# Patient Record
Sex: FEMALE | Race: WHITE | NOT HISPANIC OR LATINO | Employment: OTHER | URBAN - METROPOLITAN AREA
[De-identification: names, ages, dates, MRNs, and addresses within clinical notes are randomized per-mention and may not be internally consistent; named-entity substitution may affect disease eponyms.]

---

## 2017-01-30 ENCOUNTER — HOSPITAL ENCOUNTER (OUTPATIENT)
Dept: MAMMOGRAPHY | Facility: CLINIC | Age: 77
Discharge: HOME | End: 2017-01-30
Attending: SURGERY | Admitting: SURGERY
Payer: COMMERCIAL

## 2017-01-30 ENCOUNTER — OFFICE VISIT (OUTPATIENT)
Dept: SURGERY | Facility: CLINIC | Age: 77
End: 2017-01-30
Payer: COMMERCIAL

## 2017-01-30 VITALS — DIASTOLIC BLOOD PRESSURE: 88 MMHG | SYSTOLIC BLOOD PRESSURE: 142 MMHG | HEART RATE: 72 BPM

## 2017-01-30 DIAGNOSIS — C50.411 BREAST CANCER OF UPPER-OUTER QUADRANT OF RIGHT FEMALE BREAST (H): Primary | ICD-10-CM

## 2017-01-30 DIAGNOSIS — Z12.31 VISIT FOR SCREENING MAMMOGRAM: ICD-10-CM

## 2017-01-30 PROCEDURE — G0202 SCR MAMMO BI INCL CAD: HCPCS

## 2017-01-30 PROCEDURE — 99213 OFFICE O/P EST LOW 20 MIN: CPT | Performed by: SURGERY

## 2017-01-30 NOTE — PROGRESS NOTES
CHIEF COMPLAINT:  Chief Complaint   Patient presents with     RECHECK     6 month f/u; hx of right breast cancer; lumpectomy 2/2016       HISTORY OF PRESENT ILLNESS:  Yolanda Watson is a 76 year old female who is seen in follow up regarding right breast cancer for which she had a right lumpectomy and SLN biopsy 2/2016.  The tumor was small and low grade and the nodes were negative.  Yolanda had a consultation with radiation, but decided against radiation and is on Anastrazole.  She had mammograms today which are negative by report and my review.  She has noted no breast changes.  Since I last saw her, Yolanda had a right colectomy for cancer.  She says that she is recovering well.    REVIEW OF SYSTEMS:  Constitutional:  Negative for chills, fatigue, fever and weight change.  Eyes:  Negative for blurred vision, eye pain and photophobia.  ENT:  Negative for hearing problems, ENT pain, congestion, rhinorrhea, epistaxis, hoarseness and dental problems.  Cardiovascular:  Negative for chest pain, palpitations, tachycardia, orthopnea and edema.  Respiratory:  Negative for cough, dyspnea and hemoptysis.  Gastrointestinal:  Negative for abdominal pain, heartburn, constipation, diarrhea and stool changes.  Musculoskeletal:  Negative for arthralgias, back pain and myalgias.  Integumentary/Breast:  See HPI.    Past Medical History   Diagnosis Date     Hyperlipidaemia      HTN (hypertension)      Pre-diabetes      Tachycardia      Lung nodule 2013     Lung cancer     Gastro-oesophageal reflux disease      Anxiety      Depression      Hyperlipidemia      Cervical cancer (H)      Breast cancer, right breast (H) 1/2016     Herpes      Acoustic neuroma (H)      H/O: lung cancer 2013     Diabetes mellitus (H)      pre-diabetic no longer taking Metformin     Colon cancer (H)      Herpes zoster      Anemia      iron deficeincy     Arthritis      Dyspnea      Rib lesion        Past Surgical History   Procedure Laterality Date     Cataracts        Tonsillectomy       Cervical cancer screening results documented and reviewed       Hysterectomy  1966     Cervical CA, paps done every other year, done with appendectomy     Breast biopsy, rt/lt       Resect rib  2/15/2013     Procedure: RESECT RIB;  RESECTION PORTION RIGHT TWELVETH RIB;  Surgeon: Lorne Arenas MD;  Location:  OR     Appendectomy  1966     done with [facility]     Eye surgery       fiona cataracts     Thoracotomy  3/12/2013     Procedure: THORACOTOMY;  RIGHT THORACOTOMY, RIGHT MIDDLE AND LOWER LUNG LOBECTOMY, MEDIASTINAL LYMPH NODE DISSECTION ;  Surgeon: Lorne Arenas MD;  Location:  OR     Lobectomy lung  3/12/2013     Procedure: LOBECTOMY LUNG;;  Surgeon: Lorne Arenas MD;  Location:  OR     Excise node mediastinal  3/12/2013     Procedure: EXCISE NODE MEDIASTINAL;;  Surgeon: Lorne Arenas MD;  Location:  OR     Cholecystectomy  1991     Mini arc sling operation for stress incontinence  2009     Cl aff surgical pathology       Lumpectomy breast with seed localization Right 2/3/2016     Procedure: LUMPECTOMY BREAST WITH SEED LOCALIZATION;  Surgeon: Flory Pinto MD;  Location:  [facility]     Biopsy node sentinel Right 2/3/2016     Procedure: BIOPSY NODE SENTINEL;  Surgeon: Flory Pinto MD;  Location:  [facility]     Genitourinary surgery       bladder sling     Arthroscopy knee  2012     right knee scoped     Gyn surgery  1966     hyst      Laparoscopic assisted colectomy Right 10/27/2016     Procedure: LAPAROSCOPIC ASSISTED COLECTOMY;  Surgeon: Umang Arteaga MD;  Location:  OR     Colonoscopy         Family History   Problem Relation Age of Onset     Breast Cancer Paternal Grandmother      Colon Cancer       not specified     Fractures       Hip fracture, Aunt     Hyperlipidemia Mother      Hypertension Mother      HEART DISEASE Mother      OSTEOPOROSIS Mother      Hyperlipidemia Sister      Hyperlipidemia Brother      Prostate Cancer  Brother      Hypertension Sister      HEART DISEASE Maternal Grandfather      Prostate Cancer Father      Colon Cancer Cousin      son of paternal aunt with breast cancer.     Breast Cancer Paternal Aunt      Colon Cancer Paternal Aunt      Other Cancer Paternal Uncle      Esophageal     Other Cancer Maternal Aunt        Social History   Substance Use Topics     Smoking status: Former Smoker     Quit date: 04/24/1998     Smokeless tobacco: Not on file      Comment: quit 1998     Alcohol Use: 0.0 oz/week     0 Standard drinks or equivalent per week      Comment: glass of wine a day       Patient Active Problem List   Diagnosis     Lung cancer (H)     ACP (advance care planning)     OAB (overactive bladder)     Colon cancer (H)     Rectal bleeding     Allergies   Allergen Reactions     Demerol [Meperidine] Nausea and Vomiting     Sulfa Drugs Other (See Comments)     Extreme chills     Penicillins Rash     Swelling In mouth and tongue     Current Outpatient Prescriptions   Medication Sig Dispense Refill     LISINOPRIL PO Take 5 mg by mouth At Bedtime        FERROUS GLUCONATE PO Take 324 mg by mouth daily Takes in the afternoon ~ 4pm       Atorvastatin Calcium (LIPITOR PO) Take 20 mg by mouth At Bedtime       PARoxetine HCl (PAXIL PO) Take 20 mg by mouth At Bedtime       multivitamin, therapeutic (THERA-VIT) TABS Take 1 tablet by mouth At Bedtime No iron       Calcium Carb-Cholecalciferol (CALCIUM 600+D) 600-800 MG-UNIT TABS Take 2 tablets by mouth At Bedtime       ANASTROZOLE PO Take 1 mg by mouth At Bedtime        BIOTIN PO Take 2 tablets by mouth At Bedtime 2 x 5000 mcg       albuterol (PROAIR HFA, PROVENTIL HFA, VENTOLIN HFA) 108 (90 BASE) MCG/ACT inhaler Inhale 2 puffs into the lungs every 6 hours as needed        FENOFIBRATE PO Take 134 mg by mouth At Bedtime        omeprazole (PRILOSEC) 20 MG capsule Take 20 mg by mouth At Bedtime        Vitals: /88 mmHg  Pulse 72  BMI= There is no weight on file to  calculate BMI.    EXAM:  GENERAL: healthy, alert and no distress   BREAST:  The breasts appear symmetric with no overlying skin changes.  The nipples are normal bilaterally.  There is no dimpling or thickening of the skin. A well healed scar is seen on the upper outer right breast.  No mass is appreciated in either breast.  The breast tissue is generally soft with increased lobular density in both upper outer quadrants, more left than right.  There is no axillary or supraclavicular lymphadenopathy.  CARDIOVASCULAR:  No edema or JVD.  RESPIRATORY: negative findings: no chest deformities noted, no chest wall tenderness, breathing is slightly labored (due to previous lung surgery).  NECK: Neck supple. No adenopathy.  SKIN: No suspicious lesions or rashes  LYMPH: Normal cervical lymph nodes    ASSESSMENT:  Yolanda Watson seems to be doing well from the breast cancer standpoint.  There is no evidence of recurrence on exam or mammograms.  She is on anastrazole which she is tolerating well and will continue under Dr. Subramanian's direction.  Yolanda has been referred to genetics due to a mutation found in her colon cancer.    PLAN:  Yolanda will continue with annual breast screening and will follow up with me as needed.    Flory Pinto MD    Please route or send letter to:  Primary Care Provider (PCP)  Dr. Washington Subramanian      [facility]      Physical Exam

## 2017-01-30 NOTE — MR AVS SNAPSHOT
"              After Visit Summary   2017    Yolanda Watson    MRN: [id number]           Patient Information     Date Of Birth          1940        Visit Information        Provider Department      2017 12:00 PM Flory Pinto MD [facility]       Follow-ups after your visit        Who to contact     If you have questions or need follow up information about today's clinic visit or your schedule please contact [facility] directly at [phone].  Normal or non-critical lab and imaging results will be communicated to you by DocOnYouhart, letter or phone within 4 business days after the clinic has received the results. If you do not hear from us within 7 days, please contact the clinic through DocOnYouhart or phone. If you have a critical or abnormal lab result, we will notify you by phone as soon as possible.  Submit refill requests through TakeCharge or call your pharmacy and they will forward the refill request to us. Please allow 3 business days for your refill to be completed.          Additional Information About Your Visit        MyChart Information     TakeCharge lets you send messages to your doctor, view your test results, renew your prescriptions, schedule appointments and more. To sign up, go to [email or web address] . Click on \"Log in\" on the left side of the screen, which will take you to the Welcome page. Then click on \"Sign up Now\" on the right side of the page.     You will be asked to enter the access code listed below, as well as some personal information. Please follow the directions to create your username and password.     Your access code is: [email or web address]  Expires: 2017 12:29 PM     Your access code will  in 90 days. If you need help or a new code, please call your [facility] clinic or [phone].        Care EveryWhere ID     This is your Care EveryWhere ID. This could be " used by other organizations to access your [facility] medical records  [id number]        Your Vitals Were     Pulse                   72            Blood Pressure from Last 3 Encounters:   01/30/17 142/88   11/03/16 163/77   10/30/16 152/78    Weight from Last 3 Encounters:   10/27/16 212 lb (96.163 kg)   07/18/16 224 lb (101.606 kg)   02/15/16 221 lb (100.245 kg)              Today, you had the following     No orders found for display       Primary Care Provider Office Phone # Fax #    Soren Perdomo [phone]       [facility] [address]  [place] [address]        Thank you!     Thank you for choosing [facility]  for your care. Our goal is always to provide you with excellent care. Hearing back from our patients is one way we can continue to improve our services. Please take a few minutes to complete the written survey that you may receive in the mail after your visit with us. Thank you!             Your Updated Medication List - Protect others around you: Learn how to safely use, store and throw away your medicines at www.disposemymeds.org.          This list is accurate as of: 1/30/17 12:29 PM.  Always use your most recent med list.                   Brand Name Dispense Instructions for use    albuterol 108 (90 BASE) MCG/ACT Inhaler    PROAIR HFA/PROVENTIL HFA/VENTOLIN HFA     Inhale 2 puffs into the lungs every 6 hours as needed       ANASTROZOLE PO      Take 1 mg by mouth At Bedtime       BIOTIN PO      Take 2 tablets by mouth At Bedtime 2 x 5000 mcg       CALCIUM 600+D 600-800 MG-UNIT Tabs   Generic drug:  Calcium Carb-Cholecalciferol      Take 2 tablets by mouth At Bedtime       FENOFIBRATE PO      Take 134 mg by mouth At Bedtime       FERROUS GLUCONATE PO      Take 324 mg by mouth daily Takes in the afternoon ~ 4pm       LIPITOR PO      Take 20 mg by mouth At Bedtime       LISINOPRIL PO      Take 5 mg by mouth At Bedtime        multivitamin, therapeutic Tabs tablet      Take 1 tablet by mouth At Bedtime No iron       omeprazole 20 MG CR capsule    priLOSEC     Take 20 mg by mouth At Bedtime       PAXIL PO      Take 20 mg by mouth At Bedtime

## 2017-01-30 NOTE — Clinical Note
2017      RE: Yolanda Watson-:  [birth year]    HISTORY OF PRESENT ILLNESS:  Yolanda Watson is a 76 year old female who is seen in follow up regarding right breast cancer for which she had a right lumpectomy and SLN biopsy 2016.  The tumor was small and low grade and the nodes were negative.  Yolanda had a consultation with radiation, but decided against radiation and is on Anastrazole.  She had mammograms today which are negative by report and my review.  She has noted no breast changes.  Since I last saw her, Yolanda had a right colectomy for cancer.  She says that she is recovering well.    REVIEW OF SYSTEMS:  Constitutional:  Negative for chills, fatigue, fever and weight change.  Eyes:  Negative for blurred vision, eye pain and photophobia.  ENT:  Negative for hearing problems, ENT pain, congestion, rhinorrhea, epistaxis, hoarseness and dental problems.  Cardiovascular:  Negative for chest pain, palpitations, tachycardia, orthopnea and edema.  Respiratory:  Negative for cough, dyspnea and hemoptysis.  Gastrointestinal:  Negative for abdominal pain, heartburn, constipation, diarrhea and stool changes.  Musculoskeletal:  Negative for arthralgias, back pain and myalgias.  Integumentary/Breast:  See HPI.    Vitals: /88 mmHg  Pulse 72  BMI= There is no weight on file to calculate BMI.    EXAM:  GENERAL: healthy, alert and no distress   BREAST:  The breasts appear symmetric with no overlying skin changes.  The nipples are normal bilaterally.  There is no dimpling or thickening of the skin. A well healed scar is seen on the upper outer right breast.  No mass is appreciated in either breast.  The breast tissue is generally soft with increased lobular density in both upper outer quadrants, more left than right.  There is no axillary or supraclavicular lymphadenopathy.  CARDIOVASCULAR:  No edema or JVD.  RESPIRATORY: negative findings: no chest deformities noted, no chest wall tenderness, breathing is  slightly labored (due to previous lung surgery).  NECK: Neck supple. No adenopathy.  SKIN: No suspicious lesions or rashes  LYMPH: Normal cervical lymph nodes    ASSESSMENT:  Yolanda Watson seems to be doing well from the breast cancer standpoint.  There is no evidence of recurrence on exam or mammograms.  She is on anastrazole which she is tolerating well and will continue under Dr. Subramanian's direction.  Yolanda has been referred to genetics due to a mutation found in her colon cancer.    PLAN:  Yolanda will continue with annual breast screening and will follow up with me as needed.    Flory Pinto MD

## 2017-07-22 ENCOUNTER — OFFICE VISIT (OUTPATIENT)
Dept: URGENT CARE | Facility: URGENT CARE | Age: 77
End: 2017-07-22
Payer: COMMERCIAL

## 2017-07-22 VITALS
SYSTOLIC BLOOD PRESSURE: 132 MMHG | OXYGEN SATURATION: 96 % | BODY MASS INDEX: 31.01 KG/M2 | HEART RATE: 106 BPM | DIASTOLIC BLOOD PRESSURE: 80 MMHG | WEIGHT: 210 LBS | TEMPERATURE: 98.8 F

## 2017-07-22 DIAGNOSIS — H60.501 ACUTE OTITIS EXTERNA OF RIGHT EAR, UNSPECIFIED TYPE: Primary | ICD-10-CM

## 2017-07-22 PROCEDURE — 99213 OFFICE O/P EST LOW 20 MIN: CPT | Performed by: PHYSICIAN ASSISTANT

## 2017-07-22 RX ORDER — NEOMYCIN SULFATE, POLYMYXIN B SULFATE AND HYDROCORTISONE 10; 3.5; 1 MG/ML; MG/ML; [USP'U]/ML
4 SUSPENSION/ DROPS AURICULAR (OTIC) 4 TIMES DAILY
Qty: 6 ML | Refills: 0 | Status: SHIPPED | OUTPATIENT
Start: 2017-07-22 | End: 2017-07-29

## 2017-07-22 NOTE — PATIENT INSTRUCTIONS
External Ear Infection (Adult)    External otitis (also called  swimmer s ear ) is an infection in the ear canal. It is often caused by bacteria or fungus. It can occur a few days after water gets trapped in the ear canal (from swimming or bathing). It can also occur after cleaning too deeply in the ear canal with a cotton swab or other object. Sometimes, hair care products get into the ear canal and cause this problem.  Symptoms can include pain, fever, itching, redness, drainage, or swelling of the ear canal. Temporary hearing loss may also occur.  Home care    Do not try to clean the ear canal. This can push pus and bacteria deeper into the canal.    Use prescribed ear drops as directed. These help reduce swelling and fight the infection. If an ear wick was placed in the ear canal, apply drops right onto the end of the wick. The wick will draw the medication into the ear canal even if it is swollen closed.    A cotton ball may be loosely placed in the outer ear to absorb any drainage.    You may use acetaminophen or ibuprofen to control pain, unless another medication was prescribed. Note: If you have chronic liver or kidney disease or ever had a stomach ulcer or GI bleeding, talk to your health care provider before taking any of these medications.    Do not allow water to get into your ear when bathing. Also, avoid swimming until the infection has cleared.  Prevention    Keep your ears dry. This helps lower the risk of infection. Dry your ears with a towel or hair dryer after getting wet. Also, use ear plugs when swimming.    Do not stick any objects in the ear to remove wax.    If you feel water trapped in your ear, use ear drops right away. You can get these drops over the counter at most drugstores. They work by removing water from the ear canal.  Follow-up care  Follow up with your health care provider in one week, or as advised.  When to seek medical advice  Call your health care provider right away if  any of these occur:    Ear pain becomes worse or doesn t improve after 3 days of treatment    Redness or swelling of the outer ear occurs or gets worse    Headache    Painful or stiff neck    Drowsiness or confusion    Fever of 100.4 F (38 C) or higher, or as directed by your health care provider    Seizure  Date Last Reviewed: 3/22/2015    4490-0557 [employer]. [address] [place] [address]. All rights reserved. This information is not intended as a substitute for professional medical care. Always follow your healthcare professional's instructions.

## 2017-07-22 NOTE — PROGRESS NOTES
SUBJECTIVE:    Yolanda Watson is a 76 y.o. Woman who presents to  today for evaluation of right ear pain x 2 days duration. Notes onset of discomfort came on after using Q-Tip in right ear several days ago. No drainage. No fever. No other acute illness sxs.     ROS:     HEENT: Right ear pain.   RESP: No cough, wheezing or SOB  GI: Denies any N/V/D. No abdominal pain. Normal BM's  SKIN: Denies rash          Past Medical History:   Diagnosis Date     Acoustic neuroma (H)      Anemia     iron deficeincy     Anxiety      Arthritis      Breast cancer, right breast (H) 1/2016     Cervical cancer (H)      Colon cancer (H)      Depression      Diabetes mellitus (H)     pre-diabetic no longer taking Metformin     Dyspnea      Gastro-oesophageal reflux disease      H/O: lung cancer 2013     Herpes      Herpes zoster      HTN (hypertension)      Hyperlipidaemia      Hyperlipidemia      Lung nodule 2013    Lung cancer     Pre-diabetes      Rib lesion      Tachycardia        Current Outpatient Prescriptions:      LISINOPRIL PO, Take 5 mg by mouth At Bedtime , Disp: , Rfl:      FERROUS GLUCONATE PO, Take 324 mg by mouth daily Takes in the afternoon ~ 4pm, Disp: , Rfl:      Atorvastatin Calcium (LIPITOR PO), Take 20 mg by mouth At Bedtime, Disp: , Rfl:      PARoxetine HCl (PAXIL PO), Take 20 mg by mouth At Bedtime, Disp: , Rfl:      multivitamin, therapeutic (THERA-VIT) TABS, Take 1 tablet by mouth At Bedtime No iron, Disp: , Rfl:      Calcium Carb-Cholecalciferol (CALCIUM 600+D) 600-800 MG-UNIT TABS, Take 2 tablets by mouth At Bedtime, Disp: , Rfl:      ANASTROZOLE PO, Take 1 mg by mouth At Bedtime , Disp: , Rfl:      BIOTIN PO, Take 2 tablets by mouth At Bedtime 2 x 5000 mcg, Disp: , Rfl:      albuterol (PROAIR HFA, PROVENTIL HFA, VENTOLIN HFA) 108 (90 BASE) MCG/ACT inhaler, Inhale 2 puffs into the lungs every 6 hours as needed , Disp: , Rfl:      FENOFIBRATE PO, Take 134 mg by mouth At Bedtime , Disp: , Rfl:      omeprazole  "(PRILOSEC) 20 MG capsule, Take 20 mg by mouth At Bedtime , Disp: , Rfl:     Allergies   Allergen Reactions     Demerol [Meperidine] Nausea and Vomiting     Sulfa Drugs Other (See Comments)     Extreme chills     Penicillins Rash     Swelling In mouth and tongue           OBJECTIVE:  /80 (BP Location: Right arm, Patient Position: Chair, Cuff Size: Adult Large)  Pulse 106  Temp 98.8  F (37.1  C) (Tympanic)  Wt 210 lb (95.3 kg)  SpO2 96%  BMI 31.01 kg/m2    General appearance: alert and no apparent distress  Skin color is pink and without rash.  HEENT:   Conjunctiva not injected.  Sclera clear.  Left TM is normal: no effusions, no erythema, and normal landmarks.  Right external canal is mildly erythematous and mildly edematous. Small amount of purulent debris noted. I am only able to visualize approximately 50% of TM today. The portion of TM visualized in normal in color and wide light reflex noted. Mildly tender on manipulation of tragus. No adrienne-auricular erythema, tenderness or swelling. No facial or lateral neck swelling.   Nasal mucosa is normal.  Oropharyngeal exam is normal: no lesions, erythema, adenopathy or exudate.  Neck is supple with no adenopathy  CARDIAC:NORMAL - regular rate and rhythm without murmur.  RESP: Normal - CTA without rales, rhonchi, or wheezing.      ASSESSMENT/PLAN:    (H60.501) Acute otitis externa of right ear, unspecified type  (primary encounter diagnosis)  Plan: neomycin-polymyxin-hydrocortisone (CORTISPORIN)        [id number] otic suspension    1. Abx ear gtts as per above   2. Dry ear precautions  3. Follow-up with PCP if sxs change, worsen or fail to fully resolve with above tx.  4.  In addition to the above, OE \"red flag\" signs and sxs are reviewed with pt both verbally and by way of printed educational material for home review.  Pt verbalizes understanding of and agrees to the above plan.            "

## 2017-07-22 NOTE — NURSING NOTE
"Chief Complaint   Patient presents with     Urgent Care     Ear Problem     Pt states has right ear pain x 2 days. States balance has been off as well.        Initial /80 (BP Location: Right arm, Patient Position: Chair, Cuff Size: Adult Large)  Pulse 106  Temp 98.8  F (37.1  C) (Tympanic)  Wt 210 lb (95.3 kg)  SpO2 96%  BMI 31.01 kg/m2 Estimated body mass index is 31.01 kg/(m^2) as calculated from the following:    Height as of 10/27/16: 5' 9\" (1.753 m).    Weight as of this encounter: 210 lb (95.3 kg).  Medication Reconciliation: unable or not appropriate to perform   Daniel Conteh CMA (AAMA) 7/22/2017 1:44 PM    "

## 2017-07-22 NOTE — MR AVS SNAPSHOT
After Visit Summary   7/22/2017    Yolanda Watson    MRN: [id number]           Patient Information     Date Of Birth          1940        Visit Information        Provider Department      7/22/2017 1:25 PM Elmer Del Rio PA-C [facility]        Today's Diagnoses     Acute otitis externa of right ear, unspecified type    -  1      Care Instructions      External Ear Infection (Adult)    External otitis (also called  swimmer s ear ) is an infection in the ear canal. It is often caused by bacteria or fungus. It can occur a few days after water gets trapped in the ear canal (from swimming or bathing). It can also occur after cleaning too deeply in the ear canal with a cotton swab or other object. Sometimes, hair care products get into the ear canal and cause this problem.  Symptoms can include pain, fever, itching, redness, drainage, or swelling of the ear canal. Temporary hearing loss may also occur.  Home care    Do not try to clean the ear canal. This can push pus and bacteria deeper into the canal.    Use prescribed ear drops as directed. These help reduce swelling and fight the infection. If an ear wick was placed in the ear canal, apply drops right onto the end of the wick. The wick will draw the medication into the ear canal even if it is swollen closed.    A cotton ball may be loosely placed in the outer ear to absorb any drainage.    You may use acetaminophen or ibuprofen to control pain, unless another medication was prescribed. Note: If you have chronic liver or kidney disease or ever had a stomach ulcer or GI bleeding, talk to your health care provider before taking any of these medications.    Do not allow water to get into your ear when bathing. Also, avoid swimming until the infection has cleared.  Prevention    Keep your ears dry. This helps lower the risk of infection. Dry your ears with a towel or hair dryer after getting wet. Also, use ear plugs  when swimming.    Do not stick any objects in the ear to remove wax.    If you feel water trapped in your ear, use ear drops right away. You can get these drops over the counter at most drugstores. They work by removing water from the ear canal.  Follow-up care  Follow up with your health care provider in one week, or as advised.  When to seek medical advice  Call your health care provider right away if any of these occur:    Ear pain becomes worse or doesn t improve after 3 days of treatment    Redness or swelling of the outer ear occurs or gets worse    Headache    Painful or stiff neck    Drowsiness or confusion    Fever of 100.4 F (38 C) or higher, or as directed by your health care provider    Seizure  Date Last Reviewed: 3/22/2015    5869-8746 [employer]. [address] [place] [address]. All rights reserved. This information is not intended as a substitute for professional medical care. Always follow your healthcare professional's instructions.                Follow-ups after your visit        Who to contact     If you have questions or need follow up information about today's clinic visit or your schedule please contact [facility] directly at [phone].  Normal or non-critical lab and imaging results will be communicated to you by Areshayhart, letter or phone within 4 business days after the clinic has received the results. If you do not hear from us within 7 days, please contact the clinic through Areshayhart or phone. If you have a critical or abnormal lab result, we will notify you by phone as soon as possible.  Submit refill requests through Mebelrama or call your pharmacy and they will forward the refill request to us. Please allow 3 business days for your refill to be completed.          Additional Information About Your Visit        Areshayhart Information     Mebelrama lets you send messages to your doctor, view your test results, renew your prescriptions, schedule  "appointments and more. To sign up, go to [email or web address] . Click on \"Log in\" on the left side of the screen, which will take you to the Welcome page. Then click on \"Sign up Now\" on the right side of the page.     You will be asked to enter the access code listed below, as well as some personal information. Please follow the directions to create your username and password.     Your access code is: [email or web address]  Expires: 10/20/2017  2:19 PM     Your access code will  in 90 days. If you need help or a new code, please call your [place] clinic or [phone].        Care EveryWhere ID     This is your Care EveryWhere ID. This could be used by other organizations to access your [place] medical records  [id number]        Your Vitals Were     Pulse Temperature Pulse Oximetry BMI (Body Mass Index)          106 98.8  F (37.1  C) (Tympanic) 96% 31.01 kg/m2         Blood Pressure from Last 3 Encounters:   17 132/80   17 142/88   16 163/77    Weight from Last 3 Encounters:   17 210 lb (95.3 kg)   10/27/16 212 lb (96.2 kg)   16 224 lb (101.6 kg)              Today, you had the following     No orders found for display         Today's Medication Changes          These changes are accurate as of: 17  2:19 PM.  If you have any questions, ask your nurse or doctor.               Start taking these medicines.        Dose/Directions    neomycin-polymyxin-hydrocortisone 3.5-62609-4 otic suspension   Commonly known as:  CORTISPORIN   Used for:  Acute otitis externa of right ear, unspecified type   Started by:  Elmer Del Rio PA-C        Dose:  4 drop   Place 4 drops into the right ear 4 times daily for 7 days   Quantity:  6 mL   Refills:  0            Where to get your medicines      These medications were sent to [facility] [address] [place] - [address] [facility] [facility]  AT [place] [address] [facility] [place] [address]     Phone:  " [phone]     neomycin-polymyxin-hydrocortisone 3.5-27017-9 otic suspension                Primary Care Provider Office Phone # Fax #    Soren Perdomo [phone]       [facility] [address]  [place] [address]        Equal Access to Services     [facility] : Hadii aad ku hadasho Soomaali, waaxda luqadaha, qaybta kaalmada adeegyada, waxay idiin hayaan adeeg khkalpeshsh lascottn . So [facility] [phone].    ATENCIÓN: Si habla español, tiene a ferreira disposición servicios gratuitos de asistencia lingüística. Domonique al [phone].    We comply with applicable federal civil rights laws and [place] laws. We do not discriminate on the basis of race, color, national origin, age, disability sex, sexual orientation or gender identity.            Thank you!     Thank you for choosing [facility]  for your care. Our goal is always to provide you with excellent care. Hearing back from our patients is one way we can continue to improve our services. Please take a few minutes to complete the written survey that you may receive in the mail after your visit with us. Thank you!             Your Updated Medication List - Protect others around you: Learn how to safely use, store and throw away your medicines at www.disposemymeds.org.          This list is accurate as of: 7/22/17  2:19 PM.  Always use your most recent med list.                   Brand Name Dispense Instructions for use Diagnosis    albuterol 108 (90 BASE) MCG/ACT Inhaler    PROAIR HFA/PROVENTIL HFA/VENTOLIN HFA     Inhale 2 puffs into the lungs every 6 hours as needed        ANASTROZOLE PO      Take 1 mg by mouth At Bedtime        BIOTIN PO      Take 2 tablets by mouth At Bedtime 2 x 5000 mcg        CALCIUM 600+D 600-800 MG-UNIT Tabs   Generic drug:  Calcium Carb-Cholecalciferol      Take 2 tablets by mouth At Bedtime        FENOFIBRATE PO      Take 134 mg by mouth At Bedtime        FERROUS GLUCONATE PO       Take 324 mg by mouth daily Takes in the afternoon ~ 4pm        LIPITOR PO      Take 20 mg by mouth At Bedtime        LISINOPRIL PO      Take 5 mg by mouth At Bedtime        multivitamin, therapeutic Tabs tablet      Take 1 tablet by mouth At Bedtime No iron        neomycin-polymyxin-hydrocortisone 3.5-83271-9 otic suspension    CORTISPORIN    6 mL    Place 4 drops into the right ear 4 times daily for 7 days    Acute otitis externa of right ear, unspecified type       omeprazole 20 MG CR capsule    priLOSEC     Take 20 mg by mouth At Bedtime        PAXIL PO      Take 20 mg by mouth At Bedtime

## 2018-01-04 ENCOUNTER — TRANSFERRED RECORDS (OUTPATIENT)
Dept: HEALTH INFORMATION MANAGEMENT | Facility: CLINIC | Age: 78
End: 2018-01-04

## 2018-01-30 ENCOUNTER — TRANSFERRED RECORDS (OUTPATIENT)
Dept: HEALTH INFORMATION MANAGEMENT | Facility: CLINIC | Age: 78
End: 2018-01-30

## 2018-01-31 ENCOUNTER — TRANSFERRED RECORDS (OUTPATIENT)
Dept: HEALTH INFORMATION MANAGEMENT | Facility: CLINIC | Age: 78
End: 2018-01-31

## 2018-02-07 ENCOUNTER — TRANSFERRED RECORDS (OUTPATIENT)
Dept: HEALTH INFORMATION MANAGEMENT | Facility: CLINIC | Age: 78
End: 2018-02-07

## 2018-02-20 ENCOUNTER — OFFICE VISIT (OUTPATIENT)
Dept: SURGERY | Facility: CLINIC | Age: 78
End: 2018-02-20
Payer: COMMERCIAL

## 2018-02-20 ENCOUNTER — TELEPHONE (OUTPATIENT)
Dept: MAMMOGRAPHY | Facility: CLINIC | Age: 78
End: 2018-02-20

## 2018-02-20 VITALS
SYSTOLIC BLOOD PRESSURE: 110 MMHG | WEIGHT: 205 LBS | HEART RATE: 83 BPM | HEIGHT: 70 IN | DIASTOLIC BLOOD PRESSURE: 64 MMHG | BODY MASS INDEX: 29.35 KG/M2 | OXYGEN SATURATION: 94 %

## 2018-02-20 DIAGNOSIS — Z17.0 MALIGNANT NEOPLASM OF UPPER-OUTER QUADRANT OF LEFT BREAST IN FEMALE, ESTROGEN RECEPTOR POSITIVE (H): Primary | ICD-10-CM

## 2018-02-20 DIAGNOSIS — C50.412 MALIGNANT NEOPLASM OF UPPER-OUTER QUADRANT OF LEFT BREAST IN FEMALE, ESTROGEN RECEPTOR POSITIVE (H): Primary | ICD-10-CM

## 2018-02-20 PROCEDURE — 99205 OFFICE O/P NEW HI 60 MIN: CPT | Performed by: SURGERY

## 2018-02-20 NOTE — MR AVS SNAPSHOT
"              After Visit Summary   2018    Yolanda Watson    MRN: [id number]           Patient Information     Date Of Birth          1940        Visit Information        Provider Department      2018 10:15 AM Denae Perez MD [facility]       Follow-ups after your visit        Who to contact     If you have questions or need follow up information about today's clinic visit or your schedule please contact [facility] directly at [phone].  Normal or non-critical lab and imaging results will be communicated to you by MyChart, letter or phone within 4 business days after the clinic has received the results. If you do not hear from us within 7 days, please contact the clinic through T[employer]D Therapeutics[employer] or phone. If you have a critical or abnormal lab result, we will notify you by phone as soon as possible.  Submit refill requests through [employer] or call your pharmacy and they will forward the refill request to us. Please allow 3 business days for your refill to be completed.          Additional Information About Your Visit        MyChart Information     In[employer]otion lets you send messages to your doctor, view your test results, renew your prescriptions, schedule appointments and more. To sign up, go to [email or web address] . Click on \"Log in\" on the left side of the screen, which will take you to the Welcome page. Then click on \"Sign up Now\" on the right side of the page.     You will be asked to enter the access code listed below, as well as some personal information. Please follow the directions to create your username and password.     Your access code is: [email or web address]  Expires: 2018 10:53 AM     Your access code will  in 90 days. If you need help or a new code, please call your [place] clinic or [phone].        Care EveryWhere ID     This is your Care EveryWhere ID. This could be used by other organizations to " "access your [facility] medical records  [id number]        Your Vitals Were     Pulse Height Pulse Oximetry BMI (Body Mass Index)          83 5' 9.5\" (1.765 m) 94% 29.84 kg/m2         Blood Pressure from Last 3 Encounters:   02/20/18 110/64   07/22/17 132/80   01/30/17 142/88    Weight from Last 3 Encounters:   02/20/18 205 lb (93 kg)   07/22/17 210 lb (95.3 kg)   10/27/16 212 lb (96.2 kg)              Today, you had the following     No orders found for display       Primary Care Provider Office Phone # Fax #    Soren Perdomo [phone]       [facility] [address]  [place] [address]        Equal Access to Services     [facility] : Hadii celine chrisitan hadasho [place], waaxda luqadaha, qaybta kaalmada adejamesyacherelle, agustina donaldson . So [facility] [phone].    ATENCIÓN: Si habla español, tiene a ferreira disposición servicios gratuitos de asistencia lingüística. Domonique al [phone].    We comply with applicable federal civil rights laws and [place] laws. We do not discriminate on the basis of race, color, national origin, age, disability, sex, sexual orientation, or gender identity.            Thank you!     Thank you for choosing [facility]  for your care. Our goal is always to provide you with excellent care. Hearing back from our patients is one way we can continue to improve our services. Please take a few minutes to complete the written survey that you may receive in the mail after your visit with us. Thank you!             Your Updated Medication List - Protect others around you: Learn how to safely use, store and throw away your medicines at www.disposemymeds.org.          This list is accurate as of 2/20/18 10:53 AM.  Always use your most recent med list.                   Brand Name Dispense Instructions for use Diagnosis    ANASTROZOLE PO      Take 1 mg by mouth At Bedtime        ASPIRIN PO      Take 81 mg by mouth daily        " FENOFIBRATE PO      Take 134 mg by mouth At Bedtime        LIPITOR PO      Take 20 mg by mouth At Bedtime        LISINOPRIL PO      Take 20 mg by mouth daily        multivitamin, therapeutic Tabs tablet      Take 1 tablet by mouth At Bedtime No iron        PAXIL PO      Take 20 mg by mouth At Bedtime

## 2018-02-20 NOTE — NURSING NOTE
Breast Patients    BREAST PATIENTS (ALL)    1-Do you have any of the following symptoms? Lump(s) or Mass(es)  2-In which breast are you having the symptoms? left  3-Do you use hormones?  Yes  Type: Anastrozole  Dosage: 1mg daily   4-Have you had a Mammogram? Yes  Where: [facility]  Date: 1/30/18  5-Have you ever had a breast cyst drained? No  6-Have you ever had a breast biopsy? Yes  Date: Left 2/7/18; Right 2016  7-Have you ever had a Breast Cancer? Yes  Side: Right  Date: 2016   8-Is there a history of Breast Cancer in your family? Yes  Relationship to you: Aunt and cousins  9-Have you ever had Ovarian Cancer? No  10-Is there a history of Ovarian Cancer in your family? No  11-Summarize your caffeine intake (i.e. coffee, tea, chocolate, soda etc.): 1-2 cups of coffee daily     BREAST PATIENTS (FEMALE)    12-What age did your periods begin? 11  13-Date your last menstrual period began? Hysterectomy in 1966  14-Number of full-term pregnancies: 3  15-Your age when your first child was born? 19  16-Did you nurse your children? Yes  17-Are you pregnant now? No  18-Have you begun menopause? Yes  Age Menopause began:  Hysterectomy in 1966  19-Have you had either ovary removed?No  20-Do you have breast implants? No     Eladia Aguilar MA

## 2018-02-20 NOTE — Clinical Note
I saw Yolanda today in clinic and  I discussed with her that with a cancer >2cm and Her2 positivity, chemo is sometimes 1st; however, that if you recommend surgery up front - this is a reasonable option as well. I did have [facility] put her on the list for conference next week. She has a very impressive list of prior cancers... Is she BRCA 2 positive? This was not clear in notes to me.

## 2018-02-20 NOTE — PROGRESS NOTES
[facility] Breast Surgery Consultation    HPI:   Yolanda Watson is a 77 year old female who is seen in consultation at the request of Dr. Subramanian for evaluation of newly diagnosed left breast cancer.  Yolanda has a complicated oncology history with a history of a right lung cancer in 2013 status post surgery and chemotherapy with radiation.  She is diagnosed with a right breast cancer in January 2016 which was stage I at 8 mm, ER IN positive.  She underwent lumpectomy with sentinel lymph node biopsy.  Her lymph nodes were negative.  She did not undergo radiation therapy as the tumor was very small with negative margins.  She is currently on anastrozole.  She was diagnosed with a stage II colon cancer in October 2016 and was treated with a colectomy.  She underwent her screening mammogram in January 2018 which revealed an area of concern in the left upper outer quadrant.  Her imaging was done at [facility] and I have reviewed her imaging as well as the reports.    She reports she does self breast exams.  She had not noted any changes.  She denies any nipple drainage.  Aside from the right breast lumpectomy she has had several breast cyst aspirations and she is unsure of which side they were done on.    She reached menarche at age 12 years old.  SHe had a hysterectomy in 1966.  She has 3 pregnancies.  The first was at age 19.  She breast-fed her children for 6 months and 3 months.  She used hormone replacement medication for a short period after her hysterectomy.  She had no infertility treatment.    Her family history is significant for her father having metastatic prostate cancer.  She has a maternal aunt who had breast cancer, maternal niece with breast cancer and several cousins with breast cancer.  She previously underwent genetic testing and was noted to have a variance of uncertain clinical significance.  From Dr. Subramanian's noted also mentions BRCA2 positivity    Imaging:     Mammogram, diagnostic mammogram and  US from [facility].     2/12/2018: Left unilateral diagnostic mammogram with tomos synthesis.  Left breast ultrasound.  Findings: The breasts are heterogeneously dense.  Spot compression views demonstrate presence of a spiculated mass in the left upper outer breast.  Ultrasound findings: Ultrasound over the left outer breast at the 2 o'clock position, 8 cm from the nipple demonstrate heterogeneous hypoechoic ill-defined mass measuring 3.1 x 2 cm.  BI-RADS 5: Highly suggestive of malignancy.  Biopsy completed with ultrasound guidance.      Percutaneous core needle biopsy, left: Invasive ductal carcinoma with lobular differentiation and signet ring cells.  Grade 2.  Estrogen positive at 91%.  Progesterone receptor negative.  HER-2/rudolph is amplified.      Past Medical History:   has a past medical history of Acoustic neuroma (H); Anemia; Anxiety; Arthritis; Breast cancer, right breast (H) (1/2016); Cervical cancer (H); Colon cancer (H); Depression; Diabetes mellitus (H); Dyspnea; Gastro-oesophageal reflux disease; H/O: lung cancer (2013); Herpes; Herpes zoster; HTN (hypertension); Hyperlipidaemia; Hyperlipidemia; Lung nodule (2013); Pre-diabetes; Rib lesion; and Tachycardia.      Current Outpatient Prescriptions:      ASPIRIN PO, Take 81 mg by mouth daily, Disp: , Rfl:      LISINOPRIL PO, Take 20 mg by mouth daily , Disp: , Rfl:      Atorvastatin Calcium (LIPITOR PO), Take 20 mg by mouth At Bedtime, Disp: , Rfl:      PARoxetine HCl (PAXIL PO), Take 20 mg by mouth At Bedtime, Disp: , Rfl:      multivitamin, therapeutic (THERA-VIT) TABS, Take 1 tablet by mouth At Bedtime No iron, Disp: , Rfl:      ANASTROZOLE PO, Take 1 mg by mouth At Bedtime , Disp: , Rfl:      FENOFIBRATE PO, Take 134 mg by mouth At Bedtime , Disp: , Rfl:     Past Surgical History:  Past Surgical History:   Procedure Laterality Date     APPENDECTOMY  1966    done with [facility]     ARTHROSCOPY KNEE  2012    right knee scoped     BIOPSY NODE SENTINEL Right 2/3/2016     Procedure: BIOPSY NODE SENTINEL;  Surgeon: Flory Pinto MD;  Location:  [facility]     BREAST BIOPSY, RT/LT       cataracts       CERVICAL CANCER SCREENING RESULTS DOCUMENTED AND REVIEWED       CHOLECYSTECTOMY  1991     [facility] SURGICAL PATHOLOGY       COLONOSCOPY       EXCISE NODE MEDIASTINAL  3/12/2013    Procedure: EXCISE NODE MEDIASTINAL;;  Surgeon: Lorne Arenas MD;  Location:  OR     EYE SURGERY      fiona cataracts     GENITOURINARY SURGERY      bladder sling     GYN SURGERY  1966    hyst      HYSTERECTOMY  1966    Cervical CA, paps done every other year, done with appendectomy     LAPAROSCOPIC ASSISTED COLECTOMY Right 10/27/2016    Procedure: LAPAROSCOPIC ASSISTED COLECTOMY;  Surgeon: Umang Arteaga MD;  Location:  OR     LOBECTOMY LUNG  3/12/2013    Procedure: LOBECTOMY LUNG;;  Surgeon: Lorne Arenas MD;  Location:  OR     LUMPECTOMY BREAST WITH SEED LOCALIZATION Right 2/3/2016    Procedure: LUMPECTOMY BREAST WITH SEED LOCALIZATION;  Surgeon: Flory Pinto MD;  Location: [facility]     MINI ARC SLING OPERATION FOR STRESS INCONTINENCE  2009     RESECT RIB  2/15/2013    Procedure: RESECT RIB;  RESECTION PORTION RIGHT TWELVETH RIB;  Surgeon: Lorne Arenas MD;  Location:  OR     THORACOTOMY  3/12/2013    Procedure: THORACOTOMY;  RIGHT THORACOTOMY, RIGHT MIDDLE AND LOWER LUNG LOBECTOMY, MEDIASTINAL LYMPH NODE DISSECTION ;  Surgeon: Lorne Arenas MD;  Location:  OR     TONSILLECTOMY             Allergies   Allergen Reactions     Demerol [Meperidine] Nausea and Vomiting     Sulfa Drugs Other (See Comments)     Extreme chills     Penicillins Rash     Swelling In mouth and tongue        Social History:  Social History     Social History     Marital status:      Spouse name: N/A     Number of children: 3     Years of education: N/A     Occupational History      Retired     Social History Main Topics     Smoking status: Former Smoker     Quit date:  "4/24/1998     Smokeless tobacco: Never Used      Comment: quit 1998     Alcohol use 0.0 oz/week     0 Standard drinks or equivalent per week      Comment: glass of wine a day     Drug use: No     Sexual activity: Yes     Partners: Male     Birth control/ protection: Female Surgical     Other Topics Concern     Not on file     Social History Narrative    12/2015    -    3 kids    Retired        Pap-11/8/2010 WNL    Mammo-12/2/13     Colonoscopy-3/3/2010 WNL, no further recommended per pt     Dexa-1/1/2008            ROS:  The 10 point review of systems is negative other than noted in the HPI and above.    PE:  Vitals: /64  Pulse 83  Ht 1.765 m (5' 9.5\")  Wt 93 kg (205 lb)  SpO2 94%  BMI 29.84 kg/m2  General appearance: well-nourished, sitting comfortably, no apparent distress  Psych: normal affect, pleasant  HEENT:  Head normocephalic and atraumatic, pupils equal and round, conjunctivae clear, mucous membranes moist, external ears and nose normal  Neck: Supple without thyromegaly or masses  Lungs: Respirations unlabored  Lymphatic: No cervical, or supraclavicular lymphadenopathy  Extremities: Without edema  Musculoskeletal:  Normal station and gait  Neurologic: nonfocal, grossly intact times four extremities, alert and oriented times three  Psychiatric: Mood and affect are appropriate  Skin: Without lesions or rashes    Breast:  A bilateral breast exam was performed in the supine position.. Bilateral breasts were palpated in a circumferential clockwise fashion including the supraclavicular and axillary areas.   Right breast: Well-healed lumpectomy scar in the upper outer quadrant of the breast.  Manger breast tissue is soft with no palpable masses.  Nipple and areolar complex is normal.  Left breast: Palpable mass in the upper outer quadrant corresponding to biopsy site with overlying ecchymosis.  Mass feels to be 3-4 cm in size.  Mildly tender to palpation of this.  Nipple areolar complex is " normal      Lymph:       No supraclavicular/infraclavicular adenopathy.   Axillary adenopathy: none    Assessment:  Left breast invasive ductal carcinoma, grade 2, ER +, NC -, [id number] + measuring 3 cm at 2:00 and 8 cm from the nipple.    Plan:  Tarah is a very pleasant 77-year-old female who has newly diagnosed left breast cancer.  I reviewed the imaging and pathology reports with her and her granddaughter and explained the findings.  We talked about the fact that this is invasive ductal carcinoma.  We discussed the receptors with the estrogen positivity and her being on anastrozole.  We also discussed the HER-2/rudolph positivity and that given the size greater than 2 cm occasionally chemotherapy is initiated prior to surgery.  I do think she is a candidate for a lumpectomy with sentinel lymph node biopsy and this is what she would lean towards.  She will meet with Dr. Subramanian on Friday to discuss possible neoadjuvant chemotherapy.    We next discussed the surgical options for treatment in detail.  I described the procedures for lumpectomy and mastectomy including the details of the procedures, the risks, anesthesia and expected recovery.      I reviewed the data regarding lumpectomy and radiation vs mastectomy that shows that the local recurrence risk is slightly higher for lumpectomy and radiation vs mastectomy (5-10% vs. 1-2%), but the survival at 20 years is the same.  I advised  that lymph node biopsy is recommended whenever we are dealing with invasive breast cancer and described the procedure for sentinel lymph node biopsy.  We talked about the risk for lymphedema which is small with removal of only a few nodes, but certainly not zero. For a lumpectomy, I would have radiology place a seed preoperatively to assist in localization.    We talked about post-lumpectomy radiation, the course and usual side effects.  I told her that it may be reasonable to omit radiation if the tumor is very small, removed with clear  margins and the nodes are negative since data shows that it does not improve survival for women over 70.     We also talked about post-mastectomy reconstruction and the stages involved.    We have had a detailed discussion regarding the recommended treatment for axillary lymph node metastases for women undergoing lumpectomy followed by radiation and for women undergoing mastectomy.    We have discussed the indication, alternatives, risks and expected recovery.  Specifically, we have discussed local recurrence of cancer, risk of future second primary breast cancer, incisions, scarring, breast deformity, volume loss, possible need for axillary lymphadenectomy, postoperative infection, anesthesia, bleeding, blood transfusion, DVT, PE, postoperative restrictions and physical limitations.  We have discussed the recommended interventions and treatments for these outcomes.    All questions have been answered to the best of my ability.        Denae Perez MD      Please route or send letter to:  Primary Care Provider (PCP) and Referring Provider

## 2018-02-23 ENCOUNTER — TRANSFERRED RECORDS (OUTPATIENT)
Dept: HEALTH INFORMATION MANAGEMENT | Facility: CLINIC | Age: 78
End: 2018-02-23

## 2018-02-23 DIAGNOSIS — C50.912 MALIGNANT NEOPLASM OF LEFT BREAST (H): Primary | ICD-10-CM

## 2018-02-26 ENCOUNTER — TELEPHONE (OUTPATIENT)
Dept: SURGERY | Facility: CLINIC | Age: 78
End: 2018-02-26

## 2018-02-26 ENCOUNTER — TELEPHONE (OUTPATIENT)
Dept: MAMMOGRAPHY | Facility: CLINIC | Age: 78
End: 2018-02-26

## 2018-02-26 DIAGNOSIS — C50.919 BREAST CANCER (H): Primary | ICD-10-CM

## 2018-02-26 NOTE — TELEPHONE ENCOUNTER
I called patient after her recent surgical consult with Dr. Denae Perez. Reviewed information with patient. Procedure scheduled on 3/5/18 for a Left Breast Seed Localized Lumpectomy with Left [place] Lymph Node Biopsy with port placement which is scheduled with Dr Denae Perez.  I explained the seed localizing procedure.  Answered general questions about the sentinel node procedure, surgery and expected recovery information.  Offer to mail to patient education materials including, After you Lumpectomy and Exercises after Breast Surgery.  Patient accepted offer for information, which I will mail out today.  I encouraged patient to call nurse navigator at [phone] as questions arise.   Patient verbalized understanding and agrees with the plan of care, and follow up.  PUSHPA LeonN, RN

## 2018-02-26 NOTE — TELEPHONE ENCOUNTER
Type of surgery: Seed localized left breast lumpectomy with left sentinel lymph node biopsy and port placement  Location of surgery: [facility]  Date and time of surgery: 3/5/18 at 2:30pm  Anesthesia: General or Mac  Seed & SLN injection ordered with Penny  Surgeon: Dr. Denae Perez  Pre-Op Appt Date:   Post-Op Appt Date:   Packet sent out: Yes  Pre-cert/Authorization completed:  Not Applicable  Date: 2/26/18

## 2018-02-27 ENCOUNTER — HOSPITAL ENCOUNTER (OUTPATIENT)
Dept: CARDIOLOGY | Facility: CLINIC | Age: 78
End: 2018-02-27
Attending: INTERNAL MEDICINE
Payer: COMMERCIAL

## 2018-02-27 DIAGNOSIS — C50.919 BREAST CANCER (H): ICD-10-CM

## 2018-02-27 PROCEDURE — 40000264 ECHO COMPLETE WITH OPTISON: Performed by: INTERNAL MEDICINE

## 2018-02-27 PROCEDURE — 93306 TTE W/DOPPLER COMPLETE: CPT | Performed by: INTERNAL MEDICINE

## 2018-02-27 RX ADMIN — Medication 9 ML: at 11:01

## 2018-03-01 ENCOUNTER — TRANSFERRED RECORDS (OUTPATIENT)
Dept: HEALTH INFORMATION MANAGEMENT | Facility: CLINIC | Age: 78
End: 2018-03-01

## 2018-03-01 ENCOUNTER — DOCUMENTATION ONLY (OUTPATIENT)
Dept: CARDIOLOGY | Facility: CLINIC | Age: 78
End: 2018-03-01

## 2018-03-05 ENCOUNTER — ANESTHESIA EVENT (OUTPATIENT)
Dept: SURGERY | Facility: CLINIC | Age: 78
End: 2018-03-05
Payer: COMMERCIAL

## 2018-03-05 ENCOUNTER — OFFICE VISIT (OUTPATIENT)
Dept: SURGERY | Facility: PHYSICIAN GROUP | Age: 78
End: 2018-03-05
Payer: COMMERCIAL

## 2018-03-05 ENCOUNTER — HOSPITAL ENCOUNTER (OUTPATIENT)
Dept: MAMMOGRAPHY | Facility: CLINIC | Age: 78
End: 2018-03-05
Attending: SURGERY
Payer: COMMERCIAL

## 2018-03-05 ENCOUNTER — HOSPITAL ENCOUNTER (OUTPATIENT)
Facility: CLINIC | Age: 78
Discharge: HOME | End: 2018-03-05
Attending: SURGERY | Admitting: SURGERY
Payer: COMMERCIAL

## 2018-03-05 ENCOUNTER — SURGERY (OUTPATIENT)
Age: 78
End: 2018-03-05

## 2018-03-05 ENCOUNTER — HOSPITAL ENCOUNTER (OUTPATIENT)
Dept: NUCLEAR MEDICINE | Facility: CLINIC | Age: 78
Setting detail: NUCLEAR MEDICINE
End: 2018-03-05
Attending: SURGERY
Payer: COMMERCIAL

## 2018-03-05 ENCOUNTER — ANESTHESIA (OUTPATIENT)
Dept: SURGERY | Facility: CLINIC | Age: 78
End: 2018-03-05
Payer: COMMERCIAL

## 2018-03-05 ENCOUNTER — APPOINTMENT (OUTPATIENT)
Dept: GENERAL RADIOLOGY | Facility: CLINIC | Age: 78
End: 2018-03-05
Attending: SURGERY
Payer: COMMERCIAL

## 2018-03-05 VITALS
HEIGHT: 69 IN | BODY MASS INDEX: 30.51 KG/M2 | TEMPERATURE: 98.8 F | OXYGEN SATURATION: 94 % | RESPIRATION RATE: 16 BRPM | WEIGHT: 206 LBS | DIASTOLIC BLOOD PRESSURE: 90 MMHG | SYSTOLIC BLOOD PRESSURE: 157 MMHG

## 2018-03-05 DIAGNOSIS — C50.912 MALIGNANT NEOPLASM OF LEFT BREAST (H): ICD-10-CM

## 2018-03-05 DIAGNOSIS — G89.18 ACUTE POST-OPERATIVE PAIN: Primary | ICD-10-CM

## 2018-03-05 PROCEDURE — 25000132 ZZH RX MED GY IP 250 OP 250 PS 637: Performed by: PHYSICIAN ASSISTANT

## 2018-03-05 PROCEDURE — 25000128 H RX IP 250 OP 636

## 2018-03-05 PROCEDURE — 34300033 ZZH RX 343: Performed by: SURGERY

## 2018-03-05 PROCEDURE — 25000566 ZZH SEVOFLURANE, EA 15 MIN: Performed by: SURGERY

## 2018-03-05 PROCEDURE — 88341 IMHCHEM/IMCYTCHM EA ADD ANTB: CPT | Mod: 26 | Performed by: SURGERY

## 2018-03-05 PROCEDURE — 19301 PARTIAL MASTECTOMY: CPT | Performed by: SURGERY

## 2018-03-05 PROCEDURE — 71000027 ZZH RECOVERY PHASE 2 EACH 15 MINS: Performed by: SURGERY

## 2018-03-05 PROCEDURE — 88342 IMHCHEM/IMCYTCHM 1ST ANTB: CPT | Performed by: SURGERY

## 2018-03-05 PROCEDURE — 71000012 ZZH RECOVERY PHASE 1 LEVEL 1 FIRST HR: Performed by: SURGERY

## 2018-03-05 PROCEDURE — 19285 PERQ DEV BREAST 1ST US IMAG: CPT | Mod: LT

## 2018-03-05 PROCEDURE — 40000277 XR SURGERY CARM FLUORO LESS THAN 5 MIN W STILLS

## 2018-03-05 PROCEDURE — 37000009 ZZH ANESTHESIA TECHNICAL FEE, EACH ADDTL 15 MIN: Performed by: SURGERY

## 2018-03-05 PROCEDURE — 88307 TISSUE EXAM BY PATHOLOGIST: CPT | Performed by: SURGERY

## 2018-03-05 PROCEDURE — 38900 IO MAP OF SENT LYMPH NODE: CPT | Performed by: SURGERY

## 2018-03-05 PROCEDURE — 40000268 MA BREAST SPECIMEN LEFT OR

## 2018-03-05 PROCEDURE — 25000125 ZZHC RX 250

## 2018-03-05 PROCEDURE — 36000060 ZZH SURGERY LEVEL 3 W FLUORO 1ST 30 MIN: Performed by: SURGERY

## 2018-03-05 PROCEDURE — 25000125 ZZHC RX 250: Performed by: SURGERY

## 2018-03-05 PROCEDURE — 36000058 ZZH SURGERY LEVEL 3 EA 15 ADDTL MIN: Performed by: SURGERY

## 2018-03-05 PROCEDURE — 88307 TISSUE EXAM BY PATHOLOGIST: CPT | Mod: 26 | Performed by: SURGERY

## 2018-03-05 PROCEDURE — 40000170 ZZH STATISTIC PRE-PROCEDURE ASSESSMENT II: Performed by: SURGERY

## 2018-03-05 PROCEDURE — 40000986 MA POST PROCEDURE LEFT

## 2018-03-05 PROCEDURE — 37000008 ZZH ANESTHESIA TECHNICAL FEE, 1ST 30 MIN: Performed by: SURGERY

## 2018-03-05 PROCEDURE — 71000013 ZZH RECOVERY PHASE 1 LEVEL 1 EA ADDTL HR: Performed by: SURGERY

## 2018-03-05 PROCEDURE — 27210794 ZZH OR GENERAL SUPPLY STERILE: Performed by: SURGERY

## 2018-03-05 PROCEDURE — 88342 IMHCHEM/IMCYTCHM 1ST ANTB: CPT | Mod: 26 | Performed by: SURGERY

## 2018-03-05 PROCEDURE — 88341 IMHCHEM/IMCYTCHM EA ADD ANTB: CPT | Performed by: SURGERY

## 2018-03-05 PROCEDURE — 38525 BIOPSY/REMOVAL LYMPH NODES: CPT | Mod: 51 | Performed by: SURGERY

## 2018-03-05 PROCEDURE — 25000128 H RX IP 250 OP 636: Performed by: SURGERY

## 2018-03-05 PROCEDURE — C1788 PORT, INDWELLING, IMP: HCPCS | Performed by: SURGERY

## 2018-03-05 PROCEDURE — A9520 TC99 TILMANOCEPT DIAG 0.5MCI: HCPCS | Performed by: SURGERY

## 2018-03-05 PROCEDURE — 38792 RA TRACER ID OF SENTINL NODE: CPT

## 2018-03-05 DEVICE — CATH PORT POWERPORT CLEARVUE ISP 8FR 5608062
Type: IMPLANTABLE DEVICE | Status: NON-FUNCTIONAL
Removed: 2019-02-13

## 2018-03-05 RX ORDER — SENNOSIDES A AND B 8.6 MG/1
1 TABLET, FILM COATED ORAL 2 TIMES DAILY
Qty: 10 TABLET | Refills: 1 | Status: SHIPPED | OUTPATIENT
Start: 2018-03-05 | End: 2018-03-08

## 2018-03-05 RX ORDER — LIDOCAINE HYDROCHLORIDE 10 MG/ML
INJECTION, SOLUTION INFILTRATION; PERINEURAL
Status: DISCONTINUED
Start: 2018-03-05 | End: 2018-03-05 | Stop reason: HOSPADM

## 2018-03-05 RX ORDER — LIDOCAINE HYDROCHLORIDE 10 MG/ML
INJECTION, SOLUTION EPIDURAL; INFILTRATION; INTRACAUDAL; PERINEURAL
Status: DISCONTINUED
Start: 2018-03-05 | End: 2018-03-05 | Stop reason: HOSPADM

## 2018-03-05 RX ORDER — ONDANSETRON 4 MG/1
4 TABLET, ORALLY DISINTEGRATING ORAL EVERY 30 MIN PRN
Status: DISCONTINUED | OUTPATIENT
Start: 2018-03-05 | End: 2018-03-05 | Stop reason: HOSPADM

## 2018-03-05 RX ORDER — SODIUM CHLORIDE, SODIUM LACTATE, POTASSIUM CHLORIDE, CALCIUM CHLORIDE 600; 310; 30; 20 MG/100ML; MG/100ML; MG/100ML; MG/100ML
INJECTION, SOLUTION INTRAVENOUS CONTINUOUS PRN
Status: DISCONTINUED | OUTPATIENT
Start: 2018-03-05 | End: 2018-03-05

## 2018-03-05 RX ORDER — HEPARIN SODIUM 1000 [USP'U]/ML
INJECTION, SOLUTION INTRAVENOUS; SUBCUTANEOUS
Status: DISCONTINUED
Start: 2018-03-05 | End: 2018-03-05 | Stop reason: HOSPADM

## 2018-03-05 RX ORDER — PROPOFOL 10 MG/ML
INJECTION, EMULSION INTRAVENOUS PRN
Status: DISCONTINUED | OUTPATIENT
Start: 2018-03-05 | End: 2018-03-05

## 2018-03-05 RX ORDER — CLINDAMYCIN PHOSPHATE 900 MG/50ML
900 INJECTION, SOLUTION INTRAVENOUS SEE ADMIN INSTRUCTIONS
Status: DISCONTINUED | OUTPATIENT
Start: 2018-03-05 | End: 2018-03-05 | Stop reason: HOSPADM

## 2018-03-05 RX ORDER — HEPARIN SODIUM (PORCINE) LOCK FLUSH IV SOLN 100 UNIT/ML 100 UNIT/ML
SOLUTION INTRAVENOUS
Status: DISCONTINUED
Start: 2018-03-05 | End: 2018-03-05 | Stop reason: HOSPADM

## 2018-03-05 RX ORDER — LIDOCAINE HYDROCHLORIDE 20 MG/ML
INJECTION, SOLUTION INFILTRATION; PERINEURAL PRN
Status: DISCONTINUED | OUTPATIENT
Start: 2018-03-05 | End: 2018-03-05

## 2018-03-05 RX ORDER — NEOSTIGMINE METHYLSULFATE 1 MG/ML
VIAL (ML) INJECTION PRN
Status: DISCONTINUED | OUTPATIENT
Start: 2018-03-05 | End: 2018-03-05

## 2018-03-05 RX ORDER — FENTANYL CITRATE 50 UG/ML
INJECTION, SOLUTION INTRAMUSCULAR; INTRAVENOUS PRN
Status: DISCONTINUED | OUTPATIENT
Start: 2018-03-05 | End: 2018-03-05

## 2018-03-05 RX ORDER — NALOXONE HYDROCHLORIDE 0.4 MG/ML
.1-.4 INJECTION, SOLUTION INTRAMUSCULAR; INTRAVENOUS; SUBCUTANEOUS
Status: DISCONTINUED | OUTPATIENT
Start: 2018-03-05 | End: 2018-03-05 | Stop reason: HOSPADM

## 2018-03-05 RX ORDER — MEPERIDINE HYDROCHLORIDE 25 MG/ML
12.5 INJECTION INTRAMUSCULAR; INTRAVENOUS; SUBCUTANEOUS
Status: DISCONTINUED | OUTPATIENT
Start: 2018-03-05 | End: 2018-03-05 | Stop reason: HOSPADM

## 2018-03-05 RX ORDER — PROPOFOL 10 MG/ML
INJECTION, EMULSION INTRAVENOUS CONTINUOUS PRN
Status: DISCONTINUED | OUTPATIENT
Start: 2018-03-05 | End: 2018-03-05

## 2018-03-05 RX ORDER — HYDROCODONE BITARTRATE AND ACETAMINOPHEN 5; 325 MG/1; MG/1
1 TABLET ORAL
Status: COMPLETED | OUTPATIENT
Start: 2018-03-05 | End: 2018-03-05

## 2018-03-05 RX ORDER — ONDANSETRON 2 MG/ML
4 INJECTION INTRAMUSCULAR; INTRAVENOUS EVERY 30 MIN PRN
Status: DISCONTINUED | OUTPATIENT
Start: 2018-03-05 | End: 2018-03-05 | Stop reason: HOSPADM

## 2018-03-05 RX ORDER — DEXAMETHASONE SODIUM PHOSPHATE 4 MG/ML
INJECTION, SOLUTION INTRA-ARTICULAR; INTRALESIONAL; INTRAMUSCULAR; INTRAVENOUS; SOFT TISSUE PRN
Status: DISCONTINUED | OUTPATIENT
Start: 2018-03-05 | End: 2018-03-05

## 2018-03-05 RX ORDER — ONDANSETRON 2 MG/ML
INJECTION INTRAMUSCULAR; INTRAVENOUS PRN
Status: DISCONTINUED | OUTPATIENT
Start: 2018-03-05 | End: 2018-03-05

## 2018-03-05 RX ORDER — CLINDAMYCIN PHOSPHATE 900 MG/50ML
900 INJECTION, SOLUTION INTRAVENOUS
Status: COMPLETED | OUTPATIENT
Start: 2018-03-05 | End: 2018-03-05

## 2018-03-05 RX ORDER — GLYCOPYRROLATE 0.2 MG/ML
INJECTION, SOLUTION INTRAMUSCULAR; INTRAVENOUS PRN
Status: DISCONTINUED | OUTPATIENT
Start: 2018-03-05 | End: 2018-03-05

## 2018-03-05 RX ORDER — FENTANYL CITRATE 50 UG/ML
25-50 INJECTION, SOLUTION INTRAMUSCULAR; INTRAVENOUS EVERY 5 MIN PRN
Status: DISCONTINUED | OUTPATIENT
Start: 2018-03-05 | End: 2018-03-05 | Stop reason: HOSPADM

## 2018-03-05 RX ORDER — SODIUM CHLORIDE, SODIUM LACTATE, POTASSIUM CHLORIDE, CALCIUM CHLORIDE 600; 310; 30; 20 MG/100ML; MG/100ML; MG/100ML; MG/100ML
INJECTION, SOLUTION INTRAVENOUS CONTINUOUS
Status: DISCONTINUED | OUTPATIENT
Start: 2018-03-05 | End: 2018-03-05 | Stop reason: HOSPADM

## 2018-03-05 RX ORDER — HYDROCODONE BITARTRATE AND ACETAMINOPHEN 5; 325 MG/1; MG/1
1-2 TABLET ORAL EVERY 4 HOURS PRN
Qty: 20 TABLET | Refills: 0 | Status: SHIPPED | OUTPATIENT
Start: 2018-03-05 | End: 2018-03-08

## 2018-03-05 RX ORDER — HYDROMORPHONE HYDROCHLORIDE 1 MG/ML
.3-.5 INJECTION, SOLUTION INTRAMUSCULAR; INTRAVENOUS; SUBCUTANEOUS EVERY 10 MIN PRN
Status: DISCONTINUED | OUTPATIENT
Start: 2018-03-05 | End: 2018-03-05 | Stop reason: HOSPADM

## 2018-03-05 RX ORDER — BUPIVACAINE HYDROCHLORIDE 2.5 MG/ML
INJECTION, SOLUTION EPIDURAL; INFILTRATION; INTRACAUDAL
Status: DISCONTINUED
Start: 2018-03-05 | End: 2018-03-05 | Stop reason: HOSPADM

## 2018-03-05 RX ORDER — FENTANYL CITRATE 50 UG/ML
25-50 INJECTION, SOLUTION INTRAMUSCULAR; INTRAVENOUS
Status: DISCONTINUED | OUTPATIENT
Start: 2018-03-05 | End: 2018-03-05 | Stop reason: HOSPADM

## 2018-03-05 RX ADMIN — PROPOFOL 40 MG: 10 INJECTION, EMULSION INTRAVENOUS at 13:40

## 2018-03-05 RX ADMIN — HEPARIN SODIUM 20 ML: 1000 INJECTION, SOLUTION INTRAVENOUS; SUBCUTANEOUS at 15:18

## 2018-03-05 RX ADMIN — PROPOFOL 160 MG: 10 INJECTION, EMULSION INTRAVENOUS at 13:35

## 2018-03-05 RX ADMIN — BUPIVACAINE HYDROCHLORIDE 2 ML: 2.5 INJECTION, SOLUTION EPIDURAL; INFILTRATION; INTRACAUDAL; PERINEURAL at 15:21

## 2018-03-05 RX ADMIN — GLYCOPYRROLATE 0.6 MG: 0.2 INJECTION, SOLUTION INTRAMUSCULAR; INTRAVENOUS at 15:20

## 2018-03-05 RX ADMIN — FENTANYL CITRATE 50 MCG: 50 INJECTION, SOLUTION INTRAMUSCULAR; INTRAVENOUS at 13:35

## 2018-03-05 RX ADMIN — PHENYLEPHRINE HYDROCHLORIDE 100 MCG: 10 INJECTION INTRAVENOUS at 15:16

## 2018-03-05 RX ADMIN — LIDOCAINE HYDROCHLORIDE 60 MG: 20 INJECTION, SOLUTION INFILTRATION; PERINEURAL at 13:35

## 2018-03-05 RX ADMIN — SODIUM CHLORIDE, POTASSIUM CHLORIDE, SODIUM LACTATE AND CALCIUM CHLORIDE: 600; 310; 30; 20 INJECTION, SOLUTION INTRAVENOUS at 14:16

## 2018-03-05 RX ADMIN — SODIUM CHLORIDE, POTASSIUM CHLORIDE, SODIUM LACTATE AND CALCIUM CHLORIDE: 600; 310; 30; 20 INJECTION, SOLUTION INTRAVENOUS at 13:32

## 2018-03-05 RX ADMIN — CLINDAMYCIN PHOSPHATE 900 MG: 18 INJECTION, SOLUTION INTRAVENOUS at 13:46

## 2018-03-05 RX ADMIN — ROCURONIUM BROMIDE 30 MG: 10 INJECTION INTRAVENOUS at 13:37

## 2018-03-05 RX ADMIN — NEOSTIGMINE METHYLSULFATE 4 MG: 1 INJECTION INTRAMUSCULAR; INTRAVENOUS; SUBCUTANEOUS at 15:20

## 2018-03-05 RX ADMIN — TILMANOCEPT 0.55 MILLICURIE: KIT at 13:09

## 2018-03-05 RX ADMIN — DEXAMETHASONE SODIUM PHOSPHATE 4 MG: 4 INJECTION, SOLUTION INTRA-ARTICULAR; INTRALESIONAL; INTRAMUSCULAR; INTRAVENOUS; SOFT TISSUE at 14:19

## 2018-03-05 RX ADMIN — HYDROCODONE BITARTRATE AND ACETAMINOPHEN 1 TABLET: 5; 325 TABLET ORAL at 16:38

## 2018-03-05 RX ADMIN — FENTANYL CITRATE 25 MCG: 50 INJECTION, SOLUTION INTRAMUSCULAR; INTRAVENOUS at 13:52

## 2018-03-05 RX ADMIN — SODIUM CHLORIDE, PRESERVATIVE FREE 2 ML: 5 INJECTION INTRAVENOUS at 15:17

## 2018-03-05 RX ADMIN — PROPOFOL 200 MCG/KG/MIN: 10 INJECTION, EMULSION INTRAVENOUS at 13:35

## 2018-03-05 RX ADMIN — PHENYLEPHRINE HYDROCHLORIDE 100 MCG: 10 INJECTION INTRAVENOUS at 14:27

## 2018-03-05 RX ADMIN — ONDANSETRON 4 MG: 2 INJECTION INTRAMUSCULAR; INTRAVENOUS at 15:17

## 2018-03-05 RX ADMIN — METHYLENE BLUE 5 ML: 10 INJECTION INTRAVENOUS at 13:44

## 2018-03-05 RX ADMIN — BUPIVACAINE HYDROCHLORIDE 20 ML: 2.5 INJECTION, SOLUTION EPIDURAL; INFILTRATION; INTRACAUDAL; PERINEURAL at 14:31

## 2018-03-05 RX ADMIN — LIDOCAINE HYDROCHLORIDE 5 ML: 10 INJECTION, SOLUTION INFILTRATION; PERINEURAL at 12:22

## 2018-03-05 ASSESSMENT — LIFESTYLE VARIABLES: TOBACCO_USE: 1

## 2018-03-05 NOTE — ANESTHESIA CARE TRANSFER NOTE
Patient: Yolanda Watson    Procedure(s):  SEED LOCALIZED LEFT BREAST LUMPECTOMY, WITH LEFT SENTINEL NODE BIOPSY, PORT PLACEMENT - Wound Class: I-Clean   - Wound Class: I-Clean   - Wound Class: I-Clean   - Wound Class: I-Clean    Diagnosis: LEFT BREAST CANCER  Diagnosis Additional Information: No value filed.    Anesthesia Type:   General, LMA     Note:  Airway :Face Mask  Patient transferred to:PACU  Comments: Anesthesia Care Note    Patient: Yolanda Watson    Transferred to: PACU    Patient vital signs: Stable    Airway: FM    Monitors placed. VSS. PIV patent. No change in dentition. Report given to MAUDE Palma CRNA   3/5/2018  Handoff Report: Identifed the Patient, Identified the Reponsible Provider, Reviewed the pertinent medical history, Discussed the surgical course, Reviewed Intra-OP anesthesia mangement and issues during anesthesia, Set expectations for post-procedure period and Allowed opportunity for questions and acknowledgement of understanding      Vitals: (Last set prior to Anesthesia Care Transfer)    CRNA VITALS  3/5/2018 1504 - 3/5/2018 1536      3/5/2018             Pulse: 103    SpO2: 100 %    Resp Rate (observed): 13    Resp Rate (set): 10                Electronically Signed By: GEOVANNY Koroma CRNA  March 5, 2018  3:36 PM

## 2018-03-05 NOTE — ANESTHESIA PREPROCEDURE EVALUATION
Anesthesia Evaluation     . Pt has had prior anesthetic. Type: General    No history of anesthetic complications          ROS/MED HX    ENT/Pulmonary:     (+)tobacco use, , . .    Neurologic:       Cardiovascular:     (+) Dyslipidemia, hypertension----. : . . . :. .       METS/Exercise Tolerance:     Hematologic:         Musculoskeletal:         GI/Hepatic:     (+) GERD Asymptomatic on medication,       Renal/Genitourinary:         Endo:      (-) Type I DM   Psychiatric:     (+) psychiatric history anxiety and depression      Infectious Disease:         Malignancy:   (+) Malignancy History of Lung, Breast and GI          Other:                     Physical Exam  Normal systems: pulmonary    Airway   Mallampati: II  TM distance: >3 FB  Neck ROM: full    Dental   (+) lower dentures and upper dentures    Cardiovascular   Rhythm and rate: regular and normal      Pulmonary                     Anesthesia Plan      History & Physical Review  History and physical reviewed and following examination; no interval change.    ASA Status:  3 .    NPO Status:  > 8 hours    Plan for General with Propofol induction. Maintenance will be TIVA.    PONV prophylaxis:  Ondansetron (or other 5HT-3) and Dexamethasone or Solumedrol       Postoperative Care  Postoperative pain management:  IV analgesics and Oral pain medications.      Consents  Anesthetic plan, risks, benefits and alternatives discussed with:  Patient..                          .

## 2018-03-05 NOTE — BRIEF OP NOTE
[facility] Brief Operative Note    Pre-operative diagnosis: LEFT BREAST CANCER   Post-operative diagnosis Breast cancer      Procedure: Procedure(s):  SEED LOCALIZED LEFT BREAST LUMPECTOMY, WITH LEFT SENTINEL NODE BIOPSY, PORT PLACEMENT - Wound Class: I-Clean   - Wound Class: I-Clean   - Wound Class: I-Clean   - Wound Class: I-Clean   Surgeon(s): Surgeon(s) and Role:  Panel 1:     * Denae Perez MD - Primary     * Haydee Monaco PA-C - Assisting    Panel 2:     * Denae Perez MD - Primary     * Haydee Monaco PA-C - Assisting   Estimated blood loss: 10 mL    Specimens:   ID Type Source Tests Collected by Time Destination   A : Seed localized left breast biopsy Tissue Breast, Left SURGICAL PATHOLOGY EXAM Denae Perez MD 3/5/2018  2:12 PM    B : Left axillary sentinel lymph node # 1   No. 167 Tissue Axilla, Left SURGICAL PATHOLOGY EXAM Denae Perez MD 3/5/2018  2:14 PM       Findings: Same as above.       Haydee Monaco PA-C

## 2018-03-05 NOTE — DISCHARGE INSTRUCTIONS
Same Day Surgery Discharge Instructions for  Sedation and General Anesthesia       It's not unusual to feel dizzy, light-headed or faint for up to 24 hours after surgery or while taking pain medication.  If you have these symptoms: sit for a few minutes before standing and have someone assist you when you get up to walk or use the bathroom.      You should rest and relax for the next 24 hours. We recommend you make arrangements to have an adult stay with you for at least 24 hours after your discharge.  Avoid hazardous and strenuous activity.      DO NOT DRIVE any vehicle or operate mechanical equipment for 24 hours following the end of your surgery.  Even though you may feel normal, your reactions may be affected by the medication you have received.      Do not drink alcoholic beverages for 24 hours following surgery.       Slowly progress to your regular diet as you feel able. It's not unusual to feel nauseated and/or vomit after receiving anesthesia.  If you develop these symptoms, drink clear liquids (apple juice, ginger ale, broth, 7-up, etc. ) until you feel better.  If your nausea and vomiting persists for 24 hours, please notify your surgeon.        All narcotic pain medications, along with inactivity and anesthesia, can cause constipation. Drinking plenty of liquids and increasing fiber intake will help.      For any questions of a medical nature, call your surgeon.      Do not make important decisions for 24 hours.      If you had general anesthesia, you may have a sore throat for a couple of days related to the breathing tube used during surgery.  You may use Cepacol lozenges to help with this discomfort.  If it worsens or if you develop a fever, contact your surgeon.       If you feel your pain is not well managed with the pain medications prescribed by your surgeon, please contact your surgeon's office to let them know so they can address your concerns.     HOME CARE FOLLOWING  LUMPECTOMY    DISCOMFORT  You may expect some tenderness and aching at your breast. Use Tylenol or the  prescription your doctor has given you.    DRAINAGE  You may expect a small amount of drainage from your incision.    SKIN CLOSURE    If you have stitches which need to be removed, your doctor will have you make a return appointment to do this. If you have stitches beneath the skin, they will not need to be removed as they will absorb in time. Your incision may be covered by steri-strips, which are thin white tapes. These will peel off by themselves in 5-7 days. If they do not, you may remove them carefully after 7 days.    DRESSING  Keep dressing in place and dry until your doctor tells you to remove it.    SUPPORT  Wear a bra for support for 10 days to 2 weeks, day and night.    ACTIVITY  Avoid strenuous activities such as jogging, tennis, raquetball, etc. for 2 weeks. Also be careful of stretching activities with operative side for 2 weeks.    CONTACT YOUR DOCTOR IF THE FOLLOWING DEVELOPS:  1. A fever that is above 1000 and/or persists more than 1 day.  2. If there is a large amount of drainage or bleeding.  3. Prolonged tenderness or  hot spots  in the breast.  4. Severe pain that is not relieved by your prescription.  5. If there is drainage with an odor.                            Discharge Instructions for Power Port Placement      General Instructions:    You will be given a card with information about your port.  You should carry this with you in your wallet/billfold. It provides information to clinicians about your port.  You should also carry the card in case your port triggers any security devices.     Activity:    Limit your activity for the first few days after your port is placed    Incision Care:     Unless otherwise directed by your surgeon, the dressing may removed tomorrow.      If a topical skin adhesive was used to close incision, you may shower tomorrow. Do not use soaps, lotions, or  ointments on the wound area. Do not scrub the wound. After bathing, pat the wound dry with a soft towel.  Do not scratch, rub, or pick at the strips or film. Do not place tape directly over the strips or film.  Do not apply liquids (such as peroxide), ointments, or creams to the wound while the strips or film are in place.    Call your surgeon if you have:     Redness, swelling or drainage from incision     A fever of 101 F or greater.      Nausea or vomiting.     Pain that is not controlled by medications and/or rest.     Questions or concerns.                      **If you have concerns or questions about your procedure,    please contact Dr Perez at  [phone]**        ................................................................................................................................................................................................

## 2018-03-05 NOTE — ANESTHESIA POSTPROCEDURE EVALUATION
Patient: Yolanda Watson    Procedure(s):  SEED LOCALIZED LEFT BREAST LUMPECTOMY, WITH LEFT SENTINEL NODE BIOPSY, PORT PLACEMENT - Wound Class: I-Clean   - Wound Class: I-Clean   - Wound Class: I-Clean   - Wound Class: I-Clean    Diagnosis:LEFT BREAST CANCER  Diagnosis Additional Information: No value filed.    Anesthesia Type:  General, LMA    Note:  Anesthesia Post Evaluation    Patient location during evaluation: PACU  Patient participation: Able to fully participate in evaluation  Level of consciousness: awake  Pain management: adequate  Airway patency: patent  Cardiovascular status: acceptable  Respiratory status: acceptable  Hydration status: acceptable  PONV: none     Anesthetic complications: None          Last vitals:  Vitals:    03/05/18 1535 03/05/18 1545 03/05/18 1600   BP: 142/74 137/71 140/72   Resp: 14 12 14   Temp: 36.9  C (98.5  F) 37.1  C (98.8  F) 37.1  C (98.8  F)   SpO2: 99% 100% 100%         Electronically Signed By: Richard Jarvis MD  March 5, 2018  4:07 PM

## 2018-03-05 NOTE — OP NOTE
[facility] Operative Note      Pre-operative diagnosis: Left breast invasive ductal carcinoma   Post-operative diagnosis: Left breast invasive ductal carcinoma     Procedure: 1.  LEFT SEED LOCALIZED LUMPECTOMY  2.  LEFT SENTINEL LYMPH NODE BIOPSY  3.  INTRAOPERATIVE INJECTION OF METHYLENE BLUE TRACER  4. LEFT PORTACATH PLACEMENT     Surgeon: Denae Perez MD   Assistant(s):  Haydee Monaco PA-C  The PA s assistance was medically necessary to provide adequate exposure in the operating field, maintain hemostasis, cutting suture, clamping and ligating bleeding vessels, and visualization of anatomic structures throughout the surgical procedure.      Anesthesia: General    Estimated blood loss:   10 cc     Specimens:   ID Type Source Tests Collected by Time Destination   A : Seed localized left breast biopsy Tissue Breast, Left SURGICAL PATHOLOGY EXAM Denae Perez MD 3/5/2018  2:12 PM    B : Left axillary sentinel lymph node # 1   No. 167 Tissue Axilla, Left SURGICAL PATHOLOGY EXAM Denae Perez MD 3/5/2018  2:14 PM         INDICATION:  Ms. Watson is a 77yof with a h/o prior right breast cancer, colon cancer and lung cancer who presents with a newly diagnosed left breast invasive ductal carcinoma which measured 3cm, is grade 2, ER+, [place] - and Her2 +. After a discussion with Dr. Subramanian, her oncologist, she elected to proceed with surgery followed by adjuvant therapy.   DESCRIPTION OF PROCEDURE: The patient was placed on the table in supine position. General anesthetic was induced. Perioperative antibiotics were given. 4 ml of methylene blue dye was injected in subareolar position within the deep dermal lymphatics. The left breast and axilla were prepped and draped in standard sterile fashion.  We used the seed placed in the [facility] as well as the post-seed mammograms to localize the area of interest. We made a transverse incision centered at the 2 o'clock position. We carried the incision  down using electrocautery into the breast tissue and excised the area of interest, including the seed.  The neoprobe was used to guide the dissection. The mass was removed in its entirety with some surrounding benign appearing breast tissue. The posterior margin was including the pectoral fascia. The mass felt to be 3-4 cm in size and was easily palpable. After the specimen was removed it had a high signal with the neoprobe. Once the mass was removed, it was oriented with Morillo dyes. A specimen mammogram was obtained and revealed both the seed and the clip. The specimen was then sent to pathology for review.  The wound was then examined for bleeding and hemostasis was achieved using electrocautery.    Hemostasis was maintained throughout with electrocautery. The field was irrigated with sterile saline.     Clips were placed at the 12, 3, 6, and 9 o'clock positions of the lumpectomy cavity.  The lumpectomy cavity was reapproximated with several interrupted 3-0 vicryl sutures. The skin was closed with a deep dermal 3-0 vicryl and running 4-0 Monocryl subcuticular suture and steri strips.    We than began the sentinel lymph node biopsy. The patient had been injected with a radionuclear pharmaceutical preoperatively.  We used the Neoprobe to localize an area of increased activity in the axilla. We made an incision in the skin overlying that area of activity. The incision was carried down into the subcutaneous tissue and into the axillary space with the Bovie electrocautery. We then localized an area of increased activity, and isolated a lymph node from surrounding tissues. The lymph node had a count of 175 and was blue.  This was passed off the field as left axillary sentinel lymph node #1. The remainder of the axilla had no foci of increased radioactivity. There were no clinically positive nodes upon thorough evaluation by palpation of the axilla.   Hemostasis was assured.  We then closed the incision with  interrupted subcutaneous 3-0 Vicryl sutures, a running 4-0 Monocryl subcuticular suture and Steri strips. The nodes were sent to pathology for routine evaluation.     The drapes were taken down and a roll was placed between her shoulder blades.  The bilateral neck and upper chest were prepped and draped in standard sterile fashion.  We anesthetized the skin of the upper right chest.  The patient was placed in Trendelenburg.  Local anesthetic was injected into the skin in the lower neck, upper chest for the port site and proposed track for the catheter. We made an incision in the skin.  She had had a prior port on the right side but the vessel appeared patent. I was able to cannulate the vessel but was not able to pass a wire. I elected to then look at the left internal jugular. This vessel was much larger and I was able to easily cannulate the vein using ultrasound guidance with a needle.  We then passed a wire through the needle into the internal jugular vein.  Fluoroscopy was used and confirmed the wire was traveling into the SVC. We then used a #15 blade to make a skin incision in the right upper chest wall. The subcutaneous tissue was divided with cautery. The fascia was identified. A pocket was created above the fascia with blunt dissection. The port fit well into the pocket. We then used a tunneling device to place the catheter through the incision in the chest and tunneled to the neck incision. We then passed a dilator over the wire under fluoroscopy.  The catheter was then placed through the catheter introducer and threaded, using fluoroscopy until the tip of the catheter was at the atrial caval junction.  The catheter introducer was removed.  We flushed the port with injectable saline.  We attached the catheter to the port and secured it in place with the catheter hub.   The port was secured to the fascia with two 2-0 prolene sutures on either side.  We then closed the subcutaneous tissue with 3-0  interrupted Vicryl sutures.  The skin was closed with a running 4-0 Vicryl subcuticular suture and Dermabond.  A final flush of full strength heparin (2ml) was injected into the port using a Perez needle.  The patient tolerated the procedure well.  All sponge and instrument counts were correct.    Denae Perez MD

## 2018-03-05 NOTE — PROGRESS NOTES
SBAR Seed Localization    SITUATION:  Patient to breast imaging center for imaging guided seed localizations before breast lumpectomy or excision biopsy with sentinel node injection.    BACKGROUND:  Breast imaging cancer, breast abnormality  Ordered procedure completed: Yes  Special needs identified: No     ASSESSMENT:  SBAR report called to patient care unit because of unexpected event in radiology: Yes  Allergies and medication list reviewed prior to procedure. Yes  Skin cleansed with ChloraPrep One-Step.  Anesthesia: approximately 5ml of 1% Lidocaine injection subcutaneous before seed insertion administered by the radiologist.   Gauze dressing over insertion site(s).  Post procedure mammogram completed: Yes    Patient tolerance:well tolerated    RECOMMENDATIONS:  Patient transferred to Same Day Surgery in stable condition via wheelchair with Breast Imaging Staff.  Copy of note given to patient and instructions to hand this note to surgery staff.    Please call [facility] [phone] if there are any questions.

## 2018-03-05 NOTE — IP AVS SNAPSHOT
[facility] Same Day Surgery    [address]    [place] [address]    Phone:  [phone]    Fax:  [phone]                                       After Visit Summary   3/5/2018    Yolanda Watson    MRN: [id number]           After Visit Summary Signature Page     I have received my discharge instructions, and my questions have been answered. I have discussed any challenges I see with this plan with the nurse or doctor.    ..........................................................................................................................................  Patient/Patient Representative Signature      ..........................................................................................................................................  Patient Representative Print Name and Relationship to Patient    ..................................................               ................................................  Date                                            Time    ..........................................................................................................................................  Reviewed by Signature/Title    ...................................................              ..............................................  Date                                                            Time

## 2018-03-05 NOTE — IP AVS SNAPSHOT
MRN:[id number]                      After Visit Summary   3/5/2018    Yolanda Watson    MRN: [id number]           Thank you!     Thank you for choosing [facility] for your care. Our goal is always to provide you with excellent care. Hearing back from our patients is one way we can continue to improve our services. Please take a few minutes to complete the written survey that you may receive in the mail after you visit with us. Thank you!        Patient Information     Date Of Birth          1940        About your hospital stay     You were admitted on:  March 5, 2018 [facility]    You were discharged on:  March 5, 2018       Who to Call     For medical emergencies, please call 911.  For non-urgent questions about your medical care, please call your primary care provider or clinic, [phone]  For questions related to your surgery, please call your surgery clinic        Attending Provider     Provider Specialty    Denae Perez MD Surgery       Primary Care Provider Office Phone # Fax #    Soren Perdomo [phone]      After Care Instructions     Discharge Instructions       Patient to follow up with Dr Perez in 2 weeks, call office for appointment at [phone]            Dressing       FOR BREAST WOUND:  Keep clean and dry.  May remove dressing after 48 hours, replace as needed.  Steri strips may be removed after 1 week.       FOR PORT:  Dermabond   General Care:  Keep the wound clean and dry. You may shower or bathe tomorrow as usual, but do not use soaps, lotions, or ointments on the wound area. Do not scrub the wound. After bathing, pat the wound dry with a soft towel.  Do not scratch, rub, or pick at the strips or film. Do not place tape directly over the strips or film.  Do not apply liquids (such as peroxide), ointments, or creams to the wound while the strips or film are in place.  Most  wounds heal  without problems. However, an infection sometimes occurs despite proper treatment. Therefore, watch for the signs of infection listed below.  FOLLOW UP as directed by the doctor or our staff. The skin glue strips or film will fall off naturally in 5 to 10 days.            Shower       No shower for 24 hours post procedure. May shower Postoperative Day (POD)  2                  Your next 10 appointments already scheduled     Mar 08, 2018  1:45 PM CST   Cardio-Oncology New with Dinorah Miller DO   [facility] ([facility])    [address] [place] [id number] [phone]              Further instructions from your care team       Same Day Surgery Discharge Instructions for  Sedation and General Anesthesia       It's not unusual to feel dizzy, light-headed or faint for up to 24 hours after surgery or while taking pain medication.  If you have these symptoms: sit for a few minutes before standing and have someone assist you when you get up to walk or use the bathroom.      You should rest and relax for the next 24 hours. We recommend you make arrangements to have an adult stay with you for at least 24 hours after your discharge.  Avoid hazardous and strenuous activity.      DO NOT DRIVE any vehicle or operate mechanical equipment for 24 hours following the end of your surgery.  Even though you may feel normal, your reactions may be affected by the medication you have received.      Do not drink alcoholic beverages for 24 hours following surgery.       Slowly progress to your regular diet as you feel able. It's not unusual to feel nauseated and/or vomit after receiving anesthesia.  If you develop these symptoms, drink clear liquids (apple juice, ginger ale, broth, 7-up, etc. ) until you feel better.  If your nausea and vomiting persists for 24 hours, please notify your surgeon.        All narcotic pain medications, along with inactivity and  anesthesia, can cause constipation. Drinking plenty of liquids and increasing fiber intake will help.      For any questions of a medical nature, call your surgeon.      Do not make important decisions for 24 hours.      If you had general anesthesia, you may have a sore throat for a couple of days related to the breathing tube used during surgery.  You may use Cepacol lozenges to help with this discomfort.  If it worsens or if you develop a fever, contact your surgeon.       If you feel your pain is not well managed with the pain medications prescribed by your surgeon, please contact your surgeon's office to let them know so they can address your concerns.     HOME CARE FOLLOWING LUMPECTOMY    DISCOMFORT  You may expect some tenderness and aching at your breast. Use Tylenol or the  prescription your doctor has given you.    DRAINAGE  You may expect a small amount of drainage from your incision.    SKIN CLOSURE    If you have stitches which need to be removed, your doctor will have you make a return appointment to do this. If you have stitches beneath the skin, they will not need to be removed as they will absorb in time. Your incision may be covered by steri-strips, which are thin white tapes. These will peel off by themselves in 5-7 days. If they do not, you may remove them carefully after 7 days.    DRESSING  Keep dressing in place and dry until your doctor tells you to remove it.    SUPPORT  Wear a bra for support for 10 days to 2 weeks, day and night.    ACTIVITY  Avoid strenuous activities such as jogging, tennis, raquetball, etc. for 2 weeks. Also be careful of stretching activities with operative side for 2 weeks.    CONTACT YOUR DOCTOR IF THE FOLLOWING DEVELOPS:  1. A fever that is above 1000 and/or persists more than 1 day.  2. If there is a large amount of drainage or bleeding.  3. Prolonged tenderness or  hot spots  in the breast.  4. Severe pain that is not relieved by your prescription.  5. If there is  "drainage with an odor.                            Discharge Instructions for Power Port Placement      General Instructions:    You will be given a card with information about your port.  You should carry this with you in your wallet/billfold. It provides information to clinicians about your port.  You should also carry the card in case your port triggers any security devices.     Activity:    Limit your activity for the first few days after your port is placed    Incision Care:     Unless otherwise directed by your surgeon, the dressing may removed tomorrow.      If a topical skin adhesive was used to close incision, you may shower tomorrow. Do not use soaps, lotions, or ointments on the wound area. Do not scrub the wound. After bathing, pat the wound dry with a soft towel.  Do not scratch, rub, or pick at the strips or film. Do not place tape directly over the strips or film.  Do not apply liquids (such as peroxide), ointments, or creams to the wound while the strips or film are in place.    Call your surgeon if you have:     Redness, swelling or drainage from incision     A fever of 101 F or greater.      Nausea or vomiting.     Pain that is not controlled by medications and/or rest.     Questions or concerns.                            Pending Results     Date and Time Order Name Status Description    3/5/2018 1520 XR Surgery MICHAEL Fluoro L/T 5 Min w Stills Preliminary     3/5/2018 1413 Surgical pathology exam In process             Admission Information     Date & Time Provider Department Dept. Phone    3/5/2018 Denae Perez MD [facility] Same Day Surgery [phone]      Your Vitals Were     Blood Pressure Temperature Respirations Height Weight Pulse Oximetry    145/72 98.8  F (37.1  C) 16 1.753 m (5' 9\") 93.4 kg (206 lb) 98%    BMI (Body Mass Index)                   30.42 kg/m2           MyChart Information     [facility] lets you send messages to your doctor, view your test results, renew your " "prescriptions, schedule appointments and more. To sign up, go to [email or web address] . Click on \"Log in\" on the left side of the screen, which will take you to the Welcome page. Then click on \"Sign up Now\" on the right side of the page.     You will be asked to enter the access code listed below, as well as some personal information. Please follow the directions to create your username and password.     Your access code is: [email or web address]  Expires: 2018 10:53 AM     Your access code will  in 90 days. If you need help or a new code, please call your [facility] clinic or [phone].        Care EveryWhere ID     This is your Care EveryWhere ID. This could be used by other organizations to access your [facility] medical records  [id number]        Equal Access to Services     AR LINDQUIST : Chanell Olsen, gaby bonilla, macey walter, agustina donaldson . So [facility] [phone].    ATENCIÓN: Si habla español, tiene a ferreira disposición servicios gratuitos de asistencia lingüística. Domonique al [phone].    We comply with applicable federal civil rights laws and [place] laws. We do not discriminate on the basis of race, color, national origin, age, disability, sex, sexual orientation, or gender identity.               Review of your medicines      START taking        Dose / Directions    HYDROcodone-acetaminophen 5-325 MG per tablet   Commonly known as:  NORCO   Used for:  Acute post-operative pain   Notes to Patient:  1 tablet taken at 4:38 p.m.        Dose:  1-2 tablet   Take 1-2 tablets by mouth every 4 hours as needed for other (Moderate to Severe Pain)   Quantity:  20 tablet   Refills:  0       senna 8.6 MG tablet   Commonly known as:  SENOKOT   Used for:  Acute post-operative pain        Dose:  1 tablet   Take 1 tablet by mouth 2 times daily while on narcotics   Quantity:  10 tablet   Refills:  1         CONTINUE these medicines which have NOT CHANGED  "       Dose / Directions    ANASTROZOLE PO        Dose:  1 mg   Take 1 mg by mouth At Bedtime   Refills:  0       ASPIRIN PO        Dose:  81 mg   Take 81 mg by mouth daily   Refills:  0       FENOFIBRATE PO        Dose:  134 mg   Take 134 mg by mouth At Bedtime   Refills:  0       LIPITOR PO        Dose:  20 mg   Take 20 mg by mouth At Bedtime   Refills:  0       LISINOPRIL PO        Dose:  20 mg   Take 20 mg by mouth daily   Refills:  0       multivitamin, therapeutic Tabs tablet        Dose:  1 tablet   Take 1 tablet by mouth At Bedtime No iron   Refills:  0       PAXIL PO        Dose:  20 mg   Take 20 mg by mouth At Bedtime   Refills:  0            Where to get your medicines      These medications were sent to [facility] - [address]  [address], [place] [address]     Phone:  [phone]     senna 8.6 MG tablet         Some of these will need a paper prescription and others can be bought over the counter. Ask your nurse if you have questions.     Bring a paper prescription for each of these medications     HYDROcodone-acetaminophen 5-325 MG per tablet                Protect others around you: Learn how to safely use, store and throw away your medicines at www.disposemymeds.org.        Information about OPIOIDS     PRESCRIPTION OPIOIDS: WHAT YOU NEED TO KNOW    Prescription opioids can be used to help relieve moderate to severe pain and are often prescribed following a surgery or injury, or for certain health conditions. These medications can be an important part of treatment but also come with serious risks. It is important to work with your health care provider to make sure you are getting the safest, most effective care.    WHAT ARE THE RISKS AND SIDE EFFECTS OF OPIOID USE?  Prescription opioids carry serious risks of addiction and overdose, especially with prolonged use. An opioid overdose, often marked by slowed breathing can cause sudden death. The use of  prescription opioids can have a number of side effects as well, even when taken as directed:      Tolerance - meaning you might need to take more of a medication for the same pain relief    Physical dependence - meaning you have symptoms of withdrawal when a medication is stopped    Increased sensitivity to pain    Constipation    Nausea, vomiting, and dry mouth    Sleepiness and dizziness    Confusion    Depression    Low levels of testosterone that can result in lower sex drive, energy, and strength    Itching and sweating    RISKS ARE GREATER WITH:    History of drug misuse, substance use disorder, or overdose    Mental health conditions (such as depression or anxiety)    Sleep apnea    Older age (65 years or older)    Pregnancy    Avoid alcohol while taking prescription opioids.   Also, unless specifically advised by your health care provider, medications to avoid include:    Benzodiazepines (such as Xanax or Valium)    Muscle relaxants (such as Soma or Flexeril)    Hypnotics (such as Ambien or Lunesta)    Other prescription opioids    KNOW YOUR OPTIONS:  Talk to your health care provider about ways to manage your pain that do not involve prescription opioids. Some of these options may actually work better and have fewer risks and side effects:    Pain relievers such as acetaminophen, ibuprofen, and naproxen    Some medications that are also used for depression or seizures    Physical therapy and exercise    Cognitive behavioral therapy, a psychological, goal-directed approach, in which patients learn how to modify physical, behavioral, and emotional triggers of pain and stress    IF YOU ARE PRESCRIBED OPIOIDS FOR PAIN:    Never take opioids in greater amounts or more often than prescribed    Follow up with your primary health care provider and work together to create a plan on how to manage your pain.    Talk about ways to help manage your pain that do not involve prescription opioids    Talk about all concerns  and side effects    Help prevent misuse and abuse    Never sell or share prescription opioids    Never use another person's prescription opioids    Store prescription opioids in a secure place and out of reach of others (this may include visitors, children, friends, and family)    Visit www.cdc.gov/drugoverdose to learn about risks of opioid abuse and overdose    If you believe you may be struggling with addiction, tell your health care provider and ask for guidance or call [facility]'s National Helpline at 6-675-167-HELP    LEARN MORE / www.cdc.gov/drugoverdose/prescribing/guideline.html    Safely dispose of unused prescription opioids: Find your local drug take-back programs and more information about the importance of safe disposal at www.doseofreality.[place].gov             Medication List: This is a list of all your medications and when to take them. Check marks below indicate your daily home schedule. Keep this list as a reference.      Medications           Morning Afternoon Evening Bedtime As Needed    ANASTROZOLE PO   Take 1 mg by mouth At Bedtime                                ASPIRIN PO   Take 81 mg by mouth daily                                FENOFIBRATE PO   Take 134 mg by mouth At Bedtime                                HYDROcodone-acetaminophen 5-325 MG per tablet   Commonly known as:  NORCO   Take 1-2 tablets by mouth every 4 hours as needed for other (Moderate to Severe Pain)   Last time this was given:  1 tablet on 3/5/2018  4:38 PM   Notes to Patient:  1 tablet taken at 4:38 p.m.                                LIPITOR PO   Take 20 mg by mouth At Bedtime                                LISINOPRIL PO   Take 20 mg by mouth daily                                multivitamin, therapeutic Tabs tablet   Take 1 tablet by mouth At Bedtime No iron                                PAXIL PO   Take 20 mg by mouth At Bedtime                                senna 8.6 MG tablet   Commonly known as:  SENOKOT   Take 1 tablet by  mouth 2 times daily while on narcotics

## 2018-03-08 ENCOUNTER — OFFICE VISIT (OUTPATIENT)
Dept: CARDIOLOGY | Facility: CLINIC | Age: 78
End: 2018-03-08
Payer: COMMERCIAL

## 2018-03-08 VITALS
HEART RATE: 105 BPM | BODY MASS INDEX: 31.1 KG/M2 | WEIGHT: 210 LBS | HEIGHT: 69 IN | SYSTOLIC BLOOD PRESSURE: 138 MMHG | DIASTOLIC BLOOD PRESSURE: 84 MMHG | OXYGEN SATURATION: 96 %

## 2018-03-08 DIAGNOSIS — Z91.89 QUIT USING TOBACCO IN REMOTE PAST: ICD-10-CM

## 2018-03-08 DIAGNOSIS — E78.5 HYPERLIPIDEMIA LDL GOAL <100: ICD-10-CM

## 2018-03-08 DIAGNOSIS — I10 BENIGN ESSENTIAL HYPERTENSION: ICD-10-CM

## 2018-03-08 DIAGNOSIS — C50.011 MALIGNANT NEOPLASM OF NIPPLE OF RIGHT BREAST IN FEMALE, UNSPECIFIED ESTROGEN RECEPTOR STATUS (H): Primary | ICD-10-CM

## 2018-03-08 LAB — COPATH REPORT: NORMAL

## 2018-03-08 PROCEDURE — 99204 OFFICE O/P NEW MOD 45 MIN: CPT | Mod: 24 | Performed by: INTERNAL MEDICINE

## 2018-03-08 RX ORDER — FENOFIBRATE 134 MG/1
134 CAPSULE ORAL AT BEDTIME
Status: ON HOLD | COMMUNITY
End: 2018-06-01

## 2018-03-08 RX ORDER — LISINOPRIL 20 MG/1
40 TABLET ORAL DAILY
COMMUNITY
End: 2019-03-11

## 2018-03-08 RX ORDER — ATORVASTATIN CALCIUM 20 MG/1
20 TABLET, FILM COATED ORAL AT BEDTIME
COMMUNITY
End: 2024-01-01

## 2018-03-08 NOTE — PROGRESS NOTES
Service Date: 2018      HISTORY OF PRESENT ILLNESS:  Ms. Watson is a pleasant 77-year-old female who is referred to our clinic today for management of prevention of cardiotoxicity related to chemotherapy.  She has an unfortunate history of multiple cancers.  He had a lung cancer with right lower lobe resection, a right upper quadrant breast cancer in 2016 with lumpectomy and hormonal modulation therapy, colon cancer in 10/2016 with hemicolectomy, a remote history of cervical cancer and also a recent diagnosis now of left upper quadrant breast cancer.        She has had genetic testing and it looks like she has a BRCA2 gene mutation.  Her left-sided breast cancer is ER positive, HER-2/rudolph amplified.  Dr. Subramanian has recommended lumpectomy followed by chemotherapy with paclitaxel and Herceptin and possibly with Perjeta.  She is scheduled to follow up with him on .  She is uncertain of the timing of her scheduled chemotherapy.        She did have a baseline echocardiogram performed on , which I reviewed with her today.  This showed a normal LV size and systolic function.  Her EF is estimated at 60%-65%, grade 1 diastolic dysfunction is noted.  RV function was normal, and there is no significant valvular heart disease.        Ms. Watson does have cardiac risk factors including treated hypertension, hyperlipidemia, remote tobacco history and a family history of peripheral vascular disease; her mother  of an abdominal aortic aneurysm.  She has not had prior ischemic testing.  She is chronically short of breath with minimal activity ever since her lung surgery.  In reviewing blood work, she has had anemia in the past.  I do not see any recent blood work, but it is noted by her primary care provider that a CBC was checked here just a week ago.  I do not have those results, but she tells me the results came back normal and she is no longer anemic.  I did also review an EKG from the , which shows a  sinus rhythm with left axis deviation, no ST or T-wave abnormalities are seen.      PHYSICAL EXAMINATION:   VITAL SIGNS:  Blood pressure is 138/84, pulse is slightly elevated at 105.  Her weight is 210 with a body mass index of 31.   NECK:  Carotid upstrokes seem brisk without bruit.  She does have some scarring and bruising on her chest from her lumpectomy and a port placement.   CARDIOVASCULAR:  Tones are regular without murmur, gallop or rub.   LUNGS:  Clear with diminished breath sounds in the right base.   EXTREMITIES:  She has no peripheral edema on exam.      In summary, Ms. Watson is a very pleasant 77-year-old female with a history of multiple cancers, most recently diagnosed with left-sided breast cancer that is ER positive and HER-2/rudolph amplified.  She has undergone lumpectomy and port placement is scheduled to see Dr. Subramanian back in Oncology on 03/19 for starting chemotherapy, which is planned to include Herceptin, paclitaxel and possibly Perjeta.  I did explain the reasoning for cardiology followup to her with the possibility of cardiotoxicity, particularly related to Herceptin.  She does not know the dosing yet, but this is likely going to be every 3 weeks and I did talk to her about the monitoring for evidence of cardiotoxicity, which I will recommend every 3 months or 3 to 4 cycles of Herceptin.  This can be done down at the [place] office which is more convenient to her home where we can perform both the limited echo followups as well as clinic visits.      At present, her echocardiogram suggests baseline normal function.  I did talk to her a little bit about the current cardiac testing that has been performed.  She had asked about whether this showed whether she had any coronary disease or blockage and to my knowledge and hers, she has never had any type of ischemic testing.  She does have cardiac risk factors including hypertension and hyperlipidemia, family history of peripheral disease and a  remote tobacco history, but I am not eliciting any new symptoms or abnormalities on EKG or echocardiogram that would suggest any issues with ischemic heart disease at this time.  She is on a good regimen in regards to her blood pressure and cholesterol, although I do not have her cholesterol numbers to see if she is at goal with these.  She also is on aspirin for cerebrovascular and cardiovascular prevention.  I would have a low threshold for her for ischemic testing and I went over potential symptoms that may indicate ischemic heart disease.        We will plan for routine limited echocardiogram to monitor for cardiotoxicity based upon the chemotherapy schedule that is discussed at her Oncology visit.  This schedule has yet determined.  Therefore, I will anticipate either contact from her or [facility] for coordinating this routine followup.        Please feel free to contact me with any questions you have in regards to her care.  Thank you for allowing me to participate in the care of your nice patient.      cc:      Soren Perdomo MD    [facility]   [address]   [place] [address]      [place] Subramanian MD    [facility]   [address]   [place] [address]         TAB BERG DO             D: 2018   T: 2018   MT: LOC      Name:     WARREN METCALF   MRN:      [id number]        Account:      [id number]   :      1940           Service Date: 2018      Document: [id number]

## 2018-03-08 NOTE — LETTER
3/8/2018      [facility] [address]  [place] [address]      RE: Yolanda Georgesergio       Dear Colleague,    I had the pleasure of seeing Yolanda Watson in the [facility].    Service Date: 2018      HISTORY OF PRESENT ILLNESS:  Ms. Watson is a pleasant 77-year-old female who is referred to our clinic today for management of prevention of cardiotoxicity related to chemotherapy.  She has an unfortunate history of multiple cancers.  He had a lung cancer with right lower lobe resection, a right upper quadrant breast cancer in 2016 with lumpectomy and hormonal modulation therapy, colon cancer in 10/2016 with hemicolectomy, a remote history of cervical cancer and also a recent diagnosis now of left upper quadrant breast cancer.        She has had genetic testing and it looks like she has a BRCA2 gene mutation.  Her left-sided breast cancer is ER positive, HER-2/rudolph amplified.  Dr. Subramanian has recommended lumpectomy followed by chemotherapy with paclitaxel and Herceptin and possibly with Perjeta.  She is scheduled to follow up with him on .  She is uncertain of the timing of her scheduled chemotherapy.        She did have a baseline echocardiogram performed on , which I reviewed with her today.  This showed a normal LV size and systolic function.  Her EF is estimated at 60%-65%, grade 1 diastolic dysfunction is noted.  RV function was normal, and there is no significant valvular heart disease.        Ms. Watson does have cardiac risk factors including treated hypertension, hyperlipidemia, remote tobacco history and a family history of peripheral vascular disease; her mother  of an abdominal aortic aneurysm.  She has not had prior ischemic testing.  She is chronically short of breath with minimal activity ever since her lung surgery.  In reviewing blood work, she has had anemia in the past.  I do not see any recent blood work, but  it is noted by her primary care provider that a CBC was checked here just a week ago.  I do not have those results, but she tells me the results came back normal and she is no longer anemic.  I did also review an EKG from the 03/01, which shows a sinus rhythm with left axis deviation, no ST or T-wave abnormalities are seen.      PHYSICAL EXAMINATION:   VITAL SIGNS:  Blood pressure is 138/84, pulse is slightly elevated at 105.  Her weight is 210 with a body mass index of 31.   NECK:  Carotid upstrokes seem brisk without bruit.  She does have some scarring and bruising on her chest from her lumpectomy and a port placement.   CARDIOVASCULAR:  Tones are regular without murmur, gallop or rub.   LUNGS:  Clear with diminished breath sounds in the right base.   EXTREMITIES:  She has no peripheral edema on exam.      In summary, Ms. Watson is a very pleasant 77-year-old female with a history of multiple cancers, most recently diagnosed with left-sided breast cancer that is ER positive and HER-2/rudolph amplified.  She has undergone lumpectomy and port placement is scheduled to see Dr. Subramanian back in Oncology on 03/19 for starting chemotherapy, which is planned to include Herceptin, paclitaxel and possibly Perjeta.  I did explain the reasoning for cardiology followup to her with the possibility of cardiotoxicity, particularly related to Herceptin.  She does not know the dosing yet, but this is likely going to be every 3 weeks and I did talk to her about the monitoring for evidence of cardiotoxicity, which I will recommend every 3 months or 3 to 4 cycles of Herceptin.  This can be done down at the [place] office which is more convenient to her home where we can perform both the limited echo followups as well as clinic visits.      At present, her echocardiogram suggests baseline normal function.  I did talk to her a little bit about the current cardiac testing that has been performed.  She had asked about whether this showed  whether she had any coronary disease or blockage and to my knowledge and hers, she has never had any type of ischemic testing.  She does have cardiac risk factors including hypertension and hyperlipidemia, family history of peripheral disease and a remote tobacco history, but I am not eliciting any new symptoms or abnormalities on EKG or echocardiogram that would suggest any issues with ischemic heart disease at this time.  She is on a good regimen in regards to her blood pressure and cholesterol, although I do not have her cholesterol numbers to see if she is at goal with these.  She also is on aspirin for cerebrovascular and cardiovascular prevention.  I would have a low threshold for her for ischemic testing and I went over potential symptoms that may indicate ischemic heart disease.        We will plan for routine limited echocardiogram to monitor for cardiotoxicity based upon the chemotherapy schedule that is discussed at her Oncology visit.  This schedule has yet determined.  Therefore, I will anticipate either contact from her or [facility] for coordinating this routine followup.        Please feel free to contact me with any questions you have in regards to her care.  Thank you for allowing me to participate in the care of your nice patient.      cc:      Soren Perdomo MD    [facility]   [address]   [place] [address]      [place] Subramanian MD    [facility]   [address]   [place] [address]         TAB BERG DO         D: 2018   T: 2018   MT: LOC      Name:     WARREN METCALF   MRN:      [id number]        Account:      [id number]   :      1940           Service Date: 2018      Document: [id number]      Outpatient Encounter Prescriptions as of 3/8/2018   Medication Sig Dispense Refill     Fenofibrate Micronized 134 MG CAPS Take by mouth daily       lisinopril (PRINIVIL/ZESTRIL) 20 MG tablet Take 20 mg  by mouth daily       atorvastatin (LIPITOR) 20 MG tablet Take 20 mg by mouth daily       ASPIRIN PO Take 81 mg by mouth daily       PARoxetine HCl (PAXIL PO) Take 20 mg by mouth At Bedtime       multivitamin, therapeutic (THERA-VIT) TABS Take 1 tablet by mouth At Bedtime No iron       ANASTROZOLE PO Take 1 mg by mouth At Bedtime        [DISCONTINUED] HYDROcodone-acetaminophen (NORCO) 5-325 MG per tablet Take 1-2 tablets by mouth every 4 hours as needed for other (Moderate to Severe Pain) 20 tablet 0     [DISCONTINUED] senna (SENOKOT) 8.6 MG tablet Take 1 tablet by mouth 2 times daily while on narcotics 10 tablet 1     [DISCONTINUED] LISINOPRIL PO Take 20 mg by mouth daily        [DISCONTINUED] Atorvastatin Calcium (LIPITOR PO) Take 20 mg by mouth At Bedtime       [DISCONTINUED] FENOFIBRATE PO Take 134 mg by mouth At Bedtime        No facility-administered encounter medications on file as of 3/8/2018.      Again, thank you for allowing me to participate in the care of your patient.      Sincerely,    Dinorah Miller,      [facility]

## 2018-03-08 NOTE — LETTER
3/8/2018    [facility] [address]  [place] [address]    RE: Yolanda Watson       Dear Colleague,    I had the pleasure of seeing Yolanda Watson in the [facility].    HPI and Plan:   See dictation    No orders of the defined types were placed in this encounter.      Orders Placed This Encounter   Medications     Fenofibrate Micronized 134 MG CAPS     Sig: Take by mouth daily     lisinopril (PRINIVIL/ZESTRIL) 20 MG tablet     Sig: Take 20 mg by mouth daily     atorvastatin (LIPITOR) 20 MG tablet     Sig: Take 20 mg by mouth daily       Medications Discontinued During This Encounter   Medication Reason     FENOFIBRATE PO Medication Reconciliation Clean Up     senna (SENOKOT) 8.6 MG tablet Stopped by Patient     LISINOPRIL PO Medication Reconciliation Clean Up     Atorvastatin Calcium (LIPITOR PO) Medication Reconciliation Clean Up     HYDROcodone-acetaminophen (NORCO) 5-325 MG per tablet Stopped by Patient         Encounter Diagnoses   Name Primary?     Lung cancer (H) Yes     Colon cancer (H)        CURRENT MEDICATIONS:  Current Outpatient Prescriptions   Medication Sig Dispense Refill     Fenofibrate Micronized 134 MG CAPS Take by mouth daily       lisinopril (PRINIVIL/ZESTRIL) 20 MG tablet Take 20 mg by mouth daily       atorvastatin (LIPITOR) 20 MG tablet Take 20 mg by mouth daily       ASPIRIN PO Take 81 mg by mouth daily       PARoxetine HCl (PAXIL PO) Take 20 mg by mouth At Bedtime       multivitamin, therapeutic (THERA-VIT) TABS Take 1 tablet by mouth At Bedtime No iron       ANASTROZOLE PO Take 1 mg by mouth At Bedtime          ALLERGIES     Allergies   Allergen Reactions     Demerol [Meperidine] Nausea and Vomiting     Sulfa Drugs Other (See Comments)     Extreme chills     Penicillins Rash     Swelling In mouth and tongue       PAST MEDICAL HISTORY:  Past Medical History:   Diagnosis Date     Acoustic neuroma (H)      Anemia      iron deficeincy     Anxiety      Arthritis     osteoarthrosis of knee     Breast cancer, left breast (H) 02/2018     Breast cancer, right breast (H) 1/2016     Cervical cancer (H)      Colon cancer (H)      Depression      Diabetes mellitus (H)     pre-diabetic no longer taking Metformin     Dyspnea      Gastro-oesophageal reflux disease      H/O: lung cancer 2013     Herpes      Herpes zoster      HTN (hypertension)      Hyperlipidaemia      Hyperlipidemia      Lung nodule 2013    Lung cancer     Nausea & vomiting      Pain in female pelvis      Pre-diabetes      Rib lesion      Tachycardia      Torn meniscus        PAST SURGICAL HISTORY:  Past Surgical History:   Procedure Laterality Date     APPENDECTOMY  1966    done with CORNELIO     ARTHROSCOPY KNEE  2012    right knee scoped     BIOPSY NODE SENTINEL Right 2/3/2016    Procedure: BIOPSY NODE SENTINEL;  Surgeon: Flory Pinto MD;  Location: [facility]     BIOPSY NODE SENTINEL Left 3/5/2018    Procedure: BIOPSY NODE SENTINEL;;  Surgeon: Denae Perez MD;  Location: [facility]     BREAST BIOPSY, RT/LT       cataracts       CERVICAL CANCER SCREENING RESULTS DOCUMENTED AND REVIEWED       CHOLECYSTECTOMY  1991     [facility]       COLONOSCOPY       EXCISE NODE MEDIASTINAL  3/12/2013    Procedure: EXCISE NODE MEDIASTINAL;;  Surgeon: Lorne Arenas MD;  Location:  OR     EYE SURGERY      fiona cataracts     GENITOURINARY SURGERY      bladder sling     GYN SURGERY  1966    hyst      HYSTERECTOMY  1966    Cervical CA, paps done every other year, done with appendectomy     INSERT PORT VASCULAR ACCESS Left 3/5/2018    Procedure: INSERT PORT VASCULAR ACCESS;;  Surgeon: Denae Preez MD;  Location: [facility]     INSERT PORT VASCULAR ACCESS N/A 3/5/2018    Procedure: INSERT PORT VASCULAR ACCESS;;  Surgeon: Denae Perez MD;  Location: [facility]     LAPAROSCOPIC ASSISTED COLECTOMY Right 10/27/2016    Procedure: LAPAROSCOPIC ASSISTED COLECTOMY;  Surgeon:  Umang Arteaga MD;  Location: [facility]     LOBECTOMY LUNG  3/12/2013    Procedure: LOBECTOMY LUNG;;  Surgeon: Lorne Arenas MD;  Location: [facility]     LUMPECTOMY BREAST WITH SEED LOCALIZATION Right 2/3/2016    Procedure: LUMPECTOMY BREAST WITH SEED LOCALIZATION;  Surgeon: Flory Pinto MD;  [facility]     LUMPECTOMY BREAST WITH SEED LOCALIZATION Left 3/5/2018    Procedure: LUMPECTOMY BREAST WITH SEED LOCALIZATION;  SEED LOCALIZED LEFT BREAST LUMPECTOMY, WITH LEFT SENTINEL NODE BIOPSY, PORT PLACEMENT;  Surgeon: Denae Perez MD;  [facility]     MINI ARC SLING OPERATION FOR STRESS INCONTINENCE  2009     RESECT RIB  2/15/2013    Procedure: RESECT RIB;  RESECTION PORTION RIGHT TWELVETH RIB;  Surgeon: Lorne Arenas MD;  Location:  OR     THORACOTOMY  3/12/2013    Procedure: THORACOTOMY;  RIGHT THORACOTOMY, RIGHT MIDDLE AND LOWER LUNG LOBECTOMY, MEDIASTINAL LYMPH NODE DISSECTION ;  Surgeon: Lorne Arenas MD;  Location:  OR     TONSILLECTOMY         FAMILY HISTORY:  Family History   Problem Relation Age of Onset     Breast Cancer Paternal Grandmother      Colon Cancer Other      not specified     Fractures Other      Hip fracture, Aunt     Hyperlipidemia Mother      Hypertension Mother      HEART DISEASE Mother      OSTEOPOROSIS Mother      Hyperlipidemia Sister      Hyperlipidemia Brother      Prostate Cancer Brother      Hypertension Sister      HEART DISEASE Maternal Grandfather      Prostate Cancer Father      Colon Cancer Cousin      son of paternal aunt with breast cancer.     Breast Cancer Paternal Aunt      Colon Cancer Paternal Aunt      Other Cancer Paternal Uncle      Esophageal     Other Cancer Maternal Aunt        SOCIAL HISTORY:  Social History     Social History     Marital status:      Spouse name: N/A     Number of children: 3     Years of education: N/A     Occupational History      Retired     Social History Main Topics     Smoking status:  "Former Smoker     Packs/day: 1.00     Years: 45.00     Types: Cigarettes     Quit date: 4/24/1998     Smokeless tobacco: Never Used      Comment: quit 1998     Alcohol use 0.0 oz/week     0 Standard drinks or equivalent per week      Comment: glass of wine a day     Drug use: No     Sexual activity: Yes     Partners: Male     Birth control/ protection: Female Surgical     Other Topics Concern     None     Social History Narrative    12/2015    -    3 kids    Retired        Pap-11/8/2010 WNL    Mammo-12/2/13     Colonoscopy-3/3/2010 WNL, no further recommended per pt     Dexa-1/1/2008           Review of Systems:  Skin:  Positive for bruising     Eyes:  Positive for glasses;cataracts    ENT:  Positive for hearing loss deaf L ear, wears hearing aid R ear  Respiratory:  Positive for dyspnea on exertion     Cardiovascular:  Negative;chest pain;palpitations;syncope or near-syncope;cyanosis;lightheadedness;dizziness;edema exercise intolerance;Positive for;fatigue    Gastroenterology: Positive for   vomiting when tired  Genitourinary:  Positive for urinary frequency;nocturia    Musculoskeletal:  Positive for arthritis    Neurologic:  Negative      Psychiatric:  Positive for sleep disturbances;excessive stress;depression    Heme/Lymph/Imm:  Positive for allergies    Endocrine:  Positive for   diabetes, diet controlled, states last A1C 5.5    Physical Exam:  Vitals: /84 (BP Location: Left arm, Cuff Size: Adult Large)  Pulse 105  Ht 1.753 m (5' 9\")  Wt 95.3 kg (210 lb)  SpO2 96%  BMI 31.01 kg/m2    Constitutional:  cooperative, alert and oriented, well developed, well nourished, in no acute distress        Skin:  warm and dry to the touch          Head:  normocephalic        Eyes:  pupils equal and round        Lymph:      ENT:  no pallor or cyanosis        Neck:  carotid pulses are full and equal bilaterally        Respiratory:  clear to auscultation    diminished in R base    Cardiac: regular rhythm;no " murmurs, gallops or rubs detected                pulses full and equal                                        GI:  abdomen soft;no bruits        Extremities and Muscular Skeletal:  no deformities, clubbing, cyanosis, erythema observed;no edema              Neurological:  no gross motor deficits        Psych:  affect appropriate, oriented to time, person and place          CC  No referring provider defined for this encounter.                    Thank you for allowing me to participate in the care of your patient.      Sincerely,     Dinorah Miller,      [facility]    cc:   No referring provider defined for this encounter.

## 2018-03-08 NOTE — MR AVS SNAPSHOT
"              After Visit Summary   3/8/2018    Yolanda Watson    MRN: [id number]           Patient Information     Date Of Birth          1940        Visit Information        Provider Department      3/8/2018 1:45 PM Dinorah Miller DO [facility]        Today's Diagnoses     Lung cancer (H)    -  1    Colon cancer (H)           Follow-ups after your visit        Who to contact     If you have questions or need follow up information about today's clinic visit or your schedule please contact [facility] directly at [phone].  Normal or non-critical lab and imaging results will be communicated to you by MyChart, letter or phone within 4 business days after the clinic has received the results. If you do not hear from us within 7 days, please contact the clinic through [employer] or phone. If you have a critical or abnormal lab result, we will notify you by phone as soon as possible.  Submit refill requests through [employer] or call your pharmacy and they will forward the refill request to us. Please allow 3 business days for your refill to be completed.          Additional Information About Your Visit        MyChart Information     [employer] lets you send messages to your doctor, view your test results, renew your prescriptions, schedule appointments and more. To sign up, go to [email or web address] . Click on \"Log in\" on the left side of the screen, which will take you to the Welcome page. Then click on \"Sign up Now\" on the right side of the page.     You will be asked to enter the access code listed below, as well as some personal information. Please follow the directions to create your username and password.     Your access code is: [email or web address]  Expires: 2018 10:53 AM     Your access code will  in 90 days. If you need help or a new code, please call your [place] clinic or [phone].        Care EveryWhere ID  " "   This is your Care EveryWhere ID. This could be used by other organizations to access your [place] medical records  [id number]        Your Vitals Were     Pulse Height Pulse Oximetry BMI (Body Mass Index)          105 1.753 m (5' 9\") 96% 31.01 kg/m2         Blood Pressure from Last 3 Encounters:   03/08/18 138/84   03/05/18 157/90   02/20/18 110/64    Weight from Last 3 Encounters:   03/08/18 95.3 kg (210 lb)   03/05/18 93.4 kg (206 lb)   02/20/18 93 kg (205 lb)              Today, you had the following     No orders found for display       Primary Care Provider Office Phone # Fax #    Soren Perdomo [phone]       [facility] [address]  [facility] [address]        Equal Access to Services     [facility]: Hadii aad ku hadasho Soomaali, waaxda luqadaha, qaybta kaalmada adeegyada, waxay malachiin hayapn dorothy donaldson . So [facility] [phone].    ATENCIÓN: Si habla español, tiene a ferreira disposición servicios gratuitos de asistencia lingüística. Llame al [phone].    We comply with applicable federal civil rights laws and [place] laws. We do not discriminate on the basis of race, color, national origin, age, disability, sex, sexual orientation, or gender identity.            Thank you!     Thank you for choosing [facility]  for your care. Our goal is always to provide you with excellent care. Hearing back from our patients is one way we can continue to improve our services. Please take a few minutes to complete the written survey that you may receive in the mail after your visit with us. Thank you!             Your Updated Medication List - Protect others around you: Learn how to safely use, store and throw away your medicines at www.disposemymeds.org.          This list is accurate as of 3/8/18  2:15 PM.  Always use your most recent med list.                   Brand Name Dispense Instructions for use Diagnosis    " ANASTROZOLE PO      Take 1 mg by mouth At Bedtime        ASPIRIN PO      Take 81 mg by mouth daily        atorvastatin 20 MG tablet    LIPITOR     Take 20 mg by mouth daily        Fenofibrate Micronized 134 MG Caps      Take by mouth daily        lisinopril 20 MG tablet    PRINIVIL/ZESTRIL     Take 20 mg by mouth daily        multivitamin, therapeutic Tabs tablet      Take 1 tablet by mouth At Bedtime No iron        PAXIL PO      Take 20 mg by mouth At Bedtime

## 2018-03-08 NOTE — PROGRESS NOTES
HPI and Plan:   See dictation    No orders of the defined types were placed in this encounter.      Orders Placed This Encounter   Medications     Fenofibrate Micronized 134 MG CAPS     Sig: Take by mouth daily     lisinopril (PRINIVIL/ZESTRIL) 20 MG tablet     Sig: Take 20 mg by mouth daily     atorvastatin (LIPITOR) 20 MG tablet     Sig: Take 20 mg by mouth daily       Medications Discontinued During This Encounter   Medication Reason     FENOFIBRATE PO Medication Reconciliation Clean Up     senna (SENOKOT) 8.6 MG tablet Stopped by Patient     LISINOPRIL PO Medication Reconciliation Clean Up     Atorvastatin Calcium (LIPITOR PO) Medication Reconciliation Clean Up     HYDROcodone-acetaminophen (NORCO) 5-325 MG per tablet Stopped by Patient         Encounter Diagnoses   Name Primary?     Lung cancer (H) Yes     Colon cancer (H)        CURRENT MEDICATIONS:  Current Outpatient Prescriptions   Medication Sig Dispense Refill     Fenofibrate Micronized 134 MG CAPS Take by mouth daily       lisinopril (PRINIVIL/ZESTRIL) 20 MG tablet Take 20 mg by mouth daily       atorvastatin (LIPITOR) 20 MG tablet Take 20 mg by mouth daily       ASPIRIN PO Take 81 mg by mouth daily       PARoxetine HCl (PAXIL PO) Take 20 mg by mouth At Bedtime       multivitamin, therapeutic (THERA-VIT) TABS Take 1 tablet by mouth At Bedtime No iron       ANASTROZOLE PO Take 1 mg by mouth At Bedtime          ALLERGIES     Allergies   Allergen Reactions     Demerol [Meperidine] Nausea and Vomiting     Sulfa Drugs Other (See Comments)     Extreme chills     Penicillins Rash     Swelling In mouth and tongue       PAST MEDICAL HISTORY:  Past Medical History:   Diagnosis Date     Acoustic neuroma (H)      Anemia     iron deficeincy     Anxiety      Arthritis     osteoarthrosis of knee     Breast cancer, left breast (H) 02/2018     Breast cancer, right breast (H) 1/2016     Cervical cancer (H)      Colon cancer (H)      Depression      Diabetes mellitus (H)      pre-diabetic no longer taking Metformin     Dyspnea      Gastro-oesophageal reflux disease      H/O: lung cancer 2013     Herpes      Herpes zoster      HTN (hypertension)      Hyperlipidaemia      Hyperlipidemia      Lung nodule 2013    Lung cancer     Nausea & vomiting      Pain in female pelvis      Pre-diabetes      Rib lesion      Tachycardia      Torn meniscus        PAST SURGICAL HISTORY:  Past Surgical History:   Procedure Laterality Date     APPENDECTOMY  1966    done with CORNELIO     ARTHROSCOPY KNEE  2012    right knee scoped     BIOPSY NODE SENTINEL Right 2/3/2016    Procedure: BIOPSY NODE SENTINEL;  Surgeon: Flory Pinto MD;  Location: [facility]     BIOPSY NODE SENTINEL Left 3/5/2018    Procedure: BIOPSY NODE SENTINEL;;  Surgeon: Denae Perez MD;  Location: [facility]     BREAST BIOPSY, RT/LT       cataracts       CERVICAL CANCER SCREENING RESULTS DOCUMENTED AND REVIEWED       CHOLECYSTECTOMY  1991     [facility]       COLONOSCOPY       EXCISE NODE MEDIASTINAL  3/12/2013    Procedure: EXCISE NODE MEDIASTINAL;;  Surgeon: Lorne Arenas MD;  Location:  OR     EYE SURGERY      fiona cataracts     GENITOURINARY SURGERY      bladder sling     GYN SURGERY  1966    hyst      HYSTERECTOMY  1966    Cervical CA, paps done every other year, done with appendectomy     INSERT PORT VASCULAR ACCESS Left 3/5/2018    Procedure: INSERT PORT VASCULAR ACCESS;;  Surgeon: Denae Perez MD;  Location: [facility]     INSERT PORT VASCULAR ACCESS N/A 3/5/2018    Procedure: INSERT PORT VASCULAR ACCESS;;  Surgeon: Denae Perez MD;  Location: [facility]     LAPAROSCOPIC ASSISTED COLECTOMY Right 10/27/2016    Procedure: LAPAROSCOPIC ASSISTED COLECTOMY;  Surgeon: Umang Arteaga MD;  Location:  OR     LOBECTOMY LUNG  3/12/2013    Procedure: LOBECTOMY LUNG;;  Surgeon: Lorne Arenas MD;  Location:  OR     LUMPECTOMY BREAST WITH SEED LOCALIZATION Right 2/3/2016    Procedure: LUMPECTOMY BREAST  WITH SEED LOCALIZATION;  Surgeon: Flory Pinto MD;  [facility]     LUMPECTOMY BREAST WITH SEED LOCALIZATION Left 3/5/2018    Procedure: LUMPECTOMY BREAST WITH SEED LOCALIZATION;  SEED LOCALIZED LEFT BREAST LUMPECTOMY, WITH LEFT SENTINEL NODE BIOPSY, PORT PLACEMENT;  Surgeon: Denae Perez MD;  Location: [facility]     MINI ARC SLING OPERATION FOR STRESS INCONTINENCE  2009     RESECT RIB  2/15/2013    Procedure: RESECT RIB;  RESECTION PORTION RIGHT TWELVETH RIB;  Surgeon: Lorne Arenas MD;  Location:  OR     THORACOTOMY  3/12/2013    Procedure: THORACOTOMY;  RIGHT THORACOTOMY, RIGHT MIDDLE AND LOWER LUNG LOBECTOMY, MEDIASTINAL LYMPH NODE DISSECTION ;  Surgeon: Lorne Arenas MD;  Location:  OR     TONSILLECTOMY         FAMILY HISTORY:  Family History   Problem Relation Age of Onset     Breast Cancer Paternal Grandmother      Colon Cancer Other      not specified     Fractures Other      Hip fracture, Aunt     Hyperlipidemia Mother      Hypertension Mother      HEART DISEASE Mother      OSTEOPOROSIS Mother      Hyperlipidemia Sister      Hyperlipidemia Brother      Prostate Cancer Brother      Hypertension Sister      HEART DISEASE Maternal Grandfather      Prostate Cancer Father      Colon Cancer Cousin      son of paternal aunt with breast cancer.     Breast Cancer Paternal Aunt      Colon Cancer Paternal Aunt      Other Cancer Paternal Uncle      Esophageal     Other Cancer Maternal Aunt        SOCIAL HISTORY:  Social History     Social History     Marital status:      Spouse name: N/A     Number of children: 3     Years of education: N/A     Occupational History      Retired     Social History Main Topics     Smoking status: Former Smoker     Packs/day: 1.00     Years: 45.00     Types: Cigarettes     Quit date: 4/24/1998     Smokeless tobacco: Never Used      Comment: quit 1998     Alcohol use 0.0 oz/week     0 Standard drinks or equivalent per week      Comment: glass  "of wine a day     Drug use: No     Sexual activity: Yes     Partners: Male     Birth control/ protection: Female Surgical     Other Topics Concern     None     Social History Narrative    12/2015    -    3 kids    Retired        Pap-11/8/2010 WNL    Mammo-12/2/13     Colonoscopy-3/3/2010 WNL, no further recommended per pt     Dexa-1/1/2008           Review of Systems:  Skin:  Positive for bruising     Eyes:  Positive for glasses;cataracts    ENT:  Positive for hearing loss deaf L ear, wears hearing aid R ear  Respiratory:  Positive for dyspnea on exertion     Cardiovascular:  Negative;chest pain;palpitations;syncope or near-syncope;cyanosis;lightheadedness;dizziness;edema exercise intolerance;Positive for;fatigue    Gastroenterology: Positive for   vomiting when tired  Genitourinary:  Positive for urinary frequency;nocturia    Musculoskeletal:  Positive for arthritis    Neurologic:  Negative      Psychiatric:  Positive for sleep disturbances;excessive stress;depression    Heme/Lymph/Imm:  Positive for allergies    Endocrine:  Positive for   diabetes, diet controlled, states last A1C 5.5    Physical Exam:  Vitals: /84 (BP Location: Left arm, Cuff Size: Adult Large)  Pulse 105  Ht 1.753 m (5' 9\")  Wt 95.3 kg (210 lb)  SpO2 96%  BMI 31.01 kg/m2    Constitutional:  cooperative, alert and oriented, well developed, well nourished, in no acute distress        Skin:  warm and dry to the touch          Head:  normocephalic        Eyes:  pupils equal and round        Lymph:      ENT:  no pallor or cyanosis        Neck:  carotid pulses are full and equal bilaterally        Respiratory:  clear to auscultation    diminished in R base    Cardiac: regular rhythm;no murmurs, gallops or rubs detected                pulses full and equal                                        GI:  abdomen soft;no bruits        Extremities and Muscular Skeletal:  no deformities, clubbing, cyanosis, erythema observed;no edema          "     Neurological:  no gross motor deficits        Psych:  affect appropriate, oriented to time, person and place          CC  No referring provider defined for this encounter.

## 2018-03-12 ENCOUNTER — TELEPHONE (OUTPATIENT)
Dept: MAMMOGRAPHY | Facility: CLINIC | Age: 78
End: 2018-03-12

## 2018-03-12 NOTE — TELEPHONE ENCOUNTER
I called patient this morning to assess needs and inquire if she needs additional resources, and or support s/p surgery.  Patient informed me that she spoke with Dr. Perez last week and was informed that she had positive margins from her left breast lumpectomy 3/5/18, and is now needing to have a mastectomy.  Patient states she is concerned about this and is feeling a little anxious.      Patient also reports calling over to Dr. Subramanian's office this morning and leaving a message with his nurse to inform of the surgical pathology results and the recommendations by Dr. Perez for her to have a mastectomy.  Patient states nurse Philippe was going to give the information to Dr. Subramanian and then call her back with the plan of care.  Patient states she is wanting to proceed with getting surgery scheduled ASAP.    I informed Yolanda I will call over to Dr. Subramanian's office this morning to confirm he has the path report and to see if there is anything I can do to proceed with getting patient scheduled for surgery.    I called over to [facility] and left a message with the , who will have nurse Philippe call me back when she is able.  Nemo Barroso, PUSHPAN, RN

## 2018-03-12 NOTE — TELEPHONE ENCOUNTER
Nurse Denae from Dr. Subramanian's office called me back this afternoon and left me a voicemail message stating she spoke with Dr. Subramanian today, and he agrees to go ahead and get patient scheduled for the mastectomy as Dr. Perez recommends.  Denae also informed me that she called Yolanda to inform of Dr. Subramanian's recommendations, and they will post pone her post operative appointment with Dr. Subramanian until after her mastectomy.  Denae states she informed Yolanda to notify Dr. Perez's office to go ahead and get surgery scheduled.  I will follow up on this tomorrow to ensure that patient is scheduled for surgery.  Nemo Barroso, PUSHPAN, RN

## 2018-03-13 ENCOUNTER — TELEPHONE (OUTPATIENT)
Dept: SURGERY | Facility: CLINIC | Age: 78
End: 2018-03-13

## 2018-03-13 NOTE — TELEPHONE ENCOUNTER
Type of surgery: left breast mastectomy  Location of surgery: [facility]  Date and time of surgery: 03/28/2018 2:00 pm  Surgeon: Dr Perez  Pre-Op Appt Date: pt to schedule  Post-Op Appt Date: pt to schedule   Packet sent out: Yes  Pre-cert/Authorization completed:  Not Applicable  Date: 03/13/2018    Outpatient in a bed, general anesthesia, pt instructed to stop ASA 5 days prior to surgery per SEW instructions

## 2018-03-22 ENCOUNTER — DOCUMENTATION ONLY (OUTPATIENT)
Dept: MAMMOGRAPHY | Facility: CLINIC | Age: 78
End: 2018-03-22

## 2018-03-22 NOTE — PROGRESS NOTES
Yolanda is now scheduled for a Left Breast Simple Mastectomy with Dr. Denae Perez on 3/28/2018.  I mailed out an educational booklet to patients home address titled:  Mastectomy:  A Patient's guide.  I will also follow up with patient next week to assess her needs and see if she has any questions or concerns regarding her surgery.  PUSHPA LeonN, RN

## 2018-03-28 ENCOUNTER — HOSPITAL ENCOUNTER (OUTPATIENT)
Facility: CLINIC | Age: 78
Discharge: HOME | End: 2018-03-30
Attending: SURGERY | Admitting: SURGERY
Payer: COMMERCIAL

## 2018-03-28 ENCOUNTER — ANESTHESIA (OUTPATIENT)
Dept: SURGERY | Facility: CLINIC | Age: 78
End: 2018-03-28
Payer: COMMERCIAL

## 2018-03-28 ENCOUNTER — OFFICE VISIT (OUTPATIENT)
Dept: SURGERY | Facility: PHYSICIAN GROUP | Age: 78
End: 2018-03-28
Payer: COMMERCIAL

## 2018-03-28 ENCOUNTER — ANESTHESIA EVENT (OUTPATIENT)
Dept: SURGERY | Facility: CLINIC | Age: 78
End: 2018-03-28
Payer: COMMERCIAL

## 2018-03-28 ENCOUNTER — SURGERY (OUTPATIENT)
Age: 78
End: 2018-03-28

## 2018-03-28 DIAGNOSIS — G89.18 ACUTE POST-OPERATIVE PAIN: Primary | ICD-10-CM

## 2018-03-28 PROBLEM — C50.912 MALIGNANT NEOPLASM OF LEFT BREAST (H): Status: ACTIVE | Noted: 2018-03-28

## 2018-03-28 PROCEDURE — 36000058 ZZH SURGERY LEVEL 3 EA 15 ADDTL MIN: Performed by: SURGERY

## 2018-03-28 PROCEDURE — 25000125 ZZHC RX 250: Performed by: SURGERY

## 2018-03-28 PROCEDURE — 19303 MAST SIMPLE COMPLETE: CPT | Mod: AS | Performed by: PHYSICIAN ASSISTANT

## 2018-03-28 PROCEDURE — 40000170 ZZH STATISTIC PRE-PROCEDURE ASSESSMENT II: Performed by: SURGERY

## 2018-03-28 PROCEDURE — 88307 TISSUE EXAM BY PATHOLOGIST: CPT | Performed by: SURGERY

## 2018-03-28 PROCEDURE — 40000936 ZZH STATISTIC OUTPATIENT (NON-OBS) NIGHT

## 2018-03-28 PROCEDURE — 40000935 ZZH STATISTIC OUTPATIENT (NON-OBS) EVE

## 2018-03-28 PROCEDURE — 71000013 ZZH RECOVERY PHASE 1 LEVEL 1 EA ADDTL HR: Performed by: SURGERY

## 2018-03-28 PROCEDURE — 25000125 ZZHC RX 250: Performed by: NURSE ANESTHETIST, CERTIFIED REGISTERED

## 2018-03-28 PROCEDURE — 71000012 ZZH RECOVERY PHASE 1 LEVEL 1 FIRST HR: Performed by: SURGERY

## 2018-03-28 PROCEDURE — 36000056 ZZH SURGERY LEVEL 3 1ST 30 MIN: Performed by: SURGERY

## 2018-03-28 PROCEDURE — 88307 TISSUE EXAM BY PATHOLOGIST: CPT | Mod: 26 | Performed by: SURGERY

## 2018-03-28 PROCEDURE — 27210794 ZZH OR GENERAL SUPPLY STERILE: Performed by: SURGERY

## 2018-03-28 PROCEDURE — 37000009 ZZH ANESTHESIA TECHNICAL FEE, EACH ADDTL 15 MIN: Performed by: SURGERY

## 2018-03-28 PROCEDURE — 25000566 ZZH SEVOFLURANE, EA 15 MIN: Performed by: SURGERY

## 2018-03-28 PROCEDURE — 25000128 H RX IP 250 OP 636: Performed by: NURSE ANESTHETIST, CERTIFIED REGISTERED

## 2018-03-28 PROCEDURE — 25000128 H RX IP 250 OP 636: Performed by: ANESTHESIOLOGY

## 2018-03-28 PROCEDURE — 25000132 ZZH RX MED GY IP 250 OP 250 PS 637: Performed by: ANESTHESIOLOGY

## 2018-03-28 PROCEDURE — 25000132 ZZH RX MED GY IP 250 OP 250 PS 637: Performed by: PHYSICIAN ASSISTANT

## 2018-03-28 PROCEDURE — 25000128 H RX IP 250 OP 636: Performed by: PHYSICIAN ASSISTANT

## 2018-03-28 PROCEDURE — 19303 MAST SIMPLE COMPLETE: CPT | Mod: 58 | Performed by: SURGERY

## 2018-03-28 PROCEDURE — 37000008 ZZH ANESTHESIA TECHNICAL FEE, 1ST 30 MIN: Performed by: SURGERY

## 2018-03-28 RX ORDER — ONDANSETRON 4 MG/1
4 TABLET, ORALLY DISINTEGRATING ORAL EVERY 30 MIN PRN
Status: DISCONTINUED | OUTPATIENT
Start: 2018-03-28 | End: 2018-03-28 | Stop reason: HOSPADM

## 2018-03-28 RX ORDER — BUPIVACAINE HYDROCHLORIDE AND EPINEPHRINE 5; 5 MG/ML; UG/ML
INJECTION, SOLUTION EPIDURAL; INTRACAUDAL; PERINEURAL
Status: DISCONTINUED
Start: 2018-03-28 | End: 2018-03-28 | Stop reason: HOSPADM

## 2018-03-28 RX ORDER — PAROXETINE 20 MG/1
20 TABLET, FILM COATED ORAL AT BEDTIME
Status: DISCONTINUED | OUTPATIENT
Start: 2018-03-28 | End: 2018-03-30 | Stop reason: HOSPADM

## 2018-03-28 RX ORDER — CLINDAMYCIN PHOSPHATE 900 MG/50ML
900 INJECTION, SOLUTION INTRAVENOUS SEE ADMIN INSTRUCTIONS
Status: DISCONTINUED | OUTPATIENT
Start: 2018-03-28 | End: 2018-03-28 | Stop reason: HOSPADM

## 2018-03-28 RX ORDER — ONDANSETRON 4 MG/1
4 TABLET, ORALLY DISINTEGRATING ORAL EVERY 6 HOURS PRN
Status: DISCONTINUED | OUTPATIENT
Start: 2018-03-28 | End: 2018-03-30 | Stop reason: HOSPADM

## 2018-03-28 RX ORDER — ANASTROZOLE 1 MG/1
1 TABLET ORAL AT BEDTIME
Status: DISCONTINUED | OUTPATIENT
Start: 2018-03-28 | End: 2018-03-30 | Stop reason: HOSPADM

## 2018-03-28 RX ORDER — CLINDAMYCIN PHOSPHATE 900 MG/50ML
900 INJECTION, SOLUTION INTRAVENOUS
Status: COMPLETED | OUTPATIENT
Start: 2018-03-28 | End: 2018-03-28

## 2018-03-28 RX ORDER — ONDANSETRON 2 MG/ML
INJECTION INTRAMUSCULAR; INTRAVENOUS PRN
Status: DISCONTINUED | OUTPATIENT
Start: 2018-03-28 | End: 2018-03-28

## 2018-03-28 RX ORDER — NALOXONE HYDROCHLORIDE 0.4 MG/ML
.1-.4 INJECTION, SOLUTION INTRAMUSCULAR; INTRAVENOUS; SUBCUTANEOUS
Status: DISCONTINUED | OUTPATIENT
Start: 2018-03-28 | End: 2018-03-28 | Stop reason: HOSPADM

## 2018-03-28 RX ORDER — LISINOPRIL 20 MG/1
20 TABLET ORAL AT BEDTIME
Status: DISCONTINUED | OUTPATIENT
Start: 2018-03-28 | End: 2018-03-30 | Stop reason: HOSPADM

## 2018-03-28 RX ORDER — LIDOCAINE HYDROCHLORIDE 20 MG/ML
INJECTION, SOLUTION INFILTRATION; PERINEURAL PRN
Status: DISCONTINUED | OUTPATIENT
Start: 2018-03-28 | End: 2018-03-28

## 2018-03-28 RX ORDER — MEPERIDINE HYDROCHLORIDE 25 MG/ML
12.5 INJECTION INTRAMUSCULAR; INTRAVENOUS; SUBCUTANEOUS
Status: DISCONTINUED | OUTPATIENT
Start: 2018-03-28 | End: 2018-03-28 | Stop reason: HOSPADM

## 2018-03-28 RX ORDER — HYDROMORPHONE HYDROCHLORIDE 1 MG/ML
.2-.4 INJECTION, SOLUTION INTRAMUSCULAR; INTRAVENOUS; SUBCUTANEOUS
Status: DISCONTINUED | OUTPATIENT
Start: 2018-03-28 | End: 2018-03-30 | Stop reason: HOSPADM

## 2018-03-28 RX ORDER — SODIUM CHLORIDE, SODIUM LACTATE, POTASSIUM CHLORIDE, CALCIUM CHLORIDE 600; 310; 30; 20 MG/100ML; MG/100ML; MG/100ML; MG/100ML
INJECTION, SOLUTION INTRAVENOUS CONTINUOUS
Status: DISCONTINUED | OUTPATIENT
Start: 2018-03-28 | End: 2018-03-28 | Stop reason: HOSPADM

## 2018-03-28 RX ORDER — HYDROXYZINE HYDROCHLORIDE 25 MG/1
25 TABLET, FILM COATED ORAL ONCE
Status: COMPLETED | OUTPATIENT
Start: 2018-03-28 | End: 2018-03-28

## 2018-03-28 RX ORDER — PROCHLORPERAZINE MALEATE 5 MG
5 TABLET ORAL EVERY 6 HOURS PRN
Status: DISCONTINUED | OUTPATIENT
Start: 2018-03-28 | End: 2018-03-30 | Stop reason: HOSPADM

## 2018-03-28 RX ORDER — ACETAMINOPHEN 500 MG
1000 TABLET ORAL ONCE
Status: COMPLETED | OUTPATIENT
Start: 2018-03-28 | End: 2018-03-28

## 2018-03-28 RX ORDER — HYDROMORPHONE HYDROCHLORIDE 1 MG/ML
.3-.5 INJECTION, SOLUTION INTRAMUSCULAR; INTRAVENOUS; SUBCUTANEOUS EVERY 10 MIN PRN
Status: DISCONTINUED | OUTPATIENT
Start: 2018-03-28 | End: 2018-03-28 | Stop reason: HOSPADM

## 2018-03-28 RX ORDER — ATORVASTATIN CALCIUM 10 MG/1
20 TABLET, FILM COATED ORAL AT BEDTIME
Status: DISCONTINUED | OUTPATIENT
Start: 2018-03-28 | End: 2018-03-30 | Stop reason: HOSPADM

## 2018-03-28 RX ORDER — FENTANYL CITRATE 50 UG/ML
INJECTION, SOLUTION INTRAMUSCULAR; INTRAVENOUS PRN
Status: DISCONTINUED | OUTPATIENT
Start: 2018-03-28 | End: 2018-03-28

## 2018-03-28 RX ORDER — ONDANSETRON 2 MG/ML
4 INJECTION INTRAMUSCULAR; INTRAVENOUS EVERY 30 MIN PRN
Status: DISCONTINUED | OUTPATIENT
Start: 2018-03-28 | End: 2018-03-28 | Stop reason: HOSPADM

## 2018-03-28 RX ORDER — SODIUM CHLORIDE, SODIUM LACTATE, POTASSIUM CHLORIDE, CALCIUM CHLORIDE 600; 310; 30; 20 MG/100ML; MG/100ML; MG/100ML; MG/100ML
INJECTION, SOLUTION INTRAVENOUS CONTINUOUS
Status: DISCONTINUED | OUTPATIENT
Start: 2018-03-28 | End: 2018-03-30 | Stop reason: HOSPADM

## 2018-03-28 RX ORDER — IBUPROFEN 600 MG/1
600 TABLET, FILM COATED ORAL EVERY 6 HOURS PRN
Status: DISCONTINUED | OUTPATIENT
Start: 2018-03-29 | End: 2018-03-29

## 2018-03-28 RX ORDER — NALOXONE HYDROCHLORIDE 0.4 MG/ML
.1-.4 INJECTION, SOLUTION INTRAMUSCULAR; INTRAVENOUS; SUBCUTANEOUS
Status: DISCONTINUED | OUTPATIENT
Start: 2018-03-28 | End: 2018-03-30 | Stop reason: HOSPADM

## 2018-03-28 RX ORDER — FENTANYL CITRATE 50 UG/ML
25-50 INJECTION, SOLUTION INTRAMUSCULAR; INTRAVENOUS EVERY 5 MIN PRN
Status: DISCONTINUED | OUTPATIENT
Start: 2018-03-28 | End: 2018-03-28 | Stop reason: HOSPADM

## 2018-03-28 RX ORDER — DIAZEPAM 2 MG
2-4 TABLET ORAL EVERY 6 HOURS PRN
Status: DISCONTINUED | OUTPATIENT
Start: 2018-03-28 | End: 2018-03-30 | Stop reason: HOSPADM

## 2018-03-28 RX ORDER — PROPOFOL 10 MG/ML
INJECTION, EMULSION INTRAVENOUS PRN
Status: DISCONTINUED | OUTPATIENT
Start: 2018-03-28 | End: 2018-03-28

## 2018-03-28 RX ORDER — SODIUM CHLORIDE, SODIUM LACTATE, POTASSIUM CHLORIDE, CALCIUM CHLORIDE 600; 310; 30; 20 MG/100ML; MG/100ML; MG/100ML; MG/100ML
INJECTION, SOLUTION INTRAVENOUS CONTINUOUS PRN
Status: DISCONTINUED | OUTPATIENT
Start: 2018-03-28 | End: 2018-03-28

## 2018-03-28 RX ORDER — ONDANSETRON 2 MG/ML
4 INJECTION INTRAMUSCULAR; INTRAVENOUS EVERY 6 HOURS PRN
Status: DISCONTINUED | OUTPATIENT
Start: 2018-03-28 | End: 2018-03-30 | Stop reason: HOSPADM

## 2018-03-28 RX ORDER — PROPOFOL 10 MG/ML
INJECTION, EMULSION INTRAVENOUS CONTINUOUS PRN
Status: DISCONTINUED | OUTPATIENT
Start: 2018-03-28 | End: 2018-03-28

## 2018-03-28 RX ORDER — EPHEDRINE SULFATE 50 MG/ML
INJECTION, SOLUTION INTRAMUSCULAR; INTRAVENOUS; SUBCUTANEOUS PRN
Status: DISCONTINUED | OUTPATIENT
Start: 2018-03-28 | End: 2018-03-28

## 2018-03-28 RX ORDER — DEXAMETHASONE SODIUM PHOSPHATE 4 MG/ML
INJECTION, SOLUTION INTRA-ARTICULAR; INTRALESIONAL; INTRAMUSCULAR; INTRAVENOUS; SOFT TISSUE PRN
Status: DISCONTINUED | OUTPATIENT
Start: 2018-03-28 | End: 2018-03-28

## 2018-03-28 RX ORDER — HYDROCODONE BITARTRATE AND ACETAMINOPHEN 5; 325 MG/1; MG/1
1-2 TABLET ORAL EVERY 4 HOURS PRN
Status: DISCONTINUED | OUTPATIENT
Start: 2018-03-28 | End: 2018-03-29

## 2018-03-28 RX ORDER — FENOFIBRATE 160 MG/1
160 TABLET ORAL AT BEDTIME
Status: DISCONTINUED | OUTPATIENT
Start: 2018-03-28 | End: 2018-03-30 | Stop reason: HOSPADM

## 2018-03-28 RX ORDER — BUPIVACAINE HYDROCHLORIDE AND EPINEPHRINE 5; 5 MG/ML; UG/ML
INJECTION, SOLUTION PERINEURAL PRN
Status: DISCONTINUED | OUTPATIENT
Start: 2018-03-28 | End: 2018-03-28 | Stop reason: HOSPADM

## 2018-03-28 RX ORDER — ASPIRIN 81 MG/1
81 TABLET, CHEWABLE ORAL AT BEDTIME
Status: DISCONTINUED | OUTPATIENT
Start: 2018-03-29 | End: 2018-03-30 | Stop reason: HOSPADM

## 2018-03-28 RX ORDER — AMOXICILLIN 250 MG
1 CAPSULE ORAL AT BEDTIME
Status: DISCONTINUED | OUTPATIENT
Start: 2018-03-28 | End: 2018-03-30 | Stop reason: HOSPADM

## 2018-03-28 RX ORDER — LIDOCAINE 40 MG/G
CREAM TOPICAL
Status: DISCONTINUED | OUTPATIENT
Start: 2018-03-28 | End: 2018-03-30 | Stop reason: HOSPADM

## 2018-03-28 RX ORDER — FENTANYL CITRATE 50 UG/ML
25-50 INJECTION, SOLUTION INTRAMUSCULAR; INTRAVENOUS
Status: DISCONTINUED | OUTPATIENT
Start: 2018-03-28 | End: 2018-03-28 | Stop reason: HOSPADM

## 2018-03-28 RX ADMIN — PHENYLEPHRINE HYDROCHLORIDE 100 MCG: 10 INJECTION INTRAVENOUS at 13:54

## 2018-03-28 RX ADMIN — PROPOFOL 200 MG: 10 INJECTION, EMULSION INTRAVENOUS at 13:01

## 2018-03-28 RX ADMIN — FENTANYL CITRATE 50 MCG: 50 INJECTION, SOLUTION INTRAMUSCULAR; INTRAVENOUS at 14:21

## 2018-03-28 RX ADMIN — SENNOSIDES AND DOCUSATE SODIUM 1 TABLET: 8.6; 5 TABLET ORAL at 22:09

## 2018-03-28 RX ADMIN — FENTANYL CITRATE 25 MCG: 50 INJECTION, SOLUTION INTRAMUSCULAR; INTRAVENOUS at 14:31

## 2018-03-28 RX ADMIN — SODIUM CHLORIDE, POTASSIUM CHLORIDE, SODIUM LACTATE AND CALCIUM CHLORIDE: 600; 310; 30; 20 INJECTION, SOLUTION INTRAVENOUS at 20:50

## 2018-03-28 RX ADMIN — LIDOCAINE HYDROCHLORIDE 100 MG: 20 INJECTION, SOLUTION INFILTRATION; PERINEURAL at 13:01

## 2018-03-28 RX ADMIN — DEXMEDETOMIDINE HYDROCHLORIDE 4 MCG: 100 INJECTION, SOLUTION INTRAVENOUS at 13:20

## 2018-03-28 RX ADMIN — Medication 5 MG: at 13:06

## 2018-03-28 RX ADMIN — CLINDAMYCIN PHOSPHATE 900 MG: 18 INJECTION, SOLUTION INTRAVENOUS at 13:02

## 2018-03-28 RX ADMIN — ACETAMINOPHEN 1000 MG: 500 TABLET, FILM COATED ORAL at 12:46

## 2018-03-28 RX ADMIN — SODIUM CHLORIDE, POTASSIUM CHLORIDE, SODIUM LACTATE AND CALCIUM CHLORIDE: 600; 310; 30; 20 INJECTION, SOLUTION INTRAVENOUS at 13:56

## 2018-03-28 RX ADMIN — DEXMEDETOMIDINE HYDROCHLORIDE 4 MCG: 100 INJECTION, SOLUTION INTRAVENOUS at 13:22

## 2018-03-28 RX ADMIN — FENOFIBRATE 160 MG: 160 TABLET ORAL at 22:09

## 2018-03-28 RX ADMIN — ATORVASTATIN CALCIUM 20 MG: 10 TABLET, FILM COATED ORAL at 22:03

## 2018-03-28 RX ADMIN — PHENYLEPHRINE HYDROCHLORIDE 100 MCG: 10 INJECTION INTRAVENOUS at 13:34

## 2018-03-28 RX ADMIN — LISINOPRIL 20 MG: 20 TABLET ORAL at 22:08

## 2018-03-28 RX ADMIN — ONDANSETRON 4 MG: 2 INJECTION INTRAMUSCULAR; INTRAVENOUS at 15:13

## 2018-03-28 RX ADMIN — PAROXETINE HYDROCHLORIDE 20 MG: 20 TABLET, FILM COATED ORAL at 22:09

## 2018-03-28 RX ADMIN — SODIUM CHLORIDE, POTASSIUM CHLORIDE, SODIUM LACTATE AND CALCIUM CHLORIDE: 600; 310; 30; 20 INJECTION, SOLUTION INTRAVENOUS at 12:59

## 2018-03-28 RX ADMIN — DEXAMETHASONE SODIUM PHOSPHATE 4 MG: 4 INJECTION, SOLUTION INTRA-ARTICULAR; INTRALESIONAL; INTRAMUSCULAR; INTRAVENOUS; SOFT TISSUE at 14:17

## 2018-03-28 RX ADMIN — FENTANYL CITRATE 50 MCG: 50 INJECTION, SOLUTION INTRAMUSCULAR; INTRAVENOUS at 13:27

## 2018-03-28 RX ADMIN — PROPOFOL 30 MG: 10 INJECTION, EMULSION INTRAVENOUS at 13:30

## 2018-03-28 RX ADMIN — ANASTROZOLE 1 MG: 1 TABLET, COATED ORAL at 22:09

## 2018-03-28 RX ADMIN — PROPOFOL 120 MCG/KG/MIN: 10 INJECTION, EMULSION INTRAVENOUS at 13:01

## 2018-03-28 RX ADMIN — PHENYLEPHRINE HYDROCHLORIDE 100 MCG: 10 INJECTION INTRAVENOUS at 13:42

## 2018-03-28 RX ADMIN — PHENYLEPHRINE HYDROCHLORIDE 100 MCG: 10 INJECTION INTRAVENOUS at 13:48

## 2018-03-28 RX ADMIN — HYDROXYZINE HYDROCHLORIDE 25 MG: 25 TABLET ORAL at 12:45

## 2018-03-28 RX ADMIN — DEXMEDETOMIDINE HYDROCHLORIDE 4 MCG: 100 INJECTION, SOLUTION INTRAVENOUS at 13:21

## 2018-03-28 RX ADMIN — DEXMEDETOMIDINE HYDROCHLORIDE 4 MCG: 100 INJECTION, SOLUTION INTRAVENOUS at 13:19

## 2018-03-28 RX ADMIN — BUPIVACAINE HYDROCHLORIDE AND EPINEPHRINE BITARTRATE 10 ML: 5; .005 INJECTION, SOLUTION PERINEURAL at 14:26

## 2018-03-28 RX ADMIN — DEXMEDETOMIDINE HYDROCHLORIDE 4 MCG: 100 INJECTION, SOLUTION INTRAVENOUS at 13:18

## 2018-03-28 RX ADMIN — ONDANSETRON 4 MG: 2 INJECTION INTRAMUSCULAR; INTRAVENOUS at 14:24

## 2018-03-28 RX ADMIN — FENTANYL CITRATE 50 MCG: 50 INJECTION, SOLUTION INTRAMUSCULAR; INTRAVENOUS at 13:00

## 2018-03-28 ASSESSMENT — LIFESTYLE VARIABLES: TOBACCO_USE: 1

## 2018-03-28 NOTE — ANESTHESIA PREPROCEDURE EVALUATION
Anesthesia Evaluation     . Pt has had prior anesthetic. Type: General    No history of anesthetic complications          ROS/MED HX    ENT/Pulmonary:     (+)tobacco use, Past use 1ppd x 45 years packs/day  , . .    Neurologic:       Cardiovascular:     (+) Dyslipidemia, hypertension----. : . . . :. .       METS/Exercise Tolerance:     Hematologic:         Musculoskeletal:         GI/Hepatic:     (+) GERD Asymptomatic on medication,       Renal/Genitourinary:         Endo:     (+) Obesity, .   (-) Type I DM and Type II DM   Psychiatric:     (+) psychiatric history anxiety and depression      Infectious Disease:         Malignancy:   (+) Malignancy History of Breast, Lung and GI          Other:                     Physical Exam  Normal systems: cardiovascular and pulmonary    Airway   Mallampati: I    Dental   (+) upper dentures and lower dentures    Cardiovascular   Rhythm and rate: regular and normal      Pulmonary    breath sounds clear to auscultation(+) decreased breath sounds                       Anesthesia Plan      History & Physical Review  History and physical reviewed and following examination; no interval change.    ASA Status:  3 .    NPO Status:  > 8 hours    Plan for General and LMA with Propofol induction. Maintenance will be Inhalation.    PONV prophylaxis:  Ondansetron (or other 5HT-3) and Dexamethasone or Solumedrol       Postoperative Care  Postoperative pain management:  IV analgesics and Oral pain medications.      Consents  Anesthetic plan, risks, benefits and alternatives discussed with:  Patient..                          .

## 2018-03-28 NOTE — IP AVS SNAPSHOT
MRN:[id number]                      After Visit Summary   3/28/2018    Yolanda Watson    MRN: [id number]           Thank you!     Thank you for choosing [facility] for your care. Our goal is always to provide you with excellent care. Hearing back from our patients is one way we can continue to improve our services. Please take a few minutes to complete the written survey that you may receive in the mail after you visit with us. Thank you!        Patient Information     Date Of Birth          1940        Designated Caregiver       Most Recent Value    Caregiver    Will someone help with your care after discharge? yes    Name of designated caregiver ANGELITO BERRY  -  DAUGHTER    Phone number of caregiver [phone]      About your hospital stay     You were admitted on:  March 28, 2018 [facility]    You were discharged on:  March 30, 2018        Reason for your hospital stay       Left breast mastectomy with Dr. Perez                  Who to Call     For medical emergencies, please call 911.  For non-urgent questions about your medical care, please call your primary care provider or clinic, [phone]  For questions related to your surgery, please call your surgery clinic        Attending Provider     Provider Specialty    Denae Perez MD Surgery       Primary Care Provider Office Phone # Fax #    Soren Perdomo [phone]      Future tests that were ordered for you     Mastectomy Camisole       Size per patient                  Further instructions from your care team       [facility]  Discharge Instructions: Post-Operative Mastectomy    ACTIVITY    Take frequent, short walks and increase your activity gradually.      Avoid strenuous physical activity or heavy lifting greater than 15-20 lbs. for 2 weeks on the side of surgery.  You may climb stairs.     Gentle rotation and  stretching of your arms and shoulders will prevent joint stiffness.    You may drive without restrictions when you are not using any prescription pain medication and feel comfortable in a car.    You may return to work/school when you are comfortable without any prescription pain medication.    WOUND CARE    You may remove your outer dressing and shower 48 hours after the surgery.  Pat your incisions dry and leave them open to air.  Re-apply dressing (gauze/tape) as needed for comfort or drainage.    You have steri-strips (looks like white tape) at your incisions.  You may peel off the steri-strips 2 weeks after your surgery if they have not peeled off on their own.  If you have staples, they will be removed at your next office visit.    Do not soak your incisions in a tub or pool for 2 weeks.     Do not apply any lotions, creams, or ointments to your incisions.    A ridge under your incisions is normal and will gradually resolve.    Wear the mastectomy camisole for comfort if you would like.    You have 1 drais at your surgical site, please refer to your drain care instructions.  Record output totals daily.  You will need to schedule an appointment for drain removal when your output is less than 30 mL per day for 1-2 days or 2 weeks, whichever comes first.    DIET    Start with liquids, then gradually resume your regular diet as tolerated.     Drink plenty of fluids to stay hydrated.    PAIN    Expect some tenderness and discomfort at the incision site(s).  Use the prescribed pain medication at your discretion.  Expect gradual resolution of your pain over several days.    You may take ibuprofen with food (unless you have been told not to) instead of or in addition to your prescribed pain medication.  If you are taking Norco or Percocet, do not take any additional acetaminophen/APAP/Tylenol.    Do not drink alcohol or drive while you are taking pain medications.    You may apply ice to your incisions in 20 minute  intervals as needed for the next 48 hours.  After that time, consider switching to heat if you prefer.    EXPECTATIONS    Pain medications can cause constipation.  Limit use when possible.  Take over the counter stool softener/stimulant, such as Colace or Senna, 1-2 times a day with plenty of water.  You may take a mild over the counter laxative, such as Miralax or a suppository, as needed.      You may discontinue these medications once you are having regular bowel movements and/or are no longer taking your narcotic pain medication.    RETURN APPOINTMENT    Follow up with Dr. Perez when your drain is ready to be removed. Please call our office at [phone] to schedule your appointment.    CALL OUR OFFICE -[phone] IF YOU HAVE:     Chills or fever above 101 F.    Increased redness, warmth, or drainage at your incisions.    Significant bleeding or swelling.    Pain not relieved by your pain medication or rest.    Increasing pain after the first 48 hours.    Any other concerns or questions.    Revised January 2018         DRAIN CARE    You have 1 JERRY drain on your left side. In order to empty this, open the tab/vent on the drain and record how much liquid you remove. To properly close the drain, squeeze the bulb (with tab/vent open) then close the tab while still squeezing the bulb. You should notice that liquid moves in the tubing toward the bulb if it is properly closed.   You should also strip the drain tubing once daily to avoid any clogging. You do this by gently pinching the drain, starting near the skin, and stretching the tubing out this way until you reach the bulb. Do not pull hard on the drain tubing to the point of pulling the drain away from the skin.     When each drain is putting out 30cc or less for at least 2 consecutive days, call Dr. Perez's office at [phone] and schedule an appointment with her for drain removal.     Pending Results     No orders found from 3/26/2018 to  "3/29/2018.            Statement of Approval     Ordered          18 0955  I have reviewed and agree with all the recommendations and orders detailed in this document.  EFFECTIVE NOW     Approved and electronically signed by:  Lottie Napier PA-C             Admission Information     Date & Time Provider Department Dept. Phone    3/28/2018 Denae Perez MD [facility] [phone]      Your Vitals Were     Blood Pressure Temperature Respirations Height Weight Pulse Oximetry    153/61 (BP Location: Right arm) 98.8  F (37.1  C) (Oral) 18 1.753 m (5' 9\") 92.5 kg (204 lb) 95%    BMI (Body Mass Index)                   30.13 kg/m2           MyChart Information     "[employer] lets you send messages to your doctor, view your test results, renew your prescriptions, schedule appointments and more. To sign up, go to [email or web address] . Click on \"Log in\" on the left side of the screen, which will take you to the Welcome page. Then click on \"Sign up Now\" on the right side of the page.     You will be asked to enter the access code listed below, as well as some personal information. Please follow the directions to create your username and password.     Your access code is: [email or web address]  Expires: 2018 11:53 AM     Your access code will  in 90 days. If you need help or a new code, please call your [facility] clinic or [phone].        Care EveryWhere ID     This is your Care EveryWhere ID. This could be used by other organizations to access your [facility] medical records  [id number]        Equal Access to Services     [facility] : Hadii celine Olsen, gaby bonilla, qaagustina melchor. So [facility] [phone].    ATENCIÓN: Si habla español, tiene a ferreira disposición servicios gratuitos de asistencia lingüística. Llame al [phone].    We comply with applicable federal civil rights laws and [place] laws. We do not " discriminate on the basis of race, color, national origin, age, disability, sex, sexual orientation, or gender identity.               Review of your medicines      START taking        Dose / Directions    senna-docusate 8.6-50 MG per tablet   Commonly known as:  SENOKOT-S;PERICOLACE   Used for:  Acute post-operative pain        Dose:  1-2 tablet   Take 1-2 tablets by mouth 2 times daily as needed for constipation   Quantity:  30 tablet   Refills:  1       traMADol 50 MG tablet   Commonly known as:  ULTRAM   Used for:  Acute post-operative pain        Dose:  25-50 mg   Take 0.5-1 tablets (25-50 mg) by mouth every 6 hours as needed for moderate pain   Quantity:  20 tablet   Refills:  0         CONTINUE these medicines which have NOT CHANGED        Dose / Directions    ANASTROZOLE PO        Dose:  1 mg   Take 1 mg by mouth At Bedtime   Refills:  0       ASPIRIN PO        Dose:  81 mg   Take 81 mg by mouth At Bedtime   Refills:  0       atorvastatin 20 MG tablet   Commonly known as:  LIPITOR        Dose:  20 mg   Take 20 mg by mouth At Bedtime   Refills:  0       Fenofibrate Micronized 134 MG Caps        Dose:  134 mg   Take 134 mg by mouth At Bedtime   Refills:  0       lisinopril 20 MG tablet   Commonly known as:  PRINIVIL/ZESTRIL        Dose:  20 mg   Take 20 mg by mouth At Bedtime   Refills:  0       multivitamin, therapeutic Tabs tablet        Dose:  1 tablet   Take 1 tablet by mouth At Bedtime No iron   Refills:  0       PAXIL PO        Dose:  20 mg   Take 20 mg by mouth At Bedtime   Refills:  0            Where to get your medicines      These medications were sent to [facility] [place], [place] - [address]  [address] [place] [address] [address]     Phone:  [phone]     senna-docusate 8.6-50 MG per tablet         Some of these will need a paper prescription and others can be bought over the counter. Ask your nurse if you have questions.     Bring a paper prescription for each of  these medications     traMADol 50 MG tablet                Protect others around you: Learn how to safely use, store and throw away your medicines at www.disposemymeds.org.        Information about OPIOIDS     PRESCRIPTION OPIOIDS: WHAT YOU NEED TO KNOW    Prescription opioids can be used to help relieve moderate to severe pain and are often prescribed following a surgery or injury, or for certain health conditions. These medications can be an important part of treatment but also come with serious risks. It is important to work with your health care provider to make sure you are getting the safest, most effective care.    WHAT ARE THE RISKS AND SIDE EFFECTS OF OPIOID USE?  Prescription opioids carry serious risks of addiction and overdose, especially with prolonged use. An opioid overdose, often marked by slowed breathing can cause sudden death. The use of prescription opioids can have a number of side effects as well, even when taken as directed:      Tolerance - meaning you might need to take more of a medication for the same pain relief    Physical dependence - meaning you have symptoms of withdrawal when a medication is stopped    Increased sensitivity to pain    Constipation    Nausea, vomiting, and dry mouth    Sleepiness and dizziness    Confusion    Depression    Low levels of testosterone that can result in lower sex drive, energy, and strength    Itching and sweating    RISKS ARE GREATER WITH:    History of drug misuse, substance use disorder, or overdose    Mental health conditions (such as depression or anxiety)    Sleep apnea    Older age (65 years or older)    Pregnancy    Avoid alcohol while taking prescription opioids.   Also, unless specifically advised by your health care provider, medications to avoid include:    Benzodiazepines (such as Xanax or Valium)    Muscle relaxants (such as Soma or Flexeril)    Hypnotics (such as Ambien or Lunesta)    Other prescription opioids    KNOW YOUR OPTIONS:  Talk  to your health care provider about ways to manage your pain that do not involve prescription opioids. Some of these options may actually work better and have fewer risks and side effects:    Pain relievers such as acetaminophen, ibuprofen, and naproxen    Some medications that are also used for depression or seizures    Physical therapy and exercise    Cognitive behavioral therapy, a psychological, goal-directed approach, in which patients learn how to modify physical, behavioral, and emotional triggers of pain and stress    IF YOU ARE PRESCRIBED OPIOIDS FOR PAIN:    Never take opioids in greater amounts or more often than prescribed    Follow up with your primary health care provider and work together to create a plan on how to manage your pain.    Talk about ways to help manage your pain that do not involve prescription opioids    Talk about all concerns and side effects    Help prevent misuse and abuse    Never sell or share prescription opioids    Never use another person's prescription opioids    Store prescription opioids in a secure place and out of reach of others (this may include visitors, children, friends, and family)    Visit www.cdc.gov/drugoverdose to learn about risks of opioid abuse and overdose    If you believe you may be struggling with addiction, tell your health care provider and ask for guidance or call [facility]'s National Helpline at 9-679-431-HELP    LEARN MORE / www.cdc.gov/drugoverdose/prescribing/guideline.html    Safely dispose of unused prescription opioids: Find your local drug take-back programs and more information about the importance of safe disposal at www.doseofreality.[place].gov             Medication List: This is a list of all your medications and when to take them. Check marks below indicate your daily home schedule. Keep this list as a reference.      Medications           Morning Afternoon Evening Bedtime As Needed    ANASTROZOLE PO   Take 1 mg by mouth At Bedtime   Last time  this was given:  1 mg on 3/29/2018 10:08 PM                                   ASPIRIN PO   Take 81 mg by mouth At Bedtime   Last time this was given:  81 mg on 3/29/2018 10:07 PM                                   atorvastatin 20 MG tablet   Commonly known as:  LIPITOR   Take 20 mg by mouth At Bedtime   Last time this was given:  20 mg on 3/29/2018 10:07 PM                                   Fenofibrate Micronized 134 MG Caps   Take 134 mg by mouth At Bedtime                                   lisinopril 20 MG tablet   Commonly known as:  PRINIVIL/ZESTRIL   Take 20 mg by mouth At Bedtime   Last time this was given:  20 mg on 3/29/2018 10:09 PM                                   multivitamin, therapeutic Tabs tablet   Take 1 tablet by mouth At Bedtime No iron                                   PAXIL PO   Take 20 mg by mouth At Bedtime   Last time this was given:  20 mg on 3/29/2018 10:08 PM                                   senna-docusate 8.6-50 MG per tablet   Commonly known as:  SENOKOT-S;PERICOLACE   Take 1-2 tablets by mouth 2 times daily as needed for constipation   Last time this was given:  1 tablet on 3/28/2018 10:09 PM                                   traMADol 50 MG tablet   Commonly known as:  ULTRAM   Take 0.5-1 tablets (25-50 mg) by mouth every 6 hours as needed for moderate pain   Last time this was given:  50 mg on 3/29/2018 10:16 PM

## 2018-03-28 NOTE — PROGRESS NOTES
Admission medication history interview status for the 3/28/2018  admission is complete. See EPIC admission navigator for prior to admission medications     Medication history source reliability:Good    Medication history interview source(s):Patient    Medication history resources (including written lists, pill bottles, clinic record):None    Primary pharmacy.[facility]    Additional medication history information not noted on PTA med list :None    Time spent in this activity: 40 minutes    Prior to Admission medications    Medication Sig Last Dose Taking? Auth Provider   Fenofibrate Micronized 134 MG CAPS Take 134 mg by mouth At Bedtime  3/27/2018 at HS Yes Reported, Patient   lisinopril (PRINIVIL/ZESTRIL) 20 MG tablet Take 20 mg by mouth At Bedtime  3/27/2018 at HS Yes Reported, Patient   atorvastatin (LIPITOR) 20 MG tablet Take 20 mg by mouth At Bedtime  3/27/2018 at HS Yes Reported, Patient   ASPIRIN PO Take 81 mg by mouth At Bedtime  3/13/2018 at HS Yes Reported, Patient   PARoxetine HCl (PAXIL PO) Take 20 mg by mouth At Bedtime 3/27/2018 at HS Yes Unknown, Entered By History   multivitamin, therapeutic (THERA-VIT) TABS Take 1 tablet by mouth At Bedtime No iron 3/27/2018 at HS Yes Unknown, Entered By History   ANASTROZOLE PO Take 1 mg by mouth At Bedtime  3/27/2018 at HS Yes Reported, Patient

## 2018-03-28 NOTE — OR NURSING
PNDS Criteria met. Dr Jarvis here to assess Mrs. Watson; order received to transfer to Observational Care for continued recovery.  Hand-off report to RN.  To [id number]` per cart with all belongings.  Will transport with Capnography monitoring.

## 2018-03-28 NOTE — OP NOTE
Breast Surgery Operative Note    PREOPERATIVE DIAGNOSIS:  Left breast cancer    POSTOPERATIVE DIAGNOSIS: left breast cancer    PROCEDURE:    1. Left simple mastectomy     SURGEON:  Denae Perez MD    ASSISTANT:  Lottie Napier PA-C  The physician s assistant was medically necessary for their expertise in retraction and suctioning.    ANESTHESIA:  General.    BLOOD LOSS: 25ml    FINDINGS: enlarged left axillary node, likely reactive, excised with mastectomy specimen    INDICATIONS:   Ms. 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 Given the size of the cancer and multiple positive margins she elected to proceed with a left breast mastectomy after a discussion of the risks, benefits, indications and alternatives.     DETAILS OF PROCEDURE:     The patient was taken to the operating room and placed in supine position and general anesthesia by endotracheal tube.  The left breast were prepped with ChloraPrep and draped in the usual sterile fashion.    The timeout procedure was performed.  The patient had pneumo boots on the lower extremities and was given clindamycin for perioperative antibiotics.  The planned incision was measured and marked on the left breast.       Starting on the left side, an elliptical mastectomy incision was made with a #10 scalpel blade and deepened with electrocautery.  The superior flap was raised with electrocautery up to the top edge of the breast tissue just under the clavicle.  The inferior flap was similarly raised using the cautery down to the inframammary fold. Flaps were raised medially to the lateral aspect of the sternum and laterally to the chest wall.  The breast tissue was then elevated off of the pectoralis fascia with cautery.  At the superolateral aspect of the axillary tail of the breast the lymphatic tissue was elevated and there was an  enlarged palpable lymph node. This was excised with the specimen. Once the breast was removed, it was oriented with sutures with a short suture superior and long suture lateral.  The breast was sent to pathology to be placed in formalin and have routine pathology exam.  I then began evaluating the skin flaps for wound closure. There was a significant amount of redundant skin present. Skin on the superior and inferior flap was excised to improve cosmesis. The skin flaps were then approximated using interrupted 3-0 Vicryl sutures.  A 15 Yoruba round drain was placed laterally and oriented across the pectoral muscle. This was secured with a 3-0 nylon suture. The skin was then closed using the Insorb stapler.  Steri strips were placed. The drain was  placed to self suction and the incisions were dressed with island dressings. An ace wrap was then placed around the chest.  The patient tolerated the procedure very well and was extubated in the operating room prior to being transferred to the recovery room in good condition.      Denae Perez MD

## 2018-03-28 NOTE — ANESTHESIA CARE TRANSFER NOTE
Patient: Yolanda Watson    Procedure(s):  LEFT BREAST MASTECTOMY  - Wound Class: I-Clean    Diagnosis: LEFT BREAST CANCER   Diagnosis Additional Information: No value filed.    Anesthesia Type:   General, LMA     Note:  Airway :Face Mask and LMA  Patient transferred to:PACU  Comments: 1440:  to par spontaneous respirations.Handoff Report: Identifed the Patient, Identified the Reponsible Provider, Reviewed the pertinent medical history, Discussed the surgical course, Reviewed Intra-OP anesthesia mangement and issues during anesthesia, Set expectations for post-procedure period and Allowed opportunity for questions and acknowledgement of understanding      Vitals: (Last set prior to Anesthesia Care Transfer)    CRNA VITALS  3/28/2018 1409 - 3/28/2018 1439      3/28/2018             Pulse: 98    SpO2: 99 %    Resp Rate (set): 10                Electronically Signed By: GEOVNANY Prado CRNA  March 28, 2018  2:39 PM

## 2018-03-28 NOTE — BRIEF OP NOTE
[facility] Brief Operative Note    Pre-operative diagnosis: LEFT BREAST CANCER    Post-operative diagnosis Left breast cancer   Procedure: Procedure(s):  LEFT BREAST MASTECTOMY  - Wound Class: I-Clean   Surgeon(s): Surgeon(s) and Role:     * Denae Perez MD - Primary     * Lottie Napier PA-C - Assisting   Estimated blood loss: 25 mL    Specimens:   ID Type Source Tests Collected by Time Destination   A : left breast mastectomy Tissue Breast, Left SURGICAL PATHOLOGY EXAM Denae Perez MD 3/28/2018  1:54 PM       Findings: 15-round JERRY drain placed. No immediate complications. See operative report for full details.      Lottie Napier PA-C  [facility]  [phone]

## 2018-03-28 NOTE — ANESTHESIA POSTPROCEDURE EVALUATION
Patient: Yolanda Watson    Procedure(s):  LEFT BREAST MASTECTOMY  - Wound Class: I-Clean    Diagnosis:LEFT BREAST CANCER   Diagnosis Additional Information: No value filed.    Anesthesia Type:  General, LMA    Note:  Anesthesia Post Evaluation    Patient location during evaluation: PACU  Patient participation: Able to fully participate in evaluation  Level of consciousness: awake  Pain management: adequate  Airway patency: patent  Cardiovascular status: acceptable  Respiratory status: acceptable  Hydration status: acceptable  PONV: none     Anesthetic complications: None          Last vitals:  Vitals:    03/28/18 1515 03/28/18 1530 03/28/18 1545   BP: 127/65 129/69 121/72   Resp: 12 10 12   Temp: 37  C (98.6  F) 36.9  C (98.4  F) 36.8  C (98.2  F)   SpO2: 99% 98% 99%         Electronically Signed By: Richard Jarvis MD  March 28, 2018  3:50 PM

## 2018-03-28 NOTE — IP AVS SNAPSHOT
[facility] Observation Unit    [address] [place] [address]    Phone:  [phone]                                       After Visit Summary   3/28/2018    Yolanda Watson    MRN: [id number]           After Visit Summary Signature Page     I have received my discharge instructions, and my questions have been answered. I have discussed any challenges I see with this plan with the nurse or doctor.    ..........................................................................................................................................  Patient/Patient Representative Signature      ..........................................................................................................................................  Patient Representative Print Name and Relationship to Patient    ..................................................               ................................................  Date                                            Time    ..........................................................................................................................................  Reviewed by Signature/Title    ...................................................              ..............................................  Date                                                            Time

## 2018-03-29 LAB
ANION GAP SERPL CALCULATED.3IONS-SCNC: 6 MMOL/L (ref 3–14)
BUN SERPL-MCNC: 25 MG/DL (ref 7–30)
CALCIUM SERPL-MCNC: 8.5 MG/DL (ref 8.5–10.1)
CHLORIDE SERPL-SCNC: 107 MMOL/L (ref 94–109)
CO2 SERPL-SCNC: 27 MMOL/L (ref 20–32)
COPATH REPORT: NORMAL
CREAT SERPL-MCNC: 1.06 MG/DL (ref 0.52–1.04)
GFR SERPL CREATININE-BSD FRML MDRD: 50 ML/MIN/1.7M2
GLUCOSE BLDC GLUCOMTR-MCNC: 105 MG/DL (ref 70–99)
GLUCOSE SERPL-MCNC: 106 MG/DL (ref 70–99)
HGB BLD-MCNC: 10 G/DL (ref 11.7–15.7)
POTASSIUM SERPL-SCNC: 4 MMOL/L (ref 3.4–5.3)
SODIUM SERPL-SCNC: 140 MMOL/L (ref 133–144)

## 2018-03-29 PROCEDURE — 25000132 ZZH RX MED GY IP 250 OP 250 PS 637: Performed by: SURGERY

## 2018-03-29 PROCEDURE — 36415 COLL VENOUS BLD VENIPUNCTURE: CPT | Performed by: PHYSICIAN ASSISTANT

## 2018-03-29 PROCEDURE — 25000132 ZZH RX MED GY IP 250 OP 250 PS 637: Performed by: PHYSICIAN ASSISTANT

## 2018-03-29 PROCEDURE — 40000934 ZZH STATISTIC OUTPATIENT (NON-OBS) DAY

## 2018-03-29 PROCEDURE — 85018 HEMOGLOBIN: CPT | Performed by: PHYSICIAN ASSISTANT

## 2018-03-29 PROCEDURE — 80048 BASIC METABOLIC PNL TOTAL CA: CPT | Performed by: PHYSICIAN ASSISTANT

## 2018-03-29 PROCEDURE — 40000935 ZZH STATISTIC OUTPATIENT (NON-OBS) EVE

## 2018-03-29 PROCEDURE — 82962 GLUCOSE BLOOD TEST: CPT

## 2018-03-29 PROCEDURE — 25000128 H RX IP 250 OP 636: Performed by: PHYSICIAN ASSISTANT

## 2018-03-29 PROCEDURE — 40000936 ZZH STATISTIC OUTPATIENT (NON-OBS) NIGHT

## 2018-03-29 RX ORDER — ACETAMINOPHEN 325 MG/1
650 TABLET ORAL EVERY 4 HOURS PRN
Status: DISCONTINUED | OUTPATIENT
Start: 2018-03-29 | End: 2018-03-30 | Stop reason: HOSPADM

## 2018-03-29 RX ORDER — AMOXICILLIN 250 MG
1-2 CAPSULE ORAL 2 TIMES DAILY PRN
Qty: 30 TABLET | Refills: 1 | Status: ON HOLD | OUTPATIENT
Start: 2018-03-29 | End: 2018-06-01

## 2018-03-29 RX ORDER — TRAMADOL HYDROCHLORIDE 50 MG/1
25-50 TABLET ORAL EVERY 6 HOURS PRN
Qty: 20 TABLET | Refills: 0 | Status: SHIPPED | OUTPATIENT
Start: 2018-03-29 | End: 2018-05-11

## 2018-03-29 RX ADMIN — TRAMADOL HYDROCHLORIDE 25 MG: 50 TABLET ORAL at 14:16

## 2018-03-29 RX ADMIN — SODIUM CHLORIDE 500 ML: 9 INJECTION, SOLUTION INTRAVENOUS at 11:02

## 2018-03-29 RX ADMIN — PAROXETINE HYDROCHLORIDE 20 MG: 20 TABLET, FILM COATED ORAL at 22:08

## 2018-03-29 RX ADMIN — TRAMADOL HYDROCHLORIDE 50 MG: 50 TABLET ORAL at 22:16

## 2018-03-29 RX ADMIN — TRAMADOL HYDROCHLORIDE 25 MG: 50 TABLET ORAL at 16:12

## 2018-03-29 RX ADMIN — SODIUM CHLORIDE, POTASSIUM CHLORIDE, SODIUM LACTATE AND CALCIUM CHLORIDE: 600; 310; 30; 20 INJECTION, SOLUTION INTRAVENOUS at 04:27

## 2018-03-29 RX ADMIN — HYDROCODONE BITARTRATE AND ACETAMINOPHEN 1 TABLET: 5; 325 TABLET ORAL at 07:22

## 2018-03-29 RX ADMIN — SODIUM CHLORIDE, POTASSIUM CHLORIDE, SODIUM LACTATE AND CALCIUM CHLORIDE: 600; 310; 30; 20 INJECTION, SOLUTION INTRAVENOUS at 12:04

## 2018-03-29 RX ADMIN — ASPIRIN 81 MG 81 MG: 81 TABLET ORAL at 22:07

## 2018-03-29 RX ADMIN — ATORVASTATIN CALCIUM 20 MG: 10 TABLET, FILM COATED ORAL at 22:07

## 2018-03-29 RX ADMIN — FENOFIBRATE 160 MG: 160 TABLET ORAL at 22:08

## 2018-03-29 RX ADMIN — ANASTROZOLE 1 MG: 1 TABLET, COATED ORAL at 22:08

## 2018-03-29 RX ADMIN — Medication 1 LOZENGE: at 11:23

## 2018-03-29 RX ADMIN — LISINOPRIL 20 MG: 20 TABLET ORAL at 22:09

## 2018-03-29 ASSESSMENT — PAIN DESCRIPTION - DESCRIPTORS: DESCRIPTORS: ACHING;SHARP

## 2018-03-29 NOTE — PROGRESS NOTES
"General Surgery Progress Note    Admission Date: 3/28/2018  Today's Date: 3/29/2018         Assessment:      Yolanda Watson is a 77 year old female   S/P left simple mastectomy  POD #1     Diagnosis: left breast cancer         Plan:   - Give 500cc bolus this morning, monitor UOP and recheck SCr tomorrow morning. Continue maintenance fluids at 125ml/hr for now  - Regular diet  - Work on ambulation today, PCDs  - Change Norco to Tramadol and tylenol, hold ibuprofen for now - will monitor drain output  - Senna-docusate daily    Dispo: stable, continue to monitor, likely home tomorrow        Interval History:   Afebrile overnight, slight tachycardia postoperatively, now in the 80s. Normotensive, O2 requirement decreasing now 1L via NC. Patient had some pain so took Norco - controlled her pain well but she feels quite lethargic and tired from the [place]. She would like to try something lighter. 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 She does not feel dizzy or lightheaded today, no chest pain or shortness of breath. Using IS, tolerating diet without nausea. UOP a little sluggish initially after surgery, creatinine 1.06 today. JERRY with 105cc out overnight.          Physical Exam:   /48 (BP Location: Right arm)  Temp 96.6  F (35.9  C) (Axillary)  Resp 16  Ht 1.753 m (5' 9\")  Wt 92.5 kg (204 lb)  SpO2 93%  BMI 30.13 kg/m2  I/O last 3 completed shifts:  In: 2600 [I.V.:2600]  Out: 755 [Urine:625; Drains:105; Blood:25]  General: NAD, pleasant, sleepy but oriented x 3  Cardiovascular: regular rate and rhythm; S1 and S2 distinct without murmur  Respiratory: lungs clear to auscultation bilaterally without wheezes, rales or rhonchi   Chest: left breast incision CDI with only a small amount of ecchymosis laterally, flaps healthy without hematoma, appropriately tender " around incision. ACE wrap and dressing in place. JERRY in place with bloody fluid in bulb  Abdomen: soft, non tender, nondistended  Extremities: no edema, no calf tenderness    LABS:  Recent Labs   Lab Test  03/29/18   0602  11/03/16   0745  11/02/16   0757  11/01/16   0756   10/31/16   0630   WBC   --    --   5.4  7.2   --   7.8   HGB  10.0*  8.6*  7.9*  8.5*   < >  7.9*   MCV   --    --   89  90   --   92   PLT   --    --   323  334   --   367    < > = values in this interval not displayed.      Recent Labs   Lab Test  03/29/18   0602  11/01/16   0756  10/31/16   0630   POTASSIUM  4.0  3.7  3.4   CHLORIDE  107  110*  110*   CO2  27  28  25   BUN  25  11  11   CR  1.06*  0.96  0.97   ANIONGAP  6  7  11                 Lottie Napier PA-C  [facility]: [phone]  Pager: [phone] (7am-4pm)

## 2018-03-29 NOTE — PLAN OF CARE
Problem: Patient Care Overview  Goal: Plan of Care/Patient Progress Review  Outcome: Improving  Pt. A&Ox4. VSS on RA. Up w/ stand by assist. Voiding w/o difficulty. Pain controlled w/ IV tramadol. Left breast dressing CDI. Ace wrap on. Round drain to bulb suction. Tolerating regular diet.

## 2018-03-29 NOTE — DISCHARGE INSTRUCTIONS
[facility]  Discharge Instructions: Post-Operative Mastectomy    ACTIVITY    Take frequent, short walks and increase your activity gradually.      Avoid strenuous physical activity or heavy lifting greater than 15-20 lbs. for 2 weeks on the side of surgery.  You may climb stairs.     Gentle rotation and stretching of your arms and shoulders will prevent joint stiffness.    You may drive without restrictions when you are not using any prescription pain medication and feel comfortable in a car.    You may return to work/school when you are comfortable without any prescription pain medication.    WOUND CARE    You may remove your outer dressing and shower 48 hours after the surgery.  Pat your incisions dry and leave them open to air.  Re-apply dressing (gauze/tape) as needed for comfort or drainage.    You have steri-strips (looks like white tape) at your incisions.  You may peel off the steri-strips 2 weeks after your surgery if they have not peeled off on their own.  If you have staples, they will be removed at your next office visit.    Do not soak your incisions in a tub or pool for 2 weeks.     Do not apply any lotions, creams, or ointments to your incisions.    A ridge under your incisions is normal and will gradually resolve.    Wear the mastectomy camisole for comfort if you would like.    You have 1 drais at your surgical site, please refer to your drain care instructions.  Record output totals daily.  You will need to schedule an appointment for drain removal when your output is less than 30 mL per day for 1-2 days or 2 weeks, whichever comes first.    DIET    Start with liquids, then gradually resume your regular diet as tolerated.     Drink plenty of fluids to stay hydrated.    PAIN    Expect some tenderness and discomfort at the incision site(s).  Use the prescribed pain medication at your discretion.  Expect gradual resolution of your pain over several days.    You  may take ibuprofen with food (unless you have been told not to) instead of or in addition to your prescribed pain medication.  If you are taking Norco or Percocet, do not take any additional acetaminophen/APAP/Tylenol.    Do not drink alcohol or drive while you are taking pain medications.    You may apply ice to your incisions in 20 minute intervals as needed for the next 48 hours.  After that time, consider switching to heat if you prefer.    EXPECTATIONS    Pain medications can cause constipation.  Limit use when possible.  Take over the counter stool softener/stimulant, such as Colace or Senna, 1-2 times a day with plenty of water.  You may take a mild over the counter laxative, such as Miralax or a suppository, as needed.      You may discontinue these medications once you are having regular bowel movements and/or are no longer taking your narcotic pain medication.    RETURN APPOINTMENT    Follow up with Dr. Perez when your drain is ready to be removed. Please call our office at [phone] to schedule your appointment.    CALL OUR OFFICE -[phone] IF YOU HAVE:     Chills or fever above 101 F.    Increased redness, warmth, or drainage at your incisions.    Significant bleeding or swelling.    Pain not relieved by your pain medication or rest.    Increasing pain after the first 48 hours.    Any other concerns or questions.    Revised January 2018         DRAIN CARE    You have 1 JERRY drain on your left side. In order to empty this, open the tab/vent on the drain and record how much liquid you remove. To properly close the drain, squeeze the bulb (with tab/vent open) then close the tab while still squeezing the bulb. You should notice that liquid moves in the tubing toward the bulb if it is properly closed.   You should also strip the drain tubing once daily to avoid any clogging. You do this by gently pinching the drain, starting near the skin, and stretching the tubing out this way until you reach the  bulb. Do not pull hard on the drain tubing to the point of pulling the drain away from the skin.     When each drain is putting out 30cc or less for at least 2 consecutive days, call Dr. Perez's office at [phone] and schedule an appointment with her for drain removal.

## 2018-03-29 NOTE — PLAN OF CARE
Problem: Patient Care Overview  Goal: Plan of Care/Patient Progress Review  Outcome: Improving  Pt POD1 post mastectomy. A/o. VSS. Pain controlled. Tolerating regular diet. Up ambulating in the hallway. Incisional site CDI. JERRY drain patent with bulb suction. Ace wrap on. PCD on. Voiding in bathroom. Up with SBA. Dc home tomorrow.

## 2018-03-29 NOTE — PLAN OF CARE
Problem: Patient Care Overview  Goal: Plan of Care/Patient Progress Review  Outcome: No Change  AVSS; pt denied pain overnight; sleepy, but O2 sats stable at 94-95% on 1L/NC; pt up with 1 assist; voiding in adequate amounts; lap sites c/d/i; JERRY to bulb suction with small amount of serosanguinous drainage; chest dressing c/d/i; with ace wrap in place; IV continued overnight at 125 cc/hr as pt with minimal po intake.

## 2018-03-29 NOTE — PLAN OF CARE
Problem: Patient Care Overview  Goal: Plan of Care/Patient Progress Review  Outcome: Improving  A&Ox4. Tachy otherwise VSS on 1L O2, Capno WDL. IVF infusing. DTV. Denies pain, n/t, nausea. CMS intact. L chest procedure site CDI, ace wrap on, ice applied. JERRY drain to suction, serosanguinous drainage. Regular diet. Regular diet. PCDs on. Voiding. Continue to monitor.

## 2018-03-30 VITALS
OXYGEN SATURATION: 95 % | SYSTOLIC BLOOD PRESSURE: 153 MMHG | RESPIRATION RATE: 18 BRPM | WEIGHT: 204 LBS | HEIGHT: 69 IN | TEMPERATURE: 98.8 F | DIASTOLIC BLOOD PRESSURE: 61 MMHG | BODY MASS INDEX: 30.21 KG/M2

## 2018-03-30 LAB
ANION GAP SERPL CALCULATED.3IONS-SCNC: 5 MMOL/L (ref 3–14)
BUN SERPL-MCNC: 22 MG/DL (ref 7–30)
CALCIUM SERPL-MCNC: 8.3 MG/DL (ref 8.5–10.1)
CHLORIDE SERPL-SCNC: 108 MMOL/L (ref 94–109)
CO2 SERPL-SCNC: 31 MMOL/L (ref 20–32)
CREAT SERPL-MCNC: 0.98 MG/DL (ref 0.52–1.04)
GFR SERPL CREATININE-BSD FRML MDRD: 54 ML/MIN/1.7M2
GLUCOSE SERPL-MCNC: 96 MG/DL (ref 70–99)
HGB BLD-MCNC: 10.1 G/DL (ref 11.7–15.7)
POTASSIUM SERPL-SCNC: 4.1 MMOL/L (ref 3.4–5.3)
SODIUM SERPL-SCNC: 144 MMOL/L (ref 133–144)

## 2018-03-30 PROCEDURE — 40000934 ZZH STATISTIC OUTPATIENT (NON-OBS) DAY

## 2018-03-30 PROCEDURE — 36415 COLL VENOUS BLD VENIPUNCTURE: CPT | Performed by: PHYSICIAN ASSISTANT

## 2018-03-30 PROCEDURE — 85018 HEMOGLOBIN: CPT | Performed by: PHYSICIAN ASSISTANT

## 2018-03-30 PROCEDURE — 80048 BASIC METABOLIC PNL TOTAL CA: CPT | Performed by: PHYSICIAN ASSISTANT

## 2018-03-30 NOTE — PLAN OF CARE
Problem: Patient Care Overview  Goal: Plan of Care/Patient Progress Review  Outcome: No Change  AVSS; pt up with SBA voiding about every 1/2 to 1 hour during the night in 200 cc to 400 cc amounts so IV rated decreased to TKO; pt tolerating diet; + flatus; + BM yesterday; pt denied pain, (has tramadol available); JERRY with minimal serosanguinous drainage; left chest dressing c/d/i with ace wrap around chest; left radial pulse 2+; CMS intact; plan to d/c to home today.

## 2018-03-30 NOTE — DISCHARGE SUMMARY
Discharge Summary    Yolanda Watson MRN# [id number]   YOB: 1940 Age: 77 year old     Date of Admission:  3/28/2018  Date of Discharge:  3/30/2018 11:30 AM  Admitting Physician:  Denae Perez MD  Discharging Service:  [facility] General Surgery   Primary Provider: Soren Perdomo          Discharge Diagnoses:   Principle Diagnosis:  LEFT BREAST CANCER            Brief HPI   Ms. 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 Given the size of the cancer and multiple positive margins she elected to proceed with a left breast mastectomy after a discussion of the risks, benefits, indications and alternatives. On 3/28/18 she underwent left simple mastectomy with Dr. Perez. There were no intraoperative complications. Please see op note for full details.          Hospital Course   Postoperatively Yolanda Watson was transferred to the observation unit for monitoring and recovery. Her creatinine was slightly elevated on POD 1 so she was given a fluid bolus and labs were rechecked. Labs were normal the next morning. Her postoperative course was overall unremarkable and she recovered as anticipated without complication. On POD 2 she was afebrile, had stable vital signs on room air, was tolerating a regular diet, was ambulating without difficulty, was free of nausea, had minimal pain and was therefore appropriate for discharge. Patient agreed with the discharge plan. Discharge instructions were reviewed and questions were answered. Follow-up plan was made.         Consultations:   No consultations were requested during this admission         Procedures / Labs / Imaging:   No other procedures performed during this admission  Please see chart review for complete laboratory data         Discharge Disposition:   Discharged to home          Condition on Discharge:   Discharge  "condition: Stable   Discharge vitals: Blood pressure 153/61, temperature 98.8  F (37.1  C), temperature source Oral, resp. rate 18, height 1.753 m (5' 9\"), weight 92.5 kg (204 lb), SpO2 95 %.          Discharge Medications:     Discharge Medication List as of 3/30/2018 10:45 AM      START taking these medications    Details   senna-docusate (SENOKOT-S;PERICOLACE) 8.6-50 MG per tablet Take 1-2 tablets by mouth 2 times daily as needed for constipation, Disp-30 tablet, R-1, E-Prescribe      traMADol (ULTRAM) 50 MG tablet Take 0.5-1 tablets (25-50 mg) by mouth every 6 hours as needed for moderate pain, Disp-20 tablet, R-0, Local Print         CONTINUE these medications which have NOT CHANGED    Details   Fenofibrate Micronized 134 MG CAPS Take 134 mg by mouth At Bedtime , Historical      lisinopril (PRINIVIL/ZESTRIL) 20 MG tablet Take 20 mg by mouth At Bedtime , Historical      atorvastatin (LIPITOR) 20 MG tablet Take 20 mg by mouth At Bedtime , Historical      ASPIRIN PO Take 81 mg by mouth At Bedtime , Historical      PARoxetine HCl (PAXIL PO) Take 20 mg by mouth At Bedtime, Historical      multivitamin, therapeutic (THERA-VIT) TABS Take 1 tablet by mouth At Bedtime No iron, Historical      ANASTROZOLE PO Take 1 mg by mouth At Bedtime , Historical                Discharge Instructions and Follow-Up:   See AVS for full discharge instructions. Patient will follow-up in clinic when her drain is ready for removal.      -----------------------------------    Lottie Napier PA-C  [facility]  [phone]      "

## 2018-03-30 NOTE — PLAN OF CARE
Problem: Patient Care Overview  Goal: Discharge Needs Assessment  Outcome: Adequate for Discharge Date Met: 03/30/18  A&Ox4. VSS on RA. Regular diet. SBA. Pain managed w/ toradol, denies pain this shift. Denies nausea, n/t, SOB, dizziness. CMS+. Ace wrap in place, CDI, some bruising in L chest area, some swelling, Dr. Perez aware. JERRY bulb draining to suction, serosanguinous drainage, supplies given to pt for d/c, pt friend educated how to drain and put new gauze around JERRY, teach back method used, all questions answered. IV removed before d/c. AVS and d/c meds given and explained to pt at d/c, all questions answered, 5 med rights used. Pt belongings taken w/ pt at d/c. Pt left unit via w/c by NA, friend providing ride home for pt.

## 2018-03-30 NOTE — PLAN OF CARE
Problem: Patient Care Overview  Goal: Plan of Care/Patient Progress Review  Outcome: Improving  A&Ox4, VSS on RA. Pulses +2. JERRY w/ serosanguinous drainage. Dressing and ace CDI. Denies nausea. Pain controlled w/ tramadol.

## 2018-03-30 NOTE — PROGRESS NOTES
"General Surgery  Admission Date: 3/28/2018  Today's Date: 3/30/2018      SUBJECTIVE  Yolanda is feeling much better today, feels comfortable going home. Pain controlled with tramadol, has not taken anything this morning. Mild hypertension at times overnight, otherwise VSS on room air and afebrile. Ambulating better, good UOP. Creatinine normalized today.        OBJECTIVE  /61 (BP Location: Right arm)  Temp 98.8  F (37.1  C) (Oral)  Resp 18  Ht 1.753 m (5' 9\")  Wt 92.5 kg (204 lb)  SpO2 95%  BMI 30.13 kg/m2  I/O last 3 completed shifts:  In: 1746.5 [P.O.:680; I.V.:1066.5]  Out: 3390 [Urine:3200; Drains:190]  General: awake, alert, NAD  Respiratory: non-labored breathing  Chest: deferred, Dr. Perez removed dressings and examined earlier today, dressings replaced    Tubes/Drains past 24 hours  JERRY: 190cc serosanguinous output    Labs  Hgb: 10.1  SCr: 0.98  BUN: 22  Glucose: 96    Surgical Pathology  FINAL DIAGNOSIS:   Breast, left, mastectomy   - Inflammatory changes consistent with previous lumpectomy, no evidence of    residual carcinoma       ASSESSMENT/PLAN  Yolanda Watson is a 77 year old female s/p left simple mastectomy for left breast cancer POD 2  - Discharge to home today  - Discharge instructions reviewed, questions answered. Pathology discussed with patient by Dr. Perez  - Follow-up likely next week when drain ready for removal            Lottie Napier PA-C  Office: [phone]  Pager: [phone]   "

## 2018-04-02 ENCOUNTER — TELEPHONE (OUTPATIENT)
Dept: SURGERY | Facility: CLINIC | Age: 78
End: 2018-04-02

## 2018-04-02 NOTE — TELEPHONE ENCOUNTER
I spoke with Dr. Perez this afternoon who informed me that she will call patient now to discuss symptoms and advise on follow up plan.  PUSHPA LeonN, RN

## 2018-04-02 NOTE — TELEPHONE ENCOUNTER
"I spoke with Yolanda who states she had a left breast mastectomy and excision of a left axillary node by Dr. Perez on 3/28/2018.  Patient states everything was fine until Friday, March 30th when she noticed the left axilla incision had opened up a little and started draining a light bloody color.  Patient states her  went to change the dressing on Saturday, March 31st and the left axilla incision opened up more.  Patient states it's opened up to about 2-3\" in diameter, and is continuing to drain.  Patient states they are having to change the gauze about every other hour because of the drainage.  Patient states the drainage looks like the drainage from her JERRY drain.      Patient denies any fevers, chills, sweats, increased pain, or swelling.  Patient states she has had 15cc out of her JERRY so far today, and had >50cc's out of the JERRY yesterday(4/1/18) and the day before.      I informed patient I would inform Dr. Perez of her symptoms above, and check if she would want to see patient today, or if it's okay for Yolanda to see Dr. Perez 4/3/18 @ [facility] office at 0900.  Appointment has been scheduled for this patient to see Dr. Perez.  I informed patient I would call her back as soon as I hear from Dr. Perez.  Patient verbalized understanding and agrees with the plan of care.    I sent a message to Dr. Perez to call me back in between her surgical cases.  Nemo Barroso, PUSHPAN, RN    "

## 2018-04-02 NOTE — TELEPHONE ENCOUNTER
Patient is scheduled to see Dr. Perez 4/3/18 @ 0900 @ [facility] office.  DAVON Leon, RN

## 2018-04-02 NOTE — TELEPHONE ENCOUNTER
Name of caller: Patient    Reason for Call:  possible infectiom    Surgeon:  Dr. Perez    Recent Surgery:  Yes.    If yes, when & what type:  3/28 left breast mastectomy,      Best phone number to reach pt at is: [phone] (  Ok to leave a message with medical info? Yes.    Pharmacy preferred (if calling for a refill): n/a

## 2018-04-03 ENCOUNTER — OFFICE VISIT (OUTPATIENT)
Dept: SURGERY | Facility: CLINIC | Age: 78
End: 2018-04-03
Payer: COMMERCIAL

## 2018-04-03 VITALS — SYSTOLIC BLOOD PRESSURE: 132 MMHG | DIASTOLIC BLOOD PRESSURE: 89 MMHG | HEART RATE: 94 BPM

## 2018-04-03 DIAGNOSIS — Z90.12 S/P LEFT MASTECTOMY: Primary | ICD-10-CM

## 2018-04-03 PROCEDURE — 99024 POSTOP FOLLOW-UP VISIT: CPT | Performed by: SURGERY

## 2018-04-03 RX ORDER — CEPHALEXIN 500 MG/1
500 CAPSULE ORAL 3 TIMES DAILY
Qty: 30 CAPSULE | Refills: 0 | Status: SHIPPED | OUTPATIENT
Start: 2018-04-03 | End: 2018-04-17

## 2018-04-03 NOTE — PROGRESS NOTES
[facility] Breast Surgery Postoperative Note    S: Yolanda presents today 1 week status post left breast mastectomy.  She called in yesterday as she was doing the dressing change and noticed increased drainage from the axillary aspect of the incision.  The incision appeared to be partially opened and she called in to discuss options.  She preferred to be evaluated today she did not have a ride yesterday.  She has not been having any fevers or chills.  The fluid draining is clear in color.  She has no pain at the open part of the incision.  She reports that she did have pain on Sunday when she stretched with the left arm and felt sharp pain in the axilla and that happened again and that is when she thinks it opened.    Breasts: Left breast is surgically absent and there is a 3 cm section of the incision on the axillary aspect which is open with subcutaneous fat protruding.  The wound is very clean.  There is no surrounding erythema.  Her JERRY drain is in place with serosanguineous output.  The remainder of her incision is intact and healing well.  She does have ecchymosis on her chest wall.    Procedure: The open area on the left breast was prepped.  Local anesthetic was injected.  I then closed the open portion using interrupted 3-0 nylon sutures in a vertical mattress technique.  I placed bacitracin over this aspect of the incision and covered the incision with bandages and replaced her Ace wrap.      A/P  Yolanda Watson is recovering from a left mastectomy for positive margins following a lumpectomy for invasive ductal carcinoma.  We have reviewed her pathology which did not show any residual cancer in the mastectomy specimen.  She unfortunately had the lateral aspect of her wound open and this appeared very clean on my exam today and I elected to close the incision with interrupted sutures.  she understands that she is at much higher risk for infection.  I will have her continue to apply bacitracin daily to that  portion of the incision and will place her on Keflex.  She does have an allergy to penicillin is listed but states she has used Keflex in the past without any symptoms.  If she develops any itching or rash she will stop and call for a different antibiotic.  She has follow-up with Dr Subramanian next week and I would like to see her back in clinic next week as well for likely drain removal and suture removal.    Thank you for the opportunity to help in her care.    Denae Perez  [facility]  [phone]    Please route or send letter to:  Primary Care Provider (PCP) and Referring Provider

## 2018-04-03 NOTE — LETTER
April 3, 2018    Re: Yolanda Watson - 1940    S: Yolanda presents today 1 week status post left breast mastectomy.  She called in yesterday as she was doing the dressing change and noticed increased drainage from the axillary aspect of the incision. The incision appeared to be partially opened and she called in to discuss options.  She preferred to be evaluated today she did not have a ride yesterday.  She has not been having any fevers or chills.  The fluid draining is clear in color.  She has no pain at the open part of the incision.  She reports that she did have pain on  when she stretched with the left arm and felt sharp pain in the axilla and that happened again and that is when she thinks it opened.     Breasts: Left breast is surgically absent and there is a 3 cm section of the incision on the axillary aspect which is open with subcutaneous fat protruding.  The wound is very clean.  There is no surrounding erythema.  Her JERRY drain is in place with serosanguineous output.  The remainder of her incision is intact and healing well.  She does have ecchymosis on her chest wall.     Procedure: The open area on the left breast was prepped.  Local anesthetic was injected.  I then closed the open portion using interrupted 3-0 nylon sutures in a vertical mattress technique.  I placed bacitracin over this aspect of the incision and covered the incision with bandages and replaced her Ace wrap.      A/P  Yolanda Watson is recovering from a left mastectomy for positive margins following a lumpectomy for invasive ductal carcinoma.  We have reviewed her pathology which did not show any residual cancer in the mastectomy specimen.  She unfortunately had the lateral aspect of her wound open and this appeared very clean on my exam today and I elected to close the incision with interrupted sutures. she understands that she is at much higher risk for infection.  I will have her continue to apply bacitracin daily to that  portion of the incision and will place her on Keflex.  She does have an allergy to penicillin is listed but states she has used Keflex in the past without any symptoms.  If she develops any itching or rash she will stop and call for a different antibiotic.  She has follow-up with Dr Subramanian next week and I would like to see her back in clinic next week as well for likely drain removal and suture removal.     Thank you for the opportunity to help in her care.     Denae Perez  [facility]  [phone]

## 2018-04-03 NOTE — MR AVS SNAPSHOT
"              After Visit Summary   4/3/2018    Yolanda Watson    MRN: [id number]           Patient Information     Date Of Birth          1940        Visit Information        Provider Department      4/3/2018 9:15 AM Denae Perez MD [facility]      Today's Diagnoses     S/P left mastectomy    -  1       Follow-ups after your visit        Who to contact     If you have questions or need follow up information about today's clinic visit or your schedule please contact [facility] directly at [phone].  Normal or non-critical lab and imaging results will be communicated to you by [employer], letter or phone within 4 business days after the clinic has received the results. If you do not hear from us within 7 days, please contact the clinic through [employer] or phone. If you have a critical or abnormal lab result, we will notify you by phone as soon as possible.  Submit refill requests through [employer] or call your pharmacy and they will forward the refill request to us. Please allow 3 business days for your refill to be completed.          Additional Information About Your Visit        MyChart Information     [employer] lets you send messages to your doctor, view your test results, renew your prescriptions, schedule appointments and more. To sign up, go to [email or web address] . Click on \"Log in\" on the left side of the screen, which will take you to the Welcome page. Then click on \"Sign up Now\" on the right side of the page.     You will be asked to enter the access code listed below, as well as some personal information. Please follow the directions to create your username and password.     Your access code is: [email or web address]  Expires: 2018 11:53 AM     Your access code will  in 90 days. If you need help or a new code, please call your [place] clinic or [phone].        Care EveryWhere ID     This is your Care " EveryWhere ID. This could be used by other organizations to access your [place] medical records  [id number]        Your Vitals Were     Pulse                   94            Blood Pressure from Last 3 Encounters:   04/03/18 132/89   03/30/18 153/61   03/08/18 138/84    Weight from Last 3 Encounters:   03/28/18 204 lb (92.5 kg)   03/08/18 210 lb (95.3 kg)   03/05/18 206 lb (93.4 kg)              Today, you had the following     No orders found for display         Today's Medication Changes          These changes are accurate as of 4/3/18  9:39 AM.  If you have any questions, ask your nurse or doctor.               Start taking these medicines.        Dose/Directions    cephALEXin 500 MG capsule   Commonly known as:  KEFLEX   Used for:  S/P left mastectomy   Started by:  Denae Perez MD        Dose:  500 mg   Take 1 capsule (500 mg) by mouth 3 times daily   Quantity:  30 capsule   Refills:  0            Where to get your medicines      These medications were sent to [id number]  [place] [address]  AT [facility] [address] [place] [id number] [facility] [place] [address]     Phone:  [phone]     cephALEXin 500 MG capsule                Primary Care Provider Office Phone # Fax #    Soren Perdomo [phone]       [facility] [address]  [place] [address]        Equal Access to Services     [facility] AH: Hadii celine christian hadasho Sorosa, waaxda luqadaha, qaybta kaalmada woodegyada, agustina hughes. So [facility] [phone].    ATENCIÓN: Si habla español, tiene a ferreira disposición servicios gratuitos de asistencia lingüística. Llame al [phone].    We comply with applicable federal civil rights laws and [place] laws. We do not discriminate on the basis of race, color, national origin, age, disability, sex, sexual orientation, or gender identity.            Thank you!     Thank you for choosing [facility]  for your care. Our goal is always to provide you with excellent care. Hearing back from our patients is one way we can continue to improve our services. Please take a few minutes to complete the written survey that you may receive in the mail after your visit with us. Thank you!             Your Updated Medication List - Protect others around you: Learn how to safely use, store and throw away your medicines at www.disposemymeds.org.          This list is accurate as of 4/3/18  9:39 AM.  Always use your most recent med list.                   Brand Name Dispense Instructions for use Diagnosis    ANASTROZOLE PO      Take 1 mg by mouth At Bedtime        ASPIRIN PO      Take 81 mg by mouth At Bedtime        atorvastatin 20 MG tablet    LIPITOR     Take 20 mg by mouth At Bedtime        cephALEXin 500 MG capsule    KEFLEX    30 capsule    Take 1 capsule (500 mg) by mouth 3 times daily    S/P left mastectomy       Fenofibrate Micronized 134 MG Caps      Take 134 mg by mouth At Bedtime        lisinopril 20 MG tablet    PRINIVIL/ZESTRIL     Take 20 mg by mouth At Bedtime        multivitamin, therapeutic Tabs tablet      Take 1 tablet by mouth At Bedtime No iron        PAXIL PO      Take 20 mg by mouth At Bedtime        senna-docusate 8.6-50 MG per tablet    SENOKOT-S;PERICOLACE    30 tablet    Take 1-2 tablets by mouth 2 times daily as needed for constipation    Acute post-operative pain       traMADol 50 MG tablet    ULTRAM    20 tablet    Take 0.5-1 tablets (25-50 mg) by mouth every 6 hours as needed for moderate pain    Acute post-operative pain

## 2018-04-10 ENCOUNTER — OFFICE VISIT (OUTPATIENT)
Dept: SURGERY | Facility: CLINIC | Age: 78
End: 2018-04-10
Payer: COMMERCIAL

## 2018-04-10 DIAGNOSIS — Z09 FOLLOW-UP EXAM: Primary | ICD-10-CM

## 2018-04-10 PROCEDURE — 99024 POSTOP FOLLOW-UP VISIT: CPT | Performed by: SURGERY

## 2018-04-10 NOTE — PROGRESS NOTES
Surgery Postoperative Note    S: pt doing well with dressing changes. ALAYNA output 25ml for the past few days. No fevers. Taking abx without reaction.     Left chest: incision healing well, some blistering out laterally, no drainage from incision. Sutures in place. Mild surrounding erythema at suture line. No induration. ALAYNA drain removed.     A/P  Yolanda Watson is recovering from a left mastectomy. Her incision had opened and it is closed with nylon sutures - we are monitoring closely for infection. No infection at this point. The alayna drain was removed today. She will follow up next week for suture removal.     Thank you for the opportunity to help in her care.    Denae Perez  [facility]  [phone]    Please route or send letter to:  Primary Care Provider (PCP) and Referring Provider

## 2018-04-10 NOTE — MR AVS SNAPSHOT
"              After Visit Summary   4/10/2018    Yolanda Watson    MRN: [id number]           Patient Information     Date Of Birth          1940        Visit Information        Provider Department      4/10/2018 10:00 AM Denae Perez MD [facility] General Surgery      Today's Diagnoses     Follow-up exam    -  1       Follow-ups after your visit        Who to contact     If you have questions or need follow up information about today's clinic visit or your schedule please contact [facility] directly at [phone].  Normal or non-critical lab and imaging results will be communicated to you by [employer], letter or phone within 4 business days after the clinic has received the results. If you do not hear from us within 7 days, please contact the clinic through [employer] or phone. If you have a critical or abnormal lab result, we will notify you by phone as soon as possible.  Submit refill requests through [employer] or call your pharmacy and they will forward the refill request to us. Please allow 3 business days for your refill to be completed.          Additional Information About Your Visit        MyChart Information     [employer] lets you send messages to your doctor, view your test results, renew your prescriptions, schedule appointments and more. To sign up, go to [email or web address] . Click on \"Log in\" on the left side of the screen, which will take you to the Welcome page. Then click on \"Sign up Now\" on the right side of the page.     You will be asked to enter the access code listed below, as well as some personal information. Please follow the directions to create your username and password.     Your access code is: [email or web address]  Expires: 2018 11:53 AM     Your access code will  in 90 days. If you need help or a new code, please call your [place] clinic or [phone].        Care EveryWhere ID     This is your Care EveryWhere " ID. This could be used by other organizations to access your [place] medical records  [id number]         Blood Pressure from Last 3 Encounters:   04/03/18 132/89   03/30/18 153/61   03/08/18 138/84    Weight from Last 3 Encounters:   03/28/18 204 lb (92.5 kg)   03/08/18 210 lb (95.3 kg)   03/05/18 206 lb (93.4 kg)              Today, you had the following     No orders found for display       Primary Care Provider Office Phone # Fax #    Soren Perdomo [phone]       [facility] [address]  [facility] [address]        Equal Access to Services     [facility] : Hadii celine hansono Raúl, waaxda luqadaha, qaybta kaalmada adejamesyada, agustina donaldson . So [facility] [phone].    ATENCIÓN: Si habla español, tiene a ferreira disposición servicios gratuitos de asistencia lingüística. Llame al [phone].    We comply with applicable federal civil rights laws and [place] laws. We do not discriminate on the basis of race, color, national origin, age, disability, sex, sexual orientation, or gender identity.            Thank you!     Thank you for choosing [facility]  for your care. Our goal is always to provide you with excellent care. Hearing back from our patients is one way we can continue to improve our services. Please take a few minutes to complete the written survey that you may receive in the mail after your visit with us. Thank you!             Your Updated Medication List - Protect others around you: Learn how to safely use, store and throw away your medicines at www.disposemymeds.org.          This list is accurate as of 4/10/18 10:35 AM.  Always use your most recent med list.                   Brand Name Dispense Instructions for use Diagnosis    ANASTROZOLE PO      Take 1 mg by mouth At Bedtime        ASPIRIN PO      Take 81 mg by mouth At Bedtime        atorvastatin 20 MG tablet    LIPITOR     Take 20 mg by mouth At Bedtime         cephALEXin 500 MG capsule    KEFLEX    30 capsule    Take 1 capsule (500 mg) by mouth 3 times daily    S/P left mastectomy       Fenofibrate Micronized 134 MG Caps      Take 134 mg by mouth At Bedtime        lisinopril 20 MG tablet    PRINIVIL/ZESTRIL     Take 20 mg by mouth At Bedtime        multivitamin, therapeutic Tabs tablet      Take 1 tablet by mouth At Bedtime No iron        PAXIL PO      Take 20 mg by mouth At Bedtime        senna-docusate 8.6-50 MG per tablet    SENOKOT-S;PERICOLACE    30 tablet    Take 1-2 tablets by mouth 2 times daily as needed for constipation    Acute post-operative pain       traMADol 50 MG tablet    ULTRAM    20 tablet    Take 0.5-1 tablets (25-50 mg) by mouth every 6 hours as needed for moderate pain    Acute post-operative pain

## 2018-04-10 NOTE — LETTER
April 10, 2018    Re: Yolanda Watson - 1940    Pt doing well with dressing changes. ALAYNA output 25ml for the past few days. No fevers. Taking abx without reaction.      Left chest: incision healing well, some blistering out laterally, no drainage from incision. Sutures in place. Mild surrounding erythema at suture line. No induration. ALAYNA drain removed.      A/P  Yolanda Watson is recovering from a left mastectomy. Her incision had opened and it is closed with nylon sutures - we are monitoring closely for infection. No infection at this point. The alayna drain was removed today. She will follow up next week for suture removal.      Thank you for the opportunity to help in her care.     Denae Perez  [facility]  [phone]

## 2018-04-11 ENCOUNTER — TRANSFERRED RECORDS (OUTPATIENT)
Dept: HEALTH INFORMATION MANAGEMENT | Facility: CLINIC | Age: 78
End: 2018-04-11

## 2018-04-17 ENCOUNTER — OFFICE VISIT (OUTPATIENT)
Dept: SURGERY | Facility: CLINIC | Age: 78
End: 2018-04-17
Payer: COMMERCIAL

## 2018-04-17 DIAGNOSIS — Z09 SURGERY FOLLOW-UP EXAMINATION: Primary | ICD-10-CM

## 2018-04-17 PROCEDURE — 99024 POSTOP FOLLOW-UP VISIT: CPT | Performed by: SURGERY

## 2018-04-17 NOTE — MR AVS SNAPSHOT
"              After Visit Summary   2018    Yolanda Watson    MRN: [id number]           Patient Information     Date Of Birth          1940        Visit Information        Provider Department      2018 11:45 AM Denae Perez MD [facility] General Surgery      Today's Diagnoses     Surgery follow-up examination    -  1       Follow-ups after your visit        Who to contact     If you have questions or need follow up information about today's clinic visit or your schedule please contact [facility] directly at [phone].  Normal or non-critical lab and imaging results will be communicated to you by [employer], letter or phone within 4 business days after the clinic has received the results. If you do not hear from us within 7 days, please contact the clinic through [employer] or phone. If you have a critical or abnormal lab result, we will notify you by phone as soon as possible.  Submit refill requests through [employer] or call your pharmacy and they will forward the refill request to us. Please allow 3 business days for your refill to be completed.          Additional Information About Your Visit        MyChart Information     [employer] lets you send messages to your doctor, view your test results, renew your prescriptions, schedule appointments and more. To sign up, go to [email or web address] . Click on \"Log in\" on the left side of the screen, which will take you to the Welcome page. Then click on \"Sign up Now\" on the right side of the page.     You will be asked to enter the access code listed below, as well as some personal information. Please follow the directions to create your username and password.     Your access code is: [email or web address]  Expires: 2018 11:53 AM     Your access code will  in 90 days. If you need help or a new code, please call your [place] clinic or [phone].        Care EveryWhere ID     This is your " Care EveryWhere ID. This could be used by other organizations to access your [place] medical records  [id number]         Blood Pressure from Last 3 Encounters:   04/03/18 132/89   03/30/18 153/61   03/08/18 138/84    Weight from Last 3 Encounters:   03/28/18 204 lb (92.5 kg)   03/08/18 210 lb (95.3 kg)   03/05/18 206 lb (93.4 kg)              Today, you had the following     No orders found for display       Primary Care Provider Office Phone # Fax #    Soren Perdomo [phone]       [facility] [address]  [place] [address]        Equal Access to Services     [facility] : Hadii celine christian hadasho Sorosa, waaxda luqadaha, qaybta kaalmada adeegyada, agustina donaldson . So [facility] [phone].    ATENCIÓN: Si habla español, tiene a ferreira disposición servicios gratuitos de asistencia lingüística. [facility] [phone].    We comply with applicable federal civil rights laws and [place] laws. We do not discriminate on the basis of race, color, national origin, age, disability, sex, sexual orientation, or gender identity.            Thank you!     Thank you for choosing [facility]  for your care. Our goal is always to provide you with excellent care. Hearing back from our patients is one way we can continue to improve our services. Please take a few minutes to complete the written survey that you may receive in the mail after your visit with us. Thank you!             Your Updated Medication List - Protect others around you: Learn how to safely use, store and throw away your medicines at www.disposemymeds.org.          This list is accurate as of 4/17/18 11:59 AM.  Always use your most recent med list.                   Brand Name Dispense Instructions for use Diagnosis    ANASTROZOLE PO      Take 1 mg by mouth At Bedtime        ASPIRIN PO      Take 81 mg by mouth At Bedtime        atorvastatin 20 MG tablet    LIPITOR     Take 20 mg by  mouth At Bedtime        Fenofibrate Micronized 134 MG Caps      Take 134 mg by mouth At Bedtime        lisinopril 20 MG tablet    PRINIVIL/ZESTRIL     Take 20 mg by mouth At Bedtime        multivitamin, therapeutic Tabs tablet      Take 1 tablet by mouth At Bedtime No iron        PAXIL PO      Take 20 mg by mouth At Bedtime        senna-docusate 8.6-50 MG per tablet    SENOKOT-S;PERICOLACE    30 tablet    Take 1-2 tablets by mouth 2 times daily as needed for constipation    Acute post-operative pain       traMADol 50 MG tablet    ULTRAM    20 tablet    Take 0.5-1 tablets (25-50 mg) by mouth every 6 hours as needed for moderate pain    Acute post-operative pain

## 2018-04-17 NOTE — LETTER
2018    Re: Yolanda Watson, : 1940    Surgery Follow Up     Yolanda returns for follow up check of her left breast mastectomy incision. I removed her drain last week. She reports no drainage from the incision or drain site. She is applying bacitracin daily to the small area of denuded skin on the lateral aspect of the incision.      O: There were no vitals taken for this visit.  Left chest: sutures from lateral aspect of incision were removed. Steri strips placed over incision. 2cm area of denuded skin which should heal. Bacitracin applied. No evidence of infection.      A/P: 77yof s/p left breast mastectomy. Wound healing. No infection. She will start chemo tomorrow. She should follow up with me in 2-3 weeks to check the incision.      Denae Perez MD  [facility]  [phone]

## 2018-04-17 NOTE — PROGRESS NOTES
Surgery Follow Up    Yolanda returns for follow up check of her left breast mastectomy incision. I removed her drain last week. She reports no drainage from the incision or drain site. She is applying bacitracin daily to the small area of denuded skin on the lateral aspect of the incision.     O: There were no vitals taken for this visit.  Left chest: sutures from lateral aspect of incision were removed. Steri strips placed over incision. 2cm area of denuded skin which should heal. Bacitracin applied. No evidence of infection.     A/P: 77yof s/p left breast mastectomy. Wound healing. No infection. She will start chemo tomorrow. She should follow up with me in 2-3 weeks to check the incision.     Denae Perez MD  [facility]  [phone]

## 2018-04-18 ENCOUNTER — TRANSFERRED RECORDS (OUTPATIENT)
Dept: HEALTH INFORMATION MANAGEMENT | Facility: CLINIC | Age: 78
End: 2018-04-18

## 2018-05-02 ENCOUNTER — TRANSFERRED RECORDS (OUTPATIENT)
Dept: HEALTH INFORMATION MANAGEMENT | Facility: CLINIC | Age: 78
End: 2018-05-02

## 2018-05-08 LAB
CHOLEST SERPL-MCNC: 104 MG/DL (ref 100–199)
HDLC SERPL-MCNC: 44 MG/DL (ref 39–?)
LDLC SERPL CALC-MCNC: 36 MG/DL (ref 0–99)
TRIGL SERPL-MCNC: 118 MG/DL (ref 0–149)
VLDL CHOLESTEROL: 24 MG/DL (ref 5–40)

## 2018-05-11 ENCOUNTER — OFFICE VISIT (OUTPATIENT)
Dept: SURGERY | Facility: CLINIC | Age: 78
End: 2018-05-11
Payer: COMMERCIAL

## 2018-05-11 DIAGNOSIS — Z17.1 MALIGNANT NEOPLASM OF UPPER-OUTER QUADRANT OF LEFT BREAST IN FEMALE, ESTROGEN RECEPTOR NEGATIVE (H): Primary | ICD-10-CM

## 2018-05-11 DIAGNOSIS — C50.412 MALIGNANT NEOPLASM OF UPPER-OUTER QUADRANT OF LEFT BREAST IN FEMALE, ESTROGEN RECEPTOR NEGATIVE (H): Primary | ICD-10-CM

## 2018-05-11 PROCEDURE — 99024 POSTOP FOLLOW-UP VISIT: CPT | Performed by: SURGERY

## 2018-05-11 NOTE — LETTER
May 11, 2018    Re: Yolanda Watson, : 1940    Surgery Clinic Note     Yolanda returns for recheck of her left mastectomy incision. She reports she is doing well. She has no pain. She has a little tightness in the left axilla. She has had 3 cycles of chemotherapy and is overall tolerating well.      O: Left chest: mastectomy incision healing well, there is one 1cm area which is slightly open. No surrounding erythema or induration. No drainage.      A/P: s/p left mastectomy for breast cancer. I have been following her closely to monitor the incision and it looks great. Bacitracin to wound daily and cover with telfa.   - follow up with me in 6 months     Denae Perez MD  [facility]  [phone]

## 2018-05-11 NOTE — PROGRESS NOTES
Surgery Clinic Note    Yolanda returns for recheck of her left mastectomy incision. She reports she is doing well. She has no pain. She has a little tightness in the left axilla. She has had 3 cycles of chemotherapy and is overall tolerating well.     O: Left chest: mastectomy incision healing well, there is one 1cm area which is slightly open. No surrounding erythema or induration. No drainage.     A/P: s/p left mastectomy for breast cancer. I have been following her closely to monitor the incision and it looks great. Bacitracin to wound daily and cover with telfa.   - follow up with me in 6 months    Denae Perez MD  [facility]  [phone]

## 2018-05-11 NOTE — MR AVS SNAPSHOT
"              After Visit Summary   2018    Yolanda Watson    MRN: [id number]           Patient Information     Date Of Birth          1940        Visit Information        Provider Department      2018 2:15 PM Denae Perez MD [facility] General Surgery       Follow-ups after your visit        Who to contact     If you have questions or need follow up information about today's clinic visit or your schedule please contact [facility] directly at [phone].  Normal or non-critical lab and imaging results will be communicated to you by MyChart, letter or phone within 4 business days after the clinic has received the results. If you do not hear from us within 7 days, please contact the clinic through [facility] or phone. If you have a critical or abnormal lab result, we will notify you by phone as soon as possible.  Submit refill requests through [employer] or call your pharmacy and they will forward the refill request to us. Please allow 3 business days for your refill to be completed.          Additional Information About Your Visit        MyChart Information     [employer] Yet lets you send messages to your doctor, view your test results, renew your prescriptions, schedule appointments and more. To sign up, go to [email or web address] . Click on \"Log in\" on the left side of the screen, which will take you to the Welcome page. Then click on \"Sign up Now\" on the right side of the page.     You will be asked to enter the access code listed below, as well as some personal information. Please follow the directions to create your username and password.     Your access code is: [email or web address]  Expires: 2018 11:53 AM     Your access code will  in 90 days. If you need help or a new code, please call your [place] clinic or [phone].        Care EveryWhere ID     This is your Care EveryWhere ID. This could be used by other organizations to " access your [facility] medical records  [id number]         Blood Pressure from Last 3 Encounters:   04/03/18 132/89   03/30/18 153/61   03/08/18 138/84    Weight from Last 3 Encounters:   03/28/18 204 lb (92.5 kg)   03/08/18 210 lb (95.3 kg)   03/05/18 206 lb (93.4 kg)              Today, you had the following     No orders found for display         Today's Medication Changes          These changes are accurate as of 5/11/18  2:23 PM.  If you have any questions, ask your nurse or doctor.               Stop taking these medicines if you haven't already. Please contact your care team if you have questions.     traMADol 50 MG tablet   Commonly known as:  ULTRAM                    Primary Care Provider Office Phone # Fax #    Soren Perdomo [phone]       [facility] [address]  [facility] [address]        Equal Access to Services     AR LINDQUIST : Hadii celine christian hadasho Sorosa, waaxda luqadaha, qaybta kaalmada aderoxanne, agustina donaldson . So [facility] [phone].    ATENCIÓN: Si habla español, tiene a ferreira disposición servicios gratuitos de asistencia lingüística. Llame al [phone].    We comply with applicable federal civil rights laws and [place] laws. We do not discriminate on the basis of race, color, national origin, age, disability, sex, sexual orientation, or gender identity.            Thank you!     Thank you for choosing [facility]  for your care. Our goal is always to provide you with excellent care. Hearing back from our patients is one way we can continue to improve our services. Please take a few minutes to complete the written survey that you may receive in the mail after your visit with us. Thank you!             Your Updated Medication List - Protect others around you: Learn how to safely use, store and throw away your medicines at www.disposemymeds.org.          This list is accurate as of 5/11/18  2:23 PM.   Always use your most recent med list.                   Brand Name Dispense Instructions for use Diagnosis    ANASTROZOLE PO      Take 1 mg by mouth At Bedtime        ASPIRIN PO      Take 81 mg by mouth At Bedtime        atorvastatin 20 MG tablet    LIPITOR     Take 20 mg by mouth At Bedtime        Fenofibrate Micronized 134 MG Caps      Take 134 mg by mouth At Bedtime        lisinopril 20 MG tablet    PRINIVIL/ZESTRIL     Take 20 mg by mouth At Bedtime        multivitamin, therapeutic Tabs tablet      Take 1 tablet by mouth At Bedtime No iron        PAXIL PO      Take 20 mg by mouth At Bedtime        senna-docusate 8.6-50 MG per tablet    SENOKOT-S;PERICOLACE    30 tablet    Take 1-2 tablets by mouth 2 times daily as needed for constipation    Acute post-operative pain

## 2018-05-23 ENCOUNTER — TRANSFERRED RECORDS (OUTPATIENT)
Dept: HEALTH INFORMATION MANAGEMENT | Facility: CLINIC | Age: 78
End: 2018-05-23

## 2018-06-01 ENCOUNTER — TRANSFERRED RECORDS (OUTPATIENT)
Dept: HEALTH INFORMATION MANAGEMENT | Facility: CLINIC | Age: 78
End: 2018-06-01

## 2018-06-01 ENCOUNTER — APPOINTMENT (OUTPATIENT)
Dept: GENERAL RADIOLOGY | Facility: CLINIC | Age: 78
DRG: 394 | End: 2018-06-01
Attending: NURSE PRACTITIONER
Payer: COMMERCIAL

## 2018-06-01 ENCOUNTER — HOSPITAL ENCOUNTER (INPATIENT)
Facility: CLINIC | Age: 78
LOS: 2 days | Discharge: HOME | DRG: 394 | End: 2018-06-03
Attending: INTERNAL MEDICINE | Admitting: INTERNAL MEDICINE
Payer: COMMERCIAL

## 2018-06-01 DIAGNOSIS — B37.0 CANDIDIASIS OF MOUTH: ICD-10-CM

## 2018-06-01 DIAGNOSIS — R19.7 DIARRHEA, UNSPECIFIED TYPE: Primary | ICD-10-CM

## 2018-06-01 PROBLEM — R53.1 WEAKNESS: Status: ACTIVE | Noted: 2018-06-01

## 2018-06-01 LAB
ALBUMIN SERPL-MCNC: 3.2 G/DL (ref 3.4–5)
ALP SERPL-CCNC: 71 U/L (ref 40–150)
ALT SERPL W P-5'-P-CCNC: 25 U/L (ref 0–50)
ANION GAP SERPL CALCULATED.3IONS-SCNC: 9 MMOL/L (ref 3–14)
AST SERPL W P-5'-P-CCNC: 16 U/L (ref 0–45)
BASOPHILS # BLD AUTO: 0.1 10E9/L (ref 0–0.2)
BASOPHILS NFR BLD AUTO: 2 %
BILIRUB SERPL-MCNC: 0.4 MG/DL (ref 0.2–1.3)
BLASTS # BLD: 0.1 10E9/L
BLASTS BLD QL SMEAR: 1 %
BUN SERPL-MCNC: 11 MG/DL (ref 7–30)
C DIFF TOX B STL QL: NEGATIVE
CALCIUM SERPL-MCNC: 8.5 MG/DL (ref 8.5–10.1)
CHLORIDE SERPL-SCNC: 113 MMOL/L (ref 94–109)
CO2 SERPL-SCNC: 25 MMOL/L (ref 20–32)
CREAT SERPL-MCNC: 0.8 MG/DL (ref 0.52–1.04)
DIFFERENTIAL METHOD BLD: ABNORMAL
EOSINOPHIL # BLD AUTO: 0 10E9/L (ref 0–0.7)
EOSINOPHIL NFR BLD AUTO: 0 %
ERYTHROCYTE [DISTWIDTH] IN BLOOD BY AUTOMATED COUNT: 15.7 % (ref 10–15)
GFR SERPL CREATININE-BSD FRML MDRD: 69 ML/MIN/1.7M2
GLUCOSE SERPL-MCNC: 105 MG/DL (ref 70–99)
HCT VFR BLD AUTO: 32 % (ref 35–47)
HGB BLD-MCNC: 10.3 G/DL (ref 11.7–15.7)
LYMPHOCYTES # BLD AUTO: 1.1 10E9/L (ref 0.8–5.3)
LYMPHOCYTES NFR BLD AUTO: 15 %
MAGNESIUM SERPL-MCNC: 1.4 MG/DL (ref 1.6–2.3)
MCH RBC QN AUTO: 32 PG (ref 26.5–33)
MCHC RBC AUTO-ENTMCNC: 32.2 G/DL (ref 31.5–36.5)
MCV RBC AUTO: 99 FL (ref 78–100)
METAMYELOCYTES # BLD: 0.1 10E9/L
METAMYELOCYTES NFR BLD MANUAL: 2 %
MONOCYTES # BLD AUTO: 0.5 10E9/L (ref 0–1.3)
MONOCYTES NFR BLD AUTO: 7 %
MYELOCYTES # BLD: 0.4 10E9/L
MYELOCYTES NFR BLD MANUAL: 5 %
NEUTROPHILS # BLD AUTO: 5 10E9/L (ref 1.6–8.3)
NEUTROPHILS NFR BLD AUTO: 68 %
NRBC # BLD AUTO: 0.1 10*3/UL
NRBC BLD AUTO-RTO: 1 /100
PLATELET # BLD AUTO: 505 10E9/L (ref 150–450)
PLATELET # BLD EST: ABNORMAL 10*3/UL
POLYCHROMASIA BLD QL SMEAR: SLIGHT
POTASSIUM SERPL-SCNC: 3.2 MMOL/L (ref 3.4–5.3)
PROT SERPL-MCNC: 6.3 G/DL (ref 6.8–8.8)
RBC # BLD AUTO: 3.22 10E12/L (ref 3.8–5.2)
SODIUM SERPL-SCNC: 147 MMOL/L (ref 133–144)
SPECIMEN SOURCE: NORMAL
WBC # BLD AUTO: 7.4 10E9/L (ref 4–11)

## 2018-06-01 PROCEDURE — 40000847 ZZHCL STATISTIC MORPHOLOGY W/INTERP HISTOLOGY TC 85060: Performed by: INTERNAL MEDICINE

## 2018-06-01 PROCEDURE — 25000128 H RX IP 250 OP 636

## 2018-06-01 PROCEDURE — 85025 COMPLETE CBC W/AUTO DIFF WBC: CPT | Performed by: INTERNAL MEDICINE

## 2018-06-01 PROCEDURE — 85060 BLOOD SMEAR INTERPRETATION: CPT | Performed by: INTERNAL MEDICINE

## 2018-06-01 PROCEDURE — 25000132 ZZH RX MED GY IP 250 OP 250 PS 637

## 2018-06-01 PROCEDURE — 25000132 ZZH RX MED GY IP 250 OP 250 PS 637: Performed by: NURSE PRACTITIONER

## 2018-06-01 PROCEDURE — 12000000 ZZH R&B MED SURG/OB

## 2018-06-01 PROCEDURE — 25000128 H RX IP 250 OP 636: Performed by: NURSE PRACTITIONER

## 2018-06-01 PROCEDURE — 87506 IADNA-DNA/RNA PROBE TQ 6-11: CPT | Performed by: NURSE PRACTITIONER

## 2018-06-01 PROCEDURE — 83735 ASSAY OF MAGNESIUM: CPT | Performed by: INTERNAL MEDICINE

## 2018-06-01 PROCEDURE — 74019 RADEX ABDOMEN 2 VIEWS: CPT

## 2018-06-01 PROCEDURE — 87493 C DIFF AMPLIFIED PROBE: CPT | Performed by: NURSE PRACTITIONER

## 2018-06-01 PROCEDURE — 80053 COMPREHEN METABOLIC PANEL: CPT | Performed by: INTERNAL MEDICINE

## 2018-06-01 RX ORDER — LOPERAMIDE HCL 2 MG
2 CAPSULE ORAL 4 TIMES DAILY PRN
Status: DISCONTINUED | OUTPATIENT
Start: 2018-06-01 | End: 2018-06-03 | Stop reason: HOSPADM

## 2018-06-01 RX ORDER — POTASSIUM CHLORIDE 7.45 MG/ML
10 INJECTION INTRAVENOUS
Status: DISCONTINUED | OUTPATIENT
Start: 2018-06-01 | End: 2018-06-03 | Stop reason: HOSPADM

## 2018-06-01 RX ORDER — ONDANSETRON 2 MG/ML
4 INJECTION INTRAMUSCULAR; INTRAVENOUS EVERY 6 HOURS PRN
Status: DISCONTINUED | OUTPATIENT
Start: 2018-06-01 | End: 2018-06-03 | Stop reason: HOSPADM

## 2018-06-01 RX ORDER — POTASSIUM CHLORIDE 29.8 MG/ML
20 INJECTION INTRAVENOUS
Status: DISCONTINUED | OUTPATIENT
Start: 2018-06-01 | End: 2018-06-03 | Stop reason: HOSPADM

## 2018-06-01 RX ORDER — CIPROFLOXACIN 250 MG/1
250 TABLET, FILM COATED ORAL EVERY 12 HOURS SCHEDULED
Status: DISCONTINUED | OUTPATIENT
Start: 2018-06-01 | End: 2018-06-03 | Stop reason: HOSPADM

## 2018-06-01 RX ORDER — ANASTROZOLE 1 MG/1
1 TABLET ORAL AT BEDTIME
Status: DISCONTINUED | OUTPATIENT
Start: 2018-06-02 | End: 2018-06-03 | Stop reason: HOSPADM

## 2018-06-01 RX ORDER — POTASSIUM CHLORIDE 1500 MG/1
20-40 TABLET, EXTENDED RELEASE ORAL
Status: DISCONTINUED | OUTPATIENT
Start: 2018-06-01 | End: 2018-06-03 | Stop reason: HOSPADM

## 2018-06-01 RX ORDER — LISINOPRIL 20 MG/1
20 TABLET ORAL AT BEDTIME
Status: DISCONTINUED | OUTPATIENT
Start: 2018-06-02 | End: 2018-06-03 | Stop reason: HOSPADM

## 2018-06-01 RX ORDER — ASPIRIN 81 MG/1
81 TABLET, CHEWABLE ORAL AT BEDTIME
Status: DISCONTINUED | OUTPATIENT
Start: 2018-06-02 | End: 2018-06-03 | Stop reason: HOSPADM

## 2018-06-01 RX ORDER — POTASSIUM CHLORIDE 1.5 G/1.58G
20-40 POWDER, FOR SOLUTION ORAL
Status: DISCONTINUED | OUTPATIENT
Start: 2018-06-01 | End: 2018-06-03 | Stop reason: HOSPADM

## 2018-06-01 RX ORDER — NYSTATIN 100000/ML
500000 SUSPENSION, ORAL (FINAL DOSE FORM) ORAL 4 TIMES DAILY
Status: DISCONTINUED | OUTPATIENT
Start: 2018-06-01 | End: 2018-06-03 | Stop reason: HOSPADM

## 2018-06-01 RX ORDER — ATORVASTATIN CALCIUM 20 MG/1
20 TABLET, FILM COATED ORAL AT BEDTIME
Status: DISCONTINUED | OUTPATIENT
Start: 2018-06-01 | End: 2018-06-03 | Stop reason: HOSPADM

## 2018-06-01 RX ORDER — PAROXETINE 20 MG/1
20 TABLET, FILM COATED ORAL AT BEDTIME
Status: DISCONTINUED | OUTPATIENT
Start: 2018-06-01 | End: 2018-06-03 | Stop reason: HOSPADM

## 2018-06-01 RX ORDER — SODIUM CHLORIDE 9 MG/ML
INJECTION, SOLUTION INTRAVENOUS CONTINUOUS
Status: DISCONTINUED | OUTPATIENT
Start: 2018-06-01 | End: 2018-06-03 | Stop reason: HOSPADM

## 2018-06-01 RX ORDER — PROCHLORPERAZINE MALEATE 5 MG
5 TABLET ORAL EVERY 6 HOURS PRN
Status: DISCONTINUED | OUTPATIENT
Start: 2018-06-01 | End: 2018-06-03 | Stop reason: HOSPADM

## 2018-06-01 RX ORDER — POTASSIUM CL/LIDO/0.9 % NACL 10MEQ/0.1L
10 INTRAVENOUS SOLUTION, PIGGYBACK (ML) INTRAVENOUS
Status: DISCONTINUED | OUTPATIENT
Start: 2018-06-01 | End: 2018-06-03 | Stop reason: HOSPADM

## 2018-06-01 RX ORDER — DIPHENOXYLATE HCL/ATROPINE 2.5-.025MG
2 TABLET ORAL 4 TIMES DAILY
COMMUNITY
End: 2018-12-18

## 2018-06-01 RX ORDER — MAGNESIUM SULFATE HEPTAHYDRATE 40 MG/ML
4 INJECTION, SOLUTION INTRAVENOUS EVERY 4 HOURS PRN
Status: DISCONTINUED | OUTPATIENT
Start: 2018-06-01 | End: 2018-06-03 | Stop reason: HOSPADM

## 2018-06-01 RX ORDER — MULTIVITAMIN,THERAPEUTIC
1 TABLET ORAL AT BEDTIME
Status: DISCONTINUED | OUTPATIENT
Start: 2018-06-02 | End: 2018-06-03 | Stop reason: HOSPADM

## 2018-06-01 RX ORDER — DIPHENOXYLATE HCL/ATROPINE 2.5-.025MG
1 TABLET ORAL 4 TIMES DAILY PRN
Status: DISCONTINUED | OUTPATIENT
Start: 2018-06-01 | End: 2018-06-03 | Stop reason: HOSPADM

## 2018-06-01 RX ADMIN — ATORVASTATIN CALCIUM 20 MG: 20 TABLET, FILM COATED ORAL at 21:20

## 2018-06-01 RX ADMIN — PAROXETINE HYDROCHLORIDE 20 MG: 20 TABLET, FILM COATED ORAL at 21:20

## 2018-06-01 RX ADMIN — POTASSIUM CHLORIDE 40 MEQ: 1500 TABLET, EXTENDED RELEASE ORAL at 21:20

## 2018-06-01 RX ADMIN — SODIUM CHLORIDE: 9 INJECTION, SOLUTION INTRAVENOUS at 18:18

## 2018-06-01 RX ADMIN — NYSTATIN 500000 UNITS: 100000 SUSPENSION ORAL at 21:20

## 2018-06-01 RX ADMIN — ENOXAPARIN SODIUM 40 MG: 40 INJECTION SUBCUTANEOUS at 18:18

## 2018-06-01 RX ADMIN — NYSTATIN 500000 UNITS: 100000 SUSPENSION ORAL at 18:13

## 2018-06-01 RX ADMIN — MAGNESIUM SULFATE IN WATER 4 G: 40 INJECTION, SOLUTION INTRAVENOUS at 21:24

## 2018-06-01 RX ADMIN — CIPROFLOXACIN HYDROCHLORIDE 250 MG: 250 TABLET, FILM COATED ORAL at 20:32

## 2018-06-01 NOTE — IP AVS SNAPSHOT
[address]    [facility] [address]    Phone:  [phone]                                       After Visit Summary   6/1/2018    Yolanda Watson    MRN: [id number]           After Visit Summary Signature Page     I have received my discharge instructions, and my questions have been answered. I have discussed any challenges I see with this plan with the nurse or doctor.    ..........................................................................................................................................  Patient/Patient Representative Signature      ..........................................................................................................................................  Patient Representative Print Name and Relationship to Patient    ..................................................               ................................................  Date                                            Time    ..........................................................................................................................................  Reviewed by Signature/Title    ...................................................              ..............................................  Date                                                            Time

## 2018-06-01 NOTE — PHARMACY-ADMISSION MEDICATION HISTORY
Admission medication history interview status for the 6/1/2018  admission is complete. See EPIC admission navigator for prior to admission medications     Medication history source reliability:Good - patient, personal medication list, called [facility] to verify cipro, ondansetron & prochlorperazine strength    Actions taken by pharmacist (provider contacted, etc): called [facility]     Additional medication history information not noted on [facility] med list :  -She takes ondansetron each evening before bed, prochlorperazine as needed & has been taking the Lomotil scheduled since Wed 5/30.    Medication reconciliation/reorder completed by provider prior to medication history? Yes    Time spent in this activity: 20 min    Prior to Admission medications    Medication Sig Last Dose Taking? Auth Provider   ANASTROZOLE PO Take 1 mg by mouth At Bedtime  5/31/2018 at pm Yes Reported, Patient   ASPIRIN PO Take 81 mg by mouth At Bedtime  5/31/2018 at pm Yes Reported, Patient   atorvastatin (LIPITOR) 20 MG tablet Take 20 mg by mouth At Bedtime  5/31/2018 at pm Yes Reported, Patient   CIPROFLOXACIN PO Take 500 mg by mouth 2 times daily 5/31/2018 at pm Yes Unknown, Entered By History   diphenoxylate-atropine (LOMOTIL) 2.5-0.025 MG per tablet Take 2 tablets by mouth 4 times daily 5/31/2018 at Unknown time Yes Unknown, Entered By History   lisinopril (PRINIVIL/ZESTRIL) 20 MG tablet Take 20 mg by mouth At Bedtime  5/31/2018 at pm Yes Reported, Patient   multivitamin, therapeutic (THERA-VIT) TABS Take 1 tablet by mouth At Bedtime No iron 5/31/2018 at pm Yes Unknown, Entered By History   ONDANSETRON PO Take 8 mg by mouth every 6 hours as needed for nausea 5/31/2018 at pm Yes Unknown, Entered By History   PARoxetine HCl (PAXIL PO) Take 20 mg by mouth At Bedtime 5/31/2018 at pm Yes Unknown, Entered By History   PROCHLORPERAZINE MALEATE PO Take 10 mg by mouth every 4 hours as needed for nausea or vomiting prn Yes Unknown, Entered By History

## 2018-06-01 NOTE — H&P
IDENTIFYING DATA:  Breast cancer  Lung cancer  Intractable diarrhea  HISTORY OF PRESENT ILLNESS:   Yolanda presents to clinic today for routine follow up. She was last seen on 5/30/2018.   On 5/30/2018, Yolanda's treatment was held given ongoing issues with diarrhea and weakness. The week prior she was given a prescription for tincture of opium which caused significant confusion. The alteration in mental status is better off the tincture of opium.  Unfortunately, she has reported persistent diarrhea. This is been present since she began Taxol and Herceptin approximately 5 weeks ago. She has tried Imodium and Lomotil. Neither of these agents seem helpful. The diarrhea can be 4-5 times daily. She is having spells of incontinence since the diarrhea comes without warning. She is not noticing abdominal pain, cramping, or bloating. No fever. No blood in the stool. C diff testing was recently negative. She is on antibiotics for UTI.  No fever or chills. Stable chronic shortness of breath. No chest pain. No bony pain. She does feel very weak, but no falls. She is using a walker for stability. She is the caregiver for her .  Remainder of review of systems are negative.  ONCOLOGY HISTORY:  In 2013 she was found to have an incidental right lower lobe pulmonary mass. A PET/CT scan showed no evidence of distant disease and, therefore, the patient was taken to the operating room, and on March 12, 2013, underwent thoracotomy, right middle and lower lobe lobectomy with mediastinal lymph node dissection.  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  She opted for adjuvant navelbine and cisplatin chemotherapy, and required dose reductions and delays due to neutropenia. She eventually required discontinuation of cisplatin due to renal insufficiency, but completed all adjuvant chemotherapy in August 2013.  In January 2016 routine mammography revealed right upper outer  quadrant abnormality. After further evaluation, she then underwent a breast lumpectomy and sentinel node biopsy. The final pathology revealed a 8 mm [id number] stage 1A invasive ductal carcinoma that is strongly estrogen and progesterone receptor positive and her 2 negative by FISH.  She is continuing adjuvant hormonal therapy with anastrazole, without breast radiation. She did hold the anastrazole for a couple months due to loose stools and arthralgias, but her symptoms did not change off the anastrazole and she has since resumed it.  In October 2016 she developed iron deficiency anemia and a colonoscopy revealed a transverse colon adenocarcinoma. The tumor is microsatellite unstable, and additional testing confirmed a BRAF mutation, which is acquired and sporadic, but not related to Verduzco syndrome.  She underwent a colectomy in October 2016 with the final pathology confirming a T3N0 stage IIA colon cancer, without other high risk features.  Genetic testing has revealed a BRCA2 and TSC1 variant both of uncertain significance.  A CT scan of the chest, abdomen, and pelvis in September 2017 was unchanged without malignancy.  Mammography in February 2017 revealed a new 3 by 2 cm left upper outer quadrant breast mass. Biopsy has revealed a grade 2 invasive ductal carcinoma that is estrogen positive, progesterone negative, and her 2 positive at 3+ by IHC.  On 3/5/18 she underwent a lumpectomy with pathology revealing a multifocal tumor, the largest 2.7 cm in size, with negative sentinel node. Due to positive margins 3/28/18 she completed a mastectomy with no residual disease and now negative margins. Final stage is [id number] or stage 1B.  On 4/18/18 she began adjuvant chemotherapy with weekly paclitaxel and trastuzumab.  PAST MEDICAL HISTORY:  Significant for lung cancer, breast cancer, colon cancer, cervical cancer, treated by hysterectomy in 1966; hypertension; diabetes mellitus; chronic renal insufficiency; acoustic  neuroma resected at [place]; hiatal hernia; arthritis; depression; cholecystectomy; and a history of shingles.  FAMILY HISTORY:  Significant for her father who developed prostate cancer, and her paternal grandmother who developed breast cancer. There is no other history of malignancy in the immediate family.  SOCIAL HISTORY:  The patient is , but the primary caregiver for her . She is a former cigarette smoker of two to three packs per day for 45 years, but quit in 1999. She does drink wine, roughly one to two glasses per day on average.  ALLERGIES:  Sulfa and penicillin   MEDICATIONS:  1. Aspirin 81 mg daily  2. Atorvastatin 10 mg daily  3. Ciprofloxacin 500 mg twice daily x 7 more days  4. Lisinopril 20 mg daily  5. Multiple vitamin daily  6. Paroxetine 20 mg daily  7. Anastrozole 1 mg daily  8. Ondansetron 8 mg every 6 hours as needed for nausea  9. Prochlorperazine 10 mg every 4-6 hours as needed for nausea  10. Lomotil 1 tablet 4 times daily as needed   PHYSICAL EXAM:  VITALS: Please see chart. These were reviewed.   GENERAL: Pleasant. She is weak and frail.  HEENT: Normocephalic, atraumatic. PERRL. Sclera are white. Conjunctivae are clear. Oropharynx is clear without stomatitis or sores. Mouth is dry with white patches.  NECK: Supple. No adenopathy. Trachea is midline.  CARDIAC: Tachycardia. Regular rate and rhythm, S1, S2. No murmurs.   RESPIRATORY: Mildly labored at rest. Lungs are absent in the right lower portion from previous pneumonectomy. Otherwise breath sounds are diminished.  ABDOMEN: Soft and nontender. Bowel sounds are present. No hepatosplenomegaly or appreciable masses.  EXTREMITIES: No edema. She is seated, but is ambulating with a walker.  DERM: No petechiae, rashes, or bruising.  NEURO: The patient is alert and oriented.  LABS:  Labs on 5/30/2018 show white count 6700 with an ANC of 3600, hemoglobin 10.3 and platelet count 526,000. Glucose 104, BUN 12, creatinine 0.99 with a GFR of  54, sodium 143, potassium 3.9, chloride 108, CO2 22. Albumin 3.7, total bilirubin 0.4, AST 17 and ALT 20.  IMPRESSION/PLAN:  1. Diarrhea with resultant weakness  - Likely impacted by previous colon surgery  - Diarrhea persistent for the past 5 weeks since starting chemotherapy  - Imodium and Lomotil not helpful and she had negative effects from tincture of opium  - The issue is complicated with recent antibiotics  - She has now started to lose weight.  - Reduced dose of chemotherapy on 5/23 did not help, nor did holding her treatment on 5/30  - Get repeat stool studies, labs and plain films of the abdomen  - Consider CT imaging  - We reviewed the options and have elected to admit her to the hospital for evaluation with imaging and Gastroenterology  - ? Role of fiber given colectomy or bile acid sequstrant  2. Mild forgetfulness/confusion  - Improved off of tincture of opium  3. UTI  - She remains on antibiotics for another week   4. Breast cancers  - On AI after first breast cancer, up to 10 years if tolerated  - Getting weekly paclitaxel x 12 and trastuzumab for a year after the second breast cancer  - Echocardiogram every 3 months on trastuzumab (due mid July)  - Dose of paclitaxel cut for weight loss and diarrhea. No improvement in symptoms. Last given 5/23/2018.  - Re-evaluate treatment once diarrhea improved  5. T3N1 or stage IIIA squamous cell carcinoma of the lung.  - S/P resection and adjuvant chemotherapy   - Annual CTs, next due 9/2018  6. Colon cancer  - Treated with partial colectomy  - No adjuvant chemotherapy  - Given other comorbidities, repeat colonoscopy has been deferred but might be reconsidered  7. Prophylaxis  - LMWH given immobility  8. Code status  - DNR/DNI  Mina Barrientos APRN, CNP

## 2018-06-01 NOTE — IP AVS SNAPSHOT
MRN:[id number]                      After Visit Summary   6/1/2018    Yolanda Watson    MRN: [id number]           Thank you!     Thank you for choosing [facility] for your care. Our goal is always to provide you with excellent care. Hearing back from our patients is one way we can continue to improve our services. Please take a few minutes to complete the written survey that you may receive in the mail after you visit with us. Thank you!        Patient Information     Date Of Birth          1940        Designated Caregiver       Most Recent Value    Caregiver    Will someone help with your care after discharge? yes    Name of designated caregiver Naomi [light housework]    Phone number of caregiver [phone]    Caregiver address cannot remember at this time      About your hospital stay     You were admitted on:  June 1, 2018 You last received care in the:  [place] [address] [facility]    You were discharged on:  Jazmine 3, 2018        Reason for your hospital stay       Diarrhea dyhydration                  Who to Call     For medical emergencies, please call 911.  For non-urgent questions about your medical care, please call your primary care provider or clinic, [phone]  For questions related to your surgery, please call your surgery clinic        Attending Provider     Provider Specialty    Washington Subramanian MD Oncology       Primary Care Provider Office Phone # Fax #    Soren Perdomo [phone]      After Care Instructions     Activity       Your activity upon discharge: activity as tolerated            Diet       Follow this diet upon discharge: Regular                  Follow-up Appointments     Follow-up and recommended labs and tests        This week with Dr Subramanian (scheduled)                  Pending Results     Date and Time Order Name Status Description    6/2/2018 0909 Bld morphology pathology review In process             Statement of Approval      "Ordered          18 1034  I have reviewed and agree with all the recommendations and orders detailed in this document.  EFFECTIVE NOW     Approved and electronically signed by:  Jessica Leigh MD             Admission Information     Date & Time Provider Department Dept. Phone    2018 Washington Subramanian MD [place] [address] [facility] [phone]      Your Vitals Were     Blood Pressure Pulse Temperature Respirations Height Weight    173/76 (BP Location: Right arm) 92 97.9  F (36.6  C) (Oral) 16 1.765 m (5' 9.5\") 90.7 kg (200 lb)    Pulse Oximetry BMI (Body Mass Index)                96% 29.11 kg/m2          MyChart Information     [facility] lets you send messages to your doctor, view your test results, renew your prescriptions, schedule appointments and more. To sign up, go to [email or web address] . Click on \"Log in\" on the left side of the screen, which will take you to the Welcome page. Then click on \"Sign up Now\" on the right side of the page.     You will be asked to enter the access code listed below, as well as some personal information. Please follow the directions to create your username and password.     Your access code is: [email or web address]  Expires: 2018 11:10 AM     Your access code will  in 90 days. If you need help or a new code, please call your [facility] clinic or [phone].        Care EveryWhere ID     This is your Care EveryWhere ID. This could be used by other organizations to access your [facility] medical records  [id number]        Equal Access to Services     [facility]: Hadii celine warren Sorosa, waaxda luqadaha, qaybta kaalagustina mcdowell. So [facility] [phone].    ATENCIÓN: Si habla español, tiene a ferreira disposición servicios gratuitos de asistencia lingüística. Llame al [phone].    We comply with applicable federal civil rights laws and [place] laws. We do not discriminate on the basis of race, color, national " origin, age, disability, sex, sexual orientation, or gender identity.               Review of your medicines      START taking        Dose / Directions    cholestyramine 4 g Packet   Commonly known as:  QUESTRAN   Used for:  Diarrhea, unspecified type        Dose:  1 packet   Take 1 packet (4 g) by mouth 2 times daily   Quantity:  90 each   Refills:  0       nystatin 323419 UNIT/ML suspension   Commonly known as:  MYCOSTATIN   Indication:  Candidiasis Fungal Infection of the Oropharynx   Used for:  Candidiasis of mouth        Dose:  224219 Units   Take 5 mLs (500,000 Units) by mouth 4 times daily   Quantity:  120 mL   Refills:  0         CONTINUE these medicines which have NOT CHANGED        Dose / Directions    ANASTROZOLE PO        Dose:  1 mg   Take 1 mg by mouth At Bedtime   Refills:  0       ASPIRIN PO        Dose:  81 mg   Take 81 mg by mouth At Bedtime   Refills:  0       atorvastatin 20 MG tablet   Commonly known as:  LIPITOR        Dose:  20 mg   Take 20 mg by mouth At Bedtime   Refills:  0       CIPROFLOXACIN PO        Dose:  500 mg   Take 500 mg by mouth 2 times daily   Refills:  0       diphenoxylate-atropine 2.5-0.025 MG per tablet   Commonly known as:  LOMOTIL        Dose:  2 tablet   Take 2 tablets by mouth 4 times daily   Refills:  0       lisinopril 20 MG tablet   Commonly known as:  PRINIVIL/ZESTRIL        Dose:  20 mg   Take 20 mg by mouth At Bedtime   Refills:  0       multivitamin, therapeutic Tabs tablet        Dose:  1 tablet   Take 1 tablet by mouth At Bedtime No iron   Refills:  0       ONDANSETRON PO        Dose:  8 mg   Take 8 mg by mouth every 6 hours as needed for nausea   Refills:  0       PAXIL PO        Dose:  20 mg   Take 20 mg by mouth At Bedtime   Refills:  0       PROCHLORPERAZINE MALEATE PO        Dose:  10 mg   Take 10 mg by mouth every 4 hours as needed for nausea or vomiting   Refills:  0            Where to get your medicines      Some of these will need a paper prescription  and others can be bought over the counter. Ask your nurse if you have questions.     Bring a paper prescription for each of these medications     cholestyramine 4 g Packet    nystatin 758335 UNIT/ML suspension                Protect others around you: Learn how to safely use, store and throw away your medicines at www.disposemymeds.org.        ANTIBIOTIC INSTRUCTION     You've Been Prescribed an Antibiotic - Now What?  Your healthcare team thinks that you or your loved one might have an infection. Some infections can be treated with antibiotics, which are powerful, life-saving drugs. Like all medications, antibiotics have side effects and should only be used when necessary. There are some important things you should know about your antibiotic treatment.      Your healthcare team may run tests before you start taking an antibiotic.    Your team may take samples (e.g., from your blood, urine or other areas) to run tests to look for bacteria. These test can be important to determine if you need an antibiotic at all and, if you do, which antibiotic will work best.      Within a few days, your healthcare team might change or even stop your antibiotic.    Your team may start you on an antibiotic while they are working to find out what is making you sick.    Your team might change your antibiotic because test results show that a different antibiotic would be better to treat your infection.    In some cases, once your team has more information, they learn that you do not need an antibiotic at all. They may find out that you don't have an infection, or that the antibiotic you're taking won't work against your infection. For example, an infection caused by a virus can't be treated with antibiotics. Staying on an antibiotic when you don't need it is more likely to be harmful than helpful.      You may experience side effects from your antibiotic.    Like all medications, antibiotics have side effects. Some of these can be  serious.    Let you healthcare team know if you have any known allergies when you are admitted to the hospital.    One significant side effect of nearly all antibiotics is the risk of severe and sometimes deadly diarrhea caused by Clostridium difficile (C. Difficile). This occurs when a person takes antibiotics because some good germs are destroyed. Antibiotic use allows C. diificile to take over, putting patients at high risk for this serious infection.    As a patient or caregiver, it is important to understand your or your loved one's antibiotic treatment. It is especially important for caregivers to speak up when patients can't speak for themselves. Here are some important questions to ask your healthcare team.    What infection is this antibiotic treating and how do you know I have that infection?    What side effects might occur from this antibiotic?    How long will I need to take this antibiotic?    Is it safe to take this antibiotic with other medications or supplements (e.g., vitamins) that I am taking?     Are there any special directions I need to know about taking this antibiotic? For example, should I take it with food?    How will I be monitored to know whether my infection is responding to the antibiotic?    What tests may help to make sure the right antibiotic is prescribed for me?      Information provided by:  www.cdc.gov/getsmart  U.S. Department of Health and Human Services  Centers for disease Control and Prevention  National Center for Emerging and Zoonotic Infectious Diseases  Division of Healthcare Quality Promotion             Medication List: This is a list of all your medications and when to take them. Check marks below indicate your daily home schedule. Keep this list as a reference.      Medications           Morning Afternoon Evening Bedtime As Needed    ANASTROZOLE PO   Take 1 mg by mouth At Bedtime   Last time this was given:  1 mg on 6/2/2018 10:24 PM                                 ASPIRIN PO   Take 81 mg by mouth At Bedtime   Last time this was given:  81 mg on 6/2/2018 10:23 PM                                atorvastatin 20 MG tablet   Commonly known as:  LIPITOR   Take 20 mg by mouth At Bedtime   Last time this was given:  20 mg on 6/2/2018 10:24 PM                                cholestyramine 4 g Packet   Commonly known as:  QUESTRAN   Take 1 packet (4 g) by mouth 2 times daily   Last time this was given:  4 g on 6/3/2018 10:19 AM                                CIPROFLOXACIN PO   Take 500 mg by mouth 2 times daily                                diphenoxylate-atropine 2.5-0.025 MG per tablet   Commonly known as:  LOMOTIL   Take 2 tablets by mouth 4 times daily                                lisinopril 20 MG tablet   Commonly known as:  PRINIVIL/ZESTRIL   Take 20 mg by mouth At Bedtime   Last time this was given:  20 mg on 6/2/2018 10:23 PM                                multivitamin, therapeutic Tabs tablet   Take 1 tablet by mouth At Bedtime No iron   Last time this was given:  1 tablet on 6/2/2018 10:24 PM                                nystatin [id number] UNIT/ML suspension   Commonly known as:  MYCOSTATIN   Take 5 mLs (500,000 Units) by mouth 4 times daily   Last time this was given:  500,000 Units on 6/3/2018  9:08 AM                                ONDANSETRON PO   Take 8 mg by mouth every 6 hours as needed for nausea                                PAXIL PO   Take 20 mg by mouth At Bedtime   Last time this was given:  20 mg on 6/2/2018 10:24 PM                                PROCHLORPERAZINE MALEATE PO   Take 10 mg by mouth every 4 hours as needed for nausea or vomiting

## 2018-06-02 LAB
ANION GAP SERPL CALCULATED.3IONS-SCNC: 8 MMOL/L (ref 3–14)
BASOPHILS # BLD AUTO: 0.2 10E9/L (ref 0–0.2)
BASOPHILS NFR BLD AUTO: 2 %
BLASTS # BLD: 0.1 10E9/L
BLASTS BLD QL SMEAR: 1 %
BUN SERPL-MCNC: 9 MG/DL (ref 7–30)
C COLI+JEJUNI+LARI FUSA STL QL NAA+PROBE: NOT DETECTED
CALCIUM SERPL-MCNC: 8.2 MG/DL (ref 8.5–10.1)
CHLORIDE SERPL-SCNC: 114 MMOL/L (ref 94–109)
CO2 SERPL-SCNC: 24 MMOL/L (ref 20–32)
CREAT SERPL-MCNC: 0.79 MG/DL (ref 0.52–1.04)
DIFFERENTIAL METHOD BLD: ABNORMAL
EC STX1 GENE STL QL NAA+PROBE: NOT DETECTED
EC STX2 GENE STL QL NAA+PROBE: NOT DETECTED
ENTERIC PATHOGEN COMMENT: NORMAL
EOSINOPHIL # BLD AUTO: 0 10E9/L (ref 0–0.7)
EOSINOPHIL NFR BLD AUTO: 0 %
ERYTHROCYTE [DISTWIDTH] IN BLOOD BY AUTOMATED COUNT: 15.8 % (ref 10–15)
FLEXIBLE SIGMOIDOSCOPY: NORMAL
GFR SERPL CREATININE-BSD FRML MDRD: 70 ML/MIN/1.7M2
GLUCOSE SERPL-MCNC: 105 MG/DL (ref 70–99)
HCT VFR BLD AUTO: 30.7 % (ref 35–47)
HGB BLD-MCNC: 9.9 G/DL (ref 11.7–15.7)
LYMPHOCYTES # BLD AUTO: 1.3 10E9/L (ref 0.8–5.3)
LYMPHOCYTES NFR BLD AUTO: 17 %
MAGNESIUM SERPL-MCNC: 2.1 MG/DL (ref 1.6–2.3)
MAGNESIUM SERPL-MCNC: 2.4 MG/DL (ref 1.6–2.3)
MCH RBC QN AUTO: 32 PG (ref 26.5–33)
MCHC RBC AUTO-ENTMCNC: 32.2 G/DL (ref 31.5–36.5)
MCV RBC AUTO: 99 FL (ref 78–100)
METAMYELOCYTES # BLD: 0.3 10E9/L
METAMYELOCYTES NFR BLD MANUAL: 4 %
MONOCYTES # BLD AUTO: 0.6 10E9/L (ref 0–1.3)
MONOCYTES NFR BLD AUTO: 8 %
MYELOCYTES # BLD: 0.4 10E9/L
MYELOCYTES NFR BLD MANUAL: 5 %
NEUTROPHILS # BLD AUTO: 4.9 10E9/L (ref 1.6–8.3)
NEUTROPHILS NFR BLD AUTO: 63 %
NOROV GI+II ORF1-ORF2 JNC STL QL NAA+PR: NOT DETECTED
PLATELET # BLD AUTO: 542 10E9/L (ref 150–450)
PLATELET # BLD EST: ABNORMAL 10*3/UL
POLYCHROMASIA BLD QL SMEAR: SLIGHT
POTASSIUM SERPL-SCNC: 3.3 MMOL/L (ref 3.4–5.3)
POTASSIUM SERPL-SCNC: 3.7 MMOL/L (ref 3.4–5.3)
POTASSIUM SERPL-SCNC: 3.8 MMOL/L (ref 3.4–5.3)
RBC # BLD AUTO: 3.09 10E12/L (ref 3.8–5.2)
RVA NSP5 STL QL NAA+PROBE: NOT DETECTED
SALMONELLA SP RPOD STL QL NAA+PROBE: NOT DETECTED
SHIGELLA SP+EIEC IPAH STL QL NAA+PROBE: NOT DETECTED
SODIUM SERPL-SCNC: 146 MMOL/L (ref 133–144)
V CHOL+PARA RFBL+TRKH+TNAA STL QL NAA+PR: NOT DETECTED
WBC # BLD AUTO: 7.7 10E9/L (ref 4–11)
Y ENTERO RECN STL QL NAA+PROBE: NOT DETECTED

## 2018-06-02 PROCEDURE — 80048 BASIC METABOLIC PNL TOTAL CA: CPT | Performed by: INTERNAL MEDICINE

## 2018-06-02 PROCEDURE — 85025 COMPLETE CBC W/AUTO DIFF WBC: CPT | Performed by: INTERNAL MEDICINE

## 2018-06-02 PROCEDURE — 84132 ASSAY OF SERUM POTASSIUM: CPT | Performed by: INTERNAL MEDICINE

## 2018-06-02 PROCEDURE — 25000132 ZZH RX MED GY IP 250 OP 250 PS 637

## 2018-06-02 PROCEDURE — 12000000 ZZH R&B MED SURG/OB

## 2018-06-02 PROCEDURE — 25000132 ZZH RX MED GY IP 250 OP 250 PS 637: Performed by: NURSE PRACTITIONER

## 2018-06-02 PROCEDURE — 0DBN8ZX EXCISION OF SIGMOID COLON, VIA NATURAL OR ARTIFICIAL OPENING ENDOSCOPIC, DIAGNOSTIC: ICD-10-PCS | Performed by: INTERNAL MEDICINE

## 2018-06-02 PROCEDURE — 83735 ASSAY OF MAGNESIUM: CPT | Performed by: INTERNAL MEDICINE

## 2018-06-02 PROCEDURE — 25000128 H RX IP 250 OP 636: Performed by: NURSE PRACTITIONER

## 2018-06-02 PROCEDURE — 45331 SIGMOIDOSCOPY AND BIOPSY: CPT | Performed by: INTERNAL MEDICINE

## 2018-06-02 PROCEDURE — 88305 TISSUE EXAM BY PATHOLOGIST: CPT | Performed by: INTERNAL MEDICINE

## 2018-06-02 PROCEDURE — 25000132 ZZH RX MED GY IP 250 OP 250 PS 637: Performed by: INTERNAL MEDICINE

## 2018-06-02 PROCEDURE — 88305 TISSUE EXAM BY PATHOLOGIST: CPT | Mod: 26 | Performed by: INTERNAL MEDICINE

## 2018-06-02 RX ORDER — LIDOCAINE 40 MG/G
CREAM TOPICAL
Status: DISCONTINUED | OUTPATIENT
Start: 2018-06-02 | End: 2018-06-03 | Stop reason: HOSPADM

## 2018-06-02 RX ORDER — FLUMAZENIL 0.1 MG/ML
0.2 INJECTION, SOLUTION INTRAVENOUS
Status: ACTIVE | OUTPATIENT
Start: 2018-06-02 | End: 2018-06-02

## 2018-06-02 RX ORDER — CHOLESTYRAMINE 4 G/9G
1 POWDER, FOR SUSPENSION ORAL 2 TIMES DAILY
Status: DISCONTINUED | OUTPATIENT
Start: 2018-06-02 | End: 2018-06-03

## 2018-06-02 RX ORDER — NALOXONE HYDROCHLORIDE 0.4 MG/ML
.1-.4 INJECTION, SOLUTION INTRAMUSCULAR; INTRAVENOUS; SUBCUTANEOUS
Status: ACTIVE | OUTPATIENT
Start: 2018-06-02 | End: 2018-06-03

## 2018-06-02 RX ADMIN — ASPIRIN 81 MG 81 MG: 81 TABLET ORAL at 22:23

## 2018-06-02 RX ADMIN — NYSTATIN 500000 UNITS: 100000 SUSPENSION ORAL at 12:40

## 2018-06-02 RX ADMIN — CHOLESTYRAMINE 4 G: 4 POWDER, FOR SUSPENSION ORAL at 21:00

## 2018-06-02 RX ADMIN — NYSTATIN 500000 UNITS: 100000 SUSPENSION ORAL at 22:22

## 2018-06-02 RX ADMIN — POTASSIUM CHLORIDE 20 MEQ: 1500 TABLET, EXTENDED RELEASE ORAL at 07:06

## 2018-06-02 RX ADMIN — POTASSIUM CHLORIDE 40 MEQ: 1500 TABLET, EXTENDED RELEASE ORAL at 04:58

## 2018-06-02 RX ADMIN — NYSTATIN 500000 UNITS: 100000 SUSPENSION ORAL at 17:25

## 2018-06-02 RX ADMIN — NYSTATIN 500000 UNITS: 100000 SUSPENSION ORAL at 08:49

## 2018-06-02 RX ADMIN — SODIUM CHLORIDE: 9 INJECTION, SOLUTION INTRAVENOUS at 22:54

## 2018-06-02 RX ADMIN — LISINOPRIL 20 MG: 20 TABLET ORAL at 22:23

## 2018-06-02 RX ADMIN — PAROXETINE HYDROCHLORIDE 20 MG: 20 TABLET, FILM COATED ORAL at 22:24

## 2018-06-02 RX ADMIN — PROCHLORPERAZINE EDISYLATE 5 MG: 5 INJECTION INTRAMUSCULAR; INTRAVENOUS at 07:53

## 2018-06-02 RX ADMIN — SODIUM CHLORIDE: 9 INJECTION, SOLUTION INTRAVENOUS at 10:17

## 2018-06-02 RX ADMIN — CIPROFLOXACIN HYDROCHLORIDE 250 MG: 250 TABLET, FILM COATED ORAL at 08:26

## 2018-06-02 RX ADMIN — ENOXAPARIN SODIUM 40 MG: 40 INJECTION SUBCUTANEOUS at 17:25

## 2018-06-02 RX ADMIN — LOPERAMIDE HYDROCHLORIDE 2 MG: 2 CAPSULE ORAL at 08:25

## 2018-06-02 RX ADMIN — POTASSIUM CHLORIDE 20 MEQ: 1500 TABLET, EXTENDED RELEASE ORAL at 00:01

## 2018-06-02 RX ADMIN — SODIUM PHOSPHATE, DIBASIC AND SODIUM PHOSPHATE, MONOBASIC 2 ENEMA: 7; 19 ENEMA RECTAL at 09:30

## 2018-06-02 RX ADMIN — LOPERAMIDE HYDROCHLORIDE 2 MG: 2 CAPSULE ORAL at 22:23

## 2018-06-02 RX ADMIN — ATORVASTATIN CALCIUM 20 MG: 20 TABLET, FILM COATED ORAL at 22:24

## 2018-06-02 RX ADMIN — ANASTROZOLE 1 MG: 1 TABLET, COATED ORAL at 22:24

## 2018-06-02 RX ADMIN — CHOLESTYRAMINE 4 G: 4 POWDER, FOR SUSPENSION ORAL at 12:39

## 2018-06-02 RX ADMIN — CIPROFLOXACIN HYDROCHLORIDE 250 MG: 250 TABLET, FILM COATED ORAL at 22:23

## 2018-06-02 RX ADMIN — LOPERAMIDE HYDROCHLORIDE 2 MG: 2 CAPSULE ORAL at 04:22

## 2018-06-02 RX ADMIN — THERA TABS 1 TABLET: TAB at 22:24

## 2018-06-02 NOTE — CONSULTS
Gastroenterology Consultation      Yolanda Watson MRN# [id number]   YOB: 1940 Age: 77 year old   Date of Admission: 6/1/2018     Reason for consult: I was asked by Dr Barrientos to evaluate this patient for diarrhea.               History of Present Illness:   This patient is a 77 year old female who presents with 5 weeks of diarrhea.  She has not had issues with diarrhea in the past, and the current problems started after receiving Taxol and Herceptin.  The diarrhea is unchanged over this time.  She has 5-6 urgent stools daily.  She is incontinent most of the time as there is no warning.  The stools are yellow and mostly liquid.  There is no pain associated with this.  Fatigue is strongly associated with the diarrhea.   She occasionally vomits, but this is not new and not associated with the diarrhea.  Lomotil has not helped and tincture of opium caused mental status changes.  Imodium was started yesterday and does not seem to be working.  All stool cultures are negative.  There is no family history of diarrhea, Crohn's or celiac.              Past Medical History:     Past Medical History:   Diagnosis Date     Acoustic neuroma (H)      Acoustic neuroma (H)      Acute arthropathy     lower leg     Acute depression      Anemia     iron deficeincy     Anxiety      Arthritis     osteoarthrosis of knee     Breast cancer (H)     left breast     Breast cancer, left breast (H) 02/2018     Breast cancer, right breast (H) 1/2016     Cervical cancer (H)      Colon cancer (H)      Colon cancer (H)      Depression      Diabetes mellitus (H)     pre-diabetic no longer taking Metformin     Dyspnea      Dyspnea      Gastro-oesophageal reflux disease      H/O: lung cancer 2013     Herpes      Herpes zoster      Herpes zoster      HTN (hypertension)      Hyperlipidaemia      Hyperlipidemia      Hyperlipidemia      Iron deficiency anemia      Lung cancer (H)      Lung nodule 2013    Lung cancer     Lung nodule       Nausea & vomiting      Osteoarthritis of knee      Pain in female pelvis      Pain in female pelvis      Pre-diabetes      Preop general physical exam      Rib lesion      Rib lesion      Shoulder pain, acute     right     Tachycardia      Torn meniscus      Torn meniscus              Past Surgical History:     Past Surgical History:   Procedure Laterality Date     APPENDECTOMY  1966    done with CORNELIO     ARTHROSCOPY KNEE  2012    right knee scoped     BIOPSY NODE SENTINEL Right 2/3/2016    Procedure: BIOPSY NODE SENTINEL;  Surgeon: Flory Pinto MD;  Location: [facility]     BIOPSY NODE SENTINEL Left 3/5/2018    Procedure: BIOPSY NODE SENTINEL;;  Surgeon: Denae Perez MD;  Location: [facility]     BREAST BIOPSY, RT/LT       cataracts       cataracts       CERVICAL CANCER SCREENING RESULTS DOCUMENTED AND REVIEWED       CHOLECYSTECTOMY  1991     CL AFF SURGICAL PATHOLOGY       CL AFF SURGICAL PATHOLOGY      acoustic neuroma left side excised [facility] [place] 6/17/14     COLONOSCOPY       EXCISE NODE MEDIASTINAL  3/12/2013    Procedure: EXCISE NODE MEDIASTINAL;;  Surgeon: Lorne Arenas MD;  Location:  OR     EYE SURGERY      fiona cataracts     GENITOURINARY SURGERY      bladder sling     GYN SURGERY  1966    hysterectomy for cervical cancer *ovaries remain     HYSTERECTOMY  1966    Cervical CA, paps done every other year, done with appendectomy     INSERT PORT VASCULAR ACCESS Left 3/5/2018    Procedure: INSERT PORT VASCULAR ACCESS;;  Surgeon: Denae Perez MD;  Location: [facility]     INSERT PORT VASCULAR ACCESS N/A 3/5/2018    Procedure: INSERT PORT VASCULAR ACCESS;;  Surgeon: Denae Perez MD;  Location: [facility]     LAPAROSCOPIC ASSISTED COLECTOMY Right 10/27/2016    Procedure: LAPAROSCOPIC ASSISTED COLECTOMY;  Surgeon: Umang Arteaga MD;  Location:  OR     LOBECTOMY LUNG  3/12/2013    Procedure: LOBECTOMY LUNG;;  Surgeon: Lorne Arenas MD;  Location:  OR     LUMPECTOMY BREAST  WITH SEED LOCALIZATION Right 2/3/2016    Procedure: LUMPECTOMY BREAST WITH SEED LOCALIZATION;  Surgeon: Flory Pinto MD;  Location: [facility]     LUMPECTOMY BREAST WITH SEED LOCALIZATION Left 3/5/2018    Procedure: LUMPECTOMY BREAST WITH SEED LOCALIZATION;  SEED LOCALIZED LEFT BREAST LUMPECTOMY, WITH LEFT SENTINEL NODE BIOPSY, PORT PLACEMENT;  Surgeon: Denae Perez MD;  Location: [facility]     MASTECTOMY SIMPLE Left 3/28/2018    Procedure: MASTECTOMY SIMPLE;  LEFT BREAST MASTECTOMY ;  Surgeon: Denae Perez MD;  Location: [facility]     MINI ARC SLING OPERATION FOR STRESS INCONTINENCE  2009     RESECT RIB  2/15/2013    Procedure: RESECT RIB;  RESECTION PORTION RIGHT TWELVETH RIB;  Surgeon: Lorne Arenas MD;  Location:  OR     THORACOTOMY  3/12/2013    Procedure: THORACOTOMY;  RIGHT THORACOTOMY, RIGHT MIDDLE AND LOWER LUNG LOBECTOMY, MEDIASTINAL LYMPH NODE DISSECTION ;  Surgeon: Lorne Arenas MD;  Location:  OR     TONSILLECTOMY                 Social History:     Social History     Social History     Marital status:      Spouse name: N/A     Number of children: 3     Years of education: N/A     Occupational History      Retired     Social History Main Topics     Smoking status: Former Smoker     Packs/day: 1.00     Years: 45.00     Types: Cigarettes     Quit date: 4/24/1998     Smokeless tobacco: Never Used      Comment: quit 1998     Alcohol use 0.0 oz/week     0 Standard drinks or equivalent per week      Comment: glass of wine a day     Drug use: No     Sexual activity: Yes     Partners: Male     Birth control/ protection: Female Surgical     Other Topics Concern     Not on file     Social History Narrative    12/2015    -    3 kids    Retired        Pap-11/8/2010 WNL    Mammo-12/2/13     Colonoscopy-3/3/2010 WNL, no further recommended per pt     Dexa-1/1/2008                 Family History:     Family History   Problem Relation Age of Onset     Breast Cancer Paternal  Grandmother      Colon Cancer Other      not specified     Fractures Other      Hip fracture, Aunt     Hyperlipidemia Mother      Hypertension Mother      HEART DISEASE Mother      OSTEOPOROSIS Mother      Hyperlipidemia Sister      Hyperlipidemia Brother      Prostate Cancer Brother      Hypertension Sister      HEART DISEASE Maternal Grandfather      Prostate Cancer Father      Colon Cancer Cousin      son of paternal aunt with breast cancer.     Breast Cancer Paternal Aunt      Colon Cancer Paternal Aunt      Other Cancer Paternal Uncle      Esophageal     Other Cancer Maternal Aunt              Allergies:      Allergies   Allergen Reactions     Demerol [Meperidine] Nausea and Vomiting     Sulfa Drugs Other (See Comments)     Extreme chills     Penicillins Rash     Swelling In mouth and tongue             Medications:     No current outpatient prescriptions on file.             Review of Systems:   10-point review of systems negative except as noted in HPI.            Physical Exam:   Vitals were reviewed  /75 (BP Location: Right arm)  Pulse 104  Temp 97.9  F (36.6  C) (Oral)  Resp 18  SpO2 95%  Constitutional:   No distress     Heent:   NC/AT, sclera anicteric     Neck:   No adenopathy, thyroid not palpable   Respiratory:   CTA anteriorly     Cardiovascular:   RRR, no murmur     Gastrointestinal:   Active BS, soft, NT/ND     Extremities:   No clubbing, cyanosis or edema   Neuro:   Grossly nonfocal     Skin:   No rashes or ecchymoses   Psychiatry:        No evidence of anxiety or depression         Data:     ROUTINE IP LABS  BMP  Last Basic Metabolic Panel:  Lab Results   Component Value Date     06/02/2018      Lab Results   Component Value Date    POTASSIUM 3.8 06/02/2018     Lab Results   Component Value Date    CHLORIDE 114 06/02/2018     Lab Results   Component Value Date    PRIYA 8.2 06/02/2018     Lab Results   Component Value Date    CO2 24 06/02/2018     Lab Results   Component Value Date     BUN 9 06/02/2018     Lab Results   Component Value Date    CR 0.79 06/02/2018     Lab Results   Component Value Date     06/02/2018       CBC  Lab Results   Component Value Date    WBC 7.7 06/02/2018     Lab Results   Component Value Date    RBC 3.09 06/02/2018     Lab Results   Component Value Date    HGB 9.9 06/02/2018     Lab Results   Component Value Date    HCT 30.7 06/02/2018     No components found for: MCT  Lab Results   Component Value Date    MCV 99 06/02/2018     Lab Results   Component Value Date    MCH 32.0 06/02/2018     Lab Results   Component Value Date    MCHC 32.2 06/02/2018     Lab Results   Component Value Date    RDW 15.8 06/02/2018     Lab Results   Component Value Date     06/02/2018       INR  Lab Results   Component Value Date    INR 1.07 11/01/2016       PANCREASNo lab results found in last 7 days.  LFT  Recent Labs  Lab 06/01/18  1850   PROTTOTAL 6.3*   ALBUMIN 3.2*   BILITOTAL 0.4   ALKPHOS 71   AST 16   ALT 25                Assessment and Plan:   I suspect the diarrhea is related to chemo, and is not helped by ongoing antibiotic therapy.  I would like to do a flex sig with biopsies to look for treatable causes of the diarrhea, then start cholestyramine.     Stephen Boston MD  [facility]  Office:  [phone]

## 2018-06-02 NOTE — PROGRESS NOTES
MD Notification    Notified Person: MD    Notified Person Name: Dr. Munguia (on-call, [facility])    Notification Date/Time: 7:55 PM 6/1/2018     Notification Interaction: phone    Purpose of Notification: low magnesium (1.4) and potassium (3.2), Na high (127); need replacement protocol    Orders Received: start replacement protocol and add BMP and mag labs for AM draw. Okay to continue with NS at 75/hr    Comments:

## 2018-06-02 NOTE — PROCEDURES
Pre-Endoscopy History and Physical     Yolanda Watson MRN# [id number]   YOB: 1940 Age: 77 year old     Date of Procedure: 6/1/2018  Primary care provider: Soren Perdomo  Type of Endoscopy: flexible sigmoidoscopy  Reason for Procedure: diarrhea  Type of Anesthesia Anticipated: Moderate (conscious) sedation    HPI:    Yolanda is a 77 year old female who will be undergoing the above procedure.      A history and physical has been performed. The patient's medications and allergies have been reviewed. The risks and benefits of the procedure and the sedation options and risks were discussed with the patient.  All questions were answered and informed consent was obtained.      Allergies   Allergen Reactions     Demerol [Meperidine] Nausea and Vomiting     Sulfa Drugs Other (See Comments)     Extreme chills     Penicillins Rash     Swelling In mouth and tongue        Current Facility-Administered Medications   Medication     anastrozole (ARIMIDEX) tablet 1 mg     aspirin chewable tablet 81 mg     atorvastatin (LIPITOR) tablet 20 mg     ciprofloxacin (CIPRO) tablet 250 mg     diphenoxylate-atropine (LOMOTIL) 2.5-0.025 MG per tablet 1 tablet     enoxaparin (LOVENOX) injection 40 mg     lisinopril (PRINIVIL/ZESTRIL) tablet 20 mg     loperamide (IMODIUM) capsule 2 mg     magnesium sulfate 4 g in 100 mL sterile water (premade)     Medication Instruction     multivitamin, therapeutic (THERA-VIT) tablet 1 tablet     nystatin (MYCOSTATIN) suspension 500,000 Units     ondansetron (ZOFRAN) injection 4 mg     PARoxetine (PAXIL) tablet 20 mg     potassium chloride (KLOR-CON) Packet 20-40 mEq     potassium chloride 10 mEq in 100 mL intermittent infusion with 10 mg lidocaine     potassium chloride 10 mEq in 100 mL sterile water intermittent infusion (premix)     potassium chloride 20 mEq in 50 mL intermittent infusion     potassium chloride SA (K-DUR/KLOR-CON M) CR tablet 20-40 mEq     prochlorperazine (COMPAZINE) tablet 5  mg    Or     prochlorperazine (COMPAZINE) injection 5 mg     sodium chloride 0.9% infusion     sodium phosphate (FLEET ENEMA) 2 enema    Followed by     sodium phosphate (FLEET ENEMA) 1 enema       Patient Active Problem List   Diagnosis     Lung cancer (H)     ACP (advance care planning)     OAB (overactive bladder)     Colon cancer (H)     Rectal bleeding     Malignant neoplasm of left breast (H)     Weakness        Past Medical History:   Diagnosis Date     Acoustic neuroma (H)      Acoustic neuroma (H)      Acute arthropathy     lower leg     Acute depression      Anemia     iron deficeincy     Anxiety      Arthritis     osteoarthrosis of knee     Breast cancer (H)     left breast     Breast cancer, left breast (H) 02/2018     Breast cancer, right breast (H) 1/2016     Cervical cancer (H)      Colon cancer (H)      Colon cancer (H)      Depression      Diabetes mellitus (H)     pre-diabetic no longer taking Metformin     Dyspnea      Dyspnea      Gastro-oesophageal reflux disease      H/O: lung cancer 2013     Herpes      Herpes zoster      Herpes zoster      HTN (hypertension)      Hyperlipidaemia      Hyperlipidemia      Hyperlipidemia      Iron deficiency anemia      Lung cancer (H)      Lung nodule 2013    Lung cancer     Lung nodule      Nausea & vomiting      Osteoarthritis of knee      Pain in female pelvis      Pain in female pelvis      Pre-diabetes      Preop general physical exam      Rib lesion      Rib lesion      Shoulder pain, acute     right     Tachycardia      Torn meniscus      Torn meniscus         Past Surgical History:   Procedure Laterality Date     APPENDECTOMY  1966    done with [facility]     ARTHROSCOPY KNEE  2012    right knee scoped     BIOPSY NODE SENTINEL Right 2/3/2016    Procedure: BIOPSY NODE SENTINEL;  Surgeon: Flory Pinto MD;  Location: [facility]     BIOPSY NODE SENTINEL Left 3/5/2018    Procedure: BIOPSY NODE SENTINEL;;  Surgeon: Denae Perez MD;  Location: [facility]      BREAST BIOPSY, RT/LT       cataracts       cataracts       CERVICAL CANCER SCREENING RESULTS DOCUMENTED AND REVIEWED       CHOLECYSTECTOMY  1991     CL AFF SURGICAL PATHOLOGY       CL AFF SURGICAL PATHOLOGY      acoustic neuroma left side excised [facility] 6/17/14     COLONOSCOPY       EXCISE NODE MEDIASTINAL  3/12/2013    Procedure: EXCISE NODE MEDIASTINAL;;  Surgeon: Lorne Arenas MD;  Location:  OR     EYE SURGERY      fiona cataracts     GENITOURINARY SURGERY      bladder sling     GYN SURGERY  1966    hysterectomy for cervical cancer *ovaries remain     HYSTERECTOMY  1966    Cervical CA, paps done every other year, done with appendectomy     INSERT PORT VASCULAR ACCESS Left 3/5/2018    Procedure: INSERT PORT VASCULAR ACCESS;;  Surgeon: Denae Perez MD;  Location: [facility]     INSERT PORT VASCULAR ACCESS N/A 3/5/2018    Procedure: INSERT PORT VASCULAR ACCESS;;  Surgeon: Denae Perez MD;  Location: [facility]     LAPAROSCOPIC ASSISTED COLECTOMY Right 10/27/2016    Procedure: LAPAROSCOPIC ASSISTED COLECTOMY;  Surgeon: Umang Arteaga MD;  Location:  OR     LOBECTOMY LUNG  3/12/2013    Procedure: LOBECTOMY LUNG;;  Surgeon: Lorne Arenas MD;  Location:  OR     LUMPECTOMY BREAST WITH SEED LOCALIZATION Right 2/3/2016    Procedure: LUMPECTOMY BREAST WITH SEED LOCALIZATION;  Surgeon: Flory Pinto MD;  Location: [facility]     LUMPECTOMY BREAST WITH SEED LOCALIZATION Left 3/5/2018    Procedure: LUMPECTOMY BREAST WITH SEED LOCALIZATION;  SEED LOCALIZED LEFT BREAST LUMPECTOMY, WITH LEFT SENTINEL NODE BIOPSY, PORT PLACEMENT;  Surgeon: Denae Perez MD;  Location: [facility]     MASTECTOMY SIMPLE Left 3/28/2018    Procedure: MASTECTOMY SIMPLE;  LEFT BREAST MASTECTOMY ;  Surgeon: Denae Perez MD;  Location: [facility]     MINI ARC SLING OPERATION FOR STRESS INCONTINENCE  2009     RESECT RIB  2/15/2013    Procedure: RESECT RIB;  RESECTION PORTION RIGHT TWELVETH RIB;  Surgeon: Dagoberto  Lorne Oliva MD;  Location: [facility] OR     THORACOTOMY  3/12/2013    Procedure: THORACOTOMY;  RIGHT THORACOTOMY, RIGHT MIDDLE AND LOWER LUNG LOBECTOMY, MEDIASTINAL LYMPH NODE DISSECTION ;  Surgeon: Lorne Arenas MD;  Location: [facility] OR     TONSILLECTOMY         Social History   Substance Use Topics     Smoking status: Former Smoker     Packs/day: 1.00     Years: 45.00     Types: Cigarettes     Quit date: 4/24/1998     Smokeless tobacco: Never Used      Comment: quit 1998     Alcohol use 0.0 oz/week     0 Standard drinks or equivalent per week      Comment: glass of wine a day       Family History   Problem Relation Age of Onset     Breast Cancer Paternal Grandmother      Colon Cancer Other      not specified     Fractures Other      Hip fracture, Aunt     Hyperlipidemia Mother      Hypertension Mother      HEART DISEASE Mother      OSTEOPOROSIS Mother      Hyperlipidemia Sister      Hyperlipidemia Brother      Prostate Cancer Brother      Hypertension Sister      HEART DISEASE Maternal Grandfather      Prostate Cancer Father      Colon Cancer Cousin      son of paternal aunt with breast cancer.     Breast Cancer Paternal Aunt      Colon Cancer Paternal Aunt      Other Cancer Paternal Uncle      Esophageal     Other Cancer Maternal Aunt             Medications:     Prescriptions Prior to Admission   Medication Sig Dispense Refill Last Dose     ANASTROZOLE PO Take 1 mg by mouth At Bedtime    5/31/2018 at pm     ASPIRIN PO Take 81 mg by mouth At Bedtime    5/31/2018 at pm     atorvastatin (LIPITOR) 20 MG tablet Take 20 mg by mouth At Bedtime    5/31/2018 at pm     CIPROFLOXACIN PO Take 500 mg by mouth 2 times daily   5/31/2018 at pm     diphenoxylate-atropine (LOMOTIL) 2.5-0.025 MG per tablet Take 2 tablets by mouth 4 times daily   5/31/2018 at Unknown time     lisinopril (PRINIVIL/ZESTRIL) 20 MG tablet Take 20 mg by mouth At Bedtime    5/31/2018 at pm     multivitamin, therapeutic (THERA-VIT) TABS Take 1 tablet  "by mouth At Bedtime No iron   5/31/2018 at pm     ONDANSETRON PO Take 8 mg by mouth every 6 hours as needed for nausea   5/31/2018 at pm     PARoxetine HCl (PAXIL PO) Take 20 mg by mouth At Bedtime   5/31/2018 at pm     PROCHLORPERAZINE MALEATE PO Take 10 mg by mouth every 4 hours as needed for nausea or vomiting   prn       Scheduled Medications:    anastrozole (ARIMIDEX) tablet 1 mg  1 mg Oral At Bedtime     aspirin chewable tablet 81 mg  81 mg Oral At Bedtime     atorvastatin  20 mg Oral At Bedtime     ciprofloxacin  250 mg Oral Q12H GARRICK     enoxaparin  40 mg Subcutaneous Q24H     lisinopril  20 mg Oral At Bedtime     multivitamin, therapeutic  1 tablet Oral At Bedtime     nystatin  500,000 Units Oral 4x Daily     PARoxetine (PAXIL) tablet 20 mg  20 mg Oral At Bedtime     sodium phosphate  2 enema Rectal Once       PRN:  diphenoxylate-atropine, loperamide, magnesium sulfate, - MEDICATION INSTRUCTIONS -, ondansetron, potassium chloride, potassium chloride with lidocaine, potassium chloride, potassium chloride, potassium chloride, prochlorperazine **OR** prochlorperazine, sodium phosphate **FOLLOWED BY** sodium phosphate    PHYSICAL EXAM:   /75 (BP Location: Right arm)  Pulse 104  Temp 97.9  F (36.6  C) (Oral)  Resp 18  SpO2 95% Estimated body mass index is 30.13 kg/(m^2) as calculated from the following:    Height as of 3/28/18: 1.753 m (5' 9\").    Weight as of 3/28/18: 92.5 kg (204 lb).   RESP: lungs clear to auscultation - no rales, rhonchi or wheezes  CV: regular rates and rhythm    IMPRESSION   ASA Class 3 - Severe systemic disease, but not incapacitating      Signed Electronically by: Ignacio Boston MD  June 2, 2018    .            "

## 2018-06-02 NOTE — PLAN OF CARE
Problem: Patient Care Overview  Goal: Plan of Care/Patient Progress Review  Direct admit from oncology clinic this evening. A&Ox4. HR slightly tachy, other VSS on RA. LS clear/diminished. Low fiber diet, tolerating well, denies n/v. 3-4 loose incontinent stools this evening, cdiff negative. Up SBA/A1. L chest port infusing NS at 75/hr, also replacing K+ and mag per protocol. Abdominal x-ray tonight, awaiting results. GI consulted. Will continue to monitor.

## 2018-06-02 NOTE — PROGRESS NOTES
Seen pt, had low mag and given replacement, flex sig today, inflammation, to start on questran by GI  Had 12 BMs in 24 hours  I discussed resuscitation , she desires full code, with no active cancer I agreed.  Continue IVF and monitor chem.

## 2018-06-02 NOTE — PLAN OF CARE
Problem: Patient Care Overview  Goal: Plan of Care/Patient Progress Review  Outcome: No Change  Patient A&Ox4, pleasant. No c/o pain on shift. VSS. LS clear/dim. Absent in RLL and RML d/t previous lobectomy. Left mastectomy. Blanchable pink/purple discoloration to coccyx, DAVIDSON. Having loose stools on shift x6. PRN Imodium given x1. L chest port infusing NS at 75 mL/hr. MG= 2.4 @ 0130 when rechecked after replacement on 6/1. K= 3.3 @ 0400 after replacement on 6/1; PO replacement started again. Emesis x1 on shift, 100 mL of undigested food, yellow/tan in color. PRN IV Compazine given. Denies numbness or tingling. SBA with walker; is impulsive when having diarrhea so sets off bed alarm. Discharge pending. Continue to monitor.

## 2018-06-02 NOTE — PLAN OF CARE
Problem: Patient Care Overview  Goal: Plan of Care/Patient Progress Review  Outcome: No Change    A&ox4, VSS on RA. Denies pain. LS clear but diminished, RLL lobectomy absent in that area. L mastectomy. +1-2 BLE and LUE. Low fiber diet-tolerating well. 4 Loose stools prn imodium given x1. NS at 75 ml/hr. SBA with walker.

## 2018-06-03 VITALS
HEART RATE: 92 BPM | TEMPERATURE: 97.9 F | BODY MASS INDEX: 28.63 KG/M2 | OXYGEN SATURATION: 96 % | DIASTOLIC BLOOD PRESSURE: 76 MMHG | WEIGHT: 200 LBS | HEIGHT: 70 IN | RESPIRATION RATE: 16 BRPM | SYSTOLIC BLOOD PRESSURE: 173 MMHG

## 2018-06-03 LAB
ANION GAP SERPL CALCULATED.3IONS-SCNC: 7 MMOL/L (ref 3–14)
BUN SERPL-MCNC: 5 MG/DL (ref 7–30)
CALCIUM SERPL-MCNC: 7.9 MG/DL (ref 8.5–10.1)
CHLORIDE SERPL-SCNC: 116 MMOL/L (ref 94–109)
CO2 SERPL-SCNC: 23 MMOL/L (ref 20–32)
CREAT SERPL-MCNC: 0.67 MG/DL (ref 0.52–1.04)
ERYTHROCYTE [DISTWIDTH] IN BLOOD BY AUTOMATED COUNT: 15.9 % (ref 10–15)
GFR SERPL CREATININE-BSD FRML MDRD: 84 ML/MIN/1.7M2
GLUCOSE SERPL-MCNC: 98 MG/DL (ref 70–99)
HCT VFR BLD AUTO: 29.6 % (ref 35–47)
HGB BLD-MCNC: 9.5 G/DL (ref 11.7–15.7)
MCH RBC QN AUTO: 32.1 PG (ref 26.5–33)
MCHC RBC AUTO-ENTMCNC: 32.1 G/DL (ref 31.5–36.5)
MCV RBC AUTO: 100 FL (ref 78–100)
PLATELET # BLD AUTO: 460 10E9/L (ref 150–450)
POTASSIUM SERPL-SCNC: 3.7 MMOL/L (ref 3.4–5.3)
RBC # BLD AUTO: 2.96 10E12/L (ref 3.8–5.2)
SODIUM SERPL-SCNC: 146 MMOL/L (ref 133–144)
WBC # BLD AUTO: 7.5 10E9/L (ref 4–11)

## 2018-06-03 PROCEDURE — 25000132 ZZH RX MED GY IP 250 OP 250 PS 637: Performed by: INTERNAL MEDICINE

## 2018-06-03 PROCEDURE — 25000128 H RX IP 250 OP 636: Performed by: INTERNAL MEDICINE

## 2018-06-03 PROCEDURE — 80048 BASIC METABOLIC PNL TOTAL CA: CPT | Performed by: INTERNAL MEDICINE

## 2018-06-03 PROCEDURE — 85027 COMPLETE CBC AUTOMATED: CPT | Performed by: INTERNAL MEDICINE

## 2018-06-03 PROCEDURE — 25000132 ZZH RX MED GY IP 250 OP 250 PS 637: Performed by: NURSE PRACTITIONER

## 2018-06-03 RX ORDER — HEPARIN SODIUM,PORCINE 10 UNIT/ML
5-10 VIAL (ML) INTRAVENOUS EVERY 24 HOURS
Status: DISCONTINUED | OUTPATIENT
Start: 2018-06-03 | End: 2018-06-03 | Stop reason: HOSPADM

## 2018-06-03 RX ORDER — NYSTATIN 100000/ML
500000 SUSPENSION, ORAL (FINAL DOSE FORM) ORAL 4 TIMES DAILY
Qty: 120 ML | Refills: 0 | Status: ON HOLD | OUTPATIENT
Start: 2018-06-03 | End: 2019-07-26

## 2018-06-03 RX ORDER — HEPARIN SODIUM (PORCINE) LOCK FLUSH IV SOLN 100 UNIT/ML 100 UNIT/ML
5 SOLUTION INTRAVENOUS
Status: DISCONTINUED | OUTPATIENT
Start: 2018-06-03 | End: 2018-06-03 | Stop reason: HOSPADM

## 2018-06-03 RX ORDER — CHOLESTYRAMINE 4 G/9G
1 POWDER, FOR SUSPENSION ORAL 2 TIMES DAILY
Qty: 90 EACH | Refills: 0 | Status: ON HOLD | OUTPATIENT
Start: 2018-06-03 | End: 2019-02-13

## 2018-06-03 RX ORDER — HEPARIN SODIUM,PORCINE 10 UNIT/ML
5-10 VIAL (ML) INTRAVENOUS
Status: DISCONTINUED | OUTPATIENT
Start: 2018-06-03 | End: 2018-06-03 | Stop reason: HOSPADM

## 2018-06-03 RX ORDER — CHOLESTYRAMINE 4 G/9G
1 POWDER, FOR SUSPENSION ORAL 2 TIMES DAILY
Status: DISCONTINUED | OUTPATIENT
Start: 2018-06-03 | End: 2018-06-03 | Stop reason: HOSPADM

## 2018-06-03 RX ADMIN — NYSTATIN 500000 UNITS: 100000 SUSPENSION ORAL at 12:28

## 2018-06-03 RX ADMIN — HEPARIN 5 ML: 100 SYRINGE at 12:39

## 2018-06-03 RX ADMIN — CHOLESTYRAMINE 4 G: 4 POWDER, FOR SUSPENSION ORAL at 10:19

## 2018-06-03 RX ADMIN — NYSTATIN 500000 UNITS: 100000 SUSPENSION ORAL at 09:08

## 2018-06-03 RX ADMIN — CIPROFLOXACIN HYDROCHLORIDE 250 MG: 250 TABLET, FILM COATED ORAL at 08:57

## 2018-06-03 NOTE — PROGRESS NOTES
Boy deaccessed. Discharge paperwork and meds gone over. Belonging, paperwork and medication sent with patient.

## 2018-06-03 NOTE — PLAN OF CARE
Problem: Patient Care Overview  Goal: Plan of Care/Patient Progress Review  A&Ox4. VSS on RA, HR tachy at times. 2 loose incontinent BMs on brandt shift, started on scheduled questran today, PRN imodium x1. Low fiber diet, denies nausea. L port infusing NS at 75/hr. Up SBA. Will continue to monitor.

## 2018-06-03 NOTE — DISCHARGE SUMMARY
Admit Date:     2018   Discharge Date:           FINAL DIAGNOSES:   1.  Dehydration.   2.  Diarrhea due to systemic therapy.   3.  Hypokalemia and hypomagnesemia.   4.  History of breast cancer, lung cancer.   5.  Weakness.   6.  Urinary tract infection.   7.  Oral candidiasis.        PROCEDURE DONE DURING THIS ADMISSION:  Sigmoidoscopy, which was unremarkable except for diverticulosis and some erythema.      Followup appointment with Dr. Subramanian later this week.      DISPOSITION:  To home.        HISTORY:  Warren is a 77-year-old woman with history of breast and lung cancer, has been on therapy with Herceptin and Taxol.  She had significant diarrhea 12 times per day, and she became weaker and was hospitalized on .  She was given IV fluids, potassium and magnesium replacement, and GI was consulted.  Flexible sigmoidoscopy as detailed and she was started yesterday on Questran.  Her diarrhea significantly improved since she started on Questran, and she is eager to go home today.  She has an appointment with Dr. Subramanian later this week and she is advised to keep that appointment.  The order for Questran was 1 pack 4 times a day, but she is advised to decrease that by half if she notes significant decrease in stool or constipation.  She had oral candidiasis and started on Nystatin to continue 6 more days.  She had diagnosis of UTI prior to admission and is kept on the same course of Cipro for a total of 1 week.  She expressed understanding of the discussion before discharge today.         MARIO ANDRADE MD             D: 2018   T: 2018   MT: NALINI      Name:     WARREN METCALF   MRN:      [id number]        Account:        [id number]   :      1940           Admit Date:     2018                                  Discharge Date:       Document: [id number]

## 2018-06-03 NOTE — PLAN OF CARE
Problem: Patient Care Overview  Goal: Plan of Care/Patient Progress Review  Outcome: No Change  Pt is A&Ox4. VSS on RA. HR tachy at times, WNL this shift. Up SBA with walker to BR. Small loose BM's x2 this shift, has PRN imodium and limotal available. Low fiber diet. No c/o nausea. L port infusing NS @75mL/hr. Pt started on questran yesterday. Will continue to monitor.

## 2018-06-03 NOTE — PROGRESS NOTES
"GASTROENTEROLOGY PROGRESS NOTE       SUBJECTIVE:  Feels much better.  Only a small stool since the flex.  Able to sleep.     OBJECTIVE:  /70 (BP Location: Right arm)  Pulse 92  Temp 98.5  F (36.9  C) (Oral)  Resp 18  Ht 1.765 m (5' 9.5\")  Wt 90.7 kg (200 lb)  SpO2 94%  BMI 29.11 kg/m2  Temp (24hrs), Av.5  F (36.9  C), Min:97.8  F (36.6  C), Max:99  F (37.2  C)    Patient Vitals for the past 72 hrs:   Weight   18 0933 90.7 kg (200 lb)       Intake/Output Summary (Last 24 hours) at 18 0815  Last data filed at 18 0617   Gross per 24 hour   Intake             1813 ml   Output             2380 ml   Net             -567 ml        PHYSICAL EXAM     Gastrointestinal: Active BS, soft, NT/ND        Recent Labs   Lab Test  18   0618   0702  18   1850   16   0756   10/31/16   0614   0810   WBC  7.5  7.7  7.4   < >  7.2   --   7.8   < >  5.7   HGB  9.5*  9.9*  10.3*   < >  8.5*   < >  7.9*   < >  10.6*   MCV  100  99  99   < >  90   --   92   < >  90   PLT  460*  542*  505*   < >  334   --   367   < >  255   INR   --    --    --    --   1.07   --   0.97   --   0.93    < > = values in this interval not displayed.     Recent Labs   Lab Test  18   0618   1045  18   0718   1850   POTASSIUM  3.7  3.7  3.8   < >  3.2*   CHLORIDE  116*   --   114*   --   113*   CO2  23   --   24   --   25   BUN  5*   --   9   --   11   ANIONGAP  7   --   8   --   9    < > = values in this interval not displayed.     Recent Labs   Lab Test  18   1850  10/31/16   0630  10/28/16   0800  13   1200  13   1515   ALBUMIN  3.2*  2.8*  3.0*   --    --    BILITOTAL  0.4  0.2  0.5   --    --    ALT  25  14  22   --    --    AST  16  9  15   --    --    PROTEIN   --    --    --   Negative  10*           Active Problems:    Diarrhea    Assessment: The cholestyramine is working well to control the diarrhea.  The dose may need to be scaled back " some if it causes constipation.  Awaiting colon biopsies.  She does not have to stay in the hospital to wait for the biopsy results.          Ignacio Boston MD  [facility]  Office:  [phone]

## 2018-06-04 LAB — COPATH REPORT: NORMAL

## 2018-06-05 LAB — COPATH REPORT: NORMAL

## 2018-06-13 ENCOUNTER — TRANSFERRED RECORDS (OUTPATIENT)
Dept: HEALTH INFORMATION MANAGEMENT | Facility: CLINIC | Age: 78
End: 2018-06-13

## 2018-07-11 ENCOUNTER — TRANSFERRED RECORDS (OUTPATIENT)
Dept: HEALTH INFORMATION MANAGEMENT | Facility: CLINIC | Age: 78
End: 2018-07-11

## 2018-08-15 ENCOUNTER — TRANSFERRED RECORDS (OUTPATIENT)
Dept: HEALTH INFORMATION MANAGEMENT | Facility: CLINIC | Age: 78
End: 2018-08-15

## 2018-08-21 LAB
ALBUMIN SERPL-MCNC: 4.2 G/DL (ref 3.5–4.8)
ALP SERPL-CCNC: 81 U/L (ref 39–117)
ALT SERPL-CCNC: 23 U/L (ref 0–32)
AST SERPL-CCNC: 14 U/L (ref 0–40)
BILIRUB SERPL-MCNC: 0.5 MG/DL (ref 0–1.2)
BUN SERPL-MCNC: 14 MG/DL (ref 8–27)
CALCIUM SERPL-MCNC: 9.7 MG/DL (ref 8.7–10.3)
CHLORIDE SERPLBLD-SCNC: 103 MMOL/L (ref 96–106)
CHOLEST SERPL-MCNC: 98 MG/DL (ref 100–199)
CO2 SERPL-SCNC: 27 MMOL/L (ref 20–29)
CREAT SERPL-MCNC: 0.83 MG/DL (ref 0.57–1)
GFR SERPL CREATININE-BSD FRML MDRD: 67 ML/MIN/1.73M2 (ref 60–?)
GLUCOSE SERPL-MCNC: 107 MG/DL (ref 65–99)
HBA1C MFR BLD: 5.2 % (ref 4.8–5.6)
HDLC SERPL-MCNC: 47 MG/DL (ref 39–?)
LDLC SERPL CALC-MCNC: 19 MG/DL (ref 0–99)
POTASSIUM SERPL-SCNC: 4 MMOL/L (ref 3.5–5.2)
PROT SERPL-MCNC: 6.5 G/DL (ref 6–8.5)
SODIUM SERPL-SCNC: 143 MMOL/L (ref 134–144)
TRIGL SERPL-MCNC: 159 MG/DL (ref 0–149)
VLDL CHOLESTEROL: 32 MG/DL (ref 5–40)

## 2018-08-30 ENCOUNTER — OFFICE VISIT (OUTPATIENT)
Dept: PEDIATRICS | Facility: CLINIC | Age: 78
End: 2018-08-30
Payer: COMMERCIAL

## 2018-08-30 VITALS
TEMPERATURE: 98.6 F | BODY MASS INDEX: 26.87 KG/M2 | WEIGHT: 187.7 LBS | OXYGEN SATURATION: 95 % | HEIGHT: 70 IN | HEART RATE: 105 BPM | DIASTOLIC BLOOD PRESSURE: 70 MMHG | SYSTOLIC BLOOD PRESSURE: 142 MMHG

## 2018-08-30 DIAGNOSIS — G89.29 CHRONIC RIGHT SHOULDER PAIN: Primary | ICD-10-CM

## 2018-08-30 DIAGNOSIS — M25.511 CHRONIC RIGHT SHOULDER PAIN: Primary | ICD-10-CM

## 2018-08-30 PROCEDURE — 99213 OFFICE O/P EST LOW 20 MIN: CPT | Performed by: PHYSICIAN ASSISTANT

## 2018-08-30 ASSESSMENT — PAIN SCALES - GENERAL: PAINLEVEL: EXTREME PAIN (8)

## 2018-08-30 NOTE — MR AVS SNAPSHOT
"              After Visit Summary   2018    Yolanda Watson    MRN: [id number]           Patient Information     Date Of Birth          1940        Visit Information        Provider Department      2018 8:40 AM Mahendra Bacon PA-C [facility]        Today's Diagnoses     Chronic right shoulder pain    -  1       Follow-ups after your visit        Who to contact     If you have questions or need follow up information about today's clinic visit or your schedule please contact [facility] directly at [phone].  Normal or non-critical lab and imaging results will be communicated to you by Exoprisehart, letter or phone within 4 business days after the clinic has received the results. If you do not hear from us within 7 days, please contact the clinic through Exoprisehart or phone. If you have a critical or abnormal lab result, we will notify you by phone as soon as possible.  Submit refill requests through Save22 or call your pharmacy and they will forward the refill request to us. Please allow 3 business days for your refill to be completed.          Additional Information About Your Visit        MyChart Information     Save22 lets you send messages to your doctor, view your test results, renew your prescriptions, schedule appointments and more. To sign up, go to [email or web address] . Click on \"Log in\" on the left side of the screen, which will take you to the Welcome page. Then click on \"Sign up Now\" on the right side of the page.     You will be asked to enter the access code listed below, as well as some personal information. Please follow the directions to create your username and password.     Your access code is: [email or web address]  Expires: 2018 11:10 AM     Your access code will  in 90 days. If you need help or a new code, please call your [facility] or [phone].        Care EveryWhere ID     This is your Care EveryWhere ID. This could be used by other " "organizations to access your [facility] medical records  [id number]        Your Vitals Were     Pulse Temperature Height Pulse Oximetry BMI (Body Mass Index)       105 98.6  F (37  C) (Oral) 5' 9.5\" (1.765 m) 95% 27.32 kg/m2        Blood Pressure from Last 3 Encounters:   08/30/18 142/70   06/03/18 173/76   04/03/18 132/89    Weight from Last 3 Encounters:   08/30/18 187 lb 11.2 oz (85.1 kg)   06/02/18 200 lb (90.7 kg)   03/28/18 204 lb (92.5 kg)              Today, you had the following     No orders found for display       Primary Care Provider Office Phone # Fax #    Soren Perdomo [phone]       [facility] [address]  [place] [address]        Equal Access to Services     [facility] : Hadii aad ku hadasho Sojaniceali, waaxda luqadaha, qaybta kaalmada adeegyada, waxay malachiin hayapn dorothy donaldson . So [facility] [phone].    ATENCIÓN: Si habla español, tiene a ferreira disposición servicios gratuitos de asistencia lingüística. Domonique al [phone].    We comply with applicable federal civil rights laws and [place] laws. We do not discriminate on the basis of race, color, national origin, age, disability, sex, sexual orientation, or gender identity.            Thank you!     Thank you for choosing [facility]  for your care. Our goal is always to provide you with excellent care. Hearing back from our patients is one way we can continue to improve our services. Please take a few minutes to complete the written survey that you may receive in the mail after your visit with us. Thank you!             Your Updated Medication List - Protect others around you: Learn how to safely use, store and throw away your medicines at www.disposemymeds.org.          This list is accurate as of 8/30/18 10:07 AM.  Always use your most recent med list.                   Brand Name Dispense Instructions for use Diagnosis    ANASTROZOLE PO      Take 1 mg by mouth At Bedtime     "    ASPIRIN PO      Take 81 mg by mouth At Bedtime        atorvastatin 20 MG tablet    LIPITOR     Take 20 mg by mouth At Bedtime        cholestyramine 4 g Packet    QUESTRAN    90 each    Take 1 packet (4 g) by mouth 2 times daily    Diarrhea, unspecified type       CIPROFLOXACIN PO      Take 500 mg by mouth 2 times daily        diphenoxylate-atropine 2.5-0.025 MG per tablet    LOMOTIL     Take 2 tablets by mouth 4 times daily        lisinopril 20 MG tablet    PRINIVIL/ZESTRIL     Take 40 mg by mouth At Bedtime        multivitamin, therapeutic Tabs tablet      Take 1 tablet by mouth At Bedtime No iron        nystatin 838136 UNIT/ML suspension    MYCOSTATIN    120 mL    Take 5 mLs (500,000 Units) by mouth 4 times daily    Candidiasis of mouth       ONDANSETRON PO      Take 8 mg by mouth every 6 hours as needed for nausea        PAXIL PO      Take 20 mg by mouth At Bedtime        PROCHLORPERAZINE MALEATE PO      Take 10 mg by mouth every 4 hours as needed for nausea or vomiting

## 2018-08-30 NOTE — PROGRESS NOTES
SUBJECTIVE:  Chief Complaint   Patient presents with     Musculoskeletal Problem     start 3 days sx right shoulder pain, no trauma, redness, swelling or discoloration, 8/10-pain hx of shoulder problems, hypertension, being treated for cancer  tx ice, heat, Ibuprofen and  2 pain medicatiions names unknown      Yolanda Watson is a 77 year old female who presents with a chief complaint of right shoulder pain acute on chronic with unknown preciptitator.  Symptoms began 3 day(s) ago, are moderate and severe and constant  Context:  Injury:No.  Pain exacerbated by movement Relieved by rest.  She treated it initially with ice, heat, Tylenol and unknown Rx pain killer. This is not the first time this type of injury has occurred to this patient.     Patient was scheduled for surgery 2 years ago, but due to other circumstances was forced to cancel surgery.  Today would like MRI and pain medications.  Primary care through [facility].    Denies chest pain, shortness of breath, numbness, weakness, tingling      Past Medical History:   Diagnosis Date     Acoustic neuroma (H)      Acoustic neuroma (H)      Acute arthropathy     lower leg     Acute depression      Anemia     iron deficeincy     Anxiety      Arthritis     osteoarthrosis of knee     Breast cancer (H)     left breast     Breast cancer, left breast (H) 02/2018     Breast cancer, right breast (H) 1/2016     Cervical cancer (H)      Colon cancer (H)      Colon cancer (H)      Depression      Diabetes mellitus (H)     pre-diabetic no longer taking Metformin     Dyspnea      Dyspnea      Gastro-oesophageal reflux disease      H/O: lung cancer 2013     Herpes      Herpes zoster      Herpes zoster      HTN (hypertension)      Hyperlipidaemia      Hyperlipidemia      Hyperlipidemia      Iron deficiency anemia      Lung cancer (H)      Lung nodule 2013    Lung cancer     Lung nodule      Nausea & vomiting      Osteoarthritis of knee      Pain in female pelvis       "Pain in female pelvis      Pre-diabetes      Preop general physical exam      Rib lesion      Rib lesion      Shoulder pain, acute     right     Tachycardia      Torn meniscus      Torn meniscus      Current Outpatient Prescriptions   Medication Sig Dispense Refill     ANASTROZOLE PO Take 1 mg by mouth At Bedtime        ASPIRIN PO Take 81 mg by mouth At Bedtime        atorvastatin (LIPITOR) 20 MG tablet Take 20 mg by mouth At Bedtime        cholestyramine (QUESTRAN) 4 g Packet Take 1 packet (4 g) by mouth 2 times daily 90 each 0     CIPROFLOXACIN PO Take 500 mg by mouth 2 times daily       lisinopril (PRINIVIL/ZESTRIL) 20 MG tablet Take 40 mg by mouth At Bedtime        multivitamin, therapeutic (THERA-VIT) TABS Take 1 tablet by mouth At Bedtime No iron       ONDANSETRON PO Take 8 mg by mouth every 6 hours as needed for nausea       PARoxetine HCl (PAXIL PO) Take 20 mg by mouth At Bedtime       PROCHLORPERAZINE MALEATE PO Take 10 mg by mouth every 4 hours as needed for nausea or vomiting       diphenoxylate-atropine (LOMOTIL) 2.5-0.025 MG per tablet Take 2 tablets by mouth 4 times daily       nystatin (MYCOSTATIN) 386602 UNIT/ML suspension Take 5 mLs (500,000 Units) by mouth 4 times daily (Patient not taking: Reported on 8/30/2018) 120 mL 0     Social History   Substance Use Topics     Smoking status: Former Smoker     Packs/day: 1.00     Years: 45.00     Types: Cigarettes     Quit date: 4/24/1998     Smokeless tobacco: Never Used      Comment: quit 1998     Alcohol use 0.0 oz/week     0 Standard drinks or equivalent per week      Comment: glass of wine a day       ROS:  Review of systems negative except as stated below    EXAM:   /70 (BP Location: Left arm, Patient Position: Chair, Cuff Size: Adult Large)  Pulse 105  Temp 98.6  F (37  C) (Oral)  Ht 5' 9.5\" (1.765 m)  Wt 187 lb 11.2 oz (85.1 kg)  SpO2 95%  BMI 27.32 kg/m2  M/S Exam:shoulder nontender to palpation and pain with all muscular activity of " shoulder  GENERAL APPEARANCE: healthy, alert and no distress  CHEST: clear to auscultation  CV: regular rate and rhythm  EXTREMITIES: peripheral pulses normal  MS: no gross deformities noted, no evidence of inflammation in joints, FROM in elbow, wrist, digits, spine.  No tenderness of spine.   SKIN: no suspicious lesions or rashes  NEURO: Normal strength and tone, sensory exam grossly normal, mentation intact and speech normal    X-RAY was not done.    ASSESSMENT:  (M25.511,  G89.29) Chronic right shoulder pain  (primary encounter diagnosis)  Comment: Acute on chronic shoulder pain.  Discussed approach to care (xray, conservative measures, follow up with primary care, advanced imagine if needed) with patient. She requested advanced imaging today and Pain Medication. Declines xray and sling.  Plan: Pt to follow up with primary care provider.  If pain is intolerable, ED or Orthopedic Urgent care options discussed and understood by patient.

## 2018-09-10 ENCOUNTER — TRANSFERRED RECORDS (OUTPATIENT)
Dept: HEALTH INFORMATION MANAGEMENT | Facility: CLINIC | Age: 78
End: 2018-09-10

## 2018-09-16 ENCOUNTER — TRANSFERRED RECORDS (OUTPATIENT)
Dept: HEALTH INFORMATION MANAGEMENT | Facility: CLINIC | Age: 78
End: 2018-09-16

## 2018-09-21 LAB
BUN SERPL-MCNC: 22 MG/DL (ref 8–27)
CHLORIDE SERPLBLD-SCNC: 104 MMOL/L (ref 96–106)
CO2 SERPL-SCNC: 22 MMOL/L (ref 20–29)
CREAT SERPL-MCNC: 0.69 MG/DL (ref 0.57–1)
EGFR IF AFRICN AM: 96 ML/MIN/1.73 (ref 60–?)
EGFR IF NONAFRICN AM: 83 ML/MIN/1.73 (ref 60–?)
POTASSIUM SERPL-SCNC: 4.1 MMOL/L (ref 3.5–5.2)
SODIUM SERPL-SCNC: 144 MMOL/L (ref 134–144)

## 2018-10-01 ENCOUNTER — OFFICE VISIT (OUTPATIENT)
Dept: CARDIOLOGY | Facility: CLINIC | Age: 78
End: 2018-10-01
Payer: COMMERCIAL

## 2018-10-01 VITALS
DIASTOLIC BLOOD PRESSURE: 88 MMHG | OXYGEN SATURATION: 95 % | WEIGHT: 180 LBS | HEART RATE: 104 BPM | BODY MASS INDEX: 25.77 KG/M2 | HEIGHT: 70 IN | SYSTOLIC BLOOD PRESSURE: 142 MMHG

## 2018-10-01 DIAGNOSIS — T45.1X5A CARDIOMYOPATHY DUE TO CHEMOTHERAPY (H): Primary | ICD-10-CM

## 2018-10-01 DIAGNOSIS — I10 BENIGN ESSENTIAL HYPERTENSION: ICD-10-CM

## 2018-10-01 DIAGNOSIS — E78.5 HYPERLIPIDEMIA LDL GOAL <100: ICD-10-CM

## 2018-10-01 DIAGNOSIS — I42.7 CARDIOMYOPATHY DUE TO CHEMOTHERAPY (H): Primary | ICD-10-CM

## 2018-10-01 PROCEDURE — 99214 OFFICE O/P EST MOD 30 MIN: CPT | Performed by: INTERNAL MEDICINE

## 2018-10-01 RX ORDER — CARVEDILOL 12.5 MG/1
12.5 TABLET ORAL 2 TIMES DAILY WITH MEALS
Qty: 90 TABLET | Refills: 3 | Status: SHIPPED | OUTPATIENT
Start: 2018-10-01 | End: 2018-12-18

## 2018-10-01 NOTE — PROGRESS NOTES
Service Date: 10/01/2018      REASON FOR CONSULTATION:  Chemotherapy-induced cardiomyopathy.      REFERRING PHYSICIAN:  Dr. Washington Subramanian from [facility]       HISTORY OF PRESENT ILLNESS:  I had the pleasure of seeing Yolanda Watson at the [facility] in [place] this morning.  As you know, she is a very pleasant 77-year-old female with history of multiple prior cancers but more recently with a diagnosis of left upper quadrant breast cancer.  She was seen earlier this year by my colleague, Dr. Latricia Miller, for initial consultation.  An echocardiogram obtained in 02/2018 showed preserved LV function with an EF of 60%-65%.  Since that echocardiogram, the patient has been commenced on Herceptin.      Recent echocardiogram obtained at an outside facility demonstrated reduced LV function with an EF of 30%-35%.  Compared to her previous echocardiograms, the ejection fraction drop is new since the initiation of the Herceptin.      Ms. Watson currently denies any significant cardiopulmonary symptoms.  Specifically, she denies heart failure symptoms including exertional dyspnea, orthopnea, PND or lower extremity edema.  On clinical examination, she is euvolemic.      IMPRESSION, REPORT AND PLAN:   1.  Cardiomyopathy due to chemotherapy.   2.  Breast cancer.   3.  Hypertension.   4.  Hyperlipidemia.      The patient's new LV systolic dysfunction is likely related to toxicity from the Herceptin.  She currently does not endorse any heart failure symptoms.  Therefore, she is asymptomatic from a cardiomyopathy point of view.  She is currently on lisinopril but is not any other cardiac medications.  I would recommend adding carvedilol to her regimen, given the LV systolic dysfunction.       It is my understanding that Herceptin-related cardiotoxicity is largely reversible in majority of the cases.  I would defer to you with regards to treatment continuation versus discontinuation and  resumption once her LV function recovers.      For now, I recommend repeat echocardiogram in the next 4-8 weeks to reassess her LV systolic function.  We will see her in approximately 3 months for followup but sooner if she develops heart failure symptoms.       I appreciate the opportunity to be part of her care.      cc:   Washington Subramanian MD    [facility]   [address], [address]   [place] [address]         KARUNA WOODARD MD             D: 10/01/2018   T: 10/01/2018   MT: ANISA      Name:     WARREN METCALF   MRN:      [id number]        Account:      [id number]   :      1940           Service Date: 10/01/2018      Document: [id number]

## 2018-10-01 NOTE — LETTER
10/1/2018    [facility] [address]  [place] [address]    RE: Yolanda Watson       Dear Colleague,    I had the pleasure of seeing Yolanda Watson in the [facility].    HPI and Plan:   See dictation    Orders Placed This Encounter   Procedures     Follow-Up with Cardiologist     Echocardiogram       Orders Placed This Encounter   Medications     carvedilol (COREG) 12.5 MG tablet     Sig: Take 1 tablet (12.5 mg) by mouth 2 times daily (with meals)     Dispense:  90 tablet     Refill:  3       There are no discontinued medications.      Encounter Diagnoses   Name Primary?     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     Benign essential hypertension      Hyperlipidemia LDL goal <100        CURRENT MEDICATIONS:  Current Outpatient Prescriptions   Medication Sig Dispense Refill     ANASTROZOLE PO Take 1 mg by mouth At Bedtime        ASPIRIN PO Take 81 mg by mouth At Bedtime        atorvastatin (LIPITOR) 20 MG tablet Take 20 mg by mouth At Bedtime        carvedilol (COREG) 12.5 MG tablet Take 1 tablet (12.5 mg) by mouth 2 times daily (with meals) 90 tablet 3     cholestyramine (QUESTRAN) 4 g Packet Take 1 packet (4 g) by mouth 2 times daily 90 each 0     lisinopril (PRINIVIL/ZESTRIL) 20 MG tablet Take 40 mg by mouth At Bedtime        multivitamin, therapeutic (THERA-VIT) TABS Take 1 tablet by mouth At Bedtime No iron       ONDANSETRON PO Take 8 mg by mouth every 6 hours as needed for nausea       PARoxetine HCl (PAXIL PO) Take 20 mg by mouth At Bedtime       PROCHLORPERAZINE MALEATE PO Take 10 mg by mouth every 4 hours as needed for nausea or vomiting       CIPROFLOXACIN PO Take 500 mg by mouth 2 times daily       diphenoxylate-atropine (LOMOTIL) 2.5-0.025 MG per tablet Take 2 tablets by mouth 4 times daily       nystatin (MYCOSTATIN) 532329 UNIT/ML suspension Take 5 mLs (500,000 Units) by mouth 4 times daily (Patient not taking: Reported on  8/30/2018) 120 mL 0       ALLERGIES     Allergies   Allergen Reactions     Demerol [Meperidine] Nausea and Vomiting     Sulfa Drugs Other (See Comments)     Extreme chills     Penicillins Rash     Swelling In mouth and tongue       PAST MEDICAL HISTORY:  Past Medical History:   Diagnosis Date     Acoustic neuroma (H)      Acoustic neuroma (H)      Acute arthropathy     lower leg     Acute depression      Anemia     iron deficeincy     Anxiety      Arthritis     osteoarthrosis of knee     Breast cancer (H)     left breast     Breast cancer, left breast (H) 02/2018     Breast cancer, right breast (H) 1/2016     Cervical cancer (H)      Colon cancer (H)      Colon cancer (H)      Depression      Diabetes mellitus (H)     pre-diabetic no longer taking Metformin     Dyspnea      Dyspnea      Gastro-oesophageal reflux disease      H/O: lung cancer 2013     Herpes      Herpes zoster      Herpes zoster      HTN (hypertension)      Hyperlipidaemia      Hyperlipidemia      Hyperlipidemia      Iron deficiency anemia      Lung cancer (H)      Lung nodule 2013    Lung cancer     Lung nodule      Nausea & vomiting      Osteoarthritis of knee      Pain in female pelvis      Pain in female pelvis      Pre-diabetes      Preop general physical exam      Rib lesion      Rib lesion      Shoulder pain, acute     right     Tachycardia      Torn meniscus      Torn meniscus        PAST SURGICAL HISTORY:  Past Surgical History:   Procedure Laterality Date     APPENDECTOMY  1966    done with CORNELIO     ARTHROSCOPY KNEE  2012    right knee scoped     BIOPSY NODE SENTINEL Right 2/3/2016    Procedure: BIOPSY NODE SENTINEL;  Surgeon: Flory Pinto MD;  Location: [facility]     BIOPSY NODE SENTINEL Left 3/5/2018    Procedure: BIOPSY NODE SENTINEL;;  Surgeon: Denae Perez MD;  Location: [facility]     BREAST BIOPSY, RT/LT       cataracts       cataracts       CERVICAL CANCER SCREENING RESULTS DOCUMENTED AND REVIEWED       CHOLECYSTECTOMY  1991      CL AFF SURGICAL PATHOLOGY       CL AFF SURGICAL PATHOLOGY      acoustic neuroma left side excised [facility] Dr. [place] 6/17/14     COLONOSCOPY       EXCISE NODE MEDIASTINAL  3/12/2013    Procedure: EXCISE NODE MEDIASTINAL;;  Surgeon: Lorne Arenas MD;  Location:  OR     EYE SURGERY      fiona cataracts     GENITOURINARY SURGERY      bladder sling     GYN SURGERY  1966    hysterectomy for cervical cancer *ovaries remain     HYSTERECTOMY  1966    Cervical CA, paps done every other year, done with appendectomy     INSERT PORT VASCULAR ACCESS Left 3/5/2018    Procedure: INSERT PORT VASCULAR ACCESS;;  Surgeon: Denae Perez MD;  Location: [facility]     INSERT PORT VASCULAR ACCESS N/A 3/5/2018    Procedure: INSERT PORT VASCULAR ACCESS;;  Surgeon: Denae Perez MD;  Location: [facility]     LAPAROSCOPIC ASSISTED COLECTOMY Right 10/27/2016    Procedure: LAPAROSCOPIC ASSISTED COLECTOMY;  Surgeon: Umang Arteaga MD;  Location:  OR     LOBECTOMY LUNG  3/12/2013    Procedure: LOBECTOMY LUNG;;  Surgeon: Lorne Arenas MD;  Location:  OR     LUMPECTOMY BREAST WITH SEED LOCALIZATION Right 2/3/2016    Procedure: LUMPECTOMY BREAST WITH SEED LOCALIZATION;  Surgeon: Flory Pinto MD;  Location: [facility]     LUMPECTOMY BREAST WITH SEED LOCALIZATION Left 3/5/2018    Procedure: LUMPECTOMY BREAST WITH SEED LOCALIZATION;  SEED LOCALIZED LEFT BREAST LUMPECTOMY, WITH LEFT SENTINEL NODE BIOPSY, PORT PLACEMENT;  Surgeon: Denae Perez MD;  Location: [facility]     MASTECTOMY SIMPLE Left 3/28/2018    Procedure: MASTECTOMY SIMPLE;  LEFT BREAST MASTECTOMY ;  Surgeon: Denae Perez MD;  Location: [facility]     MINI ARC SLING OPERATION FOR STRESS INCONTINENCE  2009     RESECT RIB  2/15/2013    Procedure: RESECT RIB;  RESECTION PORTION RIGHT TWELVETH RIB;  Surgeon: Lorne Arenas MD;  Location:  OR     THORACOTOMY  3/12/2013    Procedure: THORACOTOMY;  RIGHT THORACOTOMY, RIGHT MIDDLE AND LOWER LUNG LOBECTOMY,  MEDIASTINAL LYMPH NODE DISSECTION ;  Surgeon: Lorne Arenas MD;  Location: [facility]     TONSILLECTOMY         FAMILY HISTORY:  Family History   Problem Relation Age of Onset     Breast Cancer Paternal Grandmother      Colon Cancer Other      not specified     Fractures Other      Hip fracture, Aunt     Hyperlipidemia Mother      Hypertension Mother      HEART DISEASE Mother      Osteoporosis Mother      Hyperlipidemia Sister      Hyperlipidemia Brother      Prostate Cancer Brother      Hypertension Sister      HEART DISEASE Maternal Grandfather      Prostate Cancer Father      Colon Cancer Cousin      son of paternal aunt with breast cancer.     Breast Cancer Paternal Aunt      Colon Cancer Paternal Aunt      Other Cancer Paternal Uncle      Esophageal     Other Cancer Maternal Aunt        SOCIAL HISTORY:  Social History     Social History     Marital status:      Spouse name: N/A     Number of children: 3     Years of education: N/A     Occupational History      Retired     Social History Main Topics     Smoking status: Former Smoker     Packs/day: 1.00     Years: 45.00     Types: Cigarettes     Quit date: 4/24/1998     Smokeless tobacco: Never Used      Comment: quit 1998     Alcohol use 0.0 oz/week     0 Standard drinks or equivalent per week      Comment: glass of wine a day     Drug use: No     Sexual activity: Yes     Partners: Male     Birth control/ protection: Female Surgical     Other Topics Concern     None     Social History Narrative    12/2015    -    3 kids    Retired        Pap-11/8/2010 WNL    Mammo-12/2/13     Colonoscopy-3/3/2010 WNL, no further recommended per pt     Dexa-1/1/2008           Review of Systems:  Skin:  Negative       Eyes:    glasses;cataracts right eye cataract growing back,   ENT:  Positive for deafness;hearing loss deaf L ear, wears hearing aid R ear  Respiratory:  Positive for dyspnea on exertion;shortness of breath 2/3 of right lung removed   "  Cardiovascular:    Positive for;chest pain chest pressure with deep breaths occasionally   Gastroenterology: Negative      Genitourinary:  Positive for urinary frequency;incontinence    Musculoskeletal:  Positive for arthritis;joint pain both knees and right shoulder   Neurologic:  Negative tremors    Psychiatric:  Positive for excessive stress 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  Heme/Lymph/Imm:  Negative      Endocrine:  Negative        Physical Exam:  Vitals: /88 (BP Location: Right arm, Patient Position: Sitting, Cuff Size: Adult Regular)  Pulse 104  Ht 1.765 m (5' 9.5\")  Wt 81.6 kg (180 lb)  SpO2 95%  BMI 26.2 kg/m2    Constitutional:  cooperative;in no acute distress        Skin:  warm and dry to the touch          Head:  normocephalic, no masses or lesions        Eyes:  conjunctivae and lids unremarkable        Lymph:No Cervical lymphadenopathy present     ENT:  no pallor or cyanosis, dentition good        Neck:  carotid pulses are full and equal bilaterally;JVP normal        Respiratory:  clear to auscultation         Cardiac: regular rhythm;normal S1 and S2;no murmurs, gallops or rubs detected                pulses full and equal, no bruits auscultated                                        GI:  abdomen soft;no masses;non-tender        Extremities and Muscular Skeletal:  no deformities, clubbing, cyanosis, erythema observed;no edema              Neurological:  no gross motor deficits        Psych:  Alert and Oriented x 3        CC  No referring provider defined for this encounter.                Thank you for allowing me to participate in the care of your patient.      Sincerely,     Bartolome Caly MD, MD     [facility] [place] [facility]    cc:   No referring provider defined for this encounter.        "

## 2018-10-01 NOTE — PROGRESS NOTES
HPI and Plan:   See dictation    Orders Placed This Encounter   Procedures     Follow-Up with Cardiologist     Echocardiogram       Orders Placed This Encounter   Medications     carvedilol (COREG) 12.5 MG tablet     Sig: Take 1 tablet (12.5 mg) by mouth 2 times daily (with meals)     Dispense:  90 tablet     Refill:  3       There are no discontinued medications.      Encounter Diagnoses   Name Primary?     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     Benign essential hypertension      Hyperlipidemia LDL goal <100        CURRENT MEDICATIONS:  Current Outpatient Prescriptions   Medication Sig Dispense Refill     ANASTROZOLE PO Take 1 mg by mouth At Bedtime        ASPIRIN PO Take 81 mg by mouth At Bedtime        atorvastatin (LIPITOR) 20 MG tablet Take 20 mg by mouth At Bedtime        carvedilol (COREG) 12.5 MG tablet Take 1 tablet (12.5 mg) by mouth 2 times daily (with meals) 90 tablet 3     cholestyramine (QUESTRAN) 4 g Packet Take 1 packet (4 g) by mouth 2 times daily 90 each 0     lisinopril (PRINIVIL/ZESTRIL) 20 MG tablet Take 40 mg by mouth At Bedtime        multivitamin, therapeutic (THERA-VIT) TABS Take 1 tablet by mouth At Bedtime No iron       ONDANSETRON PO Take 8 mg by mouth every 6 hours as needed for nausea       PARoxetine HCl (PAXIL PO) Take 20 mg by mouth At Bedtime       PROCHLORPERAZINE MALEATE PO Take 10 mg by mouth every 4 hours as needed for nausea or vomiting       CIPROFLOXACIN PO Take 500 mg by mouth 2 times daily       diphenoxylate-atropine (LOMOTIL) 2.5-0.025 MG per tablet Take 2 tablets by mouth 4 times daily       nystatin (MYCOSTATIN) 528225 UNIT/ML suspension Take 5 mLs (500,000 Units) by mouth 4 times daily (Patient not taking: Reported on 8/30/2018) 120 mL 0       ALLERGIES     Allergies   Allergen Reactions     Demerol [Meperidine] Nausea and Vomiting     Sulfa Drugs Other (See Comments)     Extreme chills     Penicillins Rash     Swelling In mouth and tongue       PAST MEDICAL  HISTORY:  Past Medical History:   Diagnosis Date     Acoustic neuroma (H)      Acoustic neuroma (H)      Acute arthropathy     lower leg     Acute depression      Anemia     iron deficeincy     Anxiety      Arthritis     osteoarthrosis of knee     Breast cancer (H)     left breast     Breast cancer, left breast (H) 02/2018     Breast cancer, right breast (H) 1/2016     Cervical cancer (H)      Colon cancer (H)      Colon cancer (H)      Depression      Diabetes mellitus (H)     pre-diabetic no longer taking Metformin     Dyspnea      Dyspnea      Gastro-oesophageal reflux disease      H/O: lung cancer 2013     Herpes      Herpes zoster      Herpes zoster      HTN (hypertension)      Hyperlipidaemia      Hyperlipidemia      Hyperlipidemia      Iron deficiency anemia      Lung cancer (H)      Lung nodule 2013    Lung cancer     Lung nodule      Nausea & vomiting      Osteoarthritis of knee      Pain in female pelvis      Pain in female pelvis      Pre-diabetes      Preop general physical exam      Rib lesion      Rib lesion      Shoulder pain, acute     right     Tachycardia      Torn meniscus      Torn meniscus        PAST SURGICAL HISTORY:  Past Surgical History:   Procedure Laterality Date     APPENDECTOMY  1966    done with CORNELIO     ARTHROSCOPY KNEE  2012    right knee scoped     BIOPSY NODE SENTINEL Right 2/3/2016    Procedure: BIOPSY NODE SENTINEL;  Surgeon: Flory Pinto MD;  Location: [facility]     BIOPSY NODE SENTINEL Left 3/5/2018    Procedure: BIOPSY NODE SENTINEL;;  Surgeon: Denae Perez MD;  Location: [facility]     BREAST BIOPSY, RT/LT       cataracts       cataracts       CERVICAL CANCER SCREENING RESULTS DOCUMENTED AND REVIEWED       CHOLECYSTECTOMY  1991     CL AFF SURGICAL PATHOLOGY       CL AFF SURGICAL PATHOLOGY      acoustic neuroma left side excised [facility] Dr. Longoria 6/17/14     COLONOSCOPY       EXCISE NODE MEDIASTINAL  3/12/2013    Procedure: EXCISE NODE MEDIASTINAL;;  Surgeon: Dagoberto  Lorne Oliva MD;  Location:  OR     EYE SURGERY      fiona cataracts     GENITOURINARY SURGERY      bladder sling     GYN SURGERY  1966    hysterectomy for cervical cancer *ovaries remain     HYSTERECTOMY  1966    Cervical CA, paps done every other year, done with appendectomy     INSERT PORT VASCULAR ACCESS Left 3/5/2018    Procedure: INSERT PORT VASCULAR ACCESS;;  Surgeon: Denae Perez MD;  Location: [facility]     INSERT PORT VASCULAR ACCESS N/A 3/5/2018    Procedure: INSERT PORT VASCULAR ACCESS;;  Surgeon: Denae Perez MD;  Location: [facility]     LAPAROSCOPIC ASSISTED COLECTOMY Right 10/27/2016    Procedure: LAPAROSCOPIC ASSISTED COLECTOMY;  Surgeon: Umang Arteaga MD;  Location:  OR     LOBECTOMY LUNG  3/12/2013    Procedure: LOBECTOMY LUNG;;  Surgeon: Lorne Arenas MD;  Location:  OR     LUMPECTOMY BREAST WITH SEED LOCALIZATION Right 2/3/2016    Procedure: LUMPECTOMY BREAST WITH SEED LOCALIZATION;  Surgeon: Flory Pinto MD;  Location: [facility]     LUMPECTOMY BREAST WITH SEED LOCALIZATION Left 3/5/2018    Procedure: LUMPECTOMY BREAST WITH SEED LOCALIZATION;  SEED LOCALIZED LEFT BREAST LUMPECTOMY, WITH LEFT SENTINEL NODE BIOPSY, PORT PLACEMENT;  Surgeon: Denae Perez MD;  Location: [facility]     MASTECTOMY SIMPLE Left 3/28/2018    Procedure: MASTECTOMY SIMPLE;  LEFT BREAST MASTECTOMY ;  Surgeon: Denae Perez MD;  Location: [facility]     MINI ARC SLING OPERATION FOR STRESS INCONTINENCE  2009     RESECT RIB  2/15/2013    Procedure: RESECT RIB;  RESECTION PORTION RIGHT TWELVETH RIB;  Surgeon: Lorne Arenas MD;  Location:  OR     THORACOTOMY  3/12/2013    Procedure: THORACOTOMY;  RIGHT THORACOTOMY, RIGHT MIDDLE AND LOWER LUNG LOBECTOMY, MEDIASTINAL LYMPH NODE DISSECTION ;  Surgeon: Lorne Arenas MD;  Location:  OR     TONSILLECTOMY         FAMILY HISTORY:  Family History   Problem Relation Age of Onset     Breast Cancer Paternal Grandmother      Colon Cancer  Other      not specified     Fractures Other      Hip fracture, Aunt     Hyperlipidemia Mother      Hypertension Mother      HEART DISEASE Mother      Osteoporosis Mother      Hyperlipidemia Sister      Hyperlipidemia Brother      Prostate Cancer Brother      Hypertension Sister      HEART DISEASE Maternal Grandfather      Prostate Cancer Father      Colon Cancer Cousin      son of paternal aunt with breast cancer.     Breast Cancer Paternal Aunt      Colon Cancer Paternal Aunt      Other Cancer Paternal Uncle      Esophageal     Other Cancer Maternal Aunt        SOCIAL HISTORY:  Social History     Social History     Marital status:      Spouse name: N/A     Number of children: 3     Years of education: N/A     Occupational History      Retired     Social History Main Topics     Smoking status: Former Smoker     Packs/day: 1.00     Years: 45.00     Types: Cigarettes     Quit date: 4/24/1998     Smokeless tobacco: Never Used      Comment: quit 1998     Alcohol use 0.0 oz/week     0 Standard drinks or equivalent per week      Comment: glass of wine a day     Drug use: No     Sexual activity: Yes     Partners: Male     Birth control/ protection: Female Surgical     Other Topics Concern     None     Social History Narrative    12/2015    -    3 kids    Retired        Pap-11/8/2010 WNL    Mammo-12/2/13     Colonoscopy-3/3/2010 WNL, no further recommended per pt     Dexa-1/1/2008           Review of Systems:  Skin:  Negative       Eyes:    glasses;cataracts right eye cataract growing back,   ENT:  Positive for deafness;hearing loss deaf L ear, wears hearing aid R ear  Respiratory:  Positive for dyspnea on exertion;shortness of breath 2/3 of right lung removed    Cardiovascular:    Positive for;chest pain chest pressure with deep breaths occasionally   Gastroenterology: Negative      Genitourinary:  Positive for urinary frequency;incontinence    Musculoskeletal:  Positive for arthritis;joint pain both knees  "and right shoulder   Neurologic:  Negative tremors    Psychiatric:  Positive for excessive stress 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  Heme/Lymph/Imm:  Negative      Endocrine:  Negative        Physical Exam:  Vitals: /88 (BP Location: Right arm, Patient Position: Sitting, Cuff Size: Adult Regular)  Pulse 104  Ht 1.765 m (5' 9.5\")  Wt 81.6 kg (180 lb)  SpO2 95%  BMI 26.2 kg/m2    Constitutional:  cooperative;in no acute distress        Skin:  warm and dry to the touch          Head:  normocephalic, no masses or lesions        Eyes:  conjunctivae and lids unremarkable        Lymph:No Cervical lymphadenopathy present     ENT:  no pallor or cyanosis, dentition good        Neck:  carotid pulses are full and equal bilaterally;JVP normal        Respiratory:  clear to auscultation         Cardiac: regular rhythm;normal S1 and S2;no murmurs, gallops or rubs detected                pulses full and equal, no bruits auscultated                                        GI:  abdomen soft;no masses;non-tender        Extremities and Muscular Skeletal:  no deformities, clubbing, cyanosis, erythema observed;no edema              Neurological:  no gross motor deficits        Psych:  Alert and Oriented x 3        CC  No referring provider defined for this encounter.              "

## 2018-10-01 NOTE — LETTER
10/1/2018      [facility] [address]  [place] [address]      RE: Yolanda Watson       Dear Colleague,    I had the pleasure of seeing Yolanda Watson in the [facility].    Service Date: 10/01/2018      REASON FOR CONSULTATION:  Chemotherapy-induced cardiomyopathy.      REFERRING PHYSICIAN:  Dr. Washington Subramanian from [facility]       HISTORY OF PRESENT ILLNESS:  I had the pleasure of seeing Yolanda Watson at the [facility] in [place] this morning.  As you know, she is a very pleasant 77-year-old female with history of multiple prior cancers but more recently with a diagnosis of left upper quadrant breast cancer.  She was seen earlier this year by my colleague, Dr. Latricia Miller, for initial consultation.  An echocardiogram obtained in 02/2018 showed preserved LV function with an EF of 60%-65%.  Since that echocardiogram, the patient has been commenced on Herceptin.      Recent echocardiogram obtained at an outside facility demonstrated reduced LV function with an EF of 30%-35%.  Compared to her previous echocardiograms, the ejection fraction drop is new since the initiation of the Herceptin.      Ms. Watson currently denies any significant cardiopulmonary symptoms.  Specifically, she denies heart failure symptoms including exertional dyspnea, orthopnea, PND or lower extremity edema.  On clinical examination, she is euvolemic.      IMPRESSION, REPORT AND PLAN:   1.  Cardiomyopathy due to chemotherapy.   2.  Breast cancer.   3.  Hypertension.   4.  Hyperlipidemia.      The patient's new LV systolic dysfunction is likely related to toxicity from the Herceptin.  She currently does not endorse any heart failure symptoms.  Therefore, she is asymptomatic from a cardiomyopathy point of view.  She is currently on lisinopril but is not any other cardiac medications.  I would recommend adding carvedilol to her  regimen, given the LV systolic dysfunction.       It is my understanding that Herceptin-related cardiotoxicity is largely reversible in majority of the cases.  I would defer to you with regards to treatment continuation versus discontinuation and resumption once her LV function recovers.      For now, I recommend repeat echocardiogram in the next 4-8 weeks to reassess her LV systolic function.  We will see her in approximately 3 months for followup but sooner if she develops heart failure symptoms.       I appreciate the opportunity to be part of her care.      cc:   Washington Subramanian MD    [facility]   [address]   [place] [address]         KARUNA WOODARD MD             D: 10/01/2018   T: 10/01/2018   MT: ANISA      Name:     WARREN METCALF   MRN:      [id number]        Account:      [id number]   :      1940           Service Date: 10/01/2018      Document: [id number]         Outpatient Encounter Prescriptions as of 10/1/2018   Medication Sig Dispense Refill     ANASTROZOLE PO Take 1 mg by mouth At Bedtime        ASPIRIN PO Take 81 mg by mouth At Bedtime        atorvastatin (LIPITOR) 20 MG tablet Take 20 mg by mouth At Bedtime        carvedilol (COREG) 12.5 MG tablet Take 1 tablet (12.5 mg) by mouth 2 times daily (with meals) 90 tablet 3     cholestyramine (QUESTRAN) 4 g Packet Take 1 packet (4 g) by mouth 2 times daily 90 each 0     lisinopril (PRINIVIL/ZESTRIL) 20 MG tablet Take 40 mg by mouth At Bedtime        multivitamin, therapeutic (THERA-VIT) TABS Take 1 tablet by mouth At Bedtime No iron       ONDANSETRON PO Take 8 mg by mouth every 6 hours as needed for nausea       PARoxetine HCl (PAXIL PO) Take 20 mg by mouth At Bedtime       PROCHLORPERAZINE MALEATE PO Take 10 mg by mouth every 4 hours as needed for nausea or vomiting       CIPROFLOXACIN PO Take 500 mg by mouth 2 times daily       diphenoxylate-atropine (LOMOTIL) 2.5-0.025 MG per tablet Take 2 tablets by mouth 4  times daily       nystatin (MYCOSTATIN) 680431 UNIT/ML suspension Take 5 mLs (500,000 Units) by mouth 4 times daily (Patient not taking: Reported on 8/30/2018) 120 mL 0     No facility-administered encounter medications on file as of 10/1/2018.        Again, thank you for allowing me to participate in the care of your patient.      Sincerely,    Bartolome [place] MD, MD     [facility]

## 2018-10-01 NOTE — MR AVS SNAPSHOT
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  Please bring or wear a comfortable two-piece outfit. 2.  You may eat, drink and take your normal medicines. 3.  For any questions that cannot be answered, please contact the ordering physician 4.  Please do not wear perfumes or scented lotions on the day of your exam. ***Please check-in at the [facility] Office located in [address] in the [facility] building. When you are finished registering, please go to [address] and have a seat. The technician will call your name for the test.            Dec 18, 2018  1:45 PM CST   Return Visit with Dinorah Miller DO   [facility] ([facility])    [address]  [facility] [id number] [phone]              Future tests that were ordered for you today     Open Future Orders        Priority Expected Expires Ordered    Follow-Up with Cardiologist Routine 12/30/2018 10/1/2019 10/1/2018    Echocardiogram Routine 11/30/2018 10/1/2019 10/1/2018            Who to contact     If you have questions or need follow up information about today's clinic visit or your schedule please contact [facility]  "directly at [phone].  Normal or non-critical lab and imaging results will be communicated to you by THEVAhart, letter or phone within 4 business days after the clinic has received the results. If you do not hear from us within 7 days, please contact the clinic through THEVAhart or phone. If you have a critical or abnormal lab result, we will notify you by phone as soon as possible.  Submit refill requests through Makoondi or call your pharmacy and they will forward the refill request to us. Please allow 3 business days for your refill to be completed.          Additional Information About Your Visit        THEVAharHeatmaps Information     Makoondi lets you send messages to your doctor, view your test results, renew your prescriptions, schedule appointments and more. To sign up, go to [email or web address] . Click on \"Log in\" on the left side of the screen, which will take you to the Welcome page. Then click on \"Sign up Now\" on the right side of the page.     You will be asked to enter the access code listed below, as well as some personal information. Please follow the directions to create your username and password.     Your access code is: [email or web address]  Expires: 12/10/2018  3:32 PM     Your access code will  in 90 days. If you need help or a new code, please call your [place] [facility] clinic or [phone].        Care EveryWhere ID     This is your Care EveryWhere ID. This could be used by other organizations to access your [place] [facility] medical records  [id number]        Your Vitals Were     Pulse Height Pulse Oximetry BMI (Body Mass Index)          104 1.765 m (5' 9.5\") 95% 26.2 kg/m2         Blood Pressure from Last 3 Encounters:   10/01/18 142/88   18 142/70   18 173/76    Weight from Last 3 Encounters:   10/01/18 81.6 kg (180 lb)   18 85.1 kg (187 lb 11.2 oz)   18 90.7 kg (200 lb)                 Today's Medication Changes          These changes are accurate as of 10/1/18 11:04 AM.  If you have " any questions, ask your nurse or doctor.               Start taking these medicines.        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)   Started by:  Bartolome Clay MD        Dose:  12.5 mg   Take 1 tablet (12.5 mg) by mouth 2 times daily (with meals)   Quantity:  90 tablet   Refills:  3            Where to get your medicines      These medications were sent to [id number] - [place], [place] - [address]  AT [address] [facility] [address]  [address] [place] [address]     Phone:  [phone]     carvedilol 12.5 MG tablet                Primary Care Provider Office Phone # Fax #    Soren Perdomo [phone]       [facility] [address]  [place] [address]        Equal Access to Services     [facility]: Hadii celine christian hadasho Sorosa, waaxda luqadaha, qaybta kaalmada adeegyada, waxay malachiin hayvirgil donaldson . So [facility] [phone].    ATENCIÓN: Si habla español, tiene a ferreira disposición servicios gratuitos de asistencia lingüística. Domonique al [phone].    We comply with applicable federal civil rights laws and [place] laws. We do not discriminate on the basis of race, color, national origin, age, disability, sex, sexual orientation, or gender identity.            Thank you!     Thank you for choosing [facility]  for your care. Our goal is always to provide you with excellent care. Hearing back from our patients is one way we can continue to improve our services. Please take a few minutes to complete the written survey that you may receive in the mail after your visit with us. Thank you!             Your Updated Medication List - Protect others around you: Learn how to safely use, store and throw away your medicines at www.disposemymeds.org.          This list is accurate as of 10/1/18 11:04 AM.  Always  use your most recent med list.                   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)       cholestyramine 4 g Packet    QUESTRAN    90 each    Take 1 packet (4 g) by mouth 2 times daily    Diarrhea, unspecified type       CIPROFLOXACIN PO      Take 500 mg by mouth 2 times daily        diphenoxylate-atropine 2.5-0.025 MG per tablet    LOMOTIL     Take 2 tablets by mouth 4 times daily        lisinopril 20 MG tablet    PRINIVIL/ZESTRIL     Take 40 mg by mouth At Bedtime        multivitamin, therapeutic Tabs tablet      Take 1 tablet by mouth At Bedtime No iron        nystatin 988240 UNIT/ML suspension    MYCOSTATIN    120 mL    Take 5 mLs (500,000 Units) by mouth 4 times daily    Candidiasis of mouth       ONDANSETRON PO      Take 8 mg by mouth every 6 hours as needed for nausea        PAXIL PO      Take 20 mg by mouth At Bedtime        PROCHLORPERAZINE MALEATE PO      Take 10 mg by mouth every 4 hours as needed for nausea or vomiting

## 2018-10-03 ENCOUNTER — TRANSFERRED RECORDS (OUTPATIENT)
Dept: HEALTH INFORMATION MANAGEMENT | Facility: CLINIC | Age: 78
End: 2018-10-03

## 2018-11-09 ENCOUNTER — OFFICE VISIT (OUTPATIENT)
Dept: SURGERY | Facility: CLINIC | Age: 78
End: 2018-11-09
Payer: COMMERCIAL

## 2018-11-09 VITALS
WEIGHT: 185 LBS | BODY MASS INDEX: 26.93 KG/M2 | SYSTOLIC BLOOD PRESSURE: 126 MMHG | DIASTOLIC BLOOD PRESSURE: 70 MMHG | HEART RATE: 91 BPM

## 2018-11-09 DIAGNOSIS — Z85.3 HISTORY OF LEFT BREAST CANCER: Primary | ICD-10-CM

## 2018-11-09 PROCEDURE — 99213 OFFICE O/P EST LOW 20 MIN: CPT | Performed by: SURGERY

## 2018-11-09 NOTE — PROGRESS NOTES
[facility] Follow Up Note    CHIEF COMPLAINT:  Chief Complaint   Patient presents with     RECHECK     s/p Left simple mastectomy on 3/28/18; 6 month follow-up        HISTORY OF PRESENT ILLNESS:  Yolanda Watson is a 78 year old female who is seen in follow up for left breast cancer. She is now 8 months s/p left mastectomy for T2N1 left breast cancer. She is doing very well. She completed adjuvant chemotherapy and 6 months of herceptin. She is taking anastrozole. She follows with Dr. Subramanian with oncology. She has no concerns today.     REVIEW OF SYSTEMS:  Constitutional:  Negative for chills, fatigue, fever and weight change.  Eyes:  Negative for blurred vision, eye pain and photophobia.  ENT:  Negative for hearing problems, ENT pain, congestion, rhinorrhea, epistaxis, hoarseness and dental problems.  Cardiovascular:  Negative for chest pain, palpitations, tachycardia, orthopnea and edema.  Respiratory:  Negative for cough, dyspnea and hemoptysis.  Gastrointestinal:  Negative for abdominal pain, heartburn, constipation, diarrhea and stool changes.  Musculoskeletal:  Negative for arthralgias, back pain and myalgias.  Integumentary/Breast:  See HPI.    Past Medical History:   Diagnosis Date     Acoustic neuroma (H)      Acoustic neuroma (H)      Acute arthropathy     lower leg     Acute depression      Anemia     iron deficeincy     Anxiety      Arthritis     osteoarthrosis of knee     Breast cancer (H)     left breast     Breast cancer, left breast (H) 02/2018     Breast cancer, right breast (H) 1/2016     Cervical cancer (H)      Colon cancer (H)      Colon cancer (H)      Depression      Diabetes mellitus (H)     pre-diabetic no longer taking Metformin     Dyspnea      Dyspnea      Gastro-oesophageal reflux disease      H/O: lung cancer 2013     Herpes      Herpes zoster      Herpes zoster      HTN (hypertension)      Hyperlipidaemia      Hyperlipidemia      Hyperlipidemia      Iron deficiency anemia       Lung cancer (H)      Lung nodule 2013    Lung cancer     Lung nodule      Nausea & vomiting      Osteoarthritis of knee      Pain in female pelvis      Pain in female pelvis      Pre-diabetes      Preop general physical exam      Rib lesion      Rib lesion      Shoulder pain, acute     right     Tachycardia      Torn meniscus      Torn meniscus        Past Surgical History:   Procedure Laterality Date     APPENDECTOMY  1966    done with CORNELIO     ARTHROSCOPY KNEE  2012    right knee scoped     BIOPSY NODE SENTINEL Right 2/3/2016    Procedure: BIOPSY NODE SENTINEL;  Surgeon: Flory Pinto MD;  Location: [facility]     BIOPSY NODE SENTINEL Left 3/5/2018    Procedure: BIOPSY NODE SENTINEL;;  Surgeon: Denae Perez MD;  Location: [facility]     BREAST BIOPSY, RT/LT       cataracts       cataracts       CERVICAL CANCER SCREENING RESULTS DOCUMENTED AND REVIEWED       CHOLECYSTECTOMY  1991     CL AFF SURGICAL PATHOLOGY       CL AFF SURGICAL PATHOLOGY      acoustic neuroma left side excised [facility] Dr. [place] 6/17/14     COLONOSCOPY       EXCISE NODE MEDIASTINAL  3/12/2013    Procedure: EXCISE NODE MEDIASTINAL;;  Surgeon: Lorne Areans MD;  Location:  OR     EYE SURGERY      fiona cataracts     GENITOURINARY SURGERY      bladder sling     GYN SURGERY  1966    hysterectomy for cervical cancer *ovaries remain     HYSTERECTOMY  1966    Cervical CA, paps done every other year, done with appendectomy     INSERT PORT VASCULAR ACCESS Left 3/5/2018    Procedure: INSERT PORT VASCULAR ACCESS;;  Surgeon: Denae Perez MD;  Location: [facility]     INSERT PORT VASCULAR ACCESS N/A 3/5/2018    Procedure: INSERT PORT VASCULAR ACCESS;;  Surgeon: Denae Perez MD;  Location: [facility]     LAPAROSCOPIC ASSISTED COLECTOMY Right 10/27/2016    Procedure: LAPAROSCOPIC ASSISTED COLECTOMY;  Surgeon: Umang Arteaga MD;  Location:  OR     LOBECTOMY LUNG  3/12/2013    Procedure: LOBECTOMY LUNG;;  Surgeon: Lorne Arenas  MD;  Location: [facility]     LUMPECTOMY BREAST WITH SEED LOCALIZATION Right 2/3/2016    Procedure: LUMPECTOMY BREAST WITH SEED LOCALIZATION;  Surgeon: Flory Pinto MD;  [facility]     LUMPECTOMY BREAST WITH SEED LOCALIZATION Left 3/5/2018    Procedure: LUMPECTOMY BREAST WITH SEED LOCALIZATION;  SEED LOCALIZED LEFT BREAST LUMPECTOMY, WITH LEFT SENTINEL NODE BIOPSY, PORT PLACEMENT;  Surgeon: Denae Perez MD;  Location:  [facility]     MASTECTOMY SIMPLE Left 3/28/2018    Procedure: MASTECTOMY SIMPLE;  LEFT BREAST MASTECTOMY ;  Surgeon: Denae Perez MD;  Location:  [facility]     MINI ARC SLING OPERATION FOR STRESS INCONTINENCE  2009     RESECT RIB  2/15/2013    Procedure: RESECT RIB;  RESECTION PORTION RIGHT TWELVETH RIB;  Surgeon: Lorne Arenas MD;  Location:  OR     THORACOTOMY  3/12/2013    Procedure: THORACOTOMY;  RIGHT THORACOTOMY, RIGHT MIDDLE AND LOWER LUNG LOBECTOMY, MEDIASTINAL LYMPH NODE DISSECTION ;  Surgeon: Lorne Arenas MD;  Location:  OR     TONSILLECTOMY         Family History   Problem Relation Age of Onset     Breast Cancer Paternal Grandmother      Colon Cancer Other      not specified     Fractures Other      Hip fracture, Aunt     Hyperlipidemia Mother      Hypertension Mother      HEART DISEASE Mother      Osteoporosis Mother      Hyperlipidemia Sister      Hyperlipidemia Brother      Prostate Cancer Brother      Hypertension Sister      HEART DISEASE Maternal Grandfather      Prostate Cancer Father      Colon Cancer Cousin      son of paternal aunt with breast cancer.     Breast Cancer Paternal Aunt      Colon Cancer Paternal Aunt      Other Cancer Paternal Uncle      Esophageal     Other Cancer Maternal Aunt        Social History   Substance Use Topics     Smoking status: Former Smoker     Packs/day: 1.00     Years: 45.00     Types: Cigarettes     Quit date: 4/24/1998     Smokeless tobacco: Never Used      Comment: quit 1998     Alcohol use 0.0 oz/week     0  Standard drinks or equivalent per week      Comment: glass of wine a day       Patient Active Problem List   Diagnosis     Lung cancer (H)     ACP (advance care planning)     OAB (overactive bladder)     Colon cancer (H)     Rectal bleeding     Malignant neoplasm of left breast (H)     Weakness     Pre-diabetes     Hyperlipidemia     HTN (hypertension)     Esophageal reflux     Dyspnea     Breast cancer (H)     Allergies   Allergen Reactions     Demerol [Meperidine] Nausea and Vomiting     Sulfa Drugs Other (See Comments)     Extreme chills     Penicillins Rash     Swelling In mouth and tongue     Current Outpatient Prescriptions   Medication Sig Dispense Refill     ANASTROZOLE PO Take 1 mg by mouth At Bedtime        ASPIRIN PO Take 81 mg by mouth At Bedtime        atorvastatin (LIPITOR) 20 MG tablet Take 20 mg by mouth At Bedtime        carvedilol (COREG) 12.5 MG tablet Take 1 tablet (12.5 mg) by mouth 2 times daily (with meals) 90 tablet 3     cholestyramine (QUESTRAN) 4 g Packet Take 1 packet (4 g) by mouth 2 times daily 90 each 0     CIPROFLOXACIN PO Take 500 mg by mouth 2 times daily       diphenoxylate-atropine (LOMOTIL) 2.5-0.025 MG per tablet Take 2 tablets by mouth 4 times daily       lisinopril (PRINIVIL/ZESTRIL) 20 MG tablet Take 40 mg by mouth At Bedtime        multivitamin, therapeutic (THERA-VIT) TABS Take 1 tablet by mouth At Bedtime No iron       nystatin (MYCOSTATIN) 043242 UNIT/ML suspension Take 5 mLs (500,000 Units) by mouth 4 times daily 120 mL 0     ONDANSETRON PO Take 8 mg by mouth every 6 hours as needed for nausea       PARoxetine HCl (PAXIL PO) Take 20 mg by mouth At Bedtime       PROCHLORPERAZINE MALEATE PO Take 10 mg by mouth every 4 hours as needed for nausea or vomiting       Vitals: /70  Pulse 91  Wt 185 lb (83.9 kg)  BMI 26.93 kg/m2  BMI= Body mass index is 26.93 kg/(m^2).    EXAM:  GENERAL: healthy, alert and no distress   BREAST:  Left breast is surgically absent.  Mastectomy incision is well healed. There are no areas of concern. Right breast is normal.   There is no axillary or supraclavicular lymphadenopathy.  CARDIOVASCULAR:  RRR  RESPIRATORY: nonlabored breathing  NECK: Neck supple. No adenopathy. Thyroid symmetric, normal size,, Carotids without bruits.  SKIN: No suspicious lesions or rashes  LYMPH: Normal cervical lymph nodes      ASSESSMENT:  Yolanda Watson is 6 months s/p left mastectomy. No evidence of recurrence on exam. Doing well.     PLAN:  Yolanda with follow up with Dr. Subramanian going forward, she will call me if she has any questions or concerns.     Denae Perez MD  [facility]  [phone]        Please route or send letter to:  Primary Care Provider (PCP) and Referring Provider

## 2018-11-09 NOTE — LETTER
2018    Re: Yolanda Watson, : 1940    HISTORY OF PRESENT ILLNESS:  Yolanda Watson is a 78 year old female who is seen in follow up for left breast cancer. She is now 8 months s/p left mastectomy for T2N1 left breast cancer. She is doing very well. She completed adjuvant chemotherapy and 6 months of herceptin. She is taking anastrozole. She follows with Dr. Subramanian with oncology. She has no concerns today.      REVIEW OF SYSTEMS:  Constitutional:  Negative for chills, fatigue, fever and weight change.  Eyes:  Negative for blurred vision, eye pain and photophobia.  ENT:  Negative for hearing problems, ENT pain, congestion, rhinorrhea, epistaxis, hoarseness and dental problems.  Cardiovascular:  Negative for chest pain, palpitations, tachycardia, orthopnea and edema.  Respiratory:  Negative for cough, dyspnea and hemoptysis.  Gastrointestinal:  Negative for abdominal pain, heartburn, constipation, diarrhea and stool changes.  Musculoskeletal:  Negative for arthralgias, back pain and myalgias.  Integumentary/Breast:  See HPI.     EXAM:  GENERAL: healthy, alert and no distress   BREAST:  Left breast is surgically absent. Mastectomy incision is well healed. There are no areas of concern. Right breast is normal.   There is no axillary or supraclavicular lymphadenopathy.  CARDIOVASCULAR:  RRR  RESPIRATORY: nonlabored breathing  NECK: Neck supple. No adenopathy. Thyroid symmetric, normal size,, Carotids without bruits.  SKIN: No suspicious lesions or rashes  LYMPH: Normal cervical lymph nodes     ASSESSMENT:  Yolanda Watson is 6 months s/p left mastectomy. No evidence of recurrence on exam. Doing well.      PLAN:  Yolanda with follow up with Dr. Subramanian going forward, she will call me if she has any questions or concerns.      Denae Perez MD  [facility]  [phone]

## 2018-11-09 NOTE — MR AVS SNAPSHOT
After Visit Summary   11/9/2018    Yolanda Watson    MRN: [id number]           Patient Information     Date Of Birth          1940        Visit Information        Provider Department      11/9/2018 3:30 PM Denae Perez MD [facility]      Today's Diagnoses     History of left breast cancer    -  1       Follow-ups after your visit        Your next 10 appointments already scheduled     Nov 19, 2018 12:30 PM CST   Ech Complete with [id number] ([facility])    [address]  [facility] [place] [address] [phone]           1.  Please bring or wear a comfortable two-piece outfit. 2.  You may eat, drink and take your normal medicines. 3.  For any questions that cannot be answered, please contact the ordering physician 4.  Please do not wear perfumes or scented lotions on the day of your exam. ***Please check-in at the [facility] located in [address] in the [facility] building. When you are finished registering, please go to [address] and have a seat. The technician will call your name for the test.            Dec 18, 2018  1:45 PM CST   Return Visit with Dinorah Miller DO   [facility] ([facility])    [address]  [place] [address] [phone]              Who to contact     If you have questions or need follow up information about today's clinic visit or your schedule please contact [facility] directly at [phone].  Normal or non-critical lab and imaging results will be communicated to you by MyChart, letter or phone within 4 business days after the clinic has received the results. If you do not hear from us within 7 days, please contact the clinic through MyChart or phone. If you have a critical or abnormal lab result, we  "will notify you by phone as soon as possible.  Submit refill requests through [employer] or call your pharmacy and they will forward the refill request to us. Please allow 3 business days for your refill to be completed.          Additional Information About Your Visit        [facility] [employer] [facility] Information     [facility] lets you send messages to your doctor, view your test results, renew your prescriptions, schedule appointments and more. To sign up, go to [email or web address] . Click on \"Log in\" on the left side of the screen, which will take you to the Welcome page. Then click on \"Sign up Now\" on the right side of the page.     You will be asked to enter the access code listed below, as well as some personal information. Please follow the directions to create your username and password.     Your access code is: [email or web address]  Expires: 12/10/2018  2:32 PM     Your access code will  in 90 days. If you need help or a new code, please call your [place] clinic or [phone].        Care EveryWhere ID     This is your Care EveryWhere ID. This could be used by other organizations to access your [place] medical records  [id number]        Your Vitals Were     Pulse BMI (Body Mass Index)                91 26.93 kg/m2           Blood Pressure from Last 3 Encounters:   18 126/70   10/01/18 142/88   18 142/70    Weight from Last 3 Encounters:   18 185 lb (83.9 kg)   10/01/18 180 lb (81.6 kg)   18 187 lb 11.2 oz (85.1 kg)              Today, you had the following     No orders found for display       Primary Care Provider Office Phone # Fax #    Soren Perdomo [phone]       [facility] [address]  [facility] [address]        Equal Access to Services     AR LINDQUIST : Chanell Olsen, gaby bonilla, qaybta kaalagustina mcdowell. [facility] [phone].    ATENCIÓN: Si melvi harvey, tiene a ferreira disposición " servicios gratuitos de asistencia lingüística. Domonique bolton [phone].    We comply with applicable federal civil rights laws and [place] laws. We do not discriminate on the basis of race, color, national origin, age, disability, sex, sexual orientation, or gender identity.            Thank you!     Thank you for choosing [facility]  for your care. Our goal is always to provide you with excellent care. Hearing back from our patients is one way we can continue to improve our services. Please take a few minutes to complete the written survey that you may receive in the mail after your visit with us. Thank you!             Your Updated Medication List - Protect others around you: Learn how to safely use, store and throw away your medicines at www.disposemymeds.org.          This list is accurate as of 11/9/18  4:20 PM.  Always use your most recent med list.                   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)       cholestyramine 4 g Packet    QUESTRAN    90 each    Take 1 packet (4 g) by mouth 2 times daily    Diarrhea, unspecified type       CIPROFLOXACIN PO      Take 500 mg by mouth 2 times daily        diphenoxylate-atropine 2.5-0.025 MG per tablet    LOMOTIL     Take 2 tablets by mouth 4 times daily        lisinopril 20 MG tablet    PRINIVIL/ZESTRIL     Take 40 mg by mouth At Bedtime        multivitamin, therapeutic Tabs tablet      Take 1 tablet by mouth At Bedtime No iron        nystatin [id number] UNIT/ML suspension    MYCOSTATIN    120 mL    Take 5 mLs (500,000 Units) by mouth 4 times daily    Candidiasis of mouth       ONDANSETRON PO       Take 8 mg by mouth every 6 hours as needed for nausea        PAXIL PO      Take 20 mg by mouth At Bedtime        PROCHLORPERAZINE MALEATE PO      Take 10 mg by mouth every 4 hours as needed for nausea or vomiting

## 2018-12-03 ENCOUNTER — HOSPITAL ENCOUNTER (OUTPATIENT)
Dept: CARDIOLOGY | Facility: CLINIC | Age: 78
Discharge: HOME | End: 2018-12-03
Attending: INTERNAL MEDICINE | Admitting: INTERNAL MEDICINE
Payer: COMMERCIAL

## 2018-12-03 DIAGNOSIS — T45.1X5A CARDIOMYOPATHY DUE TO CHEMOTHERAPY (H): ICD-10-CM

## 2018-12-03 DIAGNOSIS — I42.7 CARDIOMYOPATHY DUE TO CHEMOTHERAPY (H): ICD-10-CM

## 2018-12-03 PROCEDURE — 93306 TTE W/DOPPLER COMPLETE: CPT | Mod: 26 | Performed by: INTERNAL MEDICINE

## 2018-12-03 PROCEDURE — 25500064 ZZH RX 255 OP 636: Performed by: INTERNAL MEDICINE

## 2018-12-03 PROCEDURE — 93306 TTE W/DOPPLER COMPLETE: CPT

## 2018-12-03 RX ADMIN — HUMAN ALBUMIN MICROSPHERES AND PERFLUTREN 3 ML: 10; .22 INJECTION, SOLUTION INTRAVENOUS at 13:15

## 2018-12-13 ENCOUNTER — TELEPHONE (OUTPATIENT)
Dept: CARDIOLOGY | Facility: CLINIC | Age: 78
End: 2018-12-13

## 2018-12-13 NOTE — TELEPHONE ENCOUNTER
Echocardiogram results noted from 12/3/18:    The visual ejection fraction is estimated at 30-35%.  There is moderate global hypokinesia of the left ventricle.  Grade I or early diastolic dysfunction.  There is mild (1+) tricuspid regurgitation.  Right ventricular systolic pressure is elevated, consistent with moderate  pulmonary hypertension.  LVEF appears signficantly diminished as compared with most recent study.  Contrast was used without apparent complications.    Last OV note from Dr. Clay on 10/1/18:    HISTORY OF PRESENT ILLNESS:  I had the pleasure of seeing Yolanda Watson at the [facility] in [place] this morning.  As you know, she is a very pleasant 77-year-old female with history of multiple prior cancers but more recently with a diagnosis of left upper quadrant breast cancer.  She was seen earlier this year by my colleague, Dr. Latricia Miller, for initial consultation.  An echocardiogram obtained in 02/2018 showed preserved LV function with an EF of 60%-65%.  Since that echocardiogram, the patient has been commenced on Herceptin.      Recent echocardiogram obtained at an outside facility demonstrated reduced LV function with an EF of 30%-35%.  Compared to her previous echocardiograms, the ejection fraction drop is new since the initiation of the Herceptin.      Ms. Watson currently denies any significant cardiopulmonary symptoms.  Specifically, she denies heart failure symptoms including exertional dyspnea, orthopnea, PND or lower extremity edema.  On clinical examination, she is euvolemic.      IMPRESSION, REPORT AND PLAN:   1.  Cardiomyopathy due to chemotherapy.   2.  Breast cancer.   3.  Hypertension.   4.  Hyperlipidemia.      The patient's new LV systolic dysfunction is likely related to toxicity from the Herceptin.  She currently does not endorse any heart failure symptoms.  Therefore, she is asymptomatic from a cardiomyopathy point of view.  She is currently on  lisinopril but is not any other cardiac medications.  I would recommend adding carvedilol to her regimen, given the LV systolic dysfunction.       It is my understanding that Herceptin-related cardiotoxicity is largely reversible in majority of the cases.  I would defer to you with regards to treatment continuation versus discontinuation and resumption once her LV function recovers.      For now, I recommend repeat echocardiogram in the next 4-8 weeks to reassess her LV systolic function.  We will see her in approximately 3 months for followup but sooner if she develops heart failure symptoms.       I appreciate the opportunity to be part of her care.     Will route to Dr. Clay for review.

## 2018-12-17 ENCOUNTER — DOCUMENTATION ONLY (OUTPATIENT)
Dept: CARDIOLOGY | Facility: CLINIC | Age: 78
End: 2018-12-17

## 2018-12-17 NOTE — PROGRESS NOTES
Records received from [facility]. Records sent to scan. Labs entered as external results.

## 2018-12-17 NOTE — TELEPHONE ENCOUNTER
Echo reviewed. EF is still depressed. Please contact her oncologist to see if the Herceptin can be discontinued

## 2018-12-18 ENCOUNTER — OFFICE VISIT (OUTPATIENT)
Dept: CARDIOLOGY | Facility: CLINIC | Age: 78
End: 2018-12-18
Attending: INTERNAL MEDICINE
Payer: COMMERCIAL

## 2018-12-18 VITALS
HEART RATE: 84 BPM | BODY MASS INDEX: 27.86 KG/M2 | HEIGHT: 69 IN | WEIGHT: 188.1 LBS | DIASTOLIC BLOOD PRESSURE: 74 MMHG | SYSTOLIC BLOOD PRESSURE: 138 MMHG

## 2018-12-18 DIAGNOSIS — I42.7 CARDIOMYOPATHY DUE TO CHEMOTHERAPY (H): ICD-10-CM

## 2018-12-18 DIAGNOSIS — I10 BENIGN ESSENTIAL HYPERTENSION: ICD-10-CM

## 2018-12-18 DIAGNOSIS — C50.011 MALIGNANT NEOPLASM OF NIPPLE OF RIGHT BREAST IN FEMALE, UNSPECIFIED ESTROGEN RECEPTOR STATUS (H): Primary | ICD-10-CM

## 2018-12-18 DIAGNOSIS — T45.1X5A CARDIOMYOPATHY DUE TO CHEMOTHERAPY (H): ICD-10-CM

## 2018-12-18 PROCEDURE — 99214 OFFICE O/P EST MOD 30 MIN: CPT | Performed by: INTERNAL MEDICINE

## 2018-12-18 RX ORDER — CARVEDILOL 25 MG/1
12.5 TABLET ORAL 2 TIMES DAILY WITH MEALS
Qty: 60 TABLET | Refills: 11 | Status: SHIPPED | OUTPATIENT
Start: 2018-12-18 | End: 2018-12-20

## 2018-12-18 RX ORDER — SPIRONOLACTONE 25 MG/1
25 TABLET ORAL DAILY
Qty: 30 TABLET | Refills: 11 | Status: SHIPPED | OUTPATIENT
Start: 2018-12-18 | End: 2019-11-18

## 2018-12-18 ASSESSMENT — MIFFLIN-ST. JEOR: SCORE: 1387.6

## 2018-12-18 NOTE — PROGRESS NOTES
Service Date: 12/18/2018      HISTORY OF PRESENT ILLNESS:  Ms. Watson is a very pleasant 78-year-old female with a history of left-sided breast cancer, ER positive, HER-2/rudolph amplified.  She has undergone lumpectomy followed by paclitaxel and Herceptin therapy for about 6 months.  She did have a decline in her LV function and was seen by my partner, Dr. Bartolome Clay in early October.  It looks like he placed her on carvedilol in addition to maximizing her lisinopril which she has been tolerating.  She did complete 6 months of Herceptin therapy.  According to her, there is no other proposed treatment in the near future.  She is scheduled to see Dr. Subramanian here in January and have repeat screening for progression of disease.        She did have a repeat echocardiogram to assess if her LV function improved.  Once again, she started with a baseline EF 60%-65%.  In October, she had a decline in her overall LV function.  The echocardiogram is not in our system as it was done at an outside facility.  It suggested she had moderate to severe LV dysfunction with an ejection fraction estimated at 30%-35%.        The echocardiogram repeated on 12/03 at our facility, unfortunately, shows persistent moderate to severe LV dysfunction with an EF estimated 30%-35%.  Ms. Watson complains of being tired, but she denies any difficulty breathing, palpitations or chest discomfort.  She is tolerating the addition of carvedilol without any difficulty.      PHYSICAL EXAMINATION:  Today her blood pressure is 138/74, pulse of 84, weight 188 and a body mass index of 27.  Physical exam findings are otherwise unchanged.      IMPRESSION:   In summary, Ms. Watson is a very pleasant 78-year-old female with a cardiomyopathy likely secondary to cardiotoxicity related to chemotherapy.  She has subsequently stopped chemotherapy, which was Herceptin and Perjeta and paclitaxel.  However, she did complete a 6-month course and she has persistent moderate to  severe LV dysfunction.        At this time, I would like to optimize her medical management by increasing her carvedilol to 25 mg twice daily and adding low dose spironolactone.  I explained the reasoning for the new medication in optimizing her medical therapy in the hopes that we can improve upon her LV function and avoid symptoms of congestive heart failure.        She will need a blood test within 7-10 days after starting spironolactone to look for any electrolyte or kidney dysfunction.  She will return to clinic in about 7-10 days with a blood test and followup to ensure that she is tolerating the increase in carvedilol and addition of spironolactone and also check her electrolytes.        Please feel free to contact me with any questions you have in regards to her care.      cc:      Soren Perdomo MD    [facility]   [address]   [place] [address]      [place] Subramanian MD    [facility]   [address]   [place] [address]         TAB BERG DO             D: 2018   T: 2018   MT: LOC      Name:     WARREN METCALF   MRN:      [id number]        Account:      [id number]   :      1940           Service Date: 2018      Document: [id number]

## 2018-12-18 NOTE — PROGRESS NOTES
HPI and Plan:   See dictation    Orders Placed This Encounter   Procedures     Basic metabolic panel     Follow-Up with Cardiac Advanced Practice Provider       Orders Placed This Encounter   Medications     carvedilol (COREG) 25 MG tablet     Sig: Take 0.5 tablets (12.5 mg) by mouth 2 times daily (with meals)     Dispense:  60 tablet     Refill:  11     spironolactone (ALDACTONE) 25 MG tablet     Sig: Take 1 tablet (25 mg) by mouth daily     Dispense:  30 tablet     Refill:  11       Medications Discontinued During This Encounter   Medication Reason     CIPROFLOXACIN PO Therapy completed     ONDANSETRON PO Not filled/taken by Patient     diphenoxylate-atropine (LOMOTIL) 2.5-0.025 MG per tablet Therapy completed     carvedilol (COREG) 12.5 MG tablet Reorder         Encounter Diagnoses   Name Primary?     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)      Malignant neoplasm of nipple of right breast in female, unspecified estrogen receptor status (H) Yes     Benign essential hypertension        CURRENT MEDICATIONS:  Current Outpatient Medications   Medication Sig Dispense Refill     ANASTROZOLE PO Take 1 mg by mouth At Bedtime        ASPIRIN PO Take 81 mg by mouth At Bedtime        carvedilol (COREG) 25 MG tablet Take 0.5 tablets (12.5 mg) by mouth 2 times daily (with meals) 60 tablet 11     lisinopril (PRINIVIL/ZESTRIL) 20 MG tablet Take 40 mg by mouth At Bedtime        multivitamin, therapeutic (THERA-VIT) TABS Take 1 tablet by mouth At Bedtime No iron       PARoxetine HCl (PAXIL PO) Take 20 mg by mouth At Bedtime       spironolactone (ALDACTONE) 25 MG tablet Take 1 tablet (25 mg) by mouth daily 30 tablet 11     atorvastatin (LIPITOR) 20 MG tablet Take 20 mg by mouth At Bedtime        cholestyramine (QUESTRAN) 4 g Packet Take 1 packet (4 g) by mouth 2 times daily (Patient not taking: Reported on 12/18/2018) 90 each 0     nystatin (MYCOSTATIN) 811069 UNIT/ML suspension Take 5 mLs (500,000 Units) by mouth 4 times daily  (Patient not taking: Reported on 12/18/2018) 120 mL 0     PROCHLORPERAZINE MALEATE PO Take 10 mg by mouth every 4 hours as needed for nausea or vomiting         ALLERGIES     Allergies   Allergen Reactions     Demerol [Meperidine] Nausea and Vomiting     Sulfa Drugs Other (See Comments)     Extreme chills     Penicillins Rash     Swelling In mouth and tongue       PAST MEDICAL HISTORY:  Past Medical History:   Diagnosis Date     Acoustic neuroma (H)      Acoustic neuroma (H)      Acute arthropathy     lower leg     Acute depression      Anemia     iron deficeincy     Anxiety      Arthritis     osteoarthrosis of knee     Breast cancer (H)     left breast     Breast cancer, left breast (H) 02/2018     Breast cancer, right breast (H) 1/2016     Cervical cancer (H)      Colon cancer (H)      Colon cancer (H)      Depression      Diabetes mellitus (H)     pre-diabetic no longer taking Metformin     Dyspnea      Dyspnea      Gastro-oesophageal reflux disease      H/O: lung cancer 2013     Herpes      Herpes zoster      Herpes zoster      HTN (hypertension)      Hyperlipidaemia      Hyperlipidemia      Hyperlipidemia      Iron deficiency anemia      Lung cancer (H)      Lung nodule 2013    Lung cancer     Lung nodule      Nausea & vomiting      Osteoarthritis of knee      Pain in female pelvis      Pain in female pelvis      Pre-diabetes      Preop general physical exam      Rib lesion      Rib lesion      Shoulder pain, acute     right     Tachycardia      Torn meniscus      Torn meniscus        PAST SURGICAL HISTORY:  Past Surgical History:   Procedure Laterality Date     APPENDECTOMY  1966    done with [facility]     ARTHROSCOPY KNEE  2012    right knee scoped     BIOPSY NODE SENTINEL Right 2/3/2016    Procedure: BIOPSY NODE SENTINEL;  Surgeon: Flory Pinto MD;  Location: [facility]     BIOPSY NODE SENTINEL Left 3/5/2018    Procedure: BIOPSY NODE SENTINEL;;  Surgeon: Denae Perez MD;  Location: [facility]     BREAST BIOPSY,  RT/LT       cataracts       cataracts       CERVICAL CANCER SCREENING RESULTS DOCUMENTED AND REVIEWED       CHOLECYSTECTOMY  1991     CL AFF SURGICAL PATHOLOGY       CL AFF SURGICAL PATHOLOGY      acoustic neuroma left side excised [facility] Dr. [place] 6/17/14     COLONOSCOPY       EXCISE NODE MEDIASTINAL  3/12/2013    Procedure: EXCISE NODE MEDIASTINAL;;  Surgeon: Lorne Arenas MD;  Location:  OR     EYE SURGERY      fiona cataracts     GENITOURINARY SURGERY      bladder sling     GYN SURGERY  1966    hysterectomy for cervical cancer *ovaries remain     HYSTERECTOMY  1966    Cervical CA, paps done every other year, done with appendectomy     INSERT PORT VASCULAR ACCESS Left 3/5/2018    Procedure: INSERT PORT VASCULAR ACCESS;;  Surgeon: Denae Perez MD;  Location: [facility]     INSERT PORT VASCULAR ACCESS N/A 3/5/2018    Procedure: INSERT PORT VASCULAR ACCESS;;  Surgeon: Denae Perez MD;  Location: [facility]     LAPAROSCOPIC ASSISTED COLECTOMY Right 10/27/2016    Procedure: LAPAROSCOPIC ASSISTED COLECTOMY;  Surgeon: Umang Arteaga MD;  Location:  OR     LOBECTOMY LUNG  3/12/2013    Procedure: LOBECTOMY LUNG;;  Surgeon: Lorne Arenas MD;  Location:  OR     LUMPECTOMY BREAST WITH SEED LOCALIZATION Right 2/3/2016    Procedure: LUMPECTOMY BREAST WITH SEED LOCALIZATION;  Surgeon: Flory Pinto MD;  Location: [facility]     LUMPECTOMY BREAST WITH SEED LOCALIZATION Left 3/5/2018    Procedure: LUMPECTOMY BREAST WITH SEED LOCALIZATION;  SEED LOCALIZED LEFT BREAST LUMPECTOMY, WITH LEFT SENTINEL NODE BIOPSY, PORT PLACEMENT;  Surgeon: Denae Perez MD;  Location: [facility]     MASTECTOMY SIMPLE Left 3/28/2018    Procedure: MASTECTOMY SIMPLE;  LEFT BREAST MASTECTOMY ;  Surgeon: Denae Perez MD;  Location: [facility]     MINI ARC SLING OPERATION FOR STRESS INCONTINENCE  2009     RESECT RIB  2/15/2013    Procedure: RESECT RIB;  RESECTION PORTION RIGHT TWELVETH RIB;  Surgeon: Lorne Arenas  MD;  Location: [facility] OR     THORACOTOMY  3/12/2013    Procedure: THORACOTOMY;  RIGHT THORACOTOMY, RIGHT MIDDLE AND LOWER LUNG LOBECTOMY, MEDIASTINAL LYMPH NODE DISSECTION ;  Surgeon: Lorne Arenas MD;  Location: [facility] OR     TONSILLECTOMY         FAMILY HISTORY:  Family History   Problem Relation Age of Onset     Breast Cancer Paternal Grandmother      Colon Cancer Other         not specified     Fractures Other         Hip fracture, Aunt     Hyperlipidemia Mother      Hypertension Mother      Heart Disease Mother      Osteoporosis Mother      Hyperlipidemia Sister      Hyperlipidemia Brother      Prostate Cancer Brother      Hypertension Sister      Heart Disease Maternal Grandfather      Prostate Cancer Father      Colon Cancer Cousin         son of paternal aunt with breast cancer.     Breast Cancer Paternal Aunt      Colon Cancer Paternal Aunt      Other Cancer Paternal Uncle         Esophageal     Other Cancer Maternal Aunt        SOCIAL HISTORY:  Social History     Socioeconomic History     Marital status:      Spouse name: None     Number of children: 3     Years of education: None     Highest education level: None   Social Needs     Financial resource strain: None     Food insecurity - worry: None     Food insecurity - inability: None     Transportation needs - medical: None     Transportation needs - non-medical: None   Occupational History     Employer: RETIRED   Tobacco Use     Smoking status: Former Smoker     Packs/day: 1.00     Years: 45.00     Pack years: 45.00     Types: Cigarettes     Last attempt to quit: 1998     Years since quittin.6     Smokeless tobacco: Never Used     Tobacco comment: quit    Substance and Sexual Activity     Alcohol use: Yes     Alcohol/week: 0.0 oz     Comment: glass of wine a day     Drug use: No     Sexual activity: Yes     Partners: Male     Birth control/protection: Female Surgical   Other Topics Concern     Parent/sibling w/ CABG, MI or  "angioplasty before 65F 55M? Not Asked   Social History Narrative    12/2015    -    3 kids    Retired        Pap-11/8/2010 WNL    Mammo-12/2/13     Colonoscopy-3/3/2010 WNL, no further recommended per pt     Dexa-1/1/2008       Review of Systems:  Skin:  Negative       Eyes:  Positive for glasses;cataracts    ENT:  Positive for deafness;hearing loss    Respiratory:  Positive for dyspnea on exertion;shortness of breath     Cardiovascular:  Negative      Gastroenterology: Negative      Genitourinary:         Musculoskeletal:  Positive for joint pain both knees and right shoulder   Neurologic:  Positive for headaches rare headache  Psychiatric:  Positive for sleep disturbances    Heme/Lymph/Imm:  Negative      Endocrine:  Negative        Physical Exam:  Vitals: /74 (BP Location: Right arm, Patient Position: Sitting, Cuff Size: Adult Regular)   Pulse 84   Ht 1.753 m (5' 9\")   Wt 85.3 kg (188 lb 1.6 oz)   Breastfeeding? No   BMI 27.78 kg/m      Constitutional:  cooperative;in no acute distress        Skin:  warm and dry to the touch          Head:  normocephalic, no masses or lesions        Eyes:  conjunctivae and lids unremarkable        Lymph:No Cervical lymphadenopathy present     ENT:  no pallor or cyanosis, dentition good        Neck:  carotid pulses are full and equal bilaterally;JVP normal        Respiratory:  clear to auscultation    diminished in R base    Cardiac: regular rhythm;normal S1 and S2;no murmurs, gallops or rubs detected                pulses full and equal, no bruits auscultated                                        GI:  abdomen soft;no masses;non-tender        Extremities and Muscular Skeletal:  no deformities, clubbing, cyanosis, erythema observed;no edema              Neurological:  no gross motor deficits        Psych:  Alert and Oriented x 3          CC  Bartolome Clay MD  [address]  [place] [id number]                  "

## 2018-12-18 NOTE — LETTER
12/18/2018      [facility]   [address]  [place] [address]      RE: Yolanda Watson       Dear Colleague,    I had the pleasure of seeing Yolanda Watson in the [facility].    Service Date: 12/18/2018      HISTORY OF PRESENT ILLNESS:  Ms. Watson is a very pleasant 78-year-old female with a history of left-sided breast cancer, ER positive, HER-2/rudolph amplified.  She has undergone lumpectomy followed by paclitaxel and Herceptin therapy for about 6 months.  She did have a decline in her LV function and was seen by my partner, Dr. Bartolome Clay in early October.  It looks like he placed her on carvedilol in addition to maximizing her lisinopril which she has been tolerating.  She did complete 6 months of Herceptin therapy.  According to her, there is no other proposed treatment in the near future.  She is scheduled to see Dr. Subramanian here in January and have repeat screening for progression of disease.        She did have a repeat echocardiogram to assess if her LV function improved.  Once again, she started with a baseline EF 60%-65%.  In October, she had a decline in her overall LV function.  The echocardiogram is not in our system as it was done at an outside facility.  It suggested she had moderate to severe LV dysfunction with an ejection fraction estimated at 30%-35%.        The echocardiogram repeated on 12/03 at our facility, unfortunately, shows persistent moderate to severe LV dysfunction with an EF estimated 30%-35%.  Ms. Watson complains of being tired, but she denies any difficulty breathing, palpitations or chest discomfort.  She is tolerating the addition of carvedilol without any difficulty.      PHYSICAL EXAMINATION:  Today her blood pressure is 138/74, pulse of 84, weight 188 and a body mass index of 27.  Physical exam findings are otherwise unchanged.      IMPRESSION:   In summary, Ms. 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  She has subsequently stopped chemotherapy, which was Herceptin and Perjeta and paclitaxel.  However, she did complete a 6-month course and she has persistent moderate to severe LV dysfunction.        At this time, I would like to optimize her medical management by increasing her carvedilol to 25 mg twice daily and adding low dose spironolactone.  I explained the reasoning for the new medication in optimizing her medical therapy in the hopes that we can improve upon her LV function and avoid symptoms of congestive heart failure.        She will need a blood test within 7-10 days after starting spironolactone to look for any electrolyte or kidney dysfunction.  She will return to clinic in about 7-10 days with a blood test and followup to ensure that she is tolerating the increase in carvedilol and addition of spironolactone and also check her electrolytes.        Please feel free to contact me with any questions you have in regards to her care.      cc:      Soren Perdomo MD    [facility]   [address]   [place] [address]      [place] Subramanian MD    [facility]   [address]   [place] [address]         TAB BERG DO             D: 2018   T: 2018   MT: LOC      Name:     WARREN METCALF   MRN:      [id number]        Account:      [id number]   :      1940           Service Date: 2018      Document: [id number]         Outpatient Encounter Medications as of 2018   Medication Sig Dispense Refill     ANASTROZOLE PO Take 1 mg by mouth At Bedtime        ASPIRIN PO Take 81 mg by mouth At Bedtime        lisinopril (PRINIVIL/ZESTRIL) 20 MG tablet Take 40 mg by mouth At Bedtime        multivitamin, therapeutic (THERA-VIT) TABS Take 1 tablet by mouth At Bedtime No iron       PARoxetine HCl (PAXIL PO) Take 20 mg by mouth At Bedtime        spironolactone (ALDACTONE) 25 MG tablet Take 1 tablet (25 mg) by mouth daily 30 tablet 11     [DISCONTINUED] carvedilol (COREG) 25 MG tablet Take 0.5 tablets (12.5 mg) by mouth 2 times daily (with meals) 60 tablet 11     atorvastatin (LIPITOR) 20 MG tablet Take 20 mg by mouth At Bedtime        cholestyramine (QUESTRAN) 4 g Packet Take 1 packet (4 g) by mouth 2 times daily (Patient not taking: Reported on 12/18/2018) 90 each 0     nystatin (MYCOSTATIN) 583316 UNIT/ML suspension Take 5 mLs (500,000 Units) by mouth 4 times daily (Patient not taking: Reported on 12/18/2018) 120 mL 0     PROCHLORPERAZINE MALEATE PO Take 10 mg by mouth every 4 hours as needed for nausea or vomiting       [DISCONTINUED] carvedilol (COREG) 12.5 MG tablet Take 1 tablet (12.5 mg) by mouth 2 times daily (with meals) 90 tablet 3     [DISCONTINUED] CIPROFLOXACIN PO Take 500 mg by mouth 2 times daily       [DISCONTINUED] diphenoxylate-atropine (LOMOTIL) 2.5-0.025 MG per tablet Take 2 tablets by mouth 4 times daily       [DISCONTINUED] ONDANSETRON PO Take 8 mg by mouth every 6 hours as needed for nausea       No facility-administered encounter medications on file as of 12/18/2018.        Again, thank you for allowing me to participate in the care of your patient.      Sincerely,    Dinorah Miller,      [facility]

## 2018-12-20 ENCOUNTER — TELEPHONE (OUTPATIENT)
Dept: CARDIOLOGY | Facility: CLINIC | Age: 78
End: 2018-12-20

## 2018-12-20 DIAGNOSIS — I42.7 CARDIOMYOPATHY DUE TO CHEMOTHERAPY (H): ICD-10-CM

## 2018-12-20 DIAGNOSIS — T45.1X5A CARDIOMYOPATHY DUE TO CHEMOTHERAPY (H): ICD-10-CM

## 2018-12-20 RX ORDER — CARVEDILOL 25 MG/1
TABLET ORAL
Qty: 180 TABLET | Refills: 3 | Status: SHIPPED | OUTPATIENT
Start: 2018-12-20 | End: 2019-03-11

## 2018-12-20 RX ORDER — CARVEDILOL 25 MG/1
TABLET ORAL
Qty: 180 TABLET | Refills: 3 | Status: SHIPPED | OUTPATIENT
Start: 2018-12-20 | End: 2018-12-20

## 2018-12-20 NOTE — TELEPHONE ENCOUNTER
"At last OV with Dr. Miller, Dr. Miller told patient \"At this time, I would like to optimize her medical management by increasing her carvedilol to 25 mg twice daily and adding low dose spironolactone\" and put it in her letter.  She was going to order 25 mg tablets.  However, the dosage was left at 12.5 mg two times daily with meals.  Pt called to be sure that she was taking the correct dosage.  Told her that her 25 mg tablets should say, \"Take one tablet (25 mg) twice daily (with meals).  Dr. Miller also put her on spironolactone with directions to Take one tablet (25 mg) by mouth daily .  Will change carvedilol so that dosage is 25 mg twice daily.    SEEMA Adan 12/20/2018    "

## 2018-12-20 NOTE — TELEPHONE ENCOUNTER
"Directions that were sent in on 12/18/18 were in error.  Patient was told by Dr. Miller to \"Take one tablet (25 mg) by mouth two times daily, with meals\", so the correct dosage is being sent to the pharmacy.    "

## 2018-12-26 ENCOUNTER — CARE COORDINATION (OUTPATIENT)
Dept: CARDIOLOGY | Facility: CLINIC | Age: 78
End: 2018-12-26

## 2018-12-26 DIAGNOSIS — T45.1X5A CARDIOMYOPATHY DUE TO CHEMOTHERAPY (H): ICD-10-CM

## 2018-12-26 DIAGNOSIS — C50.011 MALIGNANT NEOPLASM OF NIPPLE OF RIGHT BREAST IN FEMALE, UNSPECIFIED ESTROGEN RECEPTOR STATUS (H): ICD-10-CM

## 2018-12-26 DIAGNOSIS — I42.7 CARDIOMYOPATHY DUE TO CHEMOTHERAPY (H): ICD-10-CM

## 2018-12-26 DIAGNOSIS — I10 BENIGN ESSENTIAL HYPERTENSION: ICD-10-CM

## 2018-12-26 LAB
ANION GAP SERPL CALCULATED.3IONS-SCNC: 5 MMOL/L (ref 3–14)
BUN SERPL-MCNC: 26 MG/DL (ref 7–30)
CALCIUM SERPL-MCNC: 8.6 MG/DL (ref 8.5–10.1)
CHLORIDE SERPL-SCNC: 108 MMOL/L (ref 94–109)
CO2 SERPL-SCNC: 29 MMOL/L (ref 20–32)
CREAT SERPL-MCNC: 0.78 MG/DL (ref 0.52–1.04)
GFR SERPL CREATININE-BSD FRML MDRD: 72 ML/MIN/{1.73_M2}
GLUCOSE SERPL-MCNC: 120 MG/DL (ref 70–99)
POTASSIUM SERPL-SCNC: 4.2 MMOL/L (ref 3.4–5.3)
SODIUM SERPL-SCNC: 142 MMOL/L (ref 133–144)

## 2018-12-26 PROCEDURE — 80048 BASIC METABOLIC PNL TOTAL CA: CPT | Performed by: INTERNAL MEDICINE

## 2018-12-26 PROCEDURE — 36415 COLL VENOUS BLD VENIPUNCTURE: CPT | Performed by: INTERNAL MEDICINE

## 2018-12-31 ENCOUNTER — TELEPHONE (OUTPATIENT)
Dept: CARDIOLOGY | Facility: CLINIC | Age: 78
End: 2018-12-31

## 2018-12-31 NOTE — TELEPHONE ENCOUNTER
Spoke with pt about lab results. Informed pt that the results were within normal range and indicated normal findings. Explained to pt that this will be addressed by Dr. Miller next week to see if she would like any further follow up in the future. Pt gave verbal understanding.

## 2019-01-09 ENCOUNTER — TRANSFERRED RECORDS (OUTPATIENT)
Dept: HEALTH INFORMATION MANAGEMENT | Facility: CLINIC | Age: 79
End: 2019-01-09

## 2019-01-11 ENCOUNTER — TELEPHONE (OUTPATIENT)
Dept: SURGERY | Facility: CLINIC | Age: 79
End: 2019-01-11

## 2019-01-11 NOTE — TELEPHONE ENCOUNTER
Type of surgery: port removal  Location of surgery: [facility]  Date and time of surgery: 02/13/19 10:15am  Surgeon: Dr Perez  Pre-Op Appt Date: pt to schedule  Post-Op Appt Date: pt to schedule   Packet sent out: Yes  Pre-cert/Authorization completed:  Not Applicable  Date: 01/11/19    MAC anesthesia

## 2019-01-24 ENCOUNTER — TELEPHONE (OUTPATIENT)
Dept: CARDIOLOGY | Facility: CLINIC | Age: 79
End: 2019-01-24

## 2019-01-24 DIAGNOSIS — T45.1X5A CARDIOMYOPATHY DUE TO CHEMOTHERAPY (H): ICD-10-CM

## 2019-01-24 DIAGNOSIS — I10 HTN (HYPERTENSION): Primary | ICD-10-CM

## 2019-01-24 DIAGNOSIS — I42.7 CARDIOMYOPATHY DUE TO CHEMOTHERAPY (H): ICD-10-CM

## 2019-01-24 NOTE — TELEPHONE ENCOUNTER
Received call from pt wanting to see what Dr. Miller thinks about her carvedilol medication. When pt saw Dr. Miller on 12/18/18, she increased her carvedilol from 12.5mg to 25mg BID. Pt said that she has noticed more and more that when she take her morning dose, she starts to feel dizzy and lightheaded. Pt says it is fine when she goes to bed. Pt took her blood pressure at 6:45pm last night and it was 124/65 HR 73. Pt reports that she does not take it regularly and she has not taken it when she feels dizzy. explained to pt that this RN will review this with Dr. Miller and see what her recommendations are. Pt gave verbal understanding.

## 2019-01-24 NOTE — TELEPHONE ENCOUNTER
Spoke with Dr. Miller about pt's carvedilol medication and that she has been feeling lightheaded and dizzy after taking her morning dose of carvedilol. Dr. Miller then recommended that pt cut her morning dose in half to 12.5mg and still take the full 25mg before going to bed. Informed pt that she should still try to take her BP daily and if she does have dizziness to also try to take it then. Pt gave verbal understanding and will call us if she has any further issues.

## 2019-02-05 DIAGNOSIS — I42.7 CARDIOMYOPATHY DUE TO CHEMOTHERAPY (H): ICD-10-CM

## 2019-02-05 DIAGNOSIS — T45.1X5A CARDIOMYOPATHY DUE TO CHEMOTHERAPY (H): ICD-10-CM

## 2019-02-05 DIAGNOSIS — I10 BENIGN ESSENTIAL HYPERTENSION: ICD-10-CM

## 2019-02-05 DIAGNOSIS — I42.9 SECONDARY CARDIOMYOPATHY (H): Primary | ICD-10-CM

## 2019-02-05 LAB
ANION GAP SERPL CALCULATED.3IONS-SCNC: NORMAL MMOL/L
BUN SERPL-MCNC: 24 MG/DL
CALCIUM SERPL-MCNC: 9.3 MG/DL
CHLORIDE SERPLBLD-SCNC: 107 MMOL/L
CO2 SERPL-SCNC: 26 MMOL/L
CREAT SERPL-MCNC: 0.88 MG/DL
GFR SERPL CREATININE-BSD FRML MDRD: 62 ML/MIN/1.73M2
GLUCOSE SERPL-MCNC: 95 MG/DL (ref 65–99)
HBA1C MFR BLD: 5.2 % (ref 4.8–5.6)
HEMOGLOBIN: 10.9 G/DL (ref 11.7–15.7)
POTASSIUM SERPL-SCNC: 5.1 MMOL/L
SODIUM SERPL-SCNC: 147 MMOL/L

## 2019-02-12 ENCOUNTER — ANESTHESIA EVENT (OUTPATIENT)
Dept: SURGERY | Facility: CLINIC | Age: 79
End: 2019-02-12
Payer: COMMERCIAL

## 2019-02-12 ASSESSMENT — LIFESTYLE VARIABLES: TOBACCO_USE: 1

## 2019-02-12 NOTE — ANESTHESIA PREPROCEDURE EVALUATION
Anesthesia Pre-Procedure Evaluation    Patient: Yolanda Watson   MRN: [id number] : 1940          Preoperative Diagnosis: LEFT BREAST CANCER    Procedure(s):  REMOVE PORT VASCULAR ACCESS    Past Medical History:   Diagnosis Date     Acoustic neuroma (H)      Acoustic neuroma (H)      Acute arthropathy     lower leg     Acute depression      Anemia     iron deficeincy     Anxiety      Arthritis     osteoarthrosis of knee     Breast cancer (H)     left breast     Breast cancer, left breast (H) 2018     Breast cancer, right breast (H) 2016     Cervical cancer (H)      Colon cancer (H)      Colon cancer (H)      Depression      Diabetes mellitus (H)     pre-diabetic no longer taking Metformin     Dyspnea      Dyspnea      Gastro-oesophageal reflux disease      H/O: lung cancer      Herpes      Herpes zoster      Herpes zoster      HTN (hypertension)      Hyperlipidaemia      Hyperlipidemia      Hyperlipidemia      Iron deficiency anemia      Lung cancer (H)      Lung nodule 2013    Lung cancer     Lung nodule      Nausea & vomiting      Osteoarthritis of knee      Pain in female pelvis      Pain in female pelvis      Pre-diabetes      Preop general physical exam      Rib lesion      Rib lesion      Shoulder pain, acute     right     Tachycardia      Torn meniscus      Torn meniscus      Past Surgical History:   Procedure Laterality Date     acoustic neuroma excised       APPENDECTOMY  1966    done with CORNELIO     ARTHROSCOPY KNEE      right knee scoped     BIOPSY NODE SENTINEL Right 2/3/2016    Procedure: BIOPSY NODE SENTINEL;  Surgeon: Flory Pinto MD;  Location: [facility]     BIOPSY NODE SENTINEL Left 3/5/2018    Procedure: BIOPSY NODE SENTINEL;;  Surgeon: Denae Perez MD;  Location: [facility]     BREAST BIOPSY, RT/LT       cataracts       cataracts       CERVICAL CANCER SCREENING RESULTS DOCUMENTED AND REVIEWED       CHOLECYSTECTOMY       CL AFF SURGICAL PATHOLOGY       CL AFF SURGICAL  PATHOLOGY      acoustic neuroma left side excised [facility] [place] 6/17/14     COLONOSCOPY       EXCISE NODE MEDIASTINAL  3/12/2013    Procedure: EXCISE NODE MEDIASTINAL;;  Surgeon: Lorne Arenas MD;  Location:  OR     EYE SURGERY      fiona cataracts     GENITOURINARY SURGERY      bladder sling     GYN SURGERY  1966    hysterectomy for cervical cancer *ovaries remain     HYSTERECTOMY  1966    Cervical CA, paps done every other year, done with appendectomy     INSERT PORT VASCULAR ACCESS Left 3/5/2018    Procedure: INSERT PORT VASCULAR ACCESS;;  Surgeon: Denae Perez MD;  Location: [facility]     INSERT PORT VASCULAR ACCESS N/A 3/5/2018    Procedure: INSERT PORT VASCULAR ACCESS;;  Surgeon: Denae Perez MD;  Location: [facility]     LAPAROSCOPIC ASSISTED COLECTOMY Right 10/27/2016    Procedure: LAPAROSCOPIC ASSISTED COLECTOMY;  Surgeon: Umang Arteaga MD;  Location:  OR     LOBECTOMY LUNG  3/12/2013    Procedure: LOBECTOMY LUNG;;  Surgeon: Lorne Arenas MD;  Location:  OR     LUMPECTOMY BREAST WITH SEED LOCALIZATION Right 2/3/2016    Procedure: LUMPECTOMY BREAST WITH SEED LOCALIZATION;  Surgeon: Flory Pinto MD;  Location: [facility]     LUMPECTOMY BREAST WITH SEED LOCALIZATION Left 3/5/2018    Procedure: LUMPECTOMY BREAST WITH SEED LOCALIZATION;  SEED LOCALIZED LEFT BREAST LUMPECTOMY, WITH LEFT SENTINEL NODE BIOPSY, PORT PLACEMENT;  Surgeon: Denae Perez MD;  Location: [facility]     MASTECTOMY SIMPLE Left 3/28/2018    Procedure: MASTECTOMY SIMPLE;  LEFT BREAST MASTECTOMY ;  Surgeon: Denae Perez MD;  Location: [facility]     MINI ARC SLING OPERATION FOR STRESS INCONTINENCE  2009     RESECT RIB  2/15/2013    Procedure: RESECT RIB;  RESECTION PORTION RIGHT TWELVETH RIB;  Surgeon: Lorne Arenas MD;  Location:  OR     THORACOTOMY  3/12/2013    Procedure: THORACOTOMY;  RIGHT THORACOTOMY, RIGHT MIDDLE AND LOWER LUNG LOBECTOMY, MEDIASTINAL LYMPH NODE DISSECTION ;  Surgeon:  Lorne Arenas MD;  Location:  OR     TONSILLECTOMY         Anesthesia Evaluation     . Pt has had prior anesthetic.     No history of anesthetic complications          ROS/MED HX    ENT/Pulmonary:     (+)tobacco use, , . .   (-) sleep apnea   Neurologic:       Cardiovascular:     (+) Dyslipidemia, hypertension-range: well controlled typically, ---. : . CHF Last EF: 30-35 date: 2018 . . :. . pulmonary hypertension (moderate), Previous cardiac testing Echodate:12.2018results:The visual ejection fraction is estimated at 30-35%. There is moderate global hypokinesia of the left ventricle. Grade I or early diastolic dysfunction. There is mild (1+) tricuspid regurgitation. Right ventricular systolic pressure is elevated, consistent with moderate pulmonary hypertension. LVEF appears signficantly diminished as compared with most recent study. Contrast was used without apparent complications.date: results: date: results: date: results:          METS/Exercise Tolerance:     Hematologic:         Musculoskeletal:         GI/Hepatic:        (-) GERD   Renal/Genitourinary:         Endo:         Psychiatric:         Infectious Disease:         Malignancy:   (+) Malignancy (Lung CA 2013, colon CA 2016, breast CA (left) 2018)           Other:                          Physical Exam  Normal systems: cardiovascular and pulmonary    Airway   Mallampati: II  TM distance: >3 FB  Neck ROM: full    Dental   (+) upper dentures and lower dentures    Cardiovascular       Pulmonary             Lab Results   Component Value Date    WBC 7.5 06/03/2018    HGB 9.5 (L) 06/03/2018    HCT 29.6 (L) 06/03/2018     (H) 06/03/2018     12/26/2018    POTASSIUM 4.2 12/26/2018    CHLORIDE 108 12/26/2018    CO2 29 12/26/2018    BUN 26 12/26/2018    CR 0.78 12/26/2018     (H) 12/26/2018    PRIYA 8.6 12/26/2018    PHOS 2.9 10/28/2016    MAG 2.1 06/02/2018    ALBUMIN 4.2 08/21/2018    PROTTOTAL 6.5 08/21/2018    ALT 23 08/21/2018     "AST 14 08/21/2018    ALKPHOS 81 08/21/2018    BILITOTAL 0.5 08/21/2018    PTT 29 11/01/2016    INR 1.07 11/01/2016       Preop Vitals  BP Readings from Last 3 Encounters:   12/18/18 138/74   11/09/18 126/70   10/01/18 142/88    Pulse Readings from Last 3 Encounters:   12/18/18 84   11/09/18 91   10/01/18 104      Resp Readings from Last 3 Encounters:   06/03/18 16   03/30/18 18   03/05/18 16    SpO2 Readings from Last 3 Encounters:   10/01/18 95%   08/30/18 95%   06/03/18 96%      Temp Readings from Last 1 Encounters:   08/30/18 37  C (98.6  F) (Oral)    Ht Readings from Last 1 Encounters:   12/18/18 1.753 m (5' 9\")      Wt Readings from Last 1 Encounters:   12/18/18 85.3 kg (188 lb 1.6 oz)    Estimated body mass index is 27.78 kg/m  as calculated from the following:    Height as of 12/18/18: 1.753 m (5' 9\").    Weight as of 12/18/18: 85.3 kg (188 lb 1.6 oz).       Anesthesia Plan      History & Physical Review  History and physical reviewed and following examination; no interval change.    ASA Status:  3 .    NPO Status:  > 8 hours    Plan for MAC Reason for MAC:  Procedure to face, neck, head or breast  PONV prophylaxis:  Ondansetron (or other 5HT-3)  Pt wanted to leave her dentures in.  Given it is a sedation procedure without deep sedation, I told her that was allowable.  She says they are easily removable if we need to.        Postoperative Care  Postoperative pain management:  IV analgesics.      Consents  Anesthetic plan, risks, benefits and alternatives discussed with:  Patient..                 Chase Patton MD  "

## 2019-02-13 ENCOUNTER — HOSPITAL ENCOUNTER (OUTPATIENT)
Facility: CLINIC | Age: 79
Discharge: HOME | End: 2019-02-13
Attending: SURGERY | Admitting: SURGERY
Payer: COMMERCIAL

## 2019-02-13 ENCOUNTER — OFFICE VISIT (OUTPATIENT)
Dept: SURGERY | Facility: PHYSICIAN GROUP | Age: 79
End: 2019-02-13
Payer: COMMERCIAL

## 2019-02-13 ENCOUNTER — ANESTHESIA (OUTPATIENT)
Dept: SURGERY | Facility: CLINIC | Age: 79
End: 2019-02-13
Payer: COMMERCIAL

## 2019-02-13 VITALS
HEIGHT: 69 IN | WEIGHT: 191.3 LBS | DIASTOLIC BLOOD PRESSURE: 52 MMHG | HEART RATE: 66 BPM | TEMPERATURE: 97.9 F | SYSTOLIC BLOOD PRESSURE: 113 MMHG | OXYGEN SATURATION: 96 % | RESPIRATION RATE: 16 BRPM | BODY MASS INDEX: 28.33 KG/M2

## 2019-02-13 PROCEDURE — 27210794 ZZH OR GENERAL SUPPLY STERILE: Performed by: SURGERY

## 2019-02-13 PROCEDURE — 36000052 ZZH SURGERY LEVEL 2 EA 15 ADDTL MIN: Performed by: SURGERY

## 2019-02-13 PROCEDURE — 25000125 ZZHC RX 250: Performed by: NURSE ANESTHETIST, CERTIFIED REGISTERED

## 2019-02-13 PROCEDURE — 40000170 ZZH STATISTIC PRE-PROCEDURE ASSESSMENT II: Performed by: SURGERY

## 2019-02-13 PROCEDURE — 37000009 ZZH ANESTHESIA TECHNICAL FEE, EACH ADDTL 15 MIN: Performed by: SURGERY

## 2019-02-13 PROCEDURE — 25800030 ZZH RX IP 258 OP 636: Performed by: NURSE ANESTHETIST, CERTIFIED REGISTERED

## 2019-02-13 PROCEDURE — 71000027 ZZH RECOVERY PHASE 2 EACH 15 MINS: Performed by: SURGERY

## 2019-02-13 PROCEDURE — 71000012 ZZH RECOVERY PHASE 1 LEVEL 1 FIRST HR: Performed by: SURGERY

## 2019-02-13 PROCEDURE — 37000008 ZZH ANESTHESIA TECHNICAL FEE, 1ST 30 MIN: Performed by: SURGERY

## 2019-02-13 PROCEDURE — 36590 REMOVAL TUNNELED CV CATH: CPT | Performed by: SURGERY

## 2019-02-13 PROCEDURE — 36000050 ZZH SURGERY LEVEL 2 1ST 30 MIN: Performed by: SURGERY

## 2019-02-13 PROCEDURE — 25000128 H RX IP 250 OP 636: Performed by: SURGERY

## 2019-02-13 PROCEDURE — 25000128 H RX IP 250 OP 636: Performed by: NURSE ANESTHETIST, CERTIFIED REGISTERED

## 2019-02-13 PROCEDURE — 25000125 ZZHC RX 250: Performed by: SURGERY

## 2019-02-13 RX ORDER — CLINDAMYCIN PHOSPHATE 900 MG/50ML
900 INJECTION, SOLUTION INTRAVENOUS SEE ADMIN INSTRUCTIONS
Status: DISCONTINUED | OUTPATIENT
Start: 2019-02-13 | End: 2019-02-13 | Stop reason: HOSPADM

## 2019-02-13 RX ORDER — ONDANSETRON 2 MG/ML
4 INJECTION INTRAMUSCULAR; INTRAVENOUS EVERY 30 MIN PRN
Status: DISCONTINUED | OUTPATIENT
Start: 2019-02-13 | End: 2019-02-13 | Stop reason: HOSPADM

## 2019-02-13 RX ORDER — ONDANSETRON 2 MG/ML
INJECTION INTRAMUSCULAR; INTRAVENOUS PRN
Status: DISCONTINUED | OUTPATIENT
Start: 2019-02-13 | End: 2019-02-13

## 2019-02-13 RX ORDER — MEPERIDINE HYDROCHLORIDE 25 MG/ML
12.5 INJECTION INTRAMUSCULAR; INTRAVENOUS; SUBCUTANEOUS
Status: DISCONTINUED | OUTPATIENT
Start: 2019-02-13 | End: 2019-02-13 | Stop reason: HOSPADM

## 2019-02-13 RX ORDER — HYDROMORPHONE HYDROCHLORIDE 1 MG/ML
.3-.5 INJECTION, SOLUTION INTRAMUSCULAR; INTRAVENOUS; SUBCUTANEOUS EVERY 10 MIN PRN
Status: DISCONTINUED | OUTPATIENT
Start: 2019-02-13 | End: 2019-02-13 | Stop reason: HOSPADM

## 2019-02-13 RX ORDER — LIDOCAINE HYDROCHLORIDE 20 MG/ML
INJECTION, SOLUTION INFILTRATION; PERINEURAL PRN
Status: DISCONTINUED | OUTPATIENT
Start: 2019-02-13 | End: 2019-02-13

## 2019-02-13 RX ORDER — NALOXONE HYDROCHLORIDE 0.4 MG/ML
.1-.4 INJECTION, SOLUTION INTRAMUSCULAR; INTRAVENOUS; SUBCUTANEOUS
Status: DISCONTINUED | OUTPATIENT
Start: 2019-02-13 | End: 2019-02-13 | Stop reason: HOSPADM

## 2019-02-13 RX ORDER — PROPOFOL 10 MG/ML
INJECTION, EMULSION INTRAVENOUS CONTINUOUS PRN
Status: DISCONTINUED | OUTPATIENT
Start: 2019-02-13 | End: 2019-02-13

## 2019-02-13 RX ORDER — DIAZEPAM 10 MG/2ML
2.5 INJECTION, SOLUTION INTRAMUSCULAR; INTRAVENOUS
Status: DISCONTINUED | OUTPATIENT
Start: 2019-02-13 | End: 2019-02-13

## 2019-02-13 RX ORDER — CLINDAMYCIN PHOSPHATE 900 MG/50ML
900 INJECTION, SOLUTION INTRAVENOUS
Status: COMPLETED | OUTPATIENT
Start: 2019-02-13 | End: 2019-02-13

## 2019-02-13 RX ORDER — MAGNESIUM HYDROXIDE 1200 MG/15ML
LIQUID ORAL PRN
Status: DISCONTINUED | OUTPATIENT
Start: 2019-02-13 | End: 2019-02-13 | Stop reason: HOSPADM

## 2019-02-13 RX ORDER — ONDANSETRON 4 MG/1
4 TABLET, ORALLY DISINTEGRATING ORAL EVERY 30 MIN PRN
Status: DISCONTINUED | OUTPATIENT
Start: 2019-02-13 | End: 2019-02-13 | Stop reason: HOSPADM

## 2019-02-13 RX ORDER — LOPERAMIDE HCL 2 MG
2 CAPSULE ORAL 4 TIMES DAILY PRN
COMMUNITY

## 2019-02-13 RX ORDER — ACETAMINOPHEN 325 MG/1
650 TABLET ORAL
Status: DISCONTINUED | OUTPATIENT
Start: 2019-02-13 | End: 2019-02-13 | Stop reason: HOSPADM

## 2019-02-13 RX ORDER — FENTANYL CITRATE 50 UG/ML
25-50 INJECTION, SOLUTION INTRAMUSCULAR; INTRAVENOUS
Status: DISCONTINUED | OUTPATIENT
Start: 2019-02-13 | End: 2019-02-13 | Stop reason: HOSPADM

## 2019-02-13 RX ORDER — BUPIVACAINE HYDROCHLORIDE 5 MG/ML
INJECTION, SOLUTION EPIDURAL; INTRACAUDAL
Status: DISCONTINUED
Start: 2019-02-13 | End: 2019-02-13 | Stop reason: HOSPADM

## 2019-02-13 RX ORDER — FENTANYL CITRATE 50 UG/ML
25-100 INJECTION, SOLUTION INTRAMUSCULAR; INTRAVENOUS
Status: DISCONTINUED | OUTPATIENT
Start: 2019-02-13 | End: 2019-02-13 | Stop reason: HOSPADM

## 2019-02-13 RX ORDER — HYDROMORPHONE HYDROCHLORIDE 1 MG/ML
0.3 INJECTION, SOLUTION INTRAMUSCULAR; INTRAVENOUS; SUBCUTANEOUS EVERY 10 MIN PRN
Status: DISCONTINUED | OUTPATIENT
Start: 2019-02-13 | End: 2019-02-13 | Stop reason: HOSPADM

## 2019-02-13 RX ORDER — SODIUM CHLORIDE, SODIUM LACTATE, POTASSIUM CHLORIDE, CALCIUM CHLORIDE 600; 310; 30; 20 MG/100ML; MG/100ML; MG/100ML; MG/100ML
INJECTION, SOLUTION INTRAVENOUS CONTINUOUS
Status: DISCONTINUED | OUTPATIENT
Start: 2019-02-13 | End: 2019-02-13 | Stop reason: HOSPADM

## 2019-02-13 RX ORDER — LIDOCAINE HYDROCHLORIDE 10 MG/ML
INJECTION, SOLUTION INFILTRATION; PERINEURAL
Status: DISCONTINUED
Start: 2019-02-13 | End: 2019-02-13 | Stop reason: HOSPADM

## 2019-02-13 RX ORDER — ACETAMINOPHEN 650 MG/1
650 SUPPOSITORY RECTAL EVERY 4 HOURS PRN
Status: DISCONTINUED | OUTPATIENT
Start: 2019-02-13 | End: 2019-02-13 | Stop reason: HOSPADM

## 2019-02-13 RX ORDER — DIAZEPAM 10 MG/2ML
2.5 INJECTION, SOLUTION INTRAMUSCULAR; INTRAVENOUS
Status: DISCONTINUED | OUTPATIENT
Start: 2019-02-13 | End: 2019-02-13 | Stop reason: HOSPADM

## 2019-02-13 RX ORDER — SODIUM CHLORIDE, SODIUM LACTATE, POTASSIUM CHLORIDE, CALCIUM CHLORIDE 600; 310; 30; 20 MG/100ML; MG/100ML; MG/100ML; MG/100ML
INJECTION, SOLUTION INTRAVENOUS CONTINUOUS PRN
Status: DISCONTINUED | OUTPATIENT
Start: 2019-02-13 | End: 2019-02-13

## 2019-02-13 RX ADMIN — SODIUM CHLORIDE, POTASSIUM CHLORIDE, SODIUM LACTATE AND CALCIUM CHLORIDE: 600; 310; 30; 20 INJECTION, SOLUTION INTRAVENOUS at 08:56

## 2019-02-13 RX ADMIN — DEXMEDETOMIDINE HYDROCHLORIDE 8 MCG: 100 INJECTION, SOLUTION INTRAVENOUS at 08:55

## 2019-02-13 RX ADMIN — PHENYLEPHRINE HYDROCHLORIDE 100 MCG: 10 INJECTION, SOLUTION INTRAMUSCULAR; INTRAVENOUS; SUBCUTANEOUS at 09:09

## 2019-02-13 RX ADMIN — PHENYLEPHRINE HYDROCHLORIDE 50 MCG: 10 INJECTION, SOLUTION INTRAMUSCULAR; INTRAVENOUS; SUBCUTANEOUS at 09:06

## 2019-02-13 RX ADMIN — PROPOFOL 150 MCG/KG/MIN: 10 INJECTION, EMULSION INTRAVENOUS at 08:56

## 2019-02-13 RX ADMIN — PHENYLEPHRINE HYDROCHLORIDE 100 MCG: 10 INJECTION, SOLUTION INTRAMUSCULAR; INTRAVENOUS; SUBCUTANEOUS at 09:17

## 2019-02-13 RX ADMIN — ONDANSETRON 4 MG: 2 INJECTION INTRAMUSCULAR; INTRAVENOUS at 09:01

## 2019-02-13 RX ADMIN — CLINDAMYCIN PHOSPHATE 900 MG: 18 INJECTION, SOLUTION INTRAVENOUS at 08:58

## 2019-02-13 RX ADMIN — LIDOCAINE HYDROCHLORIDE 60 MG: 20 INJECTION, SOLUTION INFILTRATION; PERINEURAL at 08:56

## 2019-02-13 ASSESSMENT — MIFFLIN-ST. JEOR: SCORE: 1402.11

## 2019-02-13 NOTE — BRIEF OP NOTE
[facility]    Brief Operative Note    Pre-operative diagnosis: LEFT BREAST CANCER  Post-operative diagnosis Left breast cancer  Procedure: Procedure(s):  REMOVE PORT VASCULAR ACCESS  Surgeon: Surgeon(s) and Role:     * Denae Perez MD - Primary     * Haydee Monaco PA-C - Assisting  Anesthesia: Monitor Anesthesia Care   Estimated blood loss: 2 mL  Drains: None  Specimens: * No specimens in log *  Findings:   as expected.  Complications: None.  Implants: None.

## 2019-02-13 NOTE — ANESTHESIA CARE TRANSFER NOTE
Patient: Yolanda Watson    Procedure(s):  REMOVE PORT VASCULAR ACCESS    Diagnosis: LEFT BREAST CANCER  Diagnosis Additional Information: No value filed.    Anesthesia Type:   MAC     Note:  Airway :Room Air  Patient transferred to:PACU  Comments: At end of procedure, spontaneous respirations, patient alert to voice, able to follow commands. Patient breathing room air to PACU. SpO2, NiBP, and EKG monitors and alarms on and functioning, Thais Hugger warmer connected to patient gown, report on patient's clinical status given to PACU RN, RN questions answered.Handoff Report: Identifed the Patient, Identified the Reponsible Provider, Reviewed the pertinent medical history, Discussed the surgical course, Reviewed Intra-OP anesthesia mangement and issues during anesthesia, Set expectations for post-procedure period and Allowed opportunity for questions and acknowledgement of understanding      Vitals: (Last set prior to Anesthesia Care Transfer)    CRNA VITALS  2/13/2019 0855 - 2/13/2019 0930      2/13/2019             Pulse:  65    SpO2:  99 %    Resp Rate (set):  10                Electronically Signed By: GEOVANNY Brasher CRNA  February 13, 2019  9:30 AM

## 2019-02-13 NOTE — ANESTHESIA POSTPROCEDURE EVALUATION
Patient: Yolanda Watson    Procedure(s):  REMOVE PORT VASCULAR ACCESS    Diagnosis:LEFT BREAST CANCER  Diagnosis Additional Information: No value filed.    Anesthesia Type:  MAC    Note:  Anesthesia Post Evaluation    Patient location during evaluation: PACU  Patient participation: Able to fully participate in evaluation  Level of consciousness: awake  Pain management: adequate  Airway patency: patent  Cardiovascular status: acceptable  Respiratory status: acceptable  Hydration status: acceptable  PONV: controlled     Anesthetic complications: None          Last vitals:  Vitals:    02/13/19 0932 02/13/19 1000 02/13/19 1115   BP: (!) 86/42 103/44 113/52   Pulse: 72 66    Resp: 12 12 16   Temp:      SpO2: 94% 96% 96%         Electronically Signed By: Chase Patton MD  February 13, 2019  1:25 PM

## 2019-02-13 NOTE — DISCHARGE INSTRUCTIONS
Same Day Surgery Discharge Instructions for  Sedation and General Anesthesia       It's not unusual to feel dizzy, light-headed or faint for up to 24 hours after surgery or while taking pain medication.  If you have these symptoms: sit for a few minutes before standing and have someone assist you when you get up to walk or use the bathroom.      You should rest and relax for the next 24 hours. We recommend you make arrangements to have an adult stay with you for at least 24 hours after your discharge.  Avoid hazardous and strenuous activity.      DO NOT DRIVE any vehicle or operate mechanical equipment for 24 hours following the end of your surgery.  Even though you may feel normal, your reactions may be affected by the medication you have received.      Do not drink alcoholic beverages for 24 hours following surgery.       Slowly progress to your regular diet as you feel able. It's not unusual to feel nauseated and/or vomit after receiving anesthesia.  If you develop these symptoms, drink clear liquids (apple juice, ginger ale, broth, 7-up, etc. ) until you feel better.  If your nausea and vomiting persists for 24 hours, please notify your surgeon.        All narcotic pain medications, along with inactivity and anesthesia, can cause constipation. Drinking plenty of liquids and increasing fiber intake will help.      For any questions of a medical nature, call your surgeon.      Do not make important decisions for 24 hours.      If you had general anesthesia, you may have a sore throat for a couple of days related to the breathing tube used during surgery.  You may use Cepacol lozenges to help with this discomfort.  If it worsens or if you develop a fever, contact your surgeon.       If you feel your pain is not well managed with the pain medications prescribed by your surgeon, please contact your surgeon's office to let them know so they can address your concerns.           [facility]  DISCHARGE INSTRUCTIONS  WOUND CARE  You may remove your dressing the morning after surgery or if drainage seeps through it. You should shower without the dressing on and replace in after bathing if the wound is still draining or if it is more comfortable to have the wound dressed.  You may use guaze and tape or bandaids depending on the size of the wound and your preference.  Dermabond:  Keep the wound clean and dry. You may shower or bathe tomorrow as usual, but do not use soaps, lotions, or ointments on the wound area. Do not scrub the wound. After bathing, pat the wound dry with a soft towel.  Do not scratch, rub, or pick at the strips or film. Do not place tape directly over the strips or film.  Do not apply liquids (such as peroxide), ointments, or creams to the wound while the strips or film are in place.  Most  wounds heal without problems. However, an infection sometimes occurs despite proper treatment. Therefore, watch for the signs of infection listed below.      ACTIVITY  You may climb stairs.  You may exercise if you are comfortable.  You may drive without restrictions when you are not using prescription pain medication and are comfortable in the car.  You may return to work / school when you are comfortable without prescription pain medication.  BATHING  You may shower.  Do not soak the wound or swim until the incision has been dry for 2-3 days.  .  DIET  Return to the diet you were on before surgery.  FOLLOW UP  Our office will contact you approximately 2 weeks to check on your progress and answer any questions you may have.  If you are doing well, you will not need to return for a follow up appointment.  If any concerns are identified over the phone, we will help you make an appointment to see a provider.     If you have not received a phone call, have any questions or concerns, or would like to be seen, please call us at [phone] and ask to speak with our nurse.  We are located at  [address] [place] [address].    HELPFUL HINTS  Prescription pain medications make most people constipated.  It is never a bad idea to take a mild laxative (Miralax / Milk of magnesia) while you are using your prescription medication.  You may take ibuprofen instead of or in addition to your prescription.  If you are taking the maximum amount of your prescription medication, you should not take any additional Tylenol.  CALL YOUR PHYSICIAN IF YOU HAVE  Chills or fever above 101  F.  Significant or malodorous drainage from the incision(s)   Significant bleeding  Pain not relieved by your pain medication or rest.   Increasing pain after the first 48 hours

## 2019-02-13 NOTE — OP NOTE
General Surgery Operative Note      Pre-operative diagnosis: LEFT BREAST CANCER   Post-operative diagnosis: Same   Procedure: Portacath removal   Surgeon: Denae Perez MD   Assistant(s): Haydee Monaco PA-C  The PA s assistance was medically necessary to provide adequate exposure in the operating field, maintain hemostasis, cutting suture, clamping and ligating bleeding vessels, and visualization of anatomic structures throughout the surgical procedure.    Anesthesia                                   Local with MAC    Estimated blood loss:  Findings:                                        2 cc  Port removed in its entirety         DESCRIPTION OF PROCEDURE:  The patient was taken to the operating room after obtaining informed consent.  The patient was placed on the table in supine position.    IV anesthetic was administered.  The left neck and upper chest were prepped and draped in standard sterile fashion.  We anesthetized the skin of the upper left chest at the prior incision for port placement.  A #15 blade was used to make a skin incision. The subcutaneous tissue was divided with cautery. The port was encountered. The prolene sutures were cut and removed. The port was removed from the pocket. A figure of eight 3-0 vicryl suture was placed around the catheter track and the catheter was then removed and the suture tied down. Pressure was held at the neck for 5 minutes. Hemostasis was excellent. We then closed the subcutaneous tissue with 3-0 interrupted Vicryl sutures.  The skin was closed with a running 4-0 monocryl subcuticular suture and steri strips were placed.  The patient tolerated the procedure well.  All sponge and instrument counts were correct    Denae Perez MD

## 2019-02-14 ENCOUNTER — TRANSFERRED RECORDS (OUTPATIENT)
Dept: HEALTH INFORMATION MANAGEMENT | Facility: CLINIC | Age: 79
End: 2019-02-14

## 2019-03-11 RX ORDER — LISINOPRIL 20 MG/1
20 TABLET ORAL DAILY
COMMUNITY
Start: 2019-03-11 | End: 2019-10-17

## 2019-03-11 RX ORDER — CARVEDILOL 25 MG/1
TABLET ORAL
Qty: 180 TABLET | Refills: 3 | COMMUNITY
Start: 2019-03-11 | End: 2019-04-15

## 2019-03-11 NOTE — TELEPHONE ENCOUNTER
Spoke with pt about continued dizziness she has been having. Pt reports that after she started decreasing her carvedilol to 12.5mg in the AM, her dizziness did improve but she has still been struggling with dizziness. Pt reported that her BP yesterday at two different times was 90/60s. Last night her BP was 119/70s and she did not take her 25mg carvedilol dose last night. Pt reports that she is still feeling dizzy this morning. Informed pt that this will be reviewed and this RN will call back with recommendations.     Discussed this pt with Dr. Penny ([facility]) and he recommended that the pt decrease her lisinopril from 40mg to 20mg daily, and that she started taking the carvedilol at 12.5mg BID.     Called pt back and explained the recommendations. Informed pt that she needs to continue to monitor her BP and inform us if it continues to be low and she still has dizziness. Pt gave verbal understanding and will update us as needed.

## 2019-03-20 ENCOUNTER — TRANSFERRED RECORDS (OUTPATIENT)
Dept: HEALTH INFORMATION MANAGEMENT | Facility: CLINIC | Age: 79
End: 2019-03-20

## 2019-04-10 ENCOUNTER — TRANSFERRED RECORDS (OUTPATIENT)
Dept: HEALTH INFORMATION MANAGEMENT | Facility: CLINIC | Age: 79
End: 2019-04-10

## 2019-04-10 ENCOUNTER — DOCUMENTATION ONLY (OUTPATIENT)
Dept: CARDIOLOGY | Facility: CLINIC | Age: 79
End: 2019-04-10

## 2019-04-10 ENCOUNTER — MEDICAL CORRESPONDENCE (OUTPATIENT)
Dept: HEALTH INFORMATION MANAGEMENT | Facility: CLINIC | Age: 79
End: 2019-04-10

## 2019-04-10 NOTE — PROGRESS NOTES
Records received from patient's PCP, including OV notes and labs. Labs entered into snapshot, copy sent to scan.

## 2019-04-15 ENCOUNTER — OFFICE VISIT (OUTPATIENT)
Dept: CARDIOLOGY | Facility: CLINIC | Age: 79
End: 2019-04-15
Payer: COMMERCIAL

## 2019-04-15 VITALS
BODY MASS INDEX: 30.01 KG/M2 | OXYGEN SATURATION: 95 % | DIASTOLIC BLOOD PRESSURE: 72 MMHG | WEIGHT: 198 LBS | HEART RATE: 86 BPM | SYSTOLIC BLOOD PRESSURE: 110 MMHG | HEIGHT: 68 IN

## 2019-04-15 DIAGNOSIS — C50.011 MALIGNANT NEOPLASM OF NIPPLE OF RIGHT BREAST IN FEMALE, UNSPECIFIED ESTROGEN RECEPTOR STATUS (H): ICD-10-CM

## 2019-04-15 DIAGNOSIS — R42 DIZZINESS: ICD-10-CM

## 2019-04-15 DIAGNOSIS — I42.9 SECONDARY CARDIOMYOPATHY (H): Primary | ICD-10-CM

## 2019-04-15 PROCEDURE — 99214 OFFICE O/P EST MOD 30 MIN: CPT | Performed by: INTERNAL MEDICINE

## 2019-04-15 RX ORDER — METOPROLOL SUCCINATE 25 MG/1
25 TABLET, EXTENDED RELEASE ORAL DAILY
Qty: 30 TABLET | Refills: 3 | Status: SHIPPED | OUTPATIENT
Start: 2019-04-15 | End: 2019-04-29

## 2019-04-15 ASSESSMENT — MIFFLIN-ST. JEOR: SCORE: 1416.62

## 2019-04-15 NOTE — PATIENT INSTRUCTIONS
STOP CARVEDILOL  START METOPROLOL 25MG ONCE DAILY  CONTINUE TO MONITOR BLOOD PRESSURE DAILY AND WHEN YOU DON'T FEEL WELL.

## 2019-04-15 NOTE — PROGRESS NOTES
HPI and Plan:   See dictation    Orders Placed This Encounter   Procedures     Follow-Up with Cardiac Advanced Practice Provider       Orders Placed This Encounter   Medications     metoprolol succinate ER (TOPROL-XL) 25 MG 24 hr tablet     Sig: Take 1 tablet (25 mg) by mouth daily     Dispense:  30 tablet     Refill:  3       Medications Discontinued During This Encounter   Medication Reason     carvedilol (COREG) 25 MG tablet          Encounter Diagnoses   Name Primary?     Secondary cardiomyopathy (H) Yes     Malignant neoplasm of nipple of right breast in female, unspecified estrogen receptor status (H)      Dizziness        CURRENT MEDICATIONS:  Current Outpatient Medications   Medication Sig Dispense Refill     ANASTROZOLE PO Take 1 mg by mouth At Bedtime        ASPIRIN PO Take 81 mg by mouth At Bedtime        atorvastatin (LIPITOR) 20 MG tablet Take 20 mg by mouth At Bedtime        lisinopril (PRINIVIL/ZESTRIL) 40 MG tablet Take 0.5 tablets (20 mg) by mouth daily       loperamide (IMODIUM A-D) 2 MG capsule Take 2 mg by mouth 4 times daily as needed for diarrhea       metoprolol succinate ER (TOPROL-XL) 25 MG 24 hr tablet Take 1 tablet (25 mg) by mouth daily 30 tablet 3     multivitamin, therapeutic (THERA-VIT) TABS Take 1 tablet by mouth At Bedtime No iron       PARoxetine HCl (PAXIL PO) Take 20 mg by mouth At Bedtime       spironolactone (ALDACTONE) 25 MG tablet Take 1 tablet (25 mg) by mouth daily 30 tablet 11     nystatin (MYCOSTATIN) 536708 UNIT/ML suspension Take 5 mLs (500,000 Units) by mouth 4 times daily (Patient not taking: Reported on 12/18/2018) 120 mL 0     PROCHLORPERAZINE MALEATE PO Take 10 mg by mouth every 4 hours as needed for nausea or vomiting         ALLERGIES     Allergies   Allergen Reactions     Demerol [Meperidine] Nausea and Vomiting     Sulfa Drugs Other (See Comments)     Extreme chills     Penicillins Rash     Swelling In mouth and tongue       PAST MEDICAL HISTORY:  Past Medical  History:   Diagnosis Date     Acoustic neuroma (H)      Acoustic neuroma (H)      Acute arthropathy     lower leg     Acute depression      Anemia     iron deficeincy     Anxiety      Arthritis     osteoarthrosis of knee     Breast cancer (H)     left breast     Breast cancer, left breast (H) 02/2018     Breast cancer, right breast (H) 1/2016     Cervical cancer (H)      Colon cancer (H)      Colon cancer (H)      Decreased cardiac ejection fraction      Depression      Diabetes mellitus (H)     pre-diabetic no longer taking Metformin     Dyspnea      Dyspnea      Gastro-oesophageal reflux disease      H/O: lung cancer 2013     Herpes      Herpes zoster      Herpes zoster      HTN (hypertension)      Hyperlipidaemia      Hyperlipidemia      Hyperlipidemia      Iron deficiency anemia      Lung cancer (H)      Lung nodule 2013    Lung cancer     Lung nodule      Nausea & vomiting      OAB (overactive bladder)      Osteoarthritis of knee      Pain in female pelvis      Pain in female pelvis      Pre-diabetes      Preop general physical exam      Rib lesion      Rib lesion      Shoulder pain, acute     right     Tachycardia      Torn meniscus      Torn meniscus        PAST SURGICAL HISTORY:  Past Surgical History:   Procedure Laterality Date     acoustic neuroma excised       APPENDECTOMY  1966    done with [facility]     ARTHROSCOPY KNEE  2012    right knee scoped     BIOPSY NODE SENTINEL Right 2/3/2016    Procedure: BIOPSY NODE SENTINEL;  Surgeon: Flory Pinto MD;  Location: [facility]     BIOPSY NODE SENTINEL Left 3/5/2018    Procedure: BIOPSY NODE SENTINEL;;  Surgeon: Denae Perez MD;  Location: [facility]     BREAST BIOPSY, RT/LT       cataracts       cataracts       CERVICAL CANCER SCREENING RESULTS DOCUMENTED AND REVIEWED       CHOLECYSTECTOMY  1991     CL AFF SURGICAL PATHOLOGY       CL AFF SURGICAL PATHOLOGY      acoustic neuroma left side excised [facility] [address] [place] 6/17/14     COLONOSCOPY       EXCISE NODE  MEDIASTINAL  3/12/2013    Procedure: EXCISE NODE MEDIASTINAL;;  Surgeon: Lorne Arenas MD;  Location:  OR     EYE SURGERY      fiona cataracts     GENITOURINARY SURGERY      bladder sling     GYN SURGERY  1966    hysterectomy for cervical cancer *ovaries remain     HYSTERECTOMY  1966    Cervical CA, paps done every other year, done with appendectomy     INSERT PORT VASCULAR ACCESS Left 3/5/2018    Procedure: INSERT PORT VASCULAR ACCESS;;  Surgeon: Denae Perez MD;  Location: [facility]     INSERT PORT VASCULAR ACCESS N/A 3/5/2018    Procedure: INSERT PORT VASCULAR ACCESS;;  Surgeon: Denae Perez MD;  Location: [facility]     LAPAROSCOPIC ASSISTED COLECTOMY Right 10/27/2016    Procedure: LAPAROSCOPIC ASSISTED COLECTOMY;  Surgeon: Umang Arteaga MD;  Location:  OR     LOBECTOMY LUNG  3/12/2013    Procedure: LOBECTOMY LUNG;;  Surgeon: Lorne Arenas MD;  Location:  OR     LUMPECTOMY BREAST WITH SEED LOCALIZATION Right 2/3/2016    Procedure: LUMPECTOMY BREAST WITH SEED LOCALIZATION;  Surgeon: Flory Pinto MD;  Location: [facility]     LUMPECTOMY BREAST WITH SEED LOCALIZATION Left 3/5/2018    Procedure: LUMPECTOMY BREAST WITH SEED LOCALIZATION;  SEED LOCALIZED LEFT BREAST LUMPECTOMY, WITH LEFT SENTINEL NODE BIOPSY, PORT PLACEMENT;  Surgeon: Denae Perez MD;  Location: [facility]     MASTECTOMY SIMPLE Left 3/28/2018    Procedure: MASTECTOMY SIMPLE;  LEFT BREAST MASTECTOMY ;  Surgeon: Denae Perez MD;  Location: [facility]     MINI ARC SLING OPERATION FOR STRESS INCONTINENCE  2009     REMOVE PORT VASCULAR ACCESS N/A 2/13/2019    Procedure: REMOVE PORT VASCULAR ACCESS;  Surgeon: Denae Perez MD;  Location:  OR     RESECT RIB  2/15/2013    Procedure: RESECT RIB;  RESECTION PORTION RIGHT TWELVETH RIB;  Surgeon: Lorne Arenas MD;  Location:  OR     THORACOTOMY  3/12/2013    Procedure: THORACOTOMY;  RIGHT THORACOTOMY, RIGHT MIDDLE AND LOWER LUNG LOBECTOMY, MEDIASTINAL LYMPH NODE  DISSECTION ;  Surgeon: Lorne Arenas MD;  [facility]     TONSILLECTOMY         FAMILY HISTORY:  Family History   Problem Relation Age of Onset     Breast Cancer Paternal Grandmother      Colon Cancer Other         not specified     Fractures Other         Hip fracture, Aunt     Hyperlipidemia Mother      Hypertension Mother      Heart Disease Mother      Osteoporosis Mother      Hyperlipidemia Sister      Hyperlipidemia Brother      Prostate Cancer Brother      Hypertension Sister      Heart Disease Maternal Grandfather      Prostate Cancer Father      Colon Cancer Cousin         son of paternal aunt with breast cancer.     Breast Cancer Paternal Aunt      Colon Cancer Paternal Aunt      Other Cancer Paternal Uncle         Esophageal     Other Cancer Maternal Aunt        SOCIAL HISTORY:  Social History     Socioeconomic History     Marital status:      Spouse name: None     Number of children: 3     Years of education: None     Highest education level: None   Occupational History     Employer: RETIRED   Social Needs     Financial resource strain: None     Food insecurity:     Worry: None     Inability: None     Transportation needs:     Medical: None     Non-medical: None   Tobacco Use     Smoking status: Former Smoker     Packs/day: 1.00     Years: 45.00     Pack years: 45.00     Types: Cigarettes     Start date:      Last attempt to quit: 1998     Years since quittin.9     Smokeless tobacco: Never Used     Tobacco comment: quit    Substance and Sexual Activity     Alcohol use: Yes     Alcohol/week: 0.0 oz     Comment: glass of wine a day     Drug use: No     Sexual activity: Yes     Partners: Male     Birth control/protection: Female Surgical   Lifestyle     Physical activity:     Days per week: None     Minutes per session: None     Stress: None   Relationships     Social connections:     Talks on phone: None     Gets together: None     Attends Taoist service: None      "Active member of club or organization: None     Attends meetings of clubs or organizations: None     Relationship status: None     Intimate partner violence:     Fear of current or ex partner: None     Emotionally abused: None     Physically abused: None     Forced sexual activity: None   Other Topics Concern     Parent/sibling w/ CABG, MI or angioplasty before 65F 55M? Not Asked   Social History Narrative    12/2015    -    3 kids    Retired        Pap-11/8/2010 WNL    Mammo-12/2/13     Colonoscopy-3/3/2010 WNL, no further recommended per pt     Dexa-1/1/2008       Review of Systems:  Skin:  Negative       Eyes:  Positive for glasses    ENT:  Positive for hearing loss    Respiratory:  Positive for   CABRERA, minimal activity   Cardiovascular:  Negative;palpitations;chest pain;cyanosis;syncope or near-syncope Positive for;lightheadedness;dizziness;lower extremity symptoms;exercise intolerance;fatigue fell 2X last month after getting up to walk, legs feel weak  Gastroenterology: Positive for reflux;nausea;vomiting;diarrhea    Genitourinary:  Positive for urinary frequency;incontinence    Musculoskeletal:  Positive for muscular weakness;joint pain    Neurologic:  Positive for headaches    Psychiatric:  Positive for sleep disturbances    Heme/Lymph/Imm:  Negative      Endocrine:  Negative        Physical Exam:  Vitals: /72 (BP Location: Left arm, Patient Position: Sitting, Cuff Size: Adult Large)   Pulse 86   Ht 1.727 m (5' 8\")   Wt 89.8 kg (198 lb)   SpO2 95%   BMI 30.11 kg/m      Constitutional:  cooperative;in no acute distress        Skin:  warm and dry to the touch          Head:  normocephalic, no masses or lesions        Eyes:  conjunctivae and lids unremarkable        Lymph:No Cervical lymphadenopathy present     ENT:  no pallor or cyanosis, dentition good        Neck:  carotid pulses are full and equal bilaterally;JVP normal        Respiratory:  clear to auscultation    diminished in R " base    Cardiac: regular rhythm;normal S1 and S2;no murmurs, gallops or rubs detected                pulses full and equal, no bruits auscultated                                        GI:  abdomen soft;no masses;non-tender        Extremities and Muscular Skeletal:  no deformities, clubbing, cyanosis, erythema observed;no edema              Neurological:  no gross motor deficits        Psych:  Alert and Oriented x 3          CC  [facility]  [address]  [place] [address]

## 2019-04-15 NOTE — LETTER
4/15/2019    [facility] [address]  [place] [address]    RE: Yolanda Watson       Dear Colleague,    I had the pleasure of seeing Yolanda Watson in the [facility].    HPI and Plan:   See dictation    Orders Placed This Encounter   Procedures     Follow-Up with Cardiac Advanced Practice Provider       Orders Placed This Encounter   Medications     metoprolol succinate ER (TOPROL-XL) 25 MG 24 hr tablet     Sig: Take 1 tablet (25 mg) by mouth daily     Dispense:  30 tablet     Refill:  3       Medications Discontinued During This Encounter   Medication Reason     carvedilol (COREG) 25 MG tablet          Encounter Diagnoses   Name Primary?     Secondary cardiomyopathy (H) Yes     Malignant neoplasm of nipple of right breast in female, unspecified estrogen receptor status (H)      Dizziness        CURRENT MEDICATIONS:  Current Outpatient Medications   Medication Sig Dispense Refill     ANASTROZOLE PO Take 1 mg by mouth At Bedtime        ASPIRIN PO Take 81 mg by mouth At Bedtime        atorvastatin (LIPITOR) 20 MG tablet Take 20 mg by mouth At Bedtime        lisinopril (PRINIVIL/ZESTRIL) 40 MG tablet Take 0.5 tablets (20 mg) by mouth daily       loperamide (IMODIUM A-D) 2 MG capsule Take 2 mg by mouth 4 times daily as needed for diarrhea       metoprolol succinate ER (TOPROL-XL) 25 MG 24 hr tablet Take 1 tablet (25 mg) by mouth daily 30 tablet 3     multivitamin, therapeutic (THERA-VIT) TABS Take 1 tablet by mouth At Bedtime No iron       PARoxetine HCl (PAXIL PO) Take 20 mg by mouth At Bedtime       spironolactone (ALDACTONE) 25 MG tablet Take 1 tablet (25 mg) by mouth daily 30 tablet 11     nystatin (MYCOSTATIN) 843463 UNIT/ML suspension Take 5 mLs (500,000 Units) by mouth 4 times daily (Patient not taking: Reported on 12/18/2018) 120 mL 0     PROCHLORPERAZINE MALEATE PO Take 10 mg by mouth every 4 hours as needed for nausea or vomiting          ALLERGIES     Allergies   Allergen Reactions     Demerol [Meperidine] Nausea and Vomiting     Sulfa Drugs Other (See Comments)     Extreme chills     Penicillins Rash     Swelling In mouth and tongue       PAST MEDICAL HISTORY:  Past Medical History:   Diagnosis Date     Acoustic neuroma (H)      Acoustic neuroma (H)      Acute arthropathy     lower leg     Acute depression      Anemia     iron deficeincy     Anxiety      Arthritis     osteoarthrosis of knee     Breast cancer (H)     left breast     Breast cancer, left breast (H) 02/2018     Breast cancer, right breast (H) 1/2016     Cervical cancer (H)      Colon cancer (H)      Colon cancer (H)      Decreased cardiac ejection fraction      Depression      Diabetes mellitus (H)     pre-diabetic no longer taking Metformin     Dyspnea      Dyspnea      Gastro-oesophageal reflux disease      H/O: lung cancer 2013     Herpes      Herpes zoster      Herpes zoster      HTN (hypertension)      Hyperlipidaemia      Hyperlipidemia      Hyperlipidemia      Iron deficiency anemia      Lung cancer (H)      Lung nodule 2013    Lung cancer     Lung nodule      Nausea & vomiting      OAB (overactive bladder)      Osteoarthritis of knee      Pain in female pelvis      Pain in female pelvis      Pre-diabetes      Preop general physical exam      Rib lesion      Rib lesion      Shoulder pain, acute     right     Tachycardia      Torn meniscus      Torn meniscus        PAST SURGICAL HISTORY:  Past Surgical History:   Procedure Laterality Date     acoustic neuroma excised       APPENDECTOMY  1966    done with [facility]     ARTHROSCOPY KNEE  2012    right knee scoped     BIOPSY NODE SENTINEL Right 2/3/2016    Procedure: BIOPSY NODE SENTINEL;  Surgeon: Flory Pinto MD;  Location: [facility]     BIOPSY NODE SENTINEL Left 3/5/2018    Procedure: BIOPSY NODE SENTINEL;;  Surgeon: Denae Perez MD;  Location: [facility]     BREAST BIOPSY, RT/LT       cataracts       cataracts       CERVICAL  CANCER SCREENING RESULTS DOCUMENTED AND REVIEWED       CHOLECYSTECTOMY  1991     CL AFF SURGICAL PATHOLOGY       CL AFF SURGICAL PATHOLOGY      acoustic neuroma left side excised [facility] [address] 6/17/14     COLONOSCOPY       EXCISE NODE MEDIASTINAL  3/12/2013    Procedure: EXCISE NODE MEDIASTINAL;;  Surgeon: Lorne Arenas MD;  Location:  OR     EYE SURGERY      fiona cataracts     GENITOURINARY SURGERY      bladder sling     GYN SURGERY  1966    hysterectomy for cervical cancer *ovaries remain     HYSTERECTOMY  1966    Cervical CA, paps done every other year, done with appendectomy     INSERT PORT VASCULAR ACCESS Left 3/5/2018    Procedure: INSERT PORT VASCULAR ACCESS;;  Surgeon: Denae Perez MD;  Location: [facility]     INSERT PORT VASCULAR ACCESS N/A 3/5/2018    Procedure: INSERT PORT VASCULAR ACCESS;;  Surgeon: Denae Perez MD;  Location: [facility]     LAPAROSCOPIC ASSISTED COLECTOMY Right 10/27/2016    Procedure: LAPAROSCOPIC ASSISTED COLECTOMY;  Surgeon: Umang Arteaga MD;  Location:  OR     LOBECTOMY LUNG  3/12/2013    Procedure: LOBECTOMY LUNG;;  Surgeon: Lorne Arenas MD;  Location:  OR     LUMPECTOMY BREAST WITH SEED LOCALIZATION Right 2/3/2016    Procedure: LUMPECTOMY BREAST WITH SEED LOCALIZATION;  Surgeon: Flory Pinto MD;  Location: [facility]     LUMPECTOMY BREAST WITH SEED LOCALIZATION Left 3/5/2018    Procedure: LUMPECTOMY BREAST WITH SEED LOCALIZATION;  SEED LOCALIZED LEFT BREAST LUMPECTOMY, WITH LEFT SENTINEL NODE BIOPSY, PORT PLACEMENT;  Surgeon: Denae Perez MD;  Location: [facility]     MASTECTOMY SIMPLE Left 3/28/2018    Procedure: MASTECTOMY SIMPLE;  LEFT BREAST MASTECTOMY ;  Surgeon: Denae Perez MD;  Location: [facility]     MINI ARC SLING OPERATION FOR STRESS INCONTINENCE  2009     REMOVE PORT VASCULAR ACCESS N/A 2/13/2019    Procedure: REMOVE PORT VASCULAR ACCESS;  Surgeon: Denae Perez MD;  Location:  OR     RESECT RIB  2/15/2013    Procedure:  RESECT RIB;  RESECTION PORTION RIGHT TWELVETH RIB;  Surgeon: Lorne Arenas MD;  [facility]     THORACOTOMY  3/12/2013    Procedure: THORACOTOMY;  RIGHT THORACOTOMY, RIGHT MIDDLE AND LOWER LUNG LOBECTOMY, MEDIASTINAL LYMPH NODE DISSECTION ;  Surgeon: Lorne Arenas MD;  [facility]     TONSILLECTOMY         FAMILY HISTORY:  Family History   Problem Relation Age of Onset     Breast Cancer Paternal Grandmother      Colon Cancer Other         not specified     Fractures Other         Hip fracture, Aunt     Hyperlipidemia Mother      Hypertension Mother      Heart Disease Mother      Osteoporosis Mother      Hyperlipidemia Sister      Hyperlipidemia Brother      Prostate Cancer Brother      Hypertension Sister      Heart Disease Maternal Grandfather      Prostate Cancer Father      Colon Cancer Cousin         son of paternal aunt with breast cancer.     Breast Cancer Paternal Aunt      Colon Cancer Paternal Aunt      Other Cancer Paternal Uncle         Esophageal     Other Cancer Maternal Aunt        SOCIAL HISTORY:  Social History     Socioeconomic History     Marital status:      Spouse name: None     Number of children: 3     Years of education: None     Highest education level: None   Occupational History     Employer: RETIRED   Social Needs     Financial resource strain: None     Food insecurity:     Worry: None     Inability: None     Transportation needs:     Medical: None     Non-medical: None   Tobacco Use     Smoking status: Former Smoker     Packs/day: 1.00     Years: 45.00     Pack years: 45.00     Types: Cigarettes     Start date:      Last attempt to quit: 1998     Years since quittin.9     Smokeless tobacco: Never Used     Tobacco comment: quit    Substance and Sexual Activity     Alcohol use: Yes     Alcohol/week: 0.0 oz     Comment: glass of wine a day     Drug use: No     Sexual activity: Yes     Partners: Male     Birth control/protection: Female  "Surgical   Lifestyle     Physical activity:     Days per week: None     Minutes per session: None     Stress: None   Relationships     Social connections:     Talks on phone: None     Gets together: None     Attends Taoism service: None     Active member of club or organization: None     Attends meetings of clubs or organizations: None     Relationship status: None     Intimate partner violence:     Fear of current or ex partner: None     Emotionally abused: None     Physically abused: None     Forced sexual activity: None   Other Topics Concern     Parent/sibling w/ CABG, MI or angioplasty before 65F 55M? Not Asked   Social History Narrative    12/2015    -    3 kids    Retired        Pap-11/8/2010 WNL    Mammo-12/2/13     Colonoscopy-3/3/2010 WNL, no further recommended per pt     Dexa-1/1/2008       Review of Systems:  Skin:  Negative       Eyes:  Positive for glasses    ENT:  Positive for hearing loss    Respiratory:  Positive for   CABRERA, minimal activity   Cardiovascular:  Negative;palpitations;chest pain;cyanosis;syncope or near-syncope Positive for;lightheadedness;dizziness;lower extremity symptoms;exercise intolerance;fatigue fell 2X last month after getting up to walk, legs feel weak  Gastroenterology: Positive for reflux;nausea;vomiting;diarrhea    Genitourinary:  Positive for urinary frequency;incontinence    Musculoskeletal:  Positive for muscular weakness;joint pain    Neurologic:  Positive for headaches    Psychiatric:  Positive for sleep disturbances    Heme/Lymph/Imm:  Negative      Endocrine:  Negative        Physical Exam:  Vitals: /72 (BP Location: Left arm, Patient Position: Sitting, Cuff Size: Adult Large)   Pulse 86   Ht 1.727 m (5' 8\")   Wt 89.8 kg (198 lb)   SpO2 95%   BMI 30.11 kg/m       Constitutional:  cooperative;in no acute distress        Skin:  warm and dry to the touch          Head:  normocephalic, no masses or lesions        Eyes:  conjunctivae and lids " unremarkable        Lymph:No Cervical lymphadenopathy present     ENT:  no pallor or cyanosis, dentition good        Neck:  carotid pulses are full and equal bilaterally;JVP normal        Respiratory:  clear to auscultation    diminished in R base    Cardiac: regular rhythm;normal S1 and S2;no murmurs, gallops or rubs detected                pulses full and equal, no bruits auscultated                                        GI:  abdomen soft;no masses;non-tender        Extremities and Muscular Skeletal:  no deformities, clubbing, cyanosis, erythema observed;no edema              Neurological:  no gross motor deficits        Psych:  Alert and Oriented x 3          CC  [facility]  [address]  [place] [address]                    Thank you for allowing me to participate in the care of your patient.      Sincerely,     Dinorah Miller, [facility]    cc:   [facility]  [address]  [place] [address]

## 2019-04-15 NOTE — LETTER
4/15/2019      [facility] [address]  [place] [address]      RE: Yolanda Watson       Dear Colleague,    I had the pleasure of seeing Yolanda Watson in the [facility].    Service Date: 04/15/2019      HISTORY OF PRESENT ILLNESS:  Ms. 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  She has had several cancers including a left-sided breast cancer for which she took paclitaxel and Herceptin.  She had baseline normal LV function following chemotherapy; however, her EF dropped to 30%-35%.  We did place her on cardioprotective medications.  She is returning to clinic today stating she is having difficulty with dizziness, feeling heaviness and shortness of breath.  Her blood pressure has been on the low side and one of my partners recommended to decrease the dosages of both lisinopril and carvedilol.  This was done in mid March.        She is feeling a little bit better with these changes, but has persistent lightheadedness and feeling heaviness.  She does feel short of breath and when I talk to her initially, it sounded like she might have orthopneic symptoms, but she states she can lay comfortably flat without any difficulty breathing.  She does prefer to sit in her reclining chair; however.  She has not note any peripheral edema.        She saw Dr. Subramanian about 5 days ago and had some blood work with the repeat hemoglobin which looks like it is increasing from her previous of 10.9, up to 11.8.      PHYSICAL EXAMINATION:     VITAL SIGNS:  She was borderline orthostatic with a blood pressure 110/74 which remains unchanged with standing.  Her pulse was 86, weight 198 and a body mass index of 30.   CARDIOVASCULAR:  Tones are regular.  I did not appreciate a murmur, gallop or rub.   LUNGS:  Clear posteriorly.   EXTREMITIES:  She has no peripheral edema on exam today.      In  summary, Ms. Watson is a pleasant 78-year-old female with multiple cancers, most recently left-sided breast cancer and underwent Herceptin and paclitaxel therapy.  She has a secondary cardiomyopathy related which is persistent and her LV function has dropped to 30%-35%.        It sounds like she is not tolerating the cardiac medications due to positional lightheadedness, feeling heaviness as well as shortness of breath.  Her clinical exam is not consistent with decompensation and it seems her symptoms are more related to feeling really positionally lightheaded.        She did receive some benefit from reduction in the dose of carvedilol and lisinopril.  I talked a little bit about her symptoms.  It seems she has had symptoms of lightheadedness ever since starting carvedilol.  Therefore, I would like to actually change her carvedilol to metoprolol and lower the dose further.  I am going to start her on 25 mg of metoprolol.      For now, we will keep her lisinopril at the reduced dose of 20 mg, but we may need to decrease this further if she has persistent symptoms, I will have her return to clinic in a couple weeks and determine if this change is helpful in reducing her symptoms.  Please feel free to contact me with any questions you have in regards to her care.      cc:      Soren Perdomo MD    [facility]   [address]   [place] [address]      Washington Subramanian MD    [facility]   [address]   [place] [address]         TAB BERG DO             D: 04/15/2019   T: 04/15/2019   MT: LOC      Name:     WARREN WATSON   MRN:      [id number]        Account:      [id number]   :      1940           Service Date: 04/15/2019      Document: [id number]         Outpatient Encounter Medications as of 4/15/2019   Medication Sig Dispense Refill     ANASTROZOLE PO Take 1 mg by mouth At Bedtime        ASPIRIN PO Take 81 mg by mouth At Bedtime         atorvastatin (LIPITOR) 20 MG tablet Take 20 mg by mouth At Bedtime        lisinopril (PRINIVIL/ZESTRIL) 40 MG tablet Take 0.5 tablets (20 mg) by mouth daily       loperamide (IMODIUM A-D) 2 MG capsule Take 2 mg by mouth 4 times daily as needed for diarrhea       metoprolol succinate ER (TOPROL-XL) 25 MG 24 hr tablet Take 1 tablet (25 mg) by mouth daily 30 tablet 3     multivitamin, therapeutic (THERA-VIT) TABS Take 1 tablet by mouth At Bedtime No iron       PARoxetine HCl (PAXIL PO) Take 20 mg by mouth At Bedtime       spironolactone (ALDACTONE) 25 MG tablet Take 1 tablet (25 mg) by mouth daily 30 tablet 11     nystatin (MYCOSTATIN) 775036 UNIT/ML suspension Take 5 mLs (500,000 Units) by mouth 4 times daily (Patient not taking: Reported on 12/18/2018) 120 mL 0     PROCHLORPERAZINE MALEATE PO Take 10 mg by mouth every 4 hours as needed for nausea or vomiting       [DISCONTINUED] carvedilol (COREG) 25 MG tablet Take 1/2 tablet (12.5 mg) by mouth twice daily (with meals). 180 tablet 3     No facility-administered encounter medications on file as of 4/15/2019.        Again, thank you for allowing me to participate in the care of your patient.      Sincerely,    Dinorah Miller,      [facility]

## 2019-04-15 NOTE — PROGRESS NOTES
Service Date: 04/15/2019      HISTORY OF PRESENT ILLNESS:  Ms. 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  She has had several cancers including a left-sided breast cancer for which she took paclitaxel and Herceptin.  She had baseline normal LV function following chemotherapy; however, her EF dropped to 30%-35%.  We did place her on cardioprotective medications.  She is returning to clinic today stating she is having difficulty with dizziness, feeling heaviness and shortness of breath.  Her blood pressure has been on the low side and one of my partners recommended to decrease the dosages of both lisinopril and carvedilol.  This was done in mid March.        She is feeling a little bit better with these changes, but has persistent lightheadedness and feeling heaviness.  She does feel short of breath and when I talk to her initially, it sounded like she might have orthopneic symptoms, but she states she can lay comfortably flat without any difficulty breathing.  She does prefer to sit in her reclining chair; however.  She has not note any peripheral edema.        She saw Dr. Subramanian about 5 days ago and had some blood work with the repeat hemoglobin which looks like it is increasing from her previous of 10.9, up to 11.8.      PHYSICAL EXAMINATION:     VITAL SIGNS:  She was borderline orthostatic with a blood pressure 110/74 which remains unchanged with standing.  Her pulse was 86, weight 198 and a body mass index of 30.   CARDIOVASCULAR:  Tones are regular.  I did not appreciate a murmur, gallop or rub.   LUNGS:  Clear posteriorly.   EXTREMITIES:  She has no peripheral edema on exam today.      In summary, Ms. Watson is a pleasant 78-year-old female with multiple cancers, most recently left-sided breast cancer and underwent Herceptin and paclitaxel therapy.  She has a secondary cardiomyopathy related which is persistent and her LV function has  dropped to 30%-35%.        It sounds like she is not tolerating the cardiac medications due to positional lightheadedness, feeling heaviness as well as shortness of breath.  Her clinical exam is not consistent with decompensation and it seems her symptoms are more related to feeling really positionally lightheaded.        She did receive some benefit from reduction in the dose of carvedilol and lisinopril.  I talked a little bit about her symptoms.  It seems she has had symptoms of lightheadedness ever since starting carvedilol.  Therefore, I would like to actually change her carvedilol to metoprolol and lower the dose further.  I am going to start her on 25 mg of metoprolol.      For now, we will keep her lisinopril at the reduced dose of 20 mg, but we may need to decrease this further if she has persistent symptoms, I will have her return to clinic in a couple weeks and determine if this change is helpful in reducing her symptoms.  Please feel free to contact me with any questions you have in regards to her care.      cc:      Soren Perdomo MD    [facility]   [address]   [place] [address]      Washington Subramanian MD    [facility]   [address]   [place] [address]         TAB BERG DO             D: 04/15/2019   T: 04/15/2019   MT: LOC      Name:     WARREN METCALF   MRN:      [id number]        Account:      [id number]   :      1940           Service Date: 04/15/2019      Document: [id number]

## 2019-04-29 ENCOUNTER — OFFICE VISIT (OUTPATIENT)
Dept: CARDIOLOGY | Facility: CLINIC | Age: 79
End: 2019-04-29
Attending: INTERNAL MEDICINE
Payer: COMMERCIAL

## 2019-04-29 VITALS
BODY MASS INDEX: 28.63 KG/M2 | WEIGHT: 200 LBS | HEIGHT: 70 IN | SYSTOLIC BLOOD PRESSURE: 121 MMHG | HEART RATE: 90 BPM | DIASTOLIC BLOOD PRESSURE: 79 MMHG

## 2019-04-29 DIAGNOSIS — R42 DIZZINESS: Primary | ICD-10-CM

## 2019-04-29 DIAGNOSIS — I42.9 SECONDARY CARDIOMYOPATHY (H): ICD-10-CM

## 2019-04-29 PROCEDURE — 99214 OFFICE O/P EST MOD 30 MIN: CPT | Performed by: PHYSICIAN ASSISTANT

## 2019-04-29 RX ORDER — METOPROLOL SUCCINATE 25 MG/1
25 TABLET, EXTENDED RELEASE ORAL DAILY
Qty: 90 TABLET | Refills: 3 | Status: SHIPPED | OUTPATIENT
Start: 2019-04-29 | End: 2019-12-16

## 2019-04-29 ASSESSMENT — MIFFLIN-ST. JEOR: SCORE: 1449.5

## 2019-04-29 NOTE — PROGRESS NOTES
Please see separate dictation for HPI, PHYSICAL EXAM AND IMPRESSION/PLAN.    CURRENT MEDICATIONS:  Current Outpatient Medications   Medication Sig Dispense Refill     ANASTROZOLE PO Take 1 mg by mouth At Bedtime        ASPIRIN PO Take 81 mg by mouth At Bedtime        atorvastatin (LIPITOR) 20 MG tablet Take 20 mg by mouth At Bedtime        lisinopril (PRINIVIL/ZESTRIL) 40 MG tablet Take 0.5 tablets (20 mg) by mouth daily       loperamide (IMODIUM A-D) 2 MG capsule Take 2 mg by mouth 4 times daily as needed for diarrhea       metoprolol succinate ER (TOPROL-XL) 25 MG 24 hr tablet Take 1 tablet (25 mg) by mouth daily 30 tablet 3     multivitamin, therapeutic (THERA-VIT) TABS Take 1 tablet by mouth At Bedtime No iron       PARoxetine HCl (PAXIL PO) Take 20 mg by mouth At Bedtime       spironolactone (ALDACTONE) 25 MG tablet Take 1 tablet (25 mg) by mouth daily 30 tablet 11     nystatin (MYCOSTATIN) 760115 UNIT/ML suspension Take 5 mLs (500,000 Units) by mouth 4 times daily (Patient not taking: Reported on 12/18/2018) 120 mL 0     PROCHLORPERAZINE MALEATE PO Take 10 mg by mouth every 4 hours as needed for nausea or vomiting         ALLERGIES:     Allergies   Allergen Reactions     Demerol [Meperidine] Nausea and Vomiting     Sulfa Drugs Other (See Comments)     Extreme chills     Penicillins Rash     Swelling In mouth and tongue       PAST MEDICAL HISTORY:  Past Medical History:   Diagnosis Date     Acoustic neuroma (H)      Acoustic neuroma (H)      Acute arthropathy     lower leg     Acute depression      Anemia     iron deficeincy     Anxiety      Arthritis     osteoarthrosis of knee     Breast cancer (H)     left breast     Breast cancer, left breast (H) 02/2018     Breast cancer, right breast (H) 1/2016     Cervical cancer (H)      Colon cancer (H)      Colon cancer (H)      Decreased cardiac ejection fraction      Depression      Diabetes mellitus (H)     pre-diabetic no longer taking Metformin     Dyspnea       Dyspnea      Gastro-oesophageal reflux disease      H/O: lung cancer 2013     Herpes      Herpes zoster      Herpes zoster      HTN (hypertension)      Hyperlipidaemia      Hyperlipidemia      Hyperlipidemia      Iron deficiency anemia      Lung cancer (H)      Lung nodule 2013    Lung cancer     Lung nodule      Nausea & vomiting      OAB (overactive bladder)      Osteoarthritis of knee      Pain in female pelvis      Pain in female pelvis      Pre-diabetes      Preop general physical exam      Rib lesion      Rib lesion      Shoulder pain, acute     right     Tachycardia      Torn meniscus      Torn meniscus        PAST SURGICAL HISTORY:  Past Surgical History:   Procedure Laterality Date     acoustic neuroma excised       APPENDECTOMY  1966    done with CORNELIO     ARTHROSCOPY KNEE  2012    right knee scoped     BIOPSY NODE SENTINEL Right 2/3/2016    Procedure: BIOPSY NODE SENTINEL;  Surgeon: Flory Pinto MD;  [facility]     BIOPSY NODE SENTINEL Left 3/5/2018    Procedure: BIOPSY NODE SENTINEL;;  Surgeon: Denae Perez MD;  Location: [facility]     BREAST BIOPSY, RT/LT       cataracts       cataracts       CERVICAL CANCER SCREENING RESULTS DOCUMENTED AND REVIEWED       CHOLECYSTECTOMY  1991     CL AFF SURGICAL PATHOLOGY       CL AFF SURGICAL PATHOLOGY      acoustic neuroma left side excised [facility] Dr. Longoria 6/17/14     COLONOSCOPY       EXCISE NODE MEDIASTINAL  3/12/2013    Procedure: EXCISE NODE MEDIASTINAL;;  Surgeon: Lorne Arenas MD;  Location:  OR     EYE SURGERY      fiona cataracts     GENITOURINARY SURGERY      bladder sling     GYN SURGERY  1966    hysterectomy for cervical cancer *ovaries remain     HYSTERECTOMY  1966    Cervical CA, paps done every other year, done with appendectomy     INSERT PORT VASCULAR ACCESS Left 3/5/2018    Procedure: INSERT PORT VASCULAR ACCESS;;  Surgeon: Denae Perez MD;  Location: [facility]     INSERT PORT VASCULAR ACCESS N/A 3/5/2018    Procedure:  INSERT PORT VASCULAR ACCESS;;  Surgeon: Denae Perez MD;  [facility]:  [facility]     LAPAROSCOPIC ASSISTED COLECTOMY Right 10/27/2016    Procedure: LAPAROSCOPIC ASSISTED COLECTOMY;  Surgeon: Umang Arteaga MD;  Location:  OR     LOBECTOMY LUNG  3/12/2013    Procedure: LOBECTOMY LUNG;;  Surgeon: Lorne Arenas MD;  Location:  OR     LUMPECTOMY BREAST WITH SEED LOCALIZATION Right 2/3/2016    Procedure: LUMPECTOMY BREAST WITH SEED LOCALIZATION;  Surgeon: Flory Pinto MD;  Location:  [facility]     LUMPECTOMY BREAST WITH SEED LOCALIZATION Left 3/5/2018    Procedure: LUMPECTOMY BREAST WITH SEED LOCALIZATION;  SEED LOCALIZED LEFT BREAST LUMPECTOMY, WITH LEFT SENTINEL NODE BIOPSY, PORT PLACEMENT;  Surgeon: Denae Perez MD;  Location: [facility]     MASTECTOMY SIMPLE Left 3/28/2018    Procedure: MASTECTOMY SIMPLE;  LEFT BREAST MASTECTOMY ;  Surgeon: Denae Perez MD;  Location: [facility]     MINI ARC SLING OPERATION FOR STRESS INCONTINENCE  2009     REMOVE PORT VASCULAR ACCESS N/A 2/13/2019    Procedure: REMOVE PORT VASCULAR ACCESS;  Surgeon: Denae Perez MD;  Location:  OR     RESECT RIB  2/15/2013    Procedure: RESECT RIB;  RESECTION PORTION RIGHT TWELVETH RIB;  Surgeon: Lorne Arenas MD;  Location:  OR     THORACOTOMY  3/12/2013    Procedure: THORACOTOMY;  RIGHT THORACOTOMY, RIGHT MIDDLE AND LOWER LUNG LOBECTOMY, MEDIASTINAL LYMPH NODE DISSECTION ;  Surgeon: Lorne Aernas MD;  Location:  OR     TONSILLECTOMY         SOCIAL HISTORY:  Social History     Socioeconomic History     Marital status:      Spouse name: None     Number of children: 3     Years of education: None     Highest education level: None   Occupational History     Employer: RETIRED   Social Needs     Financial resource strain: None     Food insecurity:     Worry: None     Inability: None     Transportation needs:     Medical: None     Non-medical: None   Tobacco Use     Smoking status: Former Smoker      Packs/day: 1.00     Years: 45.00     Pack years: 45.00     Types: Cigarettes     Start date:      Last attempt to quit: 1998     Years since quittin.0     Smokeless tobacco: Never Used     Tobacco comment: quit    Substance and Sexual Activity     Alcohol use: Yes     Alcohol/week: 0.0 oz     Comment: glass of wine a day     Drug use: No     Sexual activity: Yes     Partners: Male     Birth control/protection: Female Surgical   Lifestyle     Physical activity:     Days per week: None     Minutes per session: None     Stress: None   Relationships     Social connections:     Talks on phone: None     Gets together: None     Attends Orthodoxy service: None     Active member of club or organization: None     Attends meetings of clubs or organizations: None     Relationship status: None     Intimate partner violence:     Fear of current or ex partner: None     Emotionally abused: None     Physically abused: None     Forced sexual activity: None   Other Topics Concern     Parent/sibling w/ CABG, MI or angioplasty before 65F 55M? Not Asked   Social History Narrative    2015    -    3 kids    Retired        Pap-2010 WNL    Mammo-11     Colonoscopy-3/3/2010 WNL, no further recommended per pt     Dexa-2008       FAMILY HISTORY:  Family History   Problem Relation Age of Onset     Breast Cancer Paternal Grandmother      Colon Cancer Other         not specified     Fractures Other         Hip fracture, Aunt     Hyperlipidemia Mother      Hypertension Mother      Heart Disease Mother      Osteoporosis Mother      Hyperlipidemia Sister      Hyperlipidemia Brother      Prostate Cancer Brother      Hypertension Sister      Heart Disease Maternal Grandfather      Prostate Cancer Father      Colon Cancer Cousin         son of paternal aunt with breast cancer.     Breast Cancer Paternal Aunt      Colon Cancer Paternal Aunt      Other Cancer Paternal Uncle         Esophageal     Other Cancer  Maternal Aunt        Review of Systems:  Skin:          Eyes:  Positive for      ENT:  Positive for hearing loss    Respiratory:  Positive for dyspnea on exertion;cough;wheezing     Cardiovascular:    fatigue;Positive for    Gastroenterology: Negative      Genitourinary:  not assessed      Musculoskeletal:  Negative      Neurologic:  Positive for headaches    Psychiatric:  Positive for sleep disturbances    Heme/Lymph/Imm:  Negative      Endocrine:  Negative for         Reviewed. Remainder of the note dictated.    Sulema Coleman PA-C

## 2019-04-29 NOTE — LETTER
4/29/2019      [facility] [address]  [place] [address]      RE: Yolanda Watson       Dear Colleague,    I had the pleasure of seeing Yolanda Watson in the [facility].    Service Date: 04/29/2019      HISTORY OF PRESENT ILLNESS:  Ms. Watson is a very pleasant 78-year-old female who presents to the office today to follow up after a recent medication change secondary to lightheadedness.  Her carvedilol was discontinued and she was started on a low dose of Toprol-XL.      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  She has a history of several cancers in the past including most recently a left-sided breast cancer for which she took paclitaxel and Herceptin.  She had a baseline normal echocardiogram with normal LV size and function in early 2018, however, on a repeat echocardiography, I believe in 09/2018, her EF dropped to 30%-35%.  She was placed on cardioprotective medications.  She did have a repeat echocardiogram in December which showed her EF remained at 30%-35%.  She was in to see Dr. Miller for routine followup about 2 weeks ago.  At that time she complained of significant dizziness along with a feeling of heaviness and shortness of breath.  She actually had taken 2 falls secondary to the dizziness.  Again, at that time Dr. Miller discontinued her carvedilol 12.5 mg b.i.d. and started her on Toprol-XL 25 mg daily.      Today, Yolanda states she feels great.  Her prior dizziness has completely resolved.  She has some mild dyspnea on exertion, but states that this has been fairly stable.  She denies any orthopnea, PND or lower extremity edema.  She has not noted any significant weight gain.      CURRENT CARDIAC MEDICATIONS:   1.  Aspirin 81 mg daily.   2.  Atorvastatin 20 mg daily.   3.  Lisinopril 40 mg half tablet daily.   4.  Toprol-XL 25 mg daily.   5.  Spironolactone 25 mg daily.      The  remainder of her medications, allergies and review of systems are reviewed and as are documented separately.      PHYSICAL EXAMINATION:   GENERAL:  The patient is a pleasant 78-year-old female who appears her stated age.  She is in no apparent distress.   VITAL SIGNS:  Her blood pressure is 121/79, pulse is 90, weight is 200 pounds.     PULMONARY:  Breathing is nonlabored and lungs are clear to auscultation.   CARDIAC:  Reveals a regular rate and rhythm.  I do not appreciate any significant murmur.   EXTREMITIES:  Lower extremities show no evidence of edema.   NEUROLOGIC:  Alert and oriented.      ASSESSMENT AND PLAN:  Ms. Watson is a very pleasant 78-year-old female with a history of multiple cancers, most recently a left-sided breast cancer which was treated with Herceptin and paclitaxel therapy.  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  Again, recently she had been experiencing significant lightheadedness/dizziness which fortunately has pretty much completely resolved with the discontinuation of carvedilol and change over to Toprol-XL.      I discussed with her for the time being, I am going to continue her current medication regimen unchanged.  We will see how she does on the metoprolol for the next 8 weeks or so and then plan to repeat an echocardiogram.  If her EF remains low at that time, we may want to try to up-titrate the Toprol-XL.  She was initially scheduled to see Dr. Miller in early June, but since she is doing so well, we will have her reschedule this visit for followup after the echocardiogram.  Of course, if at any time in the interim she develops any heart failure symptoms or has any other questions or concerns, we would be happy to see her back sooner.      Thank you for allowing us to participate in the care of this pleasant patient.      cc:   Soren Perdomo MD   [facility]    [address] [place] [address]    [place]  [address]      MD [facility]    [address]    [address]   [place]  [address]         SULEMA STARK PA-C             D: 2019   T: 2019   MT: ELOY      Name:     WARREN METCALF   MRN:      [id number]        Account:      [id number]   :      1940           Service Date: 2019      Document: [id number]         Outpatient Encounter Medications as of 2019   Medication Sig Dispense Refill     ANASTROZOLE PO Take 1 mg by mouth At Bedtime        ASPIRIN PO Take 81 mg by mouth At Bedtime        atorvastatin (LIPITOR) 20 MG tablet Take 20 mg by mouth At Bedtime        lisinopril (PRINIVIL/ZESTRIL) 40 MG tablet Take 0.5 tablets (20 mg) by mouth daily       loperamide (IMODIUM A-D) 2 MG capsule Take 2 mg by mouth 4 times daily as needed for diarrhea       metoprolol succinate ER (TOPROL-XL) 25 MG 24 hr tablet Take 1 tablet (25 mg) by mouth daily 90 tablet 3     multivitamin, therapeutic (THERA-VIT) TABS Take 1 tablet by mouth At Bedtime No iron       PARoxetine HCl (PAXIL PO) Take 20 mg by mouth At Bedtime       spironolactone (ALDACTONE) 25 MG tablet Take 1 tablet (25 mg) by mouth daily 30 tablet 11     nystatin (MYCOSTATIN) 133554 UNIT/ML suspension Take 5 mLs (500,000 Units) by mouth 4 times daily (Patient not taking: Reported on 2018) 120 mL 0     PROCHLORPERAZINE MALEATE PO Take 10 mg by mouth every 4 hours as needed for nausea or vomiting       [DISCONTINUED] metoprolol succinate ER (TOPROL-XL) 25 MG 24 hr tablet Take 1 tablet (25 mg) by mouth daily 30 tablet 3     No facility-administered encounter medications on file as of 2019.        Again, thank you for allowing me to participate in the care of your patient.      Sincerely,    Sulema Stark PA-C     [facility]

## 2019-04-29 NOTE — PROGRESS NOTES
Service Date: 04/29/2019      HISTORY OF PRESENT ILLNESS:  Ms. Watson is a very pleasant 78-year-old female who presents to the office today to follow up after a recent medication change secondary to lightheadedness.  Her carvedilol was discontinued and she was started on a low dose of Toprol-XL.      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  She has a history of several cancers in the past including most recently a left-sided breast cancer for which she took paclitaxel and Herceptin.  She had a baseline normal echocardiogram with normal LV size and function in early 2018, however, on a repeat echocardiography, I believe in 09/2018, her EF dropped to 30%-35%.  She was placed on cardioprotective medications.  She did have a repeat echocardiogram in December which showed her EF remained at 30%-35%.  She was in to see Dr. Miller for routine followup about 2 weeks ago.  At that time she complained of significant dizziness along with a feeling of heaviness and shortness of breath.  She actually had taken 2 falls secondary to the dizziness.  Again, at that time Dr. Miller discontinued her carvedilol 12.5 mg b.i.d. and started her on Toprol-XL 25 mg daily.      Today, Yolanda states she feels great.  Her prior dizziness has completely resolved.  She has some mild dyspnea on exertion, but states that this has been fairly stable.  She denies any orthopnea, PND or lower extremity edema.  She has not noted any significant weight gain.      CURRENT CARDIAC MEDICATIONS:   1.  Aspirin 81 mg daily.   2.  Atorvastatin 20 mg daily.   3.  Lisinopril 40 mg half tablet daily.   4.  Toprol-XL 25 mg daily.   5.  Spironolactone 25 mg daily.      The remainder of her medications, allergies and review of systems are reviewed and as are documented separately.      PHYSICAL EXAMINATION:   GENERAL:  The patient is a pleasant 78-year-old female who appears her stated age.  She is in no apparent  distress.   VITAL SIGNS:  Her blood pressure is 121/79, pulse is 90, weight is 200 pounds.     PULMONARY:  Breathing is nonlabored and lungs are clear to auscultation.   CARDIAC:  Reveals a regular rate and rhythm.  I do not appreciate any significant murmur.   EXTREMITIES:  Lower extremities show no evidence of edema.   NEUROLOGIC:  Alert and oriented.      ASSESSMENT AND PLAN:  Ms. Watson is a very pleasant 78-year-old female with a history of multiple cancers, most recently a left-sided breast cancer which was treated with Herceptin and paclitaxel therapy.  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  Again, recently she had been experiencing significant lightheadedness/dizziness which fortunately has pretty much completely resolved with the discontinuation of carvedilol and change over to Toprol-XL.      I discussed with her for the time being, I am going to continue her current medication regimen unchanged.  We will see how she does on the metoprolol for the next 8 weeks or so and then plan to repeat an echocardiogram.  If her EF remains low at that time, we may want to try to up-titrate the Toprol-XL.  She was initially scheduled to see Dr. Miller in early June, but since she is doing so well, we will have her reschedule this visit for followup after the echocardiogram.  Of course, if at any time in the interim she develops any heart failure symptoms or has any other questions or concerns, we would be happy to see her back sooner.      Thank you for allowing us to participate in the care of this pleasant patient.      cc:   Soren Perdomo MD   [facility]    [address]   [place]  [address]      Washington Subramanian MD   [facility]    [address]    [address]   [place]  [address]         BLUE STARK PA-C             D: 04/29/2019   T: 04/29/2019   MT: ELOY      Name:      DIXON AWRREN   MRN:      [id number]        Account:      [id number]   :      1940           Service Date: 2019      Document: [id number]

## 2019-04-29 NOTE — PATIENT INSTRUCTIONS
Thank you for your [facility] visit today. Your provider has recommended the following:  Medication Changes:  No changes at this time  Recommendations:  Echo in about 3 months  Follow-up:  Move your appt with Dr Miller to the end of July after the echocardiogram    Reminder:  Please bring in your current medication list or your medication, over the counter supplements and vitamin bottles as we will review these at each office visit.

## 2019-04-29 NOTE — LETTER
4/29/2019    [facility] [address]  [place] [address]    RE: Yolanda Watson       Dear Colleague,    I had the pleasure of seeing Yolanda Watson in the [facility].    Please see separate dictation for HPI, PHYSICAL EXAM AND IMPRESSION/PLAN.    CURRENT MEDICATIONS:  Current Outpatient Medications   Medication Sig Dispense Refill     ANASTROZOLE PO Take 1 mg by mouth At Bedtime        ASPIRIN PO Take 81 mg by mouth At Bedtime        atorvastatin (LIPITOR) 20 MG tablet Take 20 mg by mouth At Bedtime        lisinopril (PRINIVIL/ZESTRIL) 40 MG tablet Take 0.5 tablets (20 mg) by mouth daily       loperamide (IMODIUM A-D) 2 MG capsule Take 2 mg by mouth 4 times daily as needed for diarrhea       metoprolol succinate ER (TOPROL-XL) 25 MG 24 hr tablet Take 1 tablet (25 mg) by mouth daily 30 tablet 3     multivitamin, therapeutic (THERA-VIT) TABS Take 1 tablet by mouth At Bedtime No iron       PARoxetine HCl (PAXIL PO) Take 20 mg by mouth At Bedtime       spironolactone (ALDACTONE) 25 MG tablet Take 1 tablet (25 mg) by mouth daily 30 tablet 11     nystatin (MYCOSTATIN) 264889 UNIT/ML suspension Take 5 mLs (500,000 Units) by mouth 4 times daily (Patient not taking: Reported on 12/18/2018) 120 mL 0     PROCHLORPERAZINE MALEATE PO Take 10 mg by mouth every 4 hours as needed for nausea or vomiting         ALLERGIES:     Allergies   Allergen Reactions     Demerol [Meperidine] Nausea and Vomiting     Sulfa Drugs Other (See Comments)     Extreme chills     Penicillins Rash     Swelling In mouth and tongue       PAST MEDICAL HISTORY:  Past Medical History:   Diagnosis Date     Acoustic neuroma (H)      Acoustic neuroma (H)      Acute arthropathy     lower leg     Acute depression      Anemia     iron deficeincy     Anxiety      Arthritis     osteoarthrosis of knee     Breast cancer (H)     left breast     Breast cancer, left breast (H) 02/2018     Breast  cancer, right breast (H) 1/2016     Cervical cancer (H)      Colon cancer (H)      Colon cancer (H)      Decreased cardiac ejection fraction      Depression      Diabetes mellitus (H)     pre-diabetic no longer taking Metformin     Dyspnea      Dyspnea      Gastro-oesophageal reflux disease      H/O: lung cancer 2013     Herpes      Herpes zoster      Herpes zoster      HTN (hypertension)      Hyperlipidaemia      Hyperlipidemia      Hyperlipidemia      Iron deficiency anemia      Lung cancer (H)      Lung nodule 2013    Lung cancer     Lung nodule      Nausea & vomiting      OAB (overactive bladder)      Osteoarthritis of knee      Pain in female pelvis      Pain in female pelvis      Pre-diabetes      Preop general physical exam      Rib lesion      Rib lesion      Shoulder pain, acute     right     Tachycardia      Torn meniscus      Torn meniscus        PAST SURGICAL HISTORY:  Past Surgical History:   Procedure Laterality Date     acoustic neuroma excised       APPENDECTOMY  1966    done with CORNELIO     ARTHROSCOPY KNEE  2012    right knee scoped     BIOPSY NODE SENTINEL Right 2/3/2016    Procedure: BIOPSY NODE SENTINEL;  Surgeon: Flory Pinto MD;  Location: [facility]     BIOPSY NODE SENTINEL Left 3/5/2018    Procedure: BIOPSY NODE SENTINEL;;  Surgeon: Denae Perez MD;  [facility]     BREAST BIOPSY, RT/LT       cataracts       cataracts       CERVICAL CANCER SCREENING RESULTS DOCUMENTED AND REVIEWED       CHOLECYSTECTOMY  1991     CL AFF SURGICAL PATHOLOGY       CL AFF SURGICAL PATHOLOGY      acoustic neuroma left side excised [facility] Dr[facility] [place] 6/17/14     COLONOSCOPY       EXCISE NODE MEDIASTINAL  3/12/2013    Procedure: EXCISE NODE MEDIASTINAL;;  Surgeon: Lorne Arenas MD;  Location:  [facility]     EYE SURGERY      fiona cataracts     GENITOURINARY SURGERY      bladder sling     GYN SURGERY  1966    hysterectomy for cervical cancer *ovaries remain     HYSTERECTOMY  1966    Cervical CA, paps  done every other year, done with appendectomy     INSERT PORT VASCULAR ACCESS Left 3/5/2018    Procedure: INSERT PORT VASCULAR ACCESS;;  Surgeon: Denae Perez MD;  Location: [facility]     INSERT PORT VASCULAR ACCESS N/A 3/5/2018    Procedure: INSERT PORT VASCULAR ACCESS;;  Surgeon: Denae Perez MD;  Location: [facility]     LAPAROSCOPIC ASSISTED COLECTOMY Right 10/27/2016    Procedure: LAPAROSCOPIC ASSISTED COLECTOMY;  Surgeon: Umang Arteaga MD;  Location:  OR     LOBECTOMY LUNG  3/12/2013    Procedure: LOBECTOMY LUNG;;  Surgeon: Lorne Arenas MD;  Location:  OR     LUMPECTOMY BREAST WITH SEED LOCALIZATION Right 2/3/2016    Procedure: LUMPECTOMY BREAST WITH SEED LOCALIZATION;  Surgeon: Flory Pinto MD;  [facility]     LUMPECTOMY BREAST WITH SEED LOCALIZATION Left 3/5/2018    Procedure: LUMPECTOMY BREAST WITH SEED LOCALIZATION;  SEED LOCALIZED LEFT BREAST LUMPECTOMY, WITH LEFT SENTINEL NODE BIOPSY, PORT PLACEMENT;  Surgeon: Denae Perez MD;  Location: [facility]     MASTECTOMY SIMPLE Left 3/28/2018    Procedure: MASTECTOMY SIMPLE;  LEFT BREAST MASTECTOMY ;  Surgeon: Denae Perez MD;  Location: [facility]     MINI ARC SLING OPERATION FOR STRESS INCONTINENCE  2009     REMOVE PORT VASCULAR ACCESS N/A 2/13/2019    Procedure: REMOVE PORT VASCULAR ACCESS;  Surgeon: Denae Perez MD;  Location:  OR     RESECT RIB  2/15/2013    Procedure: RESECT RIB;  RESECTION PORTION RIGHT TWELVETH RIB;  Surgeon: Lorne Arenas MD;  Location:  OR     THORACOTOMY  3/12/2013    Procedure: THORACOTOMY;  RIGHT THORACOTOMY, RIGHT MIDDLE AND LOWER LUNG LOBECTOMY, MEDIASTINAL LYMPH NODE DISSECTION ;  Surgeon: Lorne Arenas MD;  Location:  OR     TONSILLECTOMY         SOCIAL HISTORY:  Social History     Socioeconomic History     Marital status:      Spouse name: None     Number of children: 3     Years of education: None     Highest education level: None   Occupational History      Employer: RETIRED   Social Needs     Financial resource strain: None     Food insecurity:     Worry: None     Inability: None     Transportation needs:     Medical: None     Non-medical: None   Tobacco Use     Smoking status: Former Smoker     Packs/day: 1.00     Years: 45.00     Pack years: 45.00     Types: Cigarettes     Start date:      Last attempt to quit: 1998     Years since quittin.0     Smokeless tobacco: Never Used     Tobacco comment: quit    Substance and Sexual Activity     Alcohol use: Yes     Alcohol/week: 0.0 oz     Comment: glass of wine a day     Drug use: No     Sexual activity: Yes     Partners: Male     Birth control/protection: Female Surgical   Lifestyle     Physical activity:     Days per week: None     Minutes per session: None     Stress: None   Relationships     Social connections:     Talks on phone: None     Gets together: None     Attends [employer] service: None     Active member of club or organization: None     Attends meetings of clubs or organizations: None     Relationship status: None     Intimate partner violence:     Fear of current or ex partner: None     Emotionally abused: None     Physically abused: None     Forced sexual activity: None   Other Topics Concern     Parent/sibling w/ CABG, MI or angioplasty before 65F 55M? Not Asked   Social History Narrative    2015    -    3 kids    Retired        Pap-2010 WNL    Mammo-11     Colonoscopy-3/3/2010 WNL, no further recommended per pt     Dexa-2008       FAMILY HISTORY:  Family History   Problem Relation Age of Onset     Breast Cancer Paternal Grandmother      Colon Cancer Other         not specified     Fractures Other         Hip fracture, Aunt     Hyperlipidemia Mother      Hypertension Mother      Heart Disease Mother      Osteoporosis Mother      Hyperlipidemia Sister      Hyperlipidemia Brother      Prostate Cancer Brother      Hypertension Sister      Heart Disease Maternal  Grandfather      Prostate Cancer Father      Colon Cancer Cousin         son of paternal aunt with breast cancer.     Breast Cancer Paternal Aunt      Colon Cancer Paternal Aunt      Other Cancer Paternal Uncle         Esophageal     Other Cancer Maternal Aunt        Review of Systems:  Skin:          Eyes:  Positive for      ENT:  Positive for hearing loss    Respiratory:  Positive for dyspnea on exertion;cough;wheezing     Cardiovascular:    fatigue;Positive for    Gastroenterology: Negative      Genitourinary:  not assessed      Musculoskeletal:  Negative      Neurologic:  Positive for headaches    Psychiatric:  Positive for sleep disturbances    Heme/Lymph/Imm:  Negative      Endocrine:  Negative for         Reviewed. Remainder of the note dictated.    Sulema Coleman PA-C    Service Date: 04/29/2019      HISTORY OF PRESENT ILLNESS:  Ms. Watson is a very pleasant 78-year-old female who presents to the office today to follow up after a recent medication change secondary to lightheadedness.  Her carvedilol was discontinued and she was started on a low dose of Toprol-XL.      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  She has a history of several cancers in the past including most recently a left-sided breast cancer for which she took paclitaxel and Herceptin.  She had a baseline normal echocardiogram with normal LV size and function in early 2018, however, on a repeat echocardiography, I believe in 09/2018, her EF dropped to 30%-35%.  She was placed on cardioprotective medications.  She did have a repeat echocardiogram in December which showed her EF remained at 30%-35%.  She was in to see Dr. Miller for routine followup about 2 weeks ago.  At that time she complained of significant dizziness along with a feeling of heaviness and shortness of breath.  She actually had taken 2 falls secondary to the dizziness.  Again, at that time Dr. Miller discontinued her  carvedilol 12.5 mg b.i.d. and started her on Toprol-XL 25 mg daily.      Today, Yolanda states she feels great.  Her prior dizziness has completely resolved.  She has some mild dyspnea on exertion, but states that this has been fairly stable.  She denies any orthopnea, PND or lower extremity edema.  She has not noted any significant weight gain.      CURRENT CARDIAC MEDICATIONS:   1.  Aspirin 81 mg daily.   2.  Atorvastatin 20 mg daily.   3.  Lisinopril 40 mg half tablet daily.   4.  Toprol-XL 25 mg daily.   5.  Spironolactone 25 mg daily.      The remainder of her medications, allergies and review of systems are reviewed and as are documented separately.      PHYSICAL EXAMINATION:   GENERAL:  The patient is a pleasant 78-year-old female who appears her stated age.  She is in no apparent distress.   VITAL SIGNS:  Her blood pressure is 121/79, pulse is 90, weight is 200 pounds.     PULMONARY:  Breathing is nonlabored and lungs are clear to auscultation.   CARDIAC:  Reveals a regular rate and rhythm.  I do not appreciate any significant murmur.   EXTREMITIES:  Lower extremities show no evidence of edema.   NEUROLOGIC:  Alert and oriented.      ASSESSMENT AND PLAN:  Ms. Watson is a very pleasant 78-year-old female with a history of multiple cancers, most recently a left-sided breast cancer which was treated with Herceptin and paclitaxel therapy.  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  Again, recently she had been experiencing significant lightheadedness/dizziness which fortunately has pretty much completely resolved with the discontinuation of carvedilol and change over to Toprol-XL.      I discussed with her for the time being, I am going to continue her current medication regimen unchanged.  We will see how she does on the metoprolol for the next 8 weeks or so and then plan to repeat an echocardiogram.  If her EF remains low at that time, we may want to try to  up-titrate the Toprol-XL.  She was initially scheduled to see Dr. Miller in early , but since she is doing so well, we will have her reschedule this visit for followup after the echocardiogram.  Of course, if at any time in the interim she develops any heart failure symptoms or has any other questions or concerns, we would be happy to see her back sooner.      Thank you for allowing us to participate in the care of this pleasant patient.      cc:   Soren Perdomo MD   [facility]    [address]   [place]  [address]      [place] Subramanian MD   [facility]    [address]    [address]   [place]  [address]         SULEMA STARK PA-C             D: 2019   T: 2019   MT: ELOY      Name:     WARREN METCALF   MRN:      [id number]        Account:      [id number]   :      1940           Service Date: 2019      Document: [id number]       Thank you for allowing me to participate in the care of your patient.      Sincerely,     Sulema Stark PA-C     [facility]    cc:   Dinorah Miller, DO  [address]  [place] [address]

## 2019-06-03 ENCOUNTER — TELEPHONE (OUTPATIENT)
Dept: CARDIOLOGY | Facility: CLINIC | Age: 79
End: 2019-06-03

## 2019-06-03 DIAGNOSIS — I42.9 SECONDARY CARDIOMYOPATHY (H): ICD-10-CM

## 2019-06-03 DIAGNOSIS — R06.00 DYSPNEA: ICD-10-CM

## 2019-06-03 DIAGNOSIS — R42 DIZZINESS: ICD-10-CM

## 2019-06-03 NOTE — TELEPHONE ENCOUNTER
Spoke with pt about symptoms that she has been having the past week. Pt reports that she is still experiencing dizziness and is extremely tried and out of breath. Pt wondering if it has to do with her medication change. When pt last saw Dr. Miller on 4/15/19, her carvedilol was discontinued and she was started on Toprol XL due to dizziness and SOB. When pt saw Sulema on 4/29/19 per the note:    We will see how she does on the metoprolol for the next 8 weeks or so and then plan to repeat an echocardiogram.  If her EF remains low at that time, we may want to try to up-titrate the Toprol-XL.  She was initially scheduled to see Dr. Miller in early June, but since she is doing so well, we will have her reschedule this visit for followup after the echocardiogram.  Of course, if at any time in the interim she develops any heart failure symptoms or has any other questions or concerns, we would be happy to see her back sooner.     Pt reports that her BPs have been good and range 120s/60-70s and she checks it at home and has checked it when she has been dizzy. Pt said that she gets extremely tired that she throws up. Now that pt is having symptoms again, informed pt that she may need to have her echo sooner and then follow up with Sulema. Pt gave verbal understanding. Will route update to Sulema to see what her recommendations are. Dr. Miller does not have any openings in the next two weeks.    ADDENDUM: Let's have her get a 7 day Zio Patch and repeat the echo. I am happy to see her back after that or she can see Dr Miller if she prefers. Sulema Coleman PA-C

## 2019-06-04 NOTE — TELEPHONE ENCOUNTER
Spoke with pt about Sulema's recommendations. Pt agreed to having the echo done and then getting the zio patch placed. Placed order for 7 day zio patch. Transferred pt to scheduling to have echo, zio patch, and follow up appt scheduled.

## 2019-06-14 ENCOUNTER — HOSPITAL ENCOUNTER (OUTPATIENT)
Dept: CARDIOLOGY | Facility: CLINIC | Age: 79
Discharge: HOME | End: 2019-06-14
Attending: PHYSICIAN ASSISTANT | Admitting: PHYSICIAN ASSISTANT
Payer: COMMERCIAL

## 2019-06-14 DIAGNOSIS — I42.9 SECONDARY CARDIOMYOPATHY (H): ICD-10-CM

## 2019-06-14 DIAGNOSIS — R06.00 DYSPNEA: ICD-10-CM

## 2019-06-14 DIAGNOSIS — R42 DIZZINESS: ICD-10-CM

## 2019-06-14 PROCEDURE — 0298T ZIO PATCH HOLTER ADULT PEDIATRIC GREATER THAN 48 HRS: CPT | Performed by: INTERNAL MEDICINE

## 2019-06-14 PROCEDURE — 93306 TTE W/DOPPLER COMPLETE: CPT | Mod: 26 | Performed by: INTERNAL MEDICINE

## 2019-06-14 PROCEDURE — 25500064 ZZH RX 255 OP 636: Performed by: PHYSICIAN ASSISTANT

## 2019-06-14 PROCEDURE — 0296T ZIO PATCH HOLTER ADULT PEDIATRIC GREATER THAN 48 HRS: CPT

## 2019-06-14 PROCEDURE — 40000264 ECHOCARDIOGRAM COMPLETE

## 2019-06-14 RX ADMIN — HUMAN ALBUMIN MICROSPHERES AND PERFLUTREN 9 ML: 10; .22 INJECTION, SOLUTION INTRAVENOUS at 14:30

## 2019-07-02 ENCOUNTER — DOCUMENTATION ONLY (OUTPATIENT)
Dept: CARDIOLOGY | Facility: CLINIC | Age: 79
End: 2019-07-02

## 2019-07-02 NOTE — PROGRESS NOTES
"Called patient regarding Zio patch results and recommendations per Sulema RONDON. Pt to follow up w/  as planned.     \"Notes recorded by Sulema Coleman PA-C on 6/29/2019 at 1:53 PM CDT  1 short run of NSVT (5 beats) and a few short runs of PSVT lasting up to 11 beats. Again recent echo shows EF is normal now. Follow up as planned to discuss concerns regarding her BB. Sulema Coleman PA-C\"    SEEMA Bhatt    "

## 2019-07-17 ENCOUNTER — TRANSFERRED RECORDS (OUTPATIENT)
Dept: HEALTH INFORMATION MANAGEMENT | Facility: CLINIC | Age: 79
End: 2019-07-17

## 2019-07-24 ENCOUNTER — TRANSFERRED RECORDS (OUTPATIENT)
Dept: HEALTH INFORMATION MANAGEMENT | Facility: CLINIC | Age: 79
End: 2019-07-24

## 2019-07-26 ENCOUNTER — APPOINTMENT (OUTPATIENT)
Dept: GENERAL RADIOLOGY | Facility: CLINIC | Age: 79
DRG: 168 | End: 2019-07-26
Attending: THORACIC SURGERY (CARDIOTHORACIC VASCULAR SURGERY)
Payer: COMMERCIAL

## 2019-07-26 ENCOUNTER — ANESTHESIA (OUTPATIENT)
Dept: SURGERY | Facility: CLINIC | Age: 79
DRG: 168 | End: 2019-07-26
Payer: COMMERCIAL

## 2019-07-26 ENCOUNTER — HOSPITAL ENCOUNTER (INPATIENT)
Facility: CLINIC | Age: 79
LOS: 1 days | Discharge: HOME | DRG: 168 | End: 2019-07-27
Attending: THORACIC SURGERY (CARDIOTHORACIC VASCULAR SURGERY) | Admitting: THORACIC SURGERY (CARDIOTHORACIC VASCULAR SURGERY)
Payer: COMMERCIAL

## 2019-07-26 ENCOUNTER — ANESTHESIA EVENT (OUTPATIENT)
Dept: SURGERY | Facility: CLINIC | Age: 79
DRG: 168 | End: 2019-07-26
Payer: COMMERCIAL

## 2019-07-26 PROBLEM — R91.1 NODULE OF LOWER LOBE OF LEFT LUNG: Status: ACTIVE | Noted: 2019-07-26

## 2019-07-26 LAB
ABO + RH BLD: NORMAL
ABO + RH BLD: NORMAL
ANION GAP SERPL CALCULATED.3IONS-SCNC: 6 MMOL/L (ref 3–14)
BLD GP AB SCN SERPL QL: NORMAL
BLOOD BANK CMNT PATIENT-IMP: NORMAL
BUN SERPL-MCNC: 27 MG/DL (ref 7–30)
CALCIUM SERPL-MCNC: 9 MG/DL (ref 8.5–10.1)
CHLORIDE SERPL-SCNC: 106 MMOL/L (ref 94–109)
CO2 SERPL-SCNC: 30 MMOL/L (ref 20–32)
CREAT SERPL-MCNC: 0.95 MG/DL (ref 0.52–1.04)
ERYTHROCYTE [DISTWIDTH] IN BLOOD BY AUTOMATED COUNT: 13.4 % (ref 10–15)
GFR SERPL CREATININE-BSD FRML MDRD: 56 ML/MIN/{1.73_M2}
GLUCOSE SERPL-MCNC: 122 MG/DL (ref 70–99)
HCT VFR BLD AUTO: 36 % (ref 35–47)
HGB BLD-MCNC: 11.8 G/DL (ref 11.7–15.7)
INR PPP: 0.95 (ref 0.86–1.14)
MCH RBC QN AUTO: 33.2 PG (ref 26.5–33)
MCHC RBC AUTO-ENTMCNC: 32.8 G/DL (ref 31.5–36.5)
MCV RBC AUTO: 101 FL (ref 78–100)
PLATELET # BLD AUTO: 448 10E9/L (ref 150–450)
POTASSIUM SERPL-SCNC: 3.9 MMOL/L (ref 3.4–5.3)
RBC # BLD AUTO: 3.55 10E12/L (ref 3.8–5.2)
SODIUM SERPL-SCNC: 142 MMOL/L (ref 133–144)
SPECIMEN EXP DATE BLD: NORMAL
WBC # BLD AUTO: 10.3 10E9/L (ref 4–11)

## 2019-07-26 PROCEDURE — 88341 IMHCHEM/IMCYTCHM EA ADD ANTB: CPT | Mod: 26 | Performed by: THORACIC SURGERY (CARDIOTHORACIC VASCULAR SURGERY)

## 2019-07-26 PROCEDURE — 85610 PROTHROMBIN TIME: CPT | Performed by: THORACIC SURGERY (CARDIOTHORACIC VASCULAR SURGERY)

## 2019-07-26 PROCEDURE — 12000000 ZZH R&B MED SURG/OB

## 2019-07-26 PROCEDURE — 25000125 ZZHC RX 250: Performed by: NURSE ANESTHETIST, CERTIFIED REGISTERED

## 2019-07-26 PROCEDURE — 86900 BLOOD TYPING SEROLOGIC ABO: CPT | Performed by: THORACIC SURGERY (CARDIOTHORACIC VASCULAR SURGERY)

## 2019-07-26 PROCEDURE — 86850 RBC ANTIBODY SCREEN: CPT | Performed by: THORACIC SURGERY (CARDIOTHORACIC VASCULAR SURGERY)

## 2019-07-26 PROCEDURE — 25800030 ZZH RX IP 258 OP 636: Performed by: PHYSICIAN ASSISTANT

## 2019-07-26 PROCEDURE — 0BBJ4ZX EXCISION OF LEFT LOWER LUNG LOBE, PERCUTANEOUS ENDOSCOPIC APPROACH, DIAGNOSTIC: ICD-10-PCS | Performed by: THORACIC SURGERY (CARDIOTHORACIC VASCULAR SURGERY)

## 2019-07-26 PROCEDURE — 36415 COLL VENOUS BLD VENIPUNCTURE: CPT | Performed by: THORACIC SURGERY (CARDIOTHORACIC VASCULAR SURGERY)

## 2019-07-26 PROCEDURE — 86901 BLOOD TYPING SEROLOGIC RH(D): CPT | Performed by: THORACIC SURGERY (CARDIOTHORACIC VASCULAR SURGERY)

## 2019-07-26 PROCEDURE — 36000063 ZZH SURGERY LEVEL 4 EA 15 ADDTL MIN: Performed by: THORACIC SURGERY (CARDIOTHORACIC VASCULAR SURGERY)

## 2019-07-26 PROCEDURE — 25000125 ZZHC RX 250: Performed by: PHYSICIAN ASSISTANT

## 2019-07-26 PROCEDURE — 27210794 ZZH OR GENERAL SUPPLY STERILE: Performed by: THORACIC SURGERY (CARDIOTHORACIC VASCULAR SURGERY)

## 2019-07-26 PROCEDURE — 36000093 ZZH SURGERY LEVEL 4 1ST 30 MIN: Performed by: THORACIC SURGERY (CARDIOTHORACIC VASCULAR SURGERY)

## 2019-07-26 PROCEDURE — 25000566 ZZH SEVOFLURANE, EA 15 MIN: Performed by: THORACIC SURGERY (CARDIOTHORACIC VASCULAR SURGERY)

## 2019-07-26 PROCEDURE — 88341 IMHCHEM/IMCYTCHM EA ADD ANTB: CPT | Performed by: THORACIC SURGERY (CARDIOTHORACIC VASCULAR SURGERY)

## 2019-07-26 PROCEDURE — 25000128 H RX IP 250 OP 636: Performed by: NURSE ANESTHETIST, CERTIFIED REGISTERED

## 2019-07-26 PROCEDURE — 88307 TISSUE EXAM BY PATHOLOGIST: CPT | Mod: 26 | Performed by: THORACIC SURGERY (CARDIOTHORACIC VASCULAR SURGERY)

## 2019-07-26 PROCEDURE — 37000009 ZZH ANESTHESIA TECHNICAL FEE, EACH ADDTL 15 MIN: Performed by: THORACIC SURGERY (CARDIOTHORACIC VASCULAR SURGERY)

## 2019-07-26 PROCEDURE — 40000985 XR CHEST PORT 1 VW

## 2019-07-26 PROCEDURE — 37000008 ZZH ANESTHESIA TECHNICAL FEE, 1ST 30 MIN: Performed by: THORACIC SURGERY (CARDIOTHORACIC VASCULAR SURGERY)

## 2019-07-26 PROCEDURE — 88342 IMHCHEM/IMCYTCHM 1ST ANTB: CPT | Performed by: THORACIC SURGERY (CARDIOTHORACIC VASCULAR SURGERY)

## 2019-07-26 PROCEDURE — 25800030 ZZH RX IP 258 OP 636: Performed by: SURGERY

## 2019-07-26 PROCEDURE — 88307 TISSUE EXAM BY PATHOLOGIST: CPT | Performed by: THORACIC SURGERY (CARDIOTHORACIC VASCULAR SURGERY)

## 2019-07-26 PROCEDURE — 25000125 ZZHC RX 250: Performed by: THORACIC SURGERY (CARDIOTHORACIC VASCULAR SURGERY)

## 2019-07-26 PROCEDURE — 80048 BASIC METABOLIC PNL TOTAL CA: CPT | Performed by: THORACIC SURGERY (CARDIOTHORACIC VASCULAR SURGERY)

## 2019-07-26 PROCEDURE — 85027 COMPLETE CBC AUTOMATED: CPT | Performed by: THORACIC SURGERY (CARDIOTHORACIC VASCULAR SURGERY)

## 2019-07-26 PROCEDURE — 25000132 ZZH RX MED GY IP 250 OP 250 PS 637: Performed by: PHYSICIAN ASSISTANT

## 2019-07-26 PROCEDURE — 40000170 ZZH STATISTIC PRE-PROCEDURE ASSESSMENT II: Performed by: THORACIC SURGERY (CARDIOTHORACIC VASCULAR SURGERY)

## 2019-07-26 PROCEDURE — 71000012 ZZH RECOVERY PHASE 1 LEVEL 1 FIRST HR: Performed by: THORACIC SURGERY (CARDIOTHORACIC VASCULAR SURGERY)

## 2019-07-26 PROCEDURE — 88342 IMHCHEM/IMCYTCHM 1ST ANTB: CPT | Mod: 26 | Performed by: THORACIC SURGERY (CARDIOTHORACIC VASCULAR SURGERY)

## 2019-07-26 PROCEDURE — 25000128 H RX IP 250 OP 636: Performed by: ANESTHESIOLOGY

## 2019-07-26 RX ORDER — METOPROLOL SUCCINATE 25 MG/1
25 TABLET, EXTENDED RELEASE ORAL DAILY
Status: DISCONTINUED | OUTPATIENT
Start: 2019-07-27 | End: 2019-07-27 | Stop reason: HOSPADM

## 2019-07-26 RX ORDER — AMOXICILLIN 250 MG
2 CAPSULE ORAL 2 TIMES DAILY
Status: DISCONTINUED | OUTPATIENT
Start: 2019-07-26 | End: 2019-07-27 | Stop reason: HOSPADM

## 2019-07-26 RX ORDER — SPIRONOLACTONE 25 MG
25 TABLET ORAL AT BEDTIME
Status: DISCONTINUED | OUTPATIENT
Start: 2019-07-26 | End: 2019-07-27 | Stop reason: HOSPADM

## 2019-07-26 RX ORDER — AMOXICILLIN 250 MG
1 CAPSULE ORAL 2 TIMES DAILY
Status: DISCONTINUED | OUTPATIENT
Start: 2019-07-26 | End: 2019-07-27 | Stop reason: HOSPADM

## 2019-07-26 RX ORDER — LIDOCAINE 40 MG/G
CREAM TOPICAL
Status: DISCONTINUED | OUTPATIENT
Start: 2019-07-26 | End: 2019-07-27 | Stop reason: HOSPADM

## 2019-07-26 RX ORDER — DIPHENHYDRAMINE HYDROCHLORIDE 50 MG/ML
12.5 INJECTION INTRAMUSCULAR; INTRAVENOUS EVERY 6 HOURS PRN
Status: DISCONTINUED | OUTPATIENT
Start: 2019-07-26 | End: 2019-07-27 | Stop reason: HOSPADM

## 2019-07-26 RX ORDER — ONDANSETRON 4 MG/1
4 TABLET, ORALLY DISINTEGRATING ORAL EVERY 30 MIN PRN
Status: DISCONTINUED | OUTPATIENT
Start: 2019-07-26 | End: 2019-07-26 | Stop reason: HOSPADM

## 2019-07-26 RX ORDER — LOPERAMIDE HCL 2 MG
2 CAPSULE ORAL 4 TIMES DAILY PRN
Status: DISCONTINUED | OUTPATIENT
Start: 2019-07-26 | End: 2019-07-27 | Stop reason: HOSPADM

## 2019-07-26 RX ORDER — CALCIUM CARBONATE 500 MG/1
1000 TABLET, CHEWABLE ORAL 4 TIMES DAILY PRN
Status: DISCONTINUED | OUTPATIENT
Start: 2019-07-26 | End: 2019-07-27 | Stop reason: HOSPADM

## 2019-07-26 RX ORDER — ATORVASTATIN CALCIUM 10 MG/1
20 TABLET, FILM COATED ORAL AT BEDTIME
Status: DISCONTINUED | OUTPATIENT
Start: 2019-07-26 | End: 2019-07-27 | Stop reason: HOSPADM

## 2019-07-26 RX ORDER — SODIUM CHLORIDE, SODIUM LACTATE, POTASSIUM CHLORIDE, CALCIUM CHLORIDE 600; 310; 30; 20 MG/100ML; MG/100ML; MG/100ML; MG/100ML
INJECTION, SOLUTION INTRAVENOUS CONTINUOUS
Status: DISCONTINUED | OUTPATIENT
Start: 2019-07-26 | End: 2019-07-26 | Stop reason: HOSPADM

## 2019-07-26 RX ORDER — FENTANYL CITRATE 50 UG/ML
INJECTION, SOLUTION INTRAMUSCULAR; INTRAVENOUS PRN
Status: DISCONTINUED | OUTPATIENT
Start: 2019-07-26 | End: 2019-07-26

## 2019-07-26 RX ORDER — ONDANSETRON 4 MG/1
4 TABLET, ORALLY DISINTEGRATING ORAL EVERY 6 HOURS PRN
Status: DISCONTINUED | OUTPATIENT
Start: 2019-07-26 | End: 2019-07-27 | Stop reason: HOSPADM

## 2019-07-26 RX ORDER — ONDANSETRON 2 MG/ML
4 INJECTION INTRAMUSCULAR; INTRAVENOUS EVERY 6 HOURS PRN
Status: DISCONTINUED | OUTPATIENT
Start: 2019-07-26 | End: 2019-07-27 | Stop reason: HOSPADM

## 2019-07-26 RX ORDER — PROCHLORPERAZINE MALEATE 5 MG
5 TABLET ORAL EVERY 6 HOURS PRN
Status: DISCONTINUED | OUTPATIENT
Start: 2019-07-26 | End: 2019-07-27 | Stop reason: HOSPADM

## 2019-07-26 RX ORDER — PROPOFOL 10 MG/ML
INJECTION, EMULSION INTRAVENOUS PRN
Status: DISCONTINUED | OUTPATIENT
Start: 2019-07-26 | End: 2019-07-26

## 2019-07-26 RX ORDER — GINSENG 100 MG
CAPSULE ORAL 3 TIMES DAILY
Status: DISCONTINUED | OUTPATIENT
Start: 2019-07-26 | End: 2019-07-27 | Stop reason: HOSPADM

## 2019-07-26 RX ORDER — ONDANSETRON 4 MG/1
4 TABLET, ORALLY DISINTEGRATING ORAL 2 TIMES DAILY PRN
COMMUNITY
End: 2021-07-19

## 2019-07-26 RX ORDER — FENTANYL CITRATE 50 UG/ML
25-50 INJECTION, SOLUTION INTRAMUSCULAR; INTRAVENOUS
Status: DISCONTINUED | OUTPATIENT
Start: 2019-07-26 | End: 2019-07-26 | Stop reason: HOSPADM

## 2019-07-26 RX ORDER — POLYETHYLENE GLYCOL 3350 17 G/17G
17 POWDER, FOR SOLUTION ORAL DAILY PRN
Status: DISCONTINUED | OUTPATIENT
Start: 2019-07-26 | End: 2019-07-27 | Stop reason: HOSPADM

## 2019-07-26 RX ORDER — HYDROMORPHONE HYDROCHLORIDE 1 MG/ML
.2-.3 INJECTION, SOLUTION INTRAMUSCULAR; INTRAVENOUS; SUBCUTANEOUS
Status: DISCONTINUED | OUTPATIENT
Start: 2019-07-26 | End: 2019-07-27 | Stop reason: HOSPADM

## 2019-07-26 RX ORDER — ANASTROZOLE 1 MG/1
1 TABLET ORAL AT BEDTIME
Status: DISCONTINUED | OUTPATIENT
Start: 2019-07-26 | End: 2019-07-27 | Stop reason: HOSPADM

## 2019-07-26 RX ORDER — LISINOPRIL 20 MG/1
20 TABLET ORAL AT BEDTIME
Status: DISCONTINUED | OUTPATIENT
Start: 2019-07-26 | End: 2019-07-27 | Stop reason: HOSPADM

## 2019-07-26 RX ORDER — ACETAMINOPHEN 325 MG/1
650 TABLET ORAL EVERY 4 HOURS PRN
Status: DISCONTINUED | OUTPATIENT
Start: 2019-07-29 | End: 2019-07-27 | Stop reason: HOSPADM

## 2019-07-26 RX ORDER — AMOXICILLIN 250 MG
1 CAPSULE ORAL 2 TIMES DAILY PRN
Status: DISCONTINUED | OUTPATIENT
Start: 2019-07-26 | End: 2019-07-27 | Stop reason: HOSPADM

## 2019-07-26 RX ORDER — ASPIRIN 81 MG/1
81 TABLET ORAL EVERY EVENING
COMMUNITY
End: 2021-07-19

## 2019-07-26 RX ORDER — DIPHENHYDRAMINE HCL 12.5MG/5ML
12.5 LIQUID (ML) ORAL EVERY 6 HOURS PRN
Status: DISCONTINUED | OUTPATIENT
Start: 2019-07-26 | End: 2019-07-27 | Stop reason: HOSPADM

## 2019-07-26 RX ORDER — CLINDAMYCIN PHOSPHATE 900 MG/50ML
900 INJECTION, SOLUTION INTRAVENOUS SEE ADMIN INSTRUCTIONS
Status: DISCONTINUED | OUTPATIENT
Start: 2019-07-26 | End: 2019-07-26 | Stop reason: HOSPADM

## 2019-07-26 RX ORDER — CHOLECALCIFEROL (VITAMIN D3) 50 MCG
1 TABLET ORAL DAILY
Status: ON HOLD | COMMUNITY
End: 2023-01-01

## 2019-07-26 RX ORDER — HYDROMORPHONE HYDROCHLORIDE 1 MG/ML
.3-.5 INJECTION, SOLUTION INTRAMUSCULAR; INTRAVENOUS; SUBCUTANEOUS EVERY 5 MIN PRN
Status: DISCONTINUED | OUTPATIENT
Start: 2019-07-26 | End: 2019-07-26 | Stop reason: HOSPADM

## 2019-07-26 RX ORDER — FENTANYL CITRATE 50 UG/ML
25-100 INJECTION, SOLUTION INTRAMUSCULAR; INTRAVENOUS
Status: DISCONTINUED | OUTPATIENT
Start: 2019-07-26 | End: 2019-07-26 | Stop reason: HOSPADM

## 2019-07-26 RX ORDER — CLINDAMYCIN PHOSPHATE 900 MG/50ML
900 INJECTION, SOLUTION INTRAVENOUS
Status: COMPLETED | OUTPATIENT
Start: 2019-07-26 | End: 2019-07-26

## 2019-07-26 RX ORDER — AMOXICILLIN 250 MG
2 CAPSULE ORAL 2 TIMES DAILY PRN
Status: DISCONTINUED | OUTPATIENT
Start: 2019-07-26 | End: 2019-07-27 | Stop reason: HOSPADM

## 2019-07-26 RX ORDER — ONDANSETRON 2 MG/ML
4 INJECTION INTRAMUSCULAR; INTRAVENOUS EVERY 30 MIN PRN
Status: DISCONTINUED | OUTPATIENT
Start: 2019-07-26 | End: 2019-07-26 | Stop reason: HOSPADM

## 2019-07-26 RX ORDER — PAROXETINE 20 MG/1
20 TABLET, FILM COATED ORAL AT BEDTIME
Status: DISCONTINUED | OUTPATIENT
Start: 2019-07-26 | End: 2019-07-27 | Stop reason: HOSPADM

## 2019-07-26 RX ORDER — BISACODYL 10 MG
10 SUPPOSITORY, RECTAL RECTAL DAILY PRN
Status: DISCONTINUED | OUTPATIENT
Start: 2019-07-26 | End: 2019-07-27 | Stop reason: HOSPADM

## 2019-07-26 RX ORDER — SODIUM CHLORIDE 9 MG/ML
INJECTION, SOLUTION INTRAVENOUS CONTINUOUS
Status: DISCONTINUED | OUTPATIENT
Start: 2019-07-26 | End: 2019-07-27 | Stop reason: HOSPADM

## 2019-07-26 RX ORDER — ONDANSETRON 2 MG/ML
INJECTION INTRAMUSCULAR; INTRAVENOUS PRN
Status: DISCONTINUED | OUTPATIENT
Start: 2019-07-26 | End: 2019-07-26

## 2019-07-26 RX ORDER — ACETAMINOPHEN 325 MG/1
975 TABLET ORAL EVERY 8 HOURS
Status: DISCONTINUED | OUTPATIENT
Start: 2019-07-26 | End: 2019-07-27 | Stop reason: HOSPADM

## 2019-07-26 RX ORDER — NALOXONE HYDROCHLORIDE 0.4 MG/ML
.1-.4 INJECTION, SOLUTION INTRAMUSCULAR; INTRAVENOUS; SUBCUTANEOUS
Status: DISCONTINUED | OUTPATIENT
Start: 2019-07-26 | End: 2019-07-26

## 2019-07-26 RX ORDER — IBUPROFEN 200 MG
200-400 TABLET ORAL DAILY PRN
COMMUNITY
End: 2020-08-11

## 2019-07-26 RX ORDER — IBUPROFEN 600 MG/1
600 TABLET, FILM COATED ORAL EVERY 6 HOURS PRN
Status: DISCONTINUED | OUTPATIENT
Start: 2019-07-26 | End: 2019-07-27 | Stop reason: HOSPADM

## 2019-07-26 RX ORDER — LIDOCAINE HYDROCHLORIDE 20 MG/ML
INJECTION, SOLUTION INFILTRATION; PERINEURAL PRN
Status: DISCONTINUED | OUTPATIENT
Start: 2019-07-26 | End: 2019-07-26

## 2019-07-26 RX ORDER — NALOXONE HYDROCHLORIDE 0.4 MG/ML
.1-.4 INJECTION, SOLUTION INTRAMUSCULAR; INTRAVENOUS; SUBCUTANEOUS
Status: DISCONTINUED | OUTPATIENT
Start: 2019-07-26 | End: 2019-07-27 | Stop reason: HOSPADM

## 2019-07-26 RX ADMIN — LIDOCAINE HYDROCHLORIDE 80 MG: 20 INJECTION, SOLUTION INFILTRATION; PERINEURAL at 15:01

## 2019-07-26 RX ADMIN — HYDROMORPHONE HYDROCHLORIDE 1 MG: 2 TABLET ORAL at 18:30

## 2019-07-26 RX ADMIN — ACETAMINOPHEN 975 MG: 325 TABLET, FILM COATED ORAL at 21:18

## 2019-07-26 RX ADMIN — ANASTROZOLE 1 MG: 1 TABLET ORAL at 21:17

## 2019-07-26 RX ADMIN — FENTANYL CITRATE 50 MCG: 50 INJECTION, SOLUTION INTRAMUSCULAR; INTRAVENOUS at 15:01

## 2019-07-26 RX ADMIN — ONDANSETRON 4 MG: 2 INJECTION INTRAMUSCULAR; INTRAVENOUS at 15:44

## 2019-07-26 RX ADMIN — HYDROMORPHONE HYDROCHLORIDE 0.5 MG: 1 INJECTION, SOLUTION INTRAMUSCULAR; INTRAVENOUS; SUBCUTANEOUS at 16:24

## 2019-07-26 RX ADMIN — SENNOSIDES AND DOCUSATE SODIUM 2 TABLET: 8.6; 5 TABLET ORAL at 21:19

## 2019-07-26 RX ADMIN — LISINOPRIL 20 MG: 20 TABLET ORAL at 21:20

## 2019-07-26 RX ADMIN — PAROXETINE HYDROCHLORIDE 20 MG: 20 TABLET, FILM COATED ORAL at 21:20

## 2019-07-26 RX ADMIN — PROPOFOL 180 MG: 10 INJECTION, EMULSION INTRAVENOUS at 15:01

## 2019-07-26 RX ADMIN — SUGAMMADEX 200 MG: 100 INJECTION, SOLUTION INTRAVENOUS at 15:49

## 2019-07-26 RX ADMIN — FAMOTIDINE 20 MG: 10 INJECTION, SOLUTION INTRAVENOUS at 21:22

## 2019-07-26 RX ADMIN — SODIUM CHLORIDE: 9 INJECTION, SOLUTION INTRAVENOUS at 18:30

## 2019-07-26 RX ADMIN — ROCURONIUM BROMIDE 40 MG: 10 INJECTION INTRAVENOUS at 15:01

## 2019-07-26 RX ADMIN — FENTANYL CITRATE 50 MCG: 50 INJECTION, SOLUTION INTRAMUSCULAR; INTRAVENOUS at 16:08

## 2019-07-26 RX ADMIN — SPIRONOLACTONE 25 MG: 25 TABLET ORAL at 21:17

## 2019-07-26 RX ADMIN — SODIUM CHLORIDE, POTASSIUM CHLORIDE, SODIUM LACTATE AND CALCIUM CHLORIDE: 600; 310; 30; 20 INJECTION, SOLUTION INTRAVENOUS at 14:16

## 2019-07-26 RX ADMIN — ATORVASTATIN CALCIUM 20 MG: 10 TABLET, FILM COATED ORAL at 21:18

## 2019-07-26 RX ADMIN — CLINDAMYCIN PHOSPHATE 900 MG: 18 INJECTION, SOLUTION INTRAVENOUS at 15:09

## 2019-07-26 ASSESSMENT — ACTIVITIES OF DAILY LIVING (ADL): ADLS_ACUITY_SCORE: 15

## 2019-07-26 ASSESSMENT — MIFFLIN-ST. JEOR: SCORE: 1448.37

## 2019-07-26 ASSESSMENT — LIFESTYLE VARIABLES: TOBACCO_USE: 1

## 2019-07-26 NOTE — PROGRESS NOTES
Admission medication history interview status for the 7/26/2019  admission is complete. See EPIC admission navigator for prior to admission medications     Medication history source reliability:Good    Medication history interview source(s):Patient    Medication history resources (including written lists, pill bottles, clinic record):None    Primary pharmacy.[facility]    Additional medication history information not noted on PTA med list :None    Time spent in this activity: 45 minutes    Prior to Admission medications    Medication Sig Last Dose Taking? Auth Provider   anastrozole (ARIMIDEX) 1 MG tablet Take 1 mg by mouth At Bedtime  7/25/2019 at 2230 Yes Reported, Patient   aspirin 81 MG EC tablet Take 81 mg by mouth every evening 7/25/2019 at 2230 Yes Reported, Patient   atorvastatin (LIPITOR) 20 MG tablet Take 20 mg by mouth At Bedtime  7/25/2019 at 2230 Yes Reported, Patient   ibuprofen (ADVIL/MOTRIN) 200 MG tablet Take 200-400 mg by mouth daily as needed for mild pain 7/24/2019 at prn Yes Reported, Patient   lisinopril (PRINIVIL/ZESTRIL) 20 MG tablet Take 20 mg by mouth daily  7/25/2019 at 2230 Yes Dinorah Miller DO   loperamide (IMODIUM A-D) 2 MG capsule Take 2 mg by mouth 4 times daily as needed for diarrhea 7/26/2019 at 0830 Yes Reported, Patient   metoprolol succinate ER (TOPROL-XL) 25 MG 24 hr tablet Take 1 tablet (25 mg) by mouth daily 7/26/2019 at 0830 Yes Sulema Coleman PA-C   multivitamin, therapeutic (THERA-VIT) TABS Take 1 tablet by mouth At Bedtime No iron 7/25/2019 at 2230 Yes Unknown, Entered By History   ondansetron (ZOFRAN-ODT) 4 MG ODT tab Take 4 mg by mouth 2 times daily as needed for nausea 7/25/2019 at prn Yes Reported, Patient   PARoxetine (PAXIL) 20 MG tablet Take 20 mg by mouth At Bedtime  7/25/2019 at 2230 Yes Unknown, Entered By History   spironolactone (ALDACTONE) 25 MG tablet Take 1 tablet (25 mg) by mouth daily 7/25/2019 at 2230 Yes Dinorah Miller  DO   vitamin D3 (CHOLECALCIFEROL) 2000 units (50 mcg) tablet Take 1 tablet by mouth daily 7/26/2019 at 0830 Yes Reported, Patient

## 2019-07-26 NOTE — ANESTHESIA PREPROCEDURE EVALUATION
Anesthesia Pre-Procedure Evaluation    Patient: Yolanda Watson   MRN: [id number] : 1940          Preoperative Diagnosis: LEFT LOWER LOBE LUNG NODULE    Procedure(s):  LEFT VIDEO ASSISTED THORACIC SURGERY; POSSIBLE LEFT THORACOTOMY; WEDGE RESECTION OF LEFT LUNG NODULE    Past Medical History:   Diagnosis Date     Acoustic neuroma (H)      Acoustic neuroma (H)      Acute arthropathy     lower leg     Acute depression      Anemia     iron deficeincy     Anxiety      Arthritis     osteoarthrosis of knee     Breast cancer (H)     left breast     Breast cancer, left breast (H) 2018     Breast cancer, right breast (H) 2016     Cervical cancer (H)      Colon cancer (H)      Colon cancer (H)      Decreased cardiac ejection fraction      Depression      Depressive disorder      Diabetes mellitus (H)     pre-diabetic no longer taking Metformin     Dyspnea      Dyspnea      Gastro-oesophageal reflux disease      H/O: lung cancer      Herpes      Herpes zoster      Herpes zoster      History of blood transfusion      HTN (hypertension)      Hyperlipidaemia      Hyperlipidemia      Hyperlipidemia      Iron deficiency anemia      Lung cancer (H)      Lung nodule     Lung cancer     Lung nodule      Nausea & vomiting      OAB (overactive bladder)      Obese      Osteoarthritis of knee      Pain in female pelvis      Pain in female pelvis      Pre-diabetes      Preop general physical exam      Rib lesion      Rib lesion      Shoulder pain, acute     right     Tachycardia      Torn meniscus      Torn meniscus      Past Surgical History:   Procedure Laterality Date     acoustic neuroma excised       APPENDECTOMY  1966    done with CORNELIO     ARTHROSCOPY KNEE      right knee scoped     BIOPSY NODE SENTINEL Right 2/3/2016    Procedure: BIOPSY NODE SENTINEL;  Surgeon: Flory Pinto MD;  Location: [facility]     BIOPSY NODE SENTINEL Left 3/5/2018    Procedure: BIOPSY NODE SENTINEL;;  Surgeon: Denae Perez MD;   [facility]     BREAST BIOPSY, RT/LT       cataracts       cataracts       CERVICAL CANCER SCREENING RESULTS DOCUMENTED AND REVIEWED       CHOLECYSTECTOMY  1991     CL AFF SURGICAL PATHOLOGY       CL AFF SURGICAL PATHOLOGY      acoustic neuroma left side excised [facility] [place] 6/17/14     COLONOSCOPY       EXCISE NODE MEDIASTINAL  3/12/2013    Procedure: EXCISE NODE MEDIASTINAL;;  Surgeon: Lorne Arenas MD;  Location:  OR     EYE SURGERY      fiona cataracts     GENITOURINARY SURGERY      bladder sling     GYN SURGERY  1966    hysterectomy for cervical cancer *ovaries remain     HYSTERECTOMY  1966    Cervical CA, paps done every other year, done with appendectomy     INSERT PORT VASCULAR ACCESS Left 3/5/2018    Procedure: INSERT PORT VASCULAR ACCESS;;  Surgeon: Denae Perez MD;  Location: [facility]     INSERT PORT VASCULAR ACCESS N/A 3/5/2018    Procedure: INSERT PORT VASCULAR ACCESS;;  Surgeon: Denae Perez MD;  Location: [facility]     LAPAROSCOPIC ASSISTED COLECTOMY Right 10/27/2016    Procedure: LAPAROSCOPIC ASSISTED COLECTOMY;  Surgeon: Umang Arteaga MD;  Location:  OR     LOBECTOMY LUNG  3/12/2013    Procedure: LOBECTOMY LUNG;;  Surgeon: Lorne Arenas MD;  Location:  OR     LUMPECTOMY BREAST WITH SEED LOCALIZATION Right 2/3/2016    Procedure: LUMPECTOMY BREAST WITH SEED LOCALIZATION;  Surgeon: Flory Pinto MD;  Location: [facility]     LUMPECTOMY BREAST WITH SEED LOCALIZATION Left 3/5/2018    Procedure: LUMPECTOMY BREAST WITH SEED LOCALIZATION;  SEED LOCALIZED LEFT BREAST LUMPECTOMY, WITH LEFT SENTINEL NODE BIOPSY, PORT PLACEMENT;  Surgeon: Denae Perez MD;  Location: [facility]     MASTECTOMY SIMPLE Left 3/28/2018    Procedure: MASTECTOMY SIMPLE;  LEFT BREAST MASTECTOMY ;  Surgeon: Denae Perez MD;  Location: [facility]     MINI ARC SLING OPERATION FOR STRESS INCONTINENCE  2009     REMOVE PORT VASCULAR ACCESS N/A 2/13/2019    Procedure: REMOVE PORT VASCULAR ACCESS;   Surgeon: Denae Perez MD;  Location:  OR     RESECT RIB  2/15/2013    Procedure: RESECT RIB;  RESECTION PORTION RIGHT TWELVETH RIB;  Surgeon: Lorne Arenas MD;  Location:  OR     THORACOTOMY  3/12/2013    Procedure: THORACOTOMY;  RIGHT THORACOTOMY, RIGHT MIDDLE AND LOWER LUNG LOBECTOMY, MEDIASTINAL LYMPH NODE DISSECTION ;  Surgeon: Lorne Arenas MD;  Location:  OR     TONSILLECTOMY         Anesthesia Evaluation     . Pt has had prior anesthetic.     No history of anesthetic complications          ROS/MED HX    ENT/Pulmonary:     (+)tobacco use (90-130pk yr history), Past use , . .   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 : . . CABRERA (Per patient she say pulmonology and no COPD diagnosis and cleared for surgery.  SHe is unsure her exercise capacity but no home O2 and able to walk around), . :. .       METS/Exercise Tolerance:     Hematologic:         Musculoskeletal:   (+) arthritis,  -       GI/Hepatic:     (+) GERD Asymptomatic on medication, hiatal hernia,       Renal/Genitourinary:         Endo:     (+) Obesity (BMI 31), .   (-) Type II DM   Psychiatric:     (+) psychiatric history depression      Infectious Disease:         Malignancy:   (+) Malignancy   Cervical CA sp resection  Accoustic neuroma sp resection  Breast CA 2016 and 2018  Colon CA 2013 sp colectomy  Lung CA currently and sp 2013 middle and lower lobectomy        Other:                          Physical Exam  Normal systems: cardiovascular and pulmonary    Airway   Mallampati: II  TM distance: >3 FB  Neck ROM: full    Dental   (+) upper dentures and lower dentures    Cardiovascular   Rhythm and rate: regular and normal      Pulmonary   PE comment: Mild crackles            Lab Results   Component Value Date    WBC 10.3 07/26/2019    HGB 11.8 07/26/2019    HCT 36.0 07/26/2019      "07/26/2019     02/05/2019    POTASSIUM 5.1 02/05/2019    CHLORIDE 107 02/05/2019    CO2 26 02/05/2019    BUN 24 02/05/2019    CR 0.88 02/05/2019    GLC 95 02/05/2019    PRIYA 9.3 02/05/2019    PHOS 2.9 10/28/2016    MAG 2.1 06/02/2018    ALBUMIN 4.2 08/21/2018    PROTTOTAL 6.5 08/21/2018    ALT 23 08/21/2018    AST 14 08/21/2018    ALKPHOS 81 08/21/2018    BILITOTAL 0.5 08/21/2018    PTT 29 11/01/2016    INR 1.07 11/01/2016       Preop Vitals  BP Readings from Last 3 Encounters:   07/26/19 112/67   04/29/19 121/79   04/15/19 110/72    Pulse Readings from Last 3 Encounters:   07/26/19 71   04/29/19 90   04/15/19 86      Resp Readings from Last 3 Encounters:   07/26/19 14   02/13/19 16   06/03/18 16    SpO2 Readings from Last 3 Encounters:   07/26/19 94%   04/15/19 95%   02/13/19 96%      Temp Readings from Last 1 Encounters:   07/26/19 36.9  C (98.4  F) (Temporal)    Ht Readings from Last 1 Encounters:   07/26/19 1.727 m (5' 8\")      Wt Readings from Last 1 Encounters:   07/26/19 93 kg (205 lb)    Estimated body mass index is 31.17 kg/m  as calculated from the following:    Height as of this encounter: 1.727 m (5' 8\").    Weight as of this encounter: 93 kg (205 lb).       Anesthesia Plan      History & Physical Review  History and physical reviewed and following examination; no interval change.    ASA Status:  4 .    NPO Status:  > 8 hours    Plan for General and ETT with Intravenous induction. Maintenance will be Balanced.    PONV prophylaxis:  Ondansetron (or other 5HT-3)  37F DL ETT      Postoperative Care  Postoperative pain management:  IV analgesics.      Consents  Anesthetic plan, risks, benefits and alternatives discussed with:  Patient..                 Chase Patton MD  "

## 2019-07-26 NOTE — ANESTHESIA CARE TRANSFER NOTE
Patient: Yolanda Watson    Procedure(s):  LEFT VIDEO ASSISTED THORACIC SURGERY; POSSIBLE LEFT THORACOTOMY; TWO WEDGE RESECTIONS OF LEFT LOWER LUNG NODULE    Diagnosis: LEFT LOWER LOBE LUNG NODULE  Diagnosis Additional Information: No value filed.    Anesthesia Type:   General, ETT     Note:  Airway :Face Mask  Patient transferred to:PACU  Comments: Neuromuscular blockade reversed after TOF 4/4, spontaneous respirations, adequate tidal volumes, followed commands to voice, oropharynx suctioned with soft flexible catheter, extubated atraumatically, extubated with suction, airway patent after extubation.  Oxygen via facemask at 10 liters per minute to PACU. Oxygen tubing connected to wall O2 in PACU, SpO2, NiBP, and EKG monitors and alarms on and functioning, report on patient's clinical status given to PACU RN, RN questions answered. Handoff Report: Identifed the Patient, Identified the Reponsible Provider, Reviewed the pertinent medical history, Discussed the surgical course, Reviewed Intra-OP anesthesia mangement and issues during anesthesia, Set expectations for post-procedure period and Allowed opportunity for questions and acknowledgement of understanding      Vitals: (Last set prior to Anesthesia Care Transfer)    CRNA VITALS  7/26/2019 1529 - 7/26/2019 1610      7/26/2019             Pulse:  79    SpO2:  98 %    Resp Rate (observed):  1  (Abnormal)     Resp Rate (set):  10                Electronically Signed By: GEOVANNY Serna CRNA  July 26, 2019  4:10 PM

## 2019-07-26 NOTE — ANESTHESIA POSTPROCEDURE EVALUATION
Patient: Yolanda Watson    Procedure(s):  LEFT VIDEO ASSISTED THORACIC SURGERY; POSSIBLE LEFT THORACOTOMY; TWO WEDGE RESECTIONS OF LEFT LOWER LUNG NODULE    Diagnosis:LEFT LOWER LOBE LUNG NODULE  Diagnosis Additional Information: No value filed.    Anesthesia Type:  General, ETT    Note:  Anesthesia Post Evaluation    Patient location during evaluation: PACU  Patient participation: Able to fully participate in evaluation  Level of consciousness: awake and alert  Pain management: adequate  Airway patency: patent  Cardiovascular status: acceptable  Respiratory status: acceptable and nasal cannula  Hydration status: acceptable  PONV: none     Anesthetic complications: None          Last vitals:  Vitals:    07/26/19 1620 07/26/19 1630 07/26/19 1640   BP: 131/66 116/67 125/63   Pulse: 76 76 78   Resp: 17 12 8   Temp:  36.3  C (97.4  F)    SpO2: 100% 98% 97%         Electronically Signed By: Reed Graf MD  July 26, 2019  4:52 PM

## 2019-07-27 ENCOUNTER — APPOINTMENT (OUTPATIENT)
Dept: GENERAL RADIOLOGY | Facility: CLINIC | Age: 79
DRG: 168 | End: 2019-07-27
Attending: PHYSICIAN ASSISTANT
Payer: COMMERCIAL

## 2019-07-27 VITALS
TEMPERATURE: 98.5 F | HEIGHT: 68 IN | OXYGEN SATURATION: 90 % | RESPIRATION RATE: 16 BRPM | BODY MASS INDEX: 31.07 KG/M2 | HEART RATE: 62 BPM | DIASTOLIC BLOOD PRESSURE: 47 MMHG | WEIGHT: 205 LBS | SYSTOLIC BLOOD PRESSURE: 96 MMHG

## 2019-07-27 LAB — GLUCOSE BLDC GLUCOMTR-MCNC: 104 MG/DL (ref 70–99)

## 2019-07-27 PROCEDURE — 00000146 ZZHCL STATISTIC GLUCOSE BY METER IP

## 2019-07-27 PROCEDURE — 25000132 ZZH RX MED GY IP 250 OP 250 PS 637: Performed by: PHYSICIAN ASSISTANT

## 2019-07-27 PROCEDURE — 71045 X-RAY EXAM CHEST 1 VIEW: CPT

## 2019-07-27 RX ADMIN — IBUPROFEN 600 MG: 600 TABLET ORAL at 03:38

## 2019-07-27 RX ADMIN — ACETAMINOPHEN 975 MG: 325 TABLET, FILM COATED ORAL at 06:00

## 2019-07-27 RX ADMIN — HYDROMORPHONE HYDROCHLORIDE 2 MG: 2 TABLET ORAL at 03:38

## 2019-07-27 ASSESSMENT — ACTIVITIES OF DAILY LIVING (ADL)
ADLS_ACUITY_SCORE: 15

## 2019-07-27 NOTE — OP NOTE
Procedure Date: 07/26/2019      SURGEON:  Lorne Arenas MD      ASSISTANT:  Dianna Swift PA-C      PREOPERATIVE DIAGNOSIS:  Left lower lobe lung nodules.      POSTOPERATIVE DIAGNOSIS:  Left lower lobe lung nodules.      PROCEDURE:  Left video-assisted thoracoscopy and two wedge resections of left lower lobe lung nodules.      ANESTHESIA:  General with double endotracheal tube.      INDICATIONS:  An 78-year-old woman with significant history of cancer.  She had cervical cancer in the remote past.  She has a history of right lower lobe lung cancer, 2 separate breast cancers, colon cancer and acoustic neuroma.  She has been followed on a regular basis.  His most recent CT scan showed an enlarging subpleural nodule in the left lower lobe lung.  At the base there are 2 tiny nodules that may have increased in size.  Also,  a PET scan did not show evidence of nicole distant disease.  Pulmonary function tests are borderline, but adequate to tolerate a wedge resection.  Based on the findings, a left video-assisted thoracoscopy and resection is indicated for diagnosis and treatment.      DESCRIPTION OF PROCEDURE:  The patient was brought in and placed in a supine position.  Under general anesthesia with double endotracheal tube, the patient was placed in a right lateral decubitus position.  The left chest was prepared and draped in the usual fashion using ChloraPrep.  Ventilation of left lung was discontinued.  Three thoracoscopic incisions were made in the usual fashion.  The lung was examined.  There were 3 separate nodules that were identified.  Using multiple applications of [place] 60 mm stapling device, wedge resection of the suspicious nodule close to the superior segment of the left lower lobe lung was performed with excellent margin.  The specimen was placed in an Endocatch bag and retrieved through the anterior thoracoscopic incision.  At the base,  there were 2 small nodules corresponded to the abnormality  seen on the CT scan.  One of the nodules appears to be subpleural lymph node.  Using multiple applications of [place] 60 gold stapling device, a wedge resection of these 3 areas were performed.  Staple lines were hemostatic.  Through a separate stab wound, a 24 Martiniquais chest tube was placed and sutured to skin with #2 silk suture.  Thirty mL of Marcaine 0.5% without epinephrine was injected as costal blocks, and the 3 were closed in the usual fashion.  Estimated blood loss minimal.  Sponge and needle counts correct.        Dianna Swift PA-C, was the first assistant during the procedure.  Her role as first assistant was essential and necessary in accomplishing the steps of the procedure as described above, providing exposure, retraction and handling of the scope.         REA LEONARD MD             D: 2019   T: 2019   MT: REECE      Name:     WARREN METCALF   MRN:      [id number]        Account:        [id number]   :      1940           Procedure Date: 2019      Document: [id number]

## 2019-07-27 NOTE — PLAN OF CARE
Patient alert and orientated X 4, up with assistance of one, vital signs stable, pain managed with PO Dilaudid and Ibuprofen. Chest tube to suction, no air leak or crepitus. CT clamp at midnight.adhesive strips Dressing to incision CDI.

## 2019-07-27 NOTE — DISCHARGE INSTRUCTIONS
"[facility]  Discharge Orders & Follow-up Care  Video-Assisted Thoracoscopy or Thoracotomy    You already have your follow-up appointments scheduled for you:  Please go to [facility] for a chest x-ray at _____________________________. [facility] is located in the same building ([facility], [address] [place] [address]) as Dr. Arenas' office. They are in [address].  Please then go for your post-operative follow-up appointment in Dr. Arenas' office, on the second floor of the [facility], [address] at ____________. You will see either Sofía Swift PA-C or Dr. Arenas during your appointment.      Call Magda at Dr. Arenas  office at [phone] to make a return appointment with a chest x-ray on august 5, 2019.  Your appointment will be with either Sofía Swift PA-C or Dr. Arenas.        A. Patient Care:  Call Dr Arenas  office @ [phone] if you experience:  *Severe chills or a fever or 101 F or higher on two occasions  *Increased incisional pain that cannot be relieved with rest or pain medications  *Presence of unusual incisional or chest tube site drainage that is odorous, green or yellow in color, or if your incision is warm, red or swollen  *Coughing up bright red blood or greenish-yellow secretions  *Chest pain that gets worse with deep breathing or a significant increase in shortness of breath  *Inability to urinate or have a bowel movement  *New pain or swelling in your legs    In an emergency, call [phone] or have someone drive you to the nearest Emergency Department    Pain Relief:  You may have been given a prescription for narcotic pain medicine.  You may also take ibuprofen and acetaminophen either as a new prescription  or over the counter.  Recommended dosages are:  600 mg Ibuprofen every 6 hours as needed and 650 mg Acetaminophen every 4 hours as needed.  Many patients get good pain relief by \"staggering\" these medications. "     Constipation:  Narcotic pain medication, general anesthesia, and time in the hospital with less activity than normal can all cause constipation. Please take a stool softener (what you have at home or one that was prescribed during hospital discharge, such as Senokot-S, docusate sodium, Miralax, Milk of Magnesia) while you are taking narcotics to prevent constipation. Stop taking the stool softener once you are done taking narcotics or if you begin having loose stools/diarrhea. Please call our clinic nurse, Lottie, at [phone] if you are not having success (not having BMs) with your current stool softener.     No driving while on narcotics.     Activity:  XXX No activity restrictions for a scope procedure/thoracoscopy  ___ No heavy lifting greater than 8-10 pounds on the operative side for 4-6 weeks for a thoracotomy    Wound Care:  *You should look at your incision each day and keep it clean while it heals  *Do not apply any creams, salves such as Bacitracin, or ointments on the incision while it is healing  * Steri strips (thin white paper strips) will be present on the incision(s) and they will peel off as your incision heals-- otherwise, they will be removed at your post-op appointment.    *Remove the dressings covering your chest tube site 48 hours after your discharge from the hospital. You may then shower.  Wash the incision and chest tube site(s) daily with soap and water. No bathing or immersing incision underwater for approximately 2 weeks or until the chest tube sites are completely healed.     Place a dry gauze dressing (and tape) over the chest tube site because it is normal to have some drainage for a few days after the chest tube is removed.  Do not be alarmed if a large amount of fluid drains (should be pink or yellow) either spontaneously or with coughing or exertion. If this happens, just place a larger dry gauze dressing over the chest tube site-- it will stop and scab over in about a week  or so. Once the drainage stops, you can stop covering the chest tube site with a dressing. Call our office if the drainage is milky or green in color or foul-smelling.    B. Respiratory:  ZXXX Utilize Incentive spirometer and flutter valve/acapella (if you received one) 10 times in a row every few hours while awake for a few weeks after discharging home from the hospital    C. Activity:  It may take a few months to regain your normal energy level/stamina. It is important during your recovery to get regular physical activity:  *walk each day at a comfortable pace  *climb stairs as tolerated  *take some rest periods each day but try not to take too many long naps, as this can affect your sleep at night    D. Returning to Work:  Time away from work will depend on your situation. In general, you will need between 1-6 weeks to recover from surgery. Specific dates for returning to work can be discussed at your post-op appointments.

## 2019-07-27 NOTE — PROGRESS NOTES
THORACIC SURGERY POD #1    Discussed findings and procedure  AVSS on RA  No air leak, minimal CT output  CXR no ptx with CT clamped    CT out    D/C today    REA LEONARD MD [facility] ONCOLOGY THORACIC SURGERY  CELL:  [phone]  OFFICE: [phone]

## 2019-07-27 NOTE — PLAN OF CARE
A&O, AVSS. CT removed. Dressing CDI. Pt and daughter verbalize understanding of discharge instructions and follow up appts. Sent home with all belongings with daughter.

## 2019-07-27 NOTE — PLAN OF CARE
Care of pt transferred to writer for remainder of shift. AVSS. Sats 92% on RA. CT to suction, 65 mLs serosanguinous output since PACU. No AL, no crep. Dressing CDI, incision at scapula adhesive strips and BA, CDI. Up to chair for dinner, Assist 1, tolerated PO, mild episode of nausea with increased activity-resolved with rest and cool cloths. Pain well controlled 1mg PO dilaudid. DTV. Proper tech IS given, pt now resting comfortably, will reassess.

## 2019-07-30 ENCOUNTER — DOCUMENTATION ONLY (OUTPATIENT)
Dept: OTHER | Facility: CLINIC | Age: 79
End: 2019-07-30

## 2019-07-30 LAB — COPATH REPORT: NORMAL

## 2019-08-02 NOTE — ADDENDUM NOTE
Addendum  created 08/02/19 0602 by Chase Patton MD    Attestation recorded in Intraprocedure, Intraprocedure Attestations filed

## 2019-08-05 ENCOUNTER — TRANSFERRED RECORDS (OUTPATIENT)
Dept: HEALTH INFORMATION MANAGEMENT | Facility: CLINIC | Age: 79
End: 2019-08-05

## 2019-08-08 ENCOUNTER — TRANSFERRED RECORDS (OUTPATIENT)
Dept: HEALTH INFORMATION MANAGEMENT | Facility: CLINIC | Age: 79
End: 2019-08-08

## 2019-08-08 NOTE — DISCHARGE SUMMARY
THORACIC SURGERY HOSPITAL DISCHARGE SUMMARY  [facility] ONCOLOGY - THORACIC SURGERY  [address], [address]  [place] [address]  Phone [phone]  [email or web address]    8/8/2019     [facility] [address] / [place]  Phone: [phone]   Fax: [phone]        Re: Yolanda Watson             1940             [id number]              Dates of Hospitalization: 7/26/2019 - 7/27/2019   Date of Service (when I saw the patient): 7/27/19    Dear Dr. Perdomo:     As you are aware, we had the pleasure of caring for your patient,  Yolanda Watson here at [facility].  She is an 78-year-old woman with significant history of cancer.  She had cervical cancer in the remote past.  She has a history of right lower lobe lung cancer, 2 separate breast cancers, colon cancer and acoustic neuroma.  She has been followed on a regular basis.  His most recent CT scan showed an enlarging subpleural nodule in the left lower lobe lung.  At the base there are 2 tiny nodules that may have increased in size.  Also, a PET scan did not show evidence of nicole nor distant disease.  Pulmonary function tests are borderline, but adequate to tolerate a wedge resection.  Based on the findings, a left video-assisted thoracoscopy and resection is indicated for diagnosis and treatment.     On 7/26/2019, Dr. Lorne Arenas performed the following:    Procedure/Surgery Information   Procedure:  Left video-assisted thoracoscopy and two wedge resections of left lower lobe lung nodules.    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 Final pathologic stage [id number], Final Stage IA2.  2) Left lower lobe lung benign intraparenchymal lymph node with fibrosis with terminal airway dilation and bronchiolar metaplasia    Her post-operative course was unremarkable.      Consultations This Hospital Stay   None    Yolanda Watson has otherwise recovered sufficiently to be discharged to home today, 7/27/2019, on post-operative day number one for further convalescence.  Her incisions are healing well with no signs or symptoms of infection.  Her bowels have moved sufficiently and she is tolerating diet and activity, ambulating and transferring independently.  She is currently afebrile with stable vital signs.     Below, you will find a full discharge medication list and instructions.  We have arranged for Yolanda Watson to follow-up with us in our [facility] in 7-10 days with a Chest X-ray prior to that appointment.  We thank you for allowing us to participate in the care of Yolanda Watson here at [facility].  Please feel free to contact our office at [phone] with any questions or concerns or if we can be of any further assistance in the care of this patient.    Sincerely,    Dr. Lorne Arenas MD    D/C Summary Prepared by: Sofía Swift PA-C    Discharge Medications:  Discharge Medication List as of 7/27/2019 11:13 AM      CONTINUE these medications which have NOT CHANGED    Details   anastrozole (ARIMIDEX) 1 MG tablet Take 1 mg by mouth At Bedtime , Historical      aspirin 81 MG EC tablet Take 81 mg by mouth every evening, Historical      atorvastatin (LIPITOR) 20 MG tablet Take 20 mg by mouth At Bedtime , Historical      ibuprofen (ADVIL/MOTRIN) 200 MG tablet Take 200-400 mg by mouth daily as  needed for mild pain, Historical      lisinopril (PRINIVIL/ZESTRIL) 20 MG tablet Take 20 mg by mouth daily , Historical      loperamide (IMODIUM A-D) 2 MG capsule Take 2 mg by mouth 4 times daily as needed for diarrhea, Historical      metoprolol succinate ER (TOPROL-XL) 25 MG 24 hr tablet Take 1 tablet (25 mg) by mouth daily, Disp-90 tablet, R-3, E-PrescribeHold on file. Increase in disp #.      multivitamin, therapeutic (THERA-VIT) TABS Take 1 tablet by mouth At Bedtime No iron, Historical      ondansetron (ZOFRAN-ODT) 4 MG ODT tab Take 4 mg by mouth 2 times daily as needed for nausea, Historical      PARoxetine (PAXIL) 20 MG tablet Take 20 mg by mouth At Bedtime , Historical      spironolactone (ALDACTONE) 25 MG tablet Take 1 tablet (25 mg) by mouth daily, Disp-30 tablet, R-11, E-Prescribe      vitamin D3 (CHOLECALCIFEROL) 2000 units (50 mcg) tablet Take 1 tablet by mouth daily, Historical               Discharge Instructions:  1) Remove chest tube dressing on 7/29/19 and then it is Ok to shower.  Please wash both incision and chest tube site daily with soap and water.  You may cover the chest tube site daily with a clean band-aid or dry gauze if it continues to drain.  Once it stops draining, leave the site open to air and it will form a scab.  2) Steri-strips can be removed in 1 week or they will fall off when they are ready.  3) Continue daily use of your Incentive Spirometer, set of 10x in a row, every 1-2 hours while you are awake during the day. Also use your flutter valve if you received one during your stay.     Follow-Up Care:  1) Follow up with Sofía Swift PA-C/Dr. Arenas at the [facility] in [place] ([address] [place] [address], [address], [place] [address]).  Call Magda at [phone] to schedule the appointment.  2) Follow up with Primary Care Provider, Soren Perdomo within 1 month of discharge for routine post-surgical care, wound check and follow up.  Please call [phone] to arrange  this appointment.       CC  Patient Care Team:  Soren Perdomo MD as PCP - General (Family Practice)

## 2019-10-02 ENCOUNTER — TRANSFERRED RECORDS (OUTPATIENT)
Dept: HEALTH INFORMATION MANAGEMENT | Facility: CLINIC | Age: 79
End: 2019-10-02

## 2019-10-17 ENCOUNTER — OFFICE VISIT (OUTPATIENT)
Dept: CARDIOLOGY | Facility: CLINIC | Age: 79
End: 2019-10-17
Payer: COMMERCIAL

## 2019-10-17 ENCOUNTER — APPOINTMENT (OUTPATIENT)
Dept: GENERAL RADIOLOGY | Facility: CLINIC | Age: 79
End: 2019-10-17
Attending: EMERGENCY MEDICINE
Payer: COMMERCIAL

## 2019-10-17 ENCOUNTER — HOSPITAL ENCOUNTER (EMERGENCY)
Facility: CLINIC | Age: 79
Discharge: HOME | End: 2019-10-17
Attending: EMERGENCY MEDICINE | Admitting: EMERGENCY MEDICINE
Payer: COMMERCIAL

## 2019-10-17 VITALS
OXYGEN SATURATION: 100 % | WEIGHT: 203 LBS | RESPIRATION RATE: 22 BRPM | DIASTOLIC BLOOD PRESSURE: 63 MMHG | BODY MASS INDEX: 29.55 KG/M2 | TEMPERATURE: 98 F | SYSTOLIC BLOOD PRESSURE: 117 MMHG | HEART RATE: 73 BPM

## 2019-10-17 VITALS
BODY MASS INDEX: 29.15 KG/M2 | WEIGHT: 203.6 LBS | DIASTOLIC BLOOD PRESSURE: 60 MMHG | HEART RATE: 106 BPM | HEIGHT: 70 IN | SYSTOLIC BLOOD PRESSURE: 88 MMHG

## 2019-10-17 DIAGNOSIS — I42.9 SECONDARY CARDIOMYOPATHY (H): ICD-10-CM

## 2019-10-17 DIAGNOSIS — I42.7 CARDIOMYOPATHY DUE TO CHEMOTHERAPY (H): Primary | ICD-10-CM

## 2019-10-17 DIAGNOSIS — T45.1X5A CARDIOMYOPATHY DUE TO CHEMOTHERAPY (H): Primary | ICD-10-CM

## 2019-10-17 DIAGNOSIS — R06.00 DYSPNEA, UNSPECIFIED TYPE: ICD-10-CM

## 2019-10-17 DIAGNOSIS — C50.011 MALIGNANT NEOPLASM OF NIPPLE OF RIGHT BREAST IN FEMALE, UNSPECIFIED ESTROGEN RECEPTOR STATUS (H): ICD-10-CM

## 2019-10-17 DIAGNOSIS — I95.1 ORTHOSTATIC HYPOTENSION: ICD-10-CM

## 2019-10-17 DIAGNOSIS — R42 DIZZINESS: ICD-10-CM

## 2019-10-17 LAB
ANION GAP SERPL CALCULATED.3IONS-SCNC: 5 MMOL/L (ref 3–14)
BASOPHILS # BLD AUTO: 0.1 10E9/L (ref 0–0.2)
BASOPHILS NFR BLD AUTO: 1.1 %
BUN SERPL-MCNC: 31 MG/DL (ref 7–30)
CALCIUM SERPL-MCNC: 9.2 MG/DL (ref 8.5–10.1)
CHLORIDE SERPL-SCNC: 107 MMOL/L (ref 94–109)
CO2 SERPL-SCNC: 26 MMOL/L (ref 20–32)
CREAT SERPL-MCNC: 1.31 MG/DL (ref 0.52–1.04)
DIFFERENTIAL METHOD BLD: ABNORMAL
EOSINOPHIL # BLD AUTO: 0.1 10E9/L (ref 0–0.7)
EOSINOPHIL NFR BLD AUTO: 0.8 %
ERYTHROCYTE [DISTWIDTH] IN BLOOD BY AUTOMATED COUNT: 13.7 % (ref 10–15)
GFR SERPL CREATININE-BSD FRML MDRD: 38 ML/MIN/{1.73_M2}
GLUCOSE SERPL-MCNC: 111 MG/DL (ref 70–99)
HCT VFR BLD AUTO: 38.1 % (ref 35–47)
HGB BLD-MCNC: 12 G/DL (ref 11.7–15.7)
IMM GRANULOCYTES # BLD: 0.1 10E9/L (ref 0–0.4)
IMM GRANULOCYTES NFR BLD: 1.1 %
LYMPHOCYTES # BLD AUTO: 2.7 10E9/L (ref 0.8–5.3)
LYMPHOCYTES NFR BLD AUTO: 20.4 %
MCH RBC QN AUTO: 33 PG (ref 26.5–33)
MCHC RBC AUTO-ENTMCNC: 31.5 G/DL (ref 31.5–36.5)
MCV RBC AUTO: 105 FL (ref 78–100)
MONOCYTES # BLD AUTO: 1.3 10E9/L (ref 0–1.3)
MONOCYTES NFR BLD AUTO: 9.7 %
NEUTROPHILS # BLD AUTO: 8.8 10E9/L (ref 1.6–8.3)
NEUTROPHILS NFR BLD AUTO: 66.9 %
NRBC # BLD AUTO: 0 10*3/UL
NRBC BLD AUTO-RTO: 0 /100
PLATELET # BLD AUTO: 399 10E9/L (ref 150–450)
POTASSIUM SERPL-SCNC: 4.9 MMOL/L (ref 3.4–5.3)
RBC # BLD AUTO: 3.64 10E12/L (ref 3.8–5.2)
SODIUM SERPL-SCNC: 138 MMOL/L (ref 133–144)
TROPONIN I SERPL-MCNC: <0.015 UG/L (ref 0–0.04)
WBC # BLD AUTO: 13.1 10E9/L (ref 4–11)

## 2019-10-17 PROCEDURE — 84484 ASSAY OF TROPONIN QUANT: CPT | Performed by: EMERGENCY MEDICINE

## 2019-10-17 PROCEDURE — 99215 OFFICE O/P EST HI 40 MIN: CPT | Performed by: INTERNAL MEDICINE

## 2019-10-17 PROCEDURE — 99285 EMERGENCY DEPT VISIT HI MDM: CPT | Mod: 25

## 2019-10-17 PROCEDURE — 96360 HYDRATION IV INFUSION INIT: CPT

## 2019-10-17 PROCEDURE — 85025 COMPLETE CBC W/AUTO DIFF WBC: CPT | Performed by: EMERGENCY MEDICINE

## 2019-10-17 PROCEDURE — 25000128 H RX IP 250 OP 636: Performed by: EMERGENCY MEDICINE

## 2019-10-17 PROCEDURE — 93000 ELECTROCARDIOGRAM COMPLETE: CPT | Performed by: INTERNAL MEDICINE

## 2019-10-17 PROCEDURE — 93005 ELECTROCARDIOGRAM TRACING: CPT

## 2019-10-17 PROCEDURE — 80048 BASIC METABOLIC PNL TOTAL CA: CPT | Performed by: EMERGENCY MEDICINE

## 2019-10-17 PROCEDURE — 71046 X-RAY EXAM CHEST 2 VIEWS: CPT

## 2019-10-17 PROCEDURE — 96361 HYDRATE IV INFUSION ADD-ON: CPT

## 2019-10-17 RX ORDER — LISINOPRIL 5 MG/1
5 TABLET ORAL DAILY
Qty: 30 TABLET | Refills: 0 | Status: SHIPPED | OUTPATIENT
Start: 2019-10-17 | End: 2019-11-22

## 2019-10-17 RX ADMIN — SODIUM CHLORIDE 1000 ML: 9 INJECTION, SOLUTION INTRAVENOUS at 17:47

## 2019-10-17 ASSESSMENT — MIFFLIN-ST. JEOR: SCORE: 1465.83

## 2019-10-17 NOTE — ED AVS SNAPSHOT
[facility] Emergency Department  [address]  [facility] [address]  Phone:  [phone]  Fax:  [phone]                                    Yolanda Watson   MRN: [id number]    Department:  [facility] Emergency Department   Date of Visit:  10/17/2019           After Visit Summary Signature Page    I have received my discharge instructions, and my questions have been answered. I have discussed any challenges I see with this plan with the nurse or doctor.    ..........................................................................................................................................  Patient/Patient Representative Signature      ..........................................................................................................................................  Patient Representative Print Name and Relationship to Patient    ..................................................               ................................................  Date                                   Time    ..........................................................................................................................................  Reviewed by Signature/Title    ...................................................              ..............................................  Date                                               Time          22EPIC Rev 08/18

## 2019-10-17 NOTE — LETTER
10/17/2019      [facility] [address]  [place] [address]      RE: Yolanda Watson       Dear Colleague,    I had the pleasure of seeing Yolanda Watson in the [facility].    Service Date: 10/17/2019      CLINIC FOLLOWUP VISIT      REFERRING PHYSICIAN:  Soren Perdomo MD       HISTORY OF PRESENT ILLNESS:  Ms. Watson is a very pleasant 78-year-old female with a history of multiple cancers.  She has had colon cancer with resection, cervical cancer with resection, 2 different lung cancers with two-thirds of a right lung lobectomy and a wedge resection of her left lower lung.  She has left breast cancer and has undergone lumpectomy and chemotherapy for this as well.  I had seen her after she had evidence of Herceptin-induced cardiotoxicity.  We put her on cardiac medications and interrupted her Herceptin.  Her LV function got down to about 30%-35%; however, repeat testing in 06/2019 showed resolution of her LV function.  EF was then 55%-60%.  She has had some issues with arrhythmias, SVTs and some PVCs noted on Zio Patch monitor but she does not have any significant palpitations with this.  She came in for a routine clinic visit today but she has been having trouble with nausea, vomiting and loose stools.  She says ever since she received chemo, she has had periodic episodes of loose stools for which she takes Imodium.  In the last couple days, she has had several loose stools, at least 7 stools in the last 2 days and they are loose and watery.  Every time she stands for any length of time or tries to shower, she feels like she could vomit and the last time she did actually vomit was on Friday.  I tried to check orthostatic blood pressures in her today and her initial sitting blood pressure was 88/60.  I had her stand for me but she could not stand for 1 minute.  She started to hyperventilate, became very pale.  I tried to measure her blood  pressure and after the sphygmomanometer reached 60, I still did not even hear the pulse and she looked as though she may faint, so I had her sit back down.  She became very nauseated at that point and I did get a bag for her.  Sitting did help to relieve her symptoms.  She felt better after about 1-2 minutes and no longer felt nauseated.  She still remains mildly tachypneic and pale with this.        On exam, cardiovascular tones were regular.  We did manage to get an EKG on her before I went in and this showed just a sinus tachycardia.  She does have a left axis deviation and evidence of left ventricular hypertrophy but no apparent changes from her previous EKG other than the fast heart rhythm.  Her lungs demonstrated coarse crackles and expiratory wheezes throughout.  She has no peripheral edema on exam.  Again, she is very pale today.  Her last hemoglobin measurement that I can see is from July, was 11.8.      In summary, Ms. Watson is a very pleasant 78-year-old female with multiple cancers as described above.  Most recently, was treated for left breast cancer with Herceptin therapy which caused some cardiotoxicity.  Interruption of Herceptin and treatment of her cardiomyopathy with ACE inhibitor and beta blocker as well as spironolactone did help to improve her overall LV function and her last echocardiogram suggested normal LV function in June.  She was coming in for a routine visit today with recent frequent loose stools, nausea, vomiting and orthostatic symptoms.  In fact, she was very orthostatic in our office and therefore, I have sent her to the emergency room and contacted the emergency room physician.  She will go by wheelchair, nurse guided, straight to the emergency room for IV fluids and for further evaluation of her symptomatic hypotension which is likely orthostatic and related to dehydration given her history of underlying diarrhea along with overmedication.  I have instructed her to cut back her  lisinopril dose to 5 mg from 20 mg given the above.  I would like her to continue with her current dose of metoprolol if she can tolerate those 2 medications and I have discussed this with the ER physician.  I would like to see her back in a few weeks in our office to ensure that she is able to tolerate the lower doses of medication and decide from here if she needs any further adjustment of her heart medications.        Please feel free to contact me with any questions you have in regards to her care.      cc:   [facility]    [address]   [place] [address]      Washington Subramanian MD    [facility]   [address]   [place] [address]         TAB BERG DO             D: 10/17/2019   T: 10/17/2019   MT: DW      Name:     WARREN METCALF   MRN:      [id number]        Account:      [id number]   :      1940           Service Date: 10/17/2019      Document: [id number]         Outpatient Encounter Medications as of 10/17/2019   Medication Sig Dispense Refill     anastrozole (ARIMIDEX) 1 MG tablet Take 1 mg by mouth At Bedtime        aspirin 81 MG EC tablet Take 81 mg by mouth every evening       atorvastatin (LIPITOR) 20 MG tablet Take 20 mg by mouth At Bedtime        ibuprofen (ADVIL/MOTRIN) 200 MG tablet Take 200-400 mg by mouth daily as needed for mild pain       loperamide (IMODIUM A-D) 2 MG capsule Take 2 mg by mouth 4 times daily as needed for diarrhea       metoprolol succinate ER (TOPROL-XL) 25 MG 24 hr tablet Take 1 tablet (25 mg) by mouth daily 90 tablet 3     multivitamin, therapeutic (THERA-VIT) TABS Take 1 tablet by mouth At Bedtime No iron       ondansetron (ZOFRAN-ODT) 4 MG ODT tab Take 4 mg by mouth 2 times daily as needed for nausea       PARoxetine (PAXIL) 20 MG tablet Take 20 mg by mouth At Bedtime        spironolactone (ALDACTONE) 25 MG tablet Take 1 tablet (25 mg) by mouth daily 30 tablet 11     vitamin D3  (CHOLECALCIFEROL) 2000 units (50 mcg) tablet Take 1 tablet by mouth daily       [DISCONTINUED] lisinopril (PRINIVIL/ZESTRIL) 20 MG tablet Take 20 mg by mouth daily        No facility-administered encounter medications on file as of 10/17/2019.        Again, thank you for allowing me to participate in the care of your patient.      Sincerely,    Dinorah Miller,      [facility]

## 2019-10-17 NOTE — ED PROVIDER NOTES
I received a call from Dr. 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  She presented today with a blood pressure of 88/60 and heart rate of 106.  Patient was markedly orthostatic likely related to recent diarrhea.  Dr. Miller is concerned for subsequent dehydration.  If patient is safe for discharge after ED evaluation, Dr. Miller of cardiology would recommend that the patient continue her metoprolol dose if able but decrease lisinopril from 20 to 5 mg daily.     Genaro Mark MD  10/17/19 [id number]     Genaro Mark MD  10/17/19 [id number]

## 2019-10-17 NOTE — PROGRESS NOTES
HPI and Plan:   See dictation    Orders Placed This Encounter   Procedures     EKG 12-lead complete w/read - Clinics (performed today)       No orders of the defined types were placed in this encounter.      There are no discontinued medications.      Encounter Diagnoses   Name Primary?     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     Secondary cardiomyopathy (H)      Malignant neoplasm of nipple of right breast in female, unspecified estrogen receptor status (H)      Dizziness      Dyspnea, unspecified type        CURRENT MEDICATIONS:  Current Outpatient Medications   Medication Sig Dispense Refill     anastrozole (ARIMIDEX) 1 MG tablet Take 1 mg by mouth At Bedtime        aspirin 81 MG EC tablet Take 81 mg by mouth every evening       atorvastatin (LIPITOR) 20 MG tablet Take 20 mg by mouth At Bedtime        ibuprofen (ADVIL/MOTRIN) 200 MG tablet Take 200-400 mg by mouth daily as needed for mild pain       lisinopril (PRINIVIL/ZESTRIL) 20 MG tablet Take 20 mg by mouth daily        loperamide (IMODIUM A-D) 2 MG capsule Take 2 mg by mouth 4 times daily as needed for diarrhea       metoprolol succinate ER (TOPROL-XL) 25 MG 24 hr tablet Take 1 tablet (25 mg) by mouth daily 90 tablet 3     multivitamin, therapeutic (THERA-VIT) TABS Take 1 tablet by mouth At Bedtime No iron       ondansetron (ZOFRAN-ODT) 4 MG ODT tab Take 4 mg by mouth 2 times daily as needed for nausea       PARoxetine (PAXIL) 20 MG tablet Take 20 mg by mouth At Bedtime        spironolactone (ALDACTONE) 25 MG tablet Take 1 tablet (25 mg) by mouth daily 30 tablet 11     vitamin D3 (CHOLECALCIFEROL) 2000 units (50 mcg) tablet Take 1 tablet by mouth daily         ALLERGIES     Allergies   Allergen Reactions     Demerol [Meperidine] Nausea and Vomiting     Sulfa Drugs Other (See Comments)     Extreme chills     Penicillins Rash     Swelling In mouth and tongue       PAST MEDICAL HISTORY:  Past Medical History:   Diagnosis Date     Acoustic neuroma (H)       Acoustic neuroma (H)      Acute arthropathy     lower leg     Acute depression      Anemia     iron deficeincy     Anxiety      Arthritis     osteoarthrosis of knee     Breast cancer (H)     left breast     Breast cancer, left breast (H) 02/2018     Breast cancer, right breast (H) 1/2016     Cervical cancer (H)      Colon cancer (H)      Colon cancer (H)      Decreased cardiac ejection fraction      Depression      Depressive disorder      Diabetes mellitus (H)     pre-diabetic no longer taking Metformin     Dyspnea      Dyspnea      Gastro-oesophageal reflux disease      H/O: lung cancer 2013     Herpes      Herpes zoster      Herpes zoster      History of blood transfusion      HTN (hypertension)      Hyperlipidaemia      Hyperlipidemia      Hyperlipidemia      Iron deficiency anemia      Lung cancer (H)      Lung nodule 2013    Lung cancer     Lung nodule      Nausea & vomiting      OAB (overactive bladder)      Obese      Osteoarthritis of knee      Pain in female pelvis      Pain in female pelvis      Pre-diabetes      Preop general physical exam      Rib lesion      Rib lesion      Shoulder pain, acute     right     Tachycardia      Torn meniscus      Torn meniscus        PAST SURGICAL HISTORY:  Past Surgical History:   Procedure Laterality Date     acoustic neuroma excised       APPENDECTOMY  1966    done with CORNELIO     ARTHROSCOPY KNEE  2012    right knee scoped     BIOPSY NODE SENTINEL Right 2/3/2016    Procedure: BIOPSY NODE SENTINEL;  Surgeon: Flory Pinto MD;  Location: [facility]     BIOPSY NODE SENTINEL Left 3/5/2018    Procedure: BIOPSY NODE SENTINEL;;  Surgeon: Denae Perez MD;  Location: [facility]     BREAST BIOPSY, RT/LT       cataracts       cataracts       CERVICAL CANCER SCREENING RESULTS DOCUMENTED AND REVIEWED       CHOLECYSTECTOMY  1991     CL AFF SURGICAL PATHOLOGY       CL AFF SURGICAL PATHOLOGY      acoustic neuroma left side excised [facility] [place] 6/17/14     COLONOSCOPY        EXCISE NODE MEDIASTINAL  3/12/2013    Procedure: EXCISE NODE MEDIASTINAL;;  Surgeon: Lorne Arenas MD;  Location:  OR     EYE SURGERY      fiona cataracts     GENITOURINARY SURGERY      bladder sling     GYN SURGERY  1966    hysterectomy for cervical cancer *ovaries remain     HYSTERECTOMY  1966    Cervical CA, paps done every other year, done with appendectomy     INSERT PORT VASCULAR ACCESS Left 3/5/2018    Procedure: INSERT PORT VASCULAR ACCESS;;  Surgeon: Denae Perez MD;  Location: [facility]     INSERT PORT VASCULAR ACCESS N/A 3/5/2018    Procedure: INSERT PORT VASCULAR ACCESS;;  Surgeon: Denea Perez MD;  Location: [facility]     LAPAROSCOPIC ASSISTED COLECTOMY Right 10/27/2016    Procedure: LAPAROSCOPIC ASSISTED COLECTOMY;  Surgeon: Umang Arteaga MD;  Location:  OR     LOBECTOMY LUNG  3/12/2013    Procedure: LOBECTOMY LUNG;;  Surgeon: Lorne Arenas MD;  Location:  OR     LUMPECTOMY BREAST WITH SEED LOCALIZATION Right 2/3/2016    Procedure: LUMPECTOMY BREAST WITH SEED LOCALIZATION;  Surgeon: Flory Pinto MD;  Location: [facility]     LUMPECTOMY BREAST WITH SEED LOCALIZATION Left 3/5/2018    Procedure: LUMPECTOMY BREAST WITH SEED LOCALIZATION;  SEED LOCALIZED LEFT BREAST LUMPECTOMY, WITH LEFT SENTINEL NODE BIOPSY, PORT PLACEMENT;  Surgeon: Denae Perez MD;  Location: [facility]     MASTECTOMY SIMPLE Left 3/28/2018    Procedure: MASTECTOMY SIMPLE;  LEFT BREAST MASTECTOMY ;  Surgeon: Denae Perez MD;  Location: [facility]     MINI ARC SLING OPERATION FOR STRESS INCONTINENCE  2009     REMOVE PORT VASCULAR ACCESS N/A 2/13/2019    Procedure: REMOVE PORT VASCULAR ACCESS;  Surgeon: Denae Perez MD;  Location:  OR     RESECT RIB  2/15/2013    Procedure: RESECT RIB;  RESECTION PORTION RIGHT TWELVETH RIB;  Surgeon: Lorne Arenas MD;  Location:  OR     THORACOSCOPIC WEDGE RESECTION LUNG Left 7/26/2019    Procedure: LEFT VIDEO ASSISTED THORACIC SURGERY; POSSIBLE LEFT  THORACOTOMY; TWO WEDGE RESECTIONS OF LEFT LOWER LUNG NODULE;  Surgeon: Lorne Arenas MD;  Location: [facility] OR     THORACOTOMY  3/12/2013    Procedure: THORACOTOMY;  RIGHT THORACOTOMY, RIGHT MIDDLE AND LOWER LUNG LOBECTOMY, MEDIASTINAL LYMPH NODE DISSECTION ;  Surgeon: Lorne Arenas MD;  Location: [facility] OR     TONSILLECTOMY         FAMILY HISTORY:  Family History   Problem Relation Age of Onset     Breast Cancer Paternal Grandmother      Colon Cancer Other         not specified     Fractures Other         Hip fracture, Aunt     Hyperlipidemia Mother      Hypertension Mother      Heart Disease Mother      Osteoporosis Mother      Hyperlipidemia Sister      Hyperlipidemia Brother      Prostate Cancer Brother      Hypertension Sister      Heart Disease Maternal Grandfather      Prostate Cancer Father      Colon Cancer Cousin         son of paternal aunt with breast cancer.     Breast Cancer Paternal Aunt      Colon Cancer Paternal Aunt      Other Cancer Paternal Uncle         Esophageal     Other Cancer Maternal Aunt        SOCIAL HISTORY:  Social History     Socioeconomic History     Marital status:      Spouse name: None     Number of children: 3     Years of education: None     Highest education level: None   Occupational History     Employer: RETIRED   Social Needs     Financial resource strain: None     Food insecurity:     Worry: None     Inability: None     Transportation needs:     Medical: None     Non-medical: None   Tobacco Use     Smoking status: Former Smoker     Packs/day: 1.00     Years: 45.00     Pack years: 45.00     Types: Cigarettes     Start date:      Last attempt to quit: 1998     Years since quittin.4     Smokeless tobacco: Never Used     Tobacco comment: quit    Substance and Sexual Activity     Alcohol use: Yes     Alcohol/week: 0.0 standard drinks     Comment: glass of wine a day     Drug use: No     Sexual activity: Yes     Partners: Male     Birth  "control/protection: Female Surgical   Lifestyle     Physical activity:     Days per week: None     Minutes per session: None     Stress: None   Relationships     Social connections:     Talks on phone: None     Gets together: None     Attends Restorationist service: None     Active member of club or organization: None     Attends meetings of clubs or organizations: None     Relationship status: None     Intimate partner violence:     Fear of current or ex partner: None     Emotionally abused: None     Physically abused: None     Forced sexual activity: None   Other Topics Concern     Parent/sibling w/ CABG, MI or angioplasty before 65F 55M? Not Asked   Social History Narrative    12/2015    -    3 kids    Retired        Pap-11/8/2010 WNL    Mammo-12/2/13     Colonoscopy-3/3/2010 WNL, no further recommended per pt     Dexa-1/1/2008       Review of Systems:  Skin:  Negative       Eyes:  Positive for glasses;cataracts cataract extraction of both eyes  ENT:  Positive for hearing loss deaf L ear, wears hearing aid R ear  Respiratory:  Positive for dyspnea on exertion CABRERA, minimal activity   Cardiovascular:    Positive for;palpitations;dizziness;fatigue palpitations with dyspnea  Gastroenterology: Positive for heartburn;reflux;nausea;diarrhea waking up every morning feeling sick to her stomach; occ diarrhea  Genitourinary:  Positive for urinary frequency;incontinence;retention    Musculoskeletal:  Positive for joint pain;nocturnal cramping right shoulder pain  Neurologic:  Negative      Psychiatric:  Positive for sleep disturbances;depression trouble falling asleep; depression under control  Heme/Lymph/Imm:  Positive for allergies RX  Endocrine:  Negative        Physical Exam:  Vitals: BP (!) 88/60 (BP Location: Right arm, Patient Position: Chair, Cuff Size: Adult Regular)   Pulse 106   Ht 1.765 m (5' 9.5\")   Wt 92.4 kg (203 lb 9.6 oz)   BMI 29.64 kg/m      Constitutional:    frail;chronically ill;in " distress;appears anxious      Skin:    cyanotic;cool and clammy        Head:  normocephalic        Eyes:  conjunctivae and lids unremarkable        Lymph:      ENT:    pallor      Neck:  JVP normal        Respiratory:       crackles, exp wheezing diminished BS    Cardiac: no murmurs, gallops or rubs detected tachycardic              pulses full and equal                                        GI:  abdomen soft        Extremities and Muscular Skeletal:  no deformities, clubbing, cyanosis, erythema observed;no edema              Neurological:  no gross motor deficits        Psych:  Alert and Oriented x 3          CC  No referring provider defined for this encounter.

## 2019-10-17 NOTE — ED TRIAGE NOTES
"For a year, patient states she has been exhausted, can't stand up for more than a minute, has dizziness, near syncope.  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  Chemo finished a year ago, symptoms started while getting chemo.  Got new cardiology meds early this year.  Was at cardiology clinic today, Dr. Miller told her that \"my blood pressure drops from 88 to nothing when I'm standing and that I have to come to the ED.\"  ABCDs intact  "

## 2019-10-17 NOTE — PROGRESS NOTES
Service Date: 10/17/2019      CLINIC FOLLOWUP VISIT      REFERRING PHYSICIAN:  Soren Perdomo MD       HISTORY OF PRESENT ILLNESS:  Ms. Watson is a very pleasant 78-year-old female with a history of multiple cancers.  She has had colon cancer with resection, cervical cancer with resection, 2 different lung cancers with two-thirds of a right lung lobectomy and a wedge resection of her left lower lung.  She has left breast cancer and has undergone lumpectomy and chemotherapy for this as well.  I had seen her after she had evidence of Herceptin-induced cardiotoxicity.  We put her on cardiac medications and interrupted her Herceptin.  Her LV function got down to about 30%-35%; however, repeat testing in 06/2019 showed resolution of her LV function.  EF was then 55%-60%.  She has had some issues with arrhythmias, SVTs and some PVCs noted on Zio Patch monitor but she does not have any significant palpitations with this.  She came in for a routine clinic visit today but she has been having trouble with nausea, vomiting and loose stools.  She says ever since she received chemo, she has had periodic episodes of loose stools for which she takes Imodium.  In the last couple days, she has had several loose stools, at least 7 stools in the last 2 days and they are loose and watery.  Every time she stands for any length of time or tries to shower, she feels like she could vomit and the last time she did actually vomit was on Friday.  I tried to check orthostatic blood pressures in her today and her initial sitting blood pressure was 88/60.  I had her stand for me but she could not stand for 1 minute.  She started to hyperventilate, became very pale.  I tried to measure her blood pressure and after the sphygmomanometer reached 60, I still did not even hear the pulse and she looked as though she may faint, so I had her sit back down.  She became very nauseated at that point and I did get a bag for her.  Sitting did help to relieve  her symptoms.  She felt better after about 1-2 minutes and no longer felt nauseated.  She still remains mildly tachypneic and pale with this.        On exam, cardiovascular tones were regular.  We did manage to get an EKG on her before I went in and this showed just a sinus tachycardia.  She does have a left axis deviation and evidence of left ventricular hypertrophy but no apparent changes from her previous EKG other than the fast heart rhythm.  Her lungs demonstrated coarse crackles and expiratory wheezes throughout.  She has no peripheral edema on exam.  Again, she is very pale today.  Her last hemoglobin measurement that I can see is from July, was 11.8.      In summary, Ms. Watson is a very pleasant 78-year-old female with multiple cancers as described above.  Most recently, was treated for left breast cancer with Herceptin therapy which caused some cardiotoxicity.  Interruption of Herceptin and treatment of her cardiomyopathy with ACE inhibitor and beta blocker as well as spironolactone did help to improve her overall LV function and her last echocardiogram suggested normal LV function in June.  She was coming in for a routine visit today with recent frequent loose stools, nausea, vomiting and orthostatic symptoms.  In fact, she was very orthostatic in our office and therefore, I have sent her to the emergency room and contacted the emergency room physician.  She will go by wheelchair, nurse guided, straight to the emergency room for IV fluids and for further evaluation of her symptomatic hypotension which is likely orthostatic and related to dehydration given her history of underlying diarrhea along with overmedication.  I have instructed her to cut back her lisinopril dose to 5 mg from 20 mg given the above.  I would like her to continue with her current dose of metoprolol if she can tolerate those 2 medications and I have discussed this with the ER physician.  I would like to see her back in a few weeks in  our office to ensure that she is able to tolerate the lower doses of medication and decide from here if she needs any further adjustment of her heart medications.        Please feel free to contact me with any questions you have in regards to her care.      cc:   [facility]    [address], [address]   [place] [address]      Washington Subramanian MD    [facility]   [address], [address]   [place] [address]         TAB BERG DO             D: 10/17/2019   T: 10/17/2019   MT: ANISA      Name:     WARREN METCALF   MRN:      [id number]        Account:      [id number]   :      1940           Service Date: 10/17/2019      Document: [id number]

## 2019-10-18 LAB — INTERPRETATION ECG - MUSE: NORMAL

## 2019-10-18 ASSESSMENT — ENCOUNTER SYMPTOMS
DIZZINESS: 1
FEVER: 0
ABDOMINAL PAIN: 0
FATIGUE: 1
SHORTNESS OF BREATH: 1
COUGH: 0
CHILLS: 0

## 2019-10-18 NOTE — ED PROVIDER NOTES
History     Chief Complaint:  exhausted, unable to stand more than a minute, dizzy       HPI   Yolanda Watson is a 78 year old female who presents with generalized weakness, dizziness, and need for IV fluids.  Patient was seen in Dr. Miller's cardiology clinic this afternoon for evaluation of patient's long-term dizziness.  Patient reports a several month history of dyspnea on exertion, dizziness, worse when she gets up and moves around.  She states she was evaluated by her hematologist as well as neurology with out because found.  She was seeing cardiology today for input when she was found to be significantly orthostatic and it was recommended that she present to the emergency department.  Notably, patient reports that she gets diarrhea about once a month, and about 2 to 3 days ago she had her monthly episode where she had at least 7 bowel movements inside of a couple days that were very watery.  Dr. Miller's plan was to reduce her lisinopril dose and get her hydrated and reassess her symptoms.  She presents with her  and daughter today.    Allergies:  Demerol [Meperidine]  Sulfa Drugs  Penicillins     Medications:    lisinopril (PRINIVIL/ZESTRIL) 5 MG tablet  anastrozole (ARIMIDEX) 1 MG tablet  aspirin 81 MG EC tablet  atorvastatin (LIPITOR) 20 MG tablet  ibuprofen (ADVIL/MOTRIN) 200 MG tablet  loperamide (IMODIUM A-D) 2 MG capsule  metoprolol succinate ER (TOPROL-XL) 25 MG 24 hr tablet  multivitamin, therapeutic (THERA-VIT) TABS  ondansetron (ZOFRAN-ODT) 4 MG ODT tab  PARoxetine (PAXIL) 20 MG tablet  spironolactone (ALDACTONE) 25 MG tablet  vitamin D3 (CHOLECALCIFEROL) 2000 units (50 mcg) tablet        Past Medical History:    Past Medical History:   Diagnosis Date     Acoustic neuroma (H)      Acoustic neuroma (H)      Acute arthropathy      Acute depression      Anemia      Anxiety      Arthritis      Breast cancer (H)      Breast cancer, left breast (H) 02/2018     Breast cancer, right breast (H)  1/2016     Cervical cancer (H)      Colon cancer (H)      Colon cancer (H)      Decreased cardiac ejection fraction      Depression      Depressive disorder      Diabetes mellitus (H)      Dyspnea      Dyspnea      Gastro-oesophageal reflux disease      H/O: lung cancer 2013     Herpes      Herpes zoster      Herpes zoster      History of blood transfusion      HTN (hypertension)      Hyperlipidaemia      Hyperlipidemia      Hyperlipidemia      Iron deficiency anemia      Lung cancer (H)      Lung nodule 2013     Lung nodule      Nausea & vomiting      OAB (overactive bladder)      Obese      Osteoarthritis of knee      Pain in female pelvis      Pain in female pelvis      Pre-diabetes      Preop general physical exam      Rib lesion      Rib lesion      Shoulder pain, acute      Tachycardia      Torn meniscus      Torn meniscus        Patient Active Problem List    Diagnosis Date Noted     Nodule of lower lobe of left lung 07/26/2019     Priority: Medium     Pre-diabetes      Priority: Medium     Hyperlipidemia      Priority: Medium     HTN (hypertension)      Priority: Medium     Esophageal reflux      Priority: Medium     Dyspnea      Priority: Medium     Breast cancer (H)      Priority: Medium     left breast       Weakness 06/01/2018     Priority: Medium     Malignant neoplasm of left breast (H) 03/28/2018     Priority: Medium     Rectal bleeding 10/31/2016     Priority: Medium     Colon cancer (H) 10/27/2016     Priority: Medium     OAB (overactive bladder) 12/09/2015     Priority: Medium     Patient voids 3-4 times at night, wears pads constantly, often has urge and starts leaking when trying to get to bathroom  Tried a medication for it from urology in past, didn't help, can't remember name of drug  Already had mini arc sling place for JESSICA in 2009,, no mesh exposure  Drinks one cup coffee and freq tea use during day       Lung cancer (H) 03/12/2013     Priority: Medium        Past Surgical History:    Past  Surgical History:   Procedure Laterality Date     acoustic neuroma excised       APPENDECTOMY  1966    done with CORNELIO     ARTHROSCOPY KNEE  2012    right knee scoped     BIOPSY NODE SENTINEL Right 2/3/2016    Procedure: BIOPSY NODE SENTINEL;  Surgeon: Flory Pinto MD;  Location: [facility]     BIOPSY NODE SENTINEL Left 3/5/2018    Procedure: BIOPSY NODE SENTINEL;;  Surgeon: Denae Perez MD;  Location: [facility]     BREAST BIOPSY, RT/LT       cataracts       cataracts       CERVICAL CANCER SCREENING RESULTS DOCUMENTED AND REVIEWED       CHOLECYSTECTOMY  1991     CL AFF SURGICAL PATHOLOGY       CL AFF SURGICAL PATHOLOGY      acoustic neuroma left side excised [facility] [place] 6/17/14     COLONOSCOPY       EXCISE NODE MEDIASTINAL  3/12/2013    Procedure: EXCISE NODE MEDIASTINAL;;  Surgeon: Lorne Arenas MD;  Location:  OR     EYE SURGERY      fiona cataracts     GENITOURINARY SURGERY      bladder sling     GYN SURGERY  1966    hysterectomy for cervical cancer *ovaries remain     HYSTERECTOMY  1966    Cervical CA, paps done every other year, done with appendectomy     INSERT PORT VASCULAR ACCESS Left 3/5/2018    Procedure: INSERT PORT VASCULAR ACCESS;;  Surgeon: Denae Perez MD;  Location: [facility]     INSERT PORT VASCULAR ACCESS N/A 3/5/2018    Procedure: INSERT PORT VASCULAR ACCESS;;  Surgeon: Denae Perez MD;  Location: [facility]     LAPAROSCOPIC ASSISTED COLECTOMY Right 10/27/2016    Procedure: LAPAROSCOPIC ASSISTED COLECTOMY;  Surgeon: Umang Arteaga MD;  Location:  OR     LOBECTOMY LUNG  3/12/2013    Procedure: LOBECTOMY LUNG;;  Surgeon: Lorne Arenas MD;  Location:  OR     LUMPECTOMY BREAST WITH SEED LOCALIZATION Right 2/3/2016    Procedure: LUMPECTOMY BREAST WITH SEED LOCALIZATION;  Surgeon: Flory Pinto MD;  Location: [facility]     LUMPECTOMY BREAST WITH SEED LOCALIZATION Left 3/5/2018    Procedure: LUMPECTOMY BREAST WITH SEED LOCALIZATION;  SEED LOCALIZED LEFT BREAST  LUMPECTOMY, WITH LEFT SENTINEL NODE BIOPSY, PORT PLACEMENT;  Surgeon: Denae Perez MD;  Location: [facility]     MASTECTOMY SIMPLE Left 3/28/2018    Procedure: MASTECTOMY SIMPLE;  LEFT BREAST MASTECTOMY ;  Surgeon: Denae Perez MD;  Location: [facility]     MINI ARC SLING OPERATION FOR STRESS INCONTINENCE  2009     REMOVE PORT VASCULAR ACCESS N/A 2/13/2019    Procedure: REMOVE PORT VASCULAR ACCESS;  Surgeon: Denae Perez MD;  Location:  OR     RESECT RIB  2/15/2013    Procedure: RESECT RIB;  RESECTION PORTION RIGHT TWELVETH RIB;  Surgeon: Lorne Arenas MD;  Location:  OR     THORACOSCOPIC WEDGE RESECTION LUNG Left 7/26/2019    Procedure: LEFT VIDEO ASSISTED THORACIC SURGERY; POSSIBLE LEFT THORACOTOMY; TWO WEDGE RESECTIONS OF LEFT LOWER LUNG NODULE;  Surgeon: Lorne Arenas MD;  Location:  OR     THORACOTOMY  3/12/2013    Procedure: THORACOTOMY;  RIGHT THORACOTOMY, RIGHT MIDDLE AND LOWER LUNG LOBECTOMY, MEDIASTINAL LYMPH NODE DISSECTION ;  Surgeon: Lorne Arenas MD;  Location:  OR     TONSILLECTOMY          Family History:    family history includes Breast Cancer in her paternal aunt and paternal grandmother; Colon Cancer in her cousin, paternal aunt, and another family member; Fractures in an other family member; Heart Disease in her maternal grandfather and mother; Hyperlipidemia in her brother, mother, and sister; Hypertension in her mother and sister; Osteoporosis in her mother; Other Cancer in her maternal aunt and paternal uncle; Prostate Cancer in her brother and father.    Social History:   reports that she quit smoking about 21 years ago. Her smoking use included cigarettes. She started smoking about 65 years ago. She has a 45.00 pack-year smoking history. She has never used smokeless tobacco. She reports current alcohol use. She reports that she does not use drugs.    PCP: Soren Perdomo     Review of Systems   Constitutional: Positive for fatigue. Negative for  chills and fever.   Respiratory: Positive for shortness of breath. Negative for cough.    Cardiovascular: Negative for chest pain.   Gastrointestinal: Negative for abdominal pain.   Neurological: Positive for dizziness. Negative for syncope.   All other systems reviewed and are negative.        Physical Exam     Patient Vitals for the past 24 hrs:   BP Temp Temp src Pulse Heart Rate Resp SpO2 Weight   10/17/19 1900 117/63 -- -- 73 72 -- 100 % --   10/17/19 1800 127/60 -- -- 75 74 -- 97 % --   10/17/19 1745 128/80 -- -- -- 76 -- 96 % --   10/17/19 1700 110/61 -- -- 79 81 -- 95 % --   10/17/19 1652 -- -- -- -- -- -- -- 92.1 kg (203 lb)   10/17/19 1432 121/67 98  F (36.7  C) Temporal 104 -- 22 97 % --        Physical Exam  Nursing note and vitals reviewed.  Constitutional: Cooperative.   HENT:   Mouth/Throat: Moist mucous membranes.   Eyes: EOMI, nonicteric sclera  Cardiovascular: mild tachycardia, regular rhythm, no murmurs, rubs, or gallops  Pulmonary/Chest: Effort normal and breath sounds normal. No respiratory distress. No wheezes. No rales.   Abdominal: Soft. Nontender, nondistended, no guarding or rigidity. BS present.   Musculoskeletal: Normal range of motion.   Neurological: Alert. Moves all extremities spontaneously.   Skin: Skin is warm and dry. No rash noted.   Psychiatric: Normal mood and affect.       Emergency Department Course     Imaging:  Chest XR,  PA & LAT   Final Result   IMPRESSION: Left-sided chest tube removed. No pneumothorax. Emphysema as on prior. Resolution of congestion on prior. No focal infiltrate. Remainder stable.           Laboratory:  Labs Ordered and Resulted from Time of ED Arrival Up to the Time of Departure from the ED   CBC WITH PLATELETS DIFFERENTIAL - Abnormal; Notable for the following components:       Result Value    WBC 13.1 (*)     RBC Count 3.64 (*)      (*)     Absolute Neutrophil 8.8 (*)     All other components within normal limits   BASIC METABOLIC PANEL -  Abnormal; Notable for the following components:    Glucose 111 (*)     Urea Nitrogen 31 (*)     Creatinine 1.31 (*)     GFR Estimate 38 (*)     GFR Estimate If Black 44 (*)     All other components within normal limits   TROPONIN I      Interventions:  Medications   0.9% sodium chloride BOLUS (0 mLs Intravenous Stopped 10/17/19 1944)      Impression & Plan      Medical Decision Making:  Patient presents to the emergency department from cardiology clinic with concern for dyspnea on exertion and dizziness.  Patient was found to be significantly orthostatic in cardiology clinic with SBP in the 60s when she stands.  Here, this is confirmed as her blood pressure dropped into the 80s with merely sitting with tachycardia.  Patient received 1 L of IV fluids for suspected dehydration given recent diarrhea.  I personally rechecked her orthostatics and they are significantly improved.  However, patient is still symptomatic when she stands up and moves around.  Per Dr. Miller, patient may be on too much blood pressure medication and she recommended dropping the lisinopril from 20 mg daily to 5 mg daily.  I did write for this new prescription.  Admission offered to the patient and her family, but they declined stating that this problem has been ongoing for some time, and there is no acute change, and knowing that they have a plan, they feel comfortable with discharge home.  Patient was therefore discharged in stable condition.  All questions answered.  Reasons to return discussed.    Diagnosis:    ICD-10-CM    1. Orthostatic hypotension I95.1                William Pardo MD  10/18/19 0559

## 2019-10-18 NOTE — ED NOTES
Pt provided with discharge paperwork and educated on recommended follow-up with PCP. Pt educated on how to manage symptoms at home and when to seek medical attention. Pt voiced understanding and denied any questions at discharge. Brought to lobby in wheelchair.

## 2019-11-18 ENCOUNTER — OFFICE VISIT (OUTPATIENT)
Dept: CARDIOLOGY | Facility: CLINIC | Age: 79
End: 2019-11-18
Payer: COMMERCIAL

## 2019-11-18 VITALS
BODY MASS INDEX: 29.78 KG/M2 | SYSTOLIC BLOOD PRESSURE: 108 MMHG | HEIGHT: 70 IN | HEART RATE: 88 BPM | DIASTOLIC BLOOD PRESSURE: 76 MMHG | WEIGHT: 208 LBS

## 2019-11-18 DIAGNOSIS — I42.7 CARDIOMYOPATHY DUE TO CHEMOTHERAPY (H): ICD-10-CM

## 2019-11-18 DIAGNOSIS — T45.1X5A CARDIOMYOPATHY DUE TO CHEMOTHERAPY (H): ICD-10-CM

## 2019-11-18 DIAGNOSIS — R06.09 DOE (DYSPNEA ON EXERTION): Primary | ICD-10-CM

## 2019-11-18 PROCEDURE — 99214 OFFICE O/P EST MOD 30 MIN: CPT | Performed by: PHYSICIAN ASSISTANT

## 2019-11-18 ASSESSMENT — MIFFLIN-ST. JEOR: SCORE: 1480.79

## 2019-11-18 NOTE — PATIENT INSTRUCTIONS
Thank you for your [facility] visit today. Your provider has recommended the following:  Medication Changes:  STOP the spironolactone  Recommendations:  Nuclear stress test in the next couple weeks  Follow-up:  See Sulema FERMIN for cardiology follow up after the stress test.    Reminder:  Please bring in your current medication list or your medication, over the counter supplements and vitamin bottles as we will review these at each office visit.

## 2019-11-18 NOTE — LETTER
11/18/2019      [facility] [address]  [place] [address]      RE: Yolanda Watson       Dear Colleague,    I had the pleasure of seeing Yolanda Watson in the [facility].    Service Date: 11/18/2019      HISTORY OF PRESENT ILLNESS:  Yolanda is a pleasant 79-year-old female who presents to the office today for followup on lightheadedness after a recent reduction in her lisinopril.      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  She has a history of multiple cancers.  According to her last Oncology note from Dr. Subramanian, she was initially diagnosed with a lung cancer in 2013.  In 01/2016, she was found to have a right-sided breast cancer.  In 10/2016, she was found to have a colon cancer.  In 02/2018, she developed a second breast cancer on the left side.  Then, in 07/2019 she was found to have a second lung cancer which was resected.  Her Cardiology care started while she was being treated for her left-sided breast cancer in 2018.  She is going to be placed on paclitaxel and Herceptin.  She had a normal baseline echocardiogram; however, her repeat echocardiogram in 09/2018 showed her EF reduced to 30%-35%.  It was at that time she was placed on cardioprotective medications including a beta blocker, ACE inhibitor and spironolactone.  She had a repeat echocardiogram in 12/2018 which showed her EF remained at 30%-35%.  She was seen by Dr. Miller for routine followup in 04/2019 and at that time complained of significant dizziness along with weakness/heaviness and shortness of breath.  Her carvedilol was changed to Toprol-XL.  I saw her for followup in late April and at that time she stated that she was feeling quite well aside from some mild dyspnea on exertion.  I did not make any medication changes.      In 6/2019 she called into the office stating that she was still experiencing  "dizziness, fatigue and dyspnea on exertion. She did have a followup echocardiogram which showed resolution of her prior cardiomyopathy with an LVEF of 55%-60%.  She was noted to have some PVCs at the time of her echocardiogram.  I also recommended that she have a 7-day ZIO Patch monitor.  This showed sinus rhythm with one 5-beat run of ventricular tachycardia and a few short runs of paroxysmal SVT lasting up to 11 beats per minute, nothing that would explain her symptomatology.  I encouraged her to keep her followup with Dr. Miller as planned in July.  Unfortunately, something came up and she canceled that appointment, she was not seen in followup until October.      At that office visit, she was found to be significantly weak and hypotensive.  It was noted that she had had several recent episodes of vomiting and diarrhea.  Due to her significant hypotension, she was brought from the Cardiology Clinic to the Emergency Department.  She did get IV fluid.  Apparently admission was offered, but she refused.  Dr. Miller did decrease her lisinopril from 20 mg daily to 5 mg daily at that time and today's office visit was scheduled.      Today, Yolanda tells me that she continues to feel rather dyspneic with exertion as well as lightheaded and weak.  She tells me that these symptoms have been ongoing for more than a year.  She also states that she continues to have some intermittent diarrhea.  She denies any chest discomfort.  She denies any orthopnea, PND or significant lower extremity edema.  She does tell me \"sometimes I get so tired that I vomit.\"  She tells me that she was in to see Dr. Lopez at [facility] within the last couple of weeks and apparently was not felt to have a pulmonary etiology to her symptomatology.  She tells me that Dr. Lopez told her to follow up in Cardiology.  According to Dr. Subramanian's note, the patient has previously come off of the anastrozole due to loose stools and arthralgias, but her " symptoms continue despite this.  The patient does state that she is seeing a gastroenterologist in the next couple of weeks.      CURRENT CARDIAC MEDICATIONS:   1.  Aspirin 81 mg daily.   2.  Atorvastatin 20 mg daily.   3.  Lisinopril 5 mg daily.   4.  Toprol-XL 25 mg daily.   5.  Spironolactone 25 mg daily.      The remainder of her medications, allergies and review of systems were reviewed and as are documented separately.      PHYSICAL EXAMINATION:   GENERAL:  The patient is an 79-year-old female who appears her stated age.  She is in no apparent distress.   VITAL SIGNS:  Her blood pressure is 108/76, pulse is 88, weight is 208 pounds which is actually up 5 pounds on our office scale.   PULMONARY:  Breathing is nonlabored.  Lungs are clear to auscultation.   CARDIAC:  Reveals a regular rate and rhythm.  I do not appreciate any significant murmur.      ASSESSMENT AND PLAN:  Ms. 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  She continues to have significant lightheadedness, dizziness and dyspnea on exertion.  Previously, her carvedilol was changed to Toprol-XL at a much lower dose and her symptoms did not improve.  Her lisinopril was recently reduced from 20 mg to 5 mg daily.  Her symptoms still have not improved.  At this point, I am going to discontinue her spironolactone and see if her symptoms improve at all.  I am also going to send her for a Lexiscan nuclear stress test to look for any ischemic etiology to her ongoing dyspnea on exertion.  We will try to get this in the next 1-2 weeks and I will see her back after the stress test to review the results as well as see how she is doing off the spironolactone.          Thank you for allowing us to care for this  pleasant patient      cc:     Soren Perdomo MD   [facility]    [address]   [place]  [address]      Washington Subramanian MD   [facility]    [address]   [place]  [address]         SULEMA STARK PA-C             D: 2019   T: 2019   MT: ELOY      Name:     WARREN METCALF   MRN:      [id number]        Account:      [id number]   :      1940           Service Date: 2019      Document: [id number]           Outpatient Encounter Medications as of 2019   Medication Sig Dispense Refill     anastrozole (ARIMIDEX) 1 MG tablet Take 1 mg by mouth At Bedtime        aspirin 81 MG EC tablet Take 81 mg by mouth every evening       atorvastatin (LIPITOR) 20 MG tablet Take 20 mg by mouth At Bedtime        ibuprofen (ADVIL/MOTRIN) 200 MG tablet Take 200-400 mg by mouth daily as needed for mild pain       lisinopril (PRINIVIL/ZESTRIL) 5 MG tablet Take 1 tablet (5 mg) by mouth daily 30 tablet 0     loperamide (IMODIUM A-D) 2 MG capsule Take 2 mg by mouth 4 times daily as needed for diarrhea       metoprolol succinate ER (TOPROL-XL) 25 MG 24 hr tablet Take 1 tablet (25 mg) by mouth daily 90 tablet 3     multivitamin, therapeutic (THERA-VIT) TABS Take 1 tablet by mouth At Bedtime No iron       ondansetron (ZOFRAN-ODT) 4 MG ODT tab Take 4 mg by mouth 2 times daily as needed for nausea       PARoxetine (PAXIL) 20 MG tablet Take 20 mg by mouth At Bedtime        vitamin D3 (CHOLECALCIFEROL) 2000 units (50 mcg) tablet Take 1 tablet by mouth daily       [DISCONTINUED] spironolactone (ALDACTONE) 25 MG tablet Take 1 tablet (25 mg) by mouth daily 30 tablet 11     No facility-administered encounter medications on file as of 2019.        Again, thank you for allowing me to participate in the care of your patient.      Sincerely,    Sulema Stark PA-C     [facility]

## 2019-11-18 NOTE — PROGRESS NOTES
Service Date: 11/18/2019      HISTORY OF PRESENT ILLNESS:  Yolanda is a pleasant 79-year-old female who presents to the office today for followup on lightheadedness after a recent reduction in her lisinopril.      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  She has a history of multiple cancers.  According to her last Oncology note from Dr. Subramanian, she was initially diagnosed with a lung cancer in 2013.  In 01/2016, she was found to have a right-sided breast cancer.  In 10/2016, she was found to have a colon cancer.  In 02/2018, she developed a second breast cancer on the left side.  Then, in 07/2019 she was found to have a second lung cancer which was resected.  Her Cardiology care started while she was being treated for her left-sided breast cancer in 2018.  She is going to be placed on paclitaxel and Herceptin.  She had a normal baseline echocardiogram; however, her repeat echocardiogram in 09/2018 showed her EF reduced to 30%-35%.  It was at that time she was placed on cardioprotective medications including a beta blocker, ACE inhibitor and spironolactone.  She had a repeat echocardiogram in 12/2018 which showed her EF remained at 30%-35%.  She was seen by Dr. Miller for routine followup in 04/2019 and at that time complained of significant dizziness along with weakness/heaviness and shortness of breath.  Her carvedilol was changed to Toprol-XL.  I saw her for followup in late April and at that time she stated that she was feeling quite well aside from some mild dyspnea on exertion.  I did not make any medication changes.      In 6/2019 she called into the office stating that she was still experiencing dizziness, fatigue and dyspnea on exertion. She did have a followup echocardiogram which showed resolution of her prior cardiomyopathy with an LVEF of 55%-60%.  She was noted to have some PVCs at the time of her echocardiogram.  I also recommended  "that she have a 7-day ZIO Patch monitor.  This showed sinus rhythm with one 5-beat run of ventricular tachycardia and a few short runs of paroxysmal SVT lasting up to 11 beats per minute, nothing that would explain her symptomatology.  I encouraged her to keep her followup with Dr. Miller as planned in July.  Unfortunately, something came up and she canceled that appointment, she was not seen in followup until October.      At that office visit, she was found to be significantly weak and hypotensive.  It was noted that she had had several recent episodes of vomiting and diarrhea.  Due to her significant hypotension, she was brought from the Cardiology Clinic to the Emergency Department.  She did get IV fluid.  Apparently admission was offered, but she refused.  Dr. Miller did decrease her lisinopril from 20 mg daily to 5 mg daily at that time and today's office visit was scheduled.      Today, Yolanda tells me that she continues to feel rather dyspneic with exertion as well as lightheaded and weak.  She tells me that these symptoms have been ongoing for more than a year.  She also states that she continues to have some intermittent diarrhea.  She denies any chest discomfort.  She denies any orthopnea, PND or significant lower extremity edema.  She does tell me \"sometimes I get so tired that I vomit.\"  She tells me that she was in to see Dr. Lopez at [facility] within the last couple of weeks and apparently was not felt to have a pulmonary etiology to her symptomatology.  She tells me that Dr. Lopez told her to follow up in Cardiology.  According to Dr. Subramanian's note, the patient has previously come off of the anastrozole due to loose stools and arthralgias, but her symptoms continue despite this.  The patient does state that she is seeing a gastroenterologist in the next couple of weeks.      CURRENT CARDIAC MEDICATIONS:   1.  Aspirin 81 mg daily.   2.  Atorvastatin 20 mg daily.   3.  Lisinopril 5 mg daily. "   4.  Toprol-XL 25 mg daily.   5.  Spironolactone 25 mg daily.      The remainder of her medications, allergies and review of systems were reviewed and as are documented separately.      PHYSICAL EXAMINATION:   GENERAL:  The patient is an 79-year-old female who appears her stated age.  She is in no apparent distress.   VITAL SIGNS:  Her blood pressure is 108/76, pulse is 88, weight is 208 pounds which is actually up 5 pounds on our office scale.   PULMONARY:  Breathing is nonlabored.  Lungs are clear to auscultation.   CARDIAC:  Reveals a regular rate and rhythm.  I do not appreciate any significant murmur.      ASSESSMENT AND PLAN:  Ms. 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  She continues to have significant lightheadedness, dizziness and dyspnea on exertion.  Previously, her carvedilol was changed to Toprol-XL at a much lower dose and her symptoms did not improve.  Her lisinopril was recently reduced from 20 mg to 5 mg daily.  Her symptoms still have not improved.  At this point, I am going to discontinue her spironolactone and see if her symptoms improve at all.  I am also going to send her for a Lexiscan nuclear stress test to look for any ischemic etiology to her ongoing dyspnea on exertion.  We will try to get this in the next 1-2 weeks and I will see her back after the stress test to review the results as well as see how she is doing off the spironolactone.      Thank you for allowing us to care for this pleasant patient      cc:     Soren Perdomo MD   [facility]    [address]   [place]  [address]      [place] Subramanian MD   [facility]    [address]   [facility] [address] [facility]  [place]  [address]         BLUE STARK PA-C             D: 2019   T: 2019   MT: ELOY      Name:     WARREN METCALF   MRN:      [id number]        Account:      [id number]   :      1940           Service Date: 2019      Document: [id number]

## 2019-11-18 NOTE — PROGRESS NOTES
Please see separate dictation for HPI, PHYSICAL EXAM AND IMPRESSION/PLAN.    CURRENT MEDICATIONS:  Current Outpatient Medications   Medication Sig Dispense Refill     anastrozole (ARIMIDEX) 1 MG tablet Take 1 mg by mouth At Bedtime        aspirin 81 MG EC tablet Take 81 mg by mouth every evening       atorvastatin (LIPITOR) 20 MG tablet Take 20 mg by mouth At Bedtime        ibuprofen (ADVIL/MOTRIN) 200 MG tablet Take 200-400 mg by mouth daily as needed for mild pain       lisinopril (PRINIVIL/ZESTRIL) 5 MG tablet Take 1 tablet (5 mg) by mouth daily 30 tablet 0     loperamide (IMODIUM A-D) 2 MG capsule Take 2 mg by mouth 4 times daily as needed for diarrhea       metoprolol succinate ER (TOPROL-XL) 25 MG 24 hr tablet Take 1 tablet (25 mg) by mouth daily 90 tablet 3     multivitamin, therapeutic (THERA-VIT) TABS Take 1 tablet by mouth At Bedtime No iron       ondansetron (ZOFRAN-ODT) 4 MG ODT tab Take 4 mg by mouth 2 times daily as needed for nausea       PARoxetine (PAXIL) 20 MG tablet Take 20 mg by mouth At Bedtime        spironolactone (ALDACTONE) 25 MG tablet Take 1 tablet (25 mg) by mouth daily 30 tablet 11     vitamin D3 (CHOLECALCIFEROL) 2000 units (50 mcg) tablet Take 1 tablet by mouth daily         ALLERGIES:     Allergies   Allergen Reactions     Demerol [Meperidine] Nausea and Vomiting     Sulfa Drugs Other (See Comments)     Extreme chills     Penicillins Rash     Swelling In mouth and tongue       PAST MEDICAL HISTORY:  Past Medical History:   Diagnosis Date     Acoustic neuroma (H)      Acoustic neuroma (H)      Acute arthropathy     lower leg     Acute depression      Anemia     iron deficeincy     Anxiety      Arthritis     osteoarthrosis of knee     Breast cancer (H)     left breast     Breast cancer, left breast (H) 02/2018     Breast cancer, right breast (H) 1/2016     Cervical cancer (H)      Colon cancer (H)      Colon cancer (H)      Decreased cardiac ejection fraction      Depression       Depressive disorder      Diabetes mellitus (H)     pre-diabetic no longer taking Metformin     Dyspnea      Dyspnea      Gastro-oesophageal reflux disease      H/O: lung cancer 2013     Herpes      Herpes zoster      Herpes zoster      History of blood transfusion      HTN (hypertension)      Hyperlipidaemia      Hyperlipidemia      Hyperlipidemia      Iron deficiency anemia      Lung cancer (H)      Lung nodule 2013    Lung cancer     Lung nodule      Nausea & vomiting      OAB (overactive bladder)      Obese      Osteoarthritis of knee      Pain in female pelvis      Pain in female pelvis      Pre-diabetes      Preop general physical exam      Rib lesion      Rib lesion      Shoulder pain, acute     right     Tachycardia      Torn meniscus      Torn meniscus        PAST SURGICAL HISTORY:  Past Surgical History:   Procedure Laterality Date     acoustic neuroma excised       APPENDECTOMY  1966    done with CORNELIO     ARTHROSCOPY KNEE  2012    right knee scoped     BIOPSY NODE SENTINEL Right 2/3/2016    Procedure: BIOPSY NODE SENTINEL;  Surgeon: Flory Pinto MD;  Location: [facility]     BIOPSY NODE SENTINEL Left 3/5/2018    Procedure: BIOPSY NODE SENTINEL;;  Surgeon: Denae Perez MD;  Location: [facility]     BREAST BIOPSY, RT/LT       cataracts       cataracts       CERVICAL CANCER SCREENING RESULTS DOCUMENTED AND REVIEWED       CHOLECYSTECTOMY  1991     CL AFF SURGICAL PATHOLOGY       CL AFF SURGICAL PATHOLOGY      acoustic neuroma left side excised [facility] Dr. Longoria 6/17/14     COLONOSCOPY       EXCISE NODE MEDIASTINAL  3/12/2013    Procedure: EXCISE NODE MEDIASTINAL;;  Surgeon: Lorne Arenas MD;  Location:  OR     EYE SURGERY      fiona cataracts     GENITOURINARY SURGERY      bladder sling     GYN SURGERY  1966    hysterectomy for cervical cancer *ovaries remain     HYSTERECTOMY  1966    Cervical CA, paps done every other year, done with appendectomy     INSERT PORT VASCULAR ACCESS Left 3/5/2018     Procedure: INSERT PORT VASCULAR ACCESS;;  Surgeon: Denae Perez MD;  Location: [facility]     INSERT PORT VASCULAR ACCESS N/A 3/5/2018    Procedure: INSERT PORT VASCULAR ACCESS;;  Surgeon: Denae Perez MD;  Location: [facility]     LAPAROSCOPIC ASSISTED COLECTOMY Right 10/27/2016    Procedure: LAPAROSCOPIC ASSISTED COLECTOMY;  Surgeon: Umang Arteaga MD;  Location:  OR     LOBECTOMY LUNG  3/12/2013    Procedure: LOBECTOMY LUNG;;  Surgeon: Lorne Arenas MD;  Location:  OR     LUMPECTOMY BREAST WITH SEED LOCALIZATION Right 2/3/2016    Procedure: LUMPECTOMY BREAST WITH SEED LOCALIZATION;  Surgeon: Flory Pinto MD;  Location: [facility]     LUMPECTOMY BREAST WITH SEED LOCALIZATION Left 3/5/2018    Procedure: LUMPECTOMY BREAST WITH SEED LOCALIZATION;  SEED LOCALIZED LEFT BREAST LUMPECTOMY, WITH LEFT SENTINEL NODE BIOPSY, PORT PLACEMENT;  Surgeon: Denae Perez MD;  Location: [facility]     MASTECTOMY SIMPLE Left 3/28/2018    Procedure: MASTECTOMY SIMPLE;  LEFT BREAST MASTECTOMY ;  Surgeon: Denae Perez MD;  Location: [facility]     MINI ARC SLING OPERATION FOR STRESS INCONTINENCE  2009     REMOVE PORT VASCULAR ACCESS N/A 2/13/2019    Procedure: REMOVE PORT VASCULAR ACCESS;  Surgeon: Denae Perez MD;  Location:  OR     RESECT RIB  2/15/2013    Procedure: RESECT RIB;  RESECTION PORTION RIGHT TWELVETH RIB;  Surgeon: Lorne Arenas MD;  Location:  OR     THORACOSCOPIC WEDGE RESECTION LUNG Left 7/26/2019    Procedure: LEFT VIDEO ASSISTED THORACIC SURGERY; POSSIBLE LEFT THORACOTOMY; TWO WEDGE RESECTIONS OF LEFT LOWER LUNG NODULE;  Surgeon: Lorne Arenas MD;  Location:  OR     THORACOTOMY  3/12/2013    Procedure: THORACOTOMY;  RIGHT THORACOTOMY, RIGHT MIDDLE AND LOWER LUNG LOBECTOMY, MEDIASTINAL LYMPH NODE DISSECTION ;  Surgeon: Lorne Arenas MD;  Location:  OR     TONSILLECTOMY         SOCIAL HISTORY:  Social History     Socioeconomic History     Marital  status:      Spouse name: None     Number of children: 3     Years of education: None     Highest education level: None   Occupational History     Employer: RETIRED   Social Needs     Financial resource strain: None     Food insecurity:     Worry: None     Inability: None     Transportation needs:     Medical: None     Non-medical: None   Tobacco Use     Smoking status: Former Smoker     Packs/day: 1.00     Years: 45.00     Pack years: 45.00     Types: Cigarettes     Start date:      Last attempt to quit: 1998     Years since quittin.5     Smokeless tobacco: Never Used     Tobacco comment: quit    Substance and Sexual Activity     Alcohol use: Yes     Alcohol/week: 0.0 standard drinks     Comment: glass of wine a day     Drug use: No     Sexual activity: Yes     Partners: Male     Birth control/protection: Female Surgical   Lifestyle     Physical activity:     Days per week: None     Minutes per session: None     Stress: None   Relationships     Social connections:     Talks on phone: None     Gets together: None     Attends [employer] service: None     Active member of club or organization: None     Attends meetings of clubs or organizations: None     Relationship status: None     Intimate partner violence:     Fear of current or ex partner: None     Emotionally abused: None     Physically abused: None     Forced sexual activity: None   Other Topics Concern     Parent/sibling w/ CABG, MI or angioplasty before 65F 55M? Not Asked   Social History Narrative    2015    -    3 kids    Retired        Pap-2010 WNL    Mammo-11     Colonoscopy-3/3/2010 WNL, no further recommended per pt     Dexa-2008       FAMILY HISTORY:  Family History   Problem Relation Age of Onset     Breast Cancer Paternal Grandmother      Colon Cancer Other         not specified     Fractures Other         Hip fracture, Aunt     Hyperlipidemia Mother      Hypertension Mother      Heart Disease Mother       Osteoporosis Mother      Hyperlipidemia Sister      Hyperlipidemia Brother      Prostate Cancer Brother      Hypertension Sister      Heart Disease Maternal Grandfather      Prostate Cancer Father      Colon Cancer Cousin         son of paternal aunt with breast cancer.     Breast Cancer Paternal Aunt      Colon Cancer Paternal Aunt      Other Cancer Paternal Uncle         Esophageal     Other Cancer Maternal Aunt        Review of Systems:  Skin:  Negative       Eyes:  Positive for glasses cataract extraction of both eyes  ENT:  Positive for hearing loss deaf L ear, wears hearing aid R ear  Respiratory:  Positive for dyspnea on exertion CABRERA, minimal activity   Cardiovascular:    Positive for;palpitations;dizziness;fatigue palpitations with dyspnea  Gastroenterology: Positive for heartburn;reflux;nausea;diarrhea;vomiting waking up every morning feeling sick to her stomach - vomiting when she showers; occ diarrhea  Genitourinary:  Positive for urinary frequency;incontinence    Musculoskeletal:  Positive for joint pain;nocturnal cramping right shoulder pain  Neurologic:  Negative      Psychiatric:  Positive for sleep disturbances;depression trouble falling asleep; depression under control  Heme/Lymph/Imm:  Positive for allergies RX  Endocrine:  Negative         Reviewed. Remainder of the note dictated.    Sulema Coleman PA-C

## 2019-11-18 NOTE — LETTER
11/18/2019    [facility] [address]  [place] [address]    RE: Yolanda Watson       Dear Colleague,    I had the pleasure of seeing Yolanda Watson in the [facility].    Please see separate dictation for HPI, PHYSICAL EXAM AND IMPRESSION/PLAN.    CURRENT MEDICATIONS:  Current Outpatient Medications   Medication Sig Dispense Refill     anastrozole (ARIMIDEX) 1 MG tablet Take 1 mg by mouth At Bedtime        aspirin 81 MG EC tablet Take 81 mg by mouth every evening       atorvastatin (LIPITOR) 20 MG tablet Take 20 mg by mouth At Bedtime        ibuprofen (ADVIL/MOTRIN) 200 MG tablet Take 200-400 mg by mouth daily as needed for mild pain       lisinopril (PRINIVIL/ZESTRIL) 5 MG tablet Take 1 tablet (5 mg) by mouth daily 30 tablet 0     loperamide (IMODIUM A-D) 2 MG capsule Take 2 mg by mouth 4 times daily as needed for diarrhea       metoprolol succinate ER (TOPROL-XL) 25 MG 24 hr tablet Take 1 tablet (25 mg) by mouth daily 90 tablet 3     multivitamin, therapeutic (THERA-VIT) TABS Take 1 tablet by mouth At Bedtime No iron       ondansetron (ZOFRAN-ODT) 4 MG ODT tab Take 4 mg by mouth 2 times daily as needed for nausea       PARoxetine (PAXIL) 20 MG tablet Take 20 mg by mouth At Bedtime        spironolactone (ALDACTONE) 25 MG tablet Take 1 tablet (25 mg) by mouth daily 30 tablet 11     vitamin D3 (CHOLECALCIFEROL) 2000 units (50 mcg) tablet Take 1 tablet by mouth daily         ALLERGIES:     Allergies   Allergen Reactions     Demerol [Meperidine] Nausea and Vomiting     Sulfa Drugs Other (See Comments)     Extreme chills     Penicillins Rash     Swelling In mouth and tongue       PAST MEDICAL HISTORY:  Past Medical History:   Diagnosis Date     Acoustic neuroma (H)      Acoustic neuroma (H)      Acute arthropathy     lower leg     Acute depression      Anemia     iron deficeincy     Anxiety      Arthritis     osteoarthrosis of knee     Breast  cancer (H)     left breast     Breast cancer, left breast (H) 02/2018     Breast cancer, right breast (H) 1/2016     Cervical cancer (H)      Colon cancer (H)      Colon cancer (H)      Decreased cardiac ejection fraction      Depression      Depressive disorder      Diabetes mellitus (H)     pre-diabetic no longer taking Metformin     Dyspnea      Dyspnea      Gastro-oesophageal reflux disease      H/O: lung cancer 2013     Herpes      Herpes zoster      Herpes zoster      History of blood transfusion      HTN (hypertension)      Hyperlipidaemia      Hyperlipidemia      Hyperlipidemia      Iron deficiency anemia      Lung cancer (H)      Lung nodule 2013    Lung cancer     Lung nodule      Nausea & vomiting      OAB (overactive bladder)      Obese      Osteoarthritis of knee      Pain in female pelvis      Pain in female pelvis      Pre-diabetes      Preop general physical exam      Rib lesion      Rib lesion      Shoulder pain, acute     right     Tachycardia      Torn meniscus      Torn meniscus        PAST SURGICAL HISTORY:  Past Surgical History:   Procedure Laterality Date     acoustic neuroma excised       APPENDECTOMY  1966    done with CORNELIO     ARTHROSCOPY KNEE  2012    right knee scoped     BIOPSY NODE SENTINEL Right 2/3/2016    Procedure: BIOPSY NODE SENTINEL;  Surgeon: Flory Pinto MD;  [facility]     BIOPSY NODE SENTINEL Left 3/5/2018    Procedure: BIOPSY NODE SENTINEL;;  Surgeon: Denae Perez MD;  [facility]     BREAST BIOPSY, RT/LT       cataracts       cataracts       CERVICAL CANCER SCREENING RESULTS DOCUMENTED AND REVIEWED       CHOLECYSTECTOMY  1991     CL AFF SURGICAL PATHOLOGY       CL AFF SURGICAL PATHOLOGY      acoustic neuroma left side excised [facility] [place] 6/17/14     COLONOSCOPY       EXCISE NODE MEDIASTINAL  3/12/2013    Procedure: EXCISE NODE MEDIASTINAL;;  Surgeon: Lorne Arenas MD;  Location:  OR     EYE SURGERY      fiona cataracts      GENITOURINARY SURGERY      bladder sling     GYN SURGERY  1966    hysterectomy for cervical cancer *ovaries remain     HYSTERECTOMY  1966    Cervical CA, paps done every other year, done with appendectomy     INSERT PORT VASCULAR ACCESS Left 3/5/2018    Procedure: INSERT PORT VASCULAR ACCESS;;  Surgeon: Denae Perez MD;  Location: [facility]     INSERT PORT VASCULAR ACCESS N/A 3/5/2018    Procedure: INSERT PORT VASCULAR ACCESS;;  Surgeon: Denae Perez MD;  Location: [facility]     LAPAROSCOPIC ASSISTED COLECTOMY Right 10/27/2016    Procedure: LAPAROSCOPIC ASSISTED COLECTOMY;  Surgeon: Umang Arteaga MD;  Location:  OR     LOBECTOMY LUNG  3/12/2013    Procedure: LOBECTOMY LUNG;;  Surgeon: Lorne Arenas MD;  Location:  OR     LUMPECTOMY BREAST WITH SEED LOCALIZATION Right 2/3/2016    Procedure: LUMPECTOMY BREAST WITH SEED LOCALIZATION;  Surgeon: Flory Pinto MD;  Location: [facility]     LUMPECTOMY BREAST WITH SEED LOCALIZATION Left 3/5/2018    Procedure: LUMPECTOMY BREAST WITH SEED LOCALIZATION;  SEED LOCALIZED LEFT BREAST LUMPECTOMY, WITH LEFT SENTINEL NODE BIOPSY, PORT PLACEMENT;  Surgeon: Denae Perez MD;  Location: [facility]     MASTECTOMY SIMPLE Left 3/28/2018    Procedure: MASTECTOMY SIMPLE;  LEFT BREAST MASTECTOMY ;  Surgeon: Denae Perez MD;  Location: [facility]     MINI ARC SLING OPERATION FOR STRESS INCONTINENCE  2009     REMOVE PORT VASCULAR ACCESS N/A 2/13/2019    Procedure: REMOVE PORT VASCULAR ACCESS;  Surgeon: Denae Perez MD;  Location:  OR     RESECT RIB  2/15/2013    Procedure: RESECT RIB;  RESECTION PORTION RIGHT TWELVETH RIB;  Surgeon: Lorne Arenas MD;  Location:  OR     THORACOSCOPIC WEDGE RESECTION LUNG Left 7/26/2019    Procedure: LEFT VIDEO ASSISTED THORACIC SURGERY; POSSIBLE LEFT THORACOTOMY; TWO WEDGE RESECTIONS OF LEFT LOWER LUNG NODULE;  Surgeon: Lorne Arenas MD;  Location:  OR     THORACOTOMY  3/12/2013    Procedure: THORACOTOMY;   RIGHT THORACOTOMY, RIGHT MIDDLE AND LOWER LUNG LOBECTOMY, MEDIASTINAL LYMPH NODE DISSECTION ;  Surgeon: oLrne Arenas MD;  Location: [facility]     TONSILLECTOMY         SOCIAL HISTORY:  Social History     Socioeconomic History     Marital status:      Spouse name: None     Number of children: 3     Years of education: None     Highest education level: None   Occupational History     Employer: RETIRED   Social Needs     Financial resource strain: None     Food insecurity:     Worry: None     Inability: None     Transportation needs:     Medical: None     Non-medical: None   Tobacco Use     Smoking status: Former Smoker     Packs/day: 1.00     Years: 45.00     Pack years: 45.00     Types: Cigarettes     Start date:      Last attempt to quit: 1998     Years since quittin.5     Smokeless tobacco: Never Used     Tobacco comment: quit    Substance and Sexual Activity     Alcohol use: Yes     Alcohol/week: 0.0 standard drinks     Comment: glass of wine a day     Drug use: No     Sexual activity: Yes     Partners: Male     Birth control/protection: Female Surgical   Lifestyle     Physical activity:     Days per week: None     Minutes per session: None     Stress: None   Relationships     Social connections:     Talks on phone: None     Gets together: None     Attends Jain service: None     Active member of club or organization: None     Attends meetings of clubs or organizations: None     Relationship status: None     Intimate partner violence:     Fear of current or ex partner: None     Emotionally abused: None     Physically abused: None     Forced sexual activity: None   Other Topics Concern     Parent/sibling w/ CABG, MI or angioplasty before 65F 55M? Not Asked   Social History Narrative    2015    -    3 kids    Retired        Pap-2010 WNL    Mammo-11     Colonoscopy-3/3/2010 WNL, no further recommended per pt     Dexa-2008       FAMILY HISTORY:  Family  History   Problem Relation Age of Onset     Breast Cancer Paternal Grandmother      Colon Cancer Other         not specified     Fractures Other         Hip fracture, Aunt     Hyperlipidemia Mother      Hypertension Mother      Heart Disease Mother      Osteoporosis Mother      Hyperlipidemia Sister      Hyperlipidemia Brother      Prostate Cancer Brother      Hypertension Sister      Heart Disease Maternal Grandfather      Prostate Cancer Father      Colon Cancer Cousin         son of paternal aunt with breast cancer.     Breast Cancer Paternal Aunt      Colon Cancer Paternal Aunt      Other Cancer Paternal Uncle         Esophageal     Other Cancer Maternal Aunt        Review of Systems:  Skin:  Negative       Eyes:  Positive for glasses cataract extraction of both eyes  ENT:  Positive for hearing loss deaf L ear, wears hearing aid R ear  Respiratory:  Positive for dyspnea on exertion CABRERA, minimal activity   Cardiovascular:    Positive for;palpitations;dizziness;fatigue palpitations with dyspnea  Gastroenterology: Positive for heartburn;reflux;nausea;diarrhea;vomiting waking up every morning feeling sick to her stomach - vomiting when she showers; occ diarrhea  Genitourinary:  Positive for urinary frequency;incontinence    Musculoskeletal:  Positive for joint pain;nocturnal cramping right shoulder pain  Neurologic:  Negative      Psychiatric:  Positive for sleep disturbances;depression trouble falling asleep; depression under control  Heme/Lymph/Imm:  Positive for allergies RX  Endocrine:  Negative         Reviewed. Remainder of the note dictated.    Sulema Coleman PA-C        Service Date: 11/18/2019      HISTORY OF PRESENT ILLNESS:  Yolanda is a pleasant 79-year-old female who presents to the office today for followup on lightheadedness after a recent reduction in her lisinopril.      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  She has a  history of multiple cancers.  According to her last Oncology note from Dr. Subramanian, she was initially diagnosed with a lung cancer in 2013.  In 01/2016, she was found to have a right-sided breast cancer.  In 10/2016, she was found to have a colon cancer.  In 02/2018, she developed a second breast cancer on the left side.  Then, in 07/2019 she was found to have a second lung cancer which was resected.  Her Cardiology care started while she was being treated for her left-sided breast cancer in 2018.  She is going to be placed on paclitaxel and Herceptin.  She had a normal baseline echocardiogram; however, her repeat echocardiogram in 09/2018 showed her EF reduced to 30%-35%.  It was at that time she was placed on cardioprotective medications including a beta blocker, ACE inhibitor and spironolactone.  She had a repeat echocardiogram in 12/2018 which showed her EF remained at 30%-35%.  She was seen by Dr. Miller for routine followup in 04/2019 and at that time complained of significant dizziness along with weakness/heaviness and shortness of breath.  Her carvedilol was changed to Toprol-XL.  I saw her for followup in late April and at that time she stated that she was feeling quite well aside from some mild dyspnea on exertion.  I did not make any medication changes.      In 6/2019 she called into the office stating that she was still experiencing dizziness, fatigue and dyspnea on exertion. She did have a followup echocardiogram which showed resolution of her prior cardiomyopathy with an LVEF of 55%-60%.  She was noted to have some PVCs at the time of her echocardiogram.  I also recommended that she have a 7-day ZIO Patch monitor.  This showed sinus rhythm with one 5-beat run of ventricular tachycardia and a few short runs of paroxysmal SVT lasting up to 11 beats per minute, nothing that would explain her symptomatology.  I encouraged her to keep her followup with Dr. Miller as planned in July.  Unfortunately,  "something came up and she canceled that appointment, she was not seen in followup until October.      At that office visit, she was found to be significantly weak and hypotensive.  It was noted that she had had several recent episodes of vomiting and diarrhea.  Due to her significant hypotension, she was brought from the Cardiology Clinic to the Emergency Department.  She did get IV fluid.  Apparently admission was offered, but she refused.  Dr. Miller did decrease her lisinopril from 20 mg daily to 5 mg daily at that time and today's office visit was scheduled.      Today, Yolanda tells me that she continues to feel rather dyspneic with exertion as well as lightheaded and weak.  She tells me that these symptoms have been ongoing for more than a year.  She also states that she continues to have some intermittent diarrhea.  She denies any chest discomfort.  She denies any orthopnea, PND or significant lower extremity edema.  She does tell me \"sometimes I get so tired that I vomit.\"  She tells me that she was in to see Dr. Lopez at [facility] within the last couple of weeks and apparently was not felt to have a pulmonary etiology to her symptomatology.  She tells me that Dr. Lopez told her to follow up in Cardiology.  According to Dr. Subramanian's note, the patient has previously come off of the anastrozole due to loose stools and arthralgias, but her symptoms continue despite this.  The patient does state that she is seeing a gastroenterologist in the next couple of weeks.      CURRENT CARDIAC MEDICATIONS:   1.  Aspirin 81 mg daily.   2.  Atorvastatin 20 mg daily.   3.  Lisinopril 5 mg daily.   4.  Toprol-XL 25 mg daily.   5.  Spironolactone 25 mg daily.      The remainder of her medications, allergies and review of systems were reviewed and as are documented separately.      PHYSICAL EXAMINATION:   GENERAL:  The patient is an 79-year-old female who appears her stated age.  She is in no apparent distress.   VITAL " SIGNS:  Her blood pressure is 108/76, pulse is 88, weight is 208 pounds which is actually up 5 pounds on our office scale.   PULMONARY:  Breathing is nonlabored.  Lungs are clear to auscultation.   CARDIAC:  Reveals a regular rate and rhythm.  I do not appreciate any significant murmur.      ASSESSMENT AND PLAN:  Ms. 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  She continues to have significant lightheadedness, dizziness and dyspnea on exertion.  Previously, her carvedilol was changed to Toprol-XL at a much lower dose and her symptoms did not improve.  Her lisinopril was recently reduced from 20 mg to 5 mg daily.  Her symptoms still have not improved.  At this point, I am going to discontinue her spironolactone and see if her symptoms improve at all.  I am also going to send her for a Lexiscan nuclear stress test to look for any ischemic etiology to her ongoing dyspnea on exertion.  We will try to get this in the next 1-2 weeks and I will see her back after the stress test to review the results as well as see how she is doing off the spironolactone.      Thank you for allowing us to care for this pleasant patient      cc:     Soren Perdomo MD   [facility]    [address]   [place]  [address]      Washington Subramanian MD   [facility]    [address]   [place]  [address]         BLUE STARK PA-C             D: 2019   T: 2019   MT: ELOY      Name:     WARREN WATSON   MRN:      [id number]        Account:      [id number]   :      1940           Service Date: 2019      Document: [id number]        Thank you for allowing me to participate in the  care of your patient.      Sincerely,     MADELIN MosesC     [facility] [place]    cc:   [facility]  [address]  [place] [address]

## 2019-11-20 ENCOUNTER — HOSPITAL ENCOUNTER (OUTPATIENT)
Dept: CARDIOLOGY | Facility: CLINIC | Age: 79
Discharge: HOME | End: 2019-11-20
Attending: PHYSICIAN ASSISTANT | Admitting: PHYSICIAN ASSISTANT
Payer: COMMERCIAL

## 2019-11-20 VITALS
WEIGHT: 206 LBS | OXYGEN SATURATION: 97 % | HEART RATE: 90 BPM | SYSTOLIC BLOOD PRESSURE: 138 MMHG | HEIGHT: 70 IN | DIASTOLIC BLOOD PRESSURE: 78 MMHG | BODY MASS INDEX: 29.49 KG/M2

## 2019-11-20 DIAGNOSIS — R06.09 DOE (DYSPNEA ON EXERTION): ICD-10-CM

## 2019-11-20 LAB
CV BLOOD PRESSURE: 58 %
CV STRESS MAX HR HE: 100
NUC STRESS EJECTION FRACTION: 61 %
RATE PRESSURE PRODUCT: NORMAL
STRESS ECHO BASELINE DIASTOLIC HE: 78
STRESS ECHO BASELINE HR: 77
STRESS ECHO BASELINE SYSTOLIC BP: 138
STRESS ECHO CALCULATED PERCENT HR: 72 %
STRESS ECHO LAST STRESS DIASTOLIC BP: 78
STRESS ECHO LAST STRESS SYSTOLIC BP: 158
STRESS ECHO TARGET HR: 139
STRESS/REST PERFUSION RATIO: 1.06

## 2019-11-20 PROCEDURE — 78452 HT MUSCLE IMAGE SPECT MULT: CPT | Mod: 26 | Performed by: INTERNAL MEDICINE

## 2019-11-20 PROCEDURE — 93016 CV STRESS TEST SUPVJ ONLY: CPT | Performed by: INTERNAL MEDICINE

## 2019-11-20 PROCEDURE — 34300033 ZZH RX 343: Performed by: PHYSICIAN ASSISTANT

## 2019-11-20 PROCEDURE — 25000128 H RX IP 250 OP 636: Performed by: INTERNAL MEDICINE

## 2019-11-20 PROCEDURE — 78452 HT MUSCLE IMAGE SPECT MULT: CPT

## 2019-11-20 PROCEDURE — 93018 CV STRESS TEST I&R ONLY: CPT | Performed by: INTERNAL MEDICINE

## 2019-11-20 PROCEDURE — 25000132 ZZH RX MED GY IP 250 OP 250 PS 637: Performed by: INTERNAL MEDICINE

## 2019-11-20 PROCEDURE — A9502 TC99M TETROFOSMIN: HCPCS | Performed by: PHYSICIAN ASSISTANT

## 2019-11-20 RX ORDER — CAFFEINE 200 MG
200 TABLET ORAL
Status: DISCONTINUED | OUTPATIENT
Start: 2019-11-20 | End: 2019-11-21 | Stop reason: HOSPADM

## 2019-11-20 RX ORDER — AMINOPHYLLINE 25 MG/ML
50-100 INJECTION, SOLUTION INTRAVENOUS
Status: DISCONTINUED | OUTPATIENT
Start: 2019-11-20 | End: 2019-11-21 | Stop reason: HOSPADM

## 2019-11-20 RX ORDER — ALBUTEROL SULFATE 90 UG/1
2 AEROSOL, METERED RESPIRATORY (INHALATION) EVERY 5 MIN PRN
Status: DISCONTINUED | OUTPATIENT
Start: 2019-11-20 | End: 2019-11-21 | Stop reason: HOSPADM

## 2019-11-20 RX ORDER — ACYCLOVIR 200 MG/1
0-1 CAPSULE ORAL
Status: DISCONTINUED | OUTPATIENT
Start: 2019-11-20 | End: 2019-11-21 | Stop reason: HOSPADM

## 2019-11-20 RX ORDER — CAFFEINE CITRATE 20 MG/ML
60 SOLUTION INTRAVENOUS
Status: DISCONTINUED | OUTPATIENT
Start: 2019-11-20 | End: 2019-11-21 | Stop reason: HOSPADM

## 2019-11-20 RX ORDER — REGADENOSON 0.08 MG/ML
0.4 INJECTION, SOLUTION INTRAVENOUS ONCE
Status: COMPLETED | OUTPATIENT
Start: 2019-11-20 | End: 2019-11-20

## 2019-11-20 RX ADMIN — ALBUTEROL SULFATE 2 PUFF: 90 AEROSOL, METERED RESPIRATORY (INHALATION) at 13:20

## 2019-11-20 RX ADMIN — REGADENOSON 0.4 MG: 0.08 INJECTION, SOLUTION INTRAVENOUS at 13:20

## 2019-11-20 RX ADMIN — TETROFOSMIN 11.59 MCI.: 1.38 INJECTION, POWDER, LYOPHILIZED, FOR SOLUTION INTRAVENOUS at 13:23

## 2019-11-20 RX ADMIN — TETROFOSMIN 3.63 MCI.: 1.38 INJECTION, POWDER, LYOPHILIZED, FOR SOLUTION INTRAVENOUS at 11:28

## 2019-11-20 ASSESSMENT — MIFFLIN-ST. JEOR: SCORE: 1471.72

## 2019-11-22 DIAGNOSIS — I42.9 SECONDARY CARDIOMYOPATHY (H): Primary | ICD-10-CM

## 2019-11-22 RX ORDER — LISINOPRIL 5 MG/1
5 TABLET ORAL DAILY
Qty: 90 TABLET | Refills: 0 | Status: SHIPPED | OUTPATIENT
Start: 2019-11-22 | End: 2020-02-19

## 2019-11-22 NOTE — TELEPHONE ENCOUNTER
Received refill request for:  Lisinopril  Last OV was: 11/18/2019 with NELA Cuadra  Labs/EKG: last BMP 10/17/2019  F/U scheduled: 12/9/2019 with NELA Cuadra  New script sent to: [facility]    **reviewed with NELA Cuadra.  Order placed for BMP to be drawn prior to OV on 12/9/19.  KMortimer RN

## 2019-12-16 ENCOUNTER — OFFICE VISIT (OUTPATIENT)
Dept: CARDIOLOGY | Facility: CLINIC | Age: 79
End: 2019-12-16
Attending: PHYSICIAN ASSISTANT
Payer: COMMERCIAL

## 2019-12-16 VITALS
HEART RATE: 142 BPM | SYSTOLIC BLOOD PRESSURE: 126 MMHG | WEIGHT: 209.5 LBS | BODY MASS INDEX: 29.99 KG/M2 | DIASTOLIC BLOOD PRESSURE: 76 MMHG | HEIGHT: 70 IN

## 2019-12-16 DIAGNOSIS — R00.0 TACHYCARDIA: ICD-10-CM

## 2019-12-16 DIAGNOSIS — I42.9 SECONDARY CARDIOMYOPATHY (H): Primary | ICD-10-CM

## 2019-12-16 DIAGNOSIS — R06.09 DOE (DYSPNEA ON EXERTION): ICD-10-CM

## 2019-12-16 DIAGNOSIS — I42.9 SECONDARY CARDIOMYOPATHY (H): ICD-10-CM

## 2019-12-16 LAB
ANION GAP SERPL CALCULATED.3IONS-SCNC: 7 MMOL/L (ref 3–14)
BUN SERPL-MCNC: 22 MG/DL (ref 7–30)
CALCIUM SERPL-MCNC: 9.3 MG/DL (ref 8.5–10.1)
CHLORIDE SERPL-SCNC: 112 MMOL/L (ref 94–109)
CO2 SERPL-SCNC: 25 MMOL/L (ref 20–32)
CREAT SERPL-MCNC: 0.81 MG/DL (ref 0.52–1.04)
GFR SERPL CREATININE-BSD FRML MDRD: 68 ML/MIN/{1.73_M2}
GLUCOSE SERPL-MCNC: 138 MG/DL (ref 70–99)
POTASSIUM SERPL-SCNC: 3.8 MMOL/L (ref 3.4–5.3)
SODIUM SERPL-SCNC: 144 MMOL/L (ref 133–144)

## 2019-12-16 PROCEDURE — 80048 BASIC METABOLIC PNL TOTAL CA: CPT | Performed by: INTERNAL MEDICINE

## 2019-12-16 PROCEDURE — 93000 ELECTROCARDIOGRAM COMPLETE: CPT | Performed by: PHYSICIAN ASSISTANT

## 2019-12-16 PROCEDURE — 36415 COLL VENOUS BLD VENIPUNCTURE: CPT | Performed by: INTERNAL MEDICINE

## 2019-12-16 PROCEDURE — 99214 OFFICE O/P EST MOD 30 MIN: CPT | Performed by: PHYSICIAN ASSISTANT

## 2019-12-16 RX ORDER — METOPROLOL SUCCINATE 50 MG/1
50 TABLET, EXTENDED RELEASE ORAL DAILY
Qty: 90 TABLET | Refills: 3 | Status: ON HOLD | OUTPATIENT
Start: 2019-12-16 | End: 2024-01-01

## 2019-12-16 RX ORDER — CHOLESTYRAMINE 4 G/9G
4 POWDER, FOR SUSPENSION ORAL DAILY
COMMUNITY
End: 2021-07-19

## 2019-12-16 ASSESSMENT — MIFFLIN-ST. JEOR: SCORE: 1487.6

## 2019-12-16 NOTE — LETTER
12/16/2019    Soren Perdomo MD  [facility] [address]  [place] [address]    RE: Yolanda Watson       Dear Colleague,    I had the pleasure of seeing Yolanda Watson in the [facility].    Please see separate dictation for HPI, PHYSICAL EXAM AND IMPRESSION/PLAN.    CURRENT MEDICATIONS:Increase   Current Outpatient Medications   Medication Sig Dispense Refill     anastrozole (ARIMIDEX) 1 MG tablet Take 1 mg by mouth At Bedtime        aspirin 81 MG EC tablet Take 81 mg by mouth every evening       atorvastatin (LIPITOR) 20 MG tablet Take 20 mg by mouth At Bedtime        cholestyramine (QUESTRAN) 4 GM/DOSE powder Take by mouth 2 times daily (with meals)       ibuprofen (ADVIL/MOTRIN) 200 MG tablet Take 200-400 mg by mouth daily as needed for mild pain       lisinopril (PRINIVIL/ZESTRIL) 5 MG tablet Take 1 tablet (5 mg) by mouth daily 90 tablet 0     melatonin 5 MG tablet Take 5-10 mg by mouth nightly as needed for sleep       metoprolol succinate ER (TOPROL-XL) 25 MG 24 hr tablet Take 1 tablet (25 mg) by mouth daily 90 tablet 3     multivitamin, therapeutic (THERA-VIT) TABS Take 1 tablet by mouth At Bedtime No iron       ondansetron (ZOFRAN-ODT) 4 MG ODT tab Take 4 mg by mouth 2 times daily as needed for nausea       PARoxetine (PAXIL) 20 MG tablet Take 20 mg by mouth At Bedtime        vitamin D3 (CHOLECALCIFEROL) 2000 units (50 mcg) tablet Take 1 tablet by mouth daily       loperamide (IMODIUM A-D) 2 MG capsule Take 2 mg by mouth 4 times daily as needed for diarrhea         ALLERGIES:     Allergies   Allergen Reactions     Demerol [Meperidine] Nausea and Vomiting     Sulfa Drugs Other (See Comments)     Extreme chills     Penicillins Rash     Swelling In mouth and tongue       PAST MEDICAL HISTORY:  Past Medical History:   Diagnosis Date     Acoustic neuroma (H)      Acoustic neuroma (H)      Acute arthropathy     lower leg     Acute depression      Anemia      iron deficeincy     Anxiety      Arthritis     osteoarthrosis of knee     Breast cancer (H)     left breast     Breast cancer, left breast (H) 02/2018     Breast cancer, right breast (H) 1/2016     Cervical cancer (H)      Colon cancer (H)      Colon cancer (H)      Decreased cardiac ejection fraction      Depression      Depressive disorder      Diabetes mellitus (H)     pre-diabetic no longer taking Metformin     Dyspnea      Dyspnea      Gastro-oesophageal reflux disease      H/O: lung cancer 2013     Herpes      Herpes zoster      Herpes zoster      History of blood transfusion      HTN (hypertension)      Hyperlipidaemia      Hyperlipidemia      Hyperlipidemia      Iron deficiency anemia      Lung cancer (H)      Lung nodule 2013    Lung cancer     Lung nodule      Nausea & vomiting      OAB (overactive bladder)      Obese      Osteoarthritis of knee      Pain in female pelvis      Pain in female pelvis      Pre-diabetes      Preop general physical exam      Rib lesion      Rib lesion      Shoulder pain, acute     right     Tachycardia      Torn meniscus      Torn meniscus        PAST SURGICAL HISTORY:  Past Surgical History:   Procedure Laterality Date     acoustic neuroma excised       APPENDECTOMY  1966    done with CORNELIO     ARTHROSCOPY KNEE  2012    right knee scoped     BIOPSY NODE SENTINEL Right 2/3/2016    Procedure: BIOPSY NODE SENTINEL;  Surgeon: Flory Pinto MD;  Location: [facility]     BIOPSY NODE SENTINEL Left 3/5/2018    Procedure: BIOPSY NODE SENTINEL;;  Surgeon: Denae Perez MD;  Location: [facility]     BREAST BIOPSY, RT/LT       cataracts       cataracts       CERVICAL CANCER SCREENING RESULTS DOCUMENTED AND REVIEWED       CHOLECYSTECTOMY  1991     CL AFF SURGICAL PATHOLOGY       CL AFF SURGICAL PATHOLOGY      acoustic neuroma left side excised [facility] Dr. [place] 6/17/14     COLONOSCOPY       EXCISE NODE MEDIASTINAL  3/12/2013    Procedure: EXCISE NODE MEDIASTINAL;;  Surgeon: Dagoberto  Lorne Oliva MD;  Location:  OR     EYE SURGERY      fiona cataracts     GENITOURINARY SURGERY      bladder sling     GYN SURGERY  1966    hysterectomy for cervical cancer *ovaries remain     HYSTERECTOMY  1966    Cervical CA, paps done every other year, done with appendectomy     INSERT PORT VASCULAR ACCESS Left 3/5/2018    Procedure: INSERT PORT VASCULAR ACCESS;;  Surgeon: Denae Perez MD;  Location: [facility]     INSERT PORT VASCULAR ACCESS N/A 3/5/2018    Procedure: INSERT PORT VASCULAR ACCESS;;  Surgeon: Denae Perez MD;  Location: [facility]     LAPAROSCOPIC ASSISTED COLECTOMY Right 10/27/2016    Procedure: LAPAROSCOPIC ASSISTED COLECTOMY;  Surgeon: Umang Arteaga MD;  Location:  OR     LOBECTOMY LUNG  3/12/2013    Procedure: LOBECTOMY LUNG;;  Surgeon: Lorne Arenas MD;  Location:  OR     LUMPECTOMY BREAST WITH SEED LOCALIZATION Right 2/3/2016    Procedure: LUMPECTOMY BREAST WITH SEED LOCALIZATION;  Surgeon: Flory Pinto MD;  Location: [facility]     LUMPECTOMY BREAST WITH SEED LOCALIZATION Left 3/5/2018    Procedure: LUMPECTOMY BREAST WITH SEED LOCALIZATION;  SEED LOCALIZED LEFT BREAST LUMPECTOMY, WITH LEFT SENTINEL NODE BIOPSY, PORT PLACEMENT;  Surgeon: Deane Perez MD;  Location: [facility]     MASTECTOMY SIMPLE Left 3/28/2018    Procedure: MASTECTOMY SIMPLE;  LEFT BREAST MASTECTOMY ;  Surgeon: Denae Perez MD;  Location: [facility]     MINI ARC SLING OPERATION FOR STRESS INCONTINENCE  2009     REMOVE PORT VASCULAR ACCESS N/A 2/13/2019    Procedure: REMOVE PORT VASCULAR ACCESS;  Surgeon: Denae Perez MD;  Location:  OR     RESECT RIB  2/15/2013    Procedure: RESECT RIB;  RESECTION PORTION RIGHT TWELVETH RIB;  Surgeon: Lorne Arenas MD;  Location:  OR     THORACOSCOPIC WEDGE RESECTION LUNG Left 7/26/2019    Procedure: LEFT VIDEO ASSISTED THORACIC SURGERY; POSSIBLE LEFT THORACOTOMY; TWO WEDGE RESECTIONS OF LEFT LOWER LUNG NODULE;  Surgeon: Lorne Arenas  MD;  Location: [facility] OR     THORACOTOMY  3/12/2013    Procedure: THORACOTOMY;  RIGHT THORACOTOMY, RIGHT MIDDLE AND LOWER LUNG LOBECTOMY, MEDIASTINAL LYMPH NODE DISSECTION ;  Surgeon: Lorne Arenas MD;  Location: [facility] OR     TONSILLECTOMY         SOCIAL HISTORY:  Social History     Socioeconomic History     Marital status:      Spouse name: None     Number of children: 3     Years of education: None     Highest education level: None   Occupational History     Employer: RETIRED   Social Needs     Financial resource strain: None     Food insecurity:     Worry: None     Inability: None     Transportation needs:     Medical: None     Non-medical: None   Tobacco Use     Smoking status: Former Smoker     Packs/day: 1.00     Years: 45.00     Pack years: 45.00     Types: Cigarettes     Start date:      Last attempt to quit: 1998     Years since quittin.6     Smokeless tobacco: Never Used     Tobacco comment: quit    Substance and Sexual Activity     Alcohol use: Yes     Alcohol/week: 0.0 standard drinks     Comment: glass of wine a day     Drug use: No     Sexual activity: Yes     Partners: Male     Birth control/protection: Female Surgical   Lifestyle     Physical activity:     Days per week: None     Minutes per session: None     Stress: None   Relationships     Social connections:     Talks on phone: None     Gets together: None     Attends Zoroastrianism service: None     Active member of club or organization: None     Attends meetings of clubs or organizations: None     Relationship status: None     Intimate partner violence:     Fear of current or ex partner: None     Emotionally abused: None     Physically abused: None     Forced sexual activity: None   Other Topics Concern     Parent/sibling w/ CABG, MI or angioplasty before 65F 55M? Not Asked   Social History Narrative    2015    -    3 kids    Retired        Pap-2010 WNL    Mammo-11     Colonoscopy-3/3/2010 WNL, no  further recommended per pt     Dexa-1/1/2008       FAMILY HISTORY:  Family History   Problem Relation Age of Onset     Breast Cancer Paternal Grandmother      Colon Cancer Other         not specified     Fractures Other         Hip fracture, Aunt     Hyperlipidemia Mother      Hypertension Mother      Heart Disease Mother      Osteoporosis Mother      Hyperlipidemia Sister      Hyperlipidemia Brother      Prostate Cancer Brother      Hypertension Sister      Heart Disease Maternal Grandfather      Prostate Cancer Father      Colon Cancer Cousin         son of paternal aunt with breast cancer.     Breast Cancer Paternal Aunt      Colon Cancer Paternal Aunt      Other Cancer Paternal Uncle         Esophageal     Other Cancer Maternal Aunt        Review of Systems:  Skin:  Negative       Eyes:  Positive for glasses cataract extraction of both eyes  ENT:  Positive for hearing loss deaf L ear, wears hearing aid R ear  Respiratory:  Positive for dyspnea on exertion CABRERA, minimal activity; one episode a week ago of waking up in the middle of the night and was not able to breathe   Cardiovascular:    Positive for;palpitations;fatigue palpitations with dyspnea; poor balance  Gastroenterology: Positive for nausea;vomiting waking up every morning feeling sick to her stomach - vomiting when she showers  Genitourinary:  Positive for urinary frequency;incontinence    Musculoskeletal:  Positive for joint pain;back pain right shoulder pain; arthritis in knees and hands  Neurologic:  Positive for headaches    Psychiatric:  Positive for sleep disturbances;depression trouble falling asleep; depression under control  Heme/Lymph/Imm:  Positive for allergies RX  Endocrine:  Negative         Reviewed. Remainder of the note dictated.    Sulema Coleman PA-C    Service Date: 12/16/2019      HISTORY OF PRESENT ILLNESS:  Yolanda is an 79-year-old female who presents to the office today for followup after recent discontinuation of  spironolactone.      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  She has a history of multiple cancers.  According to her last Oncology note from Dr. Subramanian, she was initially diagnosed with lung cancer in 2013.  I believe she underwent resection at that time.  In 01/2016, she was found to have a right-sided breast cancer.  In 10/2016, she was found to have colon cancer.  In 02/2018, she was diagnosed with a second breast cancer on the left side, then in 07/2019 she was found to have a second lung cancer which was resected.  Her cardiology care started while she was being treated for her left-sided breast cancer in 2018.  She was placed on paclitaxel and Herceptin.  Her baseline echocardiogram was normal; however, a repeat echocardiogram showed her EF had dropped to 30%-35%.  She was started on a beta blocker, ACE inhibitor and spironolactone at that time.  She had a repeat echocardiogram in 12/2018 which showed her EF remained low.  Continued medical therapy was recommended.  She was seen in 04/2019 for routine followup and at that time complained of significant dizziness along with weakness, heaviness and shortness of breath, especially with activity.  Her carvedilol was changed to Toprol-XL.  I initially met her in late April of this year, and at that time she was feeling well aside from some mild dyspnea on exertion.  I did not make any medication changes.      In 06/2019, she called in to the office stating that she was still experiencing significant dizziness, fatigue and dyspnea on exertion.  We ordered a followup echocardiogram which showed resolution of her prior cardiomyopathy with an EF of 55%-60%.  She was noted to have some PVCs at the time of her echocardiogram, so a 7-day Zio Patch monitor was recommended.  This showed sinus rhythm with one 5-beat run of ventricular tachycardia and a few short runs of paroxysmal SVT lasting up to  11 beats per minute.  Nothing that would explain her symptomatology.  She was not seen in Cardiology followup until October, and when she presented, she was significantly weak and hypotensive.  She had recently had several episodes of vomiting and diarrhea.  She was brought from the Cardiology Clinic to the Emergency Department and received IV fluids.  Admission was offered, but the patient refused.  Dr. Miller decreased her lisinopril from 20 mg daily to 5 mg daily at that time, and an office visit was scheduled with me for mid November.      When I saw her in mid November, she continued to feel rather dyspneic with exertion as well as lightheaded and weak.  Again, she told me that the symptoms had been ongoing for more than a year.  She complains that if she does too much activity, she vomits.  She had recently been in to see Dr. Lopez at [facility] and apparently was not felt to have a pulmonary etiology to her symptomatology (I do not have any records regarding this).  When I saw her, her blood pressure was on the low side and recent lab work had shown that she might be a bit dehydrated.  I asked her to stop spironolactone, have a Lexiscan stress test and follow up in the office today.      At this point, the patient states that her lightheadedness is a bit better after stopping the spironolactone.  She continues to feel quite dyspneic with exertion, weak and still states that she vomits if she does too much activity.  She recently was in to see a gastroenterologist and tells me that they discussed her diarrhea.  She was started on cholestyramine, which has helped.  She states that she did not mention the vomiting to him.      We reviewed the results of her recent Lexiscan stress test, no ischemia or infarction was noted.  Her EF was felt to be normal at 58%.  No ischemic EKG changes.  She had a basic metabolic panel earlier today.  Her sodium is 144, potassium is 3.8, BUN is 22 and creatinine is 0.81.   These findings are back to her baseline.  Her hemoglobin was last checked in October, it was normal.      CURRENT CARDIAC MEDICATIONS:   1.  Aspirin 81 mg daily.   2.  Atorvastatin 20 mg daily.   3.  Lisinopril 5 mg daily.   4.  Toprol-XL 25 mg daily.      The remainder of her medications, allergies and review of systems were reviewed and as are documented separately.      PHYSICAL EXAMINATION:   GENERAL:  The patient is an 79-year-old female who appears her stated age.  She is in no apparent distress.   VITAL SIGNS:  Blood pressure was 126/76, pulse after moving from her wheelchair across the exam room to a seated position was 142.  Weight is 209 pounds, which is stable.   PULMONARY:  Breathing is nonlabored.  Lungs are clear to auscultation.   CARDIAC:  Examination reveals a regular rate and rhythm.  She is mildly tachycardic.  No significant murmur was noted.   EXTREMITIES:  Lower extremities show 1+ pitting edema bilaterally.   NEUROLOGIC:  Alert and oriented.      I did get an EKG during today's office visit that shows sinus tachycardia with a left anterior fascicular block.  She has a nonspecific T-wave abnormality.  Overall, this is unchanged from her prior EKGs other than she is more tachycardic.      ASSESSMENT AND PLAN:  Ms. 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  For at least the past year, she has complained of lightheadedness, dizziness, weakness and dyspnea on exertion.  Despite multiple changes to her cardiovascular medication regimen, her symptoms have overall remained the same, although she does feel that her lightheadedness has improved slightly with the recent discontinuation of spironolactone.  I did have her do a nuclear stress test, which did not  show any evidence of ischemia or infarction and again shows preserved LV systolic function.  At this point, I do not see a clear cardiac etiology to her symptomatology and actually would have thought as her EF improved that if her symptoms were cardiovascular, they would have also improved.  What I did note today was that with minimal activity she does get fairly tachycardic.  I suggested that we could try increasing her Toprol-XL a bit to see if her symptoms improved, and the patient was agreeable to this.  I do question a component of deconditioning to her symptomatology as well and encouraged her to try to get some routine activity in each day.      Again, she overall seems stable from a cardiac standpoint at this time.  I recommended that she follow up in the Cardiology office with Dr. Miller in approximately 6-9 months.  Of course, I encouraged her to contact us sooner with questions or concerns.         SULEMA STARK PA-C             D: 2019   T: 2019   MT: HERNÁN      Name:     WARREN METCAFL   MRN:      [id number]        Account:      [id number]   :      1940           Service Date: 2019      Document: [id number]        Thank you for allowing me to participate in the care of your patient.      Sincerely,     Sulema Stark PA-C     [facility]    cc:   [facility]  [address]  [place] [address]

## 2019-12-16 NOTE — PROGRESS NOTES
Please see separate dictation for HPI, PHYSICAL EXAM AND IMPRESSION/PLAN.    CURRENT MEDICATIONS:Increase   Current Outpatient Medications   Medication Sig Dispense Refill     anastrozole (ARIMIDEX) 1 MG tablet Take 1 mg by mouth At Bedtime        aspirin 81 MG EC tablet Take 81 mg by mouth every evening       atorvastatin (LIPITOR) 20 MG tablet Take 20 mg by mouth At Bedtime        cholestyramine (QUESTRAN) 4 GM/DOSE powder Take by mouth 2 times daily (with meals)       ibuprofen (ADVIL/MOTRIN) 200 MG tablet Take 200-400 mg by mouth daily as needed for mild pain       lisinopril (PRINIVIL/ZESTRIL) 5 MG tablet Take 1 tablet (5 mg) by mouth daily 90 tablet 0     melatonin 5 MG tablet Take 5-10 mg by mouth nightly as needed for sleep       metoprolol succinate ER (TOPROL-XL) 25 MG 24 hr tablet Take 1 tablet (25 mg) by mouth daily 90 tablet 3     multivitamin, therapeutic (THERA-VIT) TABS Take 1 tablet by mouth At Bedtime No iron       ondansetron (ZOFRAN-ODT) 4 MG ODT tab Take 4 mg by mouth 2 times daily as needed for nausea       PARoxetine (PAXIL) 20 MG tablet Take 20 mg by mouth At Bedtime        vitamin D3 (CHOLECALCIFEROL) 2000 units (50 mcg) tablet Take 1 tablet by mouth daily       loperamide (IMODIUM A-D) 2 MG capsule Take 2 mg by mouth 4 times daily as needed for diarrhea         ALLERGIES:     Allergies   Allergen Reactions     Demerol [Meperidine] Nausea and Vomiting     Sulfa Drugs Other (See Comments)     Extreme chills     Penicillins Rash     Swelling In mouth and tongue       PAST MEDICAL HISTORY:  Past Medical History:   Diagnosis Date     Acoustic neuroma (H)      Acoustic neuroma (H)      Acute arthropathy     lower leg     Acute depression      Anemia     iron deficeincy     Anxiety      Arthritis     osteoarthrosis of knee     Breast cancer (H)     left breast     Breast cancer, left breast (H) 02/2018     Breast cancer, right breast (H) 1/2016     Cervical cancer (H)      Colon cancer (H)       Colon cancer (H)      Decreased cardiac ejection fraction      Depression      Depressive disorder      Diabetes mellitus (H)     pre-diabetic no longer taking Metformin     Dyspnea      Dyspnea      Gastro-oesophageal reflux disease      H/O: lung cancer 2013     Herpes      Herpes zoster      Herpes zoster      History of blood transfusion      HTN (hypertension)      Hyperlipidaemia      Hyperlipidemia      Hyperlipidemia      Iron deficiency anemia      Lung cancer (H)      Lung nodule 2013    Lung cancer     Lung nodule      Nausea & vomiting      OAB (overactive bladder)      Obese      Osteoarthritis of knee      Pain in female pelvis      Pain in female pelvis      Pre-diabetes      Preop general physical exam      Rib lesion      Rib lesion      Shoulder pain, acute     right     Tachycardia      Torn meniscus      Torn meniscus        PAST SURGICAL HISTORY:  Past Surgical History:   Procedure Laterality Date     acoustic neuroma excised       APPENDECTOMY  1966    done with CORNELIO     ARTHROSCOPY KNEE  2012    right knee scoped     BIOPSY NODE SENTINEL Right 2/3/2016    Procedure: BIOPSY NODE SENTINEL;  Surgeon: Flory Pinto MD;  Location: [facility]     BIOPSY NODE SENTINEL Left 3/5/2018    Procedure: BIOPSY NODE SENTINEL;;  Surgeon: Denae Perez MD;  Location: [facility]     BREAST BIOPSY, RT/LT       cataracts       cataracts       CERVICAL CANCER SCREENING RESULTS DOCUMENTED AND REVIEWED       CHOLECYSTECTOMY  1991     CL AFF SURGICAL PATHOLOGY       CL AFF SURGICAL PATHOLOGY      acoustic neuroma left side excised [facility] [address] [place] 6/17/14     COLONOSCOPY       EXCISE NODE MEDIASTINAL  3/12/2013    Procedure: EXCISE NODE MEDIASTINAL;;  Surgeon: Lorne Arenas MD;  Location:  OR     EYE SURGERY      fiona cataracts     GENITOURINARY SURGERY      bladder sling     GYN SURGERY  1966    hysterectomy for cervical cancer *ovaries remain     HYSTERECTOMY  1966    Cervical CA, paps done every  other year, done with appendectomy     INSERT PORT VASCULAR ACCESS Left 3/5/2018    Procedure: INSERT PORT VASCULAR ACCESS;;  Surgeon: Denae Perez MD;  Location: [facility]     INSERT PORT VASCULAR ACCESS N/A 3/5/2018    Procedure: INSERT PORT VASCULAR ACCESS;;  Surgeon: Denae Perez MD;  Location: [facility]     LAPAROSCOPIC ASSISTED COLECTOMY Right 10/27/2016    Procedure: LAPAROSCOPIC ASSISTED COLECTOMY;  Surgeon: Umang Arteaga MD;  Location:  OR     LOBECTOMY LUNG  3/12/2013    Procedure: LOBECTOMY LUNG;;  Surgeon: Lorne Arenas MD;  Location:  OR     LUMPECTOMY BREAST WITH SEED LOCALIZATION Right 2/3/2016    Procedure: LUMPECTOMY BREAST WITH SEED LOCALIZATION;  Surgeon: Flory Pinto MD;  Location: [facility]     LUMPECTOMY BREAST WITH SEED LOCALIZATION Left 3/5/2018    Procedure: LUMPECTOMY BREAST WITH SEED LOCALIZATION;  SEED LOCALIZED LEFT BREAST LUMPECTOMY, WITH LEFT SENTINEL NODE BIOPSY, PORT PLACEMENT;  Surgeon: Denae Perez MD;  Location: [facility]     MASTECTOMY SIMPLE Left 3/28/2018    Procedure: MASTECTOMY SIMPLE;  LEFT BREAST MASTECTOMY ;  Surgeon: Denae Perez MD;  Location: [facility]     MINI ARC SLING OPERATION FOR STRESS INCONTINENCE  2009     REMOVE PORT VASCULAR ACCESS N/A 2/13/2019    Procedure: REMOVE PORT VASCULAR ACCESS;  Surgeon: Denae Perez MD;  Location:  OR     RESECT RIB  2/15/2013    Procedure: RESECT RIB;  RESECTION PORTION RIGHT TWELVETH RIB;  Surgeon: Lorne Arenas MD;  Location:  OR     THORACOSCOPIC WEDGE RESECTION LUNG Left 7/26/2019    Procedure: LEFT VIDEO ASSISTED THORACIC SURGERY; POSSIBLE LEFT THORACOTOMY; TWO WEDGE RESECTIONS OF LEFT LOWER LUNG NODULE;  Surgeon: Lorne Arenas MD;  Location:  OR     THORACOTOMY  3/12/2013    Procedure: THORACOTOMY;  RIGHT THORACOTOMY, RIGHT MIDDLE AND LOWER LUNG LOBECTOMY, MEDIASTINAL LYMPH NODE DISSECTION ;  Surgeon: Lorne Arenas MD;  Location:  OR     TONSILLECTOMY          SOCIAL HISTORY:  Social History     Socioeconomic History     Marital status:      Spouse name: None     Number of children: 3     Years of education: None     Highest education level: None   Occupational History     Employer: RETIRED   Social Needs     Financial resource strain: None     Food insecurity:     Worry: None     Inability: None     Transportation needs:     Medical: None     Non-medical: None   Tobacco Use     Smoking status: Former Smoker     Packs/day: 1.00     Years: 45.00     Pack years: 45.00     Types: Cigarettes     Start date:      Last attempt to quit: 1998     Years since quittin.6     Smokeless tobacco: Never Used     Tobacco comment: quit    Substance and Sexual Activity     Alcohol use: Yes     Alcohol/week: 0.0 standard drinks     Comment: glass of wine a day     Drug use: No     Sexual activity: Yes     Partners: Male     Birth control/protection: Female Surgical   Lifestyle     Physical activity:     Days per week: None     Minutes per session: None     Stress: None   Relationships     Social connections:     Talks on phone: None     Gets together: None     Attends Yarsani service: None     Active member of club or organization: None     Attends meetings of clubs or organizations: None     Relationship status: None     Intimate partner violence:     Fear of current or ex partner: None     Emotionally abused: None     Physically abused: None     Forced sexual activity: None   Other Topics Concern     Parent/sibling w/ CABG, MI or angioplasty before 65F 55M? Not Asked   Social History Narrative    2015    -    3 kids    Retired        Pap-2010 WNL    Mammo-11     Colonoscopy-3/3/2010 WNL, no further recommended per pt     Dexa-2008       FAMILY HISTORY:  Family History   Problem Relation Age of Onset     Breast Cancer Paternal Grandmother      Colon Cancer Other         not specified     Fractures Other         Hip fracture, Aunt      Hyperlipidemia Mother      Hypertension Mother      Heart Disease Mother      Osteoporosis Mother      Hyperlipidemia Sister      Hyperlipidemia Brother      Prostate Cancer Brother      Hypertension Sister      Heart Disease Maternal Grandfather      Prostate Cancer Father      Colon Cancer Cousin         son of paternal aunt with breast cancer.     Breast Cancer Paternal Aunt      Colon Cancer Paternal Aunt      Other Cancer Paternal Uncle         Esophageal     Other Cancer Maternal Aunt        Review of Systems:  Skin:  Negative       Eyes:  Positive for glasses cataract extraction of both eyes  ENT:  Positive for hearing loss deaf L ear, wears hearing aid R ear  Respiratory:  Positive for dyspnea on exertion CABRERA, minimal activity; one episode a week ago of waking up in the middle of the night and was not able to breathe   Cardiovascular:    Positive for;palpitations;fatigue palpitations with dyspnea; poor balance  Gastroenterology: Positive for nausea;vomiting waking up every morning feeling sick to her stomach - vomiting when she showers  Genitourinary:  Positive for urinary frequency;incontinence    Musculoskeletal:  Positive for joint pain;back pain right shoulder pain; arthritis in knees and hands  Neurologic:  Positive for headaches    Psychiatric:  Positive for sleep disturbances;depression trouble falling asleep; depression under control  Heme/Lymph/Imm:  Positive for allergies RX  Endocrine:  Negative         Reviewed. Remainder of the note dictated.    Sulema Coleman PA-C

## 2019-12-16 NOTE — LETTER
12/16/2019      [facility] [address]  [place] [address]      RE: Yolanda HURLEY Shirley       Dear Colleague,    I had the pleasure of seeing Yolanda Watson in the [facility].    Service Date: 12/16/2019      HISTORY OF PRESENT ILLNESS:  Yolanda is an 79-year-old female who presents to the office today for followup after recent discontinuation of spironolactone.      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  She has a history of multiple cancers.  According to her last Oncology note from Dr. Subramanian, she was initially diagnosed with lung cancer in 2013.  I believe she underwent resection at that time.  In 01/2016, she was found to have a right-sided breast cancer.  In 10/2016, she was found to have colon cancer.  In 02/2018, she was diagnosed with a second breast cancer on the left side, then in 07/2019 she was found to have a second lung cancer which was resected.  Her cardiology care started while she was being treated for her left-sided breast cancer in 2018.  She was placed on paclitaxel and Herceptin.  Her baseline echocardiogram was normal; however, a repeat echocardiogram showed her EF had dropped to 30%-35%.  She was started on a beta blocker, ACE inhibitor and spironolactone at that time.  She had a repeat echocardiogram in 12/2018 which showed her EF remained low.  Continued medical therapy was recommended.  She was seen in 04/2019 for routine followup and at that time complained of significant dizziness along with weakness, heaviness and shortness of breath, especially with activity.  Her carvedilol was changed to Toprol-XL.  I initially met her in late April of this year, and at that time she was feeling well aside from some mild dyspnea on exertion.  I did not make any medication changes.      In 06/2019, she called in to the office stating that she was still  experiencing significant dizziness, fatigue and dyspnea on exertion.  We ordered a followup echocardiogram which showed resolution of her prior cardiomyopathy with an EF of 55%-60%.  She was noted to have some PVCs at the time of her echocardiogram, so a 7-day Zio Patch monitor was recommended.  This showed sinus rhythm with one 5-beat run of ventricular tachycardia and a few short runs of paroxysmal SVT lasting up to 11 beats per minute.  Nothing that would explain her symptomatology.  She was not seen in Cardiology followup until October, and when she presented, she was significantly weak and hypotensive.  She had recently had several episodes of vomiting and diarrhea.  She was brought from the Cardiology Clinic to the Emergency Department and received IV fluids.  Admission was offered, but the patient refused.  Dr. Miller decreased her lisinopril from 20 mg daily to 5 mg daily at that time, and an office visit was scheduled with me for mid November.      When I saw her in mid November, she continued to feel rather dyspneic with exertion as well as lightheaded and weak.  Again, she told me that the symptoms had been ongoing for more than a year.  She complains that if she does too much activity, she vomits.  She had recently been in to see Dr. Lopez at [facility] and apparently was not felt to have a pulmonary etiology to her symptomatology (I do not have any records regarding this).  When I saw her, her blood pressure was on the low side and recent lab work had shown that she might be a bit dehydrated.  I asked her to stop spironolactone, have a Lexiscan stress test and follow up in the office today.      At this point, the patient states that her lightheadedness is a bit better after stopping the spironolactone.  She continues to feel quite dyspneic with exertion, weak and still states that she vomits if she does too much activity.  She recently was in to see a gastroenterologist and tells me that they  discussed her diarrhea.  She was started on cholestyramine, which has helped.  She states that she did not mention the vomiting to him.      We reviewed the results of her recent Lexiscan stress test, no ischemia or infarction was noted.  Her EF was felt to be normal at 58%.  No ischemic EKG changes.  She had a basic metabolic panel earlier today.  Her sodium is 144, potassium is 3.8, BUN is 22 and creatinine is 0.81.  These findings are back to her baseline.  Her hemoglobin was last checked in October, it was normal.      CURRENT CARDIAC MEDICATIONS:   1.  Aspirin 81 mg daily.   2.  Atorvastatin 20 mg daily.   3.  Lisinopril 5 mg daily.   4.  Toprol-XL 25 mg daily.      The remainder of her medications, allergies and review of systems were reviewed and as are documented separately.      PHYSICAL EXAMINATION:   GENERAL:  The patient is an 79-year-old female who appears her stated age.  She is in no apparent distress.   VITAL SIGNS:  Blood pressure was 126/76, pulse after moving from her wheelchair across the exam room to a seated position was 142.  Weight is 209 pounds, which is stable.   PULMONARY:  Breathing is nonlabored.  Lungs are clear to auscultation.   CARDIAC:  Examination reveals a regular rate and rhythm.  She is mildly tachycardic.  No significant murmur was noted.   EXTREMITIES:  Lower extremities show 1+ pitting edema bilaterally.   NEUROLOGIC:  Alert and oriented.      I did get an EKG during today's office visit that shows sinus tachycardia with a left anterior fascicular block.  She has a nonspecific T-wave abnormality.  Overall, this is unchanged from her prior EKGs other than she is more tachycardic.      ASSESSMENT AND PLAN:  Ms. 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  For at least the past year, she has complained of lightheadedness, dizziness, weakness and dyspnea on exertion.  Despite multiple changes to her cardiovascular medication regimen, her symptoms have overall remained the same, although she does feel that her lightheadedness has improved slightly with the recent discontinuation of spironolactone.  I did have her do a nuclear stress test, which did not show any evidence of ischemia or infarction and again shows preserved LV systolic function.  At this point, I do not see a clear cardiac etiology to her symptomatology and actually would have thought as her EF improved that if her symptoms were cardiovascular, they would have also improved.  What I did note today was that with minimal activity she does get fairly tachycardic.  I suggested that we could try increasing her Toprol-XL a bit to see if her symptoms improved, and the patient was agreeable to this.  I do question a component of deconditioning to her symptomatology as well and encouraged her to try to get some routine activity in each day.      Again, she overall seems stable from a cardiac standpoint at this time.  I recommended that she follow up in the Cardiology office with Dr. Miller in approximately 6-9 months.  Of course, I encouraged her to contact us sooner with questions or concerns.         BLUE STARK PA-C             D: 2019   T: 2019   MT: HERNÁN      Name:     WARREN METCALF   MRN:      [id number]        Account:      [id number]   :      1940           Service Date: 2019      Document: [id number]           Outpatient Encounter Medications as of 2019   Medication Sig Dispense Refill     anastrozole (ARIMIDEX) 1 MG tablet Take 1 mg by mouth At Bedtime        aspirin 81 MG EC tablet Take 81 mg by mouth every evening       atorvastatin (LIPITOR) 20 MG tablet Take 20 mg by mouth At Bedtime        cholestyramine (QUESTRAN) 4 GM/DOSE powder Take  by mouth 2 times daily (with meals)       ibuprofen (ADVIL/MOTRIN) 200 MG tablet Take 200-400 mg by mouth daily as needed for mild pain       lisinopril (PRINIVIL/ZESTRIL) 5 MG tablet Take 1 tablet (5 mg) by mouth daily 90 tablet 0     melatonin 5 MG tablet Take 5-10 mg by mouth nightly as needed for sleep       metoprolol succinate ER (TOPROL-XL) 50 MG 24 hr tablet Take 1 tablet (50 mg) by mouth daily 90 tablet 3     multivitamin, therapeutic (THERA-VIT) TABS Take 1 tablet by mouth At Bedtime No iron       ondansetron (ZOFRAN-ODT) 4 MG ODT tab Take 4 mg by mouth 2 times daily as needed for nausea       PARoxetine (PAXIL) 20 MG tablet Take 20 mg by mouth At Bedtime        vitamin D3 (CHOLECALCIFEROL) 2000 units (50 mcg) tablet Take 1 tablet by mouth daily       loperamide (IMODIUM A-D) 2 MG capsule Take 2 mg by mouth 4 times daily as needed for diarrhea       [DISCONTINUED] metoprolol succinate ER (TOPROL-XL) 25 MG 24 hr tablet Take 1 tablet (25 mg) by mouth daily 90 tablet 3     No facility-administered encounter medications on file as of 12/16/2019.        Again, thank you for allowing me to participate in the care of your patient.      Sincerely,    Sulema Coleman PA-C     [facility]

## 2019-12-16 NOTE — PATIENT INSTRUCTIONS
Thank you for your [facility] visit today. Your provider has recommended the following:  Medication Changes:  Increase the metoprolol to 50mg at night. Ok to take 2 of the 25mg tabs until you run out, then your new bottle will be 50mg tabs. Let us know if you have increased lightheadedness or dizziness. Also let us know if you think that it is helping and you want to try a higher dose.  Recommendations:  No other cardiac testing needed at this time.   Follow-up:  See Dr Miller for cardiology follow up in 6-9 months.    Reminder:  Please bring in your current medication list or your medication, over the counter supplements and vitamin bottles as we will review these at each office visit.

## 2019-12-17 NOTE — PROGRESS NOTES
Service Date: 12/16/2019      HISTORY OF PRESENT ILLNESS:  Yolanda is an 79-year-old female who presents to the office today for followup after recent discontinuation of spironolactone.      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  She has a history of multiple cancers.  According to her last Oncology note from Dr. Subramanian, she was initially diagnosed with lung cancer in 2013.  I believe she underwent resection at that time.  In 01/2016, she was found to have a right-sided breast cancer.  In 10/2016, she was found to have colon cancer.  In 02/2018, she was diagnosed with a second breast cancer on the left side, then in 07/2019 she was found to have a second lung cancer which was resected.  Her cardiology care started while she was being treated for her left-sided breast cancer in 2018.  She was placed on paclitaxel and Herceptin.  Her baseline echocardiogram was normal; however, a repeat echocardiogram showed her EF had dropped to 30%-35%.  She was started on a beta blocker, ACE inhibitor and spironolactone at that time.  She had a repeat echocardiogram in 12/2018 which showed her EF remained low.  Continued medical therapy was recommended.  She was seen in 04/2019 for routine followup and at that time complained of significant dizziness along with weakness, heaviness and shortness of breath, especially with activity.  Her carvedilol was changed to Toprol-XL.  I initially met her in late April of this year, and at that time she was feeling well aside from some mild dyspnea on exertion.  I did not make any medication changes.      In 06/2019, she called in to the office stating that she was still experiencing significant dizziness, fatigue and dyspnea on exertion.  We ordered a followup echocardiogram which showed resolution of her prior cardiomyopathy with an EF of 55%-60%.  She was noted to have some PVCs at the time of her echocardiogram, so a  7-day Zio Patch monitor was recommended.  This showed sinus rhythm with one 5-beat run of ventricular tachycardia and a few short runs of paroxysmal SVT lasting up to 11 beats per minute.  Nothing that would explain her symptomatology.  She was not seen in Cardiology followup until October, and when she presented, she was significantly weak and hypotensive.  She had recently had several episodes of vomiting and diarrhea.  She was brought from the Cardiology Clinic to the Emergency Department and received IV fluids.  Admission was offered, but the patient refused.  Dr. Miller decreased her lisinopril from 20 mg daily to 5 mg daily at that time, and an office visit was scheduled with me for mid November.      When I saw her in mid November, she continued to feel rather dyspneic with exertion as well as lightheaded and weak.  Again, she told me that the symptoms had been ongoing for more than a year.  She complains that if she does too much activity, she vomits.  She had recently been in to see Dr. Lopez at [facility] and apparently was not felt to have a pulmonary etiology to her symptomatology (I do not have any records regarding this).  When I saw her, her blood pressure was on the low side and recent lab work had shown that she might be a bit dehydrated.  I asked her to stop spironolactone, have a Lexiscan stress test and follow up in the office today.      At this point, the patient states that her lightheadedness is a bit better after stopping the spironolactone.  She continues to feel quite dyspneic with exertion, weak and still states that she vomits if she does too much activity.  She recently was in to see a gastroenterologist and tells me that they discussed her diarrhea.  She was started on cholestyramine, which has helped.  She states that she did not mention the vomiting to him.      We reviewed the results of her recent Lexiscan stress test, no ischemia or infarction was noted.  Her EF was felt to  be normal at 58%.  No ischemic EKG changes.  She had a basic metabolic panel earlier today.  Her sodium is 144, potassium is 3.8, BUN is 22 and creatinine is 0.81.  These findings are back to her baseline.  Her hemoglobin was last checked in October, it was normal.      CURRENT CARDIAC MEDICATIONS:   1.  Aspirin 81 mg daily.   2.  Atorvastatin 20 mg daily.   3.  Lisinopril 5 mg daily.   4.  Toprol-XL 25 mg daily.      The remainder of her medications, allergies and review of systems were reviewed and as are documented separately.      PHYSICAL EXAMINATION:   GENERAL:  The patient is an 79-year-old female who appears her stated age.  She is in no apparent distress.   VITAL SIGNS:  Blood pressure was 126/76, pulse after moving from her wheelchair across the exam room to a seated position was 142.  Weight is 209 pounds, which is stable.   PULMONARY:  Breathing is nonlabored.  Lungs are clear to auscultation.   CARDIAC:  Examination reveals a regular rate and rhythm.  She is mildly tachycardic.  No significant murmur was noted.   EXTREMITIES:  Lower extremities show 1+ pitting edema bilaterally.   NEUROLOGIC:  Alert and oriented.      I did get an EKG during today's office visit that shows sinus tachycardia with a left anterior fascicular block.  She has a nonspecific T-wave abnormality.  Overall, this is unchanged from her prior EKGs other than she is more tachycardic.      ASSESSMENT AND PLAN:  Ms. 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  For at least the past year, she has complained of lightheadedness, dizziness, weakness and dyspnea on exertion.  Despite multiple changes to her cardiovascular medication regimen, her symptoms have overall remained the same, although she  does feel that her lightheadedness has improved slightly with the recent discontinuation of spironolactone.  I did have her do a nuclear stress test, which did not show any evidence of ischemia or infarction and again shows preserved LV systolic function.  At this point, I do not see a clear cardiac etiology to her symptomatology and actually would have thought as her EF improved that if her symptoms were cardiovascular, they would have also improved.  What I did note today was that with minimal activity she does get fairly tachycardic.  I suggested that we could try increasing her Toprol-XL a bit to see if her symptoms improved, and the patient was agreeable to this.  I do question a component of deconditioning to her symptomatology as well and encouraged her to try to get some routine activity in each day.      Again, she overall seems stable from a cardiac standpoint at this time.  I recommended that she follow up in the Cardiology office with Dr. Miller in approximately 6-9 months.  Of course, I encouraged her to contact us sooner with questions or concerns.         BLUE STARK PA-C             D: 2019   T: 2019   MT: HERNÁN      Name:     WARREN METCALF   MRN:      [id number]        Account:      [id number]   :      1940           Service Date: 2019      Document: [id number]

## 2020-01-27 ENCOUNTER — PATIENT OUTREACH (OUTPATIENT)
Dept: CARE COORDINATION | Facility: CLINIC | Age: 80
End: 2020-01-27

## 2020-02-19 DIAGNOSIS — I42.9 SECONDARY CARDIOMYOPATHY (H): ICD-10-CM

## 2020-02-19 RX ORDER — LISINOPRIL 5 MG/1
5 TABLET ORAL DAILY
Qty: 90 TABLET | Refills: 1 | Status: ON HOLD | OUTPATIENT
Start: 2020-02-19 | End: 2020-08-14

## 2020-02-19 NOTE — TELEPHONE ENCOUNTER
Medication Refilled: Lisinopril  Last office visit: 2019 with Sulema Coleman PA-C  Last Labs/EK19  Next office visit: 2020 orders in [facility]  Pharmacy sent to: Flores Apple RN

## 2020-03-11 ASSESSMENT — MIFFLIN-ST. JEOR: SCORE: 1455.39

## 2020-03-16 ENCOUNTER — SURGERY - HEALTHEAST (OUTPATIENT)
Dept: SURGERY | Facility: CLINIC | Age: 80
End: 2020-03-16

## 2020-03-16 ENCOUNTER — ANESTHESIA - HEALTHEAST (OUTPATIENT)
Dept: SURGERY | Facility: CLINIC | Age: 80
End: 2020-03-16

## 2020-03-16 ASSESSMENT — MIFFLIN-ST. JEOR: SCORE: 1466.27

## 2020-03-17 ENCOUNTER — HOME CARE/HOSPICE - HEALTHEAST (OUTPATIENT)
Dept: HOME HEALTH SERVICES | Facility: HOME HEALTH | Age: 80
End: 2020-03-17

## 2020-03-23 NOTE — PROGRESS NOTES
Jocelyn Boyd, [facility]  Clinic Care Coordinator   [facility] [place]   [phone]

## 2020-06-03 ENCOUNTER — TRANSFERRED RECORDS (OUTPATIENT)
Dept: HEALTH INFORMATION MANAGEMENT | Facility: CLINIC | Age: 80
End: 2020-06-03

## 2020-06-04 LAB
ANION GAP SERPL CALCULATED.3IONS-SCNC: ABNORMAL MMOL/L
BUN SERPL-MCNC: 18 MG/DL (ref 8–27)
BUN/CREATININE RATIO: 24 (ref 12–28)
CALCIUM SERPL-MCNC: 9.5 MG/DL (ref 8.7–10.3)
CHLORIDE SERPLBLD-SCNC: 102 MMOL/L (ref 96–106)
CO2 SERPL-SCNC: 23 MMOL/L (ref 20–29)
CREAT SERPL-MCNC: 0.74 MG/DL (ref 0.57–1)
GFR SERPL CREATININE-BSD FRML MDRD: 76 ML/MIN/1.73M2
GLUCOSE SERPL-MCNC: 127 MG/DL (ref 65–99)
POTASSIUM SERPL-SCNC: 4.6 MMOL/L (ref 3.5–5.2)
SODIUM SERPL-SCNC: 141 MMOL/L (ref 134–144)

## 2020-07-23 ENCOUNTER — TRANSFERRED RECORDS (OUTPATIENT)
Dept: HEALTH INFORMATION MANAGEMENT | Facility: CLINIC | Age: 80
End: 2020-07-23

## 2020-08-11 ENCOUNTER — APPOINTMENT (OUTPATIENT)
Dept: CT IMAGING | Facility: CLINIC | Age: 80
End: 2020-08-11
Attending: EMERGENCY MEDICINE
Payer: COMMERCIAL

## 2020-08-11 ENCOUNTER — HOSPITAL ENCOUNTER (OUTPATIENT)
Facility: CLINIC | Age: 80
Setting detail: OBSERVATION
Discharge: HOME | End: 2020-08-14
Attending: EMERGENCY MEDICINE | Admitting: INTERNAL MEDICINE
Payer: COMMERCIAL

## 2020-08-11 ENCOUNTER — APPOINTMENT (OUTPATIENT)
Dept: GENERAL RADIOLOGY | Facility: CLINIC | Age: 80
End: 2020-08-11
Attending: EMERGENCY MEDICINE
Payer: COMMERCIAL

## 2020-08-11 DIAGNOSIS — R00.0 SINUS TACHYCARDIA: ICD-10-CM

## 2020-08-11 DIAGNOSIS — J18.9 COMMUNITY ACQUIRED PNEUMONIA OF RIGHT LOWER LOBE OF LUNG: ICD-10-CM

## 2020-08-11 DIAGNOSIS — R11.2 NAUSEA VOMITING AND DIARRHEA: ICD-10-CM

## 2020-08-11 DIAGNOSIS — R19.7 NAUSEA VOMITING AND DIARRHEA: ICD-10-CM

## 2020-08-11 DIAGNOSIS — I10 ESSENTIAL HYPERTENSION: Primary | ICD-10-CM

## 2020-08-11 LAB
ALBUMIN SERPL-MCNC: 4.1 G/DL (ref 3.4–5)
ALBUMIN UR-MCNC: NEGATIVE MG/DL
ALP SERPL-CCNC: 92 U/L (ref 40–150)
ALT SERPL W P-5'-P-CCNC: 23 U/L (ref 0–50)
ANION GAP SERPL CALCULATED.3IONS-SCNC: 8 MMOL/L (ref 3–14)
APPEARANCE UR: CLEAR
APTT PPP: 28 SEC (ref 22–37)
AST SERPL W P-5'-P-CCNC: 9 U/L (ref 0–45)
BASOPHILS # BLD AUTO: 0.1 10E9/L (ref 0–0.2)
BASOPHILS NFR BLD AUTO: 0.4 %
BILIRUB SERPL-MCNC: 0.9 MG/DL (ref 0.2–1.3)
BILIRUB UR QL STRIP: NEGATIVE
BUN SERPL-MCNC: 16 MG/DL (ref 7–30)
CALCIUM SERPL-MCNC: 8.9 MG/DL (ref 8.5–10.1)
CHLORIDE SERPL-SCNC: 105 MMOL/L (ref 94–109)
CO2 SERPL-SCNC: 26 MMOL/L (ref 20–32)
COLOR UR AUTO: YELLOW
CREAT SERPL-MCNC: 0.84 MG/DL (ref 0.52–1.04)
DIFFERENTIAL METHOD BLD: ABNORMAL
EOSINOPHIL # BLD AUTO: 0.1 10E9/L (ref 0–0.7)
EOSINOPHIL NFR BLD AUTO: 0.4 %
ERYTHROCYTE [DISTWIDTH] IN BLOOD BY AUTOMATED COUNT: 15.2 % (ref 10–15)
GFR SERPL CREATININE-BSD FRML MDRD: 65 ML/MIN/{1.73_M2}
GLUCOSE SERPL-MCNC: 130 MG/DL (ref 70–99)
GLUCOSE UR STRIP-MCNC: NEGATIVE MG/DL
HCT VFR BLD AUTO: 41.3 % (ref 35–47)
HGB BLD-MCNC: 13.5 G/DL (ref 11.7–15.7)
HGB UR QL STRIP: NEGATIVE
IMM GRANULOCYTES # BLD: 0.1 10E9/L (ref 0–0.4)
IMM GRANULOCYTES NFR BLD: 0.9 %
INR PPP: 0.97 (ref 0.86–1.14)
INTERPRETATION ECG - MUSE: NORMAL
KETONES UR STRIP-MCNC: NEGATIVE MG/DL
LACTATE BLD-SCNC: 1.3 MMOL/L (ref 0.7–2)
LACTATE BLD-SCNC: 2.8 MMOL/L (ref 0.7–2)
LEUKOCYTE ESTERASE UR QL STRIP: ABNORMAL
LYMPHOCYTES # BLD AUTO: 3.1 10E9/L (ref 0.8–5.3)
LYMPHOCYTES NFR BLD AUTO: 21.4 %
MAGNESIUM SERPL-MCNC: 1.8 MG/DL (ref 1.6–2.3)
MCH RBC QN AUTO: 32.9 PG (ref 26.5–33)
MCHC RBC AUTO-ENTMCNC: 32.7 G/DL (ref 31.5–36.5)
MCV RBC AUTO: 101 FL (ref 78–100)
MONOCYTES # BLD AUTO: 1.6 10E9/L (ref 0–1.3)
MONOCYTES NFR BLD AUTO: 11.3 %
NEUTROPHILS # BLD AUTO: 9.4 10E9/L (ref 1.6–8.3)
NEUTROPHILS NFR BLD AUTO: 65.6 %
NITRATE UR QL: NEGATIVE
NRBC # BLD AUTO: 0.1 10*3/UL
NRBC BLD AUTO-RTO: 0 /100
PH UR STRIP: 6 PH (ref 5–7)
PLATELET # BLD AUTO: 540 10E9/L (ref 150–450)
POTASSIUM SERPL-SCNC: 3.9 MMOL/L (ref 3.4–5.3)
PROCALCITONIN SERPL-MCNC: <0.05 NG/ML
PROT SERPL-MCNC: 7.4 G/DL (ref 6.8–8.8)
RBC # BLD AUTO: 4.1 10E12/L (ref 3.8–5.2)
RBC #/AREA URNS AUTO: <1 /HPF (ref 0–2)
SODIUM SERPL-SCNC: 139 MMOL/L (ref 133–144)
SOURCE: ABNORMAL
SP GR UR STRIP: 1.02 (ref 1–1.03)
TSH SERPL DL<=0.005 MIU/L-ACNC: 2.61 MU/L (ref 0.4–4)
UROBILINOGEN UR STRIP-MCNC: NORMAL MG/DL (ref 0–2)
WBC # BLD AUTO: 14.3 10E9/L (ref 4–11)
WBC #/AREA URNS AUTO: 2 /HPF (ref 0–5)

## 2020-08-11 PROCEDURE — 87899 AGENT NOS ASSAY W/OPTIC: CPT | Performed by: EMERGENCY MEDICINE

## 2020-08-11 PROCEDURE — 87493 C DIFF AMPLIFIED PROBE: CPT | Mod: XU | Performed by: INTERNAL MEDICINE

## 2020-08-11 PROCEDURE — 25000128 H RX IP 250 OP 636: Performed by: EMERGENCY MEDICINE

## 2020-08-11 PROCEDURE — 96365 THER/PROPH/DIAG IV INF INIT: CPT

## 2020-08-11 PROCEDURE — 99223 1ST HOSP IP/OBS HIGH 75: CPT | Mod: AI | Performed by: INTERNAL MEDICINE

## 2020-08-11 PROCEDURE — 25000125 ZZHC RX 250: Performed by: EMERGENCY MEDICINE

## 2020-08-11 PROCEDURE — 96361 HYDRATE IV INFUSION ADD-ON: CPT

## 2020-08-11 PROCEDURE — 80053 COMPREHEN METABOLIC PANEL: CPT | Performed by: EMERGENCY MEDICINE

## 2020-08-11 PROCEDURE — 83735 ASSAY OF MAGNESIUM: CPT | Performed by: EMERGENCY MEDICINE

## 2020-08-11 PROCEDURE — 25800030 ZZH RX IP 258 OP 636: Performed by: EMERGENCY MEDICINE

## 2020-08-11 PROCEDURE — 96375 TX/PRO/DX INJ NEW DRUG ADDON: CPT

## 2020-08-11 PROCEDURE — 81001 URINALYSIS AUTO W/SCOPE: CPT | Performed by: EMERGENCY MEDICINE

## 2020-08-11 PROCEDURE — 93005 ELECTROCARDIOGRAM TRACING: CPT

## 2020-08-11 PROCEDURE — 84443 ASSAY THYROID STIM HORMONE: CPT | Performed by: EMERGENCY MEDICINE

## 2020-08-11 PROCEDURE — 99285 EMERGENCY DEPT VISIT HI MDM: CPT | Mod: 25

## 2020-08-11 PROCEDURE — 71045 X-RAY EXAM CHEST 1 VIEW: CPT

## 2020-08-11 PROCEDURE — 85730 THROMBOPLASTIN TIME PARTIAL: CPT | Mod: GZ | Performed by: EMERGENCY MEDICINE

## 2020-08-11 PROCEDURE — 84145 PROCALCITONIN (PCT): CPT | Performed by: INTERNAL MEDICINE

## 2020-08-11 PROCEDURE — 84145 PROCALCITONIN (PCT): CPT | Performed by: EMERGENCY MEDICINE

## 2020-08-11 PROCEDURE — 74177 CT ABD & PELVIS W/CONTRAST: CPT

## 2020-08-11 PROCEDURE — 87506 IADNA-DNA/RNA PROBE TQ 6-11: CPT | Performed by: INTERNAL MEDICINE

## 2020-08-11 PROCEDURE — 83605 ASSAY OF LACTIC ACID: CPT | Performed by: EMERGENCY MEDICINE

## 2020-08-11 PROCEDURE — 87040 BLOOD CULTURE FOR BACTERIA: CPT | Performed by: EMERGENCY MEDICINE

## 2020-08-11 PROCEDURE — 85025 COMPLETE CBC W/AUTO DIFF WBC: CPT | Performed by: EMERGENCY MEDICINE

## 2020-08-11 PROCEDURE — C9803 HOPD COVID-19 SPEC COLLECT: HCPCS

## 2020-08-11 PROCEDURE — 85610 PROTHROMBIN TIME: CPT | Performed by: EMERGENCY MEDICINE

## 2020-08-11 RX ORDER — CEFTRIAXONE 1 G/1
1 INJECTION, POWDER, FOR SOLUTION INTRAMUSCULAR; INTRAVENOUS ONCE
Status: COMPLETED | OUTPATIENT
Start: 2020-08-11 | End: 2020-08-11

## 2020-08-11 RX ORDER — AZITHROMYCIN 500 MG/1
500 INJECTION, POWDER, LYOPHILIZED, FOR SOLUTION INTRAVENOUS ONCE
Status: COMPLETED | OUTPATIENT
Start: 2020-08-11 | End: 2020-08-11

## 2020-08-11 RX ORDER — IOPAMIDOL 755 MG/ML
103 INJECTION, SOLUTION INTRAVASCULAR ONCE
Status: COMPLETED | OUTPATIENT
Start: 2020-08-11 | End: 2020-08-11

## 2020-08-11 RX ORDER — ONDANSETRON 2 MG/ML
4 INJECTION INTRAMUSCULAR; INTRAVENOUS ONCE
Status: DISCONTINUED | OUTPATIENT
Start: 2020-08-11 | End: 2020-08-12

## 2020-08-11 RX ORDER — ACETAMINOPHEN 325 MG/1
325-650 TABLET ORAL EVERY 6 HOURS PRN
COMMUNITY
End: 2024-01-01

## 2020-08-11 RX ADMIN — SODIUM CHLORIDE 69 ML: 9 INJECTION, SOLUTION INTRAVENOUS at 18:23

## 2020-08-11 RX ADMIN — SODIUM CHLORIDE 1000 ML: 9 INJECTION, SOLUTION INTRAVENOUS at 17:18

## 2020-08-11 RX ADMIN — IOPAMIDOL 103 ML: 755 INJECTION, SOLUTION INTRAVENOUS at 18:24

## 2020-08-11 RX ADMIN — SODIUM CHLORIDE 1000 ML: 9 INJECTION, SOLUTION INTRAVENOUS at 19:03

## 2020-08-11 RX ADMIN — CEFTRIAXONE SODIUM 1 G: 1 INJECTION, POWDER, FOR SOLUTION INTRAMUSCULAR; INTRAVENOUS at 20:01

## 2020-08-11 RX ADMIN — AZITHROMYCIN MONOHYDRATE 500 MG: 500 INJECTION, POWDER, LYOPHILIZED, FOR SOLUTION INTRAVENOUS at 20:56

## 2020-08-11 ASSESSMENT — ENCOUNTER SYMPTOMS
PALPITATIONS: 1
ABDOMINAL PAIN: 1
WEAKNESS: 1
BLOOD IN STOOL: 0
FEVER: 0
DIZZINESS: 1
ABDOMINAL DISTENTION: 0
VOMITING: 1
DIARRHEA: 1
HEADACHES: 1
NAUSEA: 1

## 2020-08-11 ASSESSMENT — MIFFLIN-ST. JEOR: SCORE: 1459.25

## 2020-08-11 NOTE — ED PROVIDER NOTES
History     Chief Complaint:  Tachycardia       HPI   Yolanda Watson is a 79 year old female who presents with tachycardia. The patient says that since yesterday she has been feeling her heart rate going fast as well as abdominal tenderness, nausea, vomiting, diarrhea and weakness. The patient also notes some dizziness when standing as well as chills and the intermittent headaches. She denies any fever, chest pain, abdominal bloating, bloody stools, loss of consciousness, or sick contacts.     Allergies:  Demerol [Meperidine]  Sulfa Drugs  Penicillins     Medications:    Paxil  Zofran  Toprol  Melatonin  Imodium  Lisinopril  Ibuprofen  Questran  Lipitor  Aspirin  Arimidex     Past Medical History:    Torn meniscus  Tachycardia  Rib lesion  Prediabetes  Osteoarthritis   Overactive bladder  Obese  Lung nodule  Lung cancer  Anemia  Hyperlipidemia   Hypertension   Blood transfusion  Herpes zoster  GERD  Dyspnea  Diabetes  Depression  Decreased cardiac ejection fraction  Breast caner  Colon cancer  Cervical cancer  Arthritis  Anxiety  Anemia  Arthropathy  Acoustic neuroma     Past Surgical History:    Tonsillectomy  Thoracotomy  Thoracic wedge resection lung  Resect rib  Remove port vascular access  Mini arc sling operation for stress incontinence  Mastectomy simple   Lumpectomy breast with seed localization x2  Colectomy  Insert port vascular access x2  Hysterectomy  Bladder sling  Bilateral cataract  Excise node mediastinal   Acoustic neuroma left side excised  Cholecystectomy  Breast biopsy  Biopsy node sentinel x2  Arthroscopy knee   Appendectomy      Family History:    Cancer  Esophageal caner  Colon cancer  Breast cancer  Prostate caner  Heart disease  Hypertension   Hyperlipidemia   Osteoporosis      Social History:  Patient presents to the ED with her daughter.   Smoking Status: Former Smoker  Smokeless Tobacco: Never Used  Alcohol Use: Yes  Drug Use: No  PCP: Soren Perdoom       Review of Systems  "  Constitutional: Negative for fever.   Cardiovascular: Positive for palpitations. Negative for chest pain.   Gastrointestinal: Positive for abdominal pain, diarrhea, nausea and vomiting. Negative for abdominal distention and blood in stool.   Neurological: Positive for dizziness, weakness and headaches. Negative for syncope.   All other systems reviewed and are negative.      Physical Exam     Patient Vitals for the past 24 hrs:   BP Temp Temp src Pulse Heart Rate Resp SpO2 Height Weight   08/11/20 2000 (!) 145/84 -- -- 101 100 14 99 % -- --   08/11/20 1952 -- -- -- -- 92 13 97 % -- --   08/11/20 1945 -- -- -- -- 103 13 96 % -- --   08/11/20 1800 (!) 145/87 -- -- 117 123 15 99 % -- --   08/11/20 1740 (!) 155/85 -- -- 129 131 14 95 % -- --   08/11/20 1720 (!) 142/83 -- -- 118 118 17 97 % -- --   08/11/20 1659 (!) 149/75 98.6  F (37  C) Temporal -- 136 14 98 % 1.753 m (5' 9\") 93 kg (205 lb)        Physical Exam  Constitutional: Alert, attentive, GCS 15  HENT:    Nose: Nose normal.    Mouth/Throat: Oropharynx is clear, mucous membranes are moist  Eyes: EOM are normal, anicteric, conjugate gaze  CV: Tachycardic and rhythm; no murmurs  Chest: Effort normal and breath sounds clear without wheezing or rales, symmetric bilaterally   GI:  generalized abdominal tenderness. No distension. No guarding or rebound.    MSK: No LE edema, no tenderness to palpation of BLE.  Neurological: Alert, attentive, moving all extremities equally.   Skin: Skin is warm and dry.   Emergency Department Course     ECG:  ECG taken at 1705, ECG read at 1705  Sinus tachycardia  Left anterior fascicular block  ST & T wave abnormality  Abnormal ECG  No significant change compared to EKG dated 12/16/19  Rate 129 bpm. AR interval 160 ms. QRS duration 78 ms. QT/QTc 280/410 ms. P-R-T axes 51 -51 102.    Imaging:  Radiology findings were communicated with the patient who voiced understanding of the findings.    CT Abdomen Pelvis w Contrast  1.  Small " amount of pleural fluid is noted on the right.  2.  A small paraumbilical hernia contains a knuckle of nondilated  transverse colon.  3.  Colonic diverticulosis, without convincing evidence for  diverticulitis.  Reading per radiology    XR Chest Port 1 View  Opacity at the right lung base likely represents a small  right pleural effusion, with mild associated infiltrate and/or  atelectasis. The left lung is clear. Heart size appears stable.  Pulmonary vascularity is within normal limits. Aortic calcification.  Right shoulder arthroplasty is new since the previous exam.  Reading per radiology     Laboratory:  Laboratory findings were communicated with the patient who voiced understanding of the findings.    UA with micro: Leukocyte esterase trace (A) o/w negative   Legionella pneumonia antigen urine (In process)     CBC: WBC 14.3 (H), HGB 13.5,  (H)  CMP:  (H) o/w WNL (Creatinine 0.84)  INR: 0.97  PTT: 28  Magnesium: 1.8  TSH with free T4 reflex: 2.61    Lactic Acid (Resulted: 1739): 2.8 (H)  Lactic Acid (Resulted: 1950): 1.3   Procalcitonin: <0.05     Symptomatic COVID-19 Virus (Coronavirus) by PCR: pending     Procedures    Interventions:  1718 NS 1 L IV   1903 NS 1 L IV   2001 Rocephin 1 g IV  2056 Zithromax 500 mg IV     Emergency Department Course:    1705 Nursing notes and vitals reviewed.    1709 I performed an exam of the patient as documented above.     1712 IV was inserted and blood was drawn for laboratory testing, results above.     1813 The patient was sent for a XR while in the emergency department, results above.      1819 The patient was sent for a CT while in the emergency department, results above.      1949 I spoke with Dr. Phillips of the hospitalist service regarding patient's presentation, findings, and plan of care.      Findings and plan explained to the Patient who consents to admission. Discussed the patient with Dr. Phillips, who will admit the patient to a bed for further  monitoring, evaluation, and treatment.     Impression & Plan      Medical Decision Making:  Yolanda Watson is a 79 year old female who presents for evaluation of now 3 days nausea, vomiting and nonbloody diarrhea as well as persistent tachycardia and what sounds like orthostatic hypotension.  She denies any significant abdominal pain, and her abdominal exam is benign however given her extensive surgical history, did elect proceed with CT imaging to assess for possible bowel obstruction which was fortunately negative showing only small para umbilical hernia as well as diverticulosis without diverticulitis.  Chest x-ray shows question of possible right-sided pneumonia though patient does have a history of a pneumonectomy on that side secondary to cancer.  She does have a leukocytosis however is not febrile here but was significantly tachycardic to the 130s.  This did improve with IV fluids suggestive of some hypovolemia however her creatinine is within normal limits.  I did see several runs of SVT on the monitor and she does have a history of this Holter monitor.  Given the constellation of her symptoms and question of pneumonia, I did send a Legionella urine antigen however she has no overt risk factors for this.  I will initiate her on ceftriaxone and azithromycin for possible community-acquired pneumonia and she was admitted to medicine for continued monitoring.    Covid-19  Yolanda Watson was evaluated during a global COVID-19 pandemic, which necessitated consideration that the patient might be at risk for infection with the SARS-CoV-2 virus that causes COVID-19.   Applicable protocols for evaluation were followed during the patient's care.   COVID-19 was considered as part of the patient's evaluation. The plan for testing is:  a test was obtained during this visit.       Diagnosis:    ICD-10-CM    1. Sinus tachycardia  R00.0 Blood culture     UA with Microscopic     Lactic acid whole blood     Legionella pneumonia  antigen urine     Symptomatic COVID-19 Virus (Coronavirus) by PCR     Procalcitonin     Procalcitonin     CANCELED: Procalcitonin   2. Community acquired pneumonia of right lower lobe of lung  J18.9    3. Nausea vomiting and diarrhea  R11.2     R19.7      Disposition:   The patient is admitted into the care of Dr. Phillips.     Junaid Blair MD  [employer]  11:48 PM 08/11/20     Scribe Disclosure:  I, Prasanna Todd, am serving as a scribe at 5:08 PM on 8/11/2020 to document services personally performed by Junaid Blair MD based on my observations and the provider's statements to me.        Junaid Blair MD  08/11/20 [id number]

## 2020-08-12 ENCOUNTER — APPOINTMENT (OUTPATIENT)
Dept: PHYSICAL THERAPY | Facility: CLINIC | Age: 80
End: 2020-08-12
Attending: INTERNAL MEDICINE
Payer: COMMERCIAL

## 2020-08-12 PROBLEM — R11.2 NAUSEA AND VOMITING: Status: ACTIVE | Noted: 2020-08-12

## 2020-08-12 PROBLEM — R11.2 NAUSEA & VOMITING: Status: ACTIVE | Noted: 2020-08-12

## 2020-08-12 LAB
ANION GAP SERPL CALCULATED.3IONS-SCNC: 2 MMOL/L (ref 3–14)
BUN SERPL-MCNC: 12 MG/DL (ref 7–30)
C COLI+JEJUNI+LARI FUSA STL QL NAA+PROBE: NOT DETECTED
C DIFF TOX B STL QL: NEGATIVE
CALCIUM SERPL-MCNC: 8.2 MG/DL (ref 8.5–10.1)
CHLORIDE SERPL-SCNC: 112 MMOL/L (ref 94–109)
CO2 SERPL-SCNC: 28 MMOL/L (ref 20–32)
CREAT SERPL-MCNC: 0.74 MG/DL (ref 0.52–1.04)
EC STX1 GENE STL QL NAA+PROBE: NOT DETECTED
EC STX2 GENE STL QL NAA+PROBE: NOT DETECTED
ENTERIC PATHOGEN COMMENT: NORMAL
ERYTHROCYTE [DISTWIDTH] IN BLOOD BY AUTOMATED COUNT: 15.1 % (ref 10–15)
GFR SERPL CREATININE-BSD FRML MDRD: 75 ML/MIN/{1.73_M2}
GLUCOSE SERPL-MCNC: 121 MG/DL (ref 70–99)
HCT VFR BLD AUTO: 34 % (ref 35–47)
HGB BLD-MCNC: 11 G/DL (ref 11.7–15.7)
L PNEUMO1 AG UR QL IA: NORMAL
LABORATORY COMMENT REPORT: NORMAL
Lab: NORMAL
MCH RBC QN AUTO: 32.8 PG (ref 26.5–33)
MCHC RBC AUTO-ENTMCNC: 32.4 G/DL (ref 31.5–36.5)
MCV RBC AUTO: 102 FL (ref 78–100)
NOROV GI+II ORF1-ORF2 JNC STL QL NAA+PR: NOT DETECTED
PLATELET # BLD AUTO: 398 10E9/L (ref 150–450)
POTASSIUM SERPL-SCNC: 3.9 MMOL/L (ref 3.4–5.3)
RBC # BLD AUTO: 3.35 10E12/L (ref 3.8–5.2)
RVA NSP5 STL QL NAA+PROBE: NOT DETECTED
SALMONELLA SP RPOD STL QL NAA+PROBE: NOT DETECTED
SARS-COV-2 RNA SPEC QL NAA+PROBE: NEGATIVE
SARS-COV-2 RNA SPEC QL NAA+PROBE: NORMAL
SHIGELLA SP+EIEC IPAH STL QL NAA+PROBE: NOT DETECTED
SODIUM SERPL-SCNC: 142 MMOL/L (ref 133–144)
SPECIMEN SOURCE: NORMAL
V CHOL+PARA RFBL+TRKH+TNAA STL QL NAA+PR: NOT DETECTED
WBC # BLD AUTO: 8.5 10E9/L (ref 4–11)
Y ENTERO RECN STL QL NAA+PROBE: NOT DETECTED

## 2020-08-12 PROCEDURE — 85027 COMPLETE CBC AUTOMATED: CPT | Performed by: INTERNAL MEDICINE

## 2020-08-12 PROCEDURE — 25000132 ZZH RX MED GY IP 250 OP 250 PS 637: Performed by: HOSPITALIST

## 2020-08-12 PROCEDURE — 99226 ZZC SUBSEQUENT OBSERVATION CARE,LEVEL III: CPT | Performed by: INTERNAL MEDICINE

## 2020-08-12 PROCEDURE — 25000128 H RX IP 250 OP 636: Performed by: INTERNAL MEDICINE

## 2020-08-12 PROCEDURE — 25000132 ZZH RX MED GY IP 250 OP 250 PS 637: Performed by: INTERNAL MEDICINE

## 2020-08-12 PROCEDURE — G0378 HOSPITAL OBSERVATION PER HR: HCPCS

## 2020-08-12 PROCEDURE — 36415 COLL VENOUS BLD VENIPUNCTURE: CPT | Performed by: INTERNAL MEDICINE

## 2020-08-12 PROCEDURE — 97161 PT EVAL LOW COMPLEX 20 MIN: CPT | Mod: GP

## 2020-08-12 PROCEDURE — 25800030 ZZH RX IP 258 OP 636: Performed by: INTERNAL MEDICINE

## 2020-08-12 PROCEDURE — 97530 THERAPEUTIC ACTIVITIES: CPT | Mod: GP

## 2020-08-12 PROCEDURE — 80048 BASIC METABOLIC PNL TOTAL CA: CPT | Performed by: INTERNAL MEDICINE

## 2020-08-12 PROCEDURE — 25800030 ZZH RX IP 258 OP 636: Performed by: HOSPITALIST

## 2020-08-12 PROCEDURE — 96375 TX/PRO/DX INJ NEW DRUG ADDON: CPT

## 2020-08-12 RX ORDER — ACETAMINOPHEN 650 MG/1
650 SUPPOSITORY RECTAL EVERY 4 HOURS PRN
Status: DISCONTINUED | OUTPATIENT
Start: 2020-08-12 | End: 2020-08-14 | Stop reason: HOSPADM

## 2020-08-12 RX ORDER — CHOLESTYRAMINE 4 G/9G
4 POWDER, FOR SUSPENSION ORAL DAILY
Status: DISCONTINUED | OUTPATIENT
Start: 2020-08-12 | End: 2020-08-14 | Stop reason: HOSPADM

## 2020-08-12 RX ORDER — PROCHLORPERAZINE 25 MG
12.5 SUPPOSITORY, RECTAL RECTAL EVERY 12 HOURS PRN
Status: DISCONTINUED | OUTPATIENT
Start: 2020-08-12 | End: 2020-08-14 | Stop reason: HOSPADM

## 2020-08-12 RX ORDER — POTASSIUM CHLORIDE 29.8 MG/ML
20 INJECTION INTRAVENOUS
Status: DISCONTINUED | OUTPATIENT
Start: 2020-08-12 | End: 2020-08-14 | Stop reason: HOSPADM

## 2020-08-12 RX ORDER — ATORVASTATIN CALCIUM 10 MG/1
20 TABLET, FILM COATED ORAL AT BEDTIME
Status: DISCONTINUED | OUTPATIENT
Start: 2020-08-12 | End: 2020-08-14 | Stop reason: HOSPADM

## 2020-08-12 RX ORDER — POTASSIUM CL/LIDO/0.9 % NACL 10MEQ/0.1L
10 INTRAVENOUS SOLUTION, PIGGYBACK (ML) INTRAVENOUS
Status: DISCONTINUED | OUTPATIENT
Start: 2020-08-12 | End: 2020-08-14 | Stop reason: HOSPADM

## 2020-08-12 RX ORDER — ASPIRIN 81 MG/1
81 TABLET ORAL EVERY EVENING
Status: DISCONTINUED | OUTPATIENT
Start: 2020-08-12 | End: 2020-08-14 | Stop reason: HOSPADM

## 2020-08-12 RX ORDER — LABETALOL HYDROCHLORIDE 5 MG/ML
10 INJECTION, SOLUTION INTRAVENOUS
Status: DISCONTINUED | OUTPATIENT
Start: 2020-08-12 | End: 2020-08-14 | Stop reason: HOSPADM

## 2020-08-12 RX ORDER — LISINOPRIL 5 MG/1
5 TABLET ORAL DAILY
Status: DISCONTINUED | OUTPATIENT
Start: 2020-08-12 | End: 2020-08-13

## 2020-08-12 RX ORDER — POTASSIUM CHLORIDE 1.5 G/1.58G
20-40 POWDER, FOR SOLUTION ORAL
Status: DISCONTINUED | OUTPATIENT
Start: 2020-08-12 | End: 2020-08-14 | Stop reason: HOSPADM

## 2020-08-12 RX ORDER — LIDOCAINE 40 MG/G
CREAM TOPICAL
Status: DISCONTINUED | OUTPATIENT
Start: 2020-08-12 | End: 2020-08-14 | Stop reason: HOSPADM

## 2020-08-12 RX ORDER — ACETAMINOPHEN 325 MG/1
650 TABLET ORAL EVERY 4 HOURS PRN
Status: DISCONTINUED | OUTPATIENT
Start: 2020-08-12 | End: 2020-08-14 | Stop reason: HOSPADM

## 2020-08-12 RX ORDER — ONDANSETRON 2 MG/ML
4 INJECTION INTRAMUSCULAR; INTRAVENOUS EVERY 6 HOURS PRN
Status: DISCONTINUED | OUTPATIENT
Start: 2020-08-12 | End: 2020-08-14 | Stop reason: HOSPADM

## 2020-08-12 RX ORDER — ONDANSETRON 4 MG/1
4 TABLET, ORALLY DISINTEGRATING ORAL EVERY 6 HOURS PRN
Status: DISCONTINUED | OUTPATIENT
Start: 2020-08-12 | End: 2020-08-14 | Stop reason: HOSPADM

## 2020-08-12 RX ORDER — NALOXONE HYDROCHLORIDE 0.4 MG/ML
.1-.4 INJECTION, SOLUTION INTRAMUSCULAR; INTRAVENOUS; SUBCUTANEOUS
Status: DISCONTINUED | OUTPATIENT
Start: 2020-08-12 | End: 2020-08-14 | Stop reason: HOSPADM

## 2020-08-12 RX ORDER — METOPROLOL SUCCINATE 50 MG/1
50 TABLET, EXTENDED RELEASE ORAL DAILY
Status: DISCONTINUED | OUTPATIENT
Start: 2020-08-12 | End: 2020-08-12

## 2020-08-12 RX ORDER — PAROXETINE 20 MG/1
40 TABLET, FILM COATED ORAL AT BEDTIME
Status: DISCONTINUED | OUTPATIENT
Start: 2020-08-12 | End: 2020-08-14 | Stop reason: HOSPADM

## 2020-08-12 RX ORDER — PROCHLORPERAZINE MALEATE 5 MG
5 TABLET ORAL EVERY 6 HOURS PRN
Status: DISCONTINUED | OUTPATIENT
Start: 2020-08-12 | End: 2020-08-14 | Stop reason: HOSPADM

## 2020-08-12 RX ORDER — POTASSIUM CHLORIDE 1500 MG/1
20-40 TABLET, EXTENDED RELEASE ORAL
Status: DISCONTINUED | OUTPATIENT
Start: 2020-08-12 | End: 2020-08-14 | Stop reason: HOSPADM

## 2020-08-12 RX ORDER — SODIUM CHLORIDE, SODIUM LACTATE, POTASSIUM CHLORIDE, CALCIUM CHLORIDE 600; 310; 30; 20 MG/100ML; MG/100ML; MG/100ML; MG/100ML
INJECTION, SOLUTION INTRAVENOUS CONTINUOUS
Status: DISCONTINUED | OUTPATIENT
Start: 2020-08-12 | End: 2020-08-12

## 2020-08-12 RX ORDER — POTASSIUM CHLORIDE 7.45 MG/ML
10 INJECTION INTRAVENOUS
Status: DISCONTINUED | OUTPATIENT
Start: 2020-08-12 | End: 2020-08-14 | Stop reason: HOSPADM

## 2020-08-12 RX ORDER — METOPROLOL SUCCINATE 50 MG/1
50 TABLET, EXTENDED RELEASE ORAL EVERY EVENING
Status: DISCONTINUED | OUTPATIENT
Start: 2020-08-12 | End: 2020-08-14 | Stop reason: HOSPADM

## 2020-08-12 RX ORDER — ANASTROZOLE 1 MG/1
1 TABLET ORAL AT BEDTIME
Status: DISCONTINUED | OUTPATIENT
Start: 2020-08-12 | End: 2020-08-14 | Stop reason: HOSPADM

## 2020-08-12 RX ADMIN — ACETAMINOPHEN 650 MG: 325 TABLET, FILM COATED ORAL at 22:59

## 2020-08-12 RX ADMIN — SODIUM CHLORIDE 250 ML: 9 INJECTION, SOLUTION INTRAVENOUS at 05:25

## 2020-08-12 RX ADMIN — ONDANSETRON 4 MG: 2 INJECTION INTRAMUSCULAR; INTRAVENOUS at 04:59

## 2020-08-12 RX ADMIN — ATORVASTATIN CALCIUM 20 MG: 10 TABLET, FILM COATED ORAL at 20:51

## 2020-08-12 RX ADMIN — PAROXETINE HYDROCHLORIDE 40 MG: 20 TABLET, FILM COATED ORAL at 20:50

## 2020-08-12 RX ADMIN — ATORVASTATIN CALCIUM 20 MG: 10 TABLET, FILM COATED ORAL at 02:15

## 2020-08-12 RX ADMIN — METOPROLOL SUCCINATE 50 MG: 50 TABLET, EXTENDED RELEASE ORAL at 02:15

## 2020-08-12 RX ADMIN — CHOLESTYRAMINE 4 G: 4 POWDER, FOR SUSPENSION ORAL at 08:37

## 2020-08-12 RX ADMIN — METOPROLOL SUCCINATE 50 MG: 50 TABLET, EXTENDED RELEASE ORAL at 20:51

## 2020-08-12 RX ADMIN — LISINOPRIL 5 MG: 5 TABLET ORAL at 20:50

## 2020-08-12 RX ADMIN — Medication 1 MG: at 02:15

## 2020-08-12 RX ADMIN — ASPIRIN 81 MG: 81 TABLET, COATED ORAL at 20:51

## 2020-08-12 RX ADMIN — PAROXETINE HYDROCHLORIDE 40 MG: 20 TABLET, FILM COATED ORAL at 02:15

## 2020-08-12 RX ADMIN — SODIUM CHLORIDE, POTASSIUM CHLORIDE, SODIUM LACTATE AND CALCIUM CHLORIDE: 600; 310; 30; 20 INJECTION, SOLUTION INTRAVENOUS at 12:09

## 2020-08-12 RX ADMIN — SODIUM CHLORIDE, POTASSIUM CHLORIDE, SODIUM LACTATE AND CALCIUM CHLORIDE: 600; 310; 30; 20 INJECTION, SOLUTION INTRAVENOUS at 02:17

## 2020-08-12 RX ADMIN — ANASTROZOLE 1 MG: 1 TABLET, COATED ORAL at 20:51

## 2020-08-12 RX ADMIN — ANASTROZOLE 1 MG: 1 TABLET, COATED ORAL at 02:15

## 2020-08-12 ASSESSMENT — ACTIVITIES OF DAILY LIVING (ADL)
DRESS: 0-->INDEPENDENT
BATHING: 0-->INDEPENDENT
RETIRED_COMMUNICATION: 0-->UNDERSTANDS/COMMUNICATES WITHOUT DIFFICULTY
SWALLOWING: 0-->SWALLOWS FOODS/LIQUIDS WITHOUT DIFFICULTY
WHICH_OF_THE_ABOVE_FUNCTIONAL_RISKS_HAD_A_RECENT_ONSET_OR_CHANGE?: FALL HISTORY
ADLS_ACUITY_SCORE: 21
TOILETING: 0-->INDEPENDENT
ADLS_ACUITY_SCORE: 16
RETIRED_EATING: 0-->INDEPENDENT

## 2020-08-12 NOTE — PROVIDER NOTIFICATION
"MD Notification    Notified Person: MD    Notified Person Name: Rui Brock    Notification Date/Time: 8/12/2020    Notification Interaction: AMCOM     Purpose of Notification: \" /108. Asymptomatic\" Also, can you order scheduled metoprolol. She takes in evenings.\"    Orders Received: Metoprolol 50 mg ER PO ordered and monitoring BP. Now 170s SBP     Comments:      "

## 2020-08-12 NOTE — PLAN OF CARE
Arrived to unit at approximately 0100. COVID test pending. Alert and oriented x 4. Denies pain. Dypsnea with exertion. Hypertensive and tachycardic. [place] and bilateral hearing aids at bedside. Tele showing NSR. SBA with walker to [facility]. Diarrhea and stool sample collected. Zofran administered once for nausea and effective. Very SOB with movement and HR elevated to 170-190s. Orthos positive and 250 ml bolus administered. Skin intact. Clear liquid diet and advance as tolerated. PT consult pending. Discharge pending. Lives in condo with .

## 2020-08-12 NOTE — PROGRESS NOTES
.RECEIVING UNIT ED HANDOFF REVIEW    ED Nurse Handoff Report was reviewed by: Alaina Alva RN on August 12, 2020 at 12:54 AM

## 2020-08-12 NOTE — PROGRESS NOTES
08/12/20 1101   Living Environment   Lives With spouse   Living Arrangements condominium   Home Accessibility no concerns   Living Environment Comment Patient lives in a condo with her , that has elevator access.  is unable to walk & uses a power chair at baseline. PCA comes 10am-3pm to help with , they also help with household cores. Otherwise patient is her husbands main caregiver. Patient reports has been doing home therapy & would like to continue. Daughter lives in town and able to help as able.   Self-Care   Usual Activity Tolerance fair   Current Activity Tolerance poor   Activity/Exercise/Self-Care Comment Breathing SOB & fatigue makes it difficult to walk to bathroom & back at home. Uses a FWW at baseline. Unable to standing in long enough to make meals in the kitchen.    Functional Level Prior   Ambulation 1-->assistive equipment   Transferring 1-->assistive equipment   Fall history within last six months yes   Number of times patient has fallen within last six months 1   Which of the above functional risks had a recent onset or change? ambulation;transferring;fall history   General Information   Onset of Illness/Injury or Date of Surgery - Date 08/12/20   Referring Physician Sanjiv Phillips MD   Patient/Family Goals Statement To return to home   Precautions/Limitations fall precautions   General Observations Greeted patient finishing on commode.   General Info Comments Activity: up with assist   Cognitive Status Examination   Orientation orientation to person, place and time   Level of Consciousness alert   Follows Commands and Answers Questions 100% of the time;able to follow single-step instructions   Pain Assessment   Patient Currently in Pain   (no)   Integumentary/Edema   Integumentary/Edema no deficits were identifed   Posture    Posture Forward head position;Protracted shoulders   Range of Motion (ROM)   ROM Comment ABEL ROTH   Strength   Strength Comments ABEL WHITEHEAD  "functionally weak   Bed Mobility   Bed Mobility Comments supine <> EOB at SBA   Transfer Skills   Transfer Comments sit <> stand with FWW at SBA   Gait   Gait Comments 5ft with FWW at CGA   Sensory Examination   Sensory Perception Comments denies numbness/tingling in LE   General Therapy Interventions   Planned Therapy Interventions balance training;bed mobility training;gait training;strengthening;transfer training;home program guidelines;progressive activity/exercise   Clinical Impression   Criteria for Skilled Therapeutic Intervention yes, treatment indicated   PT Diagnosis impaired functional mobility   Influenced by the following impairments SOB, fatigue, LE weakness, decreased activity tolerance   Functional limitations due to impairments transfers, ambulation   Clinical Presentation Stable/Uncomplicated   Clinical Presentation Rationale PMH, current presentation, clinical judgement   Clinical Decision Making (Complexity) Low complexity   Therapy Frequency 3x/week  (while on OBS status)   Therapy Certification   Start of care date 08/12/20   Certification date from 08/12/20   Certification date to 08/15/20   [facility] TM \"6 Clicks\"   2016, Trustees of [facility], under license to [employer].  All rights reserved.   6 Clicks Short Forms Basic Mobility Inpatient Short Form   [facility]  \"6 Clicks\" V.2 Basic Mobility Inpatient Short Form   1. Turning from your back to your side while in a flat bed without using bedrails? 3 - A Little   2. Moving from lying on your back to sitting on the side of a flat bed without using bedrails? 3 - A Little   3. Moving to and from a bed to a chair (including a wheelchair)? 3 - A Little   4. Standing up from a chair using your arms (e.g., wheelchair, or bedside chair)? 3 - A Little   5. To walk in hospital room? 3 - A Little   6. Climbing 3-5 steps with a railing? 2 - A Lot   Basic Mobility Raw Score (Score out of 24.Lower scores equate to " lower levels of function) 17   Total Evaluation Time   Total Evaluation Time (Minutes) 8

## 2020-08-12 NOTE — PLAN OF CARE
PT orders received & acknowledged. Chart reviewed. Eval completed & treatment initiated.     Patient lives in a condo with her , that has elevator access.  is unable to walk & uses a power chair at baseline. PCA comes daily 10am-3pm to help with , assist with some household cores. Otherwise patient is her husbands main caregiver. Patient reports has been doing home therapy & would like to continue. Daughter lives in town and able to help as able. At baseline patient is VIDA with FWW.     Discharge Planner PT   Patient plan for discharge: [place] about home, doesn't feel like TCU would do anything for her   Current status: Greeted patient finishing on commode and agreeable to session. Engaged patient in commode > bed transfer at CGA. Patient quick to fatigue & abruptly requires a quick transition to supine due to SOB & fatigue. Patient indicates poor activity tolerance happening ever since lung surgery. She endorses difficulty with standing tolerance at home and walking to bathroom totally wipes her out. After supine rest, engaged patient in supine <> EOB at SBA. Engaged patient in sit < > stand with FWW at SBA. Engaged patient in ambulation with FWW in room for a total distance of 40ft at CGA-SBA. Patient is most limited by SOB & fatigue & decreased activity tolerance. Discussed discharge recommendation & educated patient on the benefits of TCU. Concluded session with patient supine in bed, all needs in reach and alarm engaged.   Barriers to return to prior living situation: decreased activity tolerance, SOB with activity, fall risk, LE weakness   Recommendations for discharge: TCU  Rationale for recommendations: Patient is below baseline for functional mobility & would benefit from continued physical therapy in the setting of a TCU for improving functional mobility, LE strength and tolerance for functional activities in order to return to baseline of functioning.     If patient declining TCU,  patient would be recommended to have Ax1 for all out of bed activity with use of FWW and increased assist with household tasks. HH-PT to improve LE strength, balance & activity tolerance to increase level of independence and decrease risk of falling.          Entered by: Yulia Mitchell 08/12/2020 11:45 AM

## 2020-08-12 NOTE — PLAN OF CARE
[facility]      OUTPATIENT PHYSICAL THERAPY EVALUATION  PLAN OF TREATMENT FOR OUTPATIENT REHABILITATION  (COMPLETE FOR INITIAL CLAIMS ONLY)  Patient's Last Name, First Name, M.I.  YOB: 1940  Yolanda Watson                        Provider's Name  [facility] Medical Record No.  [id number]                               Onset Date:  08/12/20   Start of Care Date:  08/12/20      Type:     _X_PT   ___OT   ___SLP Medical Diagnosis:                           PT Diagnosis:  impaired functional mobility   Visits from SOC:  1   _________________________________________________________________________________  Plan of Treatment/Functional Goals    Planned Interventions:  ,    balance training, bed mobility training, gait training, strengthening, transfer training, home program guidelines, progressive activity/exercise,       Goals: See Physical Therapy Goals on Care Plan in [employer] electronic health record.    Therapy Frequency: 3x/week(while on OBS status)  Predicted Duration of Therapy Intervention:    _________________________________________________________________________________    I CERTIFY THE NEED FOR THESE SERVICES FURNISHED UNDER        THIS PLAN OF TREATMENT AND WHILE UNDER MY CARE     (Physician co-signature of this document indicates review and certification of the therapy plan).                Certification date from: 08/12/20, Certification date to: 08/15/20    Referring Physician: Sanjiv Phillips MD            Initial Assessment        See Physical Therapy evaluation dated 08/12/20 in Epic electronic health record.

## 2020-08-12 NOTE — PROVIDER NOTIFICATION
Paged on-call about positive orthostatics and wanting telemetry. In response, 250 mL bolus ordered and telemetry ordered.

## 2020-08-12 NOTE — PLAN OF CARE
COVID NEG, Continue precaution/ Possible Retest.     Pt is A&Ox4, VSS on RA except for hypertension and tachycardic. Reg diet, Assist of 1 w/ gait belt with walker. Denies pain, N/V, Episode of Diarrhea x4, Incontinence at times, continue to monitor.

## 2020-08-12 NOTE — H&P
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 Admitted on 8/11/2020.      Nausea, vomiting and diarrhea  Sinus tachycardia   Presents with nausea, vomiting, nonbloody diarrhea, myalgias, chills.  No known sick contacts or concerning food intake.  Does report being on ciprofloxacin sometime in the past 1 to 2 months.  Chest x-ray with possible right lower lobe infiltrate, CT abdomen/pelvis with minimal right effusion, otherwise no acutely concerning pathology in abdomen.  No significant acute respiratory symptoms, has baseline dyspnea on exertion due to prior lobectomies from lung cancers.  - Enteric panel and C difficile PCR ordered  - Enteric precautions  - Rule out COVID19 as below   - Trend WBC   - Procalcitonin undetectable, hold on additional antibiotics, low suspicion for pneumonia   - IVF  - Advance diet as tolerated  - Antiemetics PRN    COVID19 PUI  - Patient is NOT low suspicion, if COVID19 PCR returns negative, continue precautions unless/until alternate explanation for symptoms arises and consider re-testing    History of chemotherapy induced cardiomyopathy  Hypertension  Hyperlipidemia  Most recent EF 55 to 60% 6/2019.  - Continue prior to admission metoprolol XL, lisinopril, atorvastatin, aspirin    Depression  Anxiety  - Continue prior to admission paroxetine       Diet: Clear liquid diet, advance as tolerated  DVT Prophylaxis: Pneumatic Compression Devices  Patricia Catheter: not present  Code Status: DNI - Patient is okay with a short trial of CPR    Disposition Plan   Expected discharge: Admit to inpatient. Anticipate greater than or equal to 2 midnights  prior to discharge.   Entered: Sanjiv Phillips MD 08/11/2020, 8:11 PM     The patient's care was discussed with the Patient and Patient's daughter.    Sanjiv Phillips MD  [facility]    ______________________________________________________________________    Chief Complaint   Nausea, vomiting and diarrhea for 3 days    History of Present Illness   Yolanda Watson is a 79 year old female who presents with the above chief complaint.    History is obtained from the patient.  Patient reports about 3 days prior to admission she developed nausea followed by watery, brown, nonbloody diarrhea.  She had difficulty with intake, vomiting at times and having a general lack of interest in food or liquids.  She was able to keep her medications down at night.  She did not have any significant abdominal discomfort.  She also reports feeling body aches, chills with no measured fevers.  The patient reports feeling lightheadedness, particularly when changing positions and walking.  She has chronic dyspnea on exertion due to her prior lung surgeries for lung cancer, activity tolerance has been somewhat worse since symptom onset.  She denies any significant cough.  The patient and her  stay isolated in the home, however her daughter does come in and out to assist them. Her daughter had been checking her heart rate at home and noticed it to be consistently above 100 which in addition to her ongoing symptoms led to her seeking care.    In the Emergency Department, the patient was seen by Dr Amparo Damian, with whom I discussed the patient's presenting symptoms and emergency department course.  Initial vital signs were a temperature of 98.6F, , /75, RR 14, SpO2 98%. Work-up included CT abd/pelvis that was unremarkable, CXR with possible RLL pneumonia. She was given ceftriaxone and azithromycin and hospitalists were contacted for admission to the hospital.     Review of Systems    Complete 10 point review  of systems assessed and negative except as noted in HPI.    Past Medical History    I have reviewed this patient's medical history and updated it with pertinent information if needed.   Past Medical History:   Diagnosis Date     Acoustic neuroma (H)      Acute arthropathy     lower leg     Acute depression      Anemia     iron deficeincy     Anxiety      Arthritis     osteoarthrosis of knee     Breast cancer (H)     left breast     Breast cancer, left breast (H) 02/2018     Breast cancer, right breast (H) 1/2016     Cervical cancer (H)      Colon cancer (H)      Decreased cardiac ejection fraction      Depressive disorder      Diabetes mellitus (H)     pre-diabetic no longer taking Metformin     Dyspnea      Gastro-oesophageal reflux disease      H/O: lung cancer 2013     Herpes zoster      History of blood transfusion      HTN (hypertension)      Hyperlipidemia      Iron deficiency anemia      Lung nodule 2013    Lung cancer     Nausea & vomiting      OAB (overactive bladder)      Obese      Osteoarthritis of knee      Pain in female pelvis      Pre-diabetes      Preop general physical exam      Rib lesion      Shoulder pain, acute     right     Tachycardia      Torn meniscus        Past Surgical History   I have reviewed this patient's surgical history and updated it with pertinent information if needed.  Past Surgical History:   Procedure Laterality Date     acoustic neuroma excised       APPENDECTOMY  1966    done with CORNELIO     ARTHROSCOPY KNEE  2012    right knee scoped     BIOPSY NODE SENTINEL Right 2/3/2016    Procedure: BIOPSY NODE SENTINEL;  Surgeon: Flory Pinto MD;  Location: [facility]     BIOPSY NODE SENTINEL Left 3/5/2018    Procedure: BIOPSY NODE SENTINEL;;  Surgeon: Denae Perez MD;  Location: [facility]     BREAST BIOPSY, RT/LT       cataracts       cataracts       CERVICAL CANCER SCREENING RESULTS DOCUMENTED AND REVIEWED       CHOLECYSTECTOMY  1991     CL AFF SURGICAL PATHOLOGY       CL AFF  SURGICAL PATHOLOGY      acoustic neuroma left side excised [facility] [address] [place] 6/17/14     COLONOSCOPY       EXCISE NODE MEDIASTINAL  3/12/2013    Procedure: EXCISE NODE MEDIASTINAL;;  Surgeon: Lorne Arenas MD;  Location:  OR     EYE SURGERY      fiona cataracts     GENITOURINARY SURGERY      bladder sling     GYN SURGERY  1966    hysterectomy for cervical cancer *ovaries remain     HYSTERECTOMY  1966    Cervical CA, paps done every other year, done with appendectomy     INSERT PORT VASCULAR ACCESS Left 3/5/2018    Procedure: INSERT PORT VASCULAR ACCESS;;  Surgeon: Denae Perez MD;  Location: [facility]     INSERT PORT VASCULAR ACCESS N/A 3/5/2018    Procedure: INSERT PORT VASCULAR ACCESS;;  Surgeon: Denae Perez MD;  Location: [facility]     LAPAROSCOPIC ASSISTED COLECTOMY Right 10/27/2016    Procedure: LAPAROSCOPIC ASSISTED COLECTOMY;  Surgeon: Umang Arteaga MD;  Location:  OR     LOBECTOMY LUNG  3/12/2013    Procedure: LOBECTOMY LUNG;;  Surgeon: Lorne Arenas MD;  Location:  OR     LUMPECTOMY BREAST WITH SEED LOCALIZATION Right 2/3/2016    Procedure: LUMPECTOMY BREAST WITH SEED LOCALIZATION;  Surgeon: Flory Pinto MD;  Location: [facility]     LUMPECTOMY BREAST WITH SEED LOCALIZATION Left 3/5/2018    Procedure: LUMPECTOMY BREAST WITH SEED LOCALIZATION;  SEED LOCALIZED LEFT BREAST LUMPECTOMY, WITH LEFT SENTINEL NODE BIOPSY, PORT PLACEMENT;  Surgeon: Denae Perez MD;  Location: [facility]     MASTECTOMY SIMPLE Left 3/28/2018    Procedure: MASTECTOMY SIMPLE;  LEFT BREAST MASTECTOMY ;  Surgeon: Denae Perez MD;  Location: [facility]     MINI ARC SLING OPERATION FOR STRESS INCONTINENCE  2009     REMOVE PORT VASCULAR ACCESS N/A 2/13/2019    Procedure: REMOVE PORT VASCULAR ACCESS;  Surgeon: Denae Perez MD;  Location:  OR     RESECT RIB  2/15/2013    Procedure: RESECT RIB;  RESECTION PORTION RIGHT TWELVETH RIB;  Surgeon: Lorne Arenas MD;  Location:  OR      THORACOSCOPIC WEDGE RESECTION LUNG Left 7/26/2019    Procedure: LEFT VIDEO ASSISTED THORACIC SURGERY; POSSIBLE LEFT THORACOTOMY; TWO WEDGE RESECTIONS OF LEFT LOWER LUNG NODULE;  Surgeon: Lorne Arenas MD;  Location: [facility] OR     THORACOTOMY  3/12/2013    Procedure: THORACOTOMY;  RIGHT THORACOTOMY, RIGHT MIDDLE AND LOWER LUNG LOBECTOMY, MEDIASTINAL LYMPH NODE DISSECTION ;  Surgeon: Lorne Arenas MD;  Location: [facility] OR     TONSILLECTOMY           Social History     Former smoker.  Denies alcohol use. No illicit or IV drug use    Lives with her .  Daughter assist with cares.    Family History   I have reviewed this patient's family history and updated it with pertinent information if needed.   Family History   Problem Relation Age of Onset     Breast Cancer Paternal Grandmother      Colon Cancer Other         not specified     Fractures Other         Hip fracture, Aunt     Hyperlipidemia Mother      Hypertension Mother      Heart Disease Mother      Osteoporosis Mother      Hyperlipidemia Sister      Hyperlipidemia Brother      Prostate Cancer Brother      Hypertension Sister      Heart Disease Maternal Grandfather      Prostate Cancer Father      Colon Cancer Cousin         son of paternal aunt with breast cancer.     Breast Cancer Paternal Aunt      Colon Cancer Paternal Aunt      Other Cancer Paternal Uncle         Esophageal     Other Cancer Maternal Aunt        Prior to Admission Medications   Prior to Admission Medications   Prescriptions Last Dose Informant Patient Reported? Taking?   PARoxetine (PAXIL) 40 MG tablet 8/10/2020 at pm Self Yes Yes   Sig: Take 40 mg by mouth At Bedtime    acetaminophen (TYLENOL) 325 MG tablet prn at prn  Yes Yes   Sig: Take 325-650 mg by mouth every 6 hours as needed for mild pain   anastrozole (ARIMIDEX) 1 MG tablet 8/10/2020 at pm Self Yes Yes   Sig: Take 1 mg by mouth At Bedtime    aspirin 81 MG EC tablet 8/10/2020 at pm Self Yes Yes   Sig: Take 81 mg by  mouth every evening   atorvastatin (LIPITOR) 20 MG tablet 8/10/2020 at pm Self Yes Yes   Sig: Take 20 mg by mouth At Bedtime    cholestyramine (QUESTRAN) 4 GM/DOSE powder 8/10/2020 at Unknown time  Yes Yes   Sig: Take 4 g by mouth daily    lisinopril 5 MG PO tablet 8/10/2020 at pm  No Yes   Sig: Take 1 tablet (5 mg) by mouth daily   loperamide (IMODIUM A-D) 2 MG capsule 8/10/2020 at pm Self Yes Yes   Sig: Take 2 mg by mouth 4 times daily as needed for diarrhea   metoprolol succinate ER (TOPROL-XL) 50 MG 24 hr tablet 8/10/2020 at pm  No Yes   Sig: Take 1 tablet (50 mg) by mouth daily   multivitamin, therapeutic (THERA-VIT) TABS 8/10/2020 at pm Self Yes Yes   Sig: Take 1 tablet by mouth At Bedtime No iron   ondansetron (ZOFRAN-ODT) 4 MG ODT tab prn at prn Self Yes Yes   Sig: Take 4 mg by mouth 2 times daily as needed for nausea   vitamin D3 (CHOLECALCIFEROL) 2000 units (50 mcg) tablet 8/10/2020 at pm Self Yes Yes   Sig: Take 1 tablet by mouth daily      Facility-Administered Medications: None     Allergies   Allergies   Allergen Reactions     Demerol [Meperidine] Nausea and Vomiting     Sulfa Drugs Other (See Comments)     Extreme chills     Penicillins Rash     Swelling In mouth and tongue       Physical Exam   Vital Signs: Temp: 98.6  F (37  C) Temp src: Temporal BP: (!) 145/84 Pulse: 101 Heart Rate: 100 Resp: 14 SpO2: 99 %      Weight: 205 lbs 0 oz    Constitutional: NAD  Eyes: PERRL, EOMI  HENT: Oropharynx clear, MMM  Respiratory: Clear to auscultation bilaterally, good air movement, normal effort   Cardiovascular: Regular rhythm with slightly elevated rate, no m/r/g. No peripheral edema.  .  GI: Soft, non-tender, non-distended. No rebound tenderness or guarding.    Skin: Warm, dry   Neurologic: Alert. Responding to questions appropriately. Following commands.   Psychiatric: Normal affect, appropriate      Data   Data reviewed today: I reviewed all medications, new labs and imaging results over the last 24 hours.  I personally reviewed the EKG tracing showing sinus tachycardia .    Recent Labs   Lab 08/11/20  1712   WBC 14.3*   HGB 13.5   *   *   INR 0.97      POTASSIUM 3.9   CHLORIDE 105   CO2 26   BUN 16   CR 0.84   ANIONGAP 8   PRIYA 8.9   *   ALBUMIN 4.1   PROTTOTAL 7.4   BILITOTAL 0.9   ALKPHOS 92   ALT 23   AST 9       Recent Results (from the past 24 hour(s))   XR Chest Port 1 View    Narrative    CHEST ONE VIEW PORTABLE   8/11/2020 6:13 PM     HISTORY:  Tachycardia. Weakness.    COMPARISON: 10/17/2019.      Impression    IMPRESSION: Opacity at the right lung base likely represents a small  right pleural effusion, with mild associated infiltrate and/or  atelectasis. The left lung is clear. Heart size appears stable.  Pulmonary vascularity is within normal limits. Aortic calcification.  Right shoulder arthroplasty is new since the previous exam.   CT Abdomen Pelvis w Contrast    Narrative    CT ABDOMEN AND PELVIS WITH CONTRAST 8/11/2020 6:39 PM    CLINICAL HISTORY: Vomiting and diarrhea.    TECHNIQUE: CT scan of the abdomen and pelvis was performed following  injection of IV contrast. Multiplanar reformats were obtained. Dose  reduction techniques were used.    CONTRAST: 103mL Isovue-370    COMPARISON: None.    FINDINGS:   LOWER CHEST: Mild fibrotic changes about the periphery of both lung  bases. Postoperative changes are noted in the left lower lobe  laterally. A small amount of pleural fluid is noted on the right.    HEPATOBILIARY: Mild intra and extrahepatic biliary prominence may be  related to the patient's age and postcholecystectomy state. A small  left hepatic cyst measures 1.2 cm.    PANCREAS: Normal.    SPLEEN: Normal.    ADRENAL GLANDS: Normal.    KIDNEYS/BLADDER: A few small bilateral renal cortical cysts, with the  largest in the lower pole on the left measuring 2.4 cm. Otherwise  unremarkable. No hydronephrosis.    BOWEL: Postoperative changes of right hemicolectomy are noted. A  small  paraumbilical hernia contains a knuckle of nondilated transverse  colon. No bowel obstruction. Colonic diverticulosis, most extensive in  the sigmoid colon, without convincing evidence for diverticulitis.    PELVIC ORGANS: Prior hysterectomy.    LYMPH NODES: No enlarged lymph nodes are identified in the abdomen or  pelvis.    VASCULATURE: Advanced atherosclerotic aortoiliac calcification.    ADDITIONAL FINDINGS: None.    MUSCULOSKELETAL: Degenerative changes are noted in the visualized  thoracolumbar spine.      Impression    IMPRESSION:   1.  Small amount of pleural fluid is noted on the right.  2.  A small paraumbilical hernia contains a knuckle of nondilated  transverse colon.  3.  Colonic diverticulosis, without convincing evidence for  diverticulitis.

## 2020-08-12 NOTE — PROVIDER NOTIFICATION
MD Notification    Notified Person: MD    Notified Person Name: Dr. Albarran    Notification Date/Time: 8/12/2020 1140    Notification Interaction: Paged    Purpose of Notification: FYI Pt COVID result is Negative. Do you want to discontinue the precaution and transfer the pt.     Orders Received: Not at this moment     Comments:

## 2020-08-12 NOTE — ED NOTES
[facility]  ED Nurse Handoff Report    ED Chief complaint: Tachycardia      ED Diagnosis:   Final diagnoses:   Sinus tachycardia   Community acquired pneumonia of right lower lobe of lung   Nausea vomiting and diarrhea       Code Status: To be addressed by admitting MD.     Allergies:   Allergies   Allergen Reactions     Demerol [Meperidine] Nausea and Vomiting     Sulfa Drugs Other (See Comments)     Extreme chills     Penicillins Rash     Swelling In mouth and tongue       Patient Story: Pt reports tachycardia, N/V/D  Focused Assessment:  Pt in sinus tach. Pt has lower lobe pneumonia of the right lobe. Pt reports nausea, vomiting, and diarrhea. Pt is alert and oriented. Repeat lactate sent in ED.     Treatments and/or interventions provided:   Medications   ondansetron (ZOFRAN) injection 4 mg (has no administration in time range)   0.9% sodium chloride BOLUS (1,000 mLs Intravenous New Bag 8/11/20 1903)   cefTRIAXone (ROCEPHIN) 1 g vial to attach to  mL bag for ADULTS or NS 50 mL bag for PEDS (has no administration in time range)   azithromycin (ZITHROMAX) 500 mg vial to attach to  mL bag (has no administration in time range)   0.9% sodium chloride BOLUS (0 mLs Intravenous Stopped 8/11/20 1903)   iopamidol (ISOVUE-370) solution 103 mL (103 mLs Intravenous Given 8/11/20 1824)   Saline Flush (69 mLs Intravenous Given 8/11/20 1823)       Patient's response to treatments and/or interventions:     To be done/followed up on inpatient unit:      Does this patient have any cognitive concerns?: NA    Activity level - Baseline/Home:  Independent  Activity Level - Current:   Independent    Patient's Preferred language: English   Needed?: No    Isolation: Contact  and Droplet  Infection: pending covid, enteric     Bariatric?: No    Vital Signs:   Vitals:    08/11/20 1720 08/11/20 1740 08/11/20 1800 08/11/20 1945   BP: (!) 142/83 (!) 155/85 (!) 145/87    Pulse: 118 129 117    Resp: 17 14  15 13   Temp:       TempSrc:       SpO2: 97% 95% 99% 96%   Weight:       Height:           Cardiac Rhythm:Cardiac Rhythm: Sinus tachycardia    Was the PSS-3 completed:   Yes  What interventions are required if any?               Family Comments: Daughter at bedside in ED.   OBS brochure/video discussed/provided to patient/family: N/A              Name of person given brochure if not patient: NA              Relationship to patient: NA    For the majority of the shift this patient's behavior was Green.   Behavioral interventions performed were frequent rounding.    ED NURSE PHONE NUMBER: [id number]

## 2020-08-12 NOTE — PHARMACY-ADMISSION MEDICATION HISTORY
Pharmacy Medication History  Admission medication history interview status for the 8/11/2020  admission is complete. See EPIC admission navigator for prior to admission medications     Medication history sources: Patient and Patient's family/friend (daughter)  Medication history source reliability: Good  Adherence assessment: unknown    Additional medication history information:   Paroxetine was recently increased from 20mg daily to 40mg daily.   Patient only uses cholestyramine powder once daily rather than twice daily.     Medication reconciliation completed by provider prior to medication history? No    Time spent in this activity: 15 minutes     Prior to Admission medications    Medication Sig Last Dose Taking? Auth Provider   acetaminophen (TYLENOL) 325 MG tablet Take 325-650 mg by mouth every 6 hours as needed for mild pain prn at prn Yes Unknown, Entered By History   anastrozole (ARIMIDEX) 1 MG tablet Take 1 mg by mouth At Bedtime  8/10/2020 at pm Yes Reported, Patient   aspirin 81 MG EC tablet Take 81 mg by mouth every evening 8/10/2020 at pm Yes Reported, Patient   atorvastatin (LIPITOR) 20 MG tablet Take 20 mg by mouth At Bedtime  8/10/2020 at pm Yes Reported, Patient   cholestyramine (QUESTRAN) 4 GM/DOSE powder Take 4 g by mouth daily  8/10/2020 at Unknown time Yes Reported, Patient   lisinopril 5 MG PO tablet Take 1 tablet (5 mg) by mouth daily 8/10/2020 at pm Yes Sulema Coleman PA-C   loperamide (IMODIUM A-D) 2 MG capsule Take 2 mg by mouth 4 times daily as needed for diarrhea 8/10/2020 at pm Yes Reported, Patient   metoprolol succinate ER (TOPROL-XL) 50 MG 24 hr tablet Take 1 tablet (50 mg) by mouth daily 8/10/2020 at pm Yes Sulema Coleman PA-C   multivitamin, therapeutic (THERA-VIT) TABS Take 1 tablet by mouth At Bedtime No iron 8/10/2020 at pm Yes Unknown, Entered By History   ondansetron (ZOFRAN-ODT) 4 MG ODT tab Take 4 mg by mouth 2 times daily as needed for nausea prn at prn Yes  Reported, Patient   PARoxetine (PAXIL) 40 MG tablet Take 40 mg by mouth At Bedtime  8/10/2020 at pm Yes Unknown, Entered By History   vitamin D3 (CHOLECALCIFEROL) 2000 units (50 mcg) tablet Take 1 tablet by mouth daily 8/10/2020 at pm Yes Reported, Patient     Ninoska Dias, PharmD  [phone]

## 2020-08-12 NOTE — UTILIZATION REVIEW
"  Admission Status; Secondary Review Determination         Under the authority of the Utilization Management Committee, the utilization review process indicated a secondary review on the above patient.  The review outcome is based on review of the medical records, discussions with staff, and applying clinical experience noted on the date of the review.          (x) Observation Status Appropriate - This patient does not meet hospital inpatient criteria and is placed in observation status. If this patient's primary payer is Medicare and was admitted as an inpatient, Condition Code 44 should be used and patient status changed to \"observation\".     RATIONALE FOR DETERMINATION     The severity of illness, intensity of service provided, expected LOS and risk for adverse outcome make the care appropriate for further observation; however, doesn't meet criteria for hospital inpatient admission.   notified of this determination.    The patient is an 79-year-old female with a history of multiple malignancies with breast cancer and lung cancer and colon cancer and history of chemotherapy with induced cardiomyopathy with an ejection fraction that is actually improved.  She was admitted with nausea vomiting diarrhea and some myalgias and chills.  Chest x-ray showed right pleural effusion and was borderline for potential of infiltrate.  White count was 8.5 electrolytes and renal function normal and pro calcitonin negative no antibiotics were deemed necessary at this time.  The patient is COVID-19 negative.  Most recent ejection fraction is 55 to 60% in 6/2019.  The case was discussed with Dr. Theron Shaw, her hospitalist.  At this time, she fits observation status based on medical findings and current treatment.  If her condition deteriorates or other diagnoses consistent with acute hospital care develop, reconsideration for inpatient can be done at that time.      The information on this document is developed by the " utilization review team in order for the business office to ensure compliance.  This only denotes the appropriateness of proper admission status and does not reflect the quality of care rendered.         The definitions of Inpatient Status and Observation Status used in making the determination above are those provided in the CMS Coverage Manual, Chapter 1 and Chapter 6, section 70.4.      Sincerely,     Mohan Butcher MD  Physician Advisor  Utilization Review/ Case Management  [facility].

## 2020-08-13 ENCOUNTER — APPOINTMENT (OUTPATIENT)
Dept: PHYSICAL THERAPY | Facility: CLINIC | Age: 80
End: 2020-08-13
Payer: COMMERCIAL

## 2020-08-13 LAB
ANION GAP SERPL CALCULATED.3IONS-SCNC: 2 MMOL/L (ref 3–14)
BUN SERPL-MCNC: 10 MG/DL (ref 7–30)
CALCIUM SERPL-MCNC: 8.6 MG/DL (ref 8.5–10.1)
CHLORIDE SERPL-SCNC: 109 MMOL/L (ref 94–109)
CO2 SERPL-SCNC: 31 MMOL/L (ref 20–32)
CREAT SERPL-MCNC: 0.86 MG/DL (ref 0.52–1.04)
ERYTHROCYTE [DISTWIDTH] IN BLOOD BY AUTOMATED COUNT: 14.8 % (ref 10–15)
GFR SERPL CREATININE-BSD FRML MDRD: 63 ML/MIN/{1.73_M2}
GLUCOSE SERPL-MCNC: 99 MG/DL (ref 70–99)
HCT VFR BLD AUTO: 33.6 % (ref 35–47)
HGB BLD-MCNC: 10.9 G/DL (ref 11.7–15.7)
LABORATORY COMMENT REPORT: NORMAL
MCH RBC QN AUTO: 33.1 PG (ref 26.5–33)
MCHC RBC AUTO-ENTMCNC: 32.4 G/DL (ref 31.5–36.5)
MCV RBC AUTO: 102 FL (ref 78–100)
PLATELET # BLD AUTO: 372 10E9/L (ref 150–450)
POTASSIUM SERPL-SCNC: 3.9 MMOL/L (ref 3.4–5.3)
RBC # BLD AUTO: 3.29 10E12/L (ref 3.8–5.2)
SARS-COV-2 RNA SPEC QL NAA+PROBE: NEGATIVE
SARS-COV-2 RNA SPEC QL NAA+PROBE: NORMAL
SODIUM SERPL-SCNC: 142 MMOL/L (ref 133–144)
SPECIMEN SOURCE: NORMAL
SPECIMEN SOURCE: NORMAL
WBC # BLD AUTO: 6.5 10E9/L (ref 4–11)

## 2020-08-13 PROCEDURE — 25000132 ZZH RX MED GY IP 250 OP 250 PS 637: Performed by: INTERNAL MEDICINE

## 2020-08-13 PROCEDURE — 99225 ZZC SUBSEQUENT OBSERVATION CARE,LEVEL II: CPT | Performed by: INTERNAL MEDICINE

## 2020-08-13 PROCEDURE — 97530 THERAPEUTIC ACTIVITIES: CPT | Mod: GP

## 2020-08-13 PROCEDURE — 25000132 ZZH RX MED GY IP 250 OP 250 PS 637: Performed by: HOSPITALIST

## 2020-08-13 PROCEDURE — U0003 INFECTIOUS AGENT DETECTION BY NUCLEIC ACID (DNA OR RNA); SEVERE ACUTE RESPIRATORY SYNDROME CORONAVIRUS 2 (SARS-COV-2) (CORONAVIRUS DISEASE [COVID-19]), AMPLIFIED PROBE TECHNIQUE, MAKING USE OF HIGH THROUGHPUT TECHNOLOGIES AS DESCRIBED BY CMS-2020-01-R: HCPCS | Performed by: INTERNAL MEDICINE

## 2020-08-13 PROCEDURE — 85027 COMPLETE CBC AUTOMATED: CPT | Performed by: INTERNAL MEDICINE

## 2020-08-13 PROCEDURE — 36415 COLL VENOUS BLD VENIPUNCTURE: CPT | Performed by: INTERNAL MEDICINE

## 2020-08-13 PROCEDURE — 99207 ZZC CDG-CODE CATEGORY CHANGED: CPT | Performed by: INTERNAL MEDICINE

## 2020-08-13 PROCEDURE — 97116 GAIT TRAINING THERAPY: CPT | Mod: GP

## 2020-08-13 PROCEDURE — G0378 HOSPITAL OBSERVATION PER HR: HCPCS

## 2020-08-13 PROCEDURE — 80048 BASIC METABOLIC PNL TOTAL CA: CPT | Performed by: INTERNAL MEDICINE

## 2020-08-13 RX ORDER — LISINOPRIL 10 MG/1
10 TABLET ORAL DAILY
Status: DISCONTINUED | OUTPATIENT
Start: 2020-08-13 | End: 2020-08-14 | Stop reason: HOSPADM

## 2020-08-13 RX ADMIN — ANASTROZOLE 1 MG: 1 TABLET, COATED ORAL at 21:11

## 2020-08-13 RX ADMIN — ASPIRIN 81 MG: 81 TABLET, COATED ORAL at 19:39

## 2020-08-13 RX ADMIN — METOPROLOL SUCCINATE 50 MG: 50 TABLET, EXTENDED RELEASE ORAL at 19:38

## 2020-08-13 RX ADMIN — LISINOPRIL 10 MG: 10 TABLET ORAL at 19:38

## 2020-08-13 RX ADMIN — ATORVASTATIN CALCIUM 20 MG: 10 TABLET, FILM COATED ORAL at 21:11

## 2020-08-13 RX ADMIN — CHOLESTYRAMINE 4 G: 4 POWDER, FOR SUSPENSION ORAL at 07:53

## 2020-08-13 RX ADMIN — ACETAMINOPHEN 650 MG: 325 TABLET, FILM COATED ORAL at 22:55

## 2020-08-13 RX ADMIN — PAROXETINE HYDROCHLORIDE 40 MG: 20 TABLET, FILM COATED ORAL at 21:11

## 2020-08-13 NOTE — PLAN OF CARE
[id number]: A&Ox4. HTN, other VSS on RA. IV SL. UP SBA w/ walker. Reg diet. TeleSR. CABRERA. Denies pain. PT following, recommending TCU, pt prefers to go home(see note). Continue to monitor. First COVID test negative-retest today. Will continue to monitor.

## 2020-08-13 NOTE — PROGRESS NOTES
Hospitalist X-Cover    Paged RE: COVID-19 negative.   Appears negative 8/11 and 8/13. Reviewed Dr. Garcia' note.  - Continue precautions overnight  - Allow Dr. Garcia to determine if special precautions can be lifted tomorrow    Brianne Butcher ([place]), PACamilleC  Hospitalist ANTOINETTE  Pager: [phone]

## 2020-08-13 NOTE — PROVIDER NOTIFICATION
MD Notification    Notified Person: MD    Notified Person Name: Dr. Ron     Notification Date/Time: 8/13/2020 2657    Notification Interaction: Paged    Purpose of Notification: FYI Pt second covid test is negative. Do you want to discontinue precaution and transfer pt?    Orders Received: Not at this time    Comments:

## 2020-08-13 NOTE — PROGRESS NOTES
PRIMARY DIAGNOSIS: nausea/vomiting/ diarrhea  OUTPATIENT/OBSERVATION GOALS TO BE MET BEFORE DISCHARGE  1. Orthostatic performed: No    2. Tolerating PO fluid and/or antibiotics (if applicable): Yes    3. Nausea/Vomiting/Diarrhea symptoms improved: Yes    4. Pain status: Pain free.    5. Return to near baseline physical activity: No    Discharge Planner Nurse   Safe discharge environment identified: Yes  Barriers to discharge: Yes       Entered by: Sun Houston 08/13/2020 9:30 AM     Please review provider order for any additional goals.   Nurse to notify provider when observation goals have been met and patient is ready for discharge.

## 2020-08-13 NOTE — PLAN OF CARE
Assumed care at 0730. Pt is a/o. VSS. Denies pain. Denies N/V/D. Gen weakness. Covid reswab collected. Awaiting result. Up  with 1. Good appetite. Encouraged to ambulate in the room with assistance. SR on tele. Continue to monitor.

## 2020-08-13 NOTE — PROVIDER NOTIFICATION
MD Notification    Notified Person: MD    Notified Person Name: Dr. Butcher    Notification Date/Time: 1836 8/13/2020     Notification Interaction: Paged     Purpose of Notification: FYI Pt second covid test is negative. Do you want to discontinue precaution and transfer pt?    Orders Received:    Comments:

## 2020-08-13 NOTE — PLAN OF CARE
Discharge Planner PT   Patient plan for discharge: Adamant about home, doesn't feel like TCU would do anything for her  - Today reports daughter can stay with patient upon discharge  Current status: Greeted patient supine in bed and agreeable to session despite indicating already being very tired. Engaged patient in supine <> EOB at [facility]. Engaged patient in multiple sit <> stand tranfers with FWW at [facility]. Engaged patient in ambulation with FWW at [facility] for 3 bouts of 40ft each with 2min seated rest breaks in between due to fatigue & SOB. Educated patient on energy conservation & importance of spreading activity out throughout the day with a goal of 3 walks/day during hospital stay to prevent decline - patient verbalizes understanding. Patient declines further activity due to fatigue. Concluded session with patient supine in bed, all needs in reach and alarm engaged.     Barriers to return to prior living situation: decreased activity tolerance, SOB with activity, fall risk, LE weakness   Recommendations for discharge: TCU  Rationale for recommendations: Patient is below baseline for functional mobility & would benefit from continued physical therapy in the setting of a TCU for improving functional mobility, LE strength and tolerance for functional activities in order to return to baseline of functioning.      If patient declining TCU, patient would be recommended to have Ax1 for all out of bed activity with use of FWW and increased assist with household tasks. HH-PT to improve LE strength, balance & activity tolerance to increase level of independence and decrease risk of falling.        Entered by: Yulia Mitchell 08/13/2020 3:24 PM

## 2020-08-13 NOTE — PROGRESS NOTES
[facility]    Hospitalist Progress Note    Assessment & Plan   Yolanda Watson is an 79 year-old female with history of multiple malignancies (breast x2, lung x2, colon), history of chemotherapy induced cardiomyopathy with most recent EF improved, anxiety/depression, hypertension who presents with nausea, vomiting, diarrhea. Admitted on 8/11/2020.       Nausea, vomiting and diarrhea  Sinus tachycardia   Presents with nausea, vomiting, nonbloody diarrhea, myalgias, chills.  No known sick contacts or concerning food intake.  Does report being on ciprofloxacin sometime in the past 1 to 2 months.  Chest x-ray with possible right lower lobe infiltrate, CT abdomen/pelvis with minimal right effusion, otherwise no acutely concerning pathology in abdomen.  No significant acute respiratory symptoms, has baseline dyspnea on exertion due to prior lobectomies from lung cancers.  - Enteric panel and C difficile PCR negative  - feeling better. No further n/v/d  -covid pcr negative.  -tachycardia resolved. Afebrile.   -WBc 14---> 8.5  -benign abd exam  -still feeling weak. Deconditioned. Assessing dispo.     Plan:   - Enteric precautions  - Rule out COVID19 again at 48 hours. Keep in isolation  - Procalcitonin undetectable, hold on additional antibiotics, low suspicion for pneumonia   - IVF---> stop  - Advance diet as tolerated  - Antiemetics PRN  -UOB     COVID19 PUI  - Patient is NOT low suspicion, if COVID19 PCR returns negative, continue precautions unless/until alternate explanation for symptoms arises and consider re-testing  -covid testing negative---> keep in isolation and retest at 48 hours.        History of chemotherapy induced cardiomyopathy  Hypertension  Hyperlipidemia  Most recent EF 55 to 60% 6/2019.  bp running a little high  euvolemi  - Continue prior to admission metoprolol XL, lisinopril, atorvastatin, aspirin     Depression  Anxiety  - Continue prior to admission paroxetine        Diet: Clear  liquid diet, advance as tolerated  DVT Prophylaxis: Pneumatic Compression Devices  Patricia Catheter: not present  Code Status: DNI - Patient is okay with a short trial of CPR        Disposition Plan- register to observation  Seen by PT and TCU rec'd, pt resistant  SW consult. Mobilize pt  PT, OT following     Expected discharge: possibly 2 days, repeat covid testing at 48 hours.     Theron Shaw MD  Text Page  (7am to 6pm)  Interval History   Feeling better. Taking po. Stool studies nl. covid testing negative  No n/v/d/abd pain  No ent symtoms, cough,change in chronic sob,or myalgias  Has some myalgias yesterday.       -Data reviewed today: I reviewed all new labs and imaging results over the last 24 hours. I personally reviewed imaging and labs since admisison    Physical Exam   Temp: 97.3  F (36.3  C) Temp src: Oral BP: (!) 145/65 Pulse: 78 Heart Rate: 104 Resp: 15 SpO2: 95 % O2 Device: None (Room air)    Vitals:    08/11/20 1659   Weight: 93 kg (205 lb)     Vital Signs with Ranges  Temp:  [96.7  F (35.9  C)-97.3  F (36.3  C)] 97.3  F (36.3  C)  Pulse:  [] 78  Heart Rate:  [] 104  Resp:  [12-19] 15  BP: (115-192)/() 145/65  SpO2:  [94 %-99 %] 95 %  I/O last 3 completed shifts:  In: 1405 [I.V.:305; IV Piggyback:1100]  Out: -     Constitutional: Up in bed, nad  Respiratory: CTA L lung, some inspiratory squeak R base (chronic from prior lung surgery).    Cardiovascular: RRR no r/g/m  GI: soft, nt, nd  Skin/Integumen: no rash or edema  Neuro: nl speech and mentation  Psych: nl affect        Medications       anastrozole  1 mg Oral At Bedtime     aspirin  81 mg Oral QPM     atorvastatin  20 mg Oral At Bedtime     cholestyramine  4 g Oral Daily     lisinopril  5 mg Oral Daily     metoprolol succinate ER  50 mg Oral QPM     PARoxetine  40 mg Oral At Bedtime     sodium chloride (PF)  3 mL Intracatheter Q8H       Data   Recent Labs   Lab 08/12/20  0724 08/11/20  1712   WBC 8.5 14.3*   HGB 11.0* 13.5    * 101*    540*   INR  --  0.97    139   POTASSIUM 3.9 3.9   CHLORIDE 112* 105   CO2 28 26   BUN 12 16   CR 0.74 0.84   ANIONGAP 2* 8   PRIYA 8.2* 8.9   * 130*   ALBUMIN  --  4.1   PROTTOTAL  --  7.4   BILITOTAL  --  0.9   ALKPHOS  --  92   ALT  --  23   AST  --  9       Imaging:   No results found for this or any previous visit (from the past 24 hour(s)).

## 2020-08-13 NOTE — PLAN OF CARE
COVID NEGATIVE x1. Retesting. Precautions maintained. A&Ox4 and VSS on RA ex elevated BP. Up SBA with walker and tolerating regular diet. Tele: SR and IV SL. Baseline CABRERA unchanged. Denies pain, numbness, tingling, nausea, and dizziness. PT consult. Continue to monitor.

## 2020-08-13 NOTE — PROGRESS NOTES
[facility]    Hospitalist Progress Note    Assessment & Plan   Yolanda Watson is an 79 year-old female with history of multiple malignancies (breast x2, lung x2, colon), history of chemotherapy induced cardiomyopathy with most recent EF improved, anxiety/depression, hypertension who presents with nausea, vomiting, diarrhea. Admitted on 8/11/2020.       Nausea, vomiting and diarrhea  Sinus tachycardia   Presents with nausea, vomiting, nonbloody diarrhea, myalgias, chills.  No known sick contacts or concerning food intake.  Does report being on ciprofloxacin sometime in the past 1 to 2 months.  Chest x-ray with possible right lower lobe infiltrate, CT abdomen/pelvis with minimal right effusion, otherwise no acutely concerning pathology in abdomen.  No significant acute respiratory symptoms, has baseline dyspnea on exertion due to prior lobectomies from lung cancers.  - Enteric panel and C difficile PCR negative, she is currently feeling better, no further nausea and vomiting noted, had 1 episode of loose stool today.  First set of COVID PCR is negative, repeat 1 ordered today.  Her white count is back to baseline.  Feeling weak and deconditioned.  -Advance diet as tolerated, PT/OT to work with patient, possibly discharge home tomorrow depending on the repeat cord PCR test.  -PT is recommending TCU, social work consult requested.       COVID19 PUI  - Patient is NOT low suspicion, if COVID19 PCR returns negative, continue precautions unless/until alternate explanation for symptoms arises and consider re-testing  First COVID test negative, repeat ordered.       History of chemotherapy induced cardiomyopathy  Hypertension  Hyperlipidemia  Most recent EF 55 to 60% 6/2019.  bp running a little high  Euvolemic  - Continue prior to admission metoprolol XL, lisinopril, atorvastatin, aspirin     Depression  Anxiety  - Continue prior to admission paroxetine        Diet:  Advance diet as tolerated  DVT  Prophylaxis: Pneumatic Compression Devices  Patricia Catheter: not present  Code Status: DNI - Patient is okay with a short trial of CPR          Expected discharge: Possibly can discharge tomorrow, home versus TCU    Ariadne Garcia MD  Text Page  (7am to 6pm)  Interval History   Patient had one episode of diarrhea today, she continues to feel better, some generalized weakness noted, denies any new concerns, discussed about repeat call with PCR testing today, patient had isolated herself well over the last many months, she does not suspect COVID.      -Data reviewed today: I reviewed all new labs and imaging results over the last 24 hours. I personally reviewed imaging and labs since admisison    Physical Exam   Temp: 96.3  F (35.7  C) Temp src: Oral BP: 125/48 Pulse: 78 Heart Rate: 70 Resp: 20 SpO2: 95 % O2 Device: None (Room air)    Vitals:    08/11/20 1659   Weight: 93 kg (205 lb)     Vital Signs with Ranges  Temp:  [96.3  F (35.7  C)-97.7  F (36.5  C)] 96.3  F (35.7  C)  Pulse:  [71-78] 78  Heart Rate:  [70-82] 70  Resp:  [15-20] 20  BP: (125-162)/(48-69) 125/48  SpO2:  [95 %] 95 %  No intake/output data recorded.    Constitutional: Awake, alert, cooperative, no apparent distress  Respiratory: Clear to auscultation bilaterally, no crackles or wheezing  Cardiovascular: Regular rate and rhythm, normal S1 and S2, and no murmur noted  GI: Normal bowel sounds, soft, non-distended, non-tender  Skin/Integumen: No rashes, no cyanosis, no edema  Neuro : moving all 4 extremities, no focal deficit noted           Medications       anastrozole  1 mg Oral At Bedtime     aspirin  81 mg Oral QPM     atorvastatin  20 mg Oral At Bedtime     cholestyramine  4 g Oral Daily     lisinopril  5 mg Oral Daily     metoprolol succinate ER  50 mg Oral QPM     PARoxetine  40 mg Oral At Bedtime     sodium chloride (PF)  3 mL Intracatheter Q8H       Data   Recent Labs   Lab 08/12/20  0724 08/11/20  1712   WBC 8.5 14.3*   HGB 11.0* 13.5   MCV  102* 101*    540*   INR  --  0.97    139   POTASSIUM 3.9 3.9   CHLORIDE 112* 105   CO2 28 26   BUN 12 16   CR 0.74 0.84   ANIONGAP 2* 8   PRIYA 8.2* 8.9   * 130*   ALBUMIN  --  4.1   PROTTOTAL  --  7.4   BILITOTAL  --  0.9   ALKPHOS  --  92   ALT  --  23   AST  --  9       Imaging:   No results found for this or any previous visit (from the past 24 hour(s)).

## 2020-08-13 NOTE — UTILIZATION REVIEW
Concurrent stay review; Secondary Review Determination    Under the authority of the Utilization Management Committee, the utilization review process indicated a secondary review on the above patient. The review outcome is based on review of the medical records, discussions with staff, and applying clinical experience noted on the date of the review.    (x) Observation Status Appropriate - Concurrent stay review        RATIONALE FOR DETERMINATION: 79-year-old female with history of multiple malignancies in the past now presents to the hospital with nausea vomiting and diarrhea.  By hospital day 2 the nausea vomiting had resolved and patient had mild loose stools with associated weakness awaiting transfer to TCU.    Patient is clinically improving and there is no clear indication to change patient's status to inpatient. The severity of illness, intensity of service provided, expected LOS and risk for adverse outcome make the care appropriate for observation.    This document was produced using voice recognition software    The information on this document is developed by the utilization review team in order for the business office to ensure compliance. This only denotes the appropriateness of proper admission status and does not reflect the quality of care rendered.    The definitions of Inpatient Status and Observation Status used in making the determination above are those provided in the CMS Coverage Manual, Chapter 1 and Chapter 6, section 70.4.    Sincerely,    Fady Lawrence MD  Utilization Review  Physician Advisor  [facility].

## 2020-08-14 VITALS
HEART RATE: 65 BPM | SYSTOLIC BLOOD PRESSURE: 141 MMHG | HEIGHT: 69 IN | WEIGHT: 205 LBS | DIASTOLIC BLOOD PRESSURE: 73 MMHG | TEMPERATURE: 96.3 F | BODY MASS INDEX: 30.36 KG/M2 | OXYGEN SATURATION: 96 % | RESPIRATION RATE: 16 BRPM

## 2020-08-14 LAB
ANION GAP SERPL CALCULATED.3IONS-SCNC: 4 MMOL/L (ref 3–14)
BUN SERPL-MCNC: 11 MG/DL (ref 7–30)
CALCIUM SERPL-MCNC: 8.6 MG/DL (ref 8.5–10.1)
CHLORIDE SERPL-SCNC: 105 MMOL/L (ref 94–109)
CO2 SERPL-SCNC: 32 MMOL/L (ref 20–32)
CREAT SERPL-MCNC: 0.84 MG/DL (ref 0.52–1.04)
ERYTHROCYTE [DISTWIDTH] IN BLOOD BY AUTOMATED COUNT: 14.8 % (ref 10–15)
GFR SERPL CREATININE-BSD FRML MDRD: 65 ML/MIN/{1.73_M2}
GLUCOSE SERPL-MCNC: 107 MG/DL (ref 70–99)
HCT VFR BLD AUTO: 34.3 % (ref 35–47)
HGB BLD-MCNC: 11 G/DL (ref 11.7–15.7)
MCH RBC QN AUTO: 32.4 PG (ref 26.5–33)
MCHC RBC AUTO-ENTMCNC: 32.1 G/DL (ref 31.5–36.5)
MCV RBC AUTO: 101 FL (ref 78–100)
PLATELET # BLD AUTO: 382 10E9/L (ref 150–450)
POTASSIUM SERPL-SCNC: 3.4 MMOL/L (ref 3.4–5.3)
RBC # BLD AUTO: 3.39 10E12/L (ref 3.8–5.2)
SODIUM SERPL-SCNC: 141 MMOL/L (ref 133–144)
WBC # BLD AUTO: 6.8 10E9/L (ref 4–11)

## 2020-08-14 PROCEDURE — 80048 BASIC METABOLIC PNL TOTAL CA: CPT | Performed by: INTERNAL MEDICINE

## 2020-08-14 PROCEDURE — 99207 ZZC CDG-CODE CATEGORY CHANGED: CPT | Performed by: INTERNAL MEDICINE

## 2020-08-14 PROCEDURE — 99217 ZZC OBSERVATION CARE DISCHARGE: CPT | Performed by: INTERNAL MEDICINE

## 2020-08-14 PROCEDURE — 85027 COMPLETE CBC AUTOMATED: CPT | Performed by: INTERNAL MEDICINE

## 2020-08-14 PROCEDURE — G0378 HOSPITAL OBSERVATION PER HR: HCPCS

## 2020-08-14 PROCEDURE — 36415 COLL VENOUS BLD VENIPUNCTURE: CPT | Performed by: INTERNAL MEDICINE

## 2020-08-14 PROCEDURE — 25000132 ZZH RX MED GY IP 250 OP 250 PS 637: Performed by: INTERNAL MEDICINE

## 2020-08-14 RX ORDER — LISINOPRIL 10 MG/1
10 TABLET ORAL DAILY
Qty: 30 TABLET | Refills: 1 | Status: SHIPPED | OUTPATIENT
Start: 2020-08-14 | End: 2024-01-01

## 2020-08-14 RX ADMIN — CHOLESTYRAMINE 4 G: 4 POWDER, FOR SUSPENSION ORAL at 08:05

## 2020-08-14 NOTE — PLAN OF CARE
COVID Negative twice. Pt A&Ox4. VSS on RA. Up SBA. Diet Reg. IVSL. Denies Pain. Continent, up to BSC, diarrhea & nausea resolved. Tele SR. Discharge possible today, potentially to TCU.

## 2020-08-14 NOTE — PLAN OF CARE
PT cancel - Upon arriving patient with nursing staff getting ready for discharge.     Physical Therapy Discharge Summary    Reason for therapy discharge:    Discharged to home with home therapy.    Progress towards therapy goal(s). See goals on Care Plan in [facility] electronic health record for goal details.  Goals partially met.  Barriers to achieving goals:   discharge from facility.    Therapy recommendation(s):    If patient declining TCU, patient would be recommended to have Ax1 for all out of bed activity with use of FWW and increased assist with household tasks. HH-PT to improve LE strength, balance & activity tolerance to increase level of independence and decrease risk of falling.

## 2020-08-14 NOTE — PROVIDER NOTIFICATION
MD Notification    Notified Person: MD    Notified Person Name: Dr. Garcia    Notification Date/Time: 08/14/2020 at 1136    Notification Interaction: Web page    Purpose of Notification: COVID precautions have not been discontinued and pt is covid neg x2. Do you want pt to continue isolation at home?    Orders Received: Discontinue isolation precautions.    Comments:

## 2020-08-14 NOTE — DISCHARGE SUMMARY
[facility]    Discharge Summary  Hospitalist    Date of Admission:  8/11/2020  Date of Discharge:  8/14/2020  Discharging Provider: Ariadne Garcia MD    Discharge Diagnoses   Possible viral gastroenteritis  Dehydration    History of Present Illness   Please review admission history and physical.    Hospital Course   Yolanda Watson was admitted on 8/11/2020.  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 Admitted on 8/11/2020.       Nausea, vomiting and diarrhea  Sinus tachycardia   Presents with nausea, vomiting, nonbloody diarrhea, myalgias, chills.  No known sick contacts or concerning food intake.  Does report being on ciprofloxacin sometime in the past 1 to 2 months.  Chest x-ray with possible right lower lobe infiltrate, CT abdomen/pelvis with minimal right effusion, otherwise no acutely concerning pathology in abdomen.  No significant acute respiratory symptoms, has baseline dyspnea on exertion due to prior lobectomies from lung cancers.  - Enteric panel and C difficile PCR negative, she is currently feeling better, no further nausea and vomiting noted, 2 sets of COVID PCR is negative, her white count is back to baseline.  Feeling weak and deconditioned.  She was evaluated by PT/OT and they recommended TCU but patient wanted to go home, she did not even want home care arranged.  On the time of discharge patient was tolerating diet well and denied any other complaints.          COVID19 PUI  PCR negative x2        History of chemotherapy induced cardiomyopathy  Hypertension  Hyperlipidemia  Most recent EF 55 to 60% 6/2019.  bp running a little  high  Euvolemic  - Continue prior to admission metoprolol XL, lisinopril, atorvastatin, aspirin     Depression  Anxiety  - Continue prior to admission paroxetine  Patient was recommended to continue rehab at TCU but she refused to do so, home care was also offered but patient mentioned that she could opt to do outpatient PT as needed.      Ariadne Garcia MD, MD    Significant Results and Procedures       Pending Results   These results will be followed up by pcp  Unresulted Labs Ordered in the Past 30 Days of this Admission     Date and Time Order Name Status Description    8/11/2020 1713 Blood culture Preliminary     8/11/2020 1713 Blood culture Preliminary           Code Status   Full Code       Primary Care Physician   Soren Perdomo    Physical Exam   Temp: 96.3  F (35.7  C) Temp src: Oral BP: (!) 141/73 Pulse: 65 Heart Rate: 72 Resp: 16 SpO2: 96 % O2 Device: None (Room air)    Vitals:    08/11/20 1659   Weight: 93 kg (205 lb)     Vital Signs with Ranges  Temp:  [96.3  F (35.7  C)-97.1  F (36.2  C)] 96.3  F (35.7  C)  Pulse:  [65-74] 65  Heart Rate:  [72] 72  Resp:  [16-18] 16  BP: (124-141)/(50-73) 141/73  SpO2:  [93 %-96 %] 96 %  I/O last 3 completed shifts:  In: 240 [P.O.:240]  Out: -     The patient was examined on the day of discharge.    Discharge Disposition   Discharged to home  Condition at discharge: Stable    Consultations This Hospital Stay   PHYSICAL THERAPY ADULT IP CONSULT    Time Spent on this Encounter   I, Ariadne Garcia MD, personally saw the patient today and spent greater than 30 minutes discharging this patient.    Discharge Orders      Reason for your hospital stay    gastroenteritis     Follow-up and recommended labs and tests     Follow up with primary care provider, Soren Perdomo, within 7 days to evaluate medication change and for hospital follow- up.  No follow up labs or test are needed.follow up on blood pressure , currently medications-lisinopril increased from 5 mg to 10 mg .      Activity    Your activity upon discharge: activity as tolerated     Discharge Instructions    Tcu was recommended for you by physical therapy /occupational therapy  ,incase you are having mobility issues at home please notify your care team.     Diet    Follow this diet upon discharge: Orders Placed This Encounter      Advance Diet as Tolerated: Regular Diet Adult; Regular Diet Adult     Discharge Medications   Current Discharge Medication List      CONTINUE these medications which have CHANGED    Details   lisinopril (ZESTRIL) 10 MG tablet Take 1 tablet (10 mg) by mouth daily  Qty: 30 tablet, Refills: 1    Associated Diagnoses: Essential hypertension         CONTINUE these medications which have NOT CHANGED    Details   acetaminophen (TYLENOL) 325 MG tablet Take 325-650 mg by mouth every 6 hours as needed for mild pain      anastrozole (ARIMIDEX) 1 MG tablet Take 1 mg by mouth At Bedtime       aspirin 81 MG EC tablet Take 81 mg by mouth every evening      atorvastatin (LIPITOR) 20 MG tablet Take 20 mg by mouth At Bedtime       cholestyramine (QUESTRAN) 4 GM/DOSE powder Take 4 g by mouth daily       loperamide (IMODIUM A-D) 2 MG capsule Take 2 mg by mouth 4 times daily as needed for diarrhea      metoprolol succinate ER (TOPROL-XL) 50 MG 24 hr tablet Take 1 tablet (50 mg) by mouth daily  Qty: 90 tablet, Refills: 3    Comments: Increased dose. Hold on file.  Associated Diagnoses: Secondary cardiomyopathy (H)      multivitamin, therapeutic (THERA-VIT) TABS Take 1 tablet by mouth At Bedtime No iron      ondansetron (ZOFRAN-ODT) 4 MG ODT tab Take 4 mg by mouth 2 times daily as needed for nausea      PARoxetine (PAXIL) 40 MG tablet Take 40 mg by mouth At Bedtime       vitamin D3 (CHOLECALCIFEROL) 2000 units (50 mcg) tablet Take 1 tablet by mouth daily           Allergies   Allergies   Allergen Reactions     Demerol [Meperidine] Nausea and Vomiting     Sulfa Drugs Other (See Comments)     Extreme chills      Penicillins Rash     Swelling In mouth and tongue     Data      Most Recent 3 CBC's:  Recent Labs   Lab Test 08/14/20  0954 08/13/20  0902 08/12/20  0724   WBC 6.8 6.5 8.5   HGB 11.0* 10.9* 11.0*   * 102* 102*    372 398      Most Recent 3 BMP's:  Recent Labs   Lab Test 08/14/20  0954 08/13/20  0902 08/12/20  0724    142 142   POTASSIUM 3.4 3.9 3.9   CHLORIDE 105 109 112*   CO2 32 31 28   BUN 11 10 12   CR 0.84 0.86 0.74   ANIONGAP 4 2* 2*   PRIYA 8.6 8.6 8.2*   * 99 121*     Most Recent 2 LFT's:  Recent Labs   Lab Test 08/11/20  1712 08/21/18   AST 9 14   ALT 23 23   ALKPHOS 92 81   BILITOTAL 0.9 0.5     Most Recent INR's and Anticoagulation Dosing History:  Anticoagulation Dose History     Recent Dosing and Labs Latest Ref Rng & Units 3/12/2013 4/24/2013 3/17/2014 10/31/2016 11/1/2016 7/26/2019 8/11/2020    INR 0.86 - 1.14 0.90 0.96 0.93 0.97 1.07 0.95 0.97        Most Recent 3 Troponin's:  Recent Labs   Lab Test 10/17/19  1650   TROPI <0.015     Most Recent Cholesterol Panel:  Recent Labs   Lab Test 08/21/18   CHOL 98*   LDL 19   HDL 47   TRIG 159*     Most Recent 6 Bacteria Isolates From Any Culture (See EPIC Reports for Culture Details):  Recent Labs   Lab Test 08/11/20  1937 08/11/20  1712   CULT No growth after 3 days No growth after 3 days     Most Recent TSH, T4 and A1c Labs:  Recent Labs   Lab Test 08/11/20  1712 02/05/19   TSH 2.61  --    A1C  --  5.2     Results for orders placed or performed during the hospital encounter of 08/11/20   CT Abdomen Pelvis w Contrast    Narrative    CT ABDOMEN AND PELVIS WITH CONTRAST 8/11/2020 6:39 PM    CLINICAL HISTORY: Vomiting and diarrhea.    TECHNIQUE: CT scan of the abdomen and pelvis was performed following  injection of IV contrast. Multiplanar reformats were obtained. Dose  reduction techniques were used.    CONTRAST: 103mL Isovue-370    COMPARISON: None.    FINDINGS:   LOWER CHEST: Mild fibrotic changes about the periphery of both  lung  bases. Postoperative changes are noted in the left lower lobe  laterally. A small amount of pleural fluid is noted on the right.    HEPATOBILIARY: Mild intra and extrahepatic biliary prominence may be  related to the patient's age and postcholecystectomy state. A small  left hepatic cyst measures 1.2 cm.    PANCREAS: Normal.    SPLEEN: Normal.    ADRENAL GLANDS: Normal.    KIDNEYS/BLADDER: A few small bilateral renal cortical cysts, with the  largest in the lower pole on the left measuring 2.4 cm. Otherwise  unremarkable. No hydronephrosis.    BOWEL: Postoperative changes of right hemicolectomy are noted. A small  paraumbilical hernia contains a knuckle of nondilated transverse  colon. No bowel obstruction. Colonic diverticulosis, most extensive in  the sigmoid colon, without convincing evidence for diverticulitis.    PELVIC ORGANS: Prior hysterectomy.    LYMPH NODES: No enlarged lymph nodes are identified in the abdomen or  pelvis.    VASCULATURE: Advanced atherosclerotic aortoiliac calcification.    ADDITIONAL FINDINGS: None.    MUSCULOSKELETAL: Degenerative changes are noted in the visualized  thoracolumbar spine.      Impression    IMPRESSION:   1.  Small amount of pleural fluid is noted on the right.  2.  A small paraumbilical hernia contains a knuckle of nondilated  transverse colon.  3.  Colonic diverticulosis, without convincing evidence for  diverticulitis.    ALPA PEREZ MD   XR Chest Port 1 View    Narrative    CHEST ONE VIEW PORTABLE   8/11/2020 6:13 PM     HISTORY:  Tachycardia. Weakness.    COMPARISON: 10/17/2019.      Impression    IMPRESSION: Opacity at the right lung base likely represents a small  right pleural effusion, with mild associated infiltrate and/or  atelectasis. The left lung is clear. Heart size appears stable.  Pulmonary vascularity is within normal limits. Aortic calcification.  Right shoulder arthroplasty is new since the previous exam.    ALPA PEREZ MD

## 2020-08-14 NOTE — PLAN OF CARE
First and Second COVID test Negative. MD Ordered Continue precaution.    A&Ox4, VSS on RA, Reg diet, Tolerating well, SBA to bedside commode, Incontinent at times, No BM during the shift. Denies pain, N/V/D, SOB. Possible discharge tomorrow, Continue to monitor.

## 2020-08-17 LAB
BACTERIA SPEC CULT: NO GROWTH
BACTERIA SPEC CULT: NO GROWTH
SPECIMEN SOURCE: NORMAL
SPECIMEN SOURCE: NORMAL

## 2021-03-11 ENCOUNTER — TRANSFERRED RECORDS (OUTPATIENT)
Dept: HEALTH INFORMATION MANAGEMENT | Facility: CLINIC | Age: 81
End: 2021-03-11

## 2021-03-24 ENCOUNTER — HOSPITAL ENCOUNTER (OUTPATIENT)
Facility: CLINIC | Age: 81
Setting detail: OBSERVATION
Discharge: HOME | End: 2021-03-25
Attending: EMERGENCY MEDICINE | Admitting: STUDENT IN AN ORGANIZED HEALTH CARE EDUCATION/TRAINING PROGRAM
Payer: COMMERCIAL

## 2021-03-24 ENCOUNTER — APPOINTMENT (OUTPATIENT)
Dept: GENERAL RADIOLOGY | Facility: CLINIC | Age: 81
End: 2021-03-24
Attending: EMERGENCY MEDICINE
Payer: COMMERCIAL

## 2021-03-24 DIAGNOSIS — R53.1 GENERALIZED WEAKNESS: ICD-10-CM

## 2021-03-24 DIAGNOSIS — R07.9 CHEST PAIN, UNSPECIFIED TYPE: ICD-10-CM

## 2021-03-24 LAB
ALBUMIN SERPL-MCNC: 3.5 G/DL (ref 3.4–5)
ALP SERPL-CCNC: 67 U/L (ref 40–150)
ALT SERPL W P-5'-P-CCNC: 38 U/L (ref 0–50)
ANION GAP SERPL CALCULATED.3IONS-SCNC: 7 MMOL/L (ref 3–14)
AST SERPL W P-5'-P-CCNC: 19 U/L (ref 0–45)
BASOPHILS # BLD AUTO: 0.1 10E9/L (ref 0–0.2)
BASOPHILS NFR BLD AUTO: 1 %
BILIRUB SERPL-MCNC: 0.7 MG/DL (ref 0.2–1.3)
BUN SERPL-MCNC: 18 MG/DL (ref 7–30)
CALCIUM SERPL-MCNC: 8.2 MG/DL (ref 8.5–10.1)
CHLORIDE SERPL-SCNC: 105 MMOL/L (ref 94–109)
CO2 SERPL-SCNC: 27 MMOL/L (ref 20–32)
CREAT SERPL-MCNC: 0.75 MG/DL (ref 0.52–1.04)
D DIMER PPP FEU-MCNC: <0.3 UG/ML FEU (ref 0–0.5)
DIFFERENTIAL METHOD BLD: ABNORMAL
EOSINOPHIL # BLD AUTO: 0.1 10E9/L (ref 0–0.7)
EOSINOPHIL NFR BLD AUTO: 2.1 %
ERYTHROCYTE [DISTWIDTH] IN BLOOD BY AUTOMATED COUNT: 13.2 % (ref 10–15)
FLUAV RNA RESP QL NAA+PROBE: NEGATIVE
FLUBV RNA RESP QL NAA+PROBE: NEGATIVE
GFR SERPL CREATININE-BSD FRML MDRD: 74 ML/MIN/{1.73_M2}
GLUCOSE SERPL-MCNC: 93 MG/DL (ref 70–99)
HCT VFR BLD AUTO: 35.7 % (ref 35–47)
HGB BLD-MCNC: 11.5 G/DL (ref 11.7–15.7)
IMM GRANULOCYTES # BLD: 0.1 10E9/L (ref 0–0.4)
IMM GRANULOCYTES NFR BLD: 0.8 %
INTERPRETATION ECG - MUSE: NORMAL
LABORATORY COMMENT REPORT: NORMAL
LYMPHOCYTES # BLD AUTO: 2 10E9/L (ref 0.8–5.3)
LYMPHOCYTES NFR BLD AUTO: 32.3 %
MCH RBC QN AUTO: 33 PG (ref 26.5–33)
MCHC RBC AUTO-ENTMCNC: 32.2 G/DL (ref 31.5–36.5)
MCV RBC AUTO: 102 FL (ref 78–100)
MONOCYTES # BLD AUTO: 0.8 10E9/L (ref 0–1.3)
MONOCYTES NFR BLD AUTO: 12.4 %
NEUTROPHILS # BLD AUTO: 3.3 10E9/L (ref 1.6–8.3)
NEUTROPHILS NFR BLD AUTO: 51.4 %
NRBC # BLD AUTO: 0 10*3/UL
NRBC BLD AUTO-RTO: 0 /100
NT-PROBNP SERPL-MCNC: 353 PG/ML (ref 0–1800)
PLATELET # BLD AUTO: 325 10E9/L (ref 150–450)
POTASSIUM SERPL-SCNC: 3.6 MMOL/L (ref 3.4–5.3)
PROCALCITONIN SERPL-MCNC: <0.05 NG/ML
PROT SERPL-MCNC: 6.6 G/DL (ref 6.8–8.8)
RBC # BLD AUTO: 3.49 10E12/L (ref 3.8–5.2)
RSV RNA SPEC QL NAA+PROBE: NEGATIVE
SARS-COV-2 RNA RESP QL NAA+PROBE: NEGATIVE
SODIUM SERPL-SCNC: 139 MMOL/L (ref 133–144)
SPECIMEN SOURCE: NORMAL
TROPONIN I SERPL-MCNC: <0.015 UG/L (ref 0–0.04)
WBC # BLD AUTO: 6.3 10E9/L (ref 4–11)

## 2021-03-24 PROCEDURE — 87077 CULTURE AEROBIC IDENTIFY: CPT | Performed by: EMERGENCY MEDICINE

## 2021-03-24 PROCEDURE — 84484 ASSAY OF TROPONIN QUANT: CPT | Performed by: EMERGENCY MEDICINE

## 2021-03-24 PROCEDURE — G0378 HOSPITAL OBSERVATION PER HR: HCPCS

## 2021-03-24 PROCEDURE — 99285 EMERGENCY DEPT VISIT HI MDM: CPT | Mod: 25

## 2021-03-24 PROCEDURE — 87186 SC STD MICRODIL/AGAR DIL: CPT | Performed by: EMERGENCY MEDICINE

## 2021-03-24 PROCEDURE — 84145 PROCALCITONIN (PCT): CPT | Performed by: EMERGENCY MEDICINE

## 2021-03-24 PROCEDURE — C9803 HOPD COVID-19 SPEC COLLECT: HCPCS

## 2021-03-24 PROCEDURE — 71046 X-RAY EXAM CHEST 2 VIEWS: CPT

## 2021-03-24 PROCEDURE — 83880 ASSAY OF NATRIURETIC PEPTIDE: CPT | Performed by: EMERGENCY MEDICINE

## 2021-03-24 PROCEDURE — 87636 SARSCOV2 & INF A&B AMP PRB: CPT | Performed by: EMERGENCY MEDICINE

## 2021-03-24 PROCEDURE — 250N000013 HC RX MED GY IP 250 OP 250 PS 637: Performed by: PHYSICIAN ASSISTANT

## 2021-03-24 PROCEDURE — 85025 COMPLETE CBC W/AUTO DIFF WBC: CPT | Performed by: EMERGENCY MEDICINE

## 2021-03-24 PROCEDURE — 99220 PR INITIAL OBSERVATION CARE,LEVEL III: CPT | Performed by: PHYSICIAN ASSISTANT

## 2021-03-24 PROCEDURE — 96360 HYDRATION IV INFUSION INIT: CPT

## 2021-03-24 PROCEDURE — 87040 BLOOD CULTURE FOR BACTERIA: CPT | Performed by: EMERGENCY MEDICINE

## 2021-03-24 PROCEDURE — 258N000003 HC RX IP 258 OP 636: Performed by: EMERGENCY MEDICINE

## 2021-03-24 PROCEDURE — 36415 COLL VENOUS BLD VENIPUNCTURE: CPT

## 2021-03-24 PROCEDURE — 85379 FIBRIN DEGRADATION QUANT: CPT | Performed by: EMERGENCY MEDICINE

## 2021-03-24 PROCEDURE — 80053 COMPREHEN METABOLIC PANEL: CPT | Performed by: EMERGENCY MEDICINE

## 2021-03-24 PROCEDURE — 93005 ELECTROCARDIOGRAM TRACING: CPT

## 2021-03-24 RX ORDER — ONDANSETRON 4 MG/1
4 TABLET, ORALLY DISINTEGRATING ORAL 2 TIMES DAILY PRN
Status: DISCONTINUED | OUTPATIENT
Start: 2021-03-24 | End: 2021-03-25 | Stop reason: HOSPADM

## 2021-03-24 RX ORDER — NITROGLYCERIN 0.4 MG/1
0.4 TABLET SUBLINGUAL EVERY 5 MIN PRN
Status: DISCONTINUED | OUTPATIENT
Start: 2021-03-24 | End: 2021-03-25 | Stop reason: HOSPADM

## 2021-03-24 RX ORDER — MAGNESIUM HYDROXIDE/ALUMINUM HYDROXICE/SIMETHICONE 120; 1200; 1200 MG/30ML; MG/30ML; MG/30ML
30 SUSPENSION ORAL EVERY 4 HOURS PRN
Status: DISCONTINUED | OUTPATIENT
Start: 2021-03-24 | End: 2021-03-25 | Stop reason: HOSPADM

## 2021-03-24 RX ORDER — ACETAMINOPHEN 650 MG/1
650 SUPPOSITORY RECTAL EVERY 4 HOURS PRN
Status: DISCONTINUED | OUTPATIENT
Start: 2021-03-24 | End: 2021-03-25 | Stop reason: HOSPADM

## 2021-03-24 RX ORDER — ACETAMINOPHEN 325 MG/1
650 TABLET ORAL EVERY 4 HOURS PRN
Status: DISCONTINUED | OUTPATIENT
Start: 2021-03-24 | End: 2021-03-25 | Stop reason: HOSPADM

## 2021-03-24 RX ORDER — LISINOPRIL 10 MG/1
10 TABLET ORAL DAILY
Status: DISCONTINUED | OUTPATIENT
Start: 2021-03-25 | End: 2021-03-25 | Stop reason: HOSPADM

## 2021-03-24 RX ORDER — LIDOCAINE 40 MG/G
CREAM TOPICAL
Status: DISCONTINUED | OUTPATIENT
Start: 2021-03-24 | End: 2021-03-25 | Stop reason: HOSPADM

## 2021-03-24 RX ORDER — CHOLESTYRAMINE 4 G/9G
4 POWDER, FOR SUSPENSION ORAL DAILY
Status: DISCONTINUED | OUTPATIENT
Start: 2021-03-25 | End: 2021-03-25 | Stop reason: HOSPADM

## 2021-03-24 RX ORDER — ASPIRIN 81 MG/1
81 TABLET ORAL EVERY EVENING
Status: DISCONTINUED | OUTPATIENT
Start: 2021-03-24 | End: 2021-03-25 | Stop reason: HOSPADM

## 2021-03-24 RX ORDER — PAROXETINE 20 MG/1
40 TABLET, FILM COATED ORAL AT BEDTIME
Status: DISCONTINUED | OUTPATIENT
Start: 2021-03-24 | End: 2021-03-25 | Stop reason: HOSPADM

## 2021-03-24 RX ORDER — ANASTROZOLE 1 MG/1
1 TABLET ORAL AT BEDTIME
Status: DISCONTINUED | OUTPATIENT
Start: 2021-03-24 | End: 2021-03-25 | Stop reason: HOSPADM

## 2021-03-24 RX ADMIN — ASPIRIN 81 MG: 81 TABLET, DELAYED RELEASE ORAL at 23:03

## 2021-03-24 RX ADMIN — ANASTROZOLE 1 MG: 1 TABLET, COATED ORAL at 23:04

## 2021-03-24 RX ADMIN — SODIUM CHLORIDE 1000 ML: 9 INJECTION, SOLUTION INTRAVENOUS at 20:09

## 2021-03-24 RX ADMIN — PAROXETINE HYDROCHLORIDE 40 MG: 20 TABLET, FILM COATED ORAL at 23:04

## 2021-03-24 RX ADMIN — ASPIRIN 81 MG: 81 TABLET, DELAYED RELEASE ORAL at 21:32

## 2021-03-24 ASSESSMENT — MIFFLIN-ST. JEOR: SCORE: 1517.76

## 2021-03-24 ASSESSMENT — ENCOUNTER SYMPTOMS
ARTHRALGIAS: 1
COUGH: 0
WEAKNESS: 1
DYSURIA: 0

## 2021-03-24 NOTE — ED TRIAGE NOTES
Pt presents via ems from home with multiple complaints. She has been experiencing left arm and leg aches all day with intermittent chest pain and back pain. Pt reports getting second covid shot last Thursday and has felt generalized sickness since then. Per EMS, pt was hypertensive on arrival with systolic in the 180s. Pt has sinus rhythm on arrival to ED with frequent episodes of tachycardia. Pt has hx of hypertension, tachycardia, hyperlipidemia. EMS gave 324 aspirin, 1 sublingual nitroglycerin which relieved chest and arm pain. EMS gave 100 mL fluids. BG was 104 per ems.

## 2021-03-24 NOTE — ED PROVIDER NOTES
History   Chief Complaint:  Chest Pain and left-sided paresthesia       The history is provided by the patient and a relative (daughter).      Yolanda Watson is a 80 year old female with history of hypertension, hyperlipidemia, tachycardia, UTI, and cancer who presents via EMS with chest pain and left-sided paresthesias to the hand. Patient reports waking up around 1100 this morning. Since then, she began having intermittent left-sided chest pain that radiated to the left arm and leg. She had tingling sensation to both the arm and leg. Leg pain then went away but pain in the arm persisted throughout the day. She received the second dose of COVID vaccine on 3/18/2021 and has been feeling generalized sickness since then. Here in the ED, patient states she is pain free. Patient reports she would have these symptoms every once in a while but they were not intense. Patient denies cough, dysuria, or loss of taste or smell. She further denies history of heart attack or stent placement. Patient is no longer prediabetic. Not on blood thinners. Per patient's daughter, patient had 2/3 of her lungs removed and would experiences shortness of breath with exertions. Patient's last chemotherapy was 3 years ago and she could not get her energy back since then. Patient has been hospitalized for dehydration. Of note, patient has been under stess recently due to her 's hospitalization.  The patient has been diagnosed with a urinary tract infection, but finished these and the patient is no longer on antibiotics.  She no longer has any UTI symptoms such as dysuria, urgency or frequency.      Review of Systems   Respiratory: Negative for cough.    Cardiovascular: Positive for chest pain.   Genitourinary: Negative for dysuria.   Musculoskeletal: Positive for arthralgias.   Neurological: Positive for weakness.   All other systems reviewed and are negative.    Allergies:  Demerol   Sulfa  "Drugs  Penicillins    Medications:  Zestril  Toprol  Anastrozole  Aspirin 81 mg  Lipitor  Questran  Loperamide  Zofran  Paxil    Past Medical History:    Lung caner  Colorectal cancer  Breast cancer  Arthritis  Anemia  Hyperlipidemia   Hypertension   Dyspnea  Cardiomyopathy  Depression   Diabetes mellitus  GERD  Hypercholesteremia  Hypertriglyceridemia  Acoustic neuroma    Past Surgical History:    Shoulder arthroplasty  Thoracotomy  Lymph node disection  Colon surgery  Craniectomy  Breast lumpectomy  Hysterectomy  Mastectomy  Cholecystectomy  Bladder surgery  Appendectomy   Tonsillectomy   Lung surgery    Family History:    Father: hypertension, melanoma, prostate cancer  Mother: depression, hypertension, hyperlipidemia, heart disease, osteoporosis  Brother: melanoma, hyperlipidemia, prostate cancer  Sister: hypertension, hyperlipidemia     Social History:  Patient presents with daughter.  Patient is .     Physical Exam     Patient Vitals for the past 24 hrs:   BP Temp Temp src Pulse Resp SpO2 Height Weight   03/24/21 2020 -- -- -- 81 21 96 % -- --   03/24/21 2010 -- -- -- -- -- 95 % -- --   03/24/21 2000 (!) 140/94 -- -- 91 -- 96 % -- --   03/24/21 1950 -- -- -- -- -- 95 % -- --   03/24/21 1940 (!) 143/83 -- -- 87 -- 95 % -- --   03/24/21 1900 (!) 162/91 -- -- 108 11 95 % -- --   03/24/21 1830 -- -- -- 115 12 92 % -- --   03/24/21 1820 (!) 157/87 -- -- 114 15 92 % -- --   03/24/21 1800 (!) 151/90 -- -- 94 13 -- -- --   03/24/21 1745 (!) 158/95 -- -- 94 13 -- -- --   03/24/21 1733 (!) 167/110 98.3  F (36.8  C) Oral 118 22 94 % 1.753 m (5' 9\") 99.3 kg (219 lb)   03/24/21 1715 (!) 161/90 -- -- -- -- -- -- --       Physical Exam  General:  Sitting on bed with daughter at bedside.   HENT:  No obvious trauma to head  Right Ear:  External ear normal.   Left Ear:  External ear normal.   Nose:  Nose normal.   Eyes:  Conjunctivae and EOM are normal. Pupils are equal, round, and reactive.   Neck: Normal range of " motion. Neck supple. No tracheal deviation present.   CV:  Normal heart sounds. No murmur heard.  Pulm/Chest: Effort normal and breath sounds normal.   Abd: Soft. No distension. There is no tenderness. There is no rigidity, no rebound and no guarding.   M/S: Normal range of motion.   Neuro: Alert. GCS 15.  Cranial nerves II to XII grossly intact.  No focal weakness  Skin: Skin is warm and dry. No rash noted. Not diaphoretic.   Psych: Normal mood and affect. Behavior is normal.     Emergency Department Course     ECG:  ECG taken at 1742, ECG read at 1743  Sinus tachycardia with 1 st degree AV block with premature atrial complexes. Left anterior fascicular block. Abnormal ECG.  No significant change as compared to prior, dated 4/18/2020.  Rate 120 bpm. OH interval 216 ms. QRS duration 92 ms. QT/QTc 334/472 ms. P-R-T axes 23 -50 75.     Imaging:  XR Chest 2 Views  Multifocal new patchy airspace opacities in the right lung  suspicious for developing pneumonia. Elevated right hemidiaphragm with  stable small right pleural effusion. No focal opacities in the left  lung. Left lung surgical changes. No left pleural effusion. No  pneumothorax. Normal heart size. Tortuous aorta with atherosclerotic  calcifications. Right reverse TSA.    Laboratory:    CBC: WBC: 6.3, HGB: 11.5 (L), PLT: 325  CMP: Calcium: 8.2 (L), Protein Total: 6.6 (L), o/w WNL (Creatinine: 0.75)  Troponin (Collected 1749): <0.015  D-dimer: <0.3  BNP: 353  Procalcitonin: <0.05  Blood Cultures x2: Pending  Symptomatic Influenza A/B antigen & COVID-19 PCR: pending    Emergency Department Course:    Reviewed:  I reviewed nursing notes, vitals, past medical history and care everywhere    Assessments:  1830 I obtained history and examined the patient as noted above.   2024 I updated the patient. Dr. Rodríguez was present as well.     Consults:   1958 I consulted with Dr. Rodríguez, hospitalist, regarding the patient's history and presentation in the ED.      Interventions:  2009 NS 1L IV    Disposition:  The patient was admitted to the hospital under the care of Dr. Rodríguez.       Impression & Plan   Medical Decision Making:  Yolanda Watson is a very pleasant 80 year old year old patient who presents to the emergency department with concern of chest pain, left arm pain and paresthesias, generalized weakness, sleepiness, and unwell feeling.  The patient has been under stress secondary to her  being ill and in the hospital.  He arrived home today.  The patient slept into 11 AM which is unusual.  The patient screening EKG and troponin are negative.  Serial troponins would be of benefit since her symptoms just started today, but at this time I doubt ACS.  She mentions some shortness of breath as well, but this appears to be her baseline.  Nonetheless given her history, a D-dimer is obtained to assess for pulmonary embolism.  This returned negative; therefore, I doubt pulmonary embolism.  She has a benign abdominal exam.  She has been treated for a UTI recently and no longer has urinary symptoms.  The chest x-ray shows the above findings.  These may be postsurgical findings.  A procalcitonin returned negative so 2 blood cultures were obtained in case this is pneumonia; however, I doubt this based upon the clinical presentation and her lack of fever or leukocytosis.  She received her second COVID-19 vaccine within the past week so technically this could be result of COVID-19 disease and a swab was obtained since she is not fully immunized to the 2 weeks post vaccination.  A swab was obtained.  It is pending.  A BNP is normal so I doubt this represents fluid overload.  The patient felt as if she is dehydrated so she was provided IV fluid bolus.  She mentioned paresthesias to the left arm.  She has no focal weakness and this does not sound necessarily neurologic the brain imaging not obtained at this time.  Her main concern was a left-sided chest pain. The patient was  admitted to the hospital for observation.  I spoke to the hospitalist, Dr. Rodríguez, who has agreed to admit the patient for continued evaluation and treatment.    Covid-19  Yolanda Watson was evaluated during a global COVID-19 pandemic, which necessitated consideration that the patient might be at risk for infection with the SARS-CoV-2 virus that causes COVID-19.   Applicable protocols for evaluation were followed during the patient's care.   COVID-19 was considered as part of the patient's evaluation. The plan for testing is:  a test was obtained during this visit.    Diagnosis:    ICD-10-CM    1. Chest pain, unspecified type  R07.9 Blood culture     Blood culture     Nt probnp inpatient     Nt probnp inpatient     Procalcitonin     Procalcitonin     CANCELED: Procalcitonin     CANCELED: Nt probnp inpatient (BNP)   2. Generalized weakness  R53.1        Discharge Medications:  New Prescriptions    No medications on file       Scribe Disclosure:  I, Mia Curtis, am serving as a scribe at 6:30 PM on 3/24/2021 to document services personally performed by Chavez Brock DO based on my observations and the provider's statements to me.     Chavez Brock DO  03/24/21 2100

## 2021-03-25 ENCOUNTER — APPOINTMENT (OUTPATIENT)
Dept: NUCLEAR MEDICINE | Facility: CLINIC | Age: 81
End: 2021-03-25
Attending: PHYSICIAN ASSISTANT
Payer: COMMERCIAL

## 2021-03-25 ENCOUNTER — APPOINTMENT (OUTPATIENT)
Dept: CARDIOLOGY | Facility: CLINIC | Age: 81
End: 2021-03-25
Attending: PHYSICIAN ASSISTANT
Payer: COMMERCIAL

## 2021-03-25 VITALS
RESPIRATION RATE: 20 BRPM | SYSTOLIC BLOOD PRESSURE: 162 MMHG | DIASTOLIC BLOOD PRESSURE: 71 MMHG | TEMPERATURE: 97 F | OXYGEN SATURATION: 92 % | BODY MASS INDEX: 32.44 KG/M2 | WEIGHT: 219 LBS | HEIGHT: 69 IN | HEART RATE: 113 BPM

## 2021-03-25 LAB
CV STRESS MAX HR HE: 100
NUC STRESS EJECTION FRACTION: 66 %
RATE PRESSURE PRODUCT: NORMAL
STRESS ECHO BASELINE DIASTOLIC HE: 97
STRESS ECHO BASELINE HR: 93
STRESS ECHO BASELINE SYSTOLIC BP: 196
STRESS ECHO CALCULATED PERCENT HR: 72 %
STRESS ECHO LAST STRESS DIASTOLIC BP: 88
STRESS ECHO LAST STRESS SYSTOLIC BP: 183
STRESS ECHO TARGET HR: 138
STRESS/REST PERFUSION RATIO: 0.78
TROPONIN I SERPL-MCNC: <0.015 UG/L (ref 0–0.04)
TSH SERPL DL<=0.005 MIU/L-ACNC: 2.05 MU/L (ref 0.4–4)

## 2021-03-25 PROCEDURE — 250N000013 HC RX MED GY IP 250 OP 250 PS 637: Performed by: PHYSICIAN ASSISTANT

## 2021-03-25 PROCEDURE — 250N000011 HC RX IP 250 OP 636: Performed by: STUDENT IN AN ORGANIZED HEALTH CARE EDUCATION/TRAINING PROGRAM

## 2021-03-25 PROCEDURE — G0378 HOSPITAL OBSERVATION PER HR: HCPCS

## 2021-03-25 PROCEDURE — 84484 ASSAY OF TROPONIN QUANT: CPT | Performed by: PHYSICIAN ASSISTANT

## 2021-03-25 PROCEDURE — 93016 CV STRESS TEST SUPVJ ONLY: CPT | Performed by: INTERNAL MEDICINE

## 2021-03-25 PROCEDURE — 78452 HT MUSCLE IMAGE SPECT MULT: CPT | Mod: 26 | Performed by: INTERNAL MEDICINE

## 2021-03-25 PROCEDURE — 84443 ASSAY THYROID STIM HORMONE: CPT | Performed by: PHYSICIAN ASSISTANT

## 2021-03-25 PROCEDURE — 36415 COLL VENOUS BLD VENIPUNCTURE: CPT | Performed by: PHYSICIAN ASSISTANT

## 2021-03-25 PROCEDURE — 78452 HT MUSCLE IMAGE SPECT MULT: CPT

## 2021-03-25 PROCEDURE — 343N000001 HC RX 343: Performed by: STUDENT IN AN ORGANIZED HEALTH CARE EDUCATION/TRAINING PROGRAM

## 2021-03-25 PROCEDURE — 93018 CV STRESS TEST I&R ONLY: CPT | Performed by: INTERNAL MEDICINE

## 2021-03-25 PROCEDURE — 99217 PR OBSERVATION CARE DISCHARGE: CPT | Performed by: PHYSICIAN ASSISTANT

## 2021-03-25 PROCEDURE — 250N000011 HC RX IP 250 OP 636: Performed by: PHYSICIAN ASSISTANT

## 2021-03-25 PROCEDURE — A9502 TC99M TETROFOSMIN: HCPCS | Performed by: STUDENT IN AN ORGANIZED HEALTH CARE EDUCATION/TRAINING PROGRAM

## 2021-03-25 PROCEDURE — 93017 CV STRESS TEST TRACING ONLY: CPT

## 2021-03-25 PROCEDURE — 999N000049 HC STATISTIC ECHO STRESS OR NM NPI

## 2021-03-25 RX ORDER — METOPROLOL SUCCINATE 100 MG/1
100 TABLET, EXTENDED RELEASE ORAL DAILY
Status: DISCONTINUED | OUTPATIENT
Start: 2021-03-25 | End: 2021-03-25 | Stop reason: HOSPADM

## 2021-03-25 RX ORDER — ALBUTEROL SULFATE 90 UG/1
2 AEROSOL, METERED RESPIRATORY (INHALATION) EVERY 5 MIN PRN
Status: DISCONTINUED | OUTPATIENT
Start: 2021-03-25 | End: 2021-03-25

## 2021-03-25 RX ORDER — AMINOPHYLLINE 25 MG/ML
50-100 INJECTION, SOLUTION INTRAVENOUS
Status: DISCONTINUED | OUTPATIENT
Start: 2021-03-25 | End: 2021-03-25

## 2021-03-25 RX ORDER — REGADENOSON 0.08 MG/ML
0.4 INJECTION, SOLUTION INTRAVENOUS ONCE
Status: DISCONTINUED | OUTPATIENT
Start: 2021-03-25 | End: 2021-03-25

## 2021-03-25 RX ORDER — ACYCLOVIR 200 MG/1
0-1 CAPSULE ORAL
Status: DISCONTINUED | OUTPATIENT
Start: 2021-03-25 | End: 2021-03-25 | Stop reason: HOSPADM

## 2021-03-25 RX ORDER — METOPROLOL SUCCINATE 50 MG/1
50 TABLET, EXTENDED RELEASE ORAL DAILY
Status: DISCONTINUED | OUTPATIENT
Start: 2021-03-25 | End: 2021-03-25

## 2021-03-25 RX ORDER — CAFFEINE CITRATE 20 MG/ML
60 SOLUTION INTRAVENOUS
Status: DISCONTINUED | OUTPATIENT
Start: 2021-03-25 | End: 2021-03-25

## 2021-03-25 RX ADMIN — TETROFOSMIN 30.8 MCI.: 1.38 INJECTION, POWDER, LYOPHILIZED, FOR SOLUTION INTRAVENOUS at 09:30

## 2021-03-25 RX ADMIN — TETROFOSMIN 11 MCI.: 1.38 INJECTION, POWDER, LYOPHILIZED, FOR SOLUTION INTRAVENOUS at 07:35

## 2021-03-25 RX ADMIN — METOPROLOL SUCCINATE 100 MG: 100 TABLET, EXTENDED RELEASE ORAL at 13:34

## 2021-03-25 RX ADMIN — CHOLESTYRAMINE 4 G: 4 POWDER, FOR SUSPENSION ORAL at 09:52

## 2021-03-25 RX ADMIN — LISINOPRIL 10 MG: 10 TABLET ORAL at 09:52

## 2021-03-25 RX ADMIN — ONDANSETRON 4 MG: 4 TABLET, ORALLY DISINTEGRATING ORAL at 10:32

## 2021-03-25 RX ADMIN — REGADENOSON 0.4 MG: 0.08 INJECTION, SOLUTION INTRAVENOUS at 09:27

## 2021-03-25 NOTE — PLAN OF CARE
Observation Goals:      - Serial troponins and stress test complete: not met  - Seen and cleared by consultant if applicable: not met  - Adequate pain control on oral analgesia: met  - Vital signs normal or at patient baseline: partially, high BP still  - Safe disposition plan has been identified: not met  - Nurse to notify provider when observation goals have been met and patient is ready for discharge.        Covid neg. VSS. A/Ox4. Ax1 w/ walker and GB. [place]. Denies chest pain, dizziness, SOB. Becomes tachy w/ activity.Tele reads NSR this shift. Trops are being drawn Q4hrs x2. Plan for lexiscan today, continue to monitor.

## 2021-03-25 NOTE — DISCHARGE SUMMARY
"[facility]  Hospitalist Discharge Summary       Date of Admission:  3/24/2021  Date of Discharge:  3/25/2021  4:04 PM  Discharging Provider: JoAnna K. Barthell, PA-C    Discharge Diagnoses   Atypical chest pain.  Suspected beta-blocker withdrawal.  Accidental medication noncompliance.  Uncontrolled hypertension.  Multifocal opacity right lung on CXR.  Generalized weakness.  Caregiver fatigue.  History chemotherapy induced cardiomyopathy since improved.  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  History of breast cancer.  History colon cancer.    Follow-ups Needed After Discharge   Follow-up Appointments     Follow-up and recommended labs and tests       Please call to schedule follow up with Dr. Perdomo to occur within 1-2   weeks. Virtual visit is OK. Inform this is a \"hospital follow up visit\" to   help get in during recommended time. OK to see a colleague of primary if   needed.  - consider serial CXR           Unresulted Labs Ordered in the Past 30 Days of this Admission     Date and Time Order Name Status Description    3/24/2021 1942 Blood culture Preliminary     3/24/2021 1942 Blood culture Preliminary       These results will be followed up by hospitalist service    Discharge Disposition   Discharged to home  Condition at discharge: Stable    Hospital Course   Yolanda Watson is a 80 year old female admitted on 3/24/2021. She presented for evaluation of generalized weakness, fatigue left arm pain and chest pain and is being admitted for further evaluation.     Atypical chest pain  History of chemotherapy-induced cardiomyopathy: No history of coronary artery disease.  Does have a history of chemotherapy induced cardiomyopathy though most recent echocardiogram in 2019 showed normalized EF.  Developed some left-sided arm pain with associated chest pain that radiated to her shoulder.  Fairly brief in nature.  Not exertional.  Presented because she was " concerned about cardiac etiology.  Initial troponin negative.  EKG nonischemic. . Serial troponins undetectable.  No events on telemetry.  Nuc med Alesha scan negative for inducible ischemia.    Elevated blood pressure.  Hypertension  Hyperlipidemia [PTA regimen includes Toprol-XL 50 mg daily, lisinopril 10 mg daily and atorvastatin 20 mg]  SBP's 160-190s throughout admission and intermittent tachycardia into the 110s in the context of patient holding PTA beta-blocker for stress test as well as accidentally missing dose x 2 days PTA due to coming to the hospital and acute stress/accidentally forgetting (see below re: spouse).  No other signs or symptoms end organ damage aside from atypical chest pain with evaluation as described above.  -Metoprolol restarted prior to discharge with improvement in blood pressure though not at goal.  Suspect component of rebound tachycardia in context beta-blocker withdrawal.  She is instructed to monitor her heart rate and blood pressure daily record and bring to follow-up appointment with PCP.  -Continue lisinopril and atorvastatin     Right lung opacity  History of squamous cell carcinoma of the lung in 2013: History of previous lung cancer and previous lobectomies.  Patient and daughter indicate they just recently had an outpatient chest CT scan 3/11/2021 and given a clean bill of health by her oncologist.  Chest x-ray on admission with possible right-sided infiltrate.  Afebrile.  No leukocytosis.  Procalcitonin less than 0.05.  No upper respiratory symptoms.  -No clinical evidence of pneumonia.  No antibiotics indicated.  -Given reports of recent normal chest CT 10 days ago will not pursue further imaging at this time. Discussed with patient and family.  Discussed with radiology on day of discharge and providers notes opacities previously seen but more prominent due to imaging technique.  Discussed return to care as fevers, chest pain, difficulty breathing, cough.  -Continue  routine follow-up with oncology.    Generalized weakness.  Thyroid studies normal.  No overt pathology by H&P and with complete metabolic panel, CBC, BMP, troponin.  Caregiver fatigue: Patient's  was recently discharged after a prolonged hospital stay.  He has been bedbound for 3-4 years.  They have PCA services 4-6 hours/day as well as family assistance.  There has been increased stress at home navigating her 's recent increased needs since discharge.  They have contemplated putting him in a care facility but have held off at present time given COVID-19 restrictions  -Suspect increased anxiety/caregiver fatigue may be contributing to symptoms.  Discussed with patient and daughter plan for hospitalization will be to rule out severe cardiac etiology of her symptoms.  If stress test is negative will discharge home tomorrow and if ongoing issues with fatigue she can seek further work-up with her PCP. They are in agreement  -Patient and daughter indicate they have a  following them as outpatient and declined need for inpatient social work consult for caregiver resources      H/o invasive ductal carcinoma of the breast  - Continue arimidex    Consultations This Hospital Stay   None    Code Status   Full Code    Time Spent on this Encounter   I, JoAnna K. Barthell, PA-C, personally saw the patient today and spent less than or equal to 30 minutes discharging this patient.     This patient was discussed with Dr. Gage of the Hospitalist Service who agrees with current plans as outlined above.    JoAnna K. Barthell, PA-C  [facility]  ______________________________________________________________________  Physical Exam   Temp: 97  F (36.1  C) Temp src: Oral BP: (!) 162/71 Pulse: 113   Resp: 20 SpO2: 92 % O2 Device: None (Room air)    Constitutional: Appears stated age, no acute distress.  Respiratory: Breath sounds CTA. No increased work of breathing.  Cardiovascular:  "Tachycardic, no rub or murmur. No peripheral edema.  GI: Soft, non-tender, non-distended.  Skin: Warm, dry, no rashes or lesions.       Primary Care Physician   Soren Perdomo    Discharge Orders      Reason for your hospital stay    Further evaluation and management of chest pain. Evaluation reassuring that this is not from lack of blood flow to heart.     Activity    Your activity upon discharge: activity as tolerated     Monitor and record    blood pressure and heart rate daily. Bring to follow-up with primary provider.     Follow-up and recommended labs and tests     Please call to schedule follow up with Dr. Perdomo to occur within 1-2 weeks. Virtual visit is OK. Inform this is a \"hospital follow up visit\" to help get in during recommended time. OK to see a colleague of primary if needed.  - consider serial CXR     Discharge Instructions    As discussed, opacities on chest xray on right side that could be early pneumonia though after discussing with radiology appears has been previously seen. Given absence of pneumonia-like symptoms, will monitor for now. Return to care if fevers, chest pain, difficulty breathing, cough.     Full Code     Diet    Follow this diet upon discharge:    Regular Diet Adult       Significant Results and Procedures   Most Recent 3 CBC's:  Recent Labs   Lab Test 03/24/21 1749 08/14/20  0954 08/13/20  0902   WBC 6.3 6.8 6.5   HGB 11.5* 11.0* 10.9*   * 101* 102*    382 372     Most Recent 3 BMP's:  Recent Labs   Lab Test 03/24/21 1749 08/14/20  0954 08/13/20  0902    141 142   POTASSIUM 3.6 3.4 3.9   CHLORIDE 105 105 109   CO2 27 32 31   BUN 18 11 10   CR 0.75 0.84 0.86   ANIONGAP 7 4 2*   PRIYA 8.2* 8.6 8.6   GLC 93 107* 99     Most Recent 2 LFT's:  Recent Labs   Lab Test 03/24/21  1749 08/11/20  1712   AST 19 9   ALT 38 23   ALKPHOS 67 92   BILITOTAL 0.7 0.9     Most Recent 3 Troponin's:  Recent Labs   Lab Test 03/25/21  0015 03/24/21  1749 10/17/19  1650   TROPI " <0.015 <0.015 <0.015     Most Recent 3 BNP's:  Recent Labs   Lab Test 03/24/21  1749   NTBNPI 353     Most Recent TSH and T4:  Recent Labs   Lab Test 03/25/21  0015   TSH 2.05   ,   Results for orders placed or performed during the hospital encounter of 03/24/21   XR Chest 2 Views    Narrative    XR CHEST 2 VW 3/24/2021 7:34 PM    HISTORY: chest pain    COMPARISON: 8/11/2020      Impression    IMPRESSION:Multifocal new patchy airspace opacities in the right lung  suspicious for developing pneumonia. Elevated right hemidiaphragm with  stable small right pleural effusion. No focal opacities in the left  lung. Left lung surgical changes. No left pleural effusion. No  pneumothorax. Normal heart size. Tortuous aorta with atherosclerotic  calcifications. Right reverse TSA.    MALVIN VELEZ MD   NM Lexiscan stress test (nuc card)     Value    Target     Baseline Systolic     Baseline Diastolic BP 97    Last Stress Systolic     Last Stress Diastolic BP 88    Baseline HR 93    Max     Calculated Percent HR 72    Rate Pressure Product 18,300.0    Left Ventricular EF 66    Stress/rest perfusion ratio 0.78    Narrative       The nuclear stress test is negative for inducible myocardial ischemia   or infarction.     Left ventricular function is normal.     The left ventricular ejection fraction at stress is 66%.     No changes from nuclear stress 11/2019.           Discharge Medications   Current Discharge Medication List      CONTINUE these medications which have NOT CHANGED    Details   acetaminophen (TYLENOL) 325 MG tablet Take 325-650 mg by mouth every 6 hours as needed for mild pain      anastrozole (ARIMIDEX) 1 MG tablet Take 1 mg by mouth At Bedtime       aspirin 81 MG EC tablet Take 81 mg by mouth every evening      atorvastatin (LIPITOR) 20 MG tablet Take 20 mg by mouth At Bedtime       cholestyramine (QUESTRAN) 4 GM/DOSE powder Take 4 g by mouth daily       lisinopril (ZESTRIL) 10 MG tablet Take  1 tablet (10 mg) by mouth daily  Qty: 30 tablet, Refills: 1    Associated Diagnoses: Essential hypertension      loperamide (IMODIUM A-D) 2 MG capsule Take 2 mg by mouth 4 times daily as needed for diarrhea      metoprolol succinate ER (TOPROL-XL) 50 MG 24 hr tablet Take 1 tablet (50 mg) by mouth daily  Qty: 90 tablet, Refills: 3    Comments: Increased dose. Hold on file.  Associated Diagnoses: Secondary cardiomyopathy (H)      multivitamin, therapeutic (THERA-VIT) TABS Take 1 tablet by mouth At Bedtime No iron      ondansetron (ZOFRAN-ODT) 4 MG ODT tab Take 4 mg by mouth 2 times daily as needed for nausea      PARoxetine (PAXIL) 40 MG tablet Take 40 mg by mouth At Bedtime       vitamin D3 (CHOLECALCIFEROL) 2000 units (50 mcg) tablet Take 1 tablet by mouth daily           Allergies   Allergies   Allergen Reactions     Demerol [Meperidine] Nausea and Vomiting     Sulfa Drugs Other (See Comments)     Extreme chills     Penicillins Rash     Swelling In mouth and tongue

## 2021-03-25 NOTE — H&P
[facility]    History and Physical - Hospitalist Service       Date of Admission:  3/24/2021    Assessment & Plan   Yolanda Watson is a 80 year old female admitted on 3/24/2021. She presented for evaluation of generalized weakness, fatigue left arm pain and chest pain and is being admitted for further evaluation.    Atypical chest pain  History of chemotherapy-induced cardiomyopathy: No history of coronary artery disease.  Does have a history of chemotherapy induced cardiomyopathy though most recent echocardiogram in 2019 showed normalized EF.  Developed some left-sided arm pain with associated chest pain that radiated to her shoulder.  Fairly brief in nature.  Not exertional.  Presented because she was concerned about cardiac etiology.  Initial troponin negative.  EKG nonischemic.   -Serial troponins  -Hold prior to admission metoprolol for stress test.  Continue PTA lisinopril  -Nuclear stress test tomorrow    Right lung opacity  History of squamous cell carcinoma of the lung in 2013: History of previous lung cancer and previous lobectomies.  Patient and daughter indicate they just recently had an outpatient chest CT scan 3/11/2021 and given a clean bill of health by her oncologist.  Chest x-ray on admission with possible right-sided infiltrate.  Afebrile.  No leukocytosis.  Procalcitonin less than 0.05.  No upper respiratory symptoms.  -No clinical evidence of pneumonia.  No antibiotics indicated.  -Given reports of recent normal chest CT 10 days ago will not pursue further imaging at this time. Discussed with patient and family  -Continue routine follow-up with oncology  - COVID/Influenza swab pending. Low risk PUI. She received her 2nd COVID vaccine 3/18    Generalized weakness  Caregiver fatigue: Patient's  was recently discharged after a prolonged hospital stay.  He has been bedbound for 3-4 years.  They have PCA services 4-6 hours/day as well as family assistance.   There has been increased stress at home navigating her 's recent increased needs since discharge.  They have contemplated putting him in a care facility but have held off at present time given COVID-19 restrictions  -Suspect increased anxiety/caregiver fatigue may be contributing to symptoms.  Discussed with patient and daughter plan for hospitalization will be to rule out severe cardiac etiology of her symptoms.  If stress test is negative will discharge home tomorrow and if ongoing issues with fatigue she can seek further work-up with her PCP. They are in agreement  - Check TSH  -Patient and daughter indicate they have a  following them as outpatient and declined need for inpatient social work consult for caregiver resources    Hypertension  Hyperlipidemia [PTA regimen includes Toprol-XL 50 mg daily, lisinopril 10 mg daily and atorvastatin 20 mg]  -Hold Toprol in anticipation of stress test  -Continue lisinopril and atorvastatin    H/o invasive ductal carcinoma of the breast  - Continue arimidex       Diet:   Regular   DVT Prophylaxis: Pneumatic Compression Devices  Patricia Catheter: not present  Code Status:   Full  Rule Out COVID-19 Handoff:  Yolanda is a LOW SUSPICION PUI.  Follow these instructions:    If COVID test positive -> continue isolation precautions    If COVID test negative -> discontinue COVID-specific isolation precautions       Disposition Plan   Expected discharge: Tomorrow  Entered: Shira Olivas PA-C 03/24/2021, 8:38 PM     The patient's care was discussed with the Patient, Patient's Family and ED provider.    Shira Olivas PA-C  [facility]  Contact information available via [facility] Paging/Directory      ______________________________________________________________________    Chief Complaint   Chest Pain    History is obtained from the patient    History of Present Illness   Yolanda Watson is a 80 year old female with a past medical history of  "hypertension, dyslipidemia, squamous cell carcinoma of the lung, breast cancer and previous history of ischemic cardiomyopathy who presented to the emergency department for evaluation of generalized weakness, chest pain and arm pain.    Patient indicates that she has not been feeling well for the past 2 or 3 weeks.  Admittedly, she is under a lot of stress as her  had a 3-4-week hospital stay and has just recently transitioned home.  He is primarily bedbound and receives 4-6 hours of PCA services per day.  She has had increased fatigue and generalized weakness.  No fevers or chills.  No runny nose sore throat or cough.  No nausea or vomiting.  Appetite has been fine.  No lower extremity edema.  Has chronic shortness of breath due to previous lobectomies but denies any worsening.  Today however she noticed some left-sided arm pain.  Later she developed some substernal chest pain that was fairly fleeting but then seemed to radiate to her back.  She was concern for cardiac etiology and elected to present for further evaluation    In the ER she was seen by Dr. Brock.  Laboratory evaluation fairly unremarkable.  Mono negative.  EKG nonischemic.  Chest x-ray concerning for possible right upper lobe infiltrate however's sequent procalcitonin was negative.    Presently, patient is evaluated in her hospital room.  Denies current concerns other than fatigue.  Does not have a history of daily chest pain.  Denies recent exertional symptoms.  Denies any upper respiratory symptoms.  Daughter indicates she recently had a chest CT scan 3/11/2021 and a \"clean bill of health\" by her oncologist Dr. Bear. UTI symptoms     Review of Systems    The 10 point Review of Systems is negative other than noted in the HPI or here.     Past Medical History    I have reviewed this patient's medical history and updated it with pertinent information if needed.   Past Medical History:   Diagnosis Date     Acoustic neuroma (H)      " Acute arthropathy     lower leg     Acute depression      Anemia     iron deficeincy     Anxiety      Arthritis     osteoarthrosis of knee     Breast cancer (H)     left breast     Breast cancer, left breast (H) 02/2018     Breast cancer, right breast (H) 1/2016     Cervical cancer (H)      Colon cancer (H)      Decreased cardiac ejection fraction      Depressive disorder      Diabetes mellitus (H)     pre-diabetic no longer taking Metformin     Dyspnea      Gastro-oesophageal reflux disease      H/O: lung cancer 2013     Herpes zoster      History of blood transfusion      HTN (hypertension)      Hyperlipidemia      Iron deficiency anemia      Lung nodule 2013    Lung cancer     Nausea & vomiting      OAB (overactive bladder)      Obese      Osteoarthritis of knee      Pain in female pelvis      Pre-diabetes      Preop general physical exam      Rib lesion      Shoulder pain, acute     right     Tachycardia      Torn meniscus        Past Surgical History   I have reviewed this patient's surgical history and updated it with pertinent information if needed.  Past Surgical History:   Procedure Laterality Date     acoustic neuroma excised       APPENDECTOMY  1966    done with CORNELIO     ARTHROSCOPY KNEE  2012    right knee scoped     BIOPSY NODE SENTINEL Right 2/3/2016    Procedure: BIOPSY NODE SENTINEL;  Surgeon: Flory Pinto MD;  Location: [facility]     BIOPSY NODE SENTINEL Left 3/5/2018    Procedure: BIOPSY NODE SENTINEL;;  Surgeon: Denae Perez MD;  Location: [facility]     BREAST BIOPSY, RT/LT       cataracts       cataracts       CERVICAL CANCER SCREENING RESULTS DOCUMENTED AND REVIEWED       CHOLECYSTECTOMY  1991     CL AFF SURGICAL PATHOLOGY       CL AFF SURGICAL PATHOLOGY      acoustic neuroma left side excised [facility] Dr. [place]oria 6/17/14     COLONOSCOPY       EXCISE NODE MEDIASTINAL  3/12/2013    Procedure: EXCISE NODE MEDIASTINAL;;  Surgeon: Lorne Arenas MD;  Location:  OR     EYE SURGERY       fiona cataracts     GENITOURINARY SURGERY      bladder sling     GYN SURGERY  1966    hysterectomy for cervical cancer *ovaries remain     HYSTERECTOMY  1966    Cervical CA, paps done every other year, done with appendectomy     INSERT PORT VASCULAR ACCESS Left 3/5/2018    Procedure: INSERT PORT VASCULAR ACCESS;;  Surgeon: Denae Perez MD;  Location: [facility]     INSERT PORT VASCULAR ACCESS N/A 3/5/2018    Procedure: INSERT PORT VASCULAR ACCESS;;  Surgeon: Denae Perez MD;  Location: [facility]     LAPAROSCOPIC ASSISTED COLECTOMY Right 10/27/2016    Procedure: LAPAROSCOPIC ASSISTED COLECTOMY;  Surgeon: Umang Arteaga MD;  Location:  OR     LOBECTOMY LUNG  3/12/2013    Procedure: LOBECTOMY LUNG;;  Surgeon: Lorne Arenas MD;  Location:  OR     LUMPECTOMY BREAST WITH SEED LOCALIZATION Right 2/3/2016    Procedure: LUMPECTOMY BREAST WITH SEED LOCALIZATION;  Surgeon: Flory Pinto MD;  Location: [facility]     LUMPECTOMY BREAST WITH SEED LOCALIZATION Left 3/5/2018    Procedure: LUMPECTOMY BREAST WITH SEED LOCALIZATION;  SEED LOCALIZED LEFT BREAST LUMPECTOMY, WITH LEFT SENTINEL NODE BIOPSY, PORT PLACEMENT;  Surgeon: Denae Perez MD;  Location: [facility]     MASTECTOMY SIMPLE Left 3/28/2018    Procedure: MASTECTOMY SIMPLE;  LEFT BREAST MASTECTOMY ;  Surgeon: Denae Perez MD;  Location: [facility]     MINI ARC SLING OPERATION FOR STRESS INCONTINENCE  2009     REMOVE PORT VASCULAR ACCESS N/A 2/13/2019    Procedure: REMOVE PORT VASCULAR ACCESS;  Surgeon: Denae Perez MD;  Location:  OR     RESECT RIB  2/15/2013    Procedure: RESECT RIB;  RESECTION PORTION RIGHT TWELVETH RIB;  Surgeon: Lorne Arenas MD;  Location:  OR     THORACOSCOPIC WEDGE RESECTION LUNG Left 7/26/2019    Procedure: LEFT VIDEO ASSISTED THORACIC SURGERY; POSSIBLE LEFT THORACOTOMY; TWO WEDGE RESECTIONS OF LEFT LOWER LUNG NODULE;  Surgeon: Lorne Arenas MD;  Location:  OR     THORACOTOMY  3/12/2013     Procedure: THORACOTOMY;  RIGHT THORACOTOMY, RIGHT MIDDLE AND LOWER LUNG LOBECTOMY, MEDIASTINAL LYMPH NODE DISSECTION ;  Surgeon: Lorne Arenas MD;  Location: [facility]     TONSILLECTOMY         Social History   I have reviewed this patient's social history and updated it with pertinent information if needed.  Social History     Tobacco Use     Smoking status: Former Smoker     Packs/day: 1.00     Years: 45.00     Pack years: 45.00     Types: Cigarettes     Start date:      Quit date: 1998     Years since quittin.9     Smokeless tobacco: Never Used     Tobacco comment: quit    Substance Use Topics     Alcohol use: Yes     Alcohol/week: 0.0 standard drinks     Comment: glass of wine a day     Drug use: No       Family History   I have reviewed this patient's family history and updated it with pertinent information if needed.  Family History   Problem Relation Age of Onset     Breast Cancer Paternal Grandmother      Colon Cancer Other         not specified     Fractures Other         Hip fracture, Aunt     Hyperlipidemia Mother      Hypertension Mother      Heart Disease Mother      Osteoporosis Mother      Hyperlipidemia Sister      Hyperlipidemia Brother      Prostate Cancer Brother      Hypertension Sister      Heart Disease Maternal Grandfather      Prostate Cancer Father      Colon Cancer Cousin         son of paternal aunt with breast cancer.     Breast Cancer Paternal Aunt      Colon Cancer Paternal Aunt      Other Cancer Paternal Uncle         Esophageal     Other Cancer Maternal Aunt        Prior to Admission Medications   Prior to Admission Medications   Prescriptions Last Dose Informant Patient Reported? Taking?   PARoxetine (PAXIL) 40 MG tablet 3/23/2021 at Unknown time Self Yes Yes   Sig: Take 40 mg by mouth At Bedtime    acetaminophen (TYLENOL) 325 MG tablet   Yes Yes   Sig: Take 325-650 mg by mouth every 6 hours as needed for mild pain   anastrozole (ARIMIDEX) 1 MG tablet  3/23/2021 at Unknown time Self Yes Yes   Sig: Take 1 mg by mouth At Bedtime    aspirin 81 MG EC tablet 3/24/2021 at Unknown time Self Yes Yes   Sig: Take 81 mg by mouth every evening   atorvastatin (LIPITOR) 20 MG tablet 3/23/2021 at Unknown time Self Yes Yes   Sig: Take 20 mg by mouth At Bedtime    cholestyramine (QUESTRAN) 4 GM/DOSE powder 3/24/2021 at Unknown time  Yes Yes   Sig: Take 4 g by mouth daily    lisinopril (ZESTRIL) 10 MG tablet 3/24/2021 at Unknown time  No Yes   Sig: Take 1 tablet (10 mg) by mouth daily   loperamide (IMODIUM A-D) 2 MG capsule  Self Yes Yes   Sig: Take 2 mg by mouth 4 times daily as needed for diarrhea   metoprolol succinate ER (TOPROL-XL) 50 MG 24 hr tablet 3/24/2021 at Unknown time  No Yes   Sig: Take 1 tablet (50 mg) by mouth daily   multivitamin, therapeutic (THERA-VIT) TABS 3/23/2021 at Unknown time Self Yes Yes   Sig: Take 1 tablet by mouth At Bedtime No iron   ondansetron (ZOFRAN-ODT) 4 MG ODT tab  Self Yes Yes   Sig: Take 4 mg by mouth 2 times daily as needed for nausea   vitamin D3 (CHOLECALCIFEROL) 2000 units (50 mcg) tablet 3/24/2021 at Unknown time Self Yes Yes   Sig: Take 1 tablet by mouth daily      Facility-Administered Medications: None     Allergies   Allergies   Allergen Reactions     Demerol [Meperidine] Nausea and Vomiting     Sulfa Drugs Other (See Comments)     Extreme chills     Penicillins Rash     Swelling In mouth and tongue       Physical Exam   Vital Signs: Temp: 98.3  F (36.8  C) Temp src: Oral BP: (!) 140/94 Pulse: 81   Resp: 21 SpO2: 96 % O2 Device: None (Room air)    Weight: 219 lbs 0 oz    Physical Exam  Vitals signs and nursing note reviewed.   Constitutional:       Appearance: Normal appearance. She is not toxic-appearing.   Eyes:      General: No scleral icterus.  Cardiovascular:      Rate and Rhythm: Normal rate and regular rhythm.      Heart sounds: No murmur.   Pulmonary:      Breath sounds: No wheezing or rhonchi.   Abdominal:      General:  There is no distension.   Musculoskeletal:      Right lower leg: No edema.      Left lower leg: No edema.   Skin:     General: Skin is warm and dry.      Findings: No rash.   Neurological:      General: No focal deficit present.      Mental Status: She is alert and oriented to person, place, and time.      Motor: No weakness.   Psychiatric:         Mood and Affect: Mood normal.           Data   Data reviewed today: I reviewed all medications, new labs and imaging results over the last 24 hours. I personally reviewed the EKG tracing showing no ischemic changes.

## 2021-03-25 NOTE — PLAN OF CARE
Observation Goals:      - Serial troponins and stress test complete: not met  - Seen and cleared by consultant if applicable: not met  - Adequate pain control on oral analgesia: met  - Vital signs normal or at patient baseline: partially, high BP still  - Safe disposition plan has been identified: not met  - Nurse to notify provider when observation goals have been met and patient is ready for discharge.

## 2021-03-25 NOTE — PROGRESS NOTES
Care Suites Procedure Nursing Note    Patient Information  Name: Yolanda Watson  Age: 80 year old    Procedure  Procedure: Lexiscan  Procedure start time: 0927  Procedure complete time: 0933  Concerns/abnormal assessment:   If abnormal assessment, provider notified: N/A  Plan/Other: Pt  Reports feeling SOB, stomach upset , headache, dizziness, and left arm/shoulder pain 2/10. All symptoms subsided within 5-10 minutes from start of test. BP elevated 180's/90's. Pt returned to room via w/c.    Vivian Jones RN

## 2021-03-25 NOTE — PROGRESS NOTES
- Serial troponins and stress test complete: Met  - Seen and cleared by consultant if applicable: NA    - Adequate pain control on oral analgesia: Met   - Vital signs normal or at patient baseline: Met (HTN, baseline for pt)   - Safe disposition plan has been identified: Met

## 2021-03-25 NOTE — PHARMACY-ADMISSION MEDICATION HISTORY
Pharmacy Medication History  Admission medication history interview status for the 3/24/2021  admission is complete. See EPIC admission navigator for prior to admission medications     Location of Interview: Phone  Medication history sources: Patient and her daughter/Ajith     Significant changes made to the medication list:  none    In the past week, patient estimated taking medication this percent of the time: greater than 90%      Medication reconciliation completed by provider prior to medication history? No    Time spent in this activity: 15min    Prior to Admission medications    Medication Sig Last Dose Taking? Auth Provider   acetaminophen (TYLENOL) 325 MG tablet Take 325-650 mg by mouth every 6 hours as needed for mild pain  Yes Unknown, Entered By History   anastrozole (ARIMIDEX) 1 MG tablet Take 1 mg by mouth At Bedtime  3/23/2021 at Unknown time Yes Reported, Patient   aspirin 81 MG EC tablet Take 81 mg by mouth every evening 3/24/2021 at Unknown time Yes Reported, Patient   atorvastatin (LIPITOR) 20 MG tablet Take 20 mg by mouth At Bedtime  3/23/2021 at Unknown time Yes Reported, Patient   cholestyramine (QUESTRAN) 4 GM/DOSE powder Take 4 g by mouth daily  3/24/2021 at Unknown time Yes Reported, Patient   lisinopril (ZESTRIL) 10 MG tablet Take 1 tablet (10 mg) by mouth daily 3/24/2021 at Unknown time Yes Ariadne Garcia MD   loperamide (IMODIUM A-D) 2 MG capsule Take 2 mg by mouth 4 times daily as needed for diarrhea  Yes Reported, Patient   metoprolol succinate ER (TOPROL-XL) 50 MG 24 hr tablet Take 1 tablet (50 mg) by mouth daily 3/24/2021 at Unknown time Yes Sulema Coleman PA-C   multivitamin, therapeutic (THERA-VIT) TABS Take 1 tablet by mouth At Bedtime No iron 3/23/2021 at Unknown time Yes Unknown, Entered By History   ondansetron (ZOFRAN-ODT) 4 MG ODT tab Take 4 mg by mouth 2 times daily as needed for nausea  Yes Reported, Patient   PARoxetine (PAXIL) 40 MG tablet Take 40 mg by mouth At  Bedtime  3/23/2021 at Unknown time Yes Unknown, Entered By History   vitamin D3 (CHOLECALCIFEROL) 2000 units (50 mcg) tablet Take 1 tablet by mouth daily 3/24/2021 at Unknown time Yes Reported, Patient       The information provided in this note is only as accurate as the sources available at the time of update(s)

## 2021-03-25 NOTE — PLAN OF CARE
"Date & Time: 3/25 3174-8227  Diagnosis: Weakness  Procedures: NA  Orientation/Cognitive: AOx4, [place] (HA at bedside, \"doesn't want to wear them\")   VS/O2: VSS ex HTN, RA   Mobility: SBA + walker   Diet: Regular, no caffeine   Pain Management: Denies   Bowel & Bladder: Continent   Skin: Bruise on back of R thigh   Abnormal Labs: WDL, trops -   Tele: NSR   IV Access/Drips/Fluids: L PIV SL   Drains: NA  Tests: Chest xray - possible R lung PNA, stable R pleural effusion, lexiscan negative   Consults: Hospitalist   Discharge Plan: Pending   Other: Hx lung cx - s/p R lobectomy, tachycardia, HTN. 2nd covid vaccine 3/18.   "

## 2021-03-25 NOTE — PLAN OF CARE
RECEIVING UNIT ED HANDOFF REVIEW    ED Nurse Handoff Report was reviewed by: Amira Norman RN on March 24, 2021 at 8:37 PM

## 2021-03-25 NOTE — ED NOTES
[facility]  ED Nurse Handoff Report    ED Chief complaint: Chest Pain and One-sided Weakness      ED Diagnosis:   Final diagnoses:   Chest pain, unspecified type   Generalized weakness       Code Status: See H&P    Allergies:   Allergies   Allergen Reactions     Demerol [Meperidine] Nausea and Vomiting     Sulfa Drugs Other (See Comments)     Extreme chills     Penicillins Rash     Swelling In mouth and tongue       Patient Story: Pt presents via ems from home with multiple complaints. She has been experiencing left arm and leg aches all day with intermittent chest pain and back pain. Pt reports getting second covid shot last Thursday and has felt generalized sickness since then. Per EMS, pt was hypertensive on arrival with systolic in the 180s. Pt has sinus rhythm on arrival to ED with frequent episodes of tachycardia. Pt has hx of hypertension, tachycardia, hyperlipidemia. EMS gave 324 aspirin, 1 sublingual nitroglycerin which relieved chest and arm pain. EMS gave 100 mL fluids. BG was 104 per ems.   Focused Assessment:  AOx3. AVSS, tachycardia with exertion. Denies chest pain at present. Generalized weakness/maliase.    Treatments and/or interventions provided:   Medications   0.9% sodium chloride BOLUS (1,000 mLs Intravenous New Bag 3/24/21 2009)       Patient's response to treatments and/or interventions: continue to monitor    To be done/followed up on inpatient unit:  continue to monitor    Does this patient have any cognitive concerns?: none    Activity level - Baseline/Home:  Independent  Activity Level - Current:   Stand with Assist    Patient's Preferred language: English   Needed?: No    Isolation: COVID r/o and special precautions  Infection: Not Applicable  COVID r/o and special precautions  Patient tested for COVID 19 prior to admission: YES  Bariatric?: No    Vital Signs:   Vitals:    03/24/21 1800 03/24/21 1820 03/24/21 1830 03/24/21 1900   BP: (!) 151/90 (!) 157/87  (!)  162/91   Pulse: 94 114 115 108   Resp: 13 15 12 11   Temp:       TempSrc:       SpO2:  92% 92% 95%   Weight:       Height:           Cardiac Rhythm:     Was the PSS-3 completed:   Yes  What interventions are required if any?               Family Comments: daughter at bedside  OBS brochure/video discussed/provided to patient/family: Yes              Name of person given brochure if not patient: patient              Relationship to patient: self    For the majority of the shift this patient's behavior was Green.   Behavioral interventions performed were frequent rounding.    ED NURSE PHONE NUMBER: *[phone]

## 2021-03-25 NOTE — PROGRESS NOTES
Covid negative. AxOx4. VSS on RA, hypertensive. Denies any chest pain or left arm soreness. Negative trops. PIV taken out. Patient dressed. Discharge instructions gone over with patient. Belongings sent home with patient.  Patient transferred off the floor @ 4:03 PM to door two. Daughter to .

## 2021-03-25 NOTE — PLAN OF CARE
COVID negative. A/Ox4. Presented with to ED with chest pain, left arm soreness. Denies chest pain or SOB. Reports some left arm aches. On room air. History of hypertension. Heart rate Sinus to sinus tachycardia. Troponin negative x1.  IV SL. Lexiscan stress test scheduled.

## 2021-03-29 LAB
BACTERIA SPEC CULT: ABNORMAL
SPECIMEN SOURCE: ABNORMAL

## 2021-03-30 ENCOUNTER — PATIENT OUTREACH (OUTPATIENT)
Dept: CARE COORDINATION | Facility: CLINIC | Age: 81
End: 2021-03-30

## 2021-03-30 DIAGNOSIS — R07.9 CHEST PAIN: Primary | ICD-10-CM

## 2021-03-30 LAB
BACTERIA SPEC CULT: NO GROWTH
SPECIMEN SOURCE: NORMAL

## 2021-03-30 NOTE — PROGRESS NOTES
Clinic Care Coordination Contact    Clinic Care Coordination Contact  OUTREACH    Referral Information:  Referral Source: IP Report         Chief Complaint   Patient presents with     Clinic Care Coordination - Post Hospital        Universal Utilization: NA     Utilization    Last refreshed: 3/29/2021  5:01 PM: Hospital Admissions 2           Last refreshed: 3/29/2021  5:01 PM: ED Visits 2           Last refreshed: 3/29/2021  5:01 PM: No Show Count (past year) 0              Current as of: 3/29/2021  5:01 PM              Clinical Concerns:  Current Medical Concerns:    Atypical chest pain.  Suspected beta-blocker withdrawal.  Accidental medication noncompliance.  Uncontrolled hypertension.  Multifocal opacity right lung on CXR.  Generalized weakness.  Caregiver fatigue.  History chemotherapy induced cardiomyopathy since improved.  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  History of breast cancer.  History colon cancer.  Current Behavioral Concerns: none noted   Education Provided to patient: None     Health Maintenance Reviewed:    Clinical Pathway: None    Medication Management:  CONTINUE these medications which have NOT CHANGED     Details   acetaminophen (TYLENOL) 325 MG tablet Take 325-650 mg by mouth every 6 hours as needed for mild pain       anastrozole (ARIMIDEX) 1 MG tablet Take 1 mg by mouth At Bedtime        aspirin 81 MG EC tablet Take 81 mg by mouth every evening       atorvastatin (LIPITOR) 20 MG tablet Take 20 mg by mouth At Bedtime        cholestyramine (QUESTRAN) 4 GM/DOSE powder Take 4 g by mouth daily        lisinopril (ZESTRIL) 10 MG tablet Take 1 tablet (10 mg) by mouth daily  Qty: 30 tablet, Refills: 1     Associated Diagnoses: Essential hypertension       loperamide (IMODIUM A-D) 2 MG capsule Take 2 mg by mouth 4 times daily as needed for diarrhea       metoprolol succinate ER (TOPROL-XL) 50 MG 24 hr tablet Take 1 tablet (50 mg) by mouth daily  Qty: 90  tablet, Refills: 3     Comments: Increased dose. Hold on file.  Associated Diagnoses: Secondary cardiomyopathy (H)       multivitamin, therapeutic (THERA-VIT) TABS Take 1 tablet by mouth At Bedtime No iron       ondansetron (ZOFRAN-ODT) 4 MG ODT tab Take 4 mg by mouth 2 times daily as needed for nausea       PARoxetine (PAXIL) 40 MG tablet Take 40 mg by mouth At Bedtime        vitamin D3 (CHOLECALCIFEROL) 2000 units (50 mcg) tablet Take 1 tablet by mouth daily                    Functional Status: na       Living Situation:  Current living arrangement:: I live in a private home with spouse  Type of residence:: Apartment    Lifestyle & Psychosocial Needs:                 Druze or spiritual beliefs that impact treatment:: No  Informal Support system:: Family, Friends   Socioeconomic History     Marital status:      Spouse name: Not on file     Number of children: 3     Years of education: Not on file     Highest education level: Not on file   Occupational History     Employer: RETIRED     Tobacco Use     Smoking status: Former Smoker     Packs/day: 1.00     Years: 45.00     Pack years: 45.00     Types: Cigarettes     Start date:      Quit date: 1998     Years since quittin.9     Smokeless tobacco: Never Used     Tobacco comment: quit    Substance and Sexual Activity     Alcohol use: Yes     Alcohol/week: 0.0 standard drinks     Comment: glass of wine a day     Drug use: No     Sexual activity: Yes     Partners: Male     Birth control/protection: Female Surgical               Resources and Interventions:  Current Resources: Has a maid that comes weekly,  has home care worker.        Supplies used at home:: None      )   )               Goals:       Patient/Caregiver understanding:            Plan: [facility] phoned Yolanda, spoke to her cousin b/c she couldn't hear this writer. [facility] discussed current needs. Yolanda had not scheduled follow up with PCP and said she would do it. At this time,  she states she doesn't have additional needs but would follow up with pcp and discuss any at that time. [facility] will not open up cc at this time.

## 2021-05-03 NOTE — LETTER
10/17/2019    [facility] [address]  [place] [address]    RE: Yolanda Watson       Dear Colleague,    I had the pleasure of seeing Yolanda Watson in the [facility].    HPI and Plan:   See dictation    Orders Placed This Encounter   Procedures     EKG 12-lead complete w/read - Clinics (performed today)       No orders of the defined types were placed in this encounter.      There are no discontinued medications.      Encounter Diagnoses   Name Primary?     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     Secondary cardiomyopathy (H)      Malignant neoplasm of nipple of right breast in female, unspecified estrogen receptor status (H)      Dizziness      Dyspnea, unspecified type        CURRENT MEDICATIONS:  Current Outpatient Medications   Medication Sig Dispense Refill     anastrozole (ARIMIDEX) 1 MG tablet Take 1 mg by mouth At Bedtime        aspirin 81 MG EC tablet Take 81 mg by mouth every evening       atorvastatin (LIPITOR) 20 MG tablet Take 20 mg by mouth At Bedtime        ibuprofen (ADVIL/MOTRIN) 200 MG tablet Take 200-400 mg by mouth daily as needed for mild pain       lisinopril (PRINIVIL/ZESTRIL) 20 MG tablet Take 20 mg by mouth daily        loperamide (IMODIUM A-D) 2 MG capsule Take 2 mg by mouth 4 times daily as needed for diarrhea       metoprolol succinate ER (TOPROL-XL) 25 MG 24 hr tablet Take 1 tablet (25 mg) by mouth daily 90 tablet 3     multivitamin, therapeutic (THERA-VIT) TABS Take 1 tablet by mouth At Bedtime No iron       ondansetron (ZOFRAN-ODT) 4 MG ODT tab Take 4 mg by mouth 2 times daily as needed for nausea       PARoxetine (PAXIL) 20 MG tablet Take 20 mg by mouth At Bedtime        spironolactone (ALDACTONE) 25 MG tablet Take 1 tablet (25 mg) by mouth daily 30 tablet 11     vitamin D3 (CHOLECALCIFEROL) 2000 units (50 mcg) tablet Take 1 tablet by mouth daily         ALLERGIES     Allergies   Allergen Reactions      Demerol [Meperidine] Nausea and Vomiting     Sulfa Drugs Other (See Comments)     Extreme chills     Penicillins Rash     Swelling In mouth and tongue       PAST MEDICAL HISTORY:  Past Medical History:   Diagnosis Date     Acoustic neuroma (H)      Acoustic neuroma (H)      Acute arthropathy     lower leg     Acute depression      Anemia     iron deficeincy     Anxiety      Arthritis     osteoarthrosis of knee     Breast cancer (H)     left breast     Breast cancer, left breast (H) 02/2018     Breast cancer, right breast (H) 1/2016     Cervical cancer (H)      Colon cancer (H)      Colon cancer (H)      Decreased cardiac ejection fraction      Depression      Depressive disorder      Diabetes mellitus (H)     pre-diabetic no longer taking Metformin     Dyspnea      Dyspnea      Gastro-oesophageal reflux disease      H/O: lung cancer 2013     Herpes      Herpes zoster      Herpes zoster      History of blood transfusion      HTN (hypertension)      Hyperlipidaemia      Hyperlipidemia      Hyperlipidemia      Iron deficiency anemia      Lung cancer (H)      Lung nodule 2013    Lung cancer     Lung nodule      Nausea & vomiting      OAB (overactive bladder)      Obese      Osteoarthritis of knee      Pain in female pelvis      Pain in female pelvis      Pre-diabetes      Preop general physical exam      Rib lesion      Rib lesion      Shoulder pain, acute     right     Tachycardia      Torn meniscus      Torn meniscus        PAST SURGICAL HISTORY:  Past Surgical History:   Procedure Laterality Date     acoustic neuroma excised       APPENDECTOMY  1966    done with [facility]     ARTHROSCOPY KNEE  2012    right knee scoped     BIOPSY NODE SENTINEL Right 2/3/2016    Procedure: BIOPSY NODE SENTINEL;  Surgeon: Flory Pinto MD;  Location: [facility]     BIOPSY NODE SENTINEL Left 3/5/2018    Procedure: BIOPSY NODE SENTINEL;;  Surgeon: Denae Perez MD;  Location: [facility]     BREAST BIOPSY, RT/LT       cataracts       cataracts        CERVICAL CANCER SCREENING RESULTS DOCUMENTED AND REVIEWED       CHOLECYSTECTOMY  1991     CL AFF SURGICAL PATHOLOGY       CL AFF SURGICAL PATHOLOGY      acoustic neuroma left side excised [facility] Dr. [place] 6/17/14     COLONOSCOPY       EXCISE NODE MEDIASTINAL  3/12/2013    Procedure: EXCISE NODE MEDIASTINAL;;  Surgeon: Lorne Arenas MD;  Location:  OR     EYE SURGERY      fiona cataracts     GENITOURINARY SURGERY      bladder sling     GYN SURGERY  1966    hysterectomy for cervical cancer *ovaries remain     HYSTERECTOMY  1966    Cervical CA, paps done every other year, done with appendectomy     INSERT PORT VASCULAR ACCESS Left 3/5/2018    Procedure: INSERT PORT VASCULAR ACCESS;;  Surgeon: Denae Perez MD;  Location: [facility]     INSERT PORT VASCULAR ACCESS N/A 3/5/2018    Procedure: INSERT PORT VASCULAR ACCESS;;  Surgeon: Denae Perez MD;  Location: [facility]     LAPAROSCOPIC ASSISTED COLECTOMY Right 10/27/2016    Procedure: LAPAROSCOPIC ASSISTED COLECTOMY;  Surgeon: Umang Arteaga MD;  Location:  OR     LOBECTOMY LUNG  3/12/2013    Procedure: LOBECTOMY LUNG;;  Surgeon: Lorne Arenas MD;  Location:  OR     LUMPECTOMY BREAST WITH SEED LOCALIZATION Right 2/3/2016    Procedure: LUMPECTOMY BREAST WITH SEED LOCALIZATION;  Surgeon: Flory Pinto MD;  Location: [facility]     LUMPECTOMY BREAST WITH SEED LOCALIZATION Left 3/5/2018    Procedure: LUMPECTOMY BREAST WITH SEED LOCALIZATION;  SEED LOCALIZED LEFT BREAST LUMPECTOMY, WITH LEFT SENTINEL NODE BIOPSY, PORT PLACEMENT;  Surgeon: Denae Perez MD;  Location: [facility]     MASTECTOMY SIMPLE Left 3/28/2018    Procedure: MASTECTOMY SIMPLE;  LEFT BREAST MASTECTOMY ;  Surgeon: Denae Perez MD;  Location: [facility]     MINI ARC SLING OPERATION FOR STRESS INCONTINENCE  2009     REMOVE PORT VASCULAR ACCESS N/A 2/13/2019    Procedure: REMOVE PORT VASCULAR ACCESS;  Surgeon: Denae Perez MD;  Location:  OR     RESECT RIB  2/15/2013     Procedure: RESECT RIB;  RESECTION PORTION RIGHT TWELVETH RIB;  Surgeon: Lorne Arenas MD;  Location: [facility]     THORACOSCOPIC WEDGE RESECTION LUNG Left 2019    Procedure: LEFT VIDEO ASSISTED THORACIC SURGERY; POSSIBLE LEFT THORACOTOMY; TWO WEDGE RESECTIONS OF LEFT LOWER LUNG NODULE;  Surgeon: Lorne Arenas MD;  Location: [facility] OR     THORACOTOMY  3/12/2013    Procedure: THORACOTOMY;  RIGHT THORACOTOMY, RIGHT MIDDLE AND LOWER LUNG LOBECTOMY, MEDIASTINAL LYMPH NODE DISSECTION ;  Surgeon: Lorne Arenas MD;  Location: [facility] OR     TONSILLECTOMY         FAMILY HISTORY:  Family History   Problem Relation Age of Onset     Breast Cancer Paternal Grandmother      Colon Cancer Other         not specified     Fractures Other         Hip fracture, Aunt     Hyperlipidemia Mother      Hypertension Mother      Heart Disease Mother      Osteoporosis Mother      Hyperlipidemia Sister      Hyperlipidemia Brother      Prostate Cancer Brother      Hypertension Sister      Heart Disease Maternal Grandfather      Prostate Cancer Father      Colon Cancer Cousin         son of paternal aunt with breast cancer.     Breast Cancer Paternal Aunt      Colon Cancer Paternal Aunt      Other Cancer Paternal Uncle         Esophageal     Other Cancer Maternal Aunt        SOCIAL HISTORY:  Social History     Socioeconomic History     Marital status:      Spouse name: None     Number of children: 3     Years of education: None     Highest education level: None   Occupational History     Employer: RETIRED   Social Needs     Financial resource strain: None     Food insecurity:     Worry: None     Inability: None     Transportation needs:     Medical: None     Non-medical: None   Tobacco Use     Smoking status: Former Smoker     Packs/day: 1.00     Years: 45.00     Pack years: 45.00     Types: Cigarettes     Start date:      Last attempt to quit: 1998     Years since quittin.4     Smokeless tobacco:  Never Used     Tobacco comment: quit 1998   Substance and Sexual Activity     Alcohol use: Yes     Alcohol/week: 0.0 standard drinks     Comment: glass of wine a day     Drug use: No     Sexual activity: Yes     Partners: Male     Birth control/protection: Female Surgical   Lifestyle     Physical activity:     Days per week: None     Minutes per session: None     Stress: None   Relationships     Social connections:     Talks on phone: None     Gets together: None     Attends Christianity service: None     Active member of club or organization: None     Attends meetings of clubs or organizations: None     Relationship status: None     Intimate partner violence:     Fear of current or ex partner: None     Emotionally abused: None     Physically abused: None     Forced sexual activity: None   Other Topics Concern     Parent/sibling w/ CABG, MI or angioplasty before 65F 55M? Not Asked   Social History Narrative    12/2015    -    3 kids    Retired        Pap-11/8/2010 WNL    Mammo-12/2/13     Colonoscopy-3/3/2010 WNL, no further recommended per pt     Dexa-1/1/2008       Review of Systems:  Skin:  Negative       Eyes:  Positive for glasses;cataracts cataract extraction of both eyes  ENT:  Positive for hearing loss deaf L ear, wears hearing aid R ear  Respiratory:  Positive for dyspnea on exertion CABRERA, minimal activity   Cardiovascular:    Positive for;palpitations;dizziness;fatigue palpitations with dyspnea  Gastroenterology: Positive for heartburn;reflux;nausea;diarrhea waking up every morning feeling sick to her stomach; occ diarrhea  Genitourinary:  Positive for urinary frequency;incontinence;retention    Musculoskeletal:  Positive for joint pain;nocturnal cramping right shoulder pain  Neurologic:  Negative      Psychiatric:  Positive for sleep disturbances;depression trouble falling asleep; depression under control  Heme/Lymph/Imm:  Positive for allergies RX  Endocrine:  Negative        Physical Exam:  Vitals:  "BP (!) 88/60 (BP Location: Right arm, Patient Position: Chair, Cuff Size: Adult Regular)   Pulse 106   Ht 1.765 m (5' 9.5\")   Wt 92.4 kg (203 lb 9.6 oz)   BMI 29.64 kg/m       Constitutional:    frail;chronically ill;in distress;appears anxious      Skin:    cyanotic;cool and clammy        Head:  normocephalic        Eyes:  conjunctivae and lids unremarkable        Lymph:      ENT:    pallor      Neck:  JVP normal        Respiratory:       crackles, exp wheezing diminished BS    Cardiac: no murmurs, gallops or rubs detected tachycardic              pulses full and equal                                        GI:  abdomen soft        Extremities and Muscular Skeletal:  no deformities, clubbing, cyanosis, erythema observed;no edema              Neurological:  no gross motor deficits        Psych:  Alert and Oriented x 3          CC  No referring provider defined for this encounter.                    Thank you for allowing me to participate in the care of your patient.      Sincerely,     Dinorah Miller,      [facility]    cc:   No referring provider defined for this encounter.        " Principal Discharge DX:	Encounter for laboratory testing for COVID-19 virus

## 2021-06-04 VITALS — BODY MASS INDEX: 31.01 KG/M2 | WEIGHT: 209.4 LBS | HEIGHT: 69 IN

## 2021-06-06 NOTE — ANESTHESIA PROCEDURE NOTES
Peripheral Block    Patient location during procedure: pre-op  Start time: 3/16/2020 10:10 AM  End time: 3/16/2020 10:11 AM  post-op analgesia per surgeon order as noted in medical record  Staffing:  Performing  Anesthesiologist: Estevan Sandy MD  Preanesthetic Checklist  Completed: patient identified, site marked, risks, benefits, and alternatives discussed, timeout performed, consent obtained, at patient's request, airway assessed, oxygen available, suction available, emergency drugs available and hand hygiene performed  Peripheral Block  Block type: other, superficial cervical plexus  Prep: ChloraPrep  Patient position: sitting  Patient monitoring: cardiac monitor, continuous pulse oximetry, heart rate and blood pressure  Laterality: right  Injection technique: ultrasound guided    Ultrasound used to visualize needle placement in proximity to nerve being blocked: yes   US used to visualize anesthetic spread  Visualized anatomic structures normal  No Pathological Findings  Permanent ultrasound image captured for medical record  Sterile gel and probe cover used for ultrasound.  Needle  Needle type: echogenic   Needle gauge: 20G  Needle length: 4 in  no peripheral nerve catheter placed  Assessment  Injection assessment: no difficulty with injection, negative aspiration for heme, no paresthesia on injection and incremental injection

## 2021-06-06 NOTE — ANESTHESIA POSTPROCEDURE EVALUATION
Patient: Yolanda Watson  Procedure(s):  REVERSE TOTAL SHOULDER ARTHROPLASTY (Right)  Anesthesia type: general    Patient location: PACU  Last vitals:   Vitals Value Taken Time   /61 3/16/2020  1:20 PM   Temp 37.1  C (98.7  F) 3/16/2020  1:00 PM   Pulse 78 3/16/2020  1:21 PM   Resp 11 3/16/2020  1:21 PM   SpO2 99 % 3/16/2020  1:21 PM   Vitals shown include unvalidated device data.  Post vital signs: stable  Level of consciousness: awake and responds to simple questions  Post-anesthesia pain: pain controlled  Post-anesthesia nausea and vomiting: no  Pulmonary: unassisted, nasal cannula  Cardiovascular: stable and blood pressure at baseline  Hydration: adequate  Anesthetic events: no    QCDR Measures:  ASA# 11 - Mikki-op Cardiac Arrest: ASA11B - Patient did NOT experience unanticipated cardiac arrest  ASA# 12 - Mikki-op Mortality Rate: ASA12B - Patient did NOT die  ASA# 13 - PACU Re-Intubation Rate: ASA13B - Patient did NOT require a new airway mgmt  ASA# 10 - Composite Anes Safety: ASA10A - No serious adverse event    Additional Notes:

## 2021-06-06 NOTE — ANESTHESIA CARE TRANSFER NOTE
Last vitals:   Vitals:    03/16/20 1230   BP: 134/62   Pulse: 92   Resp: 16   Temp: 36.8  C (98.2  F)   SpO2: 99%     Patient's level of consciousness is drowsy  Spontaneous respirations: yes  Maintains airway independently: yes  Dentition unchanged: yes  Oropharynx: oropharynx clear of all foreign objects    QCDR Measures:  ASA# 20 - Surgical Safety Checklist: WHO surgical safety checklist completed prior to induction    PQRS# 430 - Adult PONV Prevention: 4558F - Pt received => 2 anti-emetic agents (different classes) preop & intraop  ASA# 8 - Peds PONV Prevention: NA - Not pediatric patient, not GA or 2 or more risk factors NOT present  PQRS# 424 - Mikki-op Temp Management: 4559F - At least one body temp DOCUMENTED => 35.5C or 95.9F within required timeframe  PQRS# 426 - PACU Transfer Protocol: - Transfer of care checklist used  ASA# 14 - Acute Post-op Pain: ASA14B - Patient did NOT experience pain >= 7 out of 10

## 2021-06-06 NOTE — ANESTHESIA PROCEDURE NOTES
Peripheral Block    Patient location during procedure: pre-op  Start time: 3/16/2020 10:07 AM  End time: 3/16/2020 10:09 AM  post-op analgesia per surgeon order as noted in medical record  Staffing:  Performing  Anesthesiologist: Estevan Sandy MD  Preanesthetic Checklist  Completed: patient identified, site marked, risks, benefits, and alternatives discussed, timeout performed, consent obtained, at patient's request, airway assessed, oxygen available, suction available, emergency drugs available and hand hygiene performed  Peripheral Block  Block type: brachial plexus, interscalene  Prep: ChloraPrep  Patient position: sitting  Patient monitoring: cardiac monitor, continuous pulse oximetry, heart rate and blood pressure  Laterality: right  Injection technique: ultrasound guided    Ultrasound used to visualize needle placement in proximity to nerve being blocked: yes   US used to visualize anesthetic spread  Visualized anatomic structures normal  No Pathological Findings  Permanent ultrasound image captured for medical record  Sterile gel and probe cover used for ultrasound.  Needle  Needle type: echogenic   Needle gauge: 20G  Needle length: 4 in  no peripheral nerve catheter placed  Assessment  Injection assessment: no difficulty with injection, negative aspiration for heme, no paresthesia on injection and incremental injection

## 2021-07-19 ENCOUNTER — HOSPITAL ENCOUNTER (INPATIENT)
Facility: CLINIC | Age: 81
LOS: 2 days | Discharge: HOME HEALTH | DRG: 690 | End: 2021-07-21
Attending: EMERGENCY MEDICINE | Admitting: INTERNAL MEDICINE
Payer: COMMERCIAL

## 2021-07-19 ENCOUNTER — APPOINTMENT (OUTPATIENT)
Dept: GENERAL RADIOLOGY | Facility: CLINIC | Age: 81
DRG: 690 | End: 2021-07-19
Attending: INTERNAL MEDICINE
Payer: COMMERCIAL

## 2021-07-19 ENCOUNTER — APPOINTMENT (OUTPATIENT)
Dept: CT IMAGING | Facility: CLINIC | Age: 81
DRG: 690 | End: 2021-07-19
Attending: EMERGENCY MEDICINE
Payer: COMMERCIAL

## 2021-07-19 DIAGNOSIS — E86.0 DEHYDRATION: ICD-10-CM

## 2021-07-19 DIAGNOSIS — R19.7 VOMITING AND DIARRHEA: ICD-10-CM

## 2021-07-19 DIAGNOSIS — R53.1 GENERALIZED WEAKNESS: ICD-10-CM

## 2021-07-19 DIAGNOSIS — R11.10 VOMITING AND DIARRHEA: ICD-10-CM

## 2021-07-19 DIAGNOSIS — N39.0 UTI (URINARY TRACT INFECTION): ICD-10-CM

## 2021-07-19 DIAGNOSIS — N30.00 ACUTE CYSTITIS WITHOUT HEMATURIA: Primary | ICD-10-CM

## 2021-07-19 DIAGNOSIS — R10.84 ABDOMINAL PAIN, GENERALIZED: ICD-10-CM

## 2021-07-19 DIAGNOSIS — R79.89 LACTIC ACID BLOOD INCREASED: ICD-10-CM

## 2021-07-19 DIAGNOSIS — R11.2 NAUSEA AND VOMITING, INTRACTABILITY OF VOMITING NOT SPECIFIED, UNSPECIFIED VOMITING TYPE: ICD-10-CM

## 2021-07-19 DIAGNOSIS — N39.0 URINARY TRACT INFECTION WITHOUT HEMATURIA, SITE UNSPECIFIED: ICD-10-CM

## 2021-07-19 LAB
ALBUMIN SERPL-MCNC: 3.3 G/DL (ref 3.4–5)
ALBUMIN UR-MCNC: 30 MG/DL
ALP SERPL-CCNC: 69 U/L (ref 40–150)
ALT SERPL W P-5'-P-CCNC: 37 U/L (ref 0–50)
ANION GAP SERPL CALCULATED.3IONS-SCNC: 10 MMOL/L (ref 3–14)
APPEARANCE UR: ABNORMAL
AST SERPL W P-5'-P-CCNC: 21 U/L (ref 0–45)
ATRIAL RATE - MUSE: 95 BPM
BACTERIA #/AREA URNS HPF: ABNORMAL /HPF
BASOPHILS # BLD AUTO: 0.1 10E3/UL (ref 0–0.2)
BASOPHILS NFR BLD AUTO: 1 %
BILIRUB SERPL-MCNC: 0.6 MG/DL (ref 0.2–1.3)
BILIRUB UR QL STRIP: NEGATIVE
BUN SERPL-MCNC: 17 MG/DL (ref 7–30)
CALCIUM SERPL-MCNC: 9.2 MG/DL (ref 8.5–10.1)
CHLORIDE BLD-SCNC: 106 MMOL/L (ref 94–109)
CO2 SERPL-SCNC: 27 MMOL/L (ref 20–32)
COLOR UR AUTO: ABNORMAL
CREAT SERPL-MCNC: 1.33 MG/DL (ref 0.52–1.04)
DIASTOLIC BLOOD PRESSURE - MUSE: NORMAL MMHG
EOSINOPHIL # BLD AUTO: 0 10E3/UL (ref 0–0.7)
EOSINOPHIL NFR BLD AUTO: 0 %
ERYTHROCYTE [DISTWIDTH] IN BLOOD BY AUTOMATED COUNT: 14.2 % (ref 10–15)
GFR SERPL CREATININE-BSD FRML MDRD: 37 ML/MIN/1.73M2
GLUCOSE BLD-MCNC: 135 MG/DL (ref 70–99)
GLUCOSE UR STRIP-MCNC: NEGATIVE MG/DL
HCT VFR BLD AUTO: 38.3 % (ref 35–47)
HGB BLD-MCNC: 12.3 G/DL (ref 11.7–15.7)
HGB UR QL STRIP: NEGATIVE
HOLD SPECIMEN: NORMAL
HYALINE CASTS: 8 /LPF
IMM GRANULOCYTES # BLD: 0.1 10E3/UL
IMM GRANULOCYTES NFR BLD: 1 %
INTERPRETATION ECG - MUSE: NORMAL
KETONES UR STRIP-MCNC: NEGATIVE MG/DL
LACTATE SERPL-SCNC: 2.1 MMOL/L (ref 0.7–2)
LACTATE SERPL-SCNC: 3.8 MMOL/L (ref 0.7–2)
LEUKOCYTE ESTERASE UR QL STRIP: ABNORMAL
LIPASE SERPL-CCNC: 213 U/L (ref 73–393)
LYMPHOCYTES # BLD AUTO: 1.8 10E3/UL (ref 0.8–5.3)
LYMPHOCYTES NFR BLD AUTO: 18 %
MCH RBC QN AUTO: 33.9 PG (ref 26.5–33)
MCHC RBC AUTO-ENTMCNC: 32.1 G/DL (ref 31.5–36.5)
MCV RBC AUTO: 106 FL (ref 78–100)
MONOCYTES # BLD AUTO: 1.4 10E3/UL (ref 0–1.3)
MONOCYTES NFR BLD AUTO: 14 %
MUCOUS THREADS #/AREA URNS LPF: PRESENT /LPF
NEUTROPHILS # BLD AUTO: 6.4 10E3/UL (ref 1.6–8.3)
NEUTROPHILS NFR BLD AUTO: 66 %
NITRATE UR QL: NEGATIVE
NRBC # BLD AUTO: 0 10E3/UL
NRBC BLD AUTO-RTO: 0 /100
P AXIS - MUSE: 42 DEGREES
PH UR STRIP: 5.5 [PH] (ref 5–7)
PLATELET # BLD AUTO: 391 10E3/UL (ref 150–450)
POTASSIUM BLD-SCNC: 3.6 MMOL/L (ref 3.4–5.3)
PR INTERVAL - MUSE: 172 MS
PROCALCITONIN SERPL-MCNC: 0.07 NG/ML
PROT SERPL-MCNC: 6.5 G/DL (ref 6.8–8.8)
QRS DURATION - MUSE: 94 MS
QT - MUSE: 394 MS
QTC - MUSE: 495 MS
R AXIS - MUSE: -49 DEGREES
RBC # BLD AUTO: 3.63 10E6/UL (ref 3.8–5.2)
RBC URINE: 2 /HPF
SARS-COV-2 RNA RESP QL NAA+PROBE: NEGATIVE
SODIUM SERPL-SCNC: 143 MMOL/L (ref 133–144)
SP GR UR STRIP: 1.02 (ref 1–1.03)
SYSTOLIC BLOOD PRESSURE - MUSE: NORMAL MMHG
T AXIS - MUSE: 84 DEGREES
TROPONIN I SERPL-MCNC: 0.04 UG/L (ref 0–0.04)
UROBILINOGEN UR STRIP-MCNC: NORMAL MG/DL
VENTRICULAR RATE- MUSE: 95 BPM
WBC # BLD AUTO: 9.8 10E3/UL (ref 4–11)
WBC CLUMPS #/AREA URNS HPF: PRESENT /HPF
WBC URINE: 55 /HPF

## 2021-07-19 PROCEDURE — 96365 THER/PROPH/DIAG IV INF INIT: CPT

## 2021-07-19 PROCEDURE — 80053 COMPREHEN METABOLIC PANEL: CPT | Performed by: EMERGENCY MEDICINE

## 2021-07-19 PROCEDURE — 258N000003 HC RX IP 258 OP 636: Performed by: EMERGENCY MEDICINE

## 2021-07-19 PROCEDURE — 258N000003 HC RX IP 258 OP 636: Performed by: INTERNAL MEDICINE

## 2021-07-19 PROCEDURE — 99285 EMERGENCY DEPT VISIT HI MDM: CPT | Mod: 25

## 2021-07-19 PROCEDURE — 96361 HYDRATE IV INFUSION ADD-ON: CPT

## 2021-07-19 PROCEDURE — C9803 HOPD COVID-19 SPEC COLLECT: HCPCS

## 2021-07-19 PROCEDURE — 36592 COLLECT BLOOD FROM PICC: CPT | Performed by: EMERGENCY MEDICINE

## 2021-07-19 PROCEDURE — 87086 URINE CULTURE/COLONY COUNT: CPT | Performed by: EMERGENCY MEDICINE

## 2021-07-19 PROCEDURE — 99232 SBSQ HOSP IP/OBS MODERATE 35: CPT | Performed by: INTERNAL MEDICINE

## 2021-07-19 PROCEDURE — 36415 COLL VENOUS BLD VENIPUNCTURE: CPT | Performed by: EMERGENCY MEDICINE

## 2021-07-19 PROCEDURE — 250N000013 HC RX MED GY IP 250 OP 250 PS 637: Performed by: NURSE PRACTITIONER

## 2021-07-19 PROCEDURE — 71046 X-RAY EXAM CHEST 2 VIEWS: CPT

## 2021-07-19 PROCEDURE — 83605 ASSAY OF LACTIC ACID: CPT | Performed by: EMERGENCY MEDICINE

## 2021-07-19 PROCEDURE — 250N000011 HC RX IP 250 OP 636: Performed by: INTERNAL MEDICINE

## 2021-07-19 PROCEDURE — 87186 SC STD MICRODIL/AGAR DIL: CPT | Performed by: EMERGENCY MEDICINE

## 2021-07-19 PROCEDURE — 84484 ASSAY OF TROPONIN QUANT: CPT | Performed by: EMERGENCY MEDICINE

## 2021-07-19 PROCEDURE — 74176 CT ABD & PELVIS W/O CONTRAST: CPT

## 2021-07-19 PROCEDURE — 250N000011 HC RX IP 250 OP 636: Performed by: EMERGENCY MEDICINE

## 2021-07-19 PROCEDURE — 81001 URINALYSIS AUTO W/SCOPE: CPT | Performed by: EMERGENCY MEDICINE

## 2021-07-19 PROCEDURE — 99223 1ST HOSP IP/OBS HIGH 75: CPT | Mod: AI | Performed by: INTERNAL MEDICINE

## 2021-07-19 PROCEDURE — 96375 TX/PRO/DX INJ NEW DRUG ADDON: CPT

## 2021-07-19 PROCEDURE — 93005 ELECTROCARDIOGRAM TRACING: CPT

## 2021-07-19 PROCEDURE — 83690 ASSAY OF LIPASE: CPT | Performed by: EMERGENCY MEDICINE

## 2021-07-19 PROCEDURE — 87040 BLOOD CULTURE FOR BACTERIA: CPT | Performed by: EMERGENCY MEDICINE

## 2021-07-19 PROCEDURE — 84145 PROCALCITONIN (PCT): CPT | Performed by: EMERGENCY MEDICINE

## 2021-07-19 PROCEDURE — 85025 COMPLETE CBC W/AUTO DIFF WBC: CPT | Performed by: EMERGENCY MEDICINE

## 2021-07-19 PROCEDURE — 87635 SARS-COV-2 COVID-19 AMP PRB: CPT | Performed by: EMERGENCY MEDICINE

## 2021-07-19 PROCEDURE — 120N000001 HC R&B MED SURG/OB

## 2021-07-19 PROCEDURE — 82310 ASSAY OF CALCIUM: CPT | Performed by: EMERGENCY MEDICINE

## 2021-07-19 PROCEDURE — 250N000013 HC RX MED GY IP 250 OP 250 PS 637: Performed by: INTERNAL MEDICINE

## 2021-07-19 RX ORDER — PANTOPRAZOLE SODIUM 40 MG/1
40 TABLET, DELAYED RELEASE ORAL
Status: DISCONTINUED | OUTPATIENT
Start: 2021-07-19 | End: 2021-07-21 | Stop reason: HOSPADM

## 2021-07-19 RX ORDER — ONDANSETRON 4 MG/1
4 TABLET, ORALLY DISINTEGRATING ORAL EVERY 8 HOURS PRN
COMMUNITY
End: 2024-01-01

## 2021-07-19 RX ORDER — ACETAMINOPHEN 325 MG/1
650 TABLET ORAL EVERY 4 HOURS PRN
Status: DISCONTINUED | OUTPATIENT
Start: 2021-07-19 | End: 2021-07-21 | Stop reason: HOSPADM

## 2021-07-19 RX ORDER — SODIUM CHLORIDE 9 MG/ML
INJECTION, SOLUTION INTRAVENOUS CONTINUOUS
Status: DISCONTINUED | OUTPATIENT
Start: 2021-07-19 | End: 2021-07-20

## 2021-07-19 RX ORDER — LEVOFLOXACIN 5 MG/ML
250 INJECTION, SOLUTION INTRAVENOUS EVERY 24 HOURS
Status: DISCONTINUED | OUTPATIENT
Start: 2021-07-20 | End: 2021-07-20

## 2021-07-19 RX ORDER — HYDRALAZINE HYDROCHLORIDE 20 MG/ML
10 INJECTION INTRAMUSCULAR; INTRAVENOUS EVERY 4 HOURS PRN
Status: DISCONTINUED | OUTPATIENT
Start: 2021-07-19 | End: 2021-07-21 | Stop reason: HOSPADM

## 2021-07-19 RX ORDER — ACETAMINOPHEN 650 MG/1
650 SUPPOSITORY RECTAL EVERY 4 HOURS PRN
Status: DISCONTINUED | OUTPATIENT
Start: 2021-07-19 | End: 2021-07-21 | Stop reason: HOSPADM

## 2021-07-19 RX ORDER — METOPROLOL SUCCINATE 50 MG/1
50 TABLET, EXTENDED RELEASE ORAL DAILY
Status: DISCONTINUED | OUTPATIENT
Start: 2021-07-19 | End: 2021-07-21 | Stop reason: HOSPADM

## 2021-07-19 RX ORDER — PROCHLORPERAZINE MALEATE 5 MG
5 TABLET ORAL EVERY 6 HOURS PRN
Status: DISCONTINUED | OUTPATIENT
Start: 2021-07-19 | End: 2021-07-21 | Stop reason: HOSPADM

## 2021-07-19 RX ORDER — CALCIUM CARBONATE 500 MG/1
500 TABLET, CHEWABLE ORAL DAILY PRN
Status: DISCONTINUED | OUTPATIENT
Start: 2021-07-19 | End: 2021-07-21 | Stop reason: HOSPADM

## 2021-07-19 RX ORDER — ATORVASTATIN CALCIUM 20 MG/1
20 TABLET, FILM COATED ORAL AT BEDTIME
Status: DISCONTINUED | OUTPATIENT
Start: 2021-07-19 | End: 2021-07-21 | Stop reason: HOSPADM

## 2021-07-19 RX ORDER — LIDOCAINE 40 MG/G
CREAM TOPICAL
Status: DISCONTINUED | OUTPATIENT
Start: 2021-07-19 | End: 2021-07-21 | Stop reason: HOSPADM

## 2021-07-19 RX ORDER — ONDANSETRON 4 MG/1
4 TABLET, ORALLY DISINTEGRATING ORAL EVERY 6 HOURS PRN
Status: DISCONTINUED | OUTPATIENT
Start: 2021-07-19 | End: 2021-07-20 | Stop reason: DRUGHIGH

## 2021-07-19 RX ORDER — ONDANSETRON 2 MG/ML
4 INJECTION INTRAMUSCULAR; INTRAVENOUS EVERY 30 MIN PRN
Status: DISCONTINUED | OUTPATIENT
Start: 2021-07-19 | End: 2021-07-19 | Stop reason: ALTCHOICE

## 2021-07-19 RX ORDER — PROCHLORPERAZINE 25 MG
12.5 SUPPOSITORY, RECTAL RECTAL EVERY 12 HOURS PRN
Status: DISCONTINUED | OUTPATIENT
Start: 2021-07-19 | End: 2021-07-21 | Stop reason: HOSPADM

## 2021-07-19 RX ORDER — LEVOFLOXACIN 5 MG/ML
750 INJECTION, SOLUTION INTRAVENOUS ONCE
Status: COMPLETED | OUTPATIENT
Start: 2021-07-19 | End: 2021-07-19

## 2021-07-19 RX ORDER — ONDANSETRON 2 MG/ML
4 INJECTION INTRAMUSCULAR; INTRAVENOUS EVERY 6 HOURS PRN
Status: DISCONTINUED | OUTPATIENT
Start: 2021-07-19 | End: 2021-07-21 | Stop reason: HOSPADM

## 2021-07-19 RX ORDER — PAROXETINE 40 MG/1
40 TABLET, FILM COATED ORAL AT BEDTIME
Status: DISCONTINUED | OUTPATIENT
Start: 2021-07-19 | End: 2021-07-20

## 2021-07-19 RX ADMIN — PAROXETINE 40 MG: 40 TABLET, FILM COATED ORAL at 21:44

## 2021-07-19 RX ADMIN — SODIUM CHLORIDE 1000 ML: 9 INJECTION, SOLUTION INTRAVENOUS at 01:10

## 2021-07-19 RX ADMIN — SODIUM CHLORIDE: 9 INJECTION, SOLUTION INTRAVENOUS at 21:44

## 2021-07-19 RX ADMIN — ONDANSETRON 4 MG: 2 INJECTION INTRAMUSCULAR; INTRAVENOUS at 03:42

## 2021-07-19 RX ADMIN — SODIUM CHLORIDE: 9 INJECTION, SOLUTION INTRAVENOUS at 11:25

## 2021-07-19 RX ADMIN — ONDANSETRON 4 MG: 2 INJECTION INTRAMUSCULAR; INTRAVENOUS at 22:22

## 2021-07-19 RX ADMIN — ACETAMINOPHEN 650 MG: 325 TABLET, FILM COATED ORAL at 17:55

## 2021-07-19 RX ADMIN — METOPROLOL SUCCINATE 50 MG: 50 TABLET, EXTENDED RELEASE ORAL at 17:53

## 2021-07-19 RX ADMIN — SODIUM CHLORIDE 1000 ML: 9 INJECTION, SOLUTION INTRAVENOUS at 02:41

## 2021-07-19 RX ADMIN — METRONIDAZOLE 500 MG: 500 INJECTION, SOLUTION INTRAVENOUS at 04:58

## 2021-07-19 RX ADMIN — ATORVASTATIN CALCIUM 20 MG: 20 TABLET, FILM COATED ORAL at 21:44

## 2021-07-19 RX ADMIN — HYDRALAZINE HYDROCHLORIDE 10 MG: 20 INJECTION INTRAMUSCULAR; INTRAVENOUS at 19:56

## 2021-07-19 RX ADMIN — CALCIUM CARBONATE (ANTACID) CHEW TAB 500 MG 500 MG: 500 CHEW TAB at 22:56

## 2021-07-19 RX ADMIN — LEVOFLOXACIN 750 MG: 5 INJECTION, SOLUTION INTRAVENOUS at 06:02

## 2021-07-19 ASSESSMENT — ENCOUNTER SYMPTOMS
ABDOMINAL PAIN: 1
WEAKNESS: 1
DIZZINESS: 1
SHORTNESS OF BREATH: 1
ABDOMINAL DISTENTION: 1
DIARRHEA: 1
BLOOD IN STOOL: 1
VOMITING: 1
NAUSEA: 1

## 2021-07-19 ASSESSMENT — ACTIVITIES OF DAILY LIVING (ADL)
CONCENTRATING,_REMEMBERING_OR_MAKING_DECISIONS_DIFFICULTY: NO
ADLS_ACUITY_SCORE: 20
DOING_ERRANDS_INDEPENDENTLY_DIFFICULTY: NO
DIFFICULTY_EATING/SWALLOWING: NO
DIFFICULTY_COMMUNICATING: NO
ADLS_ACUITY_SCORE: 20
WERE_AUXILIARY_AIDS_OFFERED?: YES
TOILETING_ISSUES: NO
FALL_HISTORY_WITHIN_LAST_SIX_MONTHS: NO
PATIENT'S_PREFERRED_MEANS_OF_COMMUNICATION: OTHER
THE_FOLLOWING_AIDS_WERE_PROVIDED;: PATIENT DECLINED OFFER OF AUXILIARY AIDS
WEAR_GLASSES_OR_BLIND: NO
HEARING_DIFFICULTY_OR_DEAF: NO
WALKING_OR_CLIMBING_STAIRS_DIFFICULTY: NO
DESCRIBE_HEARING_LOSS: BILATERAL HEARING LOSS
ADLS_ACUITY_SCORE: 20
DRESSING/BATHING_DIFFICULTY: NO

## 2021-07-19 ASSESSMENT — MIFFLIN-ST. JEOR: SCORE: 1517.76

## 2021-07-19 NOTE — ED NOTES
[facility]  ED Nurse Handoff Report    ED Chief complaint: No chief complaint on file.      ED Diagnosis:   Final diagnoses:   Abdominal pain, generalized   Urinary tract infection without hematuria, site unspecified   Vomiting and diarrhea   Dehydration   Lactic acid blood increased       Code Status: Full Code    Allergies:   Allergies   Allergen Reactions     Demerol [Meperidine] Nausea and Vomiting     Sulfa Drugs Other (See Comments)     Extreme chills     Penicillins Rash     Swelling In mouth and tongue       Patient Story: pt brought in by daughter for n/v/d since Tuesday - about 2-3 episodes of vomiting and diarrhea daily. Unable to keep any food or fluids down. C/o weakness and dizziness. Pt also reports that stool and vomit are black - pt was changed in ED, noted brown-green stool.   Focused Assessment:  A&o x3, hard of hearing. Generalized abdominal pain and bloating. C/o nausea, vomiting and diarrhea. Usually independent with walking, but needs assist x1 in ED due to weakness and dizziness. No diarrhea while in the ER.    Treatments and/or interventions provided: fluids, labs, CT, initial lactic 3.8; repeat 2.1 after 2L NS; UA shows UTI and flagyl and levoquin given  Patient's response to treatments and/or interventions: fair    To be done/followed up on inpatient unit:  n/a    Does this patient have any cognitive concerns?: none    Activity level - Baseline/Home:  Independent  Activity Level - Current:   Wheelchair    Patient's Preferred language: English   Needed?: No    Isolation: enteric?  Infection: possible cdiff  Patient tested for COVID 19 prior to admission: YES  Bariatric?: No    Vital Signs:   Vitals:    07/19/21 0530 07/19/21 0600 07/19/21 0630 07/19/21 0700   BP: 115/82 137/67 120/89 128/75   Pulse: 85 85 84 79   Resp:       Temp:       TempSrc:       SpO2:  95% 98% 96%   Weight:       Height:           Cardiac Rhythm:     Was the PSS-3 completed:   Yes  What  interventions are required if any?               Family Comments: daughter at bedside  OBS brochure/video discussed/provided to patient/family: N/A              Name of person given brochure if not patient: n/a              Relationship to patient: n/a    For the majority of the shift this patient's behavior was Green.   Behavioral interventions performed were reassurance and information.    ED NURSE PHONE NUMBER: *[phone]

## 2021-07-19 NOTE — PROGRESS NOTES
[facility]    Medicine Progress Note - Hospitalist Service        Date of Admission:  7/19/2021 12:14 AM    Assessment & Plan:   Yoalnda Watson is 80, who presents with initially left-sided chest discomfort for about a week. This was then followed by left-sided flank pain with multiple episodes of nausea, vomiting, diarrhea, dizziness and dizziness.      Urinary tract infection  -The patient presented with lactic acidosis, tachycardia, dizziness, lightheadedness.    -She had no fever and had a normal white count.    -CT abdomen and pelvis done in the emergency room did not show any acute process  -Urine analysis concerning for UTI  -Continue empiric Levaquin  -Normal saline at 100 mL/h  -Await urine culture and blood culture    Acute kidney injury  -Her creatinine at presentation was up to 1.3.  At baseline she has normal creatinine.  -Continue IV fluids as above    Possible gastrointestinal bleed   Nausea, vomiting and diarrhea  -hemoglobin is  normal.    -She has been apparently having some dark stools(she isn't entirely sure though), but there is no evidence of robles blood.    -Has had only one episode of diarrhea today so no significant concern for C. difficile however if diarrhea continues will need to consider testing that also.  -If Hb drops or clinical evidence of bleeding will consult GI  -Hb in AM    Essential hypertension   -Hold prior to admission lisinopril  -Continue metoprolol with hold parameters  -As needed hydralazine    History cardiomyopathy(chemo induced)    -continue aspirin and metoprolol.      Depression   -continue Paxil    Hyperlipidemia   -continue Lipitor    Diet: Combination Diet Regular Diet Adult     DVT Prophylaxis: Pneumatic Compression Devices   Patricia Catheter: Not present  Code Status: Full Code     Disposition Plan    Expected discharge:1-2 days pending clinical progress.  Entered: Karson Gudino MD 07/19/2021, 2:05 PM        The patient's care was discussed with  "the Bedside Nurse and Patient.    Karson Gudino MD  Hospitalist Service  [facility]  Text Page 7AM-6PM  Securely message with the Vocera Web Console (learn more here)  Text page via [employer] Paging/Directory    ______________________________________________________________________    Interval History   Overall slightly better.  Patient did have 1 more episode of diarrhea after lunch earlier today.  No further vomiting.  Denies abdominal pain.    Data reviewed today: I reviewed all medications, new labs and imaging results over the last 24 hours. I personally reviewed no images or EKG's today.    Physical Exam   Vital signs:  Temp: (!) 96.7  F (35.9  C) Temp src: Oral BP: (!) 165/79 Pulse: 77   Resp: 16 SpO2: 96 % O2 Device: None (Room air)   Height: 175.3 cm (5' 9\") Weight: 99.3 kg (219 lb)  Estimated body mass index is 32.34 kg/m  as calculated from the following:    Height as of this encounter: 1.753 m (5' 9\").    Weight as of this encounter: 99.3 kg (219 lb).      Wt Readings from Last 2 Encounters:   07/19/21 99.3 kg (219 lb)   03/24/21 99.3 kg (219 lb)       Gen: AAOX3, NAD  Resp: CTA B/L, normal WOB  CVS: RRR, no murmur  Abd/GI: Soft, non-tender. BS- normoactive.    Skin: Warm, dry no rashes  MSK: No joint deformities, no pedal edema  Neuro- CN- intact. No focal deficits.      Data   Recent Labs   Lab 07/19/21  0109   WBC 9.8   HGB 12.3   *         POTASSIUM 3.6   CHLORIDE 106   CO2 27   BUN 17   CR 1.33*   ANIONGAP 10   PRIYA 9.2   *   ALBUMIN 3.3*   PROTTOTAL 6.5*   BILITOTAL 0.6   ALKPHOS 69   ALT 37   AST 21   LIPASE 213   TROPONIN 0.038       Recent Results (from the past 24 hour(s))   CT Abdomen Pelvis w/o Contrast    Narrative    EXAM: CT ABDOMEN PELVIS W/O CONTRAST  LOCATION: [facility]  DATE/TIME: 7/19/2021 4:23 AM    INDICATION: Abdominal pain, acute, nonlocalized  COMPARISON: CT abdomen pelvis 08/11/2020. CT chest " 03/09/2021.  TECHNIQUE: CT scan of the abdomen and pelvis was performed without IV contrast. Multiplanar reformats were obtained. Dose reduction techniques were used.  CONTRAST: None.    FINDINGS:   LOWER CHEST: Postoperative changes and fibrosis of the right lung base. Small right pleural effusion. Mild atelectasis at the left base. Moderate coronary artery calcification.    HEPATOBILIARY: Small probable benign cyst of the left hepatic lobe. Postcholecystectomy. Mild extrahepatic biliary dilatation consistent with reservoir effect after cholecystectomy.    PANCREAS: Normal.    SPLEEN: Normal.    ADRENAL GLANDS: Normal.    KIDNEYS/BLADDER: Bilateral renal cysts including small hyperdense cysts of both kidneys.    BOWEL: Post right colectomy with ileocolic anastomosis. Colonic diverticulosis. No obstruction or inflammation. No free fluid or gas.    LYMPH NODES: Normal.    VASCULATURE: Moderate aortoiliac atherosclerosis.    PELVIC ORGANS: Post hysterectomy.    MUSCULOSKELETAL: Demineralized skeleton. Advanced degenerative disc disease at L4-L5. Nonacute fracture of the right lateral 10th rib has not yet fully healed.      Impression    IMPRESSION:   1.  No acute process in the abdomen or pelvis.  2.  Postoperative changes and fibrosis of the right lung base. Small right pleural effusion.  3.  Coronary artery disease.  4.  Bilateral renal cysts including small hyperdense cysts of both kidneys.  5.  Post right colectomy with ileocolic anastomosis.  6.  Colonic diverticulosis without evidence for diverticulitis.     XR Chest 2 Views    Narrative    CHEST TWO VIEWS July 19, 2021 7:16 AM     HISTORY: Left-sided chest pain.    COMPARISON: 3/24/2021.    FINDINGS: Volume loss in the right hemithorax is similar to the prior  study. Blunting of the right costophrenic angle is also stable and may  be postoperative. Chronic interstitial abnormalities are again noted.  No new airspace consolidation. No pneumothorax. Right  shoulder  replacement noted.       Impression    IMPRESSION: No radiographic evidence of acute chest abnormality.     KEITH BARAJAS MD         SYSTEM ID:  [id number]     Medications     sodium chloride 100 mL/hr at 07/19/21 1125       [START ON 7/20/2021] levofloxacin  250 mg Intravenous Q24H     sodium chloride (PF)  3 mL Intracatheter Q8H

## 2021-07-19 NOTE — PROVIDER NOTIFICATION
MD Notification    Notified Person: MD    Notified Person Name: Terese    Notification Date/Time: 7/19/21 @ 6394    Notification Interaction: phone    Purpose of Notification: pt's home meds not ordered.    Orders Received: Provider unable to access epic. Verbal order to put in PTA dose of metoprolol XL, paxil, lipitor, and continue to hold ASA until GI bleed is ruled out.    Comments:

## 2021-07-19 NOTE — PROVIDER NOTIFICATION
RECEIVING UNIT ED HANDOFF REVIEW    ED Nurse Handoff Report was reviewed by: Joan Renteria RN on July 19, 2021 at 8:44 AM

## 2021-07-19 NOTE — PHARMACY-ADMISSION MEDICATION HISTORY
Pharmacy Medication History  Admission medication history interview status for the 7/19/2021  admission is complete. See EPIC admission navigator for prior to admission medications     Location of Interview: Patient room  Medication history sources: Patient    Significant changes made to the medication list:  Removed aspirin, cholestyramine, multivitamin    In the past week, patient estimated taking medication this percent of the time: greater than 90%    Additional medication history information:   None    Medication reconciliation completed by provider prior to medication history? No    Time spent in this activity: 10 minutes    Prior to Admission medications    Medication Sig Last Dose Taking? Auth Provider   acetaminophen (TYLENOL) 325 MG tablet Take 325-650 mg by mouth every 6 hours as needed for mild pain  Yes Unknown, Entered By History   anastrozole (ARIMIDEX) 1 MG tablet Take 1 mg by mouth At Bedtime  7/18/2021 at Unknown time Yes Reported, Patient   atorvastatin (LIPITOR) 20 MG tablet Take 20 mg by mouth At Bedtime  7/18/2021 at Unknown time Yes Reported, Patient   lisinopril (ZESTRIL) 10 MG tablet Take 1 tablet (10 mg) by mouth daily 7/18/2021 at Unknown time Yes Ariadne Garcia MD   loperamide (IMODIUM A-D) 2 MG capsule Take 2 mg by mouth 4 times daily as needed for diarrhea  Yes Reported, Patient   metoprolol succinate ER (TOPROL-XL) 50 MG 24 hr tablet Take 1 tablet (50 mg) by mouth daily 7/18/2021 at Unknown time Yes Sulema Coleman PA-C   ondansetron (ZOFRAN-ODT) 4 MG ODT tab Take 4 mg by mouth every 8 hours as needed for nausea  Yes Unknown, Entered By History   PARoxetine (PAXIL) 40 MG tablet Take 40 mg by mouth At Bedtime  7/18/2021 at Unknown time Yes Unknown, Entered By History   vitamin D3 (CHOLECALCIFEROL) 2000 units (50 mcg) tablet Take 1 tablet by mouth daily 7/18/2021 at Unknown time Yes Reported, Patient       The information provided in this note is only as accurate as the sources  available at the time of update(s)

## 2021-07-19 NOTE — PROGRESS NOTES
0915-10:55. Pt was settled in bed. VSS on RA, ex hypertensive, not high enough for prn meds. Denies pain and nausea at this time. Regular diet. A1+GB+W, ambulating very slow.Adequate urine output.  Head to toes assessment not completed due time constrains. Writer ( RN) assignment was reduced as a results, and report was given to oncoming nursePascual. Pt in bed at time of report and in stable conditions.

## 2021-07-19 NOTE — ED PROVIDER NOTES
History   Chief Complaint:  Nausea, vomiting, diarrhea     The history is provided by the patient.      Yolanda Watson is a 80 year old female with history of depression, cancer, hypertension, and hyperlipidemia who presents with nausea, vomiting, and diarrhea. The patient reports that since earlier this week, she has been vomiting two to three times a day and has been experiencing diarrhea. She denies seeing blood in the vomit but says her vomit and stools are often black. She denies taking any pepto bismol and has been taking ammonium. She feels weak and dizzy upon standing, and she fears she may pass out. She does have shortness of breath but says this is fairly normal for her as she only has 1/3 of her right lung. She also has lower abdominal pain and feels bloated. She denies travel, exotic pets, or contact with anyone sick.     Review of Systems   Respiratory: Positive for shortness of breath.    Gastrointestinal: Positive for abdominal distention, abdominal pain, blood in stool, diarrhea, nausea and vomiting.   Neurological: Positive for dizziness and weakness.   All other systems reviewed and are negative.        Allergies:  Meperidine  Penicillins  Sulfa drugs    Medications:  Aspirin  Lipitor  Lisinopril  Anastrozole  Metoprolol succinate  Paxil    Past Medical History:    Depression  Acoustic neuroma  Anemia  Arthritis  Breast cancer  Cervical cancer  Colon cancer  Colorectal cancer  Diabetes  Dyspnea  GERD  Lung cancer  Herpes zoster  Hypertension  Hyperlipidemia  Obesity    Past Surgical History:    Acoustic neuroma excised  Appendectomy  Arthroscopy, right knee  Biopsy node sentinel  Breast biopsy, bilateral  Reverse total shoulder arthroplasty  Cataracts  Cholecystectomy  Colon surgery  Colonoscopy  Bladder sling  Hysterectomy  Insert/remove port vascular access  Laparoscopic assisted colectomy  Lobectomy, right lung  Lumpectomy, breast  Mastectomy  Resect rib  Thoracotomy  Tonsillectomy    Family  "History:    Hyperlipidemia, mother/sister/brother  Hypertension, mother/sister  Heart disease, mother  Osteoporosis, mother  Prostate cancer, brother/father    Social History:  Smoking status: former smoker  Alcohol use: yes  Drug use: no  PCP: Soren Perdomo  Presents to the ED with daughter.    Physical Exam     Patient Vitals for the past 24 hrs:   BP Temp Temp src Pulse Resp SpO2 Height Weight   07/19/21 0630 120/89 -- -- 84 -- 98 % -- --   07/19/21 0600 137/67 -- -- 85 -- 95 % -- --   07/19/21 0530 115/82 -- -- 85 -- -- -- --   07/19/21 0500 133/71 -- -- 74 -- -- -- --   07/19/21 0400 121/86 -- -- 118 -- 95 % -- --   07/19/21 0330 132/66 -- -- 86 -- 95 % -- --   07/19/21 0300 126/69 -- -- 90 -- 96 % -- --   07/19/21 0245 124/80 -- -- 95 -- 95 % -- --   07/19/21 0230 (!) 121/93 -- -- 95 -- 94 % -- --   07/19/21 0215 126/67 -- -- 97 -- 94 % -- --   07/19/21 0200 127/68 -- -- -- -- 95 % -- --   07/19/21 0145 125/64 -- -- 94 -- 95 % -- --   07/19/21 0130 125/56 -- -- 91 -- -- -- --   07/19/21 0115 126/60 -- -- 97 -- 95 % -- --   07/19/21 0030 128/66 -- -- -- -- 96 % -- --   07/19/21 0011 119/67 97.8  F (36.6  C) Oral 110 18 96 % 1.753 m (5' 9\") 99.3 kg (219 lb)       Physical Exam    Constitutional:  Cooperative.  Pale, looks uncomfortable  HENT:   Head:    Atraumatic.   Mouth/Throat:   Oropharynx is without erythema or exudate and mucous membranes are tacky  Eyes:    Conjunctivae normal and EOM are normal.      Pupils are equal, round, and reactive to light.   Neck:    Normal range of motion. Neck supple.   Cardiovascular:  Normal rate, regular rhythm, normal heart sounds and radial and dorsalis pedis pulses are 2+ and symmetric.    Pulmonary/Chest:  Effort normal and breath sounds normal.   Abdominal:   Soft.  Mildly distended.  Bowel sounds are very active.     No splenomegaly or hepatomegaly.  Mild diffuse tenderness. No rebound.   Musculoskeletal:  Normal range of motion. No edema and no tenderness. "   Neurological:  Alert. Normal strength. No cranial nerve deficit.   Skin:    Skin is warm and dry.  Pale  Psychiatric:   Normal mood and flat affect.     Emergency Department Course     ECG:  ECG taken at 0147, ECG read at 0211  Sinus rhythm with premature atrial complexes  Left anterior fascicular block  Abnormal ECG  No significant change as compared to prior, dated 03/24/2021.   Rate 95 bpm. NV interval 172 ms. QRS duration 94 ms. QT/QTc 394/495 ms. P-R-T axes 42 -49 84.    Imaging:  CT Abdomen/Pelvis w/o contrast:   1.  No acute process in the abdomen or pelvis.   2.  Postoperative changes and fibrosis of the right lung base. Small right pleural effusion.   3.  Coronary artery disease.   4.  Bilateral renal cysts including small hyperdense cysts of both kidneys.   5.  Post right colectomy with ileocolic anastomosis.   6.  Colonic diverticulosis without evidence for diverticulitis.     Reading per radiology    Chest  XR:  Pending    Reading per radiology      Laboratory:  CBC: WBC 9.8, HGB 12.3,    CMP: Glucose 135(H) Protein Total 6.5(L), Albumin 3.3(L), GFR 37(L) o/w WNL (Creatinine: 1.33(H))    UA: Color: Orange(A), Appearance: Slightly Cloudy(A), Protein Albumin: 30(A), Leukocyte Esterase: Large(A), WBC: 55(H), WBC clumps: Present(A), Bacteria: Many(A), Mucus: Present (A), Hyaline Casts: 8(H), o/w Negative  Troponin (Collected 0109): 0.038  Lactic acid (Resulted 0127): 3.8(H)  Lactic acid (Resulted 0341): 2.1(H)  Lipase: 213  Procalcitonin: 0.07  Blood Cultures x2: Pending  Urine Culture Aerobic Bacterial: Pending  Asymptomatic COVID-19 PCR: Pending      Emergency Department Course:  Reviewed:  I reviewed the patient's nursing notes, vitals, past medical records, Care Everywhere.     Assessments:  0035 I performed an assessment and examination of the patient as noted above.      2112 I updated the patient with findings of the labs and imaging.    0615  I spoke to Dr. Leach of the hospitalist service  who accepts the patient for admission.     0640 Findings and plan explained to the Patient who consents to admission. Discussed the patient with Dr. Leach, who will admit the patient to an admit bed for further monitoring, evaluation, and treatment.     Interventions:  0110 NS 1L IV Bolus   0342 Zofran 4mg IV injection    0458 Flagyl 500 mg IV infusion  0602 Levaquin 750mg IV    Disposition:  The patient was admitted to the hospital under the care of Dr. Leach.     Impression & Plan   Medical Decision Making:  Yolanda Watson is a 80 year old female who presents complaining of nausea, vomiting, and diarrhea for the past several days. She says she is feeling weak and dehydrated, light-headed when she stands. She also has cramping abdominal pain. Prior to developing vomiting and diarrhea, she also had left flank pain for several days.     Clinically the patient looks dehydrated. An IV was established and a liter of fluids was ordered. Her lactate returned to elevated and a second liter was ordered. Lactate improved following hydration. Urinalysis suggests urinary tract infection. White cell count is normal aside from borderline elevated creatinine, as noted above. Non-contrast CT scan of the abdomen and pelvis does not show any acute abnormality or source of her abdominal pain. Her EKG did not look acutely ischemic. Troponin is checked and is detectable but not elevated. The patient denies chest pain.     The patient was given IV antibiotics for her urinary tract infection, and with concern for sepsis. However, I suspect the lactate elevation was more due to dehydration than sepsis.    I discussed test results and planned for hospitalization with the patient and her daughter, and they expressed understanding Dr. Leach of the hospitalist service accepted.    Covid-19  Yolanda Watson was evaluated during a global COVID-19 pandemic, which necessitated consideration that the patient might be at risk for infection with the  SARS-CoV-2 virus that causes COVID-19.   Applicable protocols for evaluation were followed during the patient's care.   COVID-19 was considered as part of the patient's evaluation. The plan for testing is:  a test was obtained during this visit.    Diagnosis:    ICD-10-CM    1. Abdominal pain, generalized  R10.84    2. Urinary tract infection without hematuria, site unspecified  N39.0    3. Vomiting and diarrhea  R11.10     R19.7    4. Dehydration  E86.0    5. Lactic acid blood increased  R79.89      Scribe Disclosure:  ICassia (trainee), am serving as a scribe at 12:24 AM on 7/19/2021 to document services personally performed by Avery Gabriel MD based on my observations and the provider's statements to me.      Scribe Disclosure:  Agueda GONZALEZ (), am serving as a scribe at 6:18 AM on 7/19/2021 to document services personally performed by Avery Gabriel MD based on my observations and the provider's statements to me.        Aveyr Gabriel MD  07/19/21 0839

## 2021-07-19 NOTE — PLAN OF CARE
PT/OT: Evaluation orders received, note pt with UTI. Has only just arrived to the floor this am. Will hold 24 hrs for PT/OT evaluations to allow for medical management for optimal mobility and discharge recommendations.

## 2021-07-19 NOTE — H&P
Admitted: 07/19/2021    PRIMARY CARE PHYSICIAN:  Dr. Soren Perdomo    CHIEF COMPLAINT:  Nausea, vomiting, diarrhea.    HISTORY OF PRESENT ILLNESS:  Yolanda Watson is an 80-year-old female with history of depression, anemia, breast cancer, cervical cancer, colorectal cancer, diabetes, dyspnea, GERD, lung cancer, hypertension, amongst other diagnoses.  She comes in with 3 days of nausea, vomiting, diarrhea.  For the last 3 days, she has been having multiple episodes of emesis, about 2-3 times.  She has also had diarrhea as well.  There is no robles blood in either her emesis or diarrhea, but her stools are often black, but she denies any robles blood.  She has been attempting to take Imodium.  She has been feeling weak and dizzy upon standing.  She feels she is going to pass out, though she has not.  Due to her underlying chronic obstructive pulmonary disease and lung cancer, she does have some baseline shortness of breath.  Prior to that, the patient was having some left-sided chest pain on the axillary area prior to her nausea and vomiting.    The patient came to [facility] and was seen by Dr. Avery Gabriel.  The patient was afebrile, initially tachycardic, normal oxygen saturations, blood pressures stable.  Blood work revealed normal electrolytes, BUN of 17, creatinine 1.32 with calculated GFR of 37.  This is significantly worse than her prior baseline of about 0.7.  Calcium is 9.2, albumin 3.3, total protein 6.4.  Other LFTs were normal.  Lactic acid elevated at 3.8, down to 2.0 with fluids.  Lipase was 2013.  Procalcitonin level is normal.  Troponin is below reference range 0.038.  CBC showed a white count of 9.8, hemoglobin 12.3, platelets of 391.  Urinalysis has specific gravity 1.016.  It is orange, slightly cloudy.  She has 55 white cells, 2 red cells, many bacteria and WBC clumps, and 8 hyaline casts.    The patient has ALLERGIES TO SULFA AND PENICILLIN, so she received Levaquin and Flagyl.  The  patient received a total of 2 liters of normal saline and is feeling much better after Zofran.  The patient will be admitted as inpatient for UTI, suspected sepsis, with lactic acidosis, as well as acute kidney injury with hyaline casts,being admitted as inpatient.    PAST MEDICAL HISTORY:     1.  Depression.  2.  Acoustic neuroma.  3.  Anemia.  4.  Arthritis.  5.  Breast cancer.  6.  Cervical cancer.  7.  Colon cancer.  8.  Diabetes mellitus.  9.  COPD with baseline chronic dyspnea.  10.  GERD.  11.  Lung cancer.  12.  Herpes zoster.  13.  Hypertension.  14.  Hyperlipidemia.  15.  Obesity.    PAST SURGICAL HISTORY:    1.  Acoustic neuroma excision.  2.  Appendectomy.  3.  Right knee arthroscopy.  4.  [place] node biopsy.  5.  Reverse total shoulder arthroplasty.  6.  Cataract.  7.  Cholecystectomy.  8.  Colon surgery.  9.  Colonoscopy.  10.  Bladder sling surgery.  11.  Hysterectomy.  12.  Port placement.  13.  Laparoscopic-assisted colectomy.  14.  Right lung lobectomy.  15.  Breast lumpectomy followed by mastectomy.  16. History of rib resection with thoracotomy.  17.  Tonsillectomy.    FAMILY HISTORY:  Mother, sister, brother with hyperlipidemia.  Mother, sister with hypertension.  Mother with heart disease and osteoporosis.  Father with prostate cancer.    SOCIAL HISTORY:  Former smoker.  She is , currently living independently, but she and her , who are in a rehab center, are going to be moved to an assisted living facility.  She has a glass of wine occasionally.  She is FULL CODE.  Follows with Dr. Perdomo.    ALLERGIES:  MEPERIDINE, PENICILLIN, SULFA.    CURRENT UNVERIFIED MEDICATIONS:  Include metoprolol, Tylenol, Arimidex, aspirin, atorvastatin, cholestyramine, loperamide, multivitamin, Zofran, Paxil, vitamin D.    REVIEW OF SYSTEMS:  Ten points reviewed and as dictated in history of present illness, specifically for baseline shortness of breath, but positive for abdominal pain, some  diarrhea, nausea and vomiting, dizziness and weakness.  Denies any fevers, chills, sweats, but positive for left upper chest pain.  Otherwise, 10 points reviewed and negative.    PHYSICAL EXAMINATION:    VITAL SIGNS:  Temperature 97.8, heart rate 84, respirations 18, blood pressure 120/89, sats are 90% on room air.  GENERAL:  The patient is alert and oriented x 3, but feels very weak.  HEENT:  Pupils equal.  Sclerae are anicteric.  Mucous membranes are tacky.  No facial asymmetry.  Head is atraumatic.  NECK:  JVP is flat.   PULMONARY:  Lungs are clear to auscultation.  CARDIOVASCULAR:  S1, S2, regular rhythm.  No murmurs, gallops, or rubs noted.  No chest wall tenderness noted.  GASTROINTESTINAL:  Abdomen is soft.  Bowel sounds are normoactive.  There is no guarding or rebound.  MUSCULOSKELETAL:  No edema.  NEUROLOGIC:  She is grossly nonfocal.  Cranial nerves grossly intact.  SKIN:  Warm, dry, well perfused.  PSYCHIATRIC:  Mood and affect normal and stable.    LABORATORY AND IMAGING STUDIES:  As dictated in history of present illness.    IMPRESSION:  Yolanda Watson is 80, who presents with initially left-sided chest discomfort for about a week.  This was then followed by some left-sided flank pain with multiple episodes of nausea, vomiting, diarrhea, dizziness and wooziness.  She is being admitted for further evaluation.    PLAN:     1.  Urinary tract infection, but suspected sepsis:  The patient presented with lactic acidosis, tachycardia, dizziness, lightheadedness.  She had no fever and had a normal white count.  The patient received IV fluids.  We are going to treat her with Levaquin due to HER PENICILLIN AND SULFA ALLERGIES.  We will await urine cultures.  We will also await blood cultures as well.  The patient's blood pressure is better.  Her lactic acid is improving.  The patient can eat.  Anticipate at least a 7-day course.  There is no evidence of any stones on the CT scan.  There is no acute process in the  abdomen or pelvis.  There are postoperative changes and fibrosis of the right lung base and a small right pleural effusion.  There are bilateral renal cysts and right colectomy with anastomosis and colonic diverticulosis without evidence of diverticulitis.  2.  Acute kidney injury with acute tubular necrosis:  The patient has 8 hyaline casts and her kidney function is significantly decreased.  Her creatinine is up to 1.3.  The patient will receive IV fluids.  Looking at her medications, I know she is on Lispro, which will need to be held.  3.  Hypertension:  We will continue the patient on her metoprolol as her blood pressure is stable.  4.  History of atherosclerotic cardiovascular disease:  The patient will be continued on her aspirin and metoprolol.  5.  Possible gastrointestinal bleed:  The patient's hemoglobin is actually normal.  She has been having some dark stools, but there is no evidence of robles blood.  We will monitor her stools.  Suspicion for any sort of bleeding is low based on presentation.  6.  Depression:  The patient will be continued on her Paxil once verified.  7.  Hyperlipidemia:  We will continue the patient on Lipitor once verified.  8.  Deep venous thrombosis prophylaxis:  The patient will receive compression boots.    CODE STATUS:  FULL.    Russell Leach MD        D: 2021   T: 2021   MT: [facility]    Name:     WARREN METCALF  MRN:      [id number]        Account:     [id number]   :      1940           Admitted:    2021       Document: [id number]    cc:  Soren Perdomo MD

## 2021-07-20 ENCOUNTER — APPOINTMENT (OUTPATIENT)
Dept: OCCUPATIONAL THERAPY | Facility: CLINIC | Age: 81
DRG: 690 | End: 2021-07-20
Attending: INTERNAL MEDICINE
Payer: COMMERCIAL

## 2021-07-20 LAB
ANION GAP SERPL CALCULATED.3IONS-SCNC: 7 MMOL/L (ref 3–14)
BACTERIA UR CULT: ABNORMAL
BUN SERPL-MCNC: 12 MG/DL (ref 7–30)
C COLI+JEJUNI+LARI FUSA STL QL NAA+PROBE: NOT DETECTED
CALCIUM SERPL-MCNC: 8.3 MG/DL (ref 8.5–10.1)
CHLORIDE BLD-SCNC: 110 MMOL/L (ref 94–109)
CO2 SERPL-SCNC: 25 MMOL/L (ref 20–32)
CREAT SERPL-MCNC: 0.97 MG/DL (ref 0.52–1.04)
EC STX1 GENE STL QL NAA+PROBE: NOT DETECTED
EC STX2 GENE STL QL NAA+PROBE: NOT DETECTED
ERYTHROCYTE [DISTWIDTH] IN BLOOD BY AUTOMATED COUNT: 14.1 % (ref 10–15)
GFR SERPL CREATININE-BSD FRML MDRD: 55 ML/MIN/1.73M2
GLUCOSE BLD-MCNC: 97 MG/DL (ref 70–99)
HCT VFR BLD AUTO: 32.8 % (ref 35–47)
HGB BLD-MCNC: 10.1 G/DL (ref 11.7–15.7)
MCH RBC QN AUTO: 33.6 PG (ref 26.5–33)
MCHC RBC AUTO-ENTMCNC: 30.8 G/DL (ref 31.5–36.5)
MCV RBC AUTO: 109 FL (ref 78–100)
NOROV GI+II ORF1-ORF2 JNC STL QL NAA+PR: NOT DETECTED
PLATELET # BLD AUTO: 280 10E3/UL (ref 150–450)
POTASSIUM BLD-SCNC: 3.7 MMOL/L (ref 3.4–5.3)
RBC # BLD AUTO: 3.01 10E6/UL (ref 3.8–5.2)
RVA NSP5 STL QL NAA+PROBE: NOT DETECTED
SALMONELLA SP RPOD STL QL NAA+PROBE: NOT DETECTED
SHIGELLA SP+EIEC IPAH STL QL NAA+PROBE: NOT DETECTED
SODIUM SERPL-SCNC: 142 MMOL/L (ref 133–144)
V CHOL+PARA RFBL+TRKH+TNAA STL QL NAA+PR: NOT DETECTED
WBC # BLD AUTO: 5.7 10E3/UL (ref 4–11)
Y ENTERO RECN STL QL NAA+PROBE: NOT DETECTED

## 2021-07-20 PROCEDURE — 250N000013 HC RX MED GY IP 250 OP 250 PS 637: Performed by: INTERNAL MEDICINE

## 2021-07-20 PROCEDURE — 99207 PR CDG-CODE CATEGORY CHANGED: CPT | Performed by: INTERNAL MEDICINE

## 2021-07-20 PROCEDURE — 36415 COLL VENOUS BLD VENIPUNCTURE: CPT | Performed by: INTERNAL MEDICINE

## 2021-07-20 PROCEDURE — 120N000001 HC R&B MED SURG/OB

## 2021-07-20 PROCEDURE — 99232 SBSQ HOSP IP/OBS MODERATE 35: CPT | Performed by: INTERNAL MEDICINE

## 2021-07-20 PROCEDURE — 82374 ASSAY BLOOD CARBON DIOXIDE: CPT | Performed by: INTERNAL MEDICINE

## 2021-07-20 PROCEDURE — 258N000003 HC RX IP 258 OP 636: Performed by: INTERNAL MEDICINE

## 2021-07-20 PROCEDURE — 97165 OT EVAL LOW COMPLEX 30 MIN: CPT | Mod: GO | Performed by: OCCUPATIONAL THERAPIST

## 2021-07-20 PROCEDURE — 87506 IADNA-DNA/RNA PROBE TQ 6-11: CPT | Performed by: INTERNAL MEDICINE

## 2021-07-20 PROCEDURE — 97535 SELF CARE MNGMENT TRAINING: CPT | Mod: GO | Performed by: OCCUPATIONAL THERAPIST

## 2021-07-20 PROCEDURE — 250N000011 HC RX IP 250 OP 636: Performed by: INTERNAL MEDICINE

## 2021-07-20 PROCEDURE — 85027 COMPLETE CBC AUTOMATED: CPT | Performed by: INTERNAL MEDICINE

## 2021-07-20 RX ORDER — LISINOPRIL 10 MG/1
10 TABLET ORAL DAILY
Status: DISCONTINUED | OUTPATIENT
Start: 2021-07-20 | End: 2021-07-21 | Stop reason: HOSPADM

## 2021-07-20 RX ORDER — MAGNESIUM HYDROXIDE/ALUMINUM HYDROXICE/SIMETHICONE 120; 1200; 1200 MG/30ML; MG/30ML; MG/30ML
30 SUSPENSION ORAL EVERY 4 HOURS PRN
Status: DISCONTINUED | OUTPATIENT
Start: 2021-07-20 | End: 2021-07-21 | Stop reason: HOSPADM

## 2021-07-20 RX ORDER — ANASTROZOLE 1 MG/1
1 TABLET ORAL AT BEDTIME
Status: DISCONTINUED | OUTPATIENT
Start: 2021-07-20 | End: 2021-07-21 | Stop reason: HOSPADM

## 2021-07-20 RX ORDER — ONDANSETRON 4 MG/1
4 TABLET, ORALLY DISINTEGRATING ORAL EVERY 8 HOURS PRN
Status: DISCONTINUED | OUTPATIENT
Start: 2021-07-20 | End: 2021-07-21 | Stop reason: HOSPADM

## 2021-07-20 RX ORDER — PAROXETINE 40 MG/1
40 TABLET, FILM COATED ORAL AT BEDTIME
Status: DISCONTINUED | OUTPATIENT
Start: 2021-07-20 | End: 2021-07-21 | Stop reason: HOSPADM

## 2021-07-20 RX ORDER — LEVOFLOXACIN 250 MG/1
250 TABLET, FILM COATED ORAL DAILY
Status: DISCONTINUED | OUTPATIENT
Start: 2021-07-21 | End: 2021-07-21 | Stop reason: HOSPADM

## 2021-07-20 RX ORDER — METOPROLOL SUCCINATE 50 MG/1
50 TABLET, EXTENDED RELEASE ORAL DAILY
Status: DISCONTINUED | OUTPATIENT
Start: 2021-07-20 | End: 2021-07-20

## 2021-07-20 RX ORDER — SODIUM CHLORIDE 9 MG/ML
INJECTION, SOLUTION INTRAVENOUS CONTINUOUS
Status: DISCONTINUED | OUTPATIENT
Start: 2021-07-20 | End: 2021-07-20

## 2021-07-20 RX ADMIN — ATORVASTATIN CALCIUM 20 MG: 20 TABLET, FILM COATED ORAL at 21:00

## 2021-07-20 RX ADMIN — PAROXETINE 40 MG: 40 TABLET, FILM COATED ORAL at 21:00

## 2021-07-20 RX ADMIN — ALUMINUM HYDROXIDE, MAGNESIUM HYDROXIDE, AND SIMETHICONE 30 ML: 200; 200; 20 SUSPENSION ORAL at 10:40

## 2021-07-20 RX ADMIN — SODIUM CHLORIDE: 9 INJECTION, SOLUTION INTRAVENOUS at 10:13

## 2021-07-20 RX ADMIN — ALUMINUM HYDROXIDE, MAGNESIUM HYDROXIDE, AND SIMETHICONE 30 ML: 200; 200; 20 SUSPENSION ORAL at 00:46

## 2021-07-20 RX ADMIN — LISINOPRIL 10 MG: 10 TABLET ORAL at 13:40

## 2021-07-20 RX ADMIN — ACETAMINOPHEN 650 MG: 325 TABLET, FILM COATED ORAL at 10:40

## 2021-07-20 RX ADMIN — METOPROLOL SUCCINATE 50 MG: 50 TABLET, EXTENDED RELEASE ORAL at 10:14

## 2021-07-20 RX ADMIN — ACETAMINOPHEN 650 MG: 325 TABLET, FILM COATED ORAL at 16:11

## 2021-07-20 RX ADMIN — LEVOFLOXACIN 250 MG: 5 INJECTION, SOLUTION INTRAVENOUS at 05:39

## 2021-07-20 RX ADMIN — ANASTROZOLE 1 MG: 1 TABLET, COATED ORAL at 21:00

## 2021-07-20 RX ADMIN — PANTOPRAZOLE SODIUM 40 MG: 40 TABLET, DELAYED RELEASE ORAL at 06:41

## 2021-07-20 ASSESSMENT — ACTIVITIES OF DAILY LIVING (ADL)
ADLS_ACUITY_SCORE: 21
ADLS_ACUITY_SCORE: 20
PREVIOUS_RESPONSIBILITIES: DRIVING
ADLS_ACUITY_SCORE: 20
ADLS_ACUITY_SCORE: 20

## 2021-07-20 ASSESSMENT — MIFFLIN-ST. JEOR: SCORE: 1568.56

## 2021-07-20 NOTE — CONSULTS
Care Management Initial Consult    General Information  Assessment completed with: Patient, Children,    Type of CM/SW Visit: Initial Assessment    Primary Care Provider verified and updated as needed: No (patient will have new PCP at [facility])   Readmission within the last 30 days:        Reason for Consult: discharge planning  Advance Care Planning:            Communication Assessment  Patient's communication style: spoken language (English or Bilingual)    Hearing Difficulty or Deaf:  ([place] per chart)   Wear Glasses or Blind: no    Cognitive  Cognitive/Neuro/Behavioral: WDL                      Living Environment:   People in home: child(oskar), adult     Current living Arrangements: condominium      Able to return to prior arrangements: other (see comments) (pt moving into [facility] after disharge from Hospital)       Family/Social Support:  Care provided by:    Provides care for:    Marital Status:   Children          Description of Support System:           Current Resources:   Patient receiving home care services: No     Community Resources: County Worker Reema Diallo (Elderly Waiver)   Equipment currently used at home: grab bar, toilet, grab bar, tub/shower, raised toilet seat, tub bench, walker, rolling, wheelchair, power (pt. has a WC and an electric WC for community;has lift chair)  Supplies currently used at home:      Employment/Financial:  Employment Status:          Financial Concerns:             Lifestyle & Psychosocial Needs:  Social Determinants of Health     Tobacco Use: Medium Risk     Smoking Tobacco Use: Former Smoker     Smokeless Tobacco Use: Never Used   Alcohol Use:      Frequency of Alcohol Consumption:      Average Number of Drinks:      Frequency of Binge Drinking:    Financial Resource Strain:      Difficulty of Paying Living Expenses:    Food Insecurity:      Worried About Running Out of Food in the Last Year:      Ran Out of Food in the Last Year:    Transportation Needs:      Lack of  Transportation (Medical):      Lack of Transportation (Non-Medical):    Physical Activity:      Days of Exercise per Week:      Minutes of Exercise per Session:    Stress:      Feeling of Stress :    Social Connections:      Frequency of Communication with Friends and Family:      Frequency of Social Gatherings with Friends and Family:      Attends [employer] Services:      Active Member of Clubs or Organizations:      Attends Club or Organization Meetings:      Marital Status:    Intimate Partner Violence:      Fear of Current or Ex-Partner:      Emotionally Abused:      Physically Abused:      Sexually Abused:    Depression:      PHQ-2 Score:    Housing Stability:      Unable to Pay for Housing in the Last Year:      Number of Places Lived in the Last Year:      Unstable Housing in the Last Year:        Functional Status:  Prior to admission patient needed assistance:              Mental Health Status:          Chemical Dependency Status:                Values/Beliefs:  Spiritual, Cultural Beliefs, [employer] Practices, Values that affect care:                 Additional Information:  Per consult for discharge planning & elevated risk for readmission, met with pt and pt's daughter to discuss discharge plan.  Per pt, prior to admit she was living with her daughter.  Patient shared that she will be moving into [facility] upon discharge from the hospital.  Per daughter, patient is scheduled to have an assessment to determine the services she will need at the [facility].  Discussed that OT is recommending Home care OT and would anticipate pt needing home care PT also.  Patient and daughter in agreement with this plan.  Per [place], pt's nurse at the [facility] will be Lucie.  Called [facility] [phone] and spoke to Lucie.  Lucie confirmed that pt will be moving into the [facility] and will have an assessment done upon admission.  Informed Lucie that Home Care PT/OT will be ordered at  discharge and reqeusted discharge orders be faxed to [phone] and new medications at discharge be filled at [facility].  Discussed home care agency choice with pt & daughter and they requested agency that will be able start services as soon as possible.  Referral sent to [facility] for start of services for PT/OT.     Care Coordinator will continue to follow for discharge needs.    Addendum:  Sent referral to [facility] [phone] and spoke to Amanda in Intake who stated that they would be able to start home care services within 24-48 hours post discharge from the hospital.  Amanda requested face sheet be faxed to [phone].  Face sheet faxed.  Care Coordinator will continue to follow for discharge needs.  Yenny Mera, SEEMA Mera RN, BSN, OCN   Inpatient Care Coordination [address]  Office: [phone]

## 2021-07-20 NOTE — PLAN OF CARE
A& O X 4 but Eyak, VSS except HTN not w/in parameters for prn. C/o acid reflux, had TUMS earlier and gave prn Maalox and ginger ale. IVF infusing at 100 mL/hr and ABX. Up w/A1/GB/W to bathroom, sent stool sample to r/o c-diff. Plan to check Hgb in AM and if down, may need GI consult for possible bleed.

## 2021-07-20 NOTE — PLAN OF CARE
BP elevated, hydralazine given with improvement. A/O. Up with A1 GB Walker. Voiding in bathroom. No stools, need stool sample. Tolerating regular diet but c/o nausea and acid reflux. Zofran given. Shemar MANCIA for acid reflux medication. Tums ordered. Denies pain. IVF infusing. Recheck hgb in AM. Possible GI consult pending results. Awaiting urine culture.

## 2021-07-20 NOTE — PLAN OF CARE
"7a-3p shift  Improving symptoms.  Denied abdominal pain, denied n/v. No BM this shift, Last BM reported to be yesterday.  Has been in reg diet and tolerated it well with exception of  some c/o \"ishyness feeling\"/acid reflux - controlled with Maalox given post meal x1.   C/o chronic right shoulder pain which improved after prn Tylenol given x1.   Remains unsteady, general weakness and requires assist of 1/GB/walker when up. PT/OT following  Disposition: Newly moved into Assisted living [facility]   Anticipate return to her facility when medically stable for discharge    Addendum: Hgb trended down today to 10.1.  FYI:  Has slight SOB (but this is a chronic/baseline sx due to PTA lung cancer/surgerical removal of portions of right lung.   "

## 2021-07-20 NOTE — PROGRESS NOTES
[facility]  Hospitalist Progress Note    Admit Date:  7/19/2021  Date of Service (when I saw the patient): 07/20/2021   Provider:  Kiah Aviles, DO    Assessment & Plan   Yolanda Watson is a 80 year old female who was admitted on 7/19/2021 with weakness and nausea, vomiting, diarrhea and dizziness.    Problem List:    1.  E coli - Urinary tract infection  -The patient presented with lactic acidosis, tachycardia, dizziness, lightheadedness.    -She had no fever and had a normal white count.    -CT abdomen and pelvis done in the emergency room did not show any acute process  -Urine analysis concerning for UTI - UC prelim e coli (sens pending)  -Continue empiric Levaquin ( ok to change to po dosing)    -Await urine culture and blood culture  Encourage po intake.     2. Acute kidney injury, resolved  -Her creatinine at presentation was up to 1.3.  At baseline she has normal creatinine.  Creat has normalized with IVF - will saline lock and encourage po intake     3. Nausea, vomiting and diarrhea - resolving     ?Darker stools, per report    no clear gross blood loss anemia or GI bleeding   hgb has ranged between 10-13 in last year  hgb 10.1 this am  Also, no further stools since admission, which is encouraging  Recommend outpt GI f/u until develops more GI symptoms.    4. Essential hypertension   -Continue metoprolol with hold parameters  Ok to resume home lisinopril today as creat has normalized  -As needed hydralazine     5. History cardiomyopathy(chemo induced)    -continue aspirin and metoprolol.      6. Depression   -continue Paxil     7. Hyperlipidemia   -continue Lipitor     Diet: Combination Diet Regular Diet Adult     DVT Prophylaxis: Pneumatic Compression Devices   Patricia Catheter: Not present  Code Status: Full Code     Diet: Combination Diet Regular Diet Adult    DVT Prophylaxis: Pneumatic Compression Devices  Patricia Catheter: Not present  Code Status: Full Code       Disposition Plan   Expected discharge: 07/21/2021 recommended to [facility] once safe disposition plan/ TCU bed available.  Entered: Kiah Aviles DO 07/20/2021, 7:12 AM       The patient's care was discussed with the Patient.    Interval History   Denies CP, SOB, F/C, N/V currently.  Has not yet had breakfast or been up out of bed this am.  No HA.  No bloody stools.  Worried about having to move to [facility] tomorrow (as planned PTA).  No other new concerns per nursing.    -Data reviewed today: I reviewed all new labs and imaging results over the last 24 hours. I personally reviewed no images or EKG's today.    Physical Exam   Temp: 97.6  F (36.4  C) Temp src: Oral BP: (!) 161/72 Pulse: 86   Resp: 16 SpO2: 95 % O2 Device: None (Room air)    Vitals:    07/19/21 0011 07/20/21 0456   Weight: 99.3 kg (219 lb) 104.4 kg (230 lb 3.2 oz)     Vital Signs with Ranges  Temp:  [96.7  F (35.9  C)-97.6  F (36.4  C)] 97.6  F (36.4  C)  Pulse:  [77-93] 86  Resp:  [16] 16  BP: (148-181)/(72-90) 161/72  SpO2:  [95 %-96 %] 95 %  I/O last 3 completed shifts:  In: 2331 [P.O.:480; I.V.:1851]  Out: 500 [Urine:500]    GEN:  Alert, oriented x 3, comfortable, no overt respiratory distress.  HEENT:  Normocephalic/atraumatic, no scleral icterus, no nasal discharge, mouth and membranes fairly moist, no clear oral ulcers or thrush noted.  NECK:  No clear thyromegaly of clear JVD  CV:  Regular rate and rhythm, no loud murmur to ausc.  S1 + S2 noted, no S3 or S4.  LUNGS:  Clear to auscultation ant/lat bilaterally.  No rales/rhonchi/wheezing auscultated bilaterally.  No costal retractions bilaterally.  Symmetric chest rise on inhalation noted.  ABD:  Active bowel sounds, soft, non-tender/non-distended.  No rebound/guarding/rigidity.  No masses palpated.  No obvious HSM to exam.  EXT:  No pretibial edema or cyanosis bilaterally. No joint synovitis noted.  No calf-tenderness or asymmetry noted.  SKIN:  Dry to touch, no rashes or jaundice  noted.  PSYCH:  Mood appropriate, Not tearful or depressed.  Maintains direct eye contact.  NEURO:  No tremors at rest, [place] but speech is clear and appropriate.  CN 2-12 intact to gross testing bilaterally.    Data   Labs:  Recent Labs   Lab 07/20/21  0854 07/19/21  0109    143   POTASSIUM 3.7 3.6   CHLORIDE 110* 106   CO2 25 27   ANIONGAP 7 10   GLC 97 135*   BUN 12 17   CR 0.97 1.33*   GFRESTIMATED 55* 37*   PRIYA 8.3* 9.2     Recent Labs   Lab 07/20/21  0854 07/19/21  0109   WBC 5.7 9.8   HGB 10.1* 12.3   HCT 32.8* 38.3   * 106*    391     Recent Labs   Lab 07/20/21  0854 07/19/21  0109    143   POTASSIUM 3.7 3.6   CHLORIDE 110* 106   CO2 25 27   ANIONGAP 7 10   GLC 97 135*   BUN 12 17   CR 0.97 1.33*   GFRESTIMATED 55* 37*   PRIYA 8.3* 9.2   PROTTOTAL  --  6.5*   ALBUMIN  --  3.3*   BILITOTAL  --  0.6   ALKPHOS  --  69   AST  --  21   ALT  --  37      Recent Imaging:   Recent Results (from the past 24 hour(s))   XR Chest 2 Views    Narrative    CHEST TWO VIEWS July 19, 2021 7:16 AM     HISTORY: Left-sided chest pain.    COMPARISON: 3/24/2021.    FINDINGS: Volume loss in the right hemithorax is similar to the prior  study. Blunting of the right costophrenic angle is also stable and may  be postoperative. Chronic interstitial abnormalities are again noted.  No new airspace consolidation. No pneumothorax. Right shoulder  replacement noted.       Impression    IMPRESSION: No radiographic evidence of acute chest abnormality.     KEITH BARAJAS MD         SYSTEM ID:  [id number]       Medications     sodium chloride 100 mL/hr at 07/19/21 2144       atorvastatin  20 mg Oral At Bedtime     levofloxacin  250 mg Intravenous Q24H     metoprolol succinate ER  50 mg Oral Daily     pantoprazole  40 mg Oral QAM AC     PARoxetine  40 mg Oral At Bedtime     sodium chloride (PF)  3 mL Intracatheter Q8H

## 2021-07-20 NOTE — PROGRESS NOTES
07/20/21 1100   Quick Adds   Type of Visit Initial Occupational Therapy Evaluation   Living Environment   People in home child(oskar), adult;spouse  (dtr. has been staying w/pt./spouse)   Current Living Arrangements condominium   Home Accessibility no concerns   Transportation Anticipated family or friend will provide  (pt.drives at baseline)   Living Environment Comments Pt. has higher toilet/grab bar support;walk-in tub w/ grab bars, bench--sits for bathing;uses WW for mobility(limited mobility at baseline). Pt. reports she and spouse are moving into [facility] [employer]   Self-Care   Usual Activity Tolerance fair   Current Activity Tolerance fair   Regular Exercise No   Equipment Currently Used at Home grab bar, toilet;grab bar, tub/shower;raised toilet seat;tub bench;walker, rolling;wheelchair, power  (pt. has a WC and an electric WC for community;has lift chair)   Activity/Exercise/Self-Care Comment Pt. reports she is indep./mod.indep. w/ ADL's, dtr. assists w/ IADL's including medication mgmt., meals;pt. has a cleaning person. Pt. related she will be able to get assistance w/ ADL's at [facility] as needed, will get assist w/ meds., bathing if needed. She reports spouse will be in a different area, as he needs more assistance.   (uses Life Alert at baseline)   Instrumental Activities of Daily Living (IADL)   Previous Responsibilities driving   IADL Comments dtr. has been assisting, has meals delivered   Disability/Function   Hearing Difficulty or Deaf   ([place] per chart)   Describe hearing loss bilateral hearing loss   Use of hearing assistive devices none   Walking or Climbing Stairs Difficulty yes   Walking or Climbing Stairs ambulation difficulty, requires equipment  (limited mobility distance at baseline)   Mobility Management WW'WC;electric WC   Dressing/Bathing Difficulty yes   Dressing/Bathing bathing difficulty, requires equipment   Toileting issues yes   Toileting Assistance toileting difficulty,  requires equipment   Fall history within last six months   (slipped off of EOB last week)   Change in Functional Status Since Onset of Current Illness/Injury yes   General Information   Onset of Illness/Injury or Date of Surgery 07/19/21   Referring Physician  Russell Leach MD    Patient/Family Therapy Goal Statement (OT) Pt. would like to discharge to [facility]--which she is et to move into tomorrow 7/21   Performance Patterns (Routines, Roles, Habits) Pt. reports she has been limited w/ mobility/activity for 6 months;has had part of lung removed, SOB at baseline, llimited standing tolerance   Existing Precautions/Restrictions fall   General Observations and Info Pt. A & O, agreeable to OT   Cognitive Status Examination   Orientation Status orientation to person, place and time   Affect/Mental Status (Cognitive) WNL;WFL   Follows Commands WNL;WFL   Safety Deficit   (responded to safety/judgment questions accurately)   Memory Deficit   (STM recall 3/3 words)   Cognitive Status Comments appears intact/baseline   Visual Perception   Impact of Vision Impairment on Function (Vision) no vision problems noted/reported   Sensory   Sensory Comments no numbness/tingling reported   Pain Assessment   Patient Currently in Pain No   Range of Motion Comprehensive   Comment, General Range of Motion BUE WFL   Strength Comprehensive (MMT)   Comment, General Manual Muscle Testing (MMT) Assessment BUE WFL;gen.weakness;decreased activity tolerance   Coordination   Upper Extremity Coordination No deficits were identified   Bed Mobility   Comment (Bed Mobility) SBA   Transfers   Transfer Comments SBA/vc's/WW   Balance   Balance Comments SBA for room mobility using WW   Grooming Assessment   Jefferson Level (Grooming) supervision  (SBA)   Toileting   Jefferson Level (Toileting) supervision;verbal cues  (SBA)   Assistive Devices (Toileting) commode chair;grab bar, toilet   Clinical Impression   Criteria for Skilled Therapeutic  Interventions Met (OT) yes   OT Diagnosis Decline in ADL performance   OT Problem List-Impairments impacting ADL problems related to;activity tolerance impaired;balance;mobility;strength   Assessment of Occupational Performance 3-5 Performance Deficits   Identified Performance Deficits dressing, bathing, standing/grooming, toileting, mobility   Planned Therapy Interventions (OT) ADL retraining;strengthening;progressive activity/exercise   Clinical Decision Making Complexity (OT) low complexity   Therapy Frequency (OT) 5x/week   Predicted Duration of Therapy 3-5 days   Risk & Benefits of therapy have been explained evaluation/treatment results reviewed;care plan/treatment goals reviewed;risks/benefits reviewed;current/potential barriers reviewed;participants voiced agreement with care plan;participants included;patient   OT Discharge Planning    OT Discharge Recommendation (DC Rec) home;Home with assist;home with home care occupational therapy   OT Rationale for DC Rec OT:Pt. overall moving well, SBA using WW for mobility, transfers, ADL's, appears to be close to baseline. Anticipate pt. will continue to make progress in order to DC home--pt. is set to move into assisted tomorrow 7/21--stated she can get assist w/ I/ADL's, including bathing. Rec. Home OT for cont. skilled therapy/ADL retraining in home environment;also rec. Home PT for strengthening, balance. Home therapy indicated as it would take considerable and taxing effort +A X 1 to leave the home.    Total Evaluation Time (Minutes)   Total Evaluation Time (Minutes) 10

## 2021-07-20 NOTE — PLAN OF CARE
Pt is a/ox4. BPs elevated, PTA metoprolol ordered and given this evening. Positive orthostatic BPs this morning. Pt only had 1 loose stool this afternoon. No nausea, tolerated lunch and dinner. Prn tylenol given for R shoulder pain.

## 2021-07-20 NOTE — DISCHARGE INSTRUCTIONS
[facility] will be following at discharge for PT & OT.  You will receive a call with your schedule appointment date & time.  Please call [phone] if you have any questions or concerns.     Read and refer to the attached education sheet for additional discharge instructions  1. Urinary tract Infections in women

## 2021-07-20 NOTE — PROGRESS NOTES
"Hospitalist Cross Cover  7/19/2021  10:34 PM    Patient admitted with nausea/vomiting, diarrhea. Paged regarding complaints of acid reflux/nausea. Pt notes this is similar to her acid reflux she has at home, typically takes Tums with relief.     BP (!) 181/79 (BP Location: Left arm)   Pulse 77   Temp 97.5  F (36.4  C) (Oral)   Resp 16   Ht 1.753 m (5' 9\")   Wt 99.3 kg (219 lb)   SpO2 95%   BMI 32.34 kg/m      Plan:  --tums PRN      GEOVANNY Macias [facility]  Hospitalist Service  House Officer  Pager: [phone] (7a - 6p)      "

## 2021-07-20 NOTE — PROVIDER NOTIFICATION
Shemar MANCIA, re: pt still having GERD, somewhat relieved by TUMS but still there, not new for pt. Dr. Nunez put in order for prn Maalox

## 2021-07-20 NOTE — PLAN OF CARE
Physical Therapy: Orders received. Chart reviewed and discussed with OT.? Physical Therapy not indicated at the acute level due to pt needs met by OT. Pt ambulates short distances of 20' or less in home, OT to continue to work on short distance gait and ADLs during LOS. Per discussion with OT, pt steady with good safety awareness of walker.? Defer discharge recommendations to OT.? Will complete PT orders at this time.

## 2021-07-21 VITALS
HEART RATE: 75 BPM | TEMPERATURE: 97.2 F | HEIGHT: 69 IN | OXYGEN SATURATION: 96 % | SYSTOLIC BLOOD PRESSURE: 159 MMHG | BODY MASS INDEX: 34.45 KG/M2 | WEIGHT: 232.6 LBS | DIASTOLIC BLOOD PRESSURE: 68 MMHG | RESPIRATION RATE: 16 BRPM

## 2021-07-21 PROCEDURE — 250N000013 HC RX MED GY IP 250 OP 250 PS 637: Performed by: INTERNAL MEDICINE

## 2021-07-21 PROCEDURE — 99239 HOSP IP/OBS DSCHRG MGMT >30: CPT | Performed by: HOSPITALIST

## 2021-07-21 PROCEDURE — 250N000011 HC RX IP 250 OP 636: Performed by: INTERNAL MEDICINE

## 2021-07-21 RX ADMIN — LEVOFLOXACIN 250 MG: 250 TABLET, FILM COATED ORAL at 06:40

## 2021-07-21 RX ADMIN — ONDANSETRON 4 MG: 4 TABLET, ORALLY DISINTEGRATING ORAL at 02:02

## 2021-07-21 RX ADMIN — PANTOPRAZOLE SODIUM 40 MG: 40 TABLET, DELAYED RELEASE ORAL at 06:40

## 2021-07-21 RX ADMIN — LISINOPRIL 10 MG: 10 TABLET ORAL at 09:39

## 2021-07-21 RX ADMIN — METOPROLOL SUCCINATE 50 MG: 50 TABLET, EXTENDED RELEASE ORAL at 09:39

## 2021-07-21 ASSESSMENT — ACTIVITIES OF DAILY LIVING (ADL)
ADLS_ACUITY_SCORE: 20

## 2021-07-21 ASSESSMENT — MIFFLIN-ST. JEOR: SCORE: 1579.45

## 2021-07-21 NOTE — PLAN OF CARE
AOx4. VSS on RA ex HTN, PRN BP meds available if SBP > 180. Tolerating reg diet. Up A1 GB/W. PRN tylenol given for generalized R shoulder pain, effective. Denies N/V. PIV SL.  Hgb 10.1, MD aware. C diff and enteric panel results are negative, enteric precautions discontinued. No BMs this shift. Discharge to [facility] pending.

## 2021-07-21 NOTE — PLAN OF CARE
A & O X 4, VSS except HTN, not w/in parameters for prn. No c/o pain. PIV SL. Up w/SBA/GB/W to [facility]. Plan for possible discharge to senior living.

## 2021-07-21 NOTE — PROGRESS NOTES
Care Management Discharge Note    Discharge Date: 07/21/2021       Discharge Disposition: Assisted Living ([facility])    Discharge Services: County Worker    Discharge DME:      Discharge Transportation: family or friend will provide    Private pay costs discussed: Not applicable    PAS Confirmation Code:    Patient/family educated on Medicare website which has current facility and service quality ratings: no (N/A)    Education Provided on the Discharge Plan:    Persons Notified of Discharge Plans: patient, bedside RN & Lucie Nurse at [facility]  Patient/Family in Agreement with the Plan: yes    Handoff Referral Completed: Yes    Additional Information:  Per Dr. Hollins, patient is medically stable to discharge to [facility] with HC PT/OT.  Called Lucie, Nurse at [facility] and informed her that pt will discharge to [facility] today with HC PT/OT.  Lucie requested discharge orders, H&P & discharge summary be faxed to [phone].  Requested information faxed.  Faxed discharge orders, H&P & discharge summary to [facility] [phone].  Met with pt and pt's daughters Ki and discussed discharge plan of discharging to [facility] with HC PT/OT with [facility].  Per pt and daughters in agreement with this plan.  Per Loida, she will be transporting pt to [facility] at discharge.      SEEMA Scott RN, BSN, OCN   Inpatient Care Coordination [address]  Office: [phone]

## 2021-07-21 NOTE — DISCHARGE SUMMARY
[facility]  Hospitalist Discharge Summary      Date of Admission:  7/19/2021  Date of Discharge:  7/21/2021  Discharging Provider: Norma Hollins MD      Discharge Diagnoses   E coli UTI  ALDO, resolved  Nausea and vomiting, resolved  Essential hypertension  Cardiomyopathy  Dyslipidemia    Follow-ups Needed After Discharge   Follow-up Appointments     Follow-up and recommended labs and tests       Follow up with primary care provider, Soren Perdomo - or [facility] physician,   within 7 days for hospital follow- up.  No follow up labs or test are   needed.             Discharge Disposition   Discharged to assisted living  Condition at discharge: Stable    Hospital Course   Yolanda Watson is a 80 year old female who was admitted on 7/19/2021 with weakness and nausea, vomiting, diarrhea and dizziness.     1.  E coli - Urinary tract infection  -The patient presented with lactic acidosis, tachycardia, dizziness, lightheadedness.    -She had no fever and had a normal white count.    -CT abdomen and pelvis done in the emergency room did not show any acute process  -Received 3 days levofloxacin, which is sufficient treatment     2. Acute kidney injury, resolved  -Her creatinine at presentation was up to 1.3.  At baseline she has normal creatinine.  Creat has normalized with IVF - will saline lock and encourage po intake     3. Nausea, vomiting and diarrhea - resolving     ?Darker stools, per report    no clear gross blood loss anemia or GI bleeding   hgb has ranged between 10-13 in last year  hgb 10.1 this am  Also, no further stools since admission, which is encouraging  Recommend outpt GI f/u if develops more GI symptoms.     4. Essential hypertension   -Continue metoprolol with hold parameters. Lisinopril resumed once creat normalized     5. History cardiomyopathy(chemo induced)    -continue aspirin and metoprolol.      6. Depression   -continue Paxil     7.  Hyperlipidemia   -continue Lipitor    Consultations This Hospital Stay   PHYSICAL THERAPY ADULT IP CONSULT  OCCUPATIONAL THERAPY ADULT IP CONSULT  SOCIAL WORK IP CONSULT  CARE MANAGEMENT / SOCIAL WORK IP CONSULT    Code Status   Full Code    Time Spent on this Encounter   I, Norma Hollisn MD, personally saw the patient today and spent greater than 30 minutes discharging this patient.       Norma Hollins MD  [place] [address] [facility]  [address]  [place] [address]  Phone: [phone]  ______________________________________________________________________    Physical Exam   Vital Signs: Temp: 97.2  F (36.2  C) Temp src: Oral BP: (!) 159/68 Pulse: 75   Resp: 16 SpO2: 96 % O2 Device: None (Room air)    Weight: 232 lbs 9.6 oz         Primary Care Physician   Soren Perdomo    Discharge Orders      Home Care PT Referral for Hospital Discharge      Home Care OT Referral for Hospital Discharge      Home care nursing referral      Reason for your hospital stay    E coli UTI     Follow-up and recommended labs and tests     Follow up with primary care provider, Soren Perdomo - or [facility] physician, within 7 days for hospital follow- up.  No follow up labs or test are needed.     Activity    Your activity upon discharge: activity as tolerated     MD face to face encounter    Documentation of Face to Face and Certification for Home Health Services    I certify that patient: Yolanda Watson is under my care and that I, or a nurse practitioner or physician's assistant working with me, had a face-to-face encounter that meets the physician face-to-face encounter requirements with this patient on: July 21, 2021.    This encounter with the patient was in whole, or in part, for the following medical condition, which is the primary reason for home health care: UTI, deconditioning.    I certify that, based on my findings, the following services are medically necessary home health services: Nursing,  Occupational Therapy, and Physical Therapy.    My clinical findings support the need for the above services because: Nurse is needed: To provide assessment and oversight required in the home to assure adherence to the medical plan due to: s/p UTI and deconditioning.., Occupational Therapy Services are needed to assess and treat cognitive ability and address ADL safety due to impairment in cognition and deconditioing, and Physical Therapy Services are needed to assess and treat the following functional impairments: deconditioning.    Further, I certify that my clinical findings support that this patient is homebound (i.e. absences from home require considerable and taxing effort and are for medical reasons or [facility]anda[facility] services or infrequently or of short duration when for other reasons) because: Leaving home is medically contraindicated for the following reason(s): Dyspnea on exertion that makes it so they cannot leave their home for needed services without clinical deterioration...    Based on the above findings. I certify that this patient is confined to the home and needs intermittent skilled nursing care, physical therapy and/or speech therapy.  The patient is under my care, and I have initiated the establishment of the plan of care.  This patient will be followed by a physician who will periodically review the plan of care.  Physician/Provider to provide follow up care: Soren Perdomo    Attending hospital physician (the Medicare certified [place] provider): Norma Hollins MD  Physician Signature: See electronic signature associated with these discharge orders.  Date: 7/21/2021     Diet    Follow this diet upon discharge: Orders Placed This Encounter      Combination Diet Regular Diet Adult       Significant Results and Procedures   Most Recent 3 CBC's:Recent Labs   Lab Test 07/20/21  0854 07/19/21  0109 03/24/21  1749   WBC 5.7 9.8 6.3   HGB 10.1* 12.3 11.5*   * 106* 102*    391 325     Most  Recent 3 BMP's:Recent Labs   Lab Test 07/20/21  0854 07/19/21  0109 03/24/21  1749    143 139   POTASSIUM 3.7 3.6 3.6   CHLORIDE 110* 106 105   CO2 25 27 27   BUN 12 17 18   CR 0.97 1.33* 0.75   ANIONGAP 7 10 7   PRIYA 8.3* 9.2 8.2*   GLC 97 135* 93   ,   Results for orders placed or performed during the hospital encounter of 07/19/21   CT Abdomen Pelvis w/o Contrast    Narrative    EXAM: CT ABDOMEN PELVIS W/O CONTRAST  LOCATION: [facility]  DATE/TIME: 7/19/2021 4:23 AM    INDICATION: Abdominal pain, acute, nonlocalized  COMPARISON: CT abdomen pelvis 08/11/2020. CT chest 03/09/2021.  TECHNIQUE: CT scan of the abdomen and pelvis was performed without IV contrast. Multiplanar reformats were obtained. Dose reduction techniques were used.  CONTRAST: None.    FINDINGS:   LOWER CHEST: Postoperative changes and fibrosis of the right lung base. Small right pleural effusion. Mild atelectasis at the left base. Moderate coronary artery calcification.    HEPATOBILIARY: Small probable benign cyst of the left hepatic lobe. Postcholecystectomy. Mild extrahepatic biliary dilatation consistent with reservoir effect after cholecystectomy.    PANCREAS: Normal.    SPLEEN: Normal.    ADRENAL GLANDS: Normal.    KIDNEYS/BLADDER: Bilateral renal cysts including small hyperdense cysts of both kidneys.    BOWEL: Post right colectomy with ileocolic anastomosis. Colonic diverticulosis. No obstruction or inflammation. No free fluid or gas.    LYMPH NODES: Normal.    VASCULATURE: Moderate aortoiliac atherosclerosis.    PELVIC ORGANS: Post hysterectomy.    MUSCULOSKELETAL: Demineralized skeleton. Advanced degenerative disc disease at L4-L5. Nonacute fracture of the right lateral 10th rib has not yet fully healed.      Impression    IMPRESSION:   1.  No acute process in the abdomen or pelvis.  2.  Postoperative changes and fibrosis of the right lung base. Small right pleural effusion.  3.  Coronary artery disease.  4.  Bilateral  renal cysts including small hyperdense cysts of both kidneys.  5.  Post right colectomy with ileocolic anastomosis.  6.  Colonic diverticulosis without evidence for diverticulitis.     XR Chest 2 Views    Narrative    CHEST TWO VIEWS July 19, 2021 7:16 AM     HISTORY: Left-sided chest pain.    COMPARISON: 3/24/2021.    FINDINGS: Volume loss in the right hemithorax is similar to the prior  study. Blunting of the right costophrenic angle is also stable and may  be postoperative. Chronic interstitial abnormalities are again noted.  No new airspace consolidation. No pneumothorax. Right shoulder  replacement noted.       Impression    IMPRESSION: No radiographic evidence of acute chest abnormality.     KEITH BARAJAS MD         SYSTEM ID:  [id number]       Discharge Medications   Current Discharge Medication List      CONTINUE these medications which have NOT CHANGED    Details   acetaminophen (TYLENOL) 325 MG tablet Take 325-650 mg by mouth every 6 hours as needed for mild pain      anastrozole (ARIMIDEX) 1 MG tablet Take 1 mg by mouth At Bedtime       atorvastatin (LIPITOR) 20 MG tablet Take 20 mg by mouth At Bedtime       lisinopril (ZESTRIL) 10 MG tablet Take 1 tablet (10 mg) by mouth daily  Qty: 30 tablet, Refills: 1    Associated Diagnoses: Essential hypertension      loperamide (IMODIUM A-D) 2 MG capsule Take 2 mg by mouth 4 times daily as needed for diarrhea      metoprolol succinate ER (TOPROL-XL) 50 MG 24 hr tablet Take 1 tablet (50 mg) by mouth daily  Qty: 90 tablet, Refills: 3    Comments: Increased dose. Hold on file.  Associated Diagnoses: Secondary cardiomyopathy (H)      ondansetron (ZOFRAN-ODT) 4 MG ODT tab Take 4 mg by mouth every 8 hours as needed for nausea      PARoxetine (PAXIL) 40 MG tablet Take 40 mg by mouth At Bedtime       vitamin D3 (CHOLECALCIFEROL) 2000 units (50 mcg) tablet Take 1 tablet by mouth daily           Allergies   Allergies   Allergen Reactions     Demerol [Meperidine] Nausea and  Vomiting     Sulfa Drugs Other (See Comments)     Extreme chills     Penicillins Rash     Swelling In mouth and tongue

## 2021-07-21 NOTE — PLAN OF CARE
Nursing shift note  Patient discharged at approx 1430 to home which is [facility] assisted living [facility].  HHC/PTOT to follow pt at facility arranged by CC/SW. Transportation was via daughter Loida.  IV was discontinued By NA. Denied pain this shift and at time of discharge. Belongings returned to patient.  Copy of  AVS/Discharge instructions and medications reviewed with both patient and daughter  Patient verbalized understanding and all questions were answered.   At time of discharge, patient condition was stable and left the unit in w/c escorted by NA.    A & O X 4, VSS except HTN, not w/in parameters for prn. No c/o pain. Is up w/SBA/GB/W to [facility].  Stable status

## 2021-07-22 ENCOUNTER — PATIENT OUTREACH (OUTPATIENT)
Dept: CARE COORDINATION | Facility: CLINIC | Age: 81
End: 2021-07-22

## 2021-07-22 NOTE — PROGRESS NOTES
Clinic Care Coordination Contact  [facility]/Voicemail       Clinical Data: Care Coordinator Outreach  Outreach attempted x 1.  Left message on patient's voicemail with call back information and requested return call.  . Care Coordinator will try to reach patient again in 1-2 business days.

## 2021-07-23 ENCOUNTER — PATIENT OUTREACH (OUTPATIENT)
Dept: CARE COORDINATION | Facility: CLINIC | Age: 81
End: 2021-07-23

## 2021-07-23 NOTE — PROGRESS NOTES
Clinic Care Coordination Contact    Clinic Care Coordination Contact  OUTREACH    Referral Information: Pt. discharged   from [facility] on 7/21 for E coli UTI, ALDO, Nausea and vomiting, essential hypertension, cardiomyopathy and dyslipidemia         Chief Complaint   Patient presents with     Clinic Care Coordination - Initial        Universal Utilization:      Utilization    Hospital Admissions  3             ED Visits  3             No Show Count (past year)  0                Current as of: 7/23/2021 10:33 AM              Clinical Concerns:  Current Medical Concerns:  E coli UTI  ALDO, resolved  Nausea and vomiting, resolved  Essential hypertension  Cardiomyopathy  Dyslipidemia     Current Behavioral Concerns: None  Education Provided to patient: None     Health Maintenance Reviewed:    Clinical Pathway: None    Medication Management:  Medication review status: Medications reviewed.  Changes noted per patient report.       Functional Status: Home bound       Living Situation:   Pt recently moved to [facility] assisted [facility] in [place]. (7/21)    Lifestyle & Psychosocial Needs:    Social Determinants of Health     Tobacco Use: Medium Risk     Smoking Tobacco Use: Former Smoker     Smokeless Tobacco Use: Never Used   Alcohol Use:      Frequency of Alcohol Consumption:      Average Number of Drinks:      Frequency of Binge Drinking:    Financial Resource Strain:      Difficulty of Paying Living Expenses:    Food Insecurity:      Worried About Running Out of Food in the Last Year:      Ran Out of Food in the Last Year:    Transportation Needs:      Lack of Transportation (Medical):      Lack of Transportation (Non-Medical):    Physical Activity:      Days of Exercise per Week:      Minutes of Exercise per Session:    Stress:      Feeling of Stress :    Social Connections:      Frequency of Communication with Friends and Family:      Frequency of Social Gatherings with Friends and Family:      Attends Jewish Services:       Active Member of Clubs or Organizations:      Attends Club or Organization Meetings:      Marital Status:    Intimate Partner Violence:      Fear of Current or Ex-Partner:      Emotionally Abused:      Physically Abused:      Sexually Abused:    Depression:      PHQ-2 Score:    Housing Stability:      Unable to Pay for Housing in the Last Year:      Number of Places Lived in the Last Year:      Unstable Housing in the Last Year:                             Resources and Interventions:  Current Resources: Pt. Receiving nursing and case management at [facility] in [place]. PT/Ot will begin next week.                                   Goals:       Patient/Caregiver understanding:            Plan: [facility] spoke with family member who informed this worker to call [facility] and speak with Lucie, who is Yolanda's nurse. FATOUMATA phoned [employer] and was patched through to [place] as Lucie was off today. [facility] is the . She stated Yolanda was moved there on 7/21 and has gotten her pain under control as well as her nausea. They are pursuing OT and PT for pt. As well. [facility] offered support moving forward if need be and left contact info with [facility].

## 2021-07-24 LAB
BACTERIA BLD CULT: NO GROWTH
BACTERIA BLD CULT: NO GROWTH

## 2021-08-12 ENCOUNTER — PATIENT OUTREACH (OUTPATIENT)
Dept: CARE COORDINATION | Facility: CLINIC | Age: 81
End: 2021-08-12

## 2021-08-12 NOTE — PROGRESS NOTES
Clinic Care Coordination Contact  [facility]/Voicemail       Clinical Data: Care Coordinator Outreach  Outreach attempted x 1.  Left message on pt's  voicemail with call back information and requested return call.  . Care Coordinator will try to reach patient again in 10 business days.

## 2021-08-26 ENCOUNTER — PATIENT OUTREACH (OUTPATIENT)
Dept: CARE COORDINATION | Facility: CLINIC | Age: 81
End: 2021-08-26

## 2021-08-26 NOTE — PROGRESS NOTES
Clinic Care Coordination Contact    Follow Up Progress Note      Assessment: PC to Yolanda. She is permanently living at [facility]. She is in her own room and is receiving nursing care . In addition to this, there is a physician there that she plans on transitioning to from [facility]. She states she will be meeting with him to discuss a GI referral for her stomach pains.    Care Gaps:    Health Maintenance Due   Topic Date Due     DEXA  Never done     MEDICARE ANNUAL WELLNESS VISIT  Never done     FALL RISK ASSESSMENT  Never done     ZOSTER IMMUNIZATION (2 of 3) 02/15/2007     DTAP/TDAP/TD IMMUNIZATION (2 - Td or Tdap) 12/21/2015     PHQ-2  Never done     INFLUENZA VACCINE (1) 09/01/2021       Care Gaps Last addressed on 8/26    Goals addressed this encounter:   Goals Addressed    None         Intervention/Education provided during outreach:           Plan:   Pt. To remain at [facility], transitioning to facility physician.   Care Coordinator will close out ml.

## 2021-09-23 ENCOUNTER — TELEPHONE (OUTPATIENT)
Dept: GASTROENTEROLOGY | Facility: CLINIC | Age: 81
End: 2021-09-23

## 2021-09-23 NOTE — TELEPHONE ENCOUNTER
[facility] Call Center    Phone Message    May a detailed message be left on voicemail: yes     Reason for Call: Other: [facility] called inquiring why the appt was canceled and if possible to get it rescheduled. Please reach out.Thank you     Action Taken: Message routed to:  [facility]    Travel Screening: Not Applicable

## 2021-09-28 NOTE — TELEPHONE ENCOUNTER
[facility] Call Center    Phone Message    May a detailed message be left on voicemail: yes     Reason for Call: Other: Mal[place] [facility] [place] again, to find out why patient's appointment was cancelled and she was told she cannot be seen.  Please follow up with Malinda at [phone].  Thank you!     Action Taken: Message routed to:  [facility]    Travel Screening: Not Applicable

## 2021-11-03 ENCOUNTER — TRANSFERRED RECORDS (OUTPATIENT)
Dept: HEALTH INFORMATION MANAGEMENT | Facility: CLINIC | Age: 81
End: 2021-11-03
Payer: COMMERCIAL

## 2021-11-30 NOTE — TELEPHONE ENCOUNTER
REFERRAL INFORMATION:    Referring Provider:  N/A    Referring Clinic:  N/A    Reason for Visit/Diagnosis: Chronic diarrhea      FUTURE VISIT INFORMATION:    Appointment Date: 2/22/2022    Appointment Time: 1:40 PM      NOTES STATUS DETAILS   OFFICE NOTE from Referring Provider N/A    OFFICE NOTE from Other Specialist Received 10/11/16 Office visit with Dr. Umang Arteaga ([facility])      [facility] DISCHARGE SUMMARY/  ED VISITS Internal 7/19/2021, 6/1/18 (DINESH Maya)    OPERATIVE REPORT Internal Sigmoidoscopy: 6/2/18   MEDICATION LIST Internal         ENDOSCOPY  N/A    COLONOSCOPY Internal 2/15/06   ERCP N/A    EUS N/A    STOOL TESTING Internal 7/20/2021, 8/11/2020   PERTINENT LABS Internal    PATHOLOGY REPORTS (RELATED) N/A    IMAGING (CT, MRI, EGD, MRCP, Small Bowel Follow Through/SBT, MR/CT Enterography) Internal CT Abdomen Pelvis: 7/19/2021

## 2022-02-22 ENCOUNTER — PRE VISIT (OUTPATIENT)
Dept: GASTROENTEROLOGY | Facility: CLINIC | Age: 82
End: 2022-02-22

## 2022-03-23 ENCOUNTER — HOSPITAL ENCOUNTER (OUTPATIENT)
Facility: CLINIC | Age: 82
End: 2022-03-23
Attending: INTERNAL MEDICINE | Admitting: INTERNAL MEDICINE
Payer: COMMERCIAL

## 2022-03-24 DIAGNOSIS — Z11.59 ENCOUNTER FOR SCREENING FOR OTHER VIRAL DISEASES: Primary | ICD-10-CM

## 2022-05-02 NOTE — OR NURSING
Yazmin GALICIA called  [facility] to inform regarding pt's recent PCP pre-op physical. EKG showed ischemic changes, stress test done, cleared for surgery.  Pt was assessed by Cardiology clinic [facility] in [place] by Dr. Lau cardiologist.

## 2022-05-04 ENCOUNTER — HOSPITAL ENCOUNTER (OUTPATIENT)
Facility: CLINIC | Age: 82
Discharge: NURSING, REHAB OR LONG-TERM CARE | End: 2022-05-04
Attending: INTERNAL MEDICINE | Admitting: INTERNAL MEDICINE
Payer: MEDICARE

## 2022-05-04 VITALS
OXYGEN SATURATION: 93 % | DIASTOLIC BLOOD PRESSURE: 83 MMHG | SYSTOLIC BLOOD PRESSURE: 155 MMHG | HEIGHT: 70 IN | WEIGHT: 215 LBS | BODY MASS INDEX: 30.78 KG/M2 | TEMPERATURE: 98.3 F | HEART RATE: 85 BPM | RESPIRATION RATE: 16 BRPM

## 2022-05-04 LAB
COLONOSCOPY: NORMAL
UPPER GI ENDOSCOPY: NORMAL

## 2022-05-04 PROCEDURE — 88305 TISSUE EXAM BY PATHOLOGIST: CPT | Mod: TC | Performed by: INTERNAL MEDICINE

## 2022-05-04 PROCEDURE — 45380 COLONOSCOPY AND BIOPSY: CPT | Performed by: INTERNAL MEDICINE

## 2022-05-04 PROCEDURE — 43239 EGD BIOPSY SINGLE/MULTIPLE: CPT | Performed by: INTERNAL MEDICINE

## 2022-05-04 PROCEDURE — 99153 MOD SED SAME PHYS/QHP EA: CPT | Performed by: INTERNAL MEDICINE

## 2022-05-04 PROCEDURE — 250N000009 HC RX 250: Performed by: INTERNAL MEDICINE

## 2022-05-04 PROCEDURE — 250N000011 HC RX IP 250 OP 636: Performed by: INTERNAL MEDICINE

## 2022-05-04 PROCEDURE — G0500 MOD SEDAT ENDO SERVICE >5YRS: HCPCS | Performed by: INTERNAL MEDICINE

## 2022-05-04 RX ORDER — FLUMAZENIL 0.1 MG/ML
0.2 INJECTION, SOLUTION INTRAVENOUS
Status: DISCONTINUED | OUTPATIENT
Start: 2022-05-04 | End: 2022-05-04 | Stop reason: HOSPADM

## 2022-05-04 RX ORDER — DIPHENHYDRAMINE HYDROCHLORIDE 50 MG/ML
25-50 INJECTION INTRAMUSCULAR; INTRAVENOUS
Status: DISCONTINUED | OUTPATIENT
Start: 2022-05-04 | End: 2022-05-04 | Stop reason: HOSPADM

## 2022-05-04 RX ORDER — NALOXONE HYDROCHLORIDE 0.4 MG/ML
0.2 INJECTION, SOLUTION INTRAMUSCULAR; INTRAVENOUS; SUBCUTANEOUS
Status: DISCONTINUED | OUTPATIENT
Start: 2022-05-04 | End: 2022-05-04 | Stop reason: HOSPADM

## 2022-05-04 RX ORDER — NALOXONE HYDROCHLORIDE 0.4 MG/ML
0.4 INJECTION, SOLUTION INTRAMUSCULAR; INTRAVENOUS; SUBCUTANEOUS
Status: DISCONTINUED | OUTPATIENT
Start: 2022-05-04 | End: 2022-05-04 | Stop reason: HOSPADM

## 2022-05-04 RX ORDER — SIMETHICONE 40MG/0.6ML
133 SUSPENSION, DROPS(FINAL DOSAGE FORM)(ML) ORAL
Status: DISCONTINUED | OUTPATIENT
Start: 2022-05-04 | End: 2022-05-04 | Stop reason: HOSPADM

## 2022-05-04 RX ORDER — ONDANSETRON 2 MG/ML
4 INJECTION INTRAMUSCULAR; INTRAVENOUS EVERY 6 HOURS PRN
Status: DISCONTINUED | OUTPATIENT
Start: 2022-05-04 | End: 2022-05-04 | Stop reason: HOSPADM

## 2022-05-04 RX ORDER — ONDANSETRON 2 MG/ML
4 INJECTION INTRAMUSCULAR; INTRAVENOUS
Status: DISCONTINUED | OUTPATIENT
Start: 2022-05-04 | End: 2022-05-04 | Stop reason: HOSPADM

## 2022-05-04 RX ORDER — FENTANYL CITRATE 0.05 MG/ML
50-100 INJECTION, SOLUTION INTRAMUSCULAR; INTRAVENOUS EVERY 5 MIN PRN
Status: DISCONTINUED | OUTPATIENT
Start: 2022-05-04 | End: 2022-05-04 | Stop reason: HOSPADM

## 2022-05-04 RX ORDER — ATROPINE SULFATE 0.1 MG/ML
1 INJECTION INTRAVENOUS
Status: DISCONTINUED | OUTPATIENT
Start: 2022-05-04 | End: 2022-05-04 | Stop reason: HOSPADM

## 2022-05-04 RX ORDER — LIDOCAINE 40 MG/G
CREAM TOPICAL
Status: DISCONTINUED | OUTPATIENT
Start: 2022-05-04 | End: 2022-05-04 | Stop reason: HOSPADM

## 2022-05-04 RX ORDER — ONDANSETRON 4 MG/1
4 TABLET, ORALLY DISINTEGRATING ORAL EVERY 6 HOURS PRN
Status: DISCONTINUED | OUTPATIENT
Start: 2022-05-04 | End: 2022-05-04 | Stop reason: HOSPADM

## 2022-05-04 RX ORDER — EPINEPHRINE 1 MG/ML
0.1 INJECTION, SOLUTION INTRAMUSCULAR; SUBCUTANEOUS
Status: DISCONTINUED | OUTPATIENT
Start: 2022-05-04 | End: 2022-05-04 | Stop reason: HOSPADM

## 2022-05-04 RX ORDER — PROCHLORPERAZINE MALEATE 5 MG
5 TABLET ORAL EVERY 6 HOURS PRN
Status: DISCONTINUED | OUTPATIENT
Start: 2022-05-04 | End: 2022-05-04 | Stop reason: HOSPADM

## 2022-05-04 RX ADMIN — TOPICAL ANESTHETIC 0.5 ML: 200 SPRAY DENTAL; PERIODONTAL at 13:27

## 2022-05-04 RX ADMIN — FENTANYL CITRATE 50 MCG: 0.05 INJECTION, SOLUTION INTRAMUSCULAR; INTRAVENOUS at 13:53

## 2022-05-04 RX ADMIN — MIDAZOLAM 1 MG: 1 INJECTION INTRAMUSCULAR; INTRAVENOUS at 13:26

## 2022-05-04 RX ADMIN — FENTANYL CITRATE 50 MCG: 0.05 INJECTION, SOLUTION INTRAMUSCULAR; INTRAVENOUS at 13:26

## 2022-05-04 RX ADMIN — MIDAZOLAM 1 MG: 1 INJECTION INTRAMUSCULAR; INTRAVENOUS at 13:50

## 2022-05-04 NOTE — DISCHARGE INSTRUCTIONS
The patient has received a copy of the Provation  report the doctor has written and discharge instructions have been discussed with the patient and responsible adult.  All questions were addressed and answered prior to patient discharge.     Understanding H. pylori and Ulcers  Traditionally, ulcers, or sores in the lining of your digestive tract, were thought to be caused by too much spicy food, stress, or an anxious personality. We now know that most ulcers are probably due to infection with bacteria known as Helicobacter pylori (H. pylori).     H. pylori invade and disturb the lining of the digestive tract. Acid may weaken the area, causing an ulcer.      Common Ulcer Symptoms  Burning, cramping, or hunger-like pain in the stomach area, often one to three hours after a meal or in the middle of the night  Pain that gets better or worse with eating  Nausea or vomiting  Black, tarry, or bloody stools (which means the ulcer is bleeding)  Or you may have no symptoms.     An ulcer can form in two areas of the digestive tract; the stomach and the duodenum (where the stomach meets the small intestine).      Your Evaluation  An evaluation by your doctor can show if you have an ulcer and determine whether it was caused by H. pylori. Your doctor may ask you questions, examine you, and possibly do some tests. These may include:  A special X-ray called a barium upper gastrointestinal series, to help locate an ulcer. During the test, you drink a chalky liquid. This liquid helps the ulcer show up on the X-ray.  An endoscopic exam, done with a long tube passed through your mouth into your stomach, to give the doctor a closer look at your ulcer. You will be lightly sedated for this procedure. Your doctor can also take a tissue sample to test for H. pylori.  Blood, stool, and breath tests are also available to show whether you have H. pylori in your digestive tract.  Your Treatment  To kill H. pylori so your ulcer can heal, your  doctor will probably prescribe antibiotics. Other ulcer medications that help reduce stomach acid may also be prescribed as well. Testing after treatment is recommended to be sure the H. pylori infection is gone. Usually, killing H. pylori helps keep the ulcer from returning.    6713-9468 [employer], [address] [place] [address], [place] [address]. All rights reserved. This information is not intended as a substitute for professional medical care. Always follow your healthcare professional's instructions.     Understanding Diverticulosis and Diverticulitis     Pouches or diverticula usually occur in the lower part of the colon called the sigmoid.      Diverticulitis occurs when the pouches become inflamed.     The colon (large intestine) is the last part of the digestive tract. It absorbs water from stool and changes it from a liquid to a solid. In certain cases, small pouches called diverticula can form in the colon wall. This condition is called diverticulosis. The pouches can become infected. If this happens, it becomes a more serious problem called diverticulitis. These problems can be painful. But they can be managed.   Managing Your Condition  Diet changes or taking medications are often tried first. These may be enough to bring relief. If the case is bad, surgery may be done. You and your doctor can discuss the plan that is best for you.  If You Have Diverticulosis  Diet changes are often enough to control symptoms. The main changes are adding fiber (roughage) and drinking more water. Fiber absorbs water as it travels through your colon. This helps your stool stay soft and move smoothly. Water helps this process. If needed, you may be told to take over-the-counter stool softeners. To help relieve pain, antispasmodic medications may be prescribed.  If You Have Diverticulitis  Treatment depends on how bad your symptoms are.  For mild symptoms: You may be put on a liquid diet for a short time. You may also be  prescribed antibiotics. If these two steps relieve your symptoms, you may then be prescribed a high-fiber diet. If you still have symptoms, your doctor will discuss further treatment options with you.  For severe symptoms: You may need to be admitted to the hospital. There, you can be given IV antibiotics and fluids. Once symptoms are under control, the above treatments may be tried. If these don t control your condition, your doctor may discuss the option of having surgery with you.  [place] to Colon Health  Help keep your colon healthy with a diet that includes plenty of high-fiber fruits, vegetables, and whole grains. Drink plenty of liquids like water and juice. Your doctor may also recommend avoiding seeds and nuts.          [id number] [facility], [address] [place] [address], [place] [address]. All rights reserved. This information is not intended as a substitute for professional medical care. Always follow your healthcare professional's instructions.     Eating a High-Fiber Diet  Fiber is what gives strength and structure to plants. Most grains, beans, vegetables, and fruits contain fiber. Foods rich in fiber are often low in calories and fat, and they fill you up more. They may also reduce your risks for certain health problems. To find out the amount of fiber in canned, packaged, or frozen foods, read the  Nutrition Facts  label. It tells you how much fiber is in a serving.      Types of Fiber and Their Benefits  There are two types of fiber: insoluble and soluble. They both aid digestion and help you maintain a healthy weight.  Insoluble fiber: This is found in whole grains, cereals, certain fruits and vegetables (such as apple skin, corn, and carrots). Insoluble fiber may prevent constipation and reduce the risk of certain types of cancer.   Soluble fiber: This type of fiber is in oats, beans, and certain fruits and vegetables (such as strawberries and peas). Soluble fiber can reduce cholesterol (which may  help lower the risk of heart disease), and helps control blood sugar levels.  Look for High-Fiber Foods  Whole-grain breads and cereals: Try to eat 6-8 ounces a day. Include wheat and oat bran cereals, whole-wheat muffins or toast, and corn tortillas in your meals.  Fruits: Try to eat 2 cups a day. Apples, oranges, strawberries, pears, and bananas are good sources. (Note: Fruit juice is low in fiber.)  Vegetables: Try to eat 3 cups a day. Add asparagus, carrots, broccoli, peas, and corn to your meals.  Legumes (beans): One cup of cooked lentils gives you over 15 grams of fiber. Try navy beans, lentils, and chickpeas.  Seeds:  A small handful of seeds gives you about 3 grams of fiber. Try sunflower seeds.    Keep Track of Your Fiber  A healthy diet includes 31 grams of fiber a day if you have a 2,000-calorie diet. Keep track of how much fiber you eat. Start by reading food labels. Then eat a variety of foods high in fiber. Ask your doctor about supplemental fiber products.            8177-2020 [employer], [address] [place] [address], [place] [address]. All rights reserved. This information is not intended as a substitute for professional medical care. Always follow your healthcare professional's instructions.

## 2022-05-04 NOTE — H&P
Pre-Endoscopy History and Physical     Yolanda Watson MRN# [id number]   YOB: 1940 Age: 81 year old     Date of Procedure: 5/4/2022  Primary care provider: [facility]  Type of Endoscopy: colonoscopy and esophagogastroduodenoscopy (upper GI endoscopy)  Reason for Procedure: nausea, vomiting, diarrhea  Type of Anesthesia Anticipated: Conscious Sedation    HPI:    Yolanda is a 81 year old female who will be undergoing the above procedure.      A history and physical has been performed. The patient's medications and allergies have been reviewed. The risks and benefits of the procedure and the sedation options and risks were discussed with the patient.  All questions were answered and informed consent was obtained.      She denies a personal or family history of anesthesia complications or bleeding disorders.     Patient Active Problem List   Diagnosis     Lung cancer (H)     OAB (overactive bladder)     Colon cancer (H)     Rectal bleeding     Malignant neoplasm of left breast (H)     Weakness     Pre-diabetes     Hyperlipidemia     HTN (hypertension)     Esophageal reflux     Dyspnea     Breast cancer (H)     Nodule of lower lobe of left lung     Nausea & vomiting     Nausea and vomiting     Generalized weakness     Chest pain, unspecified type     Abdominal pain, generalized     Dehydration     Lactic acid blood increased     Vomiting and diarrhea     Urinary tract infection without hematuria, site unspecified     UTI (urinary tract infection)        Past Medical History:   Diagnosis Date     Acoustic neuroma (H)      Acute arthropathy     lower leg     Acute depression      Anemia     iron deficeincy     Anemia      Anxiety      Arthritis     osteoarthrosis of knee     Arthritis      Breast cancer (H)     left breast     Breast cancer (H)      Breast cancer, left breast (H) 02/2018     Breast cancer, right breast (H) 1/2016     Cardiomyopathy (H)      Cervical cancer (H)      Colon  "cancer (H)      Colorectal cancer (H)      Decreased cardiac ejection fraction      Depression      Depressive disorder      Diabetes mellitus (H)     pre-diabetic no longer taking Metformin     Diabetes mellitus (H)     pre\"denies  A1C , 6 for couple years\"     Dyspnea      Dyspnea      Gastro-oesophageal reflux disease      H/O: lung cancer 2013     Herpes zoster      History of blood transfusion      HTN (hypertension)      Hyperlipidemia      Hyperlipidemia      Hypertension      Iron deficiency anemia      Lung cancer (H)     squamous cell carcinoma     Lung nodule 2013    Lung cancer     Nausea & vomiting      OAB (overactive bladder)      Obese      Osteoarthritis of knee      Pain in female pelvis      Pre-diabetes      Preop general physical exam      Rib lesion      Shoulder pain, acute     right     Tachycardia      Torn meniscus         Past Surgical History:   Procedure Laterality Date     acoustic neuroma excised       APPENDECTOMY  1966    done with CORNELIO     ARTHROSCOPY KNEE  2012    right knee scoped     BIOPSY NODE SENTINEL Right 2/3/2016    Procedure: BIOPSY NODE SENTINEL;  Surgeon: Flory Pinto MD;  Location: [facility]     BIOPSY NODE SENTINEL Left 3/5/2018    Procedure: BIOPSY NODE SENTINEL;;  Surgeon: Dneae Perez MD;  Location: [facility]     BREAST BIOPSY, RT/LT       cataracts       cataracts       CERVICAL CANCER SCREENING RESULTS DOCUMENTED AND REVIEWED       CHOLECYSTECTOMY  1991     CHOLECYSTECTOMY       CL AFF SURGICAL PATHOLOGY       CL AFF SURGICAL PATHOLOGY      acoustic neuroma left side excised [facility] Dr. Longoria 6/17/14     COLON SURGERY       COLONOSCOPY       CRANIECTOMY FOR EXCISION OF ACOUSTIC NEUROMA       EXCISE NODE MEDIASTINAL  3/12/2013    Procedure: EXCISE NODE MEDIASTINAL;;  Surgeon: Lorne Arenas MD;  Location:  OR     EYE SURGERY      fiona cataracts     GENITOURINARY SURGERY      bladder sling     GYN SURGERY  1966    hysterectomy for cervical cancer " *ovaries remain     HYSTERECTOMY  1966    Cervical CA, paps done every other year, done with appendectomy     HYSTERECTOMY       INSERT PORT VASCULAR ACCESS Left 3/5/2018    Procedure: INSERT PORT VASCULAR ACCESS;;  Surgeon: Denae Perez MD;  Location: [facility]     INSERT PORT VASCULAR ACCESS N/A 3/5/2018    Procedure: INSERT PORT VASCULAR ACCESS;;  Surgeon: Denae Perez MD;  Location: [facility]     LAPAROSCOPIC ASSISTED COLECTOMY Right 10/27/2016    Procedure: LAPAROSCOPIC ASSISTED COLECTOMY;  Surgeon: Umang Arteaga MD;  Location:  OR     LOBECTOMY LUNG  3/12/2013    Procedure: LOBECTOMY LUNG;;  Surgeon: Lorne Arenas MD;  Location:  OR     LUMPECTOMY BREAST       LUMPECTOMY BREAST WITH SEED LOCALIZATION Right 2/3/2016    Procedure: LUMPECTOMY BREAST WITH SEED LOCALIZATION;  Surgeon: Flory Pinto MD;  Location: [facility]     LUMPECTOMY BREAST WITH SEED LOCALIZATION Left 3/5/2018    Procedure: LUMPECTOMY BREAST WITH SEED LOCALIZATION;  SEED LOCALIZED LEFT BREAST LUMPECTOMY, WITH LEFT SENTINEL NODE BIOPSY, PORT PLACEMENT;  Surgeon: Denae Perez MD;  Location: [facility]     MASTECTOMY Left      MASTECTOMY SIMPLE Left 3/28/2018    Procedure: MASTECTOMY SIMPLE;  LEFT BREAST MASTECTOMY ;  Surgeon: Denae Perez MD;  Location: [facility]     MINI ARC SLING OPERATION FOR STRESS INCONTINENCE  2009     OTHER SURGICAL HISTORY      right middle and lower lobectomy with mediatinal lymph node disection     OTHER SURGICAL HISTORY      left lower lobe wedge resection     REMOVE PORT VASCULAR ACCESS N/A 2/13/2019    Procedure: REMOVE PORT VASCULAR ACCESS;  Surgeon: Denae Perez MD;  Location:  OR     RESECT RIB  2/15/2013    Procedure: RESECT RIB;  RESECTION PORTION RIGHT TWELVETH RIB;  Surgeon: Lorne Arenas MD;  Location:  OR     THORACOSCOPIC WEDGE RESECTION LUNG Left 7/26/2019    Procedure: LEFT VIDEO ASSISTED THORACIC SURGERY; POSSIBLE LEFT THORACOTOMY; TWO WEDGE RESECTIONS OF LEFT  LOWER LUNG NODULE;  Surgeon: Lorne Arenas MD;  Location: [facility]     THORACOTOMY  3/12/2013    Procedure: THORACOTOMY;  RIGHT THORACOTOMY, RIGHT MIDDLE AND LOWER LUNG LOBECTOMY, MEDIASTINAL LYMPH NODE DISSECTION ;  Surgeon: Lorne Arenas MD;  Location: [facility] OR     THORACOTOMY       TONSILLECTOMY       ZZC RECONSTR TOTAL SHOULDER IMPLANT Right 3/16/2020    Procedure: REVERSE TOTAL SHOULDER ARTHROPLASTY;  Surgeon: Zuhair Fuentes MD;  Location: [facility];  Service: Orthopedics       Relevant Family History: NONE    Relevant Social History: NONE     Prior to Admission medications    Medication Sig Start Date End Date Taking? Authorizing Provider   anastrozole (ARIMIDEX) 1 MG tablet Take 1 mg by mouth At Bedtime    Yes Reported, Patient   atorvastatin (LIPITOR) 20 MG tablet Take 20 mg by mouth At Bedtime    Yes Reported, Patient   lisinopril (ZESTRIL) 10 MG tablet Take 1 tablet (10 mg) by mouth daily 8/14/20  Yes Ariadne Garcia MD   loperamide (IMODIUM) 2 MG capsule Take 2 mg by mouth 4 times daily as needed for diarrhea   Yes Reported, Patient   metoprolol succinate ER (TOPROL-XL) 50 MG 24 hr tablet Take 1 tablet (50 mg) by mouth daily 12/16/19  Yes Sulema Coleman PA-C   ondansetron (ZOFRAN-ODT) 4 MG ODT tab Take 4 mg by mouth every 8 hours as needed for nausea   Yes Unknown, Entered By History   PARoxetine (PAXIL) 40 MG tablet Take 40 mg by mouth At Bedtime    Yes Unknown, Entered By History   vitamin D3 (CHOLECALCIFEROL) 2000 units (50 mcg) tablet Take 1 tablet by mouth daily   Yes Reported, Patient   acetaminophen (TYLENOL) 325 MG tablet Take 325-650 mg by mouth every 6 hours as needed for mild pain    Unknown, Entered By History       Allergies   Allergen Reactions     Demerol [Meperidine] Nausea and Vomiting     Sulfa Drugs Other (See Comments)     Extreme chills     Penicillins Rash     Swelling In mouth and tongue        REVIEW OF SYSTEMS:   A relevant review of systems  "was performed and was negative    PHYSICAL EXAM:   BP (!) 170/92   Pulse 89   Temp 98.3  F (36.8  C) (Temporal)   Resp 12   Ht 1.778 m (5' 10\")   Wt 97.5 kg (215 lb)   SpO2 93%   BMI 30.85 kg/m   Estimated body mass index is 30.85 kg/m  as calculated from the following:    Height as of this encounter: 1.778 m (5' 10\").    Weight as of this encounter: 97.5 kg (215 lb).   GENERAL APPEARANCE: alert, and oriented  MENTAL STATUS: alert  AIRWAY EXAM: Normal  RESP: lungs clear to auscultation - no rales, rhonchi or wheezes  CV: regular rates and rhythm  DIAGNOSTICS:    Not indicated    IMPRESSION   ASA Class 2 - Mild systemic disease    PLAN:   Plan for EGD and colonoscopy. We discussed the risks, benefits and alternatives and the patient wished to proceed.      Signed Electronically by: Blake Longoria MD  May 4, 2022            "

## 2022-05-05 LAB
PATH REPORT.COMMENTS IMP SPEC: NORMAL
PATH REPORT.COMMENTS IMP SPEC: NORMAL
PATH REPORT.FINAL DX SPEC: NORMAL
PATH REPORT.GROSS SPEC: NORMAL
PATH REPORT.MICROSCOPIC SPEC OTHER STN: NORMAL
PATH REPORT.RELEVANT HX SPEC: NORMAL
PHOTO IMAGE: NORMAL

## 2022-05-05 PROCEDURE — 88305 TISSUE EXAM BY PATHOLOGIST: CPT | Mod: 26 | Performed by: PATHOLOGY

## 2023-01-01 ENCOUNTER — APPOINTMENT (OUTPATIENT)
Dept: GENERAL RADIOLOGY | Facility: CLINIC | Age: 83
End: 2023-01-01
Attending: RADIOLOGY
Payer: COMMERCIAL

## 2023-01-01 ENCOUNTER — HOSPITAL ENCOUNTER (OUTPATIENT)
Facility: CLINIC | Age: 83
Discharge: HOME | End: 2023-07-10
Admitting: RADIOLOGY
Payer: COMMERCIAL

## 2023-01-01 ENCOUNTER — HOSPITAL ENCOUNTER (EMERGENCY)
Facility: CLINIC | Age: 83
Discharge: LEFT BEFORE DISCHARGE | End: 2023-12-26
Payer: COMMERCIAL

## 2023-01-01 ENCOUNTER — ANCILLARY PROCEDURE (OUTPATIENT)
Dept: MRI IMAGING | Facility: CLINIC | Age: 83
End: 2023-01-01
Attending: INTERNAL MEDICINE
Payer: COMMERCIAL

## 2023-01-01 ENCOUNTER — MEDICAL CORRESPONDENCE (OUTPATIENT)
Dept: SCHEDULING | Facility: CLINIC | Age: 83
End: 2023-01-01
Payer: COMMERCIAL

## 2023-01-01 ENCOUNTER — HOSPITAL ENCOUNTER (OUTPATIENT)
Dept: MAMMOGRAPHY | Facility: CLINIC | Age: 83
Discharge: HOME | End: 2023-06-13
Attending: INTERNAL MEDICINE | Admitting: INTERNAL MEDICINE
Payer: COMMERCIAL

## 2023-01-01 ENCOUNTER — TRANSFERRED RECORDS (OUTPATIENT)
Dept: HEALTH INFORMATION MANAGEMENT | Facility: CLINIC | Age: 83
End: 2023-01-01
Payer: COMMERCIAL

## 2023-01-01 ENCOUNTER — HOSPITAL ENCOUNTER (EMERGENCY)
Facility: CLINIC | Age: 83
Discharge: HOME | End: 2023-05-05
Attending: EMERGENCY MEDICINE | Admitting: EMERGENCY MEDICINE
Payer: COMMERCIAL

## 2023-01-01 ENCOUNTER — HOSPITAL ENCOUNTER (EMERGENCY)
Facility: CLINIC | Age: 83
Discharge: LEFT BEFORE DISCHARGE | End: 2023-08-28
Admitting: EMERGENCY MEDICINE
Payer: COMMERCIAL

## 2023-01-01 ENCOUNTER — HOSPITAL ENCOUNTER (OUTPATIENT)
Dept: CT IMAGING | Facility: CLINIC | Age: 83
Discharge: HOME | End: 2023-12-14
Attending: INTERNAL MEDICINE | Admitting: INTERNAL MEDICINE
Payer: COMMERCIAL

## 2023-01-01 ENCOUNTER — HOSPITAL ENCOUNTER (OUTPATIENT)
Dept: CT IMAGING | Facility: CLINIC | Age: 83
Discharge: HOME | End: 2023-07-10
Attending: NURSE PRACTITIONER | Admitting: RADIOLOGY
Payer: COMMERCIAL

## 2023-01-01 ENCOUNTER — APPOINTMENT (OUTPATIENT)
Dept: CT IMAGING | Facility: CLINIC | Age: 83
End: 2023-01-01
Attending: EMERGENCY MEDICINE
Payer: COMMERCIAL

## 2023-01-01 ENCOUNTER — TELEPHONE (OUTPATIENT)
Dept: MEDSURG UNIT | Facility: CLINIC | Age: 83
End: 2023-01-01
Payer: COMMERCIAL

## 2023-01-01 VITALS
SYSTOLIC BLOOD PRESSURE: 174 MMHG | HEART RATE: 95 BPM | DIASTOLIC BLOOD PRESSURE: 88 MMHG | TEMPERATURE: 97.9 F | OXYGEN SATURATION: 99 % | RESPIRATION RATE: 16 BRPM

## 2023-01-01 VITALS
DIASTOLIC BLOOD PRESSURE: 91 MMHG | BODY MASS INDEX: 26.66 KG/M2 | WEIGHT: 180 LBS | TEMPERATURE: 98.2 F | HEIGHT: 69 IN | RESPIRATION RATE: 105 BRPM | HEART RATE: 112 BPM | SYSTOLIC BLOOD PRESSURE: 152 MMHG | OXYGEN SATURATION: 94 %

## 2023-01-01 VITALS
HEART RATE: 120 BPM | TEMPERATURE: 97.4 F | SYSTOLIC BLOOD PRESSURE: 157 MMHG | HEIGHT: 69 IN | DIASTOLIC BLOOD PRESSURE: 75 MMHG | BODY MASS INDEX: 31.1 KG/M2 | OXYGEN SATURATION: 95 % | WEIGHT: 210 LBS

## 2023-01-01 DIAGNOSIS — C50.412 MALIGNANT NEOPLASM OF UPPER-OUTER QUADRANT OF LEFT FEMALE BREAST (H): ICD-10-CM

## 2023-01-01 DIAGNOSIS — C34.32 PRIMARY MALIGNANT NEOPLASM OF BRONCHUS OF LEFT LOWER LOBE (H): ICD-10-CM

## 2023-01-01 DIAGNOSIS — C50.919 PRIMARY MALIGNANT NEOPLASM OF FEMALE BREAST (H): ICD-10-CM

## 2023-01-01 DIAGNOSIS — C34.90 NON-SMALL CELL LUNG CANCER (H): ICD-10-CM

## 2023-01-01 DIAGNOSIS — R11.2 NAUSEA AND VOMITING, UNSPECIFIED VOMITING TYPE: Primary | ICD-10-CM

## 2023-01-01 DIAGNOSIS — C50.412 PRIMARY MALIGNANT NEOPLASM OF UPPER OUTER QUADRANT OF BREAST, LEFT (H): ICD-10-CM

## 2023-01-01 DIAGNOSIS — Z12.31 VISIT FOR SCREENING MAMMOGRAM: ICD-10-CM

## 2023-01-01 DIAGNOSIS — C34.90 LUNG CANCER (H): ICD-10-CM

## 2023-01-01 LAB
ALBUMIN SERPL BCG-MCNC: 4.1 G/DL (ref 3.5–5.2)
ALBUMIN UR-MCNC: NEGATIVE MG/DL
ALP SERPL-CCNC: 60 U/L (ref 35–104)
ALT SERPL W P-5'-P-CCNC: 13 U/L (ref 10–35)
ANION GAP SERPL CALCULATED.3IONS-SCNC: 15 MMOL/L (ref 7–15)
APPEARANCE UR: CLEAR
AST SERPL W P-5'-P-CCNC: 13 U/L (ref 10–35)
ATRIAL RATE - MUSE: 102 BPM
BASOPHILS # BLD AUTO: 0.1 10E3/UL (ref 0–0.2)
BASOPHILS NFR BLD AUTO: 1 %
BILIRUB SERPL-MCNC: 0.9 MG/DL
BILIRUB UR QL STRIP: NEGATIVE
BUN SERPL-MCNC: 10.5 MG/DL (ref 8–23)
CALCIUM SERPL-MCNC: 9.7 MG/DL (ref 8.8–10.2)
CHLORIDE SERPL-SCNC: 102 MMOL/L (ref 98–107)
COLOR UR AUTO: NORMAL
CREAT BLD-MCNC: 1 MG/DL (ref 0.5–1)
CREAT SERPL-MCNC: 0.79 MG/DL (ref 0.51–0.95)
DEPRECATED HCO3 PLAS-SCNC: 25 MMOL/L (ref 22–29)
DIASTOLIC BLOOD PRESSURE - MUSE: NORMAL MMHG
EGFRCR SERPLBLD CKD-EPI 2021: 55 ML/MIN/1.73M2
EOSINOPHIL # BLD AUTO: 0 10E3/UL (ref 0–0.7)
EOSINOPHIL NFR BLD AUTO: 0 %
ERYTHROCYTE [DISTWIDTH] IN BLOOD BY AUTOMATED COUNT: 15.3 % (ref 10–15)
GFR SERPL CREATININE-BSD FRML MDRD: 73 ML/MIN/1.73M2
GLUCOSE SERPL-MCNC: 131 MG/DL (ref 70–99)
GLUCOSE UR STRIP-MCNC: NEGATIVE MG/DL
HCT VFR BLD AUTO: 37.6 % (ref 35–47)
HGB BLD-MCNC: 12.4 G/DL (ref 11.7–15.7)
HGB UR QL STRIP: NEGATIVE
HYALINE CASTS: 1 /LPF
IMM GRANULOCYTES # BLD: 0.1 10E3/UL
IMM GRANULOCYTES NFR BLD: 1 %
INR PPP: 0.95 (ref 0.85–1.15)
INTERPRETATION ECG - MUSE: NORMAL
KETONES UR STRIP-MCNC: NEGATIVE MG/DL
LEUKOCYTE ESTERASE UR QL STRIP: NEGATIVE
LIPASE SERPL-CCNC: 45 U/L (ref 13–60)
LYMPHOCYTES # BLD AUTO: 1.6 10E3/UL (ref 0.8–5.3)
LYMPHOCYTES NFR BLD AUTO: 20 %
MCH RBC QN AUTO: 36.4 PG (ref 26.5–33)
MCHC RBC AUTO-ENTMCNC: 33 G/DL (ref 31.5–36.5)
MCV RBC AUTO: 110 FL (ref 78–100)
MONOCYTES # BLD AUTO: 1 10E3/UL (ref 0–1.3)
MONOCYTES NFR BLD AUTO: 13 %
NEUTROPHILS # BLD AUTO: 4.9 10E3/UL (ref 1.6–8.3)
NEUTROPHILS NFR BLD AUTO: 65 %
NITRATE UR QL: NEGATIVE
NRBC # BLD AUTO: 0 10E3/UL
NRBC BLD AUTO-RTO: 0 /100
P AXIS - MUSE: 51 DEGREES
PATH REPORT.COMMENTS IMP SPEC: ABNORMAL
PATH REPORT.COMMENTS IMP SPEC: ABNORMAL
PATH REPORT.COMMENTS IMP SPEC: YES
PATH REPORT.FINAL DX SPEC: ABNORMAL
PATH REPORT.GROSS SPEC: ABNORMAL
PATH REPORT.MICROSCOPIC SPEC OTHER STN: ABNORMAL
PATH REPORT.RELEVANT HX SPEC: ABNORMAL
PH UR STRIP: 6.5 [PH] (ref 5–7)
PHOTO IMAGE: ABNORMAL
PLATELET # BLD AUTO: 422 10E3/UL (ref 150–450)
PLATELET # BLD AUTO: 429 10E3/UL (ref 150–450)
POTASSIUM SERPL-SCNC: 3.8 MMOL/L (ref 3.4–5.3)
PR INTERVAL - MUSE: 188 MS
PROT SERPL-MCNC: 6.6 G/DL (ref 6.4–8.3)
QRS DURATION - MUSE: 92 MS
QT - MUSE: 376 MS
QTC - MUSE: 490 MS
R AXIS - MUSE: -44 DEGREES
RBC # BLD AUTO: 3.41 10E6/UL (ref 3.8–5.2)
RBC URINE: 0 /HPF
SODIUM SERPL-SCNC: 142 MMOL/L (ref 136–145)
SP GR UR STRIP: 1.01 (ref 1–1.03)
SYSTOLIC BLOOD PRESSURE - MUSE: NORMAL MMHG
T AXIS - MUSE: 57 DEGREES
UROBILINOGEN UR STRIP-MCNC: NORMAL MG/DL
VENTRICULAR RATE- MUSE: 102 BPM
WBC # BLD AUTO: 7.6 10E3/UL (ref 4–11)
WBC URINE: <1 /HPF

## 2023-01-01 PROCEDURE — 93005 ELECTROCARDIOGRAM TRACING: CPT

## 2023-01-01 PROCEDURE — 82565 ASSAY OF CREATININE: CPT

## 2023-01-01 PROCEDURE — 250N000009 HC RX 250: Performed by: EMERGENCY MEDICINE

## 2023-01-01 PROCEDURE — 80053 COMPREHEN METABOLIC PANEL: CPT | Performed by: EMERGENCY MEDICINE

## 2023-01-01 PROCEDURE — 96374 THER/PROPH/DIAG INJ IV PUSH: CPT | Mod: 59

## 2023-01-01 PROCEDURE — 96375 TX/PRO/DX INJ NEW DRUG ADDON: CPT

## 2023-01-01 PROCEDURE — 250N000009 HC RX 250: Performed by: INTERNAL MEDICINE

## 2023-01-01 PROCEDURE — 250N000011 HC RX IP 250 OP 636: Performed by: RADIOLOGY

## 2023-01-01 PROCEDURE — 258N000003 HC RX IP 258 OP 636: Performed by: EMERGENCY MEDICINE

## 2023-01-01 PROCEDURE — 32408 CORE NDL BX LNG/MED PERQ: CPT

## 2023-01-01 PROCEDURE — 83690 ASSAY OF LIPASE: CPT | Performed by: EMERGENCY MEDICINE

## 2023-01-01 PROCEDURE — 96361 HYDRATE IV INFUSION ADD-ON: CPT

## 2023-01-01 PROCEDURE — 99281 EMR DPT VST MAYX REQ PHY/QHP: CPT

## 2023-01-01 PROCEDURE — 81003 URINALYSIS AUTO W/O SCOPE: CPT | Performed by: EMERGENCY MEDICINE

## 2023-01-01 PROCEDURE — 70553 MRI BRAIN STEM W/O & W/DYE: CPT

## 2023-01-01 PROCEDURE — 85049 AUTOMATED PLATELET COUNT: CPT | Performed by: PHYSICIAN ASSISTANT

## 2023-01-01 PROCEDURE — 36415 COLL VENOUS BLD VENIPUNCTURE: CPT | Performed by: PHYSICIAN ASSISTANT

## 2023-01-01 PROCEDURE — 88305 TISSUE EXAM BY PATHOLOGIST: CPT | Mod: 26 | Performed by: PATHOLOGY

## 2023-01-01 PROCEDURE — 88342 IMHCHEM/IMCYTCHM 1ST ANTB: CPT | Mod: TC | Performed by: NURSE PRACTITIONER

## 2023-01-01 PROCEDURE — 250N000011 HC RX IP 250 OP 636: Performed by: EMERGENCY MEDICINE

## 2023-01-01 PROCEDURE — 74177 CT ABD & PELVIS W/CONTRAST: CPT

## 2023-01-01 PROCEDURE — 85025 COMPLETE CBC W/AUTO DIFF WBC: CPT | Performed by: EMERGENCY MEDICINE

## 2023-01-01 PROCEDURE — 36591 DRAW BLOOD OFF VENOUS DEVICE: CPT

## 2023-01-01 PROCEDURE — 999N000065 XR CHEST 1 VIEW

## 2023-01-01 PROCEDURE — 250N000009 HC RX 250: Performed by: RADIOLOGY

## 2023-01-01 PROCEDURE — 250N000011 HC RX IP 250 OP 636: Performed by: INTERNAL MEDICINE

## 2023-01-01 PROCEDURE — 88342 IMHCHEM/IMCYTCHM 1ST ANTB: CPT | Mod: 26 | Performed by: PATHOLOGY

## 2023-01-01 PROCEDURE — 99285 EMERGENCY DEPT VISIT HI MDM: CPT | Mod: 25

## 2023-01-01 PROCEDURE — 88341 IMHCHEM/IMCYTCHM EA ADD ANTB: CPT | Mod: 26 | Performed by: PATHOLOGY

## 2023-01-01 PROCEDURE — 272N000431 CT LUNG MEDIASTINUM BIOPSY

## 2023-01-01 PROCEDURE — A9585 GADOBUTROL INJECTION: HCPCS | Mod: JZ | Performed by: INTERNAL MEDICINE

## 2023-01-01 PROCEDURE — 77067 SCR MAMMO BI INCL CAD: CPT | Mod: 52

## 2023-01-01 PROCEDURE — 85610 PROTHROMBIN TIME: CPT | Performed by: PHYSICIAN ASSISTANT

## 2023-01-01 PROCEDURE — 36415 COLL VENOUS BLD VENIPUNCTURE: CPT | Performed by: EMERGENCY MEDICINE

## 2023-01-01 PROCEDURE — 88341 IMHCHEM/IMCYTCHM EA ADD ANTB: CPT | Mod: TC | Performed by: NURSE PRACTITIONER

## 2023-01-01 PROCEDURE — 71260 CT THORAX DX C+: CPT

## 2023-01-01 PROCEDURE — 255N000002 HC RX 255 OP 636: Mod: JZ | Performed by: INTERNAL MEDICINE

## 2023-01-01 PROCEDURE — 999N000154 HC STATISTIC RADIOLOGY XRAY, US, CT, MAR, NM

## 2023-01-01 RX ORDER — NALOXONE HYDROCHLORIDE 0.4 MG/ML
0.2 INJECTION, SOLUTION INTRAMUSCULAR; INTRAVENOUS; SUBCUTANEOUS
Status: DISCONTINUED | OUTPATIENT
Start: 2023-01-01 | End: 2023-01-01 | Stop reason: HOSPADM

## 2023-01-01 RX ORDER — FLUMAZENIL 0.1 MG/ML
0.2 INJECTION, SOLUTION INTRAVENOUS
Status: DISCONTINUED | OUTPATIENT
Start: 2023-01-01 | End: 2023-01-01 | Stop reason: HOSPADM

## 2023-01-01 RX ORDER — LIDOCAINE 40 MG/G
CREAM TOPICAL
Status: DISCONTINUED | OUTPATIENT
Start: 2023-01-01 | End: 2023-01-01 | Stop reason: HOSPADM

## 2023-01-01 RX ORDER — IOPAMIDOL 755 MG/ML
105 INJECTION, SOLUTION INTRAVASCULAR ONCE
Status: COMPLETED | OUTPATIENT
Start: 2023-01-01 | End: 2023-01-01

## 2023-01-01 RX ORDER — ESOMEPRAZOLE MAGNESIUM 40 MG/1
40 CAPSULE, DELAYED RELEASE ORAL
COMMUNITY
End: 2024-01-01

## 2023-01-01 RX ORDER — CIPROFLOXACIN 250 MG/1
250 TABLET, FILM COATED ORAL 2 TIMES DAILY
COMMUNITY
End: 2024-01-01

## 2023-01-01 RX ORDER — LABETALOL HYDROCHLORIDE 5 MG/ML
10 INJECTION, SOLUTION INTRAVENOUS ONCE
Status: COMPLETED | OUTPATIENT
Start: 2023-01-01 | End: 2023-01-01

## 2023-01-01 RX ORDER — IOPAMIDOL 755 MG/ML
91 INJECTION, SOLUTION INTRAVASCULAR ONCE
Status: COMPLETED | OUTPATIENT
Start: 2023-01-01 | End: 2023-01-01

## 2023-01-01 RX ORDER — NALOXONE HYDROCHLORIDE 0.4 MG/ML
0.4 INJECTION, SOLUTION INTRAMUSCULAR; INTRAVENOUS; SUBCUTANEOUS
Status: DISCONTINUED | OUTPATIENT
Start: 2023-01-01 | End: 2023-01-01 | Stop reason: HOSPADM

## 2023-01-01 RX ORDER — ONDANSETRON 2 MG/ML
4 INJECTION INTRAMUSCULAR; INTRAVENOUS ONCE
Status: COMPLETED | OUTPATIENT
Start: 2023-01-01 | End: 2023-01-01

## 2023-01-01 RX ORDER — LIDOCAINE 40 MG/G
CREAM TOPICAL
Status: CANCELLED | OUTPATIENT
Start: 2023-01-01

## 2023-01-01 RX ORDER — FENTANYL CITRATE 50 UG/ML
25-50 INJECTION, SOLUTION INTRAMUSCULAR; INTRAVENOUS EVERY 5 MIN PRN
Status: DISCONTINUED | OUTPATIENT
Start: 2023-01-01 | End: 2023-01-01 | Stop reason: HOSPADM

## 2023-01-01 RX ORDER — GADOBUTROL 604.72 MG/ML
10 INJECTION INTRAVENOUS ONCE
Status: COMPLETED | OUTPATIENT
Start: 2023-01-01 | End: 2023-01-01

## 2023-01-01 RX ORDER — ONDANSETRON 4 MG/1
4 TABLET, ORALLY DISINTEGRATING ORAL EVERY 8 HOURS PRN
Qty: 10 TABLET | Refills: 0 | Status: SHIPPED | OUTPATIENT
Start: 2023-01-01 | End: 2024-01-01

## 2023-01-01 RX ADMIN — GADOBUTROL 10 ML: 604.72 INJECTION INTRAVENOUS at 08:29

## 2023-01-01 RX ADMIN — SODIUM CHLORIDE 73 ML: 9 INJECTION, SOLUTION INTRAVENOUS at 17:17

## 2023-01-01 RX ADMIN — FENTANYL CITRATE 50 MCG: 50 INJECTION, SOLUTION INTRAMUSCULAR; INTRAVENOUS at 09:07

## 2023-01-01 RX ADMIN — LIDOCAINE HYDROCHLORIDE 6 ML: 10 INJECTION, SOLUTION EPIDURAL; INFILTRATION; INTRACAUDAL; PERINEURAL at 09:21

## 2023-01-01 RX ADMIN — IOPAMIDOL 91 ML: 755 INJECTION, SOLUTION INTRAVENOUS at 11:22

## 2023-01-01 RX ADMIN — SODIUM CHLORIDE 66 ML: 9 INJECTION, SOLUTION INTRAVENOUS at 11:22

## 2023-01-01 RX ADMIN — LABETALOL HYDROCHLORIDE 10 MG: 5 INJECTION INTRAVENOUS at 10:38

## 2023-01-01 RX ADMIN — SODIUM CHLORIDE 1000 ML: 9 INJECTION, SOLUTION INTRAVENOUS at 10:24

## 2023-01-01 RX ADMIN — MIDAZOLAM 1 MG: 1 INJECTION INTRAMUSCULAR; INTRAVENOUS at 09:07

## 2023-01-01 RX ADMIN — ONDANSETRON 4 MG: 2 INJECTION INTRAMUSCULAR; INTRAVENOUS at 10:38

## 2023-01-01 RX ADMIN — IOPAMIDOL 105 ML: 755 INJECTION, SOLUTION INTRAVENOUS at 17:16

## 2023-01-01 ASSESSMENT — ACTIVITIES OF DAILY LIVING (ADL)
ADLS_ACUITY_SCORE: 37

## 2023-01-01 ASSESSMENT — ENCOUNTER SYMPTOMS
VOMITING: 1
SHORTNESS OF BREATH: 0
NAUSEA: 1
FREQUENCY: 1
ABDOMINAL PAIN: 1

## 2023-05-05 NOTE — ED TRIAGE NOTES
Arrived via EMS from [facility]. Complaints of nausea since 3am. HTN noted as well.

## 2023-05-05 NOTE — ED NOTES
Bed: ED09  Expected date: 5/5/23  Expected time: 9:23 AM  Means of arrival: Ambulance  Comments:  [place] 82f nausea, htn, ETA [id number]

## 2023-05-05 NOTE — ED PROVIDER NOTES
History   Chief Complaint:  Nausea     HPI   Yolanda Watson is a 82 year old female with a history of anemia, colon cancer, lung cancer, breast cancer, cervical cancer, HTN, obesity, and GERD who presents with nausea and vomiting. The patient states that starting at 0300 she developed nausea and has been vomiting. She has lower abdominal pain as well. She notes that she also has increased urination and urgency but also has been drinking more water. She denies any shortness of breath or chest pain. She says that her cancer has been in remission. She denies any falls or trauma to her abdomen recently. She did take all of her morning medications but is unsure of what medications she takes.    Independent Historian:   None - Patient Only    Review of External Notes: Reviewed admission of July 2021 for UTI, kidney injury, and presented with nausea and vomiting.    ROS:  Review of Systems   Respiratory: Negative for shortness of breath.    Cardiovascular: Negative for chest pain.   Gastrointestinal: Positive for abdominal pain, nausea and vomiting.   Genitourinary: Positive for frequency and urgency.   All other systems reviewed and are negative.    Allergies:  Demerol [Meperidine]  Sulfa Antibiotics  Penicillins     Medications:    anastrozole   atorvastatin   lisinopril   loperamide   metoprolol succinate   ondansetron   PARoxetine     Past Medical History:    Acoustic neuroma  Arthropathy  Anemia  Arthritis  Breast cancer  Colon cancer  Cervical cancer  Cardiomyopathy  Depression  Diabetes  GERD  HTN  Hyperlipidemia  Obesity  Lung cancer    Past Surgical History:    Appendectomy  Knee arthroscopy, right  Node biopsy  cataract surgery  Cholecystectomy  Neuroma removal  Bladder sling  Hysterectomy  Lumpectomy  Colectomy  Port placement  Wedge resection of lung  Mastectomy  Thoracotomy  Tonsillectomy  Shoulder surgery     Family History:    Mother-Hyperlipidemia, HTN, heart disease,  "osteoporosis  Sister-Hyperlipidemia  Brother-Hyperlipidemia, prostate cancer    Social History:  The patient presents to the ED alone via EMS.  The patient resides in a nursing home in [place].    Physical Exam     Patient Vitals for the past 24 hrs:   BP Temp Temp src Pulse Resp SpO2 Height Weight   05/05/23 1210 (!) 150/97 -- -- -- -- -- -- --   05/05/23 1100 (!) 151/83 -- -- 112 -- 90 % -- --   05/05/23 1039 (!) 173/96 -- -- 96 -- 91 % -- --   05/05/23 0935 (!) 208/128 98.2  F (36.8  C) Oral 118 (!) 105 -- 1.753 m (5' 9\") 81.6 kg (180 lb)      Physical Exam  General: Alert, appears elderly, otherwise well-developed and well-nourished. Cooperative.     In mild distress  HEENT:  Head:  Atraumatic  Ears:  External ears are normal  Mouth/Throat:  Oropharynx is without erythema or exudate and mucous membranes are dry.   Eyes:   Conjunctivae normal and EOM are normal. No scleral icterus.  CV:  Tachycardic rate, regular rhythm, normal heart sounds and radial pulses are 2+ and symmetric.  No murmur.  Resp:  Breath sounds are clear bilaterally    Non-labored, no retractions or accessory muscle use  GI:  Abdomen is soft, obese, no distension, faint suprapubic tenderness. No rebound or guarding.  No CVA tenderness bilaterally  MS:  Normal range of motion. No edema.    Back atraumatic.    No midline cervical, thoracic, or lumbar tenderness  Skin:  Warm and dry.  No rash or lesions noted.  Neuro: Alert. Normal strength.  GCS: 15  Psych:  Normal mood and affect.    Emergency Department Course     ECG:  ECG results from 05/05/23 signed @ 1003   EKG 12-lead, tracing only     Value    Systolic Blood Pressure     Diastolic Blood Pressure     Ventricular Rate 102    Atrial Rate 102    DC Interval 188    QRS Duration 92        QTc 490    P Axis 51    R AXIS -44    T Axis 57    Interpretation ECG      Sinus tachycardia with Premature atrial complexes with Aberrant conduction  Left axis deviation  Moderate voltage criteria for " LVH, may be normal variant ( R in aVL , Joaquin product )  Nonspecific ST abnormality  Abnormal ECG  When compared with ECG of 19-JUL-2021 01:47,  No significant change was found         Imaging:  CT Abdomen Pelvis w Contrast   Final Result   IMPRESSION:       1. Marked diverticulosis without diverticulitis.   2. No ureteral stone or hydronephrosis bilaterally.   3. Similar minimal right effusion with atelectasis and/or fibrosis at   the lung bases.   4. New 1.1 cm nodule in the left lower lobe.      Recommendations for an incidental lung nodule average diameter > or =   8mm:     Low risk patients: Consider follow-up CT in 3 months, PET/CT, and/or   tissue sampling.     High risk patients: Same as for low risk patients.      *Low Risk: Minimal or absent history of smoking or other known risk   factors.   *Nonsolid (ground-glass) or partly solid nodules may require longer   follow-up to exclude indolent adenocarcinoma.   *Recommendations based on Guidelines for the Management of Incidental   Pulmonary Nodules Detected at CT: From the Fleischner Society 2017,   Radiology 2017.      STEPH BROWN MD            SYSTEM ID:  [id number]         Report per radiology    Laboratory:  Labs Ordered and Resulted from Time of ED Arrival to Time of ED Departure   COMPREHENSIVE METABOLIC PANEL - Abnormal       Result Value    Sodium 142      Potassium 3.8      Chloride 102      Carbon Dioxide (CO2) 25      Anion Gap 15      Urea Nitrogen 10.5      Creatinine 0.79      Calcium 9.7      Glucose 131 (*)     Alkaline Phosphatase 60      AST 13      ALT 13      Protein Total 6.6      Albumin 4.1      Bilirubin Total 0.9      GFR Estimate 73     CBC WITH PLATELETS AND DIFFERENTIAL - Abnormal    WBC Count 7.6      RBC Count 3.41 (*)     Hemoglobin 12.4      Hematocrit 37.6       (*)     MCH 36.4 (*)     MCHC 33.0      RDW 15.3 (*)     Platelet Count 429      % Neutrophils 65      % Lymphocytes 20      % Monocytes 13      %  Eosinophils 0      % Basophils 1      % Immature Granulocytes 1      NRBCs per 100 WBC 0      Absolute Neutrophils 4.9      Absolute Lymphocytes 1.6      Absolute Monocytes 1.0      Absolute Eosinophils 0.0      Absolute Basophils 0.1      Absolute Immature Granulocytes 0.1      Absolute NRBCs 0.0     LIPASE - Normal    Lipase 45     ROUTINE UA WITH MICROSCOPIC REFLEX TO CULTURE - Normal    Color Urine Light Yellow      Appearance Urine Clear      Glucose Urine Negative      Bilirubin Urine Negative      Ketones Urine Negative      Specific Gravity Urine 1.014      Blood Urine Negative      pH Urine 6.5      Protein Albumin Urine Negative      Urobilinogen Urine Normal      Nitrite Urine Negative      Leukocyte Esterase Urine Negative      RBC Urine 0      WBC Urine <1      Hyaline Casts Urine 1          Emergency Department Course & Assessments:    Interventions:  Medications   ondansetron (ZOFRAN) injection 4 mg (4 mg Intravenous $Given 5/5/23 1038)   0.9% sodium chloride BOLUS (0 mLs Intravenous Stopped 5/5/23 1145)   labetalol (NORMODYNE/TRANDATE) injection 10 mg (10 mg Intravenous $Given 5/5/23 1038)   iopamidol (ISOVUE-370) solution 91 mL (91 mLs Intravenous $Given 5/5/23 1122)   Saline Flush (66 mLs Intravenous $Given 5/5/23 1122)     Assessments:  0935 Obtained the patients history and performed initial exam  1317 Rechecked the patient and updated her on findings    Social Determinants of Health affecting care:   None    Disposition:  The patient was discharged to home.     Impression & Plan      Medical Decision Making:  Patient is a 82-year-old female with a complex past medical history pertinent for anemia, colon cancer, lung and breast cancer, hypertension, and obesity who presents with nausea and vomiting.  Patient notes that she has been having increased urination as well and has also soiled her pants.  She was having some mild midline suprapubic tenderness.  Urinalysis unremarkable.  CT imaging of the  abdomen pelvis revealed no sinister intra-abdominal processes such as bowel obstruction, intra-abdominal abscess, or viscus perforation.  Laboratory work has been grossly unremarkable with no significant electrolyte abnormality or evidence of anemia.  Patient has not had any bloody stools.  She felt significantly improved after Zofran for nausea.  She has not had any return of the nausea or vomiting while here in the emergency department.  Unclear to the exact etiology of the patient's nausea symptoms but could be related to potential viral illness versus chronic nausea.  She will be sent home with a course of Zofran to use as needed for persistent nausea or vomiting symptoms.  Patient felt comfortable with unremarkable work-up today and plan for return back to her care facility.  Will be sent by EMS back to facility.    Diagnosis:    ICD-10-CM    1. Nausea and vomiting, unspecified vomiting type  R11.2          Scribe Disclosure:  Francisco J GONZALEZ, am serving as a scribe at 9:48 AM on 5/5/2023 to document services personally performed by Yuriy Sethi MD based on my observations and the provider's statements to me.     5/5/2023   Yuriy Sethi MD White, Scott, MD  05/05/23 2336

## 2023-07-05 NOTE — TELEPHONE ENCOUNTER
Return call from Mina Barrientos CNP at [facility].  Explained pt needed an H&P prior to appt that is within 30 days.  Scanned document in media is 5/26/23 Date of Service- outside of 30 days.  Mina states he will have the clinic reach out to pt and have her come in for an H&P.  Mina is aware that the appt is scheduled for this coming Monday.  No return call from pt at this time.

## 2023-07-06 NOTE — TELEPHONE ENCOUNTER
Spoke to nurse working with Mina Barrientos CNP at [facility] - pt has H&P scheduled this Friday 7/7/23 in clinic.    Chely Paredes RN on 7/6/2023 at 8:15 AM

## 2023-07-10 NOTE — PROGRESS NOTES
0883 MD Sedation Exam completed at this time. Consent signed.    0904 Time Out done.    Lung Biopsy: Pt tolerated well. VSS. Total sedation given - 50 mcg Fentanyl and 1 mg Versed. Multiple cores obtained & sent to lab for [facility] results. Bandaid applied to site - area CDI.       ~0930 Pt back to [facility].

## 2023-07-10 NOTE — PROGRESS NOTES
[facility] Discharge Nursing Note    Patient Information  Name: Yolanda Watson  Age: 82 year old    Discharge Education:  Discharge instructions reviewed: Yes  Additional education/resources provided: none  Patient/patient representative verbalizes understanding: Yes  Patient discharging on new medications: No  Medication education completed: Yes    Discharge Plans:   Discharge location: home  Discharge ride contacted: Yes  Approximate discharge time: ~1100    Discharge Criteria:  Discharge criteria met and vital signs stable: Yes, Dr. Aguayo came to see pt and pt is OK to discharge to  Home.      Patient Belongs:  Patient belongings returned to patient: Yes    Hetal Castaneda RN

## 2023-07-10 NOTE — PROGRESS NOTES
[facility] Admission Nursing Note    Patient Information  Name: Yolanda Watson  Age: 82 year old  Reason for admission: Lung Biopsy   [facility] arrival time: ~0900    Visitor Information  Name: Katya      Patient Admission/Assessment   Pre-procedure assessment complete: Yes  If abnormal assessment/labs, provider notified: N/A  NPO: Yes  Medications held per instructions/orders: Yes  Consent: deffered  Patient oriented to room: Yes  Education/questions answered: Yes  Plan/other: Procedure ~0900    Discharge Planning  Discharge name/phone number: [facility] [phone]  Overnight post sedation caregiver: Daughter and pt lives in [facility]  Discharge location: home-[facility]     Hetal Castaneda RN

## 2023-07-10 NOTE — PROGRESS NOTES
[facility] Post Procedure Note    Patient Information  Name: Yolanda Watson  Age: 82 year old    Post Procedure  Time patient returned to [facility]: ~0930  Concerns/abnormal assessment: None  If abnormal assessment, provider notified: N/A  Plan/Other: CXR @1030    Hetal Castaneda RN

## 2023-08-29 NOTE — ED TRIAGE NOTES
Pt daughter states that pt has had some dizziness and diarrhea for the last few days.  States that it is usually because of radiation.

## 2023-08-29 NOTE — ED NOTES
"Pt daughter stated, \"it was hard enough getting her in here.  We will just have to call an ambulance if this happens again.\"  "

## 2023-08-29 NOTE — ED NOTES
"Pt daughter came up to the window and states referring to pt, \"she will not want to stay sitting up this long.\"  EDT was able to get vitals.  "

## 2023-12-27 NOTE — ED TRIAGE NOTES
EMS arrival:    [facility]  Back pain from left hip to left shoulder blade.  Tender to palpation.  Started 12 hours ago.  Took 8 tylenol.  Not really getting better.    Cough for 3 days.   Not a forceful cough.  Has not coughed during EMS transport.

## 2023-12-27 NOTE — ED NOTES
Daughter with patient.  Per daughter not able to wait to be seen.  Unwilling to wait to be triaged by nurse.  Patient currently sitting up in wheelchair.  Respirations appear equal & non-labored.  Left via wheelchair with daughter.

## 2024-01-01 ENCOUNTER — TELEPHONE (OUTPATIENT)
Dept: GERIATRICS | Facility: CLINIC | Age: 84
End: 2024-01-01
Payer: COMMERCIAL

## 2024-01-01 ENCOUNTER — TELEPHONE (OUTPATIENT)
Dept: NEUROSURGERY | Facility: CLINIC | Age: 84
End: 2024-01-01
Payer: COMMERCIAL

## 2024-01-01 ENCOUNTER — DISCHARGE SUMMARY NURSING HOME (OUTPATIENT)
Dept: GERIATRICS | Facility: CLINIC | Age: 84
End: 2024-01-01
Payer: COMMERCIAL

## 2024-01-01 ENCOUNTER — APPOINTMENT (OUTPATIENT)
Dept: MRI IMAGING | Facility: CLINIC | Age: 84
DRG: 871 | End: 2024-01-01
Attending: INTERNAL MEDICINE
Payer: COMMERCIAL

## 2024-01-01 ENCOUNTER — APPOINTMENT (OUTPATIENT)
Dept: MRI IMAGING | Facility: CLINIC | Age: 84
DRG: 871 | End: 2024-01-01
Attending: EMERGENCY MEDICINE
Payer: COMMERCIAL

## 2024-01-01 ENCOUNTER — HOSPITAL ENCOUNTER (INPATIENT)
Facility: CLINIC | Age: 84
LOS: 13 days | Discharge: NURSING, REHAB OR LONG-TERM CARE | DRG: 871 | End: 2024-01-22
Attending: EMERGENCY MEDICINE | Admitting: STUDENT IN AN ORGANIZED HEALTH CARE EDUCATION/TRAINING PROGRAM
Payer: COMMERCIAL

## 2024-01-01 ENCOUNTER — TRANSITIONAL CARE UNIT VISIT (OUTPATIENT)
Dept: GERIATRICS | Facility: CLINIC | Age: 84
End: 2024-01-01
Payer: COMMERCIAL

## 2024-01-01 ENCOUNTER — APPOINTMENT (OUTPATIENT)
Dept: OCCUPATIONAL THERAPY | Facility: CLINIC | Age: 84
DRG: 871 | End: 2024-01-01
Attending: INTERNAL MEDICINE
Payer: COMMERCIAL

## 2024-01-01 ENCOUNTER — DOCUMENTATION ONLY (OUTPATIENT)
Dept: GERIATRICS | Facility: CLINIC | Age: 84
End: 2024-01-01

## 2024-01-01 ENCOUNTER — APPOINTMENT (OUTPATIENT)
Dept: GENERAL RADIOLOGY | Facility: CLINIC | Age: 84
DRG: 871 | End: 2024-01-01
Attending: NURSE PRACTITIONER
Payer: COMMERCIAL

## 2024-01-01 ENCOUNTER — APPOINTMENT (OUTPATIENT)
Dept: PHYSICAL THERAPY | Facility: CLINIC | Age: 84
DRG: 871 | End: 2024-01-01
Payer: COMMERCIAL

## 2024-01-01 ENCOUNTER — TELEPHONE (OUTPATIENT)
Dept: NEUROSURGERY | Facility: CLINIC | Age: 84
End: 2024-01-01

## 2024-01-01 ENCOUNTER — APPOINTMENT (OUTPATIENT)
Dept: PHYSICAL THERAPY | Facility: CLINIC | Age: 84
DRG: 871 | End: 2024-01-01
Attending: INTERNAL MEDICINE
Payer: COMMERCIAL

## 2024-01-01 ENCOUNTER — APPOINTMENT (OUTPATIENT)
Dept: CT IMAGING | Facility: CLINIC | Age: 84
DRG: 871 | End: 2024-01-01
Attending: EMERGENCY MEDICINE
Payer: COMMERCIAL

## 2024-01-01 ENCOUNTER — APPOINTMENT (OUTPATIENT)
Dept: OCCUPATIONAL THERAPY | Facility: CLINIC | Age: 84
DRG: 871 | End: 2024-01-01
Payer: COMMERCIAL

## 2024-01-01 ENCOUNTER — APPOINTMENT (OUTPATIENT)
Dept: CARDIOLOGY | Facility: CLINIC | Age: 84
DRG: 871 | End: 2024-01-01
Attending: HOSPITALIST
Payer: COMMERCIAL

## 2024-01-01 VITALS
TEMPERATURE: 98.6 F | BODY MASS INDEX: 28.77 KG/M2 | HEIGHT: 66 IN | SYSTOLIC BLOOD PRESSURE: 160 MMHG | RESPIRATION RATE: 15 BRPM | HEART RATE: 90 BPM | WEIGHT: 179 LBS | OXYGEN SATURATION: 96 % | DIASTOLIC BLOOD PRESSURE: 84 MMHG

## 2024-01-01 VITALS
WEIGHT: 190.26 LBS | HEIGHT: 70 IN | DIASTOLIC BLOOD PRESSURE: 92 MMHG | TEMPERATURE: 97.7 F | RESPIRATION RATE: 12 BRPM | SYSTOLIC BLOOD PRESSURE: 146 MMHG | HEART RATE: 106 BPM | BODY MASS INDEX: 27.24 KG/M2 | OXYGEN SATURATION: 95 %

## 2024-01-01 VITALS
DIASTOLIC BLOOD PRESSURE: 70 MMHG | RESPIRATION RATE: 18 BRPM | BODY MASS INDEX: 28.77 KG/M2 | HEART RATE: 104 BPM | SYSTOLIC BLOOD PRESSURE: 163 MMHG | WEIGHT: 179 LBS | HEIGHT: 66 IN | TEMPERATURE: 98.3 F | OXYGEN SATURATION: 91 %

## 2024-01-01 VITALS
HEART RATE: 101 BPM | HEIGHT: 70 IN | OXYGEN SATURATION: 96 % | BODY MASS INDEX: 25.77 KG/M2 | WEIGHT: 180 LBS | RESPIRATION RATE: 16 BRPM | DIASTOLIC BLOOD PRESSURE: 84 MMHG | SYSTOLIC BLOOD PRESSURE: 147 MMHG | TEMPERATURE: 98.9 F

## 2024-01-01 VITALS
WEIGHT: 179 LBS | OXYGEN SATURATION: 94 % | TEMPERATURE: 97.6 F | HEIGHT: 66 IN | BODY MASS INDEX: 28.77 KG/M2 | RESPIRATION RATE: 14 BRPM | HEART RATE: 102 BPM | DIASTOLIC BLOOD PRESSURE: 94 MMHG | SYSTOLIC BLOOD PRESSURE: 153 MMHG

## 2024-01-01 DIAGNOSIS — M25.511 ACUTE PAIN OF RIGHT SHOULDER: ICD-10-CM

## 2024-01-01 DIAGNOSIS — J18.9 PNEUMONIA OF BOTH LOWER LOBES DUE TO INFECTIOUS ORGANISM: Primary | ICD-10-CM

## 2024-01-01 DIAGNOSIS — S22.060D CLOSED WEDGE COMPRESSION FRACTURE OF T7 VERTEBRA WITH ROUTINE HEALING, SUBSEQUENT ENCOUNTER: ICD-10-CM

## 2024-01-01 DIAGNOSIS — R53.81 PHYSICAL DECONDITIONING: ICD-10-CM

## 2024-01-01 DIAGNOSIS — I10 BENIGN ESSENTIAL HYPERTENSION: ICD-10-CM

## 2024-01-01 DIAGNOSIS — K21.00 GASTROESOPHAGEAL REFLUX DISEASE WITH ESOPHAGITIS WITHOUT HEMORRHAGE: ICD-10-CM

## 2024-01-01 DIAGNOSIS — Z85.118 HISTORY OF LUNG CANCER: ICD-10-CM

## 2024-01-01 DIAGNOSIS — I25.5 ISCHEMIC CARDIOMYOPATHY: ICD-10-CM

## 2024-01-01 DIAGNOSIS — S22.060A CLOSED WEDGE COMPRESSION FRACTURE OF T7 VERTEBRA, INITIAL ENCOUNTER (H): ICD-10-CM

## 2024-01-01 DIAGNOSIS — I10 PRIMARY HYPERTENSION: ICD-10-CM

## 2024-01-01 DIAGNOSIS — A41.9 SEPSIS, DUE TO UNSPECIFIED ORGANISM, UNSPECIFIED WHETHER ACUTE ORGAN DYSFUNCTION PRESENT (H): ICD-10-CM

## 2024-01-01 DIAGNOSIS — I42.9 CARDIOMYOPATHY, UNSPECIFIED TYPE (H): Primary | ICD-10-CM

## 2024-01-01 DIAGNOSIS — J18.9 PNEUMONIA OF RIGHT LOWER LOBE DUE TO INFECTIOUS ORGANISM: ICD-10-CM

## 2024-01-01 LAB
1,3 BETA GLUCAN SER-MCNC: <31 PG/ML
ACANTHOCYTES BLD QL SMEAR: ABNORMAL
ACID FAST STAIN (ARUP): ABNORMAL
ALBUMIN SERPL BCG-MCNC: 3.3 G/DL (ref 3.5–5.2)
ALBUMIN SERPL BCG-MCNC: 3.6 G/DL (ref 3.5–5.2)
ALBUMIN UR-MCNC: NEGATIVE MG/DL
ALBUMIN UR-MCNC: NEGATIVE MG/DL
ALP SERPL-CCNC: 100 U/L (ref 40–150)
ALP SERPL-CCNC: 89 U/L (ref 40–150)
ALP SERPL-CCNC: 96 U/L (ref 40–150)
ALT SERPL W P-5'-P-CCNC: 11 U/L (ref 0–50)
ALT SERPL W P-5'-P-CCNC: 8 U/L (ref 0–50)
ALT SERPL W P-5'-P-CCNC: 8 U/L (ref 0–50)
AMMONIA PLAS-SCNC: 10 UMOL/L (ref 11–51)
ANION GAP SERPL CALCULATED.3IONS-SCNC: 11 MMOL/L (ref 7–15)
ANION GAP SERPL CALCULATED.3IONS-SCNC: 13 MMOL/L (ref 7–15)
ANION GAP SERPL CALCULATED.3IONS-SCNC: 14 MMOL/L (ref 7–15)
ANION GAP SERPL CALCULATED.3IONS-SCNC: 6 MMOL/L (ref 7–15)
ANION GAP SERPL CALCULATED.3IONS-SCNC: 7 MMOL/L (ref 7–15)
ANION GAP SERPL CALCULATED.3IONS-SCNC: 8 MMOL/L (ref 7–15)
ANION GAP SERPL CALCULATED.3IONS-SCNC: 8 MMOL/L (ref 7–15)
ANION GAP SERPL CALCULATED.3IONS-SCNC: 9 MMOL/L (ref 7–15)
ANION GAP SERPL CALCULATED.3IONS-SCNC: 9 MMOL/L (ref 7–15)
APPEARANCE FLD: ABNORMAL
APPEARANCE UR: ABNORMAL
APPEARANCE UR: CLEAR
AST SERPL W P-5'-P-CCNC: 14 U/L (ref 0–45)
AST SERPL W P-5'-P-CCNC: 14 U/L (ref 0–45)
AST SERPL W P-5'-P-CCNC: 17 U/L (ref 0–45)
ATRIAL RATE - MUSE: 109 BPM
ATRIAL RATE - MUSE: 114 BPM
ATRIAL RATE - MUSE: 78 BPM
ATRIAL RATE - MUSE: 87 BPM
ATRIAL RATE - MUSE: 91 BPM
AUER BODIES BLD QL SMEAR: ABNORMAL
BACTERIA BLD CULT: NO GROWTH
BACTERIA BLD CULT: NO GROWTH
BACTERIA BRONCH: ABNORMAL
BACTERIA BRONCH: ABNORMAL
BACTERIA BRONCH: NORMAL
BACTERIA SPT CULT: NORMAL
BASE EXCESS BLDV CALC-SCNC: 11.6 MMOL/L (ref -3–3)
BASO STIPL BLD QL SMEAR: ABNORMAL
BASOPHILS # BLD AUTO: 0.1 10E3/UL (ref 0–0.2)
BASOPHILS # BLD AUTO: ABNORMAL 10*3/UL
BASOPHILS # BLD MANUAL: 0 10E3/UL (ref 0–0.2)
BASOPHILS # BLD MANUAL: 0 10E3/UL (ref 0–0.2)
BASOPHILS NFR BLD AUTO: 1 %
BASOPHILS NFR BLD AUTO: ABNORMAL %
BASOPHILS NFR BLD MANUAL: 0 %
BASOPHILS NFR BLD MANUAL: 0 %
BILIRUB DIRECT SERPL-MCNC: <0.2 MG/DL (ref 0–0.3)
BILIRUB DIRECT SERPL-MCNC: <0.2 MG/DL (ref 0–0.3)
BILIRUB SERPL-MCNC: 0.5 MG/DL
BILIRUB SERPL-MCNC: 0.9 MG/DL
BILIRUB SERPL-MCNC: 1.1 MG/DL
BILIRUB UR QL STRIP: NEGATIVE
BILIRUB UR QL STRIP: NEGATIVE
BITE CELLS BLD QL SMEAR: ABNORMAL
BLISTER CELLS BLD QL SMEAR: ABNORMAL
BUN SERPL-MCNC: 13.8 MG/DL (ref 8–23)
BUN SERPL-MCNC: 13.9 MG/DL (ref 8–23)
BUN SERPL-MCNC: 14 MG/DL (ref 8–23)
BUN SERPL-MCNC: 14.9 MG/DL (ref 8–23)
BUN SERPL-MCNC: 14.9 MG/DL (ref 8–23)
BUN SERPL-MCNC: 15.3 MG/DL (ref 8–23)
BUN SERPL-MCNC: 16.1 MG/DL (ref 8–23)
BUN SERPL-MCNC: 16.9 MG/DL (ref 8–23)
BUN SERPL-MCNC: 17.1 MG/DL (ref 8–23)
BUN SERPL-MCNC: 17.7 MG/DL (ref 8–23)
BURR CELLS BLD QL SMEAR: ABNORMAL
CALCIUM SERPL-MCNC: 8.3 MG/DL (ref 8.8–10.2)
CALCIUM SERPL-MCNC: 8.5 MG/DL (ref 8.8–10.2)
CALCIUM SERPL-MCNC: 8.6 MG/DL (ref 8.8–10.2)
CALCIUM SERPL-MCNC: 8.6 MG/DL (ref 8.8–10.2)
CALCIUM SERPL-MCNC: 9 MG/DL (ref 8.8–10.2)
CALCIUM SERPL-MCNC: 9 MG/DL (ref 8.8–10.2)
CALCIUM SERPL-MCNC: 9.1 MG/DL (ref 8.8–10.2)
CALCIUM SERPL-MCNC: 9.5 MG/DL (ref 8.8–10.2)
CELL COUNT BODY FLUID SOURCE: ABNORMAL
CHLORIDE SERPL-SCNC: 100 MMOL/L (ref 98–107)
CHLORIDE SERPL-SCNC: 102 MMOL/L (ref 98–107)
CHLORIDE SERPL-SCNC: 102 MMOL/L (ref 98–107)
CHLORIDE SERPL-SCNC: 90 MMOL/L (ref 98–107)
CHLORIDE SERPL-SCNC: 93 MMOL/L (ref 98–107)
CHLORIDE SERPL-SCNC: 93 MMOL/L (ref 98–107)
CHLORIDE SERPL-SCNC: 94 MMOL/L (ref 98–107)
CHLORIDE SERPL-SCNC: 94 MMOL/L (ref 98–107)
CHLORIDE SERPL-SCNC: 96 MMOL/L (ref 98–107)
CHLORIDE SERPL-SCNC: 97 MMOL/L (ref 98–107)
CHLORIDE SERPL-SCNC: 97 MMOL/L (ref 98–107)
CHOLEST SERPL-MCNC: 107 MG/DL
COLOR FLD: ABNORMAL
COLOR UR AUTO: ABNORMAL
COLOR UR AUTO: ABNORMAL
CREAT SERPL-MCNC: 0.6 MG/DL (ref 0.51–0.95)
CREAT SERPL-MCNC: 0.6 MG/DL (ref 0.51–0.95)
CREAT SERPL-MCNC: 0.61 MG/DL (ref 0.51–0.95)
CREAT SERPL-MCNC: 0.63 MG/DL (ref 0.51–0.95)
CREAT SERPL-MCNC: 0.69 MG/DL (ref 0.51–0.95)
CREAT SERPL-MCNC: 0.7 MG/DL (ref 0.51–0.95)
CREAT SERPL-MCNC: 0.71 MG/DL (ref 0.51–0.95)
CREAT SERPL-MCNC: 0.75 MG/DL (ref 0.51–0.95)
CREAT SERPL-MCNC: 0.78 MG/DL (ref 0.51–0.95)
CREAT SERPL-MCNC: 0.91 MG/DL (ref 0.51–0.95)
D DIMER PPP FEU-MCNC: 0.66 UG/ML FEU (ref 0–0.5)
DACRYOCYTES BLD QL SMEAR: ABNORMAL
DEPRECATED HCO3 PLAS-SCNC: 28 MMOL/L (ref 22–29)
DEPRECATED HCO3 PLAS-SCNC: 30 MMOL/L (ref 22–29)
DEPRECATED HCO3 PLAS-SCNC: 30 MMOL/L (ref 22–29)
DEPRECATED HCO3 PLAS-SCNC: 31 MMOL/L (ref 22–29)
DEPRECATED HCO3 PLAS-SCNC: 31 MMOL/L (ref 22–29)
DEPRECATED HCO3 PLAS-SCNC: 33 MMOL/L (ref 22–29)
DEPRECATED HCO3 PLAS-SCNC: 34 MMOL/L (ref 22–29)
DEPRECATED HCO3 PLAS-SCNC: 35 MMOL/L (ref 22–29)
DEPRECATED HCO3 PLAS-SCNC: 35 MMOL/L (ref 22–29)
DIASTOLIC BLOOD PRESSURE - MUSE: NORMAL MMHG
EGFRCR SERPLBLD CKD-EPI 2021: 62 ML/MIN/1.73M2
EGFRCR SERPLBLD CKD-EPI 2021: 74 ML/MIN/1.73M2
EGFRCR SERPLBLD CKD-EPI 2021: 78 ML/MIN/1.73M2
EGFRCR SERPLBLD CKD-EPI 2021: 83 ML/MIN/1.73M2
EGFRCR SERPLBLD CKD-EPI 2021: 84 ML/MIN/1.73M2
EGFRCR SERPLBLD CKD-EPI 2021: 85 ML/MIN/1.73M2
EGFRCR SERPLBLD CKD-EPI 2021: 86 ML/MIN/1.73M2
EGFRCR SERPLBLD CKD-EPI 2021: 87 ML/MIN/1.73M2
ELLIPTOCYTES BLD QL SMEAR: ABNORMAL
EOSINOPHIL # BLD AUTO: 0 10E3/UL (ref 0–0.7)
EOSINOPHIL # BLD AUTO: ABNORMAL 10*3/UL
EOSINOPHIL # BLD MANUAL: 0.1 10E3/UL (ref 0–0.7)
EOSINOPHIL # BLD MANUAL: 0.1 10E3/UL (ref 0–0.7)
EOSINOPHIL NFR BLD AUTO: 0 %
EOSINOPHIL NFR BLD AUTO: ABNORMAL %
EOSINOPHIL NFR BLD MANUAL: 1 %
EOSINOPHIL NFR BLD MANUAL: 1 %
ERYTHROCYTE [DISTWIDTH] IN BLOOD BY AUTOMATED COUNT: 17.3 % (ref 10–15)
ERYTHROCYTE [DISTWIDTH] IN BLOOD BY AUTOMATED COUNT: 17.7 % (ref 10–15)
ERYTHROCYTE [DISTWIDTH] IN BLOOD BY AUTOMATED COUNT: 18 % (ref 10–15)
ERYTHROCYTE [DISTWIDTH] IN BLOOD BY AUTOMATED COUNT: 18 % (ref 10–15)
FLUAV RNA SPEC QL NAA+PROBE: NEGATIVE
FLUBV RNA RESP QL NAA+PROBE: NEGATIVE
FRAGMENTS BLD QL SMEAR: ABNORMAL
GALACTOMANNAN AG SERPL QL IA: NEGATIVE
GALACTOMANNAN AG SPEC IA-ACNC: 0.08
GLUCOSE BLDC GLUCOMTR-MCNC: 153 MG/DL (ref 70–99)
GLUCOSE SERPL-MCNC: 100 MG/DL (ref 70–99)
GLUCOSE SERPL-MCNC: 111 MG/DL (ref 70–99)
GLUCOSE SERPL-MCNC: 115 MG/DL (ref 70–99)
GLUCOSE SERPL-MCNC: 139 MG/DL (ref 70–99)
GLUCOSE SERPL-MCNC: 156 MG/DL (ref 70–99)
GLUCOSE SERPL-MCNC: 86 MG/DL (ref 70–99)
GLUCOSE SERPL-MCNC: 90 MG/DL (ref 70–99)
GLUCOSE SERPL-MCNC: 93 MG/DL (ref 70–99)
GLUCOSE SERPL-MCNC: 95 MG/DL (ref 70–99)
GLUCOSE SERPL-MCNC: 98 MG/DL (ref 70–99)
GLUCOSE UR STRIP-MCNC: NEGATIVE MG/DL
GLUCOSE UR STRIP-MCNC: NEGATIVE MG/DL
GRAM STAIN RESULT: NORMAL
HCO3 BLDV-SCNC: 28 MMOL/L (ref 21–28)
HCO3 BLDV-SCNC: 37 MMOL/L (ref 21–28)
HCT VFR BLD AUTO: 32.1 % (ref 35–47)
HCT VFR BLD AUTO: 32.9 % (ref 35–47)
HCT VFR BLD AUTO: 33 % (ref 35–47)
HCT VFR BLD AUTO: 33.1 % (ref 35–47)
HCT VFR BLD AUTO: 33.6 % (ref 35–47)
HCT VFR BLD AUTO: 36.4 % (ref 35–47)
HCT VFR BLD AUTO: 37 % (ref 35–47)
HDLC SERPL-MCNC: 43 MG/DL
HGB BLD-MCNC: 10.3 G/DL (ref 11.7–15.7)
HGB BLD-MCNC: 10.5 G/DL (ref 11.7–15.7)
HGB BLD-MCNC: 10.8 G/DL (ref 11.7–15.7)
HGB BLD-MCNC: 11.1 G/DL (ref 11.7–15.7)
HGB BLD-MCNC: 11.2 G/DL (ref 11.7–15.7)
HGB BLD-MCNC: 11.5 G/DL (ref 11.7–15.7)
HGB BLD-MCNC: 12 G/DL (ref 11.7–15.7)
HGB BLD-MCNC: 12 G/DL (ref 11.7–15.7)
HGB C CRYSTALS: ABNORMAL
HGB UR QL STRIP: NEGATIVE
HGB UR QL STRIP: NEGATIVE
HOWELL-JOLLY BOD BLD QL SMEAR: ABNORMAL
HYALINE CASTS: 1 /LPF
IMM GRANULOCYTES # BLD: 0.2 10E3/UL
IMM GRANULOCYTES # BLD: ABNORMAL 10*3/UL
IMM GRANULOCYTES NFR BLD: 2 %
IMM GRANULOCYTES NFR BLD: ABNORMAL %
INTERPRETATION ECG - MUSE: NORMAL
KETONES UR STRIP-MCNC: NEGATIVE MG/DL
KETONES UR STRIP-MCNC: NEGATIVE MG/DL
KOH PREPARATION: NORMAL
KOH PREPARATION: NORMAL
L PNEUMO1 AG UR QL IA: NEGATIVE
LACTATE BLD-SCNC: 4.1 MMOL/L
LACTATE SERPL-SCNC: 1.8 MMOL/L (ref 0.7–2)
LDLC SERPL CALC-MCNC: 37 MG/DL
LEUKOCYTE ESTERASE UR QL STRIP: ABNORMAL
LEUKOCYTE ESTERASE UR QL STRIP: NEGATIVE
LVEF ECHO: NORMAL
LYMPHOCYTES # BLD AUTO: 2.2 10E3/UL (ref 0.8–5.3)
LYMPHOCYTES # BLD AUTO: ABNORMAL 10*3/UL
LYMPHOCYTES # BLD MANUAL: 1.7 10E3/UL (ref 0.8–5.3)
LYMPHOCYTES # BLD MANUAL: 2.5 10E3/UL (ref 0.8–5.3)
LYMPHOCYTES NFR BLD AUTO: 23 %
LYMPHOCYTES NFR BLD AUTO: ABNORMAL %
LYMPHOCYTES NFR BLD MANUAL: 21 %
LYMPHOCYTES NFR BLD MANUAL: 27 %
LYMPHOCYTES NFR FLD MANUAL: 3 %
MAGNESIUM SERPL-MCNC: 1.3 MG/DL (ref 1.7–2.3)
MAGNESIUM SERPL-MCNC: 1.3 MG/DL (ref 1.7–2.3)
MAGNESIUM SERPL-MCNC: 1.4 MG/DL (ref 1.7–2.3)
MAGNESIUM SERPL-MCNC: 1.5 MG/DL (ref 1.7–2.3)
MAGNESIUM SERPL-MCNC: 1.6 MG/DL (ref 1.7–2.3)
MAGNESIUM SERPL-MCNC: 1.6 MG/DL (ref 1.7–2.3)
MAGNESIUM SERPL-MCNC: 1.7 MG/DL (ref 1.7–2.3)
MAGNESIUM SERPL-MCNC: 1.7 MG/DL (ref 1.7–2.3)
MAGNESIUM SERPL-MCNC: 1.8 MG/DL (ref 1.7–2.3)
MAGNESIUM SERPL-MCNC: 1.8 MG/DL (ref 1.7–2.3)
MAGNESIUM SERPL-MCNC: 2 MG/DL (ref 1.7–2.3)
MAGNESIUM SERPL-MCNC: 2.2 MG/DL (ref 1.7–2.3)
MAGNESIUM SERPL-MCNC: 2.4 MG/DL (ref 1.7–2.3)
MAGNESIUM SERPL-MCNC: 2.9 MG/DL (ref 1.7–2.3)
MCH RBC QN AUTO: 34.8 PG (ref 26.5–33)
MCH RBC QN AUTO: 35 PG (ref 26.5–33)
MCH RBC QN AUTO: 35.1 PG (ref 26.5–33)
MCH RBC QN AUTO: 35.4 PG (ref 26.5–33)
MCH RBC QN AUTO: 35.5 PG (ref 26.5–33)
MCH RBC QN AUTO: 35.6 PG (ref 26.5–33)
MCH RBC QN AUTO: 36 PG (ref 26.5–33)
MCHC RBC AUTO-ENTMCNC: 31.8 G/DL (ref 31.5–36.5)
MCHC RBC AUTO-ENTMCNC: 32.1 G/DL (ref 31.5–36.5)
MCHC RBC AUTO-ENTMCNC: 32.4 G/DL (ref 31.5–36.5)
MCHC RBC AUTO-ENTMCNC: 32.8 G/DL (ref 31.5–36.5)
MCHC RBC AUTO-ENTMCNC: 33 G/DL (ref 31.5–36.5)
MCHC RBC AUTO-ENTMCNC: 33.3 G/DL (ref 31.5–36.5)
MCHC RBC AUTO-ENTMCNC: 33.5 G/DL (ref 31.5–36.5)
MCV RBC AUTO: 106 FL (ref 78–100)
MCV RBC AUTO: 106 FL (ref 78–100)
MCV RBC AUTO: 108 FL (ref 78–100)
MCV RBC AUTO: 108 FL (ref 78–100)
MCV RBC AUTO: 109 FL (ref 78–100)
MCV RBC AUTO: 109 FL (ref 78–100)
MCV RBC AUTO: 110 FL (ref 78–100)
METAMYELOCYTES # BLD MANUAL: 0.2 10E3/UL
METAMYELOCYTES NFR BLD MANUAL: 2 %
MONOCYTES # BLD AUTO: 1.2 10E3/UL (ref 0–1.3)
MONOCYTES # BLD AUTO: ABNORMAL 10*3/UL
MONOCYTES # BLD MANUAL: 0.2 10E3/UL (ref 0–1.3)
MONOCYTES # BLD MANUAL: 0.2 10E3/UL (ref 0–1.3)
MONOCYTES NFR BLD AUTO: 12 %
MONOCYTES NFR BLD AUTO: ABNORMAL %
MONOCYTES NFR BLD MANUAL: 2 %
MONOCYTES NFR BLD MANUAL: 3 %
MONOS+MACROS NFR FLD MANUAL: 43 %
MRSA DNA SPEC QL NAA+PROBE: NEGATIVE
NEUTROPHILS # BLD AUTO: 6 10E3/UL (ref 1.6–8.3)
NEUTROPHILS # BLD AUTO: ABNORMAL 10*3/UL
NEUTROPHILS # BLD MANUAL: 4.4 10E3/UL (ref 1.6–8.3)
NEUTROPHILS # BLD MANUAL: 8.7 10E3/UL (ref 1.6–8.3)
NEUTROPHILS NFR BLD AUTO: 62 %
NEUTROPHILS NFR BLD AUTO: ABNORMAL %
NEUTROPHILS NFR BLD MANUAL: 69 %
NEUTROPHILS NFR BLD MANUAL: 74 %
NEUTS BAND NFR FLD MANUAL: 54 %
NEUTS HYPERSEG BLD QL SMEAR: ABNORMAL
NITRATE UR QL: NEGATIVE
NITRATE UR QL: NEGATIVE
NONHDLC SERPL-MCNC: 64 MG/DL
NRBC # BLD AUTO: 0 10E3/UL
NRBC # BLD AUTO: 0.1 10E3/UL
NRBC BLD AUTO-RTO: 0 /100
NRBC BLD AUTO-RTO: 1 /100
NRBC BLD MANUAL-RTO: 1 %
NRBC BLD MANUAL-RTO: 1 %
NT-PROBNP SERPL-MCNC: 5117 PG/ML (ref 0–1800)
NT-PROBNP SERPL-MCNC: ABNORMAL PG/ML (ref 0–1800)
O2/TOTAL GAS SETTING VFR VENT: 1 %
OBSERVATION IMP: NEGATIVE
ORGANISM (ARUP): ABNORMAL
OXYHGB MFR BLDV: 79 % (ref 70–75)
P AXIS - MUSE: 19 DEGREES
P AXIS - MUSE: 60 DEGREES
P AXIS - MUSE: 73 DEGREES
P AXIS - MUSE: 73 DEGREES
P AXIS - MUSE: NORMAL DEGREES
PATH REPORT.COMMENTS IMP SPEC: NORMAL
PATH REPORT.FINAL DX SPEC: NORMAL
PATH REPORT.GROSS SPEC: NORMAL
PATH REPORT.MICROSCOPIC SPEC OTHER STN: NORMAL
PATH REPORT.RELEVANT HX SPEC: NORMAL
PCO2 BLDV: 46 MM HG (ref 40–50)
PCO2 BLDV: 53 MM HG (ref 40–50)
PH BLDV: 7.39 [PH] (ref 7.32–7.43)
PH BLDV: 7.46 [PH] (ref 7.32–7.43)
PH UR STRIP: 7 [PH] (ref 5–7)
PH UR STRIP: 7 [PH] (ref 5–7)
PHOSPHATE SERPL-MCNC: 3 MG/DL (ref 2.5–4.5)
PHOSPHATE SERPL-MCNC: 3.1 MG/DL (ref 2.5–4.5)
PLAT MORPH BLD: ABNORMAL
PLATELET # BLD AUTO: 298 10E3/UL (ref 150–450)
PLATELET # BLD AUTO: 300 10E3/UL (ref 150–450)
PLATELET # BLD AUTO: 316 10E3/UL (ref 150–450)
PLATELET # BLD AUTO: 331 10E3/UL (ref 150–450)
PLATELET # BLD AUTO: 339 10E3/UL (ref 150–450)
PLATELET # BLD AUTO: 341 10E3/UL (ref 150–450)
PLATELET # BLD AUTO: 434 10E3/UL (ref 150–450)
PO2 BLDV: 24 MM HG (ref 25–47)
PO2 BLDV: 46 MM HG (ref 25–47)
POLYCHROMASIA BLD QL SMEAR: ABNORMAL
POTASSIUM SERPL-SCNC: 3.3 MMOL/L (ref 3.4–5.3)
POTASSIUM SERPL-SCNC: 3.5 MMOL/L (ref 3.4–5.3)
POTASSIUM SERPL-SCNC: 3.6 MMOL/L (ref 3.4–5.3)
POTASSIUM SERPL-SCNC: 3.8 MMOL/L (ref 3.4–5.3)
POTASSIUM SERPL-SCNC: 3.9 MMOL/L (ref 3.4–5.3)
POTASSIUM SERPL-SCNC: 4 MMOL/L (ref 3.4–5.3)
POTASSIUM SERPL-SCNC: 4.1 MMOL/L (ref 3.4–5.3)
POTASSIUM SERPL-SCNC: 4.2 MMOL/L (ref 3.4–5.3)
POTASSIUM SERPL-SCNC: 5.5 MMOL/L (ref 3.4–5.3)
PR INTERVAL - MUSE: 144 MS
PR INTERVAL - MUSE: 188 MS
PR INTERVAL - MUSE: 196 MS
PR INTERVAL - MUSE: 200 MS
PR INTERVAL - MUSE: 202 MS
PROCALCITONIN SERPL IA-MCNC: 0.06 NG/ML
PROCALCITONIN SERPL IA-MCNC: 0.1 NG/ML
PROT SERPL-MCNC: 5.6 G/DL (ref 6.4–8.3)
PROT SERPL-MCNC: 6.4 G/DL (ref 6.4–8.3)
PROT SERPL-MCNC: 6.5 G/DL (ref 6.4–8.3)
QRS DURATION - MUSE: 72 MS
QRS DURATION - MUSE: 92 MS
QRS DURATION - MUSE: 92 MS
QRS DURATION - MUSE: 94 MS
QRS DURATION - MUSE: 98 MS
QT - MUSE: 342 MS
QT - MUSE: 402 MS
QT - MUSE: 418 MS
QT - MUSE: 424 MS
QT - MUSE: 430 MS
QTC - MUSE: 460 MS
QTC - MUSE: 476 MS
QTC - MUSE: 517 MS
QTC - MUSE: 521 MS
QTC - MUSE: 554 MS
R AXIS - MUSE: -38 DEGREES
R AXIS - MUSE: -42 DEGREES
R AXIS - MUSE: -44 DEGREES
R AXIS - MUSE: -49 DEGREES
R AXIS - MUSE: -50 DEGREES
RBC # BLD AUTO: 2.96 10E6/UL (ref 3.8–5.2)
RBC # BLD AUTO: 3 10E6/UL (ref 3.8–5.2)
RBC # BLD AUTO: 3.03 10E6/UL (ref 3.8–5.2)
RBC # BLD AUTO: 3.11 10E6/UL (ref 3.8–5.2)
RBC # BLD AUTO: 3.13 10E6/UL (ref 3.8–5.2)
RBC # BLD AUTO: 3.39 10E6/UL (ref 3.8–5.2)
RBC # BLD AUTO: 3.42 10E6/UL (ref 3.8–5.2)
RBC AGGLUT BLD QL: ABNORMAL
RBC MORPH BLD: ABNORMAL
RBC URINE: 1 /HPF
RBC URINE: <1 /HPF
ROULEAUX BLD QL SMEAR: ABNORMAL
RSV RNA SPEC NAA+PROBE: NEGATIVE
S PNEUM AG SPEC QL: NEGATIVE
SA TARGET DNA: NEGATIVE
SAO2 % BLDV: 41 % (ref 94–100)
SAO2 % BLDV: 80.4 % (ref 70–75)
SARS-COV-2 RNA RESP QL NAA+PROBE: NEGATIVE
SICKLE CELLS BLD QL SMEAR: ABNORMAL
SMUDGE CELLS BLD QL SMEAR: PRESENT
SMUDGE CELLS BLD QL SMEAR: PRESENT
SODIUM SERPL-SCNC: 135 MMOL/L (ref 135–145)
SODIUM SERPL-SCNC: 136 MMOL/L (ref 135–145)
SODIUM SERPL-SCNC: 137 MMOL/L (ref 135–145)
SODIUM SERPL-SCNC: 137 MMOL/L (ref 135–145)
SODIUM SERPL-SCNC: 138 MMOL/L (ref 135–145)
SODIUM SERPL-SCNC: 138 MMOL/L (ref 135–145)
SODIUM SERPL-SCNC: 139 MMOL/L (ref 135–145)
SODIUM SERPL-SCNC: 139 MMOL/L (ref 135–145)
SODIUM SERPL-SCNC: 141 MMOL/L (ref 135–145)
SP GR UR STRIP: 1.01 (ref 1–1.03)
SP GR UR STRIP: 1.02 (ref 1–1.03)
SPHEROCYTES BLD QL SMEAR: ABNORMAL
SQUAMOUS EPITHELIAL: 2 /HPF
SQUAMOUS EPITHELIAL: <1 /HPF
STOMATOCYTES BLD QL SMEAR: SLIGHT
SYSTOLIC BLOOD PRESSURE - MUSE: NORMAL MMHG
T AXIS - MUSE: -34 DEGREES
T AXIS - MUSE: -7 DEGREES
T AXIS - MUSE: 33 DEGREES
T AXIS - MUSE: 51 DEGREES
T AXIS - MUSE: 57 DEGREES
TARGETS BLD QL SMEAR: SLIGHT
TOXIC GRANULES BLD QL SMEAR: ABNORMAL
TRIGL SERPL-MCNC: 137 MG/DL
TROPONIN T SERPL HS-MCNC: 17 NG/L
TROPONIN T SERPL HS-MCNC: 19 NG/L
TROPONIN T SERPL HS-MCNC: 20 NG/L
TSH SERPL DL<=0.005 MIU/L-ACNC: 1.43 UIU/ML (ref 0.3–4.2)
UROBILINOGEN UR STRIP-MCNC: NORMAL MG/DL
UROBILINOGEN UR STRIP-MCNC: NORMAL MG/DL
VARIANT LYMPHS BLD QL SMEAR: ABNORMAL
VENTRICULAR RATE- MUSE: 109 BPM
VENTRICULAR RATE- MUSE: 114 BPM
VENTRICULAR RATE- MUSE: 78 BPM
VENTRICULAR RATE- MUSE: 87 BPM
VENTRICULAR RATE- MUSE: 91 BPM
WBC # BLD AUTO: 10.7 10E3/UL (ref 4–11)
WBC # BLD AUTO: 11.7 10E3/UL (ref 4–11)
WBC # BLD AUTO: 6.3 10E3/UL (ref 4–11)
WBC # BLD AUTO: 6.4 10E3/UL (ref 4–11)
WBC # BLD AUTO: 6.6 10E3/UL (ref 4–11)
WBC # BLD AUTO: 7.1 10E3/UL (ref 4–11)
WBC # BLD AUTO: 7.1 10E3/UL (ref 4–11)
WBC # BLD AUTO: 9.5 10E3/UL (ref 4–11)
WBC # FLD AUTO: 288 /UL
WBC URINE: 0 /HPF
WBC URINE: 4 /HPF

## 2024-01-01 PROCEDURE — 83735 ASSAY OF MAGNESIUM: CPT | Performed by: HOSPITALIST

## 2024-01-01 PROCEDURE — 99310 SBSQ NF CARE HIGH MDM 45: CPT | Performed by: NURSE PRACTITIONER

## 2024-01-01 PROCEDURE — 93010 ELECTROCARDIOGRAM REPORT: CPT | Performed by: INTERNAL MEDICINE

## 2024-01-01 PROCEDURE — 82374 ASSAY BLOOD CARBON DIOXIDE: CPT | Performed by: HOSPITALIST

## 2024-01-01 PROCEDURE — 93005 ELECTROCARDIOGRAM TRACING: CPT

## 2024-01-01 PROCEDURE — 99232 SBSQ HOSP IP/OBS MODERATE 35: CPT | Performed by: HOSPITALIST

## 2024-01-01 PROCEDURE — 120N000001 HC R&B MED SURG/OB

## 2024-01-01 PROCEDURE — 250N000013 HC RX MED GY IP 250 OP 250 PS 637

## 2024-01-01 PROCEDURE — 250N000013 HC RX MED GY IP 250 OP 250 PS 637: Performed by: HOSPITALIST

## 2024-01-01 PROCEDURE — 36415 COLL VENOUS BLD VENIPUNCTURE: CPT | Performed by: EMERGENCY MEDICINE

## 2024-01-01 PROCEDURE — 999N000157 HC STATISTIC RCP TIME EA 10 MIN

## 2024-01-01 PROCEDURE — 250N000013 HC RX MED GY IP 250 OP 250 PS 637: Performed by: INTERNAL MEDICINE

## 2024-01-01 PROCEDURE — 84132 ASSAY OF SERUM POTASSIUM: CPT | Performed by: INTERNAL MEDICINE

## 2024-01-01 PROCEDURE — 80048 BASIC METABOLIC PNL TOTAL CA: CPT | Performed by: HOSPITALIST

## 2024-01-01 PROCEDURE — 250N000012 HC RX MED GY IP 250 OP 636 PS 637: Performed by: HOSPITALIST

## 2024-01-01 PROCEDURE — 0B9J8ZZ DRAINAGE OF LEFT LOWER LUNG LOBE, VIA NATURAL OR ARTIFICIAL OPENING ENDOSCOPIC: ICD-10-PCS | Performed by: STUDENT IN AN ORGANIZED HEALTH CARE EDUCATION/TRAINING PROGRAM

## 2024-01-01 PROCEDURE — 85018 HEMOGLOBIN: CPT

## 2024-01-01 PROCEDURE — 81003 URINALYSIS AUTO W/O SCOPE: CPT | Performed by: EMERGENCY MEDICINE

## 2024-01-01 PROCEDURE — 87637 SARSCOV2&INF A&B&RSV AMP PRB: CPT | Performed by: EMERGENCY MEDICINE

## 2024-01-01 PROCEDURE — 97530 THERAPEUTIC ACTIVITIES: CPT | Mod: GP

## 2024-01-01 PROCEDURE — 250N000013 HC RX MED GY IP 250 OP 250 PS 637: Performed by: STUDENT IN AN ORGANIZED HEALTH CARE EDUCATION/TRAINING PROGRAM

## 2024-01-01 PROCEDURE — 87305 ASPERGILLUS AG IA: CPT | Performed by: INTERNAL MEDICINE

## 2024-01-01 PROCEDURE — 250N000011 HC RX IP 250 OP 636: Performed by: INTERNAL MEDICINE

## 2024-01-01 PROCEDURE — 99233 SBSQ HOSP IP/OBS HIGH 50: CPT | Performed by: HOSPITALIST

## 2024-01-01 PROCEDURE — 36415 COLL VENOUS BLD VENIPUNCTURE: CPT | Performed by: HOSPITALIST

## 2024-01-01 PROCEDURE — 80051 ELECTROLYTE PANEL: CPT

## 2024-01-01 PROCEDURE — A9585 GADOBUTROL INJECTION: HCPCS | Performed by: INTERNAL MEDICINE

## 2024-01-01 PROCEDURE — 83735 ASSAY OF MAGNESIUM: CPT

## 2024-01-01 PROCEDURE — 255N000002 HC RX 255 OP 636: Performed by: HOSPITALIST

## 2024-01-01 PROCEDURE — 85048 AUTOMATED LEUKOCYTE COUNT: CPT

## 2024-01-01 PROCEDURE — 250N000011 HC RX IP 250 OP 636: Performed by: STUDENT IN AN ORGANIZED HEALTH CARE EDUCATION/TRAINING PROGRAM

## 2024-01-01 PROCEDURE — 84484 ASSAY OF TROPONIN QUANT: CPT

## 2024-01-01 PROCEDURE — 96366 THER/PROPH/DIAG IV INF ADDON: CPT

## 2024-01-01 PROCEDURE — 250N000011 HC RX IP 250 OP 636

## 2024-01-01 PROCEDURE — 96375 TX/PRO/DX INJ NEW DRUG ADDON: CPT

## 2024-01-01 PROCEDURE — 83880 ASSAY OF NATRIURETIC PEPTIDE: CPT | Performed by: EMERGENCY MEDICINE

## 2024-01-01 PROCEDURE — 250N000011 HC RX IP 250 OP 636: Performed by: HOSPITALIST

## 2024-01-01 PROCEDURE — 250N000009 HC RX 250: Performed by: STUDENT IN AN ORGANIZED HEALTH CARE EDUCATION/TRAINING PROGRAM

## 2024-01-01 PROCEDURE — 74177 CT ABD & PELVIS W/CONTRAST: CPT

## 2024-01-01 PROCEDURE — 88108 CYTOPATH CONCENTRATE TECH: CPT | Mod: 26 | Performed by: PATHOLOGY

## 2024-01-01 PROCEDURE — 83735 ASSAY OF MAGNESIUM: CPT | Performed by: INTERNAL MEDICINE

## 2024-01-01 PROCEDURE — 97530 THERAPEUTIC ACTIVITIES: CPT | Mod: GP | Performed by: PHYSICAL THERAPIST

## 2024-01-01 PROCEDURE — 99223 1ST HOSP IP/OBS HIGH 75: CPT | Mod: 25 | Performed by: INTERNAL MEDICINE

## 2024-01-01 PROCEDURE — A9585 GADOBUTROL INJECTION: HCPCS | Performed by: EMERGENCY MEDICINE

## 2024-01-01 PROCEDURE — 999N000208 ECHOCARDIOGRAM COMPLETE

## 2024-01-01 PROCEDURE — 250N000011 HC RX IP 250 OP 636: Performed by: EMERGENCY MEDICINE

## 2024-01-01 PROCEDURE — 36415 COLL VENOUS BLD VENIPUNCTURE: CPT | Performed by: INTERNAL MEDICINE

## 2024-01-01 PROCEDURE — 83880 ASSAY OF NATRIURETIC PEPTIDE: CPT | Performed by: HOSPITALIST

## 2024-01-01 PROCEDURE — 255N000002 HC RX 255 OP 636: Performed by: INTERNAL MEDICINE

## 2024-01-01 PROCEDURE — 0B9J8ZX DRAINAGE OF LEFT LOWER LUNG LOBE, VIA NATURAL OR ARTIFICIAL OPENING ENDOSCOPIC, DIAGNOSTIC: ICD-10-PCS | Performed by: STUDENT IN AN ORGANIZED HEALTH CARE EDUCATION/TRAINING PROGRAM

## 2024-01-01 PROCEDURE — 80061 LIPID PANEL: CPT | Performed by: INTERNAL MEDICINE

## 2024-01-01 PROCEDURE — 82140 ASSAY OF AMMONIA: CPT | Performed by: INTERNAL MEDICINE

## 2024-01-01 PROCEDURE — G0500 MOD SEDAT ENDO SERVICE >5YRS: HCPCS | Performed by: STUDENT IN AN ORGANIZED HEALTH CARE EDUCATION/TRAINING PROGRAM

## 2024-01-01 PROCEDURE — 85027 COMPLETE CBC AUTOMATED: CPT | Performed by: INTERNAL MEDICINE

## 2024-01-01 PROCEDURE — 85027 COMPLETE CBC AUTOMATED: CPT | Performed by: HOSPITALIST

## 2024-01-01 PROCEDURE — 250N000012 HC RX MED GY IP 250 OP 636 PS 637: Performed by: INTERNAL MEDICINE

## 2024-01-01 PROCEDURE — 250N000009 HC RX 250: Performed by: HOSPITALIST

## 2024-01-01 PROCEDURE — 83735 ASSAY OF MAGNESIUM: CPT | Performed by: EMERGENCY MEDICINE

## 2024-01-01 PROCEDURE — 81001 URINALYSIS AUTO W/SCOPE: CPT | Performed by: INTERNAL MEDICINE

## 2024-01-01 PROCEDURE — 36415 COLL VENOUS BLD VENIPUNCTURE: CPT | Performed by: STUDENT IN AN ORGANIZED HEALTH CARE EDUCATION/TRAINING PROGRAM

## 2024-01-01 PROCEDURE — 85379 FIBRIN DEGRADATION QUANT: CPT | Performed by: EMERGENCY MEDICINE

## 2024-01-01 PROCEDURE — 36415 COLL VENOUS BLD VENIPUNCTURE: CPT

## 2024-01-01 PROCEDURE — 87040 BLOOD CULTURE FOR BACTERIA: CPT | Performed by: EMERGENCY MEDICINE

## 2024-01-01 PROCEDURE — 72070 X-RAY EXAM THORAC SPINE 2VWS: CPT

## 2024-01-01 PROCEDURE — 80069 RENAL FUNCTION PANEL: CPT | Performed by: INTERNAL MEDICINE

## 2024-01-01 PROCEDURE — 93306 TTE W/DOPPLER COMPLETE: CPT | Mod: 26 | Performed by: INTERNAL MEDICINE

## 2024-01-01 PROCEDURE — 84145 PROCALCITONIN (PCT): CPT | Performed by: STUDENT IN AN ORGANIZED HEALTH CARE EDUCATION/TRAINING PROGRAM

## 2024-01-01 PROCEDURE — 87070 CULTURE OTHR SPECIMN AEROBIC: CPT | Performed by: STUDENT IN AN ORGANIZED HEALTH CARE EDUCATION/TRAINING PROGRAM

## 2024-01-01 PROCEDURE — 99222 1ST HOSP IP/OBS MODERATE 55: CPT | Performed by: INTERNAL MEDICINE

## 2024-01-01 PROCEDURE — 87206 SMEAR FLUORESCENT/ACID STAI: CPT | Performed by: STUDENT IN AN ORGANIZED HEALTH CARE EDUCATION/TRAINING PROGRAM

## 2024-01-01 PROCEDURE — 31624 DX BRONCHOSCOPE/LAVAGE: CPT | Performed by: STUDENT IN AN ORGANIZED HEALTH CARE EDUCATION/TRAINING PROGRAM

## 2024-01-01 PROCEDURE — 80053 COMPREHEN METABOLIC PANEL: CPT | Performed by: STUDENT IN AN ORGANIZED HEALTH CARE EDUCATION/TRAINING PROGRAM

## 2024-01-01 PROCEDURE — 97166 OT EVAL MOD COMPLEX 45 MIN: CPT | Mod: GO | Performed by: OCCUPATIONAL THERAPIST

## 2024-01-01 PROCEDURE — 99232 SBSQ HOSP IP/OBS MODERATE 35: CPT | Performed by: INTERNAL MEDICINE

## 2024-01-01 PROCEDURE — 255N000002 HC RX 255 OP 636: Performed by: EMERGENCY MEDICINE

## 2024-01-01 PROCEDURE — 97530 THERAPEUTIC ACTIVITIES: CPT | Mod: GO

## 2024-01-01 PROCEDURE — 96365 THER/PROPH/DIAG IV INF INIT: CPT | Mod: 59

## 2024-01-01 PROCEDURE — 87641 MR-STAPH DNA AMP PROBE: CPT | Performed by: STUDENT IN AN ORGANIZED HEALTH CARE EDUCATION/TRAINING PROGRAM

## 2024-01-01 PROCEDURE — 88305 TISSUE EXAM BY PATHOLOGIST: CPT | Mod: 26 | Performed by: PATHOLOGY

## 2024-01-01 PROCEDURE — 97535 SELF CARE MNGMENT TRAINING: CPT | Mod: GO | Performed by: OCCUPATIONAL THERAPIST

## 2024-01-01 PROCEDURE — 85027 COMPLETE CBC AUTOMATED: CPT | Performed by: STUDENT IN AN ORGANIZED HEALTH CARE EDUCATION/TRAINING PROGRAM

## 2024-01-01 PROCEDURE — 84100 ASSAY OF PHOSPHORUS: CPT | Performed by: HOSPITALIST

## 2024-01-01 PROCEDURE — 250N000009 HC RX 250: Performed by: EMERGENCY MEDICINE

## 2024-01-01 PROCEDURE — 99223 1ST HOSP IP/OBS HIGH 75: CPT | Mod: 25 | Performed by: STUDENT IN AN ORGANIZED HEALTH CARE EDUCATION/TRAINING PROGRAM

## 2024-01-01 PROCEDURE — 99223 1ST HOSP IP/OBS HIGH 75: CPT | Performed by: STUDENT IN AN ORGANIZED HEALTH CARE EDUCATION/TRAINING PROGRAM

## 2024-01-01 PROCEDURE — 87210 SMEAR WET MOUNT SALINE/INK: CPT | Performed by: STUDENT IN AN ORGANIZED HEALTH CARE EDUCATION/TRAINING PROGRAM

## 2024-01-01 PROCEDURE — 84484 ASSAY OF TROPONIN QUANT: CPT | Performed by: EMERGENCY MEDICINE

## 2024-01-01 PROCEDURE — 87205 SMEAR GRAM STAIN: CPT | Performed by: STUDENT IN AN ORGANIZED HEALTH CARE EDUCATION/TRAINING PROGRAM

## 2024-01-01 PROCEDURE — 85007 BL SMEAR W/DIFF WBC COUNT: CPT | Performed by: INTERNAL MEDICINE

## 2024-01-01 PROCEDURE — 85007 BL SMEAR W/DIFF WBC COUNT: CPT | Performed by: HOSPITALIST

## 2024-01-01 PROCEDURE — 82803 BLOOD GASES ANY COMBINATION: CPT

## 2024-01-01 PROCEDURE — 96361 HYDRATE IV INFUSION ADD-ON: CPT

## 2024-01-01 PROCEDURE — 87899 AGENT NOS ASSAY W/OPTIC: CPT | Performed by: STUDENT IN AN ORGANIZED HEALTH CARE EDUCATION/TRAINING PROGRAM

## 2024-01-01 PROCEDURE — 94640 AIRWAY INHALATION TREATMENT: CPT

## 2024-01-01 PROCEDURE — 89050 BODY FLUID CELL COUNT: CPT | Performed by: STUDENT IN AN ORGANIZED HEALTH CARE EDUCATION/TRAINING PROGRAM

## 2024-01-01 PROCEDURE — 85025 COMPLETE CBC W/AUTO DIFF WBC: CPT | Performed by: EMERGENCY MEDICINE

## 2024-01-01 PROCEDURE — 99291 CRITICAL CARE FIRST HOUR: CPT | Mod: 25

## 2024-01-01 PROCEDURE — 70553 MRI BRAIN STEM W/O & W/DYE: CPT

## 2024-01-01 PROCEDURE — 88305 TISSUE EXAM BY PATHOLOGIST: CPT | Mod: TC | Performed by: STUDENT IN AN ORGANIZED HEALTH CARE EDUCATION/TRAINING PROGRAM

## 2024-01-01 PROCEDURE — 87106 FUNGI IDENTIFICATION YEAST: CPT | Performed by: STUDENT IN AN ORGANIZED HEALTH CARE EDUCATION/TRAINING PROGRAM

## 2024-01-01 PROCEDURE — 84484 ASSAY OF TROPONIN QUANT: CPT | Performed by: HOSPITALIST

## 2024-01-01 PROCEDURE — 84145 PROCALCITONIN (PCT): CPT | Performed by: INTERNAL MEDICINE

## 2024-01-01 PROCEDURE — 72157 MRI CHEST SPINE W/O & W/DYE: CPT

## 2024-01-01 PROCEDURE — 82040 ASSAY OF SERUM ALBUMIN: CPT | Performed by: INTERNAL MEDICINE

## 2024-01-01 PROCEDURE — 99207 PR APP CREDIT; MD BILLING SHARED VISIT: CPT

## 2024-01-01 PROCEDURE — 84132 ASSAY OF SERUM POTASSIUM: CPT | Performed by: HOSPITALIST

## 2024-01-01 PROCEDURE — 99233 SBSQ HOSP IP/OBS HIGH 50: CPT | Performed by: INTERNAL MEDICINE

## 2024-01-01 PROCEDURE — 80048 BASIC METABOLIC PNL TOTAL CA: CPT | Performed by: INTERNAL MEDICINE

## 2024-01-01 PROCEDURE — 31622 DX BRONCHOSCOPE/WASH: CPT | Performed by: STUDENT IN AN ORGANIZED HEALTH CARE EDUCATION/TRAINING PROGRAM

## 2024-01-01 PROCEDURE — 84443 ASSAY THYROID STIM HORMONE: CPT | Performed by: HOSPITALIST

## 2024-01-01 PROCEDURE — 82310 ASSAY OF CALCIUM: CPT | Performed by: INTERNAL MEDICINE

## 2024-01-01 PROCEDURE — 82805 BLOOD GASES W/O2 SATURATION: CPT | Performed by: INTERNAL MEDICINE

## 2024-01-01 PROCEDURE — 82040 ASSAY OF SERUM ALBUMIN: CPT | Performed by: EMERGENCY MEDICINE

## 2024-01-01 PROCEDURE — 97161 PT EVAL LOW COMPLEX 20 MIN: CPT | Mod: GP | Performed by: PHYSICAL THERAPIST

## 2024-01-01 PROCEDURE — 258N000003 HC RX IP 258 OP 636: Performed by: EMERGENCY MEDICINE

## 2024-01-01 PROCEDURE — 88108 CYTOPATH CONCENTRATE TECH: CPT | Mod: TC | Performed by: STUDENT IN AN ORGANIZED HEALTH CARE EDUCATION/TRAINING PROGRAM

## 2024-01-01 PROCEDURE — 83605 ASSAY OF LACTIC ACID: CPT

## 2024-01-01 PROCEDURE — 99239 HOSP IP/OBS DSCHRG MGMT >30: CPT | Performed by: INTERNAL MEDICINE

## 2024-01-01 PROCEDURE — 99221 1ST HOSP IP/OBS SF/LOW 40: CPT | Performed by: NURSE PRACTITIONER

## 2024-01-01 PROCEDURE — 87449 NOS EACH ORGANISM AG IA: CPT | Performed by: INTERNAL MEDICINE

## 2024-01-01 PROCEDURE — 258N000003 HC RX IP 258 OP 636

## 2024-01-01 RX ORDER — DEXTROMETHORPHAN POLISTIREX 30 MG/5ML
30 SUSPENSION ORAL 2 TIMES DAILY PRN
Status: DISCONTINUED | OUTPATIENT
Start: 2024-01-01 | End: 2024-01-01 | Stop reason: HOSPADM

## 2024-01-01 RX ORDER — ONDANSETRON 2 MG/ML
4 INJECTION INTRAMUSCULAR; INTRAVENOUS EVERY 6 HOURS PRN
Status: DISCONTINUED | OUTPATIENT
Start: 2024-01-01 | End: 2024-01-01

## 2024-01-01 RX ORDER — NALOXONE HYDROCHLORIDE 0.4 MG/ML
0.2 INJECTION, SOLUTION INTRAMUSCULAR; INTRAVENOUS; SUBCUTANEOUS
Status: DISCONTINUED | OUTPATIENT
Start: 2024-01-01 | End: 2024-01-01 | Stop reason: HOSPADM

## 2024-01-01 RX ORDER — FUROSEMIDE 10 MG/ML
20 INJECTION INTRAMUSCULAR; INTRAVENOUS ONCE
Status: COMPLETED | OUTPATIENT
Start: 2024-01-01 | End: 2024-01-01

## 2024-01-01 RX ORDER — ONDANSETRON 2 MG/ML
INJECTION INTRAMUSCULAR; INTRAVENOUS PRN
Status: DISCONTINUED | OUTPATIENT
Start: 2024-01-01 | End: 2024-01-01 | Stop reason: HOSPADM

## 2024-01-01 RX ORDER — LIDOCAINE 40 MG/G
CREAM TOPICAL
Status: DISCONTINUED | OUTPATIENT
Start: 2024-01-01 | End: 2024-01-01 | Stop reason: HOSPADM

## 2024-01-01 RX ORDER — METOPROLOL TARTRATE 100 MG
100 TABLET ORAL 2 TIMES DAILY
DISCHARGE
Start: 2024-01-01

## 2024-01-01 RX ORDER — LISINOPRIL 20 MG/1
20 TABLET ORAL DAILY
Status: DISCONTINUED | OUTPATIENT
Start: 2024-01-01 | End: 2024-01-01

## 2024-01-01 RX ORDER — MAGNESIUM SULFATE HEPTAHYDRATE 40 MG/ML
4 INJECTION, SOLUTION INTRAVENOUS ONCE
Qty: 100 ML | Refills: 0 | Status: COMPLETED | OUTPATIENT
Start: 2024-01-01 | End: 2024-01-01

## 2024-01-01 RX ORDER — LACTOBACILLUS RHAMNOSUS GG 10B CELL
1 CAPSULE ORAL 2 TIMES DAILY
Status: DISCONTINUED | OUTPATIENT
Start: 2024-01-01 | End: 2024-01-01 | Stop reason: HOSPADM

## 2024-01-01 RX ORDER — OXYCODONE HYDROCHLORIDE 5 MG/1
5 TABLET ORAL EVERY 6 HOURS PRN
Status: DISCONTINUED | OUTPATIENT
Start: 2024-01-01 | End: 2024-01-01

## 2024-01-01 RX ORDER — POTASSIUM CHLORIDE 1500 MG/1
20 TABLET, EXTENDED RELEASE ORAL ONCE
Status: COMPLETED | OUTPATIENT
Start: 2024-01-01 | End: 2024-01-01

## 2024-01-01 RX ORDER — ONDANSETRON 2 MG/ML
4 INJECTION INTRAMUSCULAR; INTRAVENOUS EVERY 30 MIN PRN
Status: DISCONTINUED | OUTPATIENT
Start: 2024-01-01 | End: 2024-01-01

## 2024-01-01 RX ORDER — AZITHROMYCIN 500 MG/1
500 INJECTION, POWDER, LYOPHILIZED, FOR SOLUTION INTRAVENOUS ONCE
Status: COMPLETED | OUTPATIENT
Start: 2024-01-01 | End: 2024-01-01

## 2024-01-01 RX ORDER — MAGNESIUM SULFATE HEPTAHYDRATE 40 MG/ML
4 INJECTION, SOLUTION INTRAVENOUS ONCE
Status: COMPLETED | OUTPATIENT
Start: 2024-01-01 | End: 2024-01-01

## 2024-01-01 RX ORDER — CEFTRIAXONE 2 G/1
2 INJECTION, POWDER, FOR SOLUTION INTRAMUSCULAR; INTRAVENOUS EVERY 24 HOURS
Status: DISCONTINUED | OUTPATIENT
Start: 2024-01-01 | End: 2024-01-01

## 2024-01-01 RX ORDER — CALCIUM CARBONATE 500 MG/1
1000 TABLET, CHEWABLE ORAL
Status: DISCONTINUED | OUTPATIENT
Start: 2024-01-01 | End: 2024-01-01

## 2024-01-01 RX ORDER — MAGNESIUM OXIDE 400 MG/1
400 TABLET ORAL EVERY 4 HOURS
Status: COMPLETED | OUTPATIENT
Start: 2024-01-01 | End: 2024-01-01

## 2024-01-01 RX ORDER — GADOBUTROL 604.72 MG/ML
9 INJECTION INTRAVENOUS ONCE
Status: COMPLETED | OUTPATIENT
Start: 2024-01-01 | End: 2024-01-01

## 2024-01-01 RX ORDER — ONDANSETRON 4 MG/1
4 TABLET, ORALLY DISINTEGRATING ORAL EVERY 6 HOURS PRN
Status: DISCONTINUED | OUTPATIENT
Start: 2024-01-01 | End: 2024-01-01

## 2024-01-01 RX ORDER — ACETAMINOPHEN 500 MG
1000 TABLET ORAL EVERY 8 HOURS
Status: DISCONTINUED | OUTPATIENT
Start: 2024-01-01 | End: 2024-01-01 | Stop reason: HOSPADM

## 2024-01-01 RX ORDER — NALOXONE HYDROCHLORIDE 0.4 MG/ML
0.4 INJECTION, SOLUTION INTRAMUSCULAR; INTRAVENOUS; SUBCUTANEOUS
Status: DISCONTINUED | OUTPATIENT
Start: 2024-01-01 | End: 2024-01-01 | Stop reason: HOSPADM

## 2024-01-01 RX ORDER — FAMOTIDINE 40 MG/1
40 TABLET, FILM COATED ORAL DAILY
DISCHARGE
Start: 2024-01-01

## 2024-01-01 RX ORDER — LISINOPRIL 20 MG/1
20 TABLET ORAL DAILY
Status: DISCONTINUED | OUTPATIENT
Start: 2024-01-01 | End: 2024-01-01 | Stop reason: HOSPADM

## 2024-01-01 RX ORDER — CHOLECALCIFEROL (VITAMIN D3) 50 MCG
1 TABLET ORAL DAILY
COMMUNITY

## 2024-01-01 RX ORDER — MORPHINE SULFATE 10 MG/5ML
2.5 SOLUTION ORAL
Start: 2024-01-01 | End: 2024-01-01

## 2024-01-01 RX ORDER — METHOCARBAMOL 500 MG/1
500 TABLET, FILM COATED ORAL 2 TIMES DAILY PRN
Status: DISCONTINUED | OUTPATIENT
Start: 2024-01-01 | End: 2024-01-01

## 2024-01-01 RX ORDER — FLUMAZENIL 0.1 MG/ML
0.2 INJECTION, SOLUTION INTRAVENOUS
Status: ACTIVE | OUTPATIENT
Start: 2024-01-01 | End: 2024-01-01

## 2024-01-01 RX ORDER — FUROSEMIDE 40 MG
40 TABLET ORAL DAILY
Status: DISCONTINUED | OUTPATIENT
Start: 2024-01-01 | End: 2024-01-01 | Stop reason: HOSPADM

## 2024-01-01 RX ORDER — CALCIUM CARBONATE 500 MG/1
2 TABLET, CHEWABLE ORAL
COMMUNITY

## 2024-01-01 RX ORDER — METOPROLOL SUCCINATE 100 MG/1
100 TABLET, EXTENDED RELEASE ORAL DAILY
Status: DISCONTINUED | OUTPATIENT
Start: 2024-01-01 | End: 2024-01-01

## 2024-01-01 RX ORDER — METOPROLOL SUCCINATE 50 MG/1
50 TABLET, EXTENDED RELEASE ORAL DAILY
Status: DISCONTINUED | OUTPATIENT
Start: 2024-01-01 | End: 2024-01-01

## 2024-01-01 RX ORDER — PAROXETINE 20 MG/1
40 TABLET, FILM COATED ORAL EVERY MORNING
Status: DISCONTINUED | OUTPATIENT
Start: 2024-01-01 | End: 2024-01-01 | Stop reason: HOSPADM

## 2024-01-01 RX ORDER — AZITHROMYCIN 250 MG/1
250 TABLET, FILM COATED ORAL DAILY
Qty: 4 TABLET | Refills: 0 | Status: DISCONTINUED | OUTPATIENT
Start: 2024-01-01 | End: 2024-01-01

## 2024-01-01 RX ORDER — LABETALOL HYDROCHLORIDE 5 MG/ML
10 INJECTION, SOLUTION INTRAVENOUS
Status: DISCONTINUED | OUTPATIENT
Start: 2024-01-01 | End: 2024-01-01 | Stop reason: HOSPADM

## 2024-01-01 RX ORDER — LISINOPRIL 40 MG/1
40 TABLET ORAL DAILY
Status: DISCONTINUED | OUTPATIENT
Start: 2024-01-01 | End: 2024-01-01

## 2024-01-01 RX ORDER — CALCITONIN SALMON 200 [IU]/.09ML
1 SPRAY, METERED NASAL DAILY
DISCHARGE
Start: 2024-01-01

## 2024-01-01 RX ORDER — PREDNISONE 20 MG/1
40 TABLET ORAL DAILY
Status: COMPLETED | OUTPATIENT
Start: 2024-01-01 | End: 2024-01-01

## 2024-01-01 RX ORDER — ASPIRIN 81 MG/1
81 TABLET ORAL DAILY
Status: DISCONTINUED | OUTPATIENT
Start: 2024-01-01 | End: 2024-01-01 | Stop reason: HOSPADM

## 2024-01-01 RX ORDER — MORPHINE SULFATE 4 MG/ML
2 INJECTION, SOLUTION INTRAMUSCULAR; INTRAVENOUS ONCE
Status: COMPLETED | OUTPATIENT
Start: 2024-01-01 | End: 2024-01-01

## 2024-01-01 RX ORDER — FENTANYL CITRATE 50 UG/ML
INJECTION, SOLUTION INTRAMUSCULAR; INTRAVENOUS PRN
Status: DISCONTINUED | OUTPATIENT
Start: 2024-01-01 | End: 2024-01-01 | Stop reason: HOSPADM

## 2024-01-01 RX ORDER — GINGER ROOT/GINGER ROOT EXT 262.5 MG
1 CAPSULE ORAL DAILY
COMMUNITY

## 2024-01-01 RX ORDER — LIDOCAINE HYDROCHLORIDE 40 MG/ML
INJECTION, SOLUTION RETROBULBAR PRN
Status: DISCONTINUED | OUTPATIENT
Start: 2024-01-01 | End: 2024-01-01 | Stop reason: HOSPADM

## 2024-01-01 RX ORDER — PANTOPRAZOLE SODIUM 40 MG/1
40 TABLET, DELAYED RELEASE ORAL DAILY
Status: DISCONTINUED | OUTPATIENT
Start: 2024-01-01 | End: 2024-01-01

## 2024-01-01 RX ORDER — POTASSIUM CHLORIDE 1.5 G/1.58G
20 POWDER, FOR SOLUTION ORAL ONCE
Status: COMPLETED | OUTPATIENT
Start: 2024-01-01 | End: 2024-01-01

## 2024-01-01 RX ORDER — MAGNESIUM SULFATE HEPTAHYDRATE 40 MG/ML
2 INJECTION, SOLUTION INTRAVENOUS ONCE
Status: COMPLETED | OUTPATIENT
Start: 2024-01-01 | End: 2024-01-01

## 2024-01-01 RX ORDER — ALBUTEROL SULFATE 0.83 MG/ML
2.5 SOLUTION RESPIRATORY (INHALATION) ONCE
Status: COMPLETED | OUTPATIENT
Start: 2024-01-01 | End: 2024-01-01

## 2024-01-01 RX ORDER — DOXYCYCLINE 100 MG/1
100 CAPSULE ORAL EVERY 12 HOURS SCHEDULED
Status: COMPLETED | OUTPATIENT
Start: 2024-01-01 | End: 2024-01-01

## 2024-01-01 RX ORDER — METOPROLOL TARTRATE 50 MG
100 TABLET ORAL 2 TIMES DAILY
Status: DISCONTINUED | OUTPATIENT
Start: 2024-01-01 | End: 2024-01-01 | Stop reason: HOSPADM

## 2024-01-01 RX ORDER — ASPIRIN 81 MG/1
81 TABLET ORAL DAILY
COMMUNITY

## 2024-01-01 RX ORDER — PROCHLORPERAZINE MALEATE 5 MG
5 TABLET ORAL EVERY 6 HOURS PRN
Status: DISCONTINUED | OUTPATIENT
Start: 2024-01-01 | End: 2024-01-01 | Stop reason: HOSPADM

## 2024-01-01 RX ORDER — POTASSIUM CHLORIDE 1500 MG/1
40 TABLET, EXTENDED RELEASE ORAL ONCE
Status: COMPLETED | OUTPATIENT
Start: 2024-01-01 | End: 2024-01-01

## 2024-01-01 RX ORDER — CEFTRIAXONE 2 G/1
2 INJECTION, POWDER, FOR SOLUTION INTRAMUSCULAR; INTRAVENOUS ONCE
Status: COMPLETED | OUTPATIENT
Start: 2024-01-01 | End: 2024-01-01

## 2024-01-01 RX ORDER — CALCIUM CARBONATE 500 MG/1
1000 TABLET, CHEWABLE ORAL 4 TIMES DAILY PRN
Status: DISCONTINUED | OUTPATIENT
Start: 2024-01-01 | End: 2024-01-01 | Stop reason: HOSPADM

## 2024-01-01 RX ORDER — LISINOPRIL 20 MG/1
20 TABLET ORAL DAILY
COMMUNITY

## 2024-01-01 RX ORDER — IOPAMIDOL 755 MG/ML
105 INJECTION, SOLUTION INTRAVASCULAR ONCE
Status: COMPLETED | OUTPATIENT
Start: 2024-01-01 | End: 2024-01-01

## 2024-01-01 RX ORDER — CEFTRIAXONE 1 G/1
1 INJECTION, POWDER, FOR SOLUTION INTRAMUSCULAR; INTRAVENOUS EVERY 24 HOURS
Status: DISCONTINUED | OUTPATIENT
Start: 2024-01-01 | End: 2024-01-01

## 2024-01-01 RX ORDER — METHOCARBAMOL 500 MG/1
500 TABLET, FILM COATED ORAL 2 TIMES DAILY PRN
Status: DISCONTINUED | OUTPATIENT
Start: 2024-01-01 | End: 2024-01-01 | Stop reason: HOSPADM

## 2024-01-01 RX ORDER — METHOCARBAMOL 500 MG/1
500 TABLET, FILM COATED ORAL 2 TIMES DAILY PRN
COMMUNITY

## 2024-01-01 RX ORDER — LIDOCAINE HYDROCHLORIDE 10 MG/ML
INJECTION, SOLUTION INFILTRATION; PERINEURAL PRN
Status: DISCONTINUED | OUTPATIENT
Start: 2024-01-01 | End: 2024-01-01 | Stop reason: HOSPADM

## 2024-01-01 RX ORDER — CALCIUM CARBONATE/VITAMIN D3 600 MG-10
1 TABLET ORAL DAILY
Status: DISCONTINUED | OUTPATIENT
Start: 2024-01-01 | End: 2024-01-01 | Stop reason: HOSPADM

## 2024-01-01 RX ORDER — PROCHLORPERAZINE 25 MG
12.5 SUPPOSITORY, RECTAL RECTAL EVERY 12 HOURS PRN
Status: DISCONTINUED | OUTPATIENT
Start: 2024-01-01 | End: 2024-01-01 | Stop reason: HOSPADM

## 2024-01-01 RX ORDER — FUROSEMIDE 10 MG/ML
40 INJECTION INTRAMUSCULAR; INTRAVENOUS EVERY 12 HOURS
Status: COMPLETED | OUTPATIENT
Start: 2024-01-01 | End: 2024-01-01

## 2024-01-01 RX ORDER — ALBUTEROL SULFATE 0.83 MG/ML
2.5 SOLUTION RESPIRATORY (INHALATION)
Status: DISCONTINUED | OUTPATIENT
Start: 2024-01-01 | End: 2024-01-01 | Stop reason: HOSPADM

## 2024-01-01 RX ORDER — LISINOPRIL 10 MG/1
10 TABLET ORAL DAILY
Status: DISCONTINUED | OUTPATIENT
Start: 2024-01-01 | End: 2024-01-01

## 2024-01-01 RX ORDER — ONDANSETRON 4 MG/1
4 TABLET, FILM COATED ORAL EVERY 8 HOURS PRN
COMMUNITY

## 2024-01-01 RX ORDER — METHOCARBAMOL 500 MG/1
500 TABLET, FILM COATED ORAL 2 TIMES DAILY
Status: DISCONTINUED | OUTPATIENT
Start: 2024-01-01 | End: 2024-01-01

## 2024-01-01 RX ORDER — HYDRALAZINE HYDROCHLORIDE 20 MG/ML
10 INJECTION INTRAMUSCULAR; INTRAVENOUS EVERY 4 HOURS PRN
Status: DISCONTINUED | OUTPATIENT
Start: 2024-01-01 | End: 2024-01-01 | Stop reason: HOSPADM

## 2024-01-01 RX ORDER — FAMOTIDINE 20 MG/1
40 TABLET, FILM COATED ORAL DAILY
Status: DISCONTINUED | OUTPATIENT
Start: 2024-01-01 | End: 2024-01-01 | Stop reason: HOSPADM

## 2024-01-01 RX ORDER — GADOBUTROL 604.72 MG/ML
10 INJECTION INTRAVENOUS ONCE
Status: COMPLETED | OUTPATIENT
Start: 2024-01-01 | End: 2024-01-01

## 2024-01-01 RX ORDER — CALCITONIN SALMON 200 [IU]/.09ML
1 SPRAY, METERED NASAL DAILY
Status: DISCONTINUED | OUTPATIENT
Start: 2024-01-01 | End: 2024-01-01 | Stop reason: HOSPADM

## 2024-01-01 RX ORDER — POTASSIUM CHLORIDE 1500 MG/1
40 TABLET, EXTENDED RELEASE ORAL ONCE
Qty: 2 TABLET | Refills: 0 | Status: COMPLETED | OUTPATIENT
Start: 2024-01-01 | End: 2024-01-01

## 2024-01-01 RX ORDER — MAGNESIUM OXIDE 400 MG/1
400 TABLET ORAL EVERY 4 HOURS
Qty: 2 TABLET | Refills: 0 | Status: COMPLETED | OUTPATIENT
Start: 2024-01-01 | End: 2024-01-01

## 2024-01-01 RX ORDER — LOPERAMIDE HCL 2 MG
2 CAPSULE ORAL 4 TIMES DAILY PRN
Status: DISCONTINUED | OUTPATIENT
Start: 2024-01-01 | End: 2024-01-01 | Stop reason: HOSPADM

## 2024-01-01 RX ORDER — MAGNESIUM SULFATE HEPTAHYDRATE 40 MG/ML
2 INJECTION, SOLUTION INTRAVENOUS ONCE
Qty: 50 ML | Refills: 0 | Status: DISCONTINUED | OUTPATIENT
Start: 2024-01-01 | End: 2024-01-01

## 2024-01-01 RX ORDER — ONDANSETRON 4 MG/1
4 TABLET, FILM COATED ORAL AT BEDTIME
COMMUNITY

## 2024-01-01 RX ORDER — ACETAMINOPHEN 500 MG
1000 TABLET ORAL EVERY 6 HOURS PRN
COMMUNITY

## 2024-01-01 RX ADMIN — DICLOFENAC SODIUM 4 G: 10 GEL TOPICAL at 22:43

## 2024-01-01 RX ADMIN — FUROSEMIDE 40 MG: 40 TABLET ORAL at 10:14

## 2024-01-01 RX ADMIN — DICLOFENAC SODIUM 4 G: 10 GEL TOPICAL at 22:21

## 2024-01-01 RX ADMIN — ASPIRIN 81 MG: 81 TABLET, COATED ORAL at 10:14

## 2024-01-01 RX ADMIN — OXYCODONE HYDROCHLORIDE 5 MG: 5 TABLET ORAL at 16:53

## 2024-01-01 RX ADMIN — PANTOPRAZOLE SODIUM 40 MG: 40 TABLET, DELAYED RELEASE ORAL at 10:16

## 2024-01-01 RX ADMIN — METHOCARBAMOL 500 MG: 500 TABLET ORAL at 22:11

## 2024-01-01 RX ADMIN — OXYCODONE HYDROCHLORIDE 5 MG: 5 TABLET ORAL at 00:49

## 2024-01-01 RX ADMIN — PAROXETINE HYDROCHLORIDE 40 MG: 20 TABLET, FILM COATED ORAL at 08:28

## 2024-01-01 RX ADMIN — PAROXETINE HYDROCHLORIDE 40 MG: 20 TABLET, FILM COATED ORAL at 10:16

## 2024-01-01 RX ADMIN — METHOCARBAMOL 500 MG: 500 TABLET ORAL at 21:53

## 2024-01-01 RX ADMIN — ALBUTEROL SULFATE 2.5 MG: 2.5 SOLUTION RESPIRATORY (INHALATION) at 18:54

## 2024-01-01 RX ADMIN — ACETAMINOPHEN 1000 MG: 500 TABLET, FILM COATED ORAL at 20:20

## 2024-01-01 RX ADMIN — LISINOPRIL 40 MG: 40 TABLET ORAL at 10:16

## 2024-01-01 RX ADMIN — DICLOFENAC SODIUM 4 G: 10 GEL TOPICAL at 11:51

## 2024-01-01 RX ADMIN — LISINOPRIL 10 MG: 10 TABLET ORAL at 08:20

## 2024-01-01 RX ADMIN — ACETAMINOPHEN 1000 MG: 500 TABLET, FILM COATED ORAL at 21:29

## 2024-01-01 RX ADMIN — DOXYCYCLINE HYCLATE 100 MG: 100 CAPSULE ORAL at 09:16

## 2024-01-01 RX ADMIN — PAROXETINE HYDROCHLORIDE 40 MG: 20 TABLET, FILM COATED ORAL at 09:16

## 2024-01-01 RX ADMIN — METOPROLOL TARTRATE 100 MG: 50 TABLET, FILM COATED ORAL at 20:21

## 2024-01-01 RX ADMIN — DOXYCYCLINE HYCLATE 100 MG: 100 CAPSULE ORAL at 19:24

## 2024-01-01 RX ADMIN — METOPROLOL TARTRATE 100 MG: 50 TABLET, FILM COATED ORAL at 20:44

## 2024-01-01 RX ADMIN — ACETAMINOPHEN 1000 MG: 500 TABLET, FILM COATED ORAL at 13:30

## 2024-01-01 RX ADMIN — ACETAMINOPHEN 1000 MG: 500 TABLET, FILM COATED ORAL at 06:59

## 2024-01-01 RX ADMIN — OXYCODONE HYDROCHLORIDE 2.5 MG: 5 TABLET ORAL at 15:42

## 2024-01-01 RX ADMIN — OXYCODONE HYDROCHLORIDE 2.5 MG: 5 TABLET ORAL at 01:52

## 2024-01-01 RX ADMIN — DICLOFENAC SODIUM 4 G: 10 GEL TOPICAL at 17:20

## 2024-01-01 RX ADMIN — LISINOPRIL 20 MG: 20 TABLET ORAL at 09:16

## 2024-01-01 RX ADMIN — MAGNESIUM SULFATE IN WATER 4 G: 40 INJECTION, SOLUTION INTRAVENOUS at 14:25

## 2024-01-01 RX ADMIN — ASPIRIN 81 MG: 81 TABLET, COATED ORAL at 08:53

## 2024-01-01 RX ADMIN — Medication 400 MG: at 10:16

## 2024-01-01 RX ADMIN — ACETAMINOPHEN 1000 MG: 500 TABLET, FILM COATED ORAL at 12:07

## 2024-01-01 RX ADMIN — ACETAMINOPHEN 1000 MG: 500 TABLET, FILM COATED ORAL at 12:23

## 2024-01-01 RX ADMIN — PAROXETINE HYDROCHLORIDE 40 MG: 20 TABLET, FILM COATED ORAL at 08:53

## 2024-01-01 RX ADMIN — FUROSEMIDE 40 MG: 40 TABLET ORAL at 08:39

## 2024-01-01 RX ADMIN — Medication 1 CAPSULE: at 09:18

## 2024-01-01 RX ADMIN — PAROXETINE HYDROCHLORIDE 40 MG: 20 TABLET, FILM COATED ORAL at 11:02

## 2024-01-01 RX ADMIN — ACETAMINOPHEN 1000 MG: 500 TABLET, FILM COATED ORAL at 05:14

## 2024-01-01 RX ADMIN — DICLOFENAC SODIUM 4 G: 10 GEL TOPICAL at 18:58

## 2024-01-01 RX ADMIN — METOPROLOL TARTRATE 100 MG: 50 TABLET, FILM COATED ORAL at 08:12

## 2024-01-01 RX ADMIN — CALCITONIN SALMON 1 SPRAY: 200 SPRAY, METERED NASAL at 10:43

## 2024-01-01 RX ADMIN — Medication 400 MG: at 16:33

## 2024-01-01 RX ADMIN — PREDNISONE 40 MG: 20 TABLET ORAL at 10:18

## 2024-01-01 RX ADMIN — FUROSEMIDE 40 MG: 40 TABLET ORAL at 10:18

## 2024-01-01 RX ADMIN — SODIUM CHLORIDE 1000 ML: 9 INJECTION, SOLUTION INTRAVENOUS at 10:54

## 2024-01-01 RX ADMIN — PANTOPRAZOLE SODIUM 40 MG: 40 TABLET, DELAYED RELEASE ORAL at 09:16

## 2024-01-01 RX ADMIN — METHOCARBAMOL 500 MG: 500 TABLET ORAL at 08:52

## 2024-01-01 RX ADMIN — AZITHROMYCIN DIHYDRATE 250 MG: 250 TABLET ORAL at 08:56

## 2024-01-01 RX ADMIN — ACETAMINOPHEN 1000 MG: 500 TABLET, FILM COATED ORAL at 13:03

## 2024-01-01 RX ADMIN — ACETAMINOPHEN 1000 MG: 500 TABLET, FILM COATED ORAL at 20:17

## 2024-01-01 RX ADMIN — ASPIRIN 81 MG: 81 TABLET, COATED ORAL at 10:17

## 2024-01-01 RX ADMIN — DICLOFENAC SODIUM 4 G: 10 GEL TOPICAL at 18:46

## 2024-01-01 RX ADMIN — DICLOFENAC SODIUM 4 G: 10 GEL TOPICAL at 22:19

## 2024-01-01 RX ADMIN — ACETAMINOPHEN 1000 MG: 500 TABLET, FILM COATED ORAL at 13:36

## 2024-01-01 RX ADMIN — ASPIRIN 81 MG: 81 TABLET, COATED ORAL at 08:12

## 2024-01-01 RX ADMIN — ACETAMINOPHEN 1000 MG: 500 TABLET, FILM COATED ORAL at 04:46

## 2024-01-01 RX ADMIN — HUMAN ALBUMIN MICROSPHERES AND PERFLUTREN 3 ML: 10; .22 INJECTION, SOLUTION INTRAVENOUS at 08:12

## 2024-01-01 RX ADMIN — Medication 1 TABLET: at 10:16

## 2024-01-01 RX ADMIN — Medication 400 MG: at 12:35

## 2024-01-01 RX ADMIN — Medication 1 TABLET: at 10:18

## 2024-01-01 RX ADMIN — PAROXETINE HYDROCHLORIDE 40 MG: 20 TABLET, FILM COATED ORAL at 08:11

## 2024-01-01 RX ADMIN — ASPIRIN 81 MG: 81 TABLET, COATED ORAL at 10:16

## 2024-01-01 RX ADMIN — METOPROLOL TARTRATE 100 MG: 50 TABLET, FILM COATED ORAL at 21:48

## 2024-01-01 RX ADMIN — POTASSIUM CHLORIDE 40 MEQ: 1500 TABLET, EXTENDED RELEASE ORAL at 13:22

## 2024-01-01 RX ADMIN — DICLOFENAC SODIUM 4 G: 10 GEL TOPICAL at 08:34

## 2024-01-01 RX ADMIN — METOPROLOL TARTRATE 100 MG: 50 TABLET, FILM COATED ORAL at 21:56

## 2024-01-01 RX ADMIN — PANTOPRAZOLE SODIUM 40 MG: 40 TABLET, DELAYED RELEASE ORAL at 08:12

## 2024-01-01 RX ADMIN — DOXYCYCLINE HYCLATE 100 MG: 100 CAPSULE ORAL at 08:28

## 2024-01-01 RX ADMIN — DICLOFENAC SODIUM 4 G: 10 GEL TOPICAL at 09:02

## 2024-01-01 RX ADMIN — ACETAMINOPHEN 1000 MG: 500 TABLET, FILM COATED ORAL at 12:09

## 2024-01-01 RX ADMIN — MAGNESIUM SULFATE IN WATER 4 G: 40 INJECTION, SOLUTION INTRAVENOUS at 12:08

## 2024-01-01 RX ADMIN — DICLOFENAC SODIUM 4 G: 10 GEL TOPICAL at 11:03

## 2024-01-01 RX ADMIN — ONDANSETRON 4 MG: 4 TABLET, ORALLY DISINTEGRATING ORAL at 04:47

## 2024-01-01 RX ADMIN — OXYCODONE HYDROCHLORIDE 5 MG: 5 TABLET ORAL at 06:05

## 2024-01-01 RX ADMIN — DICLOFENAC SODIUM 4 G: 10 GEL TOPICAL at 17:50

## 2024-01-01 RX ADMIN — Medication 1 TABLET: at 08:12

## 2024-01-01 RX ADMIN — Medication 1 CAPSULE: at 09:01

## 2024-01-01 RX ADMIN — PAROXETINE HYDROCHLORIDE 40 MG: 20 TABLET, FILM COATED ORAL at 10:14

## 2024-01-01 RX ADMIN — ASPIRIN 81 MG: 81 TABLET, COATED ORAL at 09:16

## 2024-01-01 RX ADMIN — PANTOPRAZOLE SODIUM 40 MG: 40 TABLET, DELAYED RELEASE ORAL at 08:39

## 2024-01-01 RX ADMIN — ACETAMINOPHEN 1000 MG: 500 TABLET, FILM COATED ORAL at 20:33

## 2024-01-01 RX ADMIN — METOPROLOL TARTRATE 100 MG: 50 TABLET, FILM COATED ORAL at 22:12

## 2024-01-01 RX ADMIN — LISINOPRIL 40 MG: 40 TABLET ORAL at 10:19

## 2024-01-01 RX ADMIN — METOPROLOL TARTRATE 100 MG: 50 TABLET, FILM COATED ORAL at 22:11

## 2024-01-01 RX ADMIN — CEFTRIAXONE SODIUM 2 G: 2 INJECTION, POWDER, FOR SOLUTION INTRAMUSCULAR; INTRAVENOUS at 12:03

## 2024-01-01 RX ADMIN — LISINOPRIL 20 MG: 20 TABLET ORAL at 10:14

## 2024-01-01 RX ADMIN — Medication 1 TABLET: at 08:39

## 2024-01-01 RX ADMIN — ACETAMINOPHEN 1000 MG: 500 TABLET, FILM COATED ORAL at 12:06

## 2024-01-01 RX ADMIN — CALCITONIN SALMON 1 SPRAY: 200 SPRAY, METERED NASAL at 08:45

## 2024-01-01 RX ADMIN — ONDANSETRON 4 MG: 2 INJECTION INTRAMUSCULAR; INTRAVENOUS at 13:19

## 2024-01-01 RX ADMIN — GADOBUTROL 10 ML: 604.72 INJECTION INTRAVENOUS at 15:24

## 2024-01-01 RX ADMIN — POTASSIUM CHLORIDE 20 MEQ: 1.5 POWDER, FOR SOLUTION ORAL at 19:14

## 2024-01-01 RX ADMIN — METOPROLOL TARTRATE 100 MG: 50 TABLET, FILM COATED ORAL at 09:00

## 2024-01-01 RX ADMIN — ACETAMINOPHEN 1000 MG: 500 TABLET, FILM COATED ORAL at 20:21

## 2024-01-01 RX ADMIN — CALCITONIN SALMON 1 SPRAY: 200 SPRAY, METERED NASAL at 09:19

## 2024-01-01 RX ADMIN — AZITHROMYCIN DIHYDRATE 250 MG: 250 TABLET ORAL at 08:12

## 2024-01-01 RX ADMIN — PREDNISONE 40 MG: 20 TABLET ORAL at 10:16

## 2024-01-01 RX ADMIN — FAMOTIDINE 40 MG: 20 TABLET ORAL at 09:01

## 2024-01-01 RX ADMIN — METOPROLOL TARTRATE 100 MG: 50 TABLET, FILM COATED ORAL at 10:14

## 2024-01-01 RX ADMIN — Medication 1 TABLET: at 09:18

## 2024-01-01 RX ADMIN — CALCITONIN SALMON 1 SPRAY: 200 SPRAY, METERED NASAL at 10:55

## 2024-01-01 RX ADMIN — OXYCODONE HYDROCHLORIDE 5 MG: 5 TABLET ORAL at 16:12

## 2024-01-01 RX ADMIN — CALCITONIN SALMON 1 SPRAY: 200 SPRAY, METERED NASAL at 10:27

## 2024-01-01 RX ADMIN — METOPROLOL TARTRATE 100 MG: 50 TABLET, FILM COATED ORAL at 08:38

## 2024-01-01 RX ADMIN — CALCITONIN SALMON 1 SPRAY: 200 SPRAY, METERED NASAL at 08:15

## 2024-01-01 RX ADMIN — MAGNESIUM SULFATE IN WATER 4 G: 40 INJECTION, SOLUTION INTRAVENOUS at 10:49

## 2024-01-01 RX ADMIN — PANTOPRAZOLE SODIUM 40 MG: 40 TABLET, DELAYED RELEASE ORAL at 10:14

## 2024-01-01 RX ADMIN — DOXYCYCLINE HYCLATE 100 MG: 100 CAPSULE ORAL at 20:38

## 2024-01-01 RX ADMIN — ACETAMINOPHEN 1000 MG: 500 TABLET, FILM COATED ORAL at 04:34

## 2024-01-01 RX ADMIN — ACETAMINOPHEN 1000 MG: 500 TABLET, FILM COATED ORAL at 05:38

## 2024-01-01 RX ADMIN — FAMOTIDINE 40 MG: 20 TABLET ORAL at 09:18

## 2024-01-01 RX ADMIN — MAGNESIUM SULFATE HEPTAHYDRATE 2 G: 40 INJECTION, SOLUTION INTRAVENOUS at 10:36

## 2024-01-01 RX ADMIN — METHOCARBAMOL 500 MG: 500 TABLET ORAL at 22:13

## 2024-01-01 RX ADMIN — LISINOPRIL 20 MG: 20 TABLET ORAL at 08:53

## 2024-01-01 RX ADMIN — DICLOFENAC SODIUM 4 G: 10 GEL TOPICAL at 08:39

## 2024-01-01 RX ADMIN — CALCITONIN SALMON 1 SPRAY: 200 SPRAY, METERED NASAL at 09:02

## 2024-01-01 RX ADMIN — ACETAMINOPHEN 1000 MG: 500 TABLET, FILM COATED ORAL at 21:48

## 2024-01-01 RX ADMIN — Medication 1 TABLET: at 08:56

## 2024-01-01 RX ADMIN — Medication 400 MG: at 11:42

## 2024-01-01 RX ADMIN — MAGNESIUM SULFATE HEPTAHYDRATE 2 G: 40 INJECTION, SOLUTION INTRAVENOUS at 16:45

## 2024-01-01 RX ADMIN — ASPIRIN 81 MG: 81 TABLET, COATED ORAL at 08:39

## 2024-01-01 RX ADMIN — ACETAMINOPHEN 1000 MG: 500 TABLET, FILM COATED ORAL at 01:31

## 2024-01-01 RX ADMIN — ACETAMINOPHEN 1000 MG: 500 TABLET, FILM COATED ORAL at 13:22

## 2024-01-01 RX ADMIN — Medication 400 MG: at 12:36

## 2024-01-01 RX ADMIN — Medication 1 TABLET: at 10:14

## 2024-01-01 RX ADMIN — DICLOFENAC SODIUM 4 G: 10 GEL TOPICAL at 22:14

## 2024-01-01 RX ADMIN — ACETAMINOPHEN 1000 MG: 500 TABLET, FILM COATED ORAL at 22:11

## 2024-01-01 RX ADMIN — LISINOPRIL 40 MG: 40 TABLET ORAL at 08:38

## 2024-01-01 RX ADMIN — DOXYCYCLINE HYCLATE 100 MG: 100 CAPSULE ORAL at 19:23

## 2024-01-01 RX ADMIN — Medication 1 TABLET: at 08:28

## 2024-01-01 RX ADMIN — CALCITONIN SALMON 1 SPRAY: 200 SPRAY, METERED NASAL at 09:23

## 2024-01-01 RX ADMIN — PANTOPRAZOLE SODIUM 40 MG: 40 TABLET, DELAYED RELEASE ORAL at 08:56

## 2024-01-01 RX ADMIN — Medication 1 TABLET: at 08:54

## 2024-01-01 RX ADMIN — METHOCARBAMOL 500 MG: 500 TABLET ORAL at 08:28

## 2024-01-01 RX ADMIN — CALCITONIN SALMON 1 SPRAY: 200 SPRAY, METERED NASAL at 20:39

## 2024-01-01 RX ADMIN — FUROSEMIDE 20 MG: 10 INJECTION, SOLUTION INTRAMUSCULAR; INTRAVENOUS at 16:05

## 2024-01-01 RX ADMIN — CALCITONIN SALMON 1 SPRAY: 200 SPRAY, METERED NASAL at 08:34

## 2024-01-01 RX ADMIN — DICLOFENAC SODIUM 4 G: 10 GEL TOPICAL at 17:44

## 2024-01-01 RX ADMIN — DICLOFENAC SODIUM 4 G: 10 GEL TOPICAL at 13:30

## 2024-01-01 RX ADMIN — MAGNESIUM SULFATE IN WATER 4 G: 40 INJECTION, SOLUTION INTRAVENOUS at 14:19

## 2024-01-01 RX ADMIN — CEFTRIAXONE SODIUM 2 G: 2 INJECTION, POWDER, FOR SOLUTION INTRAMUSCULAR; INTRAVENOUS at 11:04

## 2024-01-01 RX ADMIN — POTASSIUM CHLORIDE 20 MEQ: 1500 TABLET, EXTENDED RELEASE ORAL at 13:03

## 2024-01-01 RX ADMIN — ACETAMINOPHEN 1000 MG: 500 TABLET, FILM COATED ORAL at 13:54

## 2024-01-01 RX ADMIN — DOXYCYCLINE HYCLATE 100 MG: 100 CAPSULE ORAL at 19:44

## 2024-01-01 RX ADMIN — LOPERAMIDE HYDROCHLORIDE 2 MG: 2 CAPSULE ORAL at 01:28

## 2024-01-01 RX ADMIN — AZITHROMYCIN DIHYDRATE 250 MG: 250 TABLET ORAL at 12:23

## 2024-01-01 RX ADMIN — POTASSIUM CHLORIDE 40 MEQ: 1500 TABLET, EXTENDED RELEASE ORAL at 12:36

## 2024-01-01 RX ADMIN — DICLOFENAC SODIUM 4 G: 10 GEL TOPICAL at 10:17

## 2024-01-01 RX ADMIN — METOPROLOL TARTRATE 100 MG: 50 TABLET, FILM COATED ORAL at 20:20

## 2024-01-01 RX ADMIN — METHOCARBAMOL 500 MG: 500 TABLET ORAL at 20:39

## 2024-01-01 RX ADMIN — POTASSIUM CHLORIDE 20 MEQ: 1500 TABLET, EXTENDED RELEASE ORAL at 09:16

## 2024-01-01 RX ADMIN — Medication 1 CAPSULE: at 20:17

## 2024-01-01 RX ADMIN — DICLOFENAC SODIUM 4 G: 10 GEL TOPICAL at 22:01

## 2024-01-01 RX ADMIN — ACETAMINOPHEN 1000 MG: 500 TABLET, FILM COATED ORAL at 12:36

## 2024-01-01 RX ADMIN — METHOCARBAMOL 500 MG: 500 TABLET ORAL at 00:48

## 2024-01-01 RX ADMIN — SODIUM CHLORIDE 98 ML: 9 INJECTION, SOLUTION INTRAVENOUS at 12:49

## 2024-01-01 RX ADMIN — Medication 1 TABLET: at 11:02

## 2024-01-01 RX ADMIN — ASPIRIN 81 MG: 81 TABLET, COATED ORAL at 11:02

## 2024-01-01 RX ADMIN — DICLOFENAC SODIUM 4 G: 10 GEL TOPICAL at 13:23

## 2024-01-01 RX ADMIN — DICLOFENAC SODIUM 4 G: 10 GEL TOPICAL at 13:07

## 2024-01-01 RX ADMIN — POTASSIUM CHLORIDE 40 MEQ: 1500 TABLET, EXTENDED RELEASE ORAL at 13:26

## 2024-01-01 RX ADMIN — LISINOPRIL 20 MG: 20 TABLET ORAL at 09:00

## 2024-01-01 RX ADMIN — ACETAMINOPHEN 1000 MG: 500 TABLET, FILM COATED ORAL at 20:55

## 2024-01-01 RX ADMIN — MAGNESIUM SULFATE IN WATER 4 G: 40 INJECTION, SOLUTION INTRAVENOUS at 10:15

## 2024-01-01 RX ADMIN — METOPROLOL TARTRATE 100 MG: 50 TABLET, FILM COATED ORAL at 20:17

## 2024-01-01 RX ADMIN — ACETAMINOPHEN 1000 MG: 500 TABLET, FILM COATED ORAL at 22:12

## 2024-01-01 RX ADMIN — CALCITONIN SALMON 1 SPRAY: 200 SPRAY, METERED NASAL at 08:59

## 2024-01-01 RX ADMIN — ACETAMINOPHEN 1000 MG: 500 TABLET, FILM COATED ORAL at 20:53

## 2024-01-01 RX ADMIN — DICLOFENAC SODIUM 4 G: 10 GEL TOPICAL at 21:51

## 2024-01-01 RX ADMIN — CEFTRIAXONE SODIUM 2 G: 2 INJECTION, POWDER, FOR SOLUTION INTRAMUSCULAR; INTRAVENOUS at 11:35

## 2024-01-01 RX ADMIN — PREDNISONE 40 MG: 20 TABLET ORAL at 08:53

## 2024-01-01 RX ADMIN — ASPIRIN 81 MG: 81 TABLET, COATED ORAL at 08:56

## 2024-01-01 RX ADMIN — ASPIRIN 81 MG: 81 TABLET, COATED ORAL at 09:18

## 2024-01-01 RX ADMIN — DICLOFENAC SODIUM 4 G: 10 GEL TOPICAL at 21:01

## 2024-01-01 RX ADMIN — DICLOFENAC SODIUM 4 G: 10 GEL TOPICAL at 13:37

## 2024-01-01 RX ADMIN — METOPROLOL TARTRATE 100 MG: 50 TABLET, FILM COATED ORAL at 20:53

## 2024-01-01 RX ADMIN — POTASSIUM CHLORIDE 20 MEQ: 1500 TABLET, EXTENDED RELEASE ORAL at 11:14

## 2024-01-01 RX ADMIN — DICLOFENAC SODIUM 4 G: 10 GEL TOPICAL at 13:26

## 2024-01-01 RX ADMIN — DICLOFENAC SODIUM 4 G: 10 GEL TOPICAL at 21:36

## 2024-01-01 RX ADMIN — PAROXETINE HYDROCHLORIDE 40 MG: 20 TABLET, FILM COATED ORAL at 08:56

## 2024-01-01 RX ADMIN — CALCITONIN SALMON 1 SPRAY: 200 SPRAY, METERED NASAL at 10:59

## 2024-01-01 RX ADMIN — FUROSEMIDE 40 MG: 10 INJECTION, SOLUTION INTRAMUSCULAR; INTRAVENOUS at 00:56

## 2024-01-01 RX ADMIN — METOPROLOL TARTRATE 100 MG: 50 TABLET, FILM COATED ORAL at 20:33

## 2024-01-01 RX ADMIN — DICLOFENAC SODIUM 4 G: 10 GEL TOPICAL at 13:13

## 2024-01-01 RX ADMIN — DOXYCYCLINE HYCLATE 100 MG: 100 CAPSULE ORAL at 10:14

## 2024-01-01 RX ADMIN — ACETAMINOPHEN 1000 MG: 500 TABLET, FILM COATED ORAL at 12:35

## 2024-01-01 RX ADMIN — METHOCARBAMOL 500 MG: 500 TABLET ORAL at 10:14

## 2024-01-01 RX ADMIN — PREDNISONE 40 MG: 20 TABLET ORAL at 11:02

## 2024-01-01 RX ADMIN — ACETAMINOPHEN 1000 MG: 500 TABLET, FILM COATED ORAL at 04:51

## 2024-01-01 RX ADMIN — METOPROLOL TARTRATE 100 MG: 50 TABLET, FILM COATED ORAL at 10:18

## 2024-01-01 RX ADMIN — IOPAMIDOL 105 ML: 755 INJECTION, SOLUTION INTRAVENOUS at 12:49

## 2024-01-01 RX ADMIN — AZITHROMYCIN MONOHYDRATE 500 MG: 500 INJECTION, POWDER, LYOPHILIZED, FOR SOLUTION INTRAVENOUS at 13:21

## 2024-01-01 RX ADMIN — METHOCARBAMOL 500 MG: 500 TABLET ORAL at 09:05

## 2024-01-01 RX ADMIN — METOPROLOL SUCCINATE 100 MG: 100 TABLET, EXTENDED RELEASE ORAL at 08:54

## 2024-01-01 RX ADMIN — FUROSEMIDE 40 MG: 40 TABLET ORAL at 08:29

## 2024-01-01 RX ADMIN — FUROSEMIDE 40 MG: 10 INJECTION, SOLUTION INTRAMUSCULAR; INTRAVENOUS at 11:25

## 2024-01-01 RX ADMIN — HYDRALAZINE HYDROCHLORIDE 10 MG: 20 INJECTION INTRAMUSCULAR; INTRAVENOUS at 08:56

## 2024-01-01 RX ADMIN — MAGNESIUM SULFATE IN WATER 4 G: 40 INJECTION, SOLUTION INTRAVENOUS at 10:50

## 2024-01-01 RX ADMIN — CEFTRIAXONE SODIUM 2 G: 2 INJECTION, POWDER, FOR SOLUTION INTRAMUSCULAR; INTRAVENOUS at 12:47

## 2024-01-01 RX ADMIN — FUROSEMIDE 40 MG: 40 TABLET ORAL at 09:02

## 2024-01-01 RX ADMIN — DICLOFENAC SODIUM 4 G: 10 GEL TOPICAL at 15:19

## 2024-01-01 RX ADMIN — FAMOTIDINE 40 MG: 20 TABLET ORAL at 11:02

## 2024-01-01 RX ADMIN — LISINOPRIL 20 MG: 20 TABLET ORAL at 09:18

## 2024-01-01 RX ADMIN — ACETAMINOPHEN 1000 MG: 500 TABLET, FILM COATED ORAL at 13:26

## 2024-01-01 RX ADMIN — Medication 1 TABLET: at 08:53

## 2024-01-01 RX ADMIN — PANTOPRAZOLE SODIUM 40 MG: 40 TABLET, DELAYED RELEASE ORAL at 10:18

## 2024-01-01 RX ADMIN — ALBUTEROL SULFATE 2.5 MG: 2.5 SOLUTION RESPIRATORY (INHALATION) at 08:23

## 2024-01-01 RX ADMIN — Medication 1 TABLET: at 09:16

## 2024-01-01 RX ADMIN — PANTOPRAZOLE SODIUM 40 MG: 40 TABLET, DELAYED RELEASE ORAL at 08:53

## 2024-01-01 RX ADMIN — METOPROLOL TARTRATE 100 MG: 50 TABLET, FILM COATED ORAL at 09:18

## 2024-01-01 RX ADMIN — LISINOPRIL 20 MG: 20 TABLET ORAL at 08:28

## 2024-01-01 RX ADMIN — METOPROLOL TARTRATE 100 MG: 50 TABLET, FILM COATED ORAL at 08:28

## 2024-01-01 RX ADMIN — MELATONIN 5 MG TABLET 5 MG: at 01:28

## 2024-01-01 RX ADMIN — Medication 1 TABLET: at 09:00

## 2024-01-01 RX ADMIN — CALCITONIN SALMON 1 SPRAY: 200 SPRAY, METERED NASAL at 08:52

## 2024-01-01 RX ADMIN — ACETAMINOPHEN 1000 MG: 500 TABLET, FILM COATED ORAL at 05:40

## 2024-01-01 RX ADMIN — GADOBUTROL 9 ML: 604.72 INJECTION INTRAVENOUS at 12:11

## 2024-01-01 RX ADMIN — PAROXETINE HYDROCHLORIDE 40 MG: 20 TABLET, FILM COATED ORAL at 08:38

## 2024-01-01 RX ADMIN — CEFTRIAXONE SODIUM 2 G: 2 INJECTION, POWDER, FOR SOLUTION INTRAMUSCULAR; INTRAVENOUS at 12:08

## 2024-01-01 RX ADMIN — ACETAMINOPHEN 1000 MG: 500 TABLET, FILM COATED ORAL at 20:38

## 2024-01-01 RX ADMIN — ACETAMINOPHEN 1000 MG: 500 TABLET, FILM COATED ORAL at 06:00

## 2024-01-01 RX ADMIN — POTASSIUM CHLORIDE 20 MEQ: 1500 TABLET, EXTENDED RELEASE ORAL at 11:42

## 2024-01-01 RX ADMIN — MORPHINE SULFATE 2 MG: 4 INJECTION, SOLUTION INTRAMUSCULAR; INTRAVENOUS at 13:18

## 2024-01-01 RX ADMIN — METHOCARBAMOL 500 MG: 500 TABLET ORAL at 09:16

## 2024-01-01 RX ADMIN — ACETAMINOPHEN 1000 MG: 500 TABLET, FILM COATED ORAL at 21:37

## 2024-01-01 RX ADMIN — DICLOFENAC SODIUM 4 G: 10 GEL TOPICAL at 10:43

## 2024-01-01 RX ADMIN — OXYCODONE HYDROCHLORIDE 5 MG: 5 TABLET ORAL at 16:43

## 2024-01-01 RX ADMIN — METOPROLOL SUCCINATE 100 MG: 100 TABLET, EXTENDED RELEASE ORAL at 08:56

## 2024-01-01 RX ADMIN — METOPROLOL TARTRATE 100 MG: 50 TABLET, FILM COATED ORAL at 11:01

## 2024-01-01 RX ADMIN — DICLOFENAC SODIUM 4 G: 10 GEL TOPICAL at 09:19

## 2024-01-01 RX ADMIN — CEFTRIAXONE SODIUM 2 G: 2 INJECTION, POWDER, FOR SOLUTION INTRAMUSCULAR; INTRAVENOUS at 13:03

## 2024-01-01 RX ADMIN — METOPROLOL TARTRATE 100 MG: 50 TABLET, FILM COATED ORAL at 10:16

## 2024-01-01 RX ADMIN — METOPROLOL TARTRATE 100 MG: 50 TABLET, FILM COATED ORAL at 21:29

## 2024-01-01 RX ADMIN — ACETAMINOPHEN 1000 MG: 500 TABLET, FILM COATED ORAL at 05:20

## 2024-01-01 RX ADMIN — DOXYCYCLINE HYCLATE 100 MG: 100 CAPSULE ORAL at 12:07

## 2024-01-01 RX ADMIN — OXYCODONE HYDROCHLORIDE 5 MG: 5 TABLET ORAL at 04:46

## 2024-01-01 RX ADMIN — DICLOFENAC SODIUM 4 G: 10 GEL TOPICAL at 19:21

## 2024-01-01 RX ADMIN — ASPIRIN 81 MG: 81 TABLET, COATED ORAL at 09:00

## 2024-01-01 RX ADMIN — Medication 400 MG: at 18:30

## 2024-01-01 RX ADMIN — MAGNESIUM SULFATE IN WATER 4 G: 40 INJECTION, SOLUTION INTRAVENOUS at 11:43

## 2024-01-01 RX ADMIN — METOPROLOL TARTRATE 100 MG: 50 TABLET, FILM COATED ORAL at 09:16

## 2024-01-01 RX ADMIN — CALCITONIN SALMON 1 SPRAY: 200 SPRAY, METERED NASAL at 11:50

## 2024-01-01 RX ADMIN — PREDNISONE 40 MG: 20 TABLET ORAL at 08:39

## 2024-01-01 RX ADMIN — ONDANSETRON 4 MG: 2 INJECTION INTRAMUSCULAR; INTRAVENOUS at 09:20

## 2024-01-01 RX ADMIN — PANTOPRAZOLE SODIUM 40 MG: 40 TABLET, DELAYED RELEASE ORAL at 08:28

## 2024-01-01 RX ADMIN — CEFTRIAXONE SODIUM 1 G: 1 INJECTION, POWDER, FOR SOLUTION INTRAMUSCULAR; INTRAVENOUS at 12:23

## 2024-01-01 RX ADMIN — CEFTRIAXONE SODIUM 2 G: 2 INJECTION, POWDER, FOR SOLUTION INTRAMUSCULAR; INTRAVENOUS at 11:42

## 2024-01-01 RX ADMIN — SODIUM CHLORIDE 500 ML: 9 INJECTION, SOLUTION INTRAVENOUS at 04:26

## 2024-01-01 RX ADMIN — DICLOFENAC SODIUM 4 G: 10 GEL TOPICAL at 12:36

## 2024-01-01 RX ADMIN — DICLOFENAC SODIUM 4 G: 10 GEL TOPICAL at 13:54

## 2024-01-01 RX ADMIN — FUROSEMIDE 40 MG: 40 TABLET ORAL at 10:16

## 2024-01-01 RX ADMIN — ASPIRIN 81 MG: 81 TABLET, COATED ORAL at 08:28

## 2024-01-01 RX ADMIN — ACETAMINOPHEN 1000 MG: 500 TABLET, FILM COATED ORAL at 21:53

## 2024-01-01 RX ADMIN — LISINOPRIL 40 MG: 40 TABLET ORAL at 11:02

## 2024-01-01 RX ADMIN — ACETAMINOPHEN 1000 MG: 500 TABLET, FILM COATED ORAL at 04:58

## 2024-01-01 ASSESSMENT — ACTIVITIES OF DAILY LIVING (ADL)
ADLS_ACUITY_SCORE: 54
ADLS_ACUITY_SCORE: 48
ADLS_ACUITY_SCORE: 48
ADLS_ACUITY_SCORE: 46
ADLS_ACUITY_SCORE: 48
ADLS_ACUITY_SCORE: 56
ADLS_ACUITY_SCORE: 56
ADLS_ACUITY_SCORE: 42
ADLS_ACUITY_SCORE: 55
ADLS_ACUITY_SCORE: 48
ADLS_ACUITY_SCORE: 48
ADLS_ACUITY_SCORE: 51
ADLS_ACUITY_SCORE: 48
ADLS_ACUITY_SCORE: 51
ADLS_ACUITY_SCORE: 51
ADLS_ACUITY_SCORE: 40
ADLS_ACUITY_SCORE: 54
ADLS_ACUITY_SCORE: 52
ADLS_ACUITY_SCORE: 51
ADLS_ACUITY_SCORE: 56
ADLS_ACUITY_SCORE: 51
ADLS_ACUITY_SCORE: 55
ADLS_ACUITY_SCORE: 37
ADLS_ACUITY_SCORE: 46
ADLS_ACUITY_SCORE: 52
ADLS_ACUITY_SCORE: 46
ADLS_ACUITY_SCORE: 55
ADLS_ACUITY_SCORE: 59
ADLS_ACUITY_SCORE: 51
ADLS_ACUITY_SCORE: 42
ADLS_ACUITY_SCORE: 46
ADLS_ACUITY_SCORE: 58
ADLS_ACUITY_SCORE: 46
ADLS_ACUITY_SCORE: 52
ADLS_ACUITY_SCORE: 55
ADLS_ACUITY_SCORE: 46
ADLS_ACUITY_SCORE: 48
ADLS_ACUITY_SCORE: 52
ADLS_ACUITY_SCORE: 55
ADLS_ACUITY_SCORE: 48
ADLS_ACUITY_SCORE: 48
ADLS_ACUITY_SCORE: 50
ADLS_ACUITY_SCORE: 56
ADLS_ACUITY_SCORE: 48
ADLS_ACUITY_SCORE: 54
ADLS_ACUITY_SCORE: 52
ADLS_ACUITY_SCORE: 55
ADLS_ACUITY_SCORE: 48
ADLS_ACUITY_SCORE: 48
ADLS_ACUITY_SCORE: 59
ADLS_ACUITY_SCORE: 51
ADLS_ACUITY_SCORE: 56
ADLS_ACUITY_SCORE: 56
ADLS_ACUITY_SCORE: 59
ADLS_ACUITY_SCORE: 51
ADLS_ACUITY_SCORE: 48
ADLS_ACUITY_SCORE: 51
ADLS_ACUITY_SCORE: 58
ADLS_ACUITY_SCORE: 46
ADLS_ACUITY_SCORE: 46
ADLS_ACUITY_SCORE: 51
ADLS_ACUITY_SCORE: 51
ADLS_ACUITY_SCORE: 48
ADLS_ACUITY_SCORE: 59
ADLS_ACUITY_SCORE: 54
ADLS_ACUITY_SCORE: 51
ADLS_ACUITY_SCORE: 46
ADLS_ACUITY_SCORE: 51
ADLS_ACUITY_SCORE: 59
ADLS_ACUITY_SCORE: 56
ADLS_ACUITY_SCORE: 46
ADLS_ACUITY_SCORE: 54
ADLS_ACUITY_SCORE: 48
ADLS_ACUITY_SCORE: 51
ADLS_ACUITY_SCORE: 56
ADLS_ACUITY_SCORE: 46
ADLS_ACUITY_SCORE: 56
ADLS_ACUITY_SCORE: 55
ADLS_ACUITY_SCORE: 58
ADLS_ACUITY_SCORE: 37
ADLS_ACUITY_SCORE: 40
ADLS_ACUITY_SCORE: 59
ADLS_ACUITY_SCORE: 52
ADLS_ACUITY_SCORE: 37
ADLS_ACUITY_SCORE: 37
ADLS_ACUITY_SCORE: 48
ADLS_ACUITY_SCORE: 46
ADLS_ACUITY_SCORE: 56
ADLS_ACUITY_SCORE: 59
ADLS_ACUITY_SCORE: 48
ADLS_ACUITY_SCORE: 46
ADLS_ACUITY_SCORE: 51
ADLS_ACUITY_SCORE: 52
ADLS_ACUITY_SCORE: 55
ADLS_ACUITY_SCORE: 59
ADLS_ACUITY_SCORE: 49
ADLS_ACUITY_SCORE: 51
ADLS_ACUITY_SCORE: 48
ADLS_ACUITY_SCORE: 52
ADLS_ACUITY_SCORE: 52
ADLS_ACUITY_SCORE: 46
ADLS_ACUITY_SCORE: 50
ADLS_ACUITY_SCORE: 46
ADLS_ACUITY_SCORE: 48
ADLS_ACUITY_SCORE: 46
ADLS_ACUITY_SCORE: 46
ADLS_ACUITY_SCORE: 51
ADLS_ACUITY_SCORE: 48
ADLS_ACUITY_SCORE: 56
ADLS_ACUITY_SCORE: 56
ADLS_ACUITY_SCORE: 48
ADLS_ACUITY_SCORE: 57
ADLS_ACUITY_SCORE: 55
ADLS_ACUITY_SCORE: 54
ADLS_ACUITY_SCORE: 59
ADLS_ACUITY_SCORE: 37
ADLS_ACUITY_SCORE: 59
ADLS_ACUITY_SCORE: 46
ADLS_ACUITY_SCORE: 59
ADLS_ACUITY_SCORE: 48
ADLS_ACUITY_SCORE: 51
ADLS_ACUITY_SCORE: 42
ADLS_ACUITY_SCORE: 51
ADLS_ACUITY_SCORE: 59
ADLS_ACUITY_SCORE: 46
ADLS_ACUITY_SCORE: 54
ADLS_ACUITY_SCORE: 56
ADLS_ACUITY_SCORE: 46
ADLS_ACUITY_SCORE: 46
ADLS_ACUITY_SCORE: 51
ADLS_ACUITY_SCORE: 48
ADLS_ACUITY_SCORE: 50
ADLS_ACUITY_SCORE: 49
ADLS_ACUITY_SCORE: 58
ADLS_ACUITY_SCORE: 46
ADLS_ACUITY_SCORE: 52
IADL_COMMENTS: FACILITY COMPLETES
ADLS_ACUITY_SCORE: 46
ADLS_ACUITY_SCORE: 48
ADLS_ACUITY_SCORE: 42
ADLS_ACUITY_SCORE: 59
ADLS_ACUITY_SCORE: 46
ADLS_ACUITY_SCORE: 52
ADLS_ACUITY_SCORE: 51
ADLS_ACUITY_SCORE: 59
ADLS_ACUITY_SCORE: 46
ADLS_ACUITY_SCORE: 56
ADLS_ACUITY_SCORE: 48
ADLS_ACUITY_SCORE: 52
ADLS_ACUITY_SCORE: 48
ADLS_ACUITY_SCORE: 51
ADLS_ACUITY_SCORE: 42
ADLS_ACUITY_SCORE: 46

## 2024-01-09 PROBLEM — A41.9 SEPSIS, DUE TO UNSPECIFIED ORGANISM, UNSPECIFIED WHETHER ACUTE ORGAN DYSFUNCTION PRESENT (H): Status: ACTIVE | Noted: 2024-01-01

## 2024-01-09 PROBLEM — S22.060A CLOSED WEDGE COMPRESSION FRACTURE OF T7 VERTEBRA, INITIAL ENCOUNTER (H): Status: ACTIVE | Noted: 2024-01-01

## 2024-01-09 PROBLEM — J18.9 PNEUMONIA OF RIGHT LOWER LOBE DUE TO INFECTIOUS ORGANISM: Status: ACTIVE | Noted: 2024-01-01

## 2024-01-09 NOTE — ED PROVIDER NOTES
"  History     Chief Complaint:  Generalized Weakness     The history is provided by the patient.      Yolanda Watson is a 83 year old female with a history of lung cancer, breast cancer in remission, colon cancer in remission, atrial fibrillation, hyperlipidemia, and hypertension BIBA to the emergency department for evaluation of generalized weakness. Patient has a current pneumonia diagnosis. EMS reports this morning the patient exhibited increased weakness when on the way to the bathroom. Her nursing home states it usually takes one person to help walk her to the bathroom, but this morning it took 3 people to help walk her to the bathroom. The patient reports she felt weak from the waist down, dizzy, and nauseous. No known falls or loss of consciousness. She was given Zofran by EMS en route to the emergency department. EMS found atrial fibrillation on 12-lead, with a heart rate ranging from the 70s to 140s. Patient denies history of atrial fibrillation. The patient reports pain in her back with pain being more prominent on left side than the right side. She notes no pain when laying down, but when sitting up she feels pain in her lower lungs and back. She notes pain with deep breaths as well as difficulty breathing with ambulation, which she attributes to pneumonia. She denies pain in legs or calves and leg swelling. Denies history of blood clots. She notes her onset of symptoms was approximately one week ago but also notes she \"feels nothing new\" and is \"always in pain.\" Patient reports she has undergone radiation therapy for lung cancer, and believes it is in remission. This was somewhat unclear due to pneumonia on most recent imaging.    Independent Historian:   None - Patient Only    Review of External Notes:   1025 I reviewed the patient's oncology note from 12/28/2023     Medications:    Lipitor  Nexium  Lisinopril   Metoprolol   Paxil  Arimidex    Past Medical History:    Acoustic neuroma  Depression "   Anxiety   Arthritis   Breast cancer   Cardiomyopathy   Cervical cancer   Colon  Diabetes mellitus   Lung cancer   GERD  Hyperlipidemia   Hypertension   Obesity    Past Surgical History:    Acoustic neuroma excision  Appendectomy   Right knee arthroscopy   Biopsy node sentinel  Breast biopsy   Cataract surgery   Cholecystectomy   Colectomy  Esophagogastroduodenoscopy   Excise node mediastinal  Hysterectomy   Lung lobectomy  Breast lumpectomy  Mastectomy  Bladder sling  Resect rib  Tonsillectomy   Reconstruct total shoulder implant    Physical Exam   Patient Vitals for the past 24 hrs:   BP Temp Temp src Pulse Resp SpO2   01/09/24 1521 -- -- -- 97 (!) 8 99 %   01/09/24 1520 -- -- -- 109 (!) 9 99 %   01/09/24 1444 -- -- -- 96 10 99 %   01/09/24 1429 134/86 -- -- 98 -- --   01/09/24 1404 -- -- -- 102 (!) 8 99 %   01/09/24 1400 (!) 138/90 -- -- 97 -- --   01/09/24 1343 -- -- -- (!) 124 10 99 %   01/09/24 1340 -- -- -- 106 14 98 %   01/09/24 1226 (!) 146/98 -- -- (!) 125 (!) 8 90 %   01/09/24 1202 (!) 145/101 -- -- 114 10 93 %   01/09/24 1154 -- -- -- 114 16 91 %   01/09/24 1125 (!) 143/94 -- -- 116 13 95 %   01/09/24 1110 -- -- -- 100 16 93 %   01/09/24 1055 114/86 -- -- (!) 125 18 92 %   01/09/24 1040 119/85 -- -- (!) 128 15 93 %   01/09/24 1033 -- -- -- 106 14 94 %   01/09/24 1031 (!) 138/94 98.3  F (36.8  C) Oral (!) 131 -- --        Physical Exam  General: Laying on the ED bed  HEENT: Normocephalic, atraumatic  Cardiac: Radial pulses 2+, irregular tachycardia  Pulm: Breathing comfortably, no accessory muscle usage, no conversational dyspnea, and lungs clear bilaterally  GI: Abdomen soft, nontender, no rigidity or guarding  MSK: No bony deformities  Skin: Warm and dry  Neuro: Moves all extremities  Psych: Pleasant mood and affect    Emergency Department Course   ECG  ECG taken at 1116, ECG read at 1141  Sinus tachycardia with premature atrial complexes with aberrant conduction, first-degree AV block  Increased PAC  burden and first-degree AV block new as compared to prior, dated 5/5/23.  Rate 109 bpm. MI interval 202 ms. QRS duration 92 ms. QT/QTc 342/460 ms. P-R-T axes 73 -50 57.     Imaging:  CT Chest (PE) Abdomen Pelvis w Contrast   Final Result   IMPRESSION:   1.  No pulmonary emboli. Enlarged main pulmonary artery consistent   with pulmonary arterial hypertension.   2.  Worsening of patchy opacities in the lung bases bilaterally since   12/14/2023, likely representing worsening pneumonia. Stable small   right and trace left pleural effusion.   3.  New since 12/14/2023 is moderate to severe wedge compression   fracture T7. This may be acute or subacute. Please correlate with   tenderness and consider follow-up thoracic spine MRI.   4.  A punctate focus of gas in the urinary bladder lumen may be   related to infection or instrumentation. Please correlate with   urinalysis. Otherwise, no acute findings in the abdomen and pelvis.   5.  A stable 1.7 cm hyperdense lesion in the upper pole of the right   kidney, may be a cyst containing hemorrhagic or proteinaceous   material. Consider a follow-up renal MRI on a nonemergent basis for   further evaluation.      MALVIN VELEZ MD            SYSTEM ID:  [id number]      MR Thoracic Spine w/o & w Contrast    (Results Pending)          Laboratory:  Labs Ordered and Resulted from Time of ED Arrival to Time of ED Departure   COMPREHENSIVE METABOLIC PANEL - Abnormal       Result Value    Sodium 141      Potassium 4.1      Carbon Dioxide (CO2) 28      Anion Gap 11      Urea Nitrogen 17.1      Creatinine 0.91      GFR Estimate 62      Calcium 9.0      Chloride 102      Glucose 156 (*)     Alkaline Phosphatase 96      AST 14      ALT 8      Protein Total 6.4      Albumin 3.6      Bilirubin Total 0.9     D DIMER QUANTITATIVE - Abnormal    D-Dimer Quantitative 0.66 (*)    NT PROBNP INPATIENT - Abnormal    N terminal Pro BNP Inpatient 5,117 (*)    TROPONIN T, HIGH SENSITIVITY - Abnormal     Troponin T, High Sensitivity 20 (*)    MAGNESIUM - Abnormal    Magnesium 1.4 (*)    ROUTINE UA WITH MICROSCOPIC REFLEX TO CULTURE - Abnormal    Color Urine Straw      Appearance Urine Clear      Glucose Urine Negative      Bilirubin Urine Negative      Ketones Urine Negative      Specific Gravity Urine 1.018      Blood Urine Negative      pH Urine 7.0      Protein Albumin Urine Negative      Urobilinogen Urine Normal      Nitrite Urine Negative      Leukocyte Esterase Urine Trace (*)     RBC Urine <1      WBC Urine 4      Squamous Epithelials Urine 2 (*)    CBC WITH PLATELETS AND DIFFERENTIAL - Abnormal    WBC Count 9.5      RBC Count 3.39 (*)     Hemoglobin 12.0      Hematocrit 37.0       (*)     MCH 35.4 (*)     MCHC 32.4      RDW 18.0 (*)     Platelet Count 434      % Neutrophils 62      % Lymphocytes 23      % Monocytes 12      % Eosinophils 0      % Basophils 1      % Immature Granulocytes 2      NRBCs per 100 WBC 1 (*)     Absolute Neutrophils 6.0      Absolute Lymphocytes 2.2      Absolute Monocytes 1.2      Absolute Eosinophils 0.0      Absolute Basophils 0.1      Absolute Immature Granulocytes 0.2      Absolute NRBCs 0.1     ISTAT GASES LACTATE VENOUS POCT - Abnormal    Lactic Acid POCT 4.1 (*)     Bicarbonate Venous POCT 28      O2 Sat, Venous POCT 41 (*)     pCO2 Venous POCT 46      pH Venous POCT 7.39      pO2 Venous POCT 24 (*)    LACTIC ACID WHOLE BLOOD - Normal    Lactic Acid 1.8     INFLUENZA A/B, RSV, & SARS-COV2 PCR   BLOOD CULTURE   BLOOD CULTURE        Procedures   None    Emergency Department Course & Assessments:             Interventions:  Medications   sodium chloride (PF) 0.9% PF flush 3 mL (has no administration in time range)   sodium chloride (PF) 0.9% PF flush 3 mL (3 mLs Intracatheter $Given 1/9/24 1203)   ondansetron (ZOFRAN) injection 4 mg (4 mg Intravenous $Given 1/9/24 1319)   magnesium sulfate 2 g in 50 mL sterile water intermittent infusion (has no administration in time  range)   sodium chloride 0.9% BOLUS 1,000 mL (0 mLs Intravenous Stopped 1/9/24 1203)   cefTRIAXone (ROCEPHIN) 2 g vial to attach to  ml bag for ADULTS or NS 50 ml bag for PEDS (0 g Intravenous Stopped 1/9/24 1241)   azithromycin (ZITHROMAX) 500 mg vial to attach to  mL bag (0 mg Intravenous Stopped 1/9/24 1459)   iopamidol (ISOVUE-370) solution 105 mL (105 mLs Intravenous $Given 1/9/24 1249)   Saline (98 mLs As instructed $Given 1/9/24 1249)   morphine (PF) injection 2 mg (2 mg Intravenous $Given 1/9/24 1318)        Assessments:  1040 I obtained history and examined the patient as noted above.  1500 reevaluation    Independent Interpretation (X-rays, CTs, rhythm strip):  None    Consultations/Discussion of Management or Tests:  1525 I discussed the patient with Dr. Juan, hospitalist, who accepts the patient for admission   1535 I discussed the patient with Senia Claros, neurosurgery, who recommends MRI of the thoracic spine with and without contrast given the patient's cancer history    Social Determinants of Health affecting care:   None    Disposition:  The patient was admitted to the hospital under the care of Dr. Juan.     Impression & Plan    CMS Diagnoses: The patient has signs of Septic Shock  The patient has signs of septic shock as evidenced by:  1. Presence of Sepsis, AND  2. Lactic Acidosis with value greater than or equal to 4    Time septic shock diagnosis confirmed = 1102  01/09/24   as this was the time when Lactate was resulted and the level was greater than or equal to 4    3 Hour Septic Shock Bundle Completion:  1. Initial Lactic Acid Result:   Recent Labs   Lab Test 01/09/24  1324 01/09/24  1048 07/19/21  0336   LACT 1.8 4.1* 2.1*     2. Blood Cultures before Antibiotics: Yes  3. Broad Spectrum Antibiotics Administered:  yes       Anti-infectives (From admission through now)      Start     Dose/Rate Route Frequency Ordered Stop    01/09/24 1105  cefTRIAXone (ROCEPHIN) 2 g vial to  attach to  ml bag for ADULTS or NS 50 ml bag for PEDS         2 g  over 30 Minutes Intravenous ONCE 24 1101 24 1241    24 1105  azithromycin (ZITHROMAX) 500 mg vial to attach to  mL bag         500 mg  over 1 Hours Intravenous ONCE 24 1101 24 1459            4. IF 30 mL/kg bolus criteria met based on:  -Lactate > 4  OR  -Initial Hypotension:  Definition:  2 low BP readings (SBP <90, MAP <65, or decrease > 40 from baseline due to infection) within 3 hrs of each other during the time period of 6 hrs before and 3 hrs  after time zero  THEN: Fluid volume administered in ED:  Full 30 mL/kg bolus intentionally NOT administered to this patient due to concern for hypervolemia. The target volume to infuse in this patient is 1000.    BMI Readings from Last 1 Encounters:   23 31.01 kg/m      30 mL/kg fluids based on weight: Patient actual weight not available.  30 mL/kg fluids based on IBW (must be >= 60 inches tall): Patient ideal weight not available.    Septic Shock reassessment:  1. Repeat Lactic Acid Level: Pending  2. MAP>65 after initial IVF bolus, will continue to monitor fluid status and vital signs    I attest to having performed a repeat sepsis exam and assessment of perfusion at 1445 and the results demonstrate improved perfusion.            Medical Decision Makin-year-old female presents with generalized weakness and ongoing flank pain as above. Flank pain seems likely to be related to the patient's recently diagnosed pleural effusion. Also considered the possibility of PE given the pleuritic nature and CABRERA, as well as abdominal process such as kidney stone or pyelonephritis given the location over the flank. The patient is in atrial fibrillation with RVR on my initial evaluation which she does not evidently have a prior diagnosis of. She is not on anticoagulation as far as I can tell. CT today shows worsening pneumonia and no evidence of PE and no acute  process in the abdomen.  Urinalysis is not consistent with UTI, despite gas visualized in the bladder on CT.  CT does show a new T7 fracture which corresponds with the patient's tenderness on exam.  Neurosurgery recommends MRI of the thoracic spine with and without contrast due to the patient's cancer history.  MRI of the thoracic spine was ordered and pending at the time of admission.  Lactate is 4, consistent with sepsis from likely pulmonary source. Patient was given a bolus of fluids with good response and her heart rate.  She does not seem clinically fluid overloaded at this time.  Plan is admission to the hospitalist for further care.       Diagnosis:    ICD-10-CM    1. Pneumonia of right lower lobe due to infectious organism  J18.9       2. Sepsis, due to unspecified organism, unspecified whether acute organ dysfunction present (H)  A41.9       3. Closed wedge compression fracture of T7 vertebra, initial encounter (H)  S22.060A               Scribe Disclosure:  Bebe GONZALEZ, am serving as a scribe  for Rizwan Barnes at 11:09 AM on 1/9/2024 to document services personally performed by Israel Dickens MD based on my observations and the provider's statements to me.    Scribe Disclosure:  Rizwan GONZALEZ, am serving as a scribe at 1:25 PM on 1/9/2024 to document services personally performed by Israel Dickens MD based on my observations and the provider's statements to me.   1/9/2024   Israel Dickens MD King, Colin, MD  01/09/24 [id number]

## 2024-01-09 NOTE — ED NOTES
Bed: FT01  Expected date:   Expected time:   Means of arrival:   Comments:  Kay - 83 F tachycardia eta 1021

## 2024-01-09 NOTE — ED NOTES
[facility]  ED Nurse Handoff Report    ED Chief complaint: Generalized Weakness      ED Diagnosis:   Final diagnoses:   Pneumonia of right lower lobe due to infectious organism   Sepsis, due to unspecified organism, unspecified whether acute organ dysfunction present (H)   Closed wedge compression fracture of T7 vertebra, initial encounter (H)       Code Status: Full Code    Allergies:   Allergies   Allergen Reactions    Demerol [Meperidine] Nausea and Vomiting    Sulfa Antibiotics Other (See Comments)     Extreme chills    Penicillins Rash     Swelling In mouth and tongue       Patient Story: comes from [facility]CU; she has been c/o flank/lower back  pain.   Focused Assessment:  Ct shows pneumonia and a T7 fx    Treatments and/or interventions provided: abx for pneumonia and pain meds for back   Patient's response to treatments and/or interventions: fair    To be done/followed up on inpatient unit:  ortho and hem/onc consult    Does this patient have any cognitive concerns?: Forgetful; [place]    Activity level - Baseline/Home:  Walker and Wheelchair  Activity Level - Current:   Unknown; pt reports feeling weak today and probably can not walk     Patient's Preferred language: English   Needed?: No    Isolation: None  Infection: Not Applicable  Patient tested for COVID 19 prior to admission: YES  Bariatric?: No    Vital Signs:   Vitals:    01/09/24 1444 01/09/24 1520 01/09/24 1521 01/09/24 1701   BP:       Pulse: 96 109 97 110   Resp: 10 (!) 9 (!) 8 16   Temp:       TempSrc:       SpO2: 99% 99% 99% 99%       Cardiac Rhythm:Cardiac Rhythm: Atrial fibrillation    Was the PSS-3 completed:   Yes  What interventions are required if any?               Family Comments: daughter, POA at bedside; would appreciate updates   OBS brochure/video discussed/provided to patient/family: No              Name of person given brochure if not patient:               Relationship to patient:     For the majority of the  shift this patient's behavior was Green.   Behavioral interventions performed were .    ED NURSE PHONE NUMBER: *[phone]

## 2024-01-09 NOTE — ED TRIAGE NOTES
Generalized weakness - dizzy lightheaded - pt awoke this morning feeling fine - hour later went to bathroom for bowel movement became dizzy lightheaded - complaining of abd pain back pain   Pt was seen here recently for back pain -  pt reports she was dx with pneumonia   EMS called found pt in atrial fib HR 's 's        Triage Assessment (Adult)       Row Name 01/09/24 1035          Triage Assessment    Airway WDL WDL        Respiratory WDL    Respiratory WDL WDL        Cardiac WDL    Cardiac WDL X     Cardiac Rhythm Atrial fibrillation        Cognitive/Neuro/Behavioral WDL    Cognitive/Neuro/Behavioral WDL WDL

## 2024-01-09 NOTE — CONSULTS
[facility]    Neurosurgery Consultation     Date of Admission:  1/9/2024  Date of Consult (When I saw the patient): 01/09/24    Assessment & Plan   Yolanda Watson is a 83 year old female who was admitted on 1/9/2024 with generalized weakness, back pain, and pneumonia. Neurosurgery was consulted for T7 compression fracture, likely acute or subacute.     - Thoracic spine MRI ordered for further evaluation   - Continue pain control measures as needed  - Further recommendations pending MRI results  - Admission through Hospitalist for medical management   - Appreciate assistance from specialties   .   I have discussed the following assessment and plan with Dr. Whaley.     Senia Jones, [facility] Neurosurgery  Pager [phone]    Code Status    Prior    Reason for Consult   I was asked by Dr. Juan to evaluate this patient for compression fracture.    Primary Care Physician   [facility]    Chief Complaint   Back pain     History is obtained from the patient, electronic health record, emergency department physician, and patient's family    History of Present Illness   Yolanda Watson is a 83 year old female with history of breast cancer, lung cancer, colon cancer, afib, and HTN who was admitted with back pain, generalized weakness, and pneumonia. Patient lives at a nursing home. Per chart review, nursing staff noted patient had difficulty walking to the bathroom today due to generalized weakness. Patient was brought to the ED via EMS. Imaging showed a T7 compression fracture, likely acute or subacute. Neurosurgery was consulted. Today, patient reports back pain developed around Samuel. Denies any falls or injuries. She describes middle back pain that radiates to left sided ribs. Denies leg pain or numbness. She reports chronic weakness in legs x 2 years, ambulates with walker at baseline. She also notes chronic bowel issues. Denies any new bowel or bladder  "changes, incontinence, or saddle anesthesia.     Past Medical History   I have reviewed this patient's medical history and updated it with pertinent information if needed.   Past Medical History:   Diagnosis Date    Acoustic neuroma (H)     Acute arthropathy     lower leg    Acute depression     Anemia     iron deficeincy    Anemia     Anxiety     Arthritis     osteoarthrosis of knee    Arthritis     Breast cancer (H)     left breast    Breast cancer (H)     Breast cancer, left breast (H) 02/2018    Breast cancer, right breast (H) 01/2016    Cardiomyopathy (H)     Cervical cancer (H)     Colon cancer (H)     Colorectal cancer (H)     Decreased cardiac ejection fraction     Depression     Depressive disorder     Diabetes mellitus (H)     pre-diabetic no longer taking Metformin    Diabetes mellitus (H)     pre\"denies  A1C , 6 for couple years\"    Dyspnea     Dyspnea     Gastro-oesophageal reflux disease     H/O: lung cancer 2013    Herpes zoster     History of blood transfusion     HTN (hypertension)     Hyperlipidemia     Hyperlipidemia     Hypertension     Iron deficiency anemia     Lung cancer (H)     squamous cell carcinoma    Lung nodule 2013    Lung cancer    Nausea & vomiting     Nausea and vomiting 08/12/2020    OAB (overactive bladder)     Obese     Osteoarthritis of knee     Pain in female pelvis     Pre-diabetes     Preop general physical exam     Rib lesion     Shoulder pain, acute     right    Tachycardia     Torn meniscus        Past Surgical History   I have reviewed this patient's surgical history and updated it with pertinent information if needed.  Past Surgical History:   Procedure Laterality Date    acoustic neuroma excised      APPENDECTOMY  1966    done with [facility]    ARTHROSCOPY KNEE  2012    right knee scoped    BIOPSY NODE SENTINEL Right 2/3/2016    Procedure: BIOPSY NODE SENTINEL;  Surgeon: Flory Pinto MD;  Location: [facility]    BIOPSY NODE SENTINEL Left 3/5/2018    Procedure: BIOPSY NODE " SENTINEL;;  Surgeon: Denae Perez MD;  [facility]    BREAST BIOPSY, RT/LT      cataracts      cataracts      CERVICAL CANCER SCREENING RESULTS DOCUMENTED AND REVIEWED      CHOLECYSTECTOMY  1991    CHOLECYSTECTOMY      CL AFF SURGICAL PATHOLOGY      CL AFF SURGICAL PATHOLOGY      acoustic neuroma left side excised [facility] [address] Longoria 6/17/14    COLON SURGERY      COLONOSCOPY      COLONOSCOPY N/A 5/4/2022    Procedure: COLONOSCOPY, WITH POLYPECTOMY AND BIOPSY with RANDOM COLON BIOPSIES BY COLD BIOPSY FORCEPS;  Surgeon: Blake Longoria MD;  Location: [facility]    CRANIECTOMY FOR EXCISION OF ACOUSTIC NEUROMA      ESOPHAGOSCOPY, GASTROSCOPY, DUODENOSCOPY (EGD), COMBINED N/A 5/4/2022    Procedure: ESOPHAGOGASTRODUODENOSCOPY, WITH BIOPSY with duodenum and gasric biopsies by cold biopsy forceps;  Surgeon: Blake Longoria MD;  Location:  GI    EXCISE NODE MEDIASTINAL  3/12/2013    Procedure: EXCISE NODE MEDIASTINAL;;  Surgeon: Lorne Arenas MD;  Location:  OR    EYE SURGERY      fiona cataracts    GENITOURINARY SURGERY      bladder sling    GYN SURGERY  1966    hysterectomy for cervical cancer *ovaries remain    HYSTERECTOMY  1966    Cervical CA, paps done every other year, done with appendectomy    HYSTERECTOMY      INSERT PORT VASCULAR ACCESS Left 3/5/2018    Procedure: INSERT PORT VASCULAR ACCESS;;  Surgeon: Denae Perez MD;  Location: [facility]    INSERT PORT VASCULAR ACCESS N/A 3/5/2018    Procedure: INSERT PORT VASCULAR ACCESS;;  Surgeon: Denae Perez MD;  Location: [facility]    LAPAROSCOPIC ASSISTED COLECTOMY Right 10/27/2016    Procedure: LAPAROSCOPIC ASSISTED COLECTOMY;  Surgeon: Umang Arteaga MD;  Location:  OR    LOBECTOMY LUNG  3/12/2013    Procedure: LOBECTOMY LUNG;;  Surgeon: Lorne Arenas MD;  Location:  OR    LUMPECTOMY BREAST      LUMPECTOMY BREAST WITH SEED LOCALIZATION Right 2/3/2016    Procedure: LUMPECTOMY BREAST WITH SEED LOCALIZATION;  Surgeon: Flory Pinto  MD;  [facility]    LUMPECTOMY BREAST WITH SEED LOCALIZATION Left 3/5/2018    Procedure: LUMPECTOMY BREAST WITH SEED LOCALIZATION;  SEED LOCALIZED LEFT BREAST LUMPECTOMY, WITH LEFT SENTINEL NODE BIOPSY, PORT PLACEMENT;  Surgeon: Denae Perez MD;  Location:  [facility]    MASTECTOMY Left     MASTECTOMY SIMPLE Left 3/28/2018    Procedure: MASTECTOMY SIMPLE;  LEFT BREAST MASTECTOMY ;  Surgeon: Denae Perez MD;  Location:  [facility]    MINI ARC SLING OPERATION FOR STRESS INCONTINENCE  2009    OTHER SURGICAL HISTORY      right middle and lower lobectomy with mediatinal lymph node disection    OTHER SURGICAL HISTORY      left lower lobe wedge resection    REMOVE PORT VASCULAR ACCESS N/A 2/13/2019    Procedure: REMOVE PORT VASCULAR ACCESS;  Surgeon: Denae Perez MD;  Location:  OR    RESECT RIB  2/15/2013    Procedure: RESECT RIB;  RESECTION PORTION RIGHT TWELVETH RIB;  Surgeon: Lorne Arenas MD;  Location:  OR    THORACOSCOPIC WEDGE RESECTION LUNG Left 7/26/2019    Procedure: LEFT VIDEO ASSISTED THORACIC SURGERY; POSSIBLE LEFT THORACOTOMY; TWO WEDGE RESECTIONS OF LEFT LOWER LUNG NODULE;  Surgeon: Lorne Arenas MD;  Location:  OR    THORACOTOMY  3/12/2013    Procedure: THORACOTOMY;  RIGHT THORACOTOMY, RIGHT MIDDLE AND LOWER LUNG LOBECTOMY, MEDIASTINAL LYMPH NODE DISSECTION ;  Surgeon: Lorne Arenas MD;  Location:  OR    THORACOTOMY      TONSILLECTOMY      ZZC RECONSTR TOTAL SHOULDER IMPLANT Right 3/16/2020    Procedure: REVERSE TOTAL SHOULDER ARTHROPLASTY;  Surgeon: Zuhair Fuentes MD;  Location: [facility] OR;  Service: Orthopedics       Prior to Admission Medications   Prior to Admission Medications   Prescriptions Last Dose Informant Patient Reported? Taking?   PARoxetine (PAXIL) 40 MG tablet  Self Yes No   Sig: Take 40 mg by mouth At Bedtime    acetaminophen (TYLENOL) 325 MG tablet   Yes No   Sig: Take 325-650 mg by mouth every 6 hours as needed for mild pain    anastrozole (ARIMIDEX) 1 MG tablet  Self Yes No   Sig: Take 1 mg by mouth At Bedtime    atorvastatin (LIPITOR) 20 MG tablet  Self Yes No   Sig: Take 20 mg by mouth At Bedtime    calcium carbonate (OS-PRIYA) 1500 (600 Ca) MG tablet   Yes No   Sig: Take 600 mg by mouth 2 times daily (with meals)   ciprofloxacin (CIPRO) 250 MG tablet   Yes No   Sig: Take 250 mg by mouth 2 times daily Last dose today   esomeprazole (NEXIUM) 40 MG DR capsule   Yes No   Sig: Take 40 mg by mouth every morning (before breakfast) Take 30-60 minutes before eating.   lisinopril (ZESTRIL) 10 MG tablet   No No   Sig: Take 1 tablet (10 mg) by mouth daily   loperamide (IMODIUM) 2 MG capsule  Self Yes No   Sig: Take 2 mg by mouth 4 times daily as needed for diarrhea   metoprolol succinate ER (TOPROL-XL) 50 MG 24 hr tablet   No No   Sig: Take 1 tablet (50 mg) by mouth daily   ondansetron (ZOFRAN ODT) 4 MG ODT tab   No No   Sig: Take 1 tablet (4 mg) by mouth every 8 hours as needed for nausea   ondansetron (ZOFRAN-ODT) 4 MG ODT tab   Yes No   Sig: Take 4 mg by mouth every 8 hours as needed for nausea      Facility-Administered Medications: None     Allergies   Allergies   Allergen Reactions    Demerol [Meperidine] Nausea and Vomiting    Sulfa Antibiotics Other (See Comments)     Extreme chills    Penicillins Rash     Swelling In mouth and tongue       Social History   I have reviewed this patient's social history and updated it with pertinent information if needed. Yolanda HURLEY Shirley  reports that she quit smoking about 25 years ago. Her smoking use included cigarettes. She started smoking about 70 years ago. She has a 45 pack-year smoking history. She has never used smokeless tobacco. She reports current alcohol use. She reports that she does not use drugs.    Family History   I have reviewed this patient's family history and updated it with pertinent information if needed.   Family History   Problem Relation Age of Onset    Breast Cancer Paternal  Grandmother     Colon Cancer Other         not specified    Fractures Other         Hip fracture, Aunt    Hyperlipidemia Mother     Hypertension Mother     Heart Disease Mother     Osteoporosis Mother     Hyperlipidemia Sister     Hyperlipidemia Brother     Prostate Cancer Brother     Hypertension Sister     Heart Disease Maternal Grandfather     Prostate Cancer Father     Colon Cancer Cousin         son of paternal aunt with breast cancer.    Breast Cancer Paternal Aunt     Colon Cancer Paternal Aunt     Other Cancer Paternal Uncle         Esophageal    Other Cancer Maternal Aunt        Review of Systems   10 point ROS negative other than symptoms noted in HPI.    Physical Exam   Vital signs with ranges:   Temp:  [98.3  F (36.8  C)] 98.3  F (36.8  C)  Pulse:  [] 110  Resp:  [8-18] 16  BP: (114-146)/() 134/86  SpO2:  [90 %-99 %] 99 %  0 lbs 0 oz    NEUROLOGICAL EXAMINATION:   Mental status:  Alert and Oriented x 3, speech is fluent.  Motor:  Generalized weakness but able to move all extremities equally   Shoulder Abduction  Right:  5/5   Left:  5/5  Biceps                      Right:  5/5   Left:  5/5  Triceps                     Right:  5/5   Left:  5/5  Wrist Extensors        Right:  5/5   Left:  5/5  Wrist Flexors            Right:  5/5   Left:  5/5  Intrinsics                   Right:  5/5   Left:  5/5    Iliopsoas  (hip flexion)               Right: 5/5  Left:  5/5  Quadriceps  (knee extension)       Right:  5/5  Left:  5/5  Hamstrings  (knee flexion)            Right:  5/5  Left:  5/5  Gastroc Soleus  (PF)                          Right:  5/5  Left:  5/5  Tibialis Ant  (DF)                          Right:  5/5  Left:  5/5  EHL                          Right:  5/5  Left:  5/5    Sensation:  Intact to light touch   Reflexes:   Negative Clonus.  Negative David's sign.     Tenderness to palpation of mid thoracic spine near T7.   Straight leg raise is negative bilaterally.     Data   CT CHEST PE  ABDOMEN PELVIS W CONTRAST 1/9/2024 1:10 PM                                           IMPRESSION:  1.  No pulmonary emboli. Enlarged main pulmonary artery consistent  with pulmonary arterial hypertension.  2.  Worsening of patchy opacities in the lung bases bilaterally since  12/14/2023, likely representing worsening pneumonia. Stable small  right and trace left pleural effusion.  3.  New since 12/14/2023 is moderate to severe wedge compression  fracture T7. This may be acute or subacute. Please correlate with  tenderness and consider follow-up thoracic spine MRI.  4.  A punctate focus of gas in the urinary bladder lumen may be  related to infection or instrumentation. Please correlate with  urinalysis. Otherwise, no acute findings in the abdomen and pelvis.  5.  A stable 1.7 cm hyperdense lesion in the upper pole of the right  kidney, may be a cyst containing hemorrhagic or proteinaceous  material. Consider a follow-up renal MRI on a nonemergent basis for  further evaluation.      CBC RESULTS:   Recent Labs   Lab Test 01/09/24  1046   WBC 9.5   RBC 3.39*   HGB 12.0   HCT 37.0   *   MCH 35.4*   MCHC 32.4   RDW 18.0*        Basic Metabolic Panel:  Lab Results   Component Value Date     01/09/2024     03/24/2021      Lab Results   Component Value Date    POTASSIUM 4.1 01/09/2024    POTASSIUM 3.7 07/20/2021    POTASSIUM 3.6 03/24/2021     Lab Results   Component Value Date    CHLORIDE 102 01/09/2024    CHLORIDE 110 07/20/2021    CHLORIDE 105 03/24/2021     Lab Results   Component Value Date    PRIYA 9.0 01/09/2024    PRIYA 8.2 03/24/2021     Lab Results   Component Value Date    CO2 28 01/09/2024    CO2 25 07/20/2021    CO2 27 03/24/2021     Lab Results   Component Value Date    BUN 17.1 01/09/2024    BUN 12 07/20/2021    BUN 18 03/24/2021     Lab Results   Component Value Date    CR 0.91 01/09/2024    CR 0.75 03/24/2021     Lab Results   Component Value Date     01/09/2024    GLC 97  07/20/2021    GLC 93 03/24/2021     INR:  Lab Results   Component Value Date    INR 0.95 07/10/2023    INR 0.97 08/11/2020    INR 0.95 07/26/2019    INR 1.07 11/01/2016    INR 0.97 10/31/2016    INR 0.93 03/17/2014    INR 0.96 04/24/2013    INR 0.90 03/12/2013    INR 0.87 02/15/2013

## 2024-01-09 NOTE — H&P
[facility]    History and Physical - Hospitalist Service       Date of Admission:  1/9/2024    Assessment & Plan      Yolanda Watson is a 83 year old female admitted on 1/9/2024. She istory of breast cancer, primary lung cancer, colon cancer coming to ER with bilteral weakness and back pain      # Pnuemonia   # Sepsis with lactic acidosis    # CT scan Worsening of patchy opacities in the lung bases bilaterally since  1, likely representing worsening pneumonia. Stable small  right and trace left pleural effusion.  -Continue azithromycin and ceftriaxone   -MRSA nares  -Urine Legionella and urine pneumonia  -Follow blood cultures  -UA wnl        # Lung cancer  -PET CT June 23, 2023 showed development of a spiculated FDG avid nodule in the left lower lobe measuring 1 cm suspicious for early primary lung neoplasm without mets no PET lymphadenopathy no finding concerning for distant metastatic disease  CT chest with contrast on 12/14/2023 showed patchyconsolidative opacity seen within the left lower lobe with findings suspicious for developing pneumonia.  Stable 1.1 cm left lower lobe lung nodule.  Slightly enlarging small to moderate right pleural effusion  CT scan 1/9/24     Worsening of patchy opacities in the lung bases bilaterally since  12/14/2023, likely representing worsening pneumonia. Stable small  right and trace left pleural effusion.  -Will consult  [place] [facility]  -Will also consult [facility] pulmonology      # History of Breast cancer   # History of colon cancer  In remission      # Back pain  # Wedge compression fracture T7  -No recent fall.  No urinary incontinence patient has chronic fecal incontinence and chronic bilateral weakness  -Neurosurgery consulted.  Appreciate recommendations  -Bedrest  Thoracic MRI ordered-  -pain management  -Will start patient on calcitonin spray for pain management      # Incidental Finding     A stable 1.7 cm hyperdense lesion in  the upper pole of the right  kidney, may be a cyst containing hemorrhagic or proteinaceous  material. Consider a follow-up renal MRI on a nonemergent basis for  further evaluation.  -Outpatient follow-up      # Hypomagnesmia  Mag replacmement        # Hypertension  Continue PTA metoprolol 50 mg tablet daily and also continue lisinopril 20 mg daily after med reconciliation     Diet:    DVT Prophylaxis: Pneumatic Compression Devices  Patricia Catheter: Not present  Lines: None     Cardiac Monitoring: None  Code Status:  DNR/DNI    Clinically Significant Risk Factors Present on Admission            # Hypomagnesemia: Lowest Mg = 1.4 mg/dL in last 2 days, will replace as needed       # Hypertension: Noted on problem list                 Disposition Plan           Cas Juan MD  Hospitalist Service  [facility]  Securely message with [email or web address] (more info)  Text page via [employer] Paging/Directory     ______________________________________________________________________    Chief Complaint   Back pain    History obtained from patient and daughter    History of Present Illness   Yolanda Watson is a 83 year old female who   History of breast cancer, primary lung cancer, colon cancer      Patient coming to the ER with bilateral weakness and back pain.  Patient has a history of lung cancer and she follows [facility].  Her CAT scan in December 19, 2023 showed a left-sided infiltrate.  She was initially not treated for pneumonia because of not having any respiratory symptoms or fever.     Patient developed worsening weakness and also started having shortness of breath with cough.  She was treated outpatient with oral antibiotics for pneumonia.  Patient also has been having increased back pain around December 26, 2023.  Patient was brought to the ER but left as patient did not want to wait to be seen.      Patient has chronic fecal incontinence.  No urinary incontinence.  Denies any fever or  "chills.  Has chronic runny nose.     Past Medical History    Past Medical History:   Diagnosis Date    Acoustic neuroma (H)     Acute arthropathy     lower leg    Acute depression     Anemia     iron deficeincy    Anemia     Anxiety     Arthritis     osteoarthrosis of knee    Arthritis     Breast cancer (H)     left breast    Breast cancer (H)     Breast cancer, left breast (H) 02/2018    Breast cancer, right breast (H) 01/2016    Cardiomyopathy (H)     Cervical cancer (H)     Colon cancer (H)     Colorectal cancer (H)     Decreased cardiac ejection fraction     Depression     Depressive disorder     Diabetes mellitus (H)     pre-diabetic no longer taking Metformin    Diabetes mellitus (H)     pre\"denies  A1C , 6 for couple years\"    Dyspnea     Dyspnea     Gastro-oesophageal reflux disease     H/O: lung cancer 2013    Herpes zoster     History of blood transfusion     HTN (hypertension)     Hyperlipidemia     Hyperlipidemia     Hypertension     Iron deficiency anemia     Lung cancer (H)     squamous cell carcinoma    Lung nodule 2013    Lung cancer    Nausea & vomiting     Nausea and vomiting 08/12/2020    OAB (overactive bladder)     Obese     Osteoarthritis of knee     Pain in female pelvis     Pre-diabetes     Preop general physical exam     Rib lesion     Shoulder pain, acute     right    Tachycardia     Torn meniscus        Past Surgical History   Past Surgical History:   Procedure Laterality Date    acoustic neuroma excised      APPENDECTOMY  1966    done with CORNELIO    ARTHROSCOPY KNEE  2012    right knee scoped    BIOPSY NODE SENTINEL Right 2/3/2016    Procedure: BIOPSY NODE SENTINEL;  Surgeon: Flory Pinto MD;  Location: [facility]    BIOPSY NODE SENTINEL Left 3/5/2018    Procedure: BIOPSY NODE SENTINEL;;  Surgeon: Denae Perez MD;  Location: [facility]    BREAST BIOPSY, RT/LT      cataracts      cataracts      CERVICAL CANCER SCREENING RESULTS DOCUMENTED AND REVIEWED      CHOLECYSTECTOMY  1991    " CHOLECYSTECTOMY      CL AFF SURGICAL PATHOLOGY      CL AFF SURGICAL PATHOLOGY      acoustic neuroma left side excised [facility] Dr. Longoria 6/17/14    COLON SURGERY      COLONOSCOPY      COLONOSCOPY N/A 5/4/2022    Procedure: COLONOSCOPY, WITH POLYPECTOMY AND BIOPSY with RANDOM COLON BIOPSIES BY COLD BIOPSY FORCEPS;  Surgeon: Blake Longoria MD;  Location:  GI    CRANIECTOMY FOR EXCISION OF ACOUSTIC NEUROMA      ESOPHAGOSCOPY, GASTROSCOPY, DUODENOSCOPY (EGD), COMBINED N/A 5/4/2022    Procedure: ESOPHAGOGASTRODUODENOSCOPY, WITH BIOPSY with duodenum and gasric biopsies by cold biopsy forceps;  Surgeon: Blake Longoria MD;  Location:  GI    EXCISE NODE MEDIASTINAL  3/12/2013    Procedure: EXCISE NODE MEDIASTINAL;;  Surgeon: Lrone Arenas MD;  Location:  OR    EYE SURGERY      fiona cataracts    GENITOURINARY SURGERY      bladder sling    GYN SURGERY  1966    hysterectomy for cervical cancer *ovaries remain    HYSTERECTOMY  1966    Cervical CA, paps done every other year, done with appendectomy    HYSTERECTOMY      INSERT PORT VASCULAR ACCESS Left 3/5/2018    Procedure: INSERT PORT VASCULAR ACCESS;;  Surgeon: Denae Perez MD;  Location: [facility]    INSERT PORT VASCULAR ACCESS N/A 3/5/2018    Procedure: INSERT PORT VASCULAR ACCESS;;  Surgeon: Denae Perez MD;  Location: [facility]    LAPAROSCOPIC ASSISTED COLECTOMY Right 10/27/2016    Procedure: LAPAROSCOPIC ASSISTED COLECTOMY;  Surgeon: Umang Arteaga MD;  Location:  OR    LOBECTOMY LUNG  3/12/2013    Procedure: LOBECTOMY LUNG;;  Surgeon: Lorne Arenas MD;  Location:  OR    LUMPECTOMY BREAST      LUMPECTOMY BREAST WITH SEED LOCALIZATION Right 2/3/2016    Procedure: LUMPECTOMY BREAST WITH SEED LOCALIZATION;  Surgeon: Flory Pinto MD;  Location:  [facility]    LUMPECTOMY BREAST WITH SEED LOCALIZATION Left 3/5/2018    Procedure: LUMPECTOMY BREAST WITH SEED LOCALIZATION;  SEED LOCALIZED LEFT BREAST LUMPECTOMY, WITH LEFT SENTINEL NODE BIOPSY,  PORT PLACEMENT;  Surgeon: Denae Perez MD;  Location:  [facility]    MASTECTOMY Left     MASTECTOMY SIMPLE Left 3/28/2018    Procedure: MASTECTOMY SIMPLE;  LEFT BREAST MASTECTOMY ;  Surgeon: Denae Perez MD;  Location: [facility]    MINI ARC SLING OPERATION FOR STRESS INCONTINENCE  2009    OTHER SURGICAL HISTORY      right middle and lower lobectomy with mediatinal lymph node disection    OTHER SURGICAL HISTORY      left lower lobe wedge resection    REMOVE PORT VASCULAR ACCESS N/A 2/13/2019    Procedure: REMOVE PORT VASCULAR ACCESS;  Surgeon: Denae Perez MD;  Location:  OR    RESECT RIB  2/15/2013    Procedure: RESECT RIB;  RESECTION PORTION RIGHT TWELVETH RIB;  Surgeon: Lorne Arenas MD;  Location:  OR    THORACOSCOPIC WEDGE RESECTION LUNG Left 7/26/2019    Procedure: LEFT VIDEO ASSISTED THORACIC SURGERY; POSSIBLE LEFT THORACOTOMY; TWO WEDGE RESECTIONS OF LEFT LOWER LUNG NODULE;  Surgeon: Lorne Arenas MD;  Location:  OR    THORACOTOMY  3/12/2013    Procedure: THORACOTOMY;  RIGHT THORACOTOMY, RIGHT MIDDLE AND LOWER LUNG LOBECTOMY, MEDIASTINAL LYMPH NODE DISSECTION ;  Surgeon: Lorne Arenas MD;  Location:  OR    THORACOTOMY      TONSILLECTOMY      ZZC RECONSTR TOTAL SHOULDER IMPLANT Right 3/16/2020    Procedure: REVERSE TOTAL SHOULDER ARTHROPLASTY;  Surgeon: Zuhair Fuentes MD;  Location: [facility];  Service: Orthopedics       Prior to Admission Medications   Prior to Admission Medications   Prescriptions Last Dose Informant Patient Reported? Taking?   PARoxetine (PAXIL) 40 MG tablet  Self Yes No   Sig: Take 40 mg by mouth At Bedtime    acetaminophen (TYLENOL) 325 MG tablet   Yes No   Sig: Take 325-650 mg by mouth every 6 hours as needed for mild pain   anastrozole (ARIMIDEX) 1 MG tablet  Self Yes No   Sig: Take 1 mg by mouth At Bedtime    atorvastatin (LIPITOR) 20 MG tablet  Self Yes No   Sig: Take 20 mg by mouth At Bedtime    calcium carbonate (OS-PRIYA) 1500  (600 Ca) MG tablet   Yes No   Sig: Take 600 mg by mouth 2 times daily (with meals)   ciprofloxacin (CIPRO) 250 MG tablet   Yes No   Sig: Take 250 mg by mouth 2 times daily Last dose today   esomeprazole (NEXIUM) 40 MG DR capsule   Yes No   Sig: Take 40 mg by mouth every morning (before breakfast) Take 30-60 minutes before eating.   lisinopril (ZESTRIL) 10 MG tablet   No No   Sig: Take 1 tablet (10 mg) by mouth daily   loperamide (IMODIUM) 2 MG capsule  Self Yes No   Sig: Take 2 mg by mouth 4 times daily as needed for diarrhea   metoprolol succinate ER (TOPROL-XL) 50 MG 24 hr tablet   No No   Sig: Take 1 tablet (50 mg) by mouth daily   ondansetron (ZOFRAN ODT) 4 MG ODT tab   No No   Sig: Take 1 tablet (4 mg) by mouth every 8 hours as needed for nausea   ondansetron (ZOFRAN-ODT) 4 MG ODT tab   Yes No   Sig: Take 4 mg by mouth every 8 hours as needed for nausea      Facility-Administered Medications: None           Physical Exam   Vital Signs: Temp: 98.3  F (36.8  C) Temp src: Oral BP: 134/86 Pulse: 97   Resp: (!) 8 SpO2: 99 % O2 Device: None (Room air)    Weight: 0 lbs 0 oz    Physical Exam  Cardiovascular:      Rate and Rhythm: Normal rate and regular rhythm.   Pulmonary:      Effort: Pulmonary effort is normal. No respiratory distress.   Abdominal:      General: There is no distension.      Palpations: Abdomen is soft.      Tenderness: There is no abdominal tenderness.   Neurological:      Mental Status: She is alert.          Medical Decision Making       80 MINUTES SPENT BY ME on the date of service doing chart review, history, exam, documentation & further activities per the note.      Data     I have personally reviewed the following data over the past 24 hrs:    9.5  \   12.0   / 434     141 102 17.1 /  156 (H)   4.1 28 0.91 \     ALT: 8 AST: 14 AP: 96 TBILI: 0.9   ALB: 3.6 TOT PROTEIN: 6.4 LIPASE: N/A     Trop: 20 (H) BNP: 5,117 (H)     Procal: N/A CRP: N/A Lactic Acid: 1.8       INR:  N/A PTT:  N/A   D-dimer:   0.66 (H) Fibrinogen:  N/A       Imaging results reviewed over the past 24 hrs:   Recent Results (from the past 24 hour(s))   CT Chest (PE) Abdomen Pelvis w Contrast    Narrative    CT CHEST PE ABDOMEN PELVIS W CONTRAST 1/9/2024 1:10 PM    CLINICAL HISTORY: Chest pain, CABRERA, recently diagnosed pneumonia, flank  pain  TECHNIQUE: CT angiogram chest and routine CT abdomen pelvis with IV  contrast. Arterial phase through the chest and venous phase through  the abdomen and pelvis. 2D and 3D MIP reconstructions were preformed  by the CT technologist. Dose reduction techniques were used.     CONTRAST: 105mL [id number]    COMPARISON: Chest CT 12/14/2023 and CT of the abdomen and pelvis  5/5/2023    FINDINGS:  ANGIOGRAM CHEST: No pulmonary emboli. Enlarged main pulmonary artery  consistent with pulmonary arterial hypertension. Valuation of the  thoracic aorta lumen is limited due to timing of the contrast bolus.  No CT evidence of right heart strain.     LUNGS AND PLEURA: Postoperative changes RIGHT middle and lower  lobectomy and left lower lobe wedge resections. Stable scattered  centrilobular emphysema and subpleural fibrosis in the lung bases.  Superimposed patchy opacities in the lung bases bilaterally have  worsened since 12/14/2023, likely due to worsening atypical pneumonia.  Stable small right and trace left pleural effusion.    MEDIASTINUM/AXILLAE: Left mastectomy. No lymphadenopathy. No thoracic  aortic aneurysm. Cardiomegaly. Severe coronary artery calcifications.  No pericardial effusion.    HEPATOBILIARY: No focal lesions in the liver. Cholecystectomy. Mild  intrahepatic and extrahepatic biliary ductal dilatation, likely due to  postcholecystectomy reservoir effect.    PANCREAS: Normal.    SPLEEN: Normal.    ADRENAL GLANDS: Normal.    KIDNEYS/BLADDER: An exophytic hyperdense lesion measuring 1.7 cm at  the upper pole of the right kidney (series 12, image 78) is stable,  likely a cyst containing hemorrhagic or  proteinaceous material. A few  other hypodense lesions in the kidneys, also likely cysts. No  hydronephrosis or perinephric stranding. No calculi. Punctate focus of  gas in the urinary bladder lumen.    BOWEL: Right hemicolectomy. Sigmoid diverticulosis. No small bowel or  colonic obstruction or inflammatory changes.    LYMPH NODES: No lymphadenopathy.    PELVIC ORGANS: Hysterectomy. No pelvic masses.    OTHER: No abdominal aortic aneurysm. No free fluid or fluid  collections. No free air.    MUSCULOSKELETAL: Right shoulder arthroplasty. Moderate to severe wedge  compression fracture of T7 is new since 12/14/2023, either acute or  subacute.      Impression    IMPRESSION:  1.  No pulmonary emboli. Enlarged main pulmonary artery consistent  with pulmonary arterial hypertension.  2.  Worsening of patchy opacities in the lung bases bilaterally since  12/14/2023, likely representing worsening pneumonia. Stable small  right and trace left pleural effusion.  3.  New since 12/14/2023 is moderate to severe wedge compression  fracture T7. This may be acute or subacute. Please correlate with  tenderness and consider follow-up thoracic spine MRI.  4.  A punctate focus of gas in the urinary bladder lumen may be  related to infection or instrumentation. Please correlate with  urinalysis. Otherwise, no acute findings in the abdomen and pelvis.  5.  A stable 1.7 cm hyperdense lesion in the upper pole of the right  kidney, may be a cyst containing hemorrhagic or proteinaceous  material. Consider a follow-up renal MRI on a nonemergent basis for  further evaluation.    MALVIN VELEZ MD         SYSTEM ID:  [id number]

## 2024-01-09 NOTE — PHARMACY-ADMISSION MEDICATION HISTORY
Pharmacist Admission Medication History    Admission medication history is complete. The information provided in this note is only as accurate as the sources available at the time of the update.    Information Source(s): Facility ([facility]/) medication list/MAR via in-person and phone. Medication list from [facility] ([phone]), called to verify last dose times    Pertinent Information:   -Facility could not verify if probiotic on hold or if receiving    Changes made to PTA medication list:  Added: vit D, methocarbamol, tramadol, Tums, APAP  Deleted: anastrozole, atorvastatin, cipro  Changed: Ca to Ca/D, esomeprazole to omeprazole, lisinopril 10mg to 20mg    Medication History Completed By: Evelyn Reddy [facility] 1/9/2024 5:55 PM      Prior to Admission medications    Medication Sig Last Dose Taking? Auth Provider Long Term End Date   acetaminophen (TYLENOL) 500 MG tablet Take 1,000 mg by mouth every 6 hours as needed for mild pain  Yes Unknown, Entered By History     aspirin 81 MG EC tablet Take 81 mg by mouth daily 1/9/2024 at 0800 Yes Unknown, Entered By History No    Calcium Carb-Cholecalciferol (CALCIUM 600+D) 600-20 MG-MCG TABS Take 1 tablet by mouth daily 1/9/2024 at 0800 Yes Unknown, Entered By History     calcium carbonate (TUMS) 500 MG chewable tablet Take 2 chew tab by mouth every hour as needed for heartburn Max 8 tab/day  Yes Unknown, Entered By History     lisinopril (ZESTRIL) 20 MG tablet Take 20 mg by mouth daily 1/9/2024 at 0800 Yes Unknown, Entered By History Yes    loperamide (IMODIUM) 2 MG capsule Take 2 mg by mouth 4 times daily as needed for diarrhea  Yes Reported, Patient     methocarbamol (ROBAXIN) 500 MG tablet Take 500 mg by mouth 2 times daily as needed for muscle spasms or other (back pain)  Yes Unknown, Entered By History     metoprolol succinate ER (TOPROL-XL) 50 MG 24 hr tablet Take 1 tablet (50 mg) by mouth daily 1/9/2024 at 0800 Yes Sulema Coleman PA-C Yes     omeprazole (PRILOSEC) 20 MG DR capsule Take 20 mg by mouth daily 1/9/2024 at 0800 Yes Unknown, Entered By History     ondansetron (ZOFRAN) 4 MG tablet Take 4 mg by mouth at bedtime  at 2000 Yes Unknown, Entered By History     ondansetron (ZOFRAN) 4 MG tablet Take 4 mg by mouth every 8 hours as needed for nausea  Yes Unknown, Entered By History     PARoxetine (PAXIL) 40 MG tablet Take 40 mg by mouth every morning 1/9/2024 at 0800 Yes Unknown, Entered By History     traMADol (ULTRAM) 25 mg TABS half-tab Take 25 mg by mouth daily as needed for moderate to severe pain  Yes Unknown, Entered By History     vitamin D3 (CHOLECALCIFEROL) 50 mcg (2000 units) tablet Take 1 tablet by mouth daily 1/9/2024 at 0800 Yes Unknown, Entered By History     Probiotic Product (PROBIOTIC DAILY PO) Take 1 packet by mouth daily Nutrilite Probiotic packet Unsure at Facility could not confirm if taking or on hold  Unknown, Entered By History

## 2024-01-10 NOTE — CONSULTS
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  --completed adjuvant chemotherapy with navelbine and cisplatin  --On 7/26/19 she underwent a wedge resection of the left lower lobe lung nodule with pathology revealing a second primary 1.8 cm squamous cell cancer, stage [id number] or stage 1.  --Presented to [facility] ER on 5/5/2023 for evaluation of nausea and vomiting.  CT abdomen and pelvis in the ER on 5/5/2023 showed a new 1.1 cm left lower lobe nodule that raised a concern given her prior history of lung, breast and colon cancers.  --PET/CT 6/23/2023 showed development of a spiculated FDG avid nodule in the left lower lobe measuring 1 cm (max SUV 3.2) suspicious for early primary lung neoplasm without metastasis.  No findings concerning for distant metastatic disease.  No PET lymphadenopathy.  --CT-guided biopsy of left lower lobe lung on 7/10/2023 showed moderate to poorly differentiated squamous cell cancer.  IHC supported the diagnosis showing positive reactivity for p40 and cytokeratin 5.  --MRI brain 7/18/2023 showed no findings concerning for CNS metastatic disease  --7/17/2023 met with Dr Arenas .  Surgical resection was not favored.  Referred for consideration for SBRT   --completed SBRT to the left lower lobe lung mass that was treated to a dose of 5200 cGy over 4 treatment fractions from 8/11/2023 to 8/18/2023 under the direction of Dr. Olivo  --CT chest with contrast on 12/14/2023 showed patchy consolidative opacity seen within the left lower lobe with findings suspicious for developing pneumonia.  Stable 1.1 cm left lower lobe lung nodule.  Slightly enlarging small to moderate right pleural  effusion.    --CT chest, abdomen, pelvis with contrast on 1/9/2024 worsening patchy opacities in the lung bases bilaterally since 12/14/2023 likely representing worsening pneumonia.  New since 12/14/2023 was wedge compression fracture of T7.  Stable 1.7 cm right upper pole hypodense kidney lesion felt to be likely a cyst - likely continued follow up with serial imaging which will be obtained for her lung cancer anyway  --noted plan for bronchoscopy and BAL tomorrow  --will continue to follow her with you     T7 compression fracture  Back pain  --pain control   --MRI thoracic spine pending   --has h/o osteopenia   --noted neurosurgery input     Prior h/o breast and colon cancers  --has had genetic work up which revealed BRCA2 mutation  --continued outpatient follow up     Thank you for the consult.  We will continue to follow her with you.    Deepak Borrero MD         Chief Complaint:   Generalized Weakness           History of Present Illness:   Yolanda Galarza is an 83-year-old lady who presents to the hospital with bilateral weakness and back pain.    She has had CT chest, abdomen, pelvis with contrast on 1/9/2024 worsening patchy opacities in the lung bases bilaterally since 12/14/2023 likely representing worsening pneumonia.  New since 12/14/2023 was wedge compression fracture of T7.  Stable 1.7 cm right upper pole hypodense kidney lesion felt to be likely a cyst.    She was admitted for further management of her pneumonia.  Currently on Rocephin and azithromycin.  Being scheduled for bronchoscopy tomorrow.    She has had a history of recurrent lung cancers, recurrent breast cancers along with history of colon cancer.    I have copy/pasted her HPI below:  Yolanda Watson is a postmenopausal woman with a T3N1, stage IIIA squamous cell carcinoma of the lung resected in March 2013. In January 2016 she was also diagnosed with a stage 1A ER/SD positive her 2 negative upper outer quadrant right breast cancer. In October  2016 she completed a colectomy for a T3N0 stage IIA colon cancer. In February 2018  she developed a second primary her 2 positive estrogen receptor positive left breast cancer. In July 2019 she underwent resection of a [id number] stage 1 second primary squamous cell lung cancer.    In 2013 she was found to have an incidental right lower lobe pulmonary mass. A PET/CT scan showed no evidence of distant disease and, therefore, the patient was taken to the operating room, and on March 12, 2013, underwent thoracotomy, right middle and lower lobe lobectomy with mediastinal lymph node dissection.    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    She opted for adjuvant navelbine and cisplatin chemotherapy, and required dose reductions and delays due to neutropenia. She eventually required discontinuation of cisplatin due to renal insufficiency, but completed all adjuvant chemotherapy in August 2013.    In January 2016 routine mammography revealed right upper outer quadrant abnormality. After further evaluation, she then underwent a --breast lumpectomy and sentinel node biopsy. The final pathology revealed a 8 mm [id number] stage 1A invasive ductal carcinoma that is strongly estrogen and progesterone receptor positive and her 2 negative by FISH.    She is continuing  adjuvant hormonal therapy with anastrazole, without breast radiation. She did hold the anastrazole for a couple months due to loose stools and arthralgias, but her symptoms did not change off the anastrazole and she has since resumed it.    In October 2016 she developed iron deficiency anemia and a colonoscopy revealed a transverse colon adenocarcinoma. The tumor is microsatellite unstable, and additional testing confirmed a BRAF mutation, which is acquired and sporadic, but not related to Verduzco syndrome.    She underwent a colectomy in October 2016 with the final pathology confirming a T3N0 stage IIA colon  cancer, without other high risk features.    Genetic testing has revealed a BRCA2 and TSC1 variant both of uncertain significance.    A CT scan of the chest, abdomen, and pelvis in September 2017 was unchanged without malignancy.    Mammography in February 2018 revealed a new 3 by 2 cm left upper outer quadrant breast mass. Biopsy has revealed a grade 2 invasive ductal carcinoma that is estrogen positive, progesterone negative, and her 2 positive at 3+ by IHC.    On 3/5/18 she underwent a lumpectomy with pathology revealing a multifocal tumor, the largest 2.7 cm in size, with negative sentinel node. Due to positive margins 3/28/18 she completed a mastectomy with no residual disease and now negative margins. Final stage is [id number] or stage 1B.    On 4/18/18 she began adjuvant chemotherapy with weekly paclitaxel and trastuzumab and then every 3 week trastuzumab.    On 9/24/18 a CT C/A/P scan confirmed a continued remission but did reveal 2 small 2-3 mm indeterminate pulmonary nodules.    On 9/24/18 an echocardiogram revealed a drop in the ejection fraction. She was seen by cardiology, her cardiac meds have been adjusted, Herceptin has been discontinued.    On 1/3/19 a chest CT was stable and without malignancy.    With increased fatigue, dyspnea with exertion and nausea a chest CT 7/8/19 revealed enlarging left lower lobe pulmonary nodules.    A PET/CT 7/12/19 confirmed that the dominant pulmonary nodule was hypermetabolic and the other nodules were too small to characterize. No other disease is noted with all suspicious lesions in the left lower lobe of the lung.    On 7/26/19 she underwent a wedge resection of the left lower lobe lung nodule with pathology revealing a 1.8 cm squamous cell cancer, stage [id number] or stage 1.    A CT of the abdomen and pelvis 8/23/19 was negative for malignancy.    She developed significant diarrhea with resultant dehydration that required hospitalization. Sigmoidoscopy with biopsy  revealed drug induced colitis and she was treated with Questran. Her diarrhea has improved but she has required intermittent Imodium. She is without fevers or abdominal pain.  She has not had black or bloody stools.  She feels that her bowels are at baseline (1-3 loose stools per week).    Mammography 6/19/20 benign.    CT C/A/P 7/31/20 negative for recurrent malignancy    CT chest 3/9/2021 stable without new malignancy.    Admitted to hospital 7/19/21 - 7/21/21 with E coli UTI, acute kidney insufficiency, nausea and vomiting. CT abdomen and pelvis 7/19/21 without acute changes or evidence of malignancy.    CT C/A/P 10/29/21 with no evidence of recurrent or metastatic disease    Presented to [facility] ER on 5/5/2023 for evaluation of nausea and vomiting.  CT abdomen and pelvis in the ER on 5/5/2023 showed a new 1.1 cm left lower lobe nodule that raised a concern given her prior history of lung, breast and colon cancers.    PET/CT 6/23/2023 showed development of a spiculated FDG avid nodule in the left lower lobe measuring 1 cm (max SUV 3.2) suspicious for early primary lung neoplasm without metastasis.  No findings concerning for distant metastatic disease.  No PET lymphadenopathy.    CT-guided biopsy of left lower lobe lung on 7/10/2023 showed moderate to poorly differentiated squamous cell cancer.  IHC supported the diagnosis showing positive reactivity for p40 and cytokeratin 5.    MRI brain 7/18/2023 showed no findings concerning for CNS metastatic disease    7/17/2023 met with Dr Arenas .  Surgical resection was not favored.  Referred for consideration for SBRT     Completed SBRT to the left lower lobe lung mass that was treated to a dose of 5200 cGy over 4 treatment fractions from 8/11/2023 to 8/18/2023 under the direction of Dr. Olivo    CT chest with contrast on 12/14/2023 showed patchy consolidative opacity seen within the left lower lobe with findings suspicious for developing pneumonia.   Stable 1.1 cm left lower lobe lung nodule.  Slightly enlarging small to moderate right pleural effusion.           Physical Exam:   Vitals were reviewed  Blood pressure 137/70, pulse 81, temperature 97.9  F (36.6  C), temperature source Oral, resp. rate 16, SpO2 93%, not currently breastfeeding.  Temperatures:  Current - Temp: 97.9  F (36.6  C); Max - Temp  Av.6  F (36.4  C)  Min: 97  F (36.1  C)  Max: 97.9  F (36.6  C)  Respiration range: Resp  Av.3  Min: 8  Max: 18  Pulse range: Pulse  Av.1  Min: 81  Max: 110  Blood pressure range: Systolic (24hrs), Av , Min:113 , Max:159   ; Diastolic (24hrs), Av, Min:70, Max:95    Pulse oximetry range: SpO2  Av.6 %  Min: 90 %  Max: 99 %    Intake/Output Summary (Last 24 hours) at 1/10/2024 1448  Last data filed at 1/10/2024 1100  Gross per 24 hour   Intake 110 ml   Output 248 ml   Net -138 ml       GENERAL: lying on her side due to reported back pain  SKIN: No rashes or jaundice.  HEENT: mild conjunctival pallor without scleral icterus.    LYMPH: No palpable lymphadenopathy in the cervical, supraclavicular, axillary, or inguinal regions.  HEART: Regular rate and rhythm with no murmurs.  LUNGS: Clear bilaterally.  ABDOMEN: Soft, nontender, nondistended with no palpable hepatosplenomegaly.              Past Medical History:   I have reviewed this patient's past medical history  Past Medical History:   Diagnosis Date    Acoustic neuroma (H)     Acute arthropathy     lower leg    Acute depression     Anemia     iron deficeincy    Anemia     Anxiety     Arthritis     osteoarthrosis of knee    Arthritis     Breast cancer (H)     left breast    Breast cancer (H)     Breast cancer, left breast (H) 2018    Breast cancer, right breast (H) 2016    Cardiomyopathy (H)     Cervical cancer (H)     Colon cancer (H)     Colorectal cancer (H)     Decreased cardiac ejection fraction     Depression     Depressive disorder     Diabetes mellitus (H)     pre-diabetic  "no longer taking Metformin    Diabetes mellitus (H)     pre\"denies  A1C , 6 for couple years\"    Dyspnea     Dyspnea     Gastro-oesophageal reflux disease     H/O: lung cancer 2013    Herpes zoster     History of blood transfusion     HTN (hypertension)     Hyperlipidemia     Hyperlipidemia     Hypertension     Iron deficiency anemia     Lung cancer (H)     squamous cell carcinoma    Lung nodule 2013    Lung cancer    Nausea & vomiting     Nausea and vomiting 08/12/2020    OAB (overactive bladder)     Obese     Osteoarthritis of knee     Pain in female pelvis     Pre-diabetes     Preop general physical exam     Rib lesion     Shoulder pain, acute     right    Tachycardia     Torn meniscus              Past Surgical History:   I have reviewed this patient's past surgical history  Past Surgical History:   Procedure Laterality Date    acoustic neuroma excised      APPENDECTOMY  1966    done with CORNELIO    ARTHROSCOPY KNEE  2012    right knee scoped    BIOPSY NODE SENTINEL Right 2/3/2016    Procedure: BIOPSY NODE SENTINEL;  Surgeon: Flory Pinto MD;  Location: [facility]    BIOPSY NODE SENTINEL Left 3/5/2018    Procedure: BIOPSY NODE SENTINEL;;  Surgeon: Denae Perez MD;  Location: [facility]    BREAST BIOPSY, RT/LT      cataracts      cataracts      CERVICAL CANCER SCREENING RESULTS DOCUMENTED AND REVIEWED      CHOLECYSTECTOMY  1991    CHOLECYSTECTOMY      CL AFF SURGICAL PATHOLOGY      CL AFF SURGICAL PATHOLOGY      acoustic neuroma left side excised [facility] Dr. Longoria 6/17/14    COLON SURGERY      COLONOSCOPY      COLONOSCOPY N/A 5/4/2022    Procedure: COLONOSCOPY, WITH POLYPECTOMY AND BIOPSY with RANDOM COLON BIOPSIES BY COLD BIOPSY FORCEPS;  Surgeon: Blake Longoria MD;  Location:  GI    CRANIECTOMY FOR EXCISION OF ACOUSTIC NEUROMA      ESOPHAGOSCOPY, GASTROSCOPY, DUODENOSCOPY (EGD), COMBINED N/A 5/4/2022    Procedure: ESOPHAGOGASTRODUODENOSCOPY, WITH BIOPSY with duodenum and gasric biopsies by cold biopsy " forceps;  Surgeon: Blake Longoria MD;  Location:  GI    EXCISE NODE MEDIASTINAL  3/12/2013    Procedure: EXCISE NODE MEDIASTINAL;;  Surgeon: Lorne Arenas MD;  Location:  OR    EYE SURGERY      fiona cataracts    GENITOURINARY SURGERY      bladder sling    GYN SURGERY  1966    hysterectomy for cervical cancer *ovaries remain    HYSTERECTOMY  1966    Cervical CA, paps done every other year, done with appendectomy    HYSTERECTOMY      INSERT PORT VASCULAR ACCESS Left 3/5/2018    Procedure: INSERT PORT VASCULAR ACCESS;;  Surgeon: Denae Perez MD;  Location: [facility]    INSERT PORT VASCULAR ACCESS N/A 3/5/2018    Procedure: INSERT PORT VASCULAR ACCESS;;  Surgeon: Denae Perez MD;  Location: [facility]    LAPAROSCOPIC ASSISTED COLECTOMY Right 10/27/2016    Procedure: LAPAROSCOPIC ASSISTED COLECTOMY;  Surgeon: Umang Arteaga MD;  Location:  OR    LOBECTOMY LUNG  3/12/2013    Procedure: LOBECTOMY LUNG;;  Surgeon: Lorne Arenas MD;  Location: [facility]    LUMPECTOMY BREAST      LUMPECTOMY BREAST WITH SEED LOCALIZATION Right 2/3/2016    Procedure: LUMPECTOMY BREAST WITH SEED LOCALIZATION;  Surgeon: Flory Pinto MD;  Location: [facility]    LUMPECTOMY BREAST WITH SEED LOCALIZATION Left 3/5/2018    Procedure: LUMPECTOMY BREAST WITH SEED LOCALIZATION;  SEED LOCALIZED LEFT BREAST LUMPECTOMY, WITH LEFT SENTINEL NODE BIOPSY, PORT PLACEMENT;  Surgeon: Denae Perez MD;  Location: [facility]    MASTECTOMY Left     MASTECTOMY SIMPLE Left 3/28/2018    Procedure: MASTECTOMY SIMPLE;  LEFT BREAST MASTECTOMY ;  Surgeon: Denae Perez MD;  Location: [facility]    MINI ARC SLING OPERATION FOR STRESS INCONTINENCE  2009    OTHER SURGICAL HISTORY      right middle and lower lobectomy with mediatinal lymph node disection    OTHER SURGICAL HISTORY      left lower lobe wedge resection    REMOVE PORT VASCULAR ACCESS N/A 2/13/2019    Procedure: REMOVE PORT VASCULAR ACCESS;  Surgeon: Denae Perez MD;  Location: [facility]     RESECT RIB  2/15/2013    Procedure: RESECT RIB;  RESECTION PORTION RIGHT TWELVETH RIB;  Surgeon: Lorne Arenas MD;  Location:  OR    THORACOSCOPIC WEDGE RESECTION LUNG Left 2019    Procedure: LEFT VIDEO ASSISTED THORACIC SURGERY; POSSIBLE LEFT THORACOTOMY; TWO WEDGE RESECTIONS OF LEFT LOWER LUNG NODULE;  Surgeon: Lorne Arenas MD;  Location: [facility] OR    THORACOTOMY  3/12/2013    Procedure: THORACOTOMY;  RIGHT THORACOTOMY, RIGHT MIDDLE AND LOWER LUNG LOBECTOMY, MEDIASTINAL LYMPH NODE DISSECTION ;  Surgeon: Lorne Arenas MD;  Location: [facility] OR    THORACOTOMY      TONSILLECTOMY      ZZC RECONSTR TOTAL SHOULDER IMPLANT Right 3/16/2020    Procedure: REVERSE TOTAL SHOULDER ARTHROPLASTY;  Surgeon: Zuhair Fuentes MD;  Location: [facility];  Service: Orthopedics               Social History:   I have reviewed this patient's social history  Social History     Tobacco Use    Smoking status: Former     Packs/day: 1.00     Years: 45.00     Additional pack years: 0.00     Total pack years: 45.00     Types: Cigarettes     Start date:      Quit date: 1998     Years since quittin.7    Smokeless tobacco: Never    Tobacco comments:     quit    Substance Use Topics    Alcohol use: Yes     Alcohol/week: 0.0 standard drinks of alcohol     Comment: glass of wine a day             Family History:   I have reviewed this patient's family history  Family History   Problem Relation Age of Onset    Breast Cancer Paternal Grandmother     Colon Cancer Other         not specified    Fractures Other         Hip fracture, Aunt    Hyperlipidemia Mother     Hypertension Mother     Heart Disease Mother     Osteoporosis Mother     Hyperlipidemia Sister     Hyperlipidemia Brother     Prostate Cancer Brother     Hypertension Sister     Heart Disease Maternal Grandfather     Prostate Cancer Father     Colon Cancer Cousin         son of paternal aunt with breast cancer.    Breast Cancer  Paternal Aunt     Colon Cancer Paternal Aunt     Other Cancer Paternal Uncle         Esophageal    Other Cancer Maternal Aunt              Allergies:     Allergies   Allergen Reactions    Demerol [Meperidine] Nausea and Vomiting    Sulfa Antibiotics Other (See Comments)     Extreme chills    Penicillins Rash     Swelling In mouth and tongue             Medications:   I have reviewed this patient's current medications  Medications Prior to Admission   Medication Sig Dispense Refill Last Dose    acetaminophen (TYLENOL) 500 MG tablet Take 1,000 mg by mouth every 6 hours as needed for mild pain       aspirin 81 MG EC tablet Take 81 mg by mouth daily   1/9/2024 at 0800    Calcium Carb-Cholecalciferol (CALCIUM 600+D) 600-20 MG-MCG TABS Take 1 tablet by mouth daily   1/9/2024 at 0800    calcium carbonate (TUMS) 500 MG chewable tablet Take 2 chew tab by mouth every hour as needed for heartburn Max 8 tab/day       lisinopril (ZESTRIL) 20 MG tablet Take 20 mg by mouth daily   1/9/2024 at 0800    loperamide (IMODIUM) 2 MG capsule Take 2 mg by mouth 4 times daily as needed for diarrhea       methocarbamol (ROBAXIN) 500 MG tablet Take 500 mg by mouth 2 times daily as needed for muscle spasms or other (back pain)       metoprolol succinate ER (TOPROL-XL) 50 MG 24 hr tablet Take 1 tablet (50 mg) by mouth daily 90 tablet 3 1/9/2024 at 0800    omeprazole (PRILOSEC) 20 MG DR capsule Take 20 mg by mouth daily   1/9/2024 at 0800    ondansetron (ZOFRAN) 4 MG tablet Take 4 mg by mouth at bedtime    at 2000    ondansetron (ZOFRAN) 4 MG tablet Take 4 mg by mouth every 8 hours as needed for nausea       PARoxetine (PAXIL) 40 MG tablet Take 40 mg by mouth every morning   1/9/2024 at 0800    traMADol (ULTRAM) 25 mg TABS half-tab Take 25 mg by mouth daily as needed for moderate to severe pain       vitamin D3 (CHOLECALCIFEROL) 50 mcg (2000 units) tablet Take 1 tablet by mouth daily   1/9/2024 at 0800    Probiotic Product (PROBIOTIC DAILY PO)  Take 1 packet by mouth daily Nutrilite Probiotic packet   Unsure at Facility could not confirm if taking or on hold     Current Facility-Administered Medications Ordered in Epic   Medication Dose Route Frequency Last Rate Last Admin    acetaminophen (TYLENOL) tablet 1,000 mg  1,000 mg Oral Q8H   1,000 mg at 01/10/24 1223    aspirin EC tablet 81 mg  81 mg Oral Daily   81 mg at 01/10/24 0853    azithromycin (ZITHROMAX) tablet 250 mg  250 mg Oral Daily   250 mg at 01/10/24 1223    calcitonin (salmon) (MIACALCIN) nasal spray 1 spray  1 spray Alternating Nostrils Daily   1 spray at 01/10/24 0859    calcium carbonate (TUMS) chewable tablet 1,000 mg  1,000 mg Oral 4x Daily PRN        calcium carbonate-vitamin D (CALTRATE) 600-10 MG-MCG per tablet 1 tablet  1 tablet Oral Daily   1 tablet at 01/10/24 0854    cefTRIAXone (ROCEPHIN) 1 g vial to attach to  mL bag for ADULTS or NS 50 mL bag for PEDS  1 g Intravenous Q24H   1 g at 01/10/24 1223    lidocaine (LMX4) cream   Topical Q1H PRN        lidocaine 1 % 0.1-1 mL  0.1-1 mL Other Q1H PRN        [Held by provider] lisinopril (ZESTRIL) tablet 20 mg  20 mg Oral Daily        methocarbamol (ROBAXIN) tablet 500 mg  500 mg Oral BID PRN   500 mg at 01/10/24 0048    metoprolol succinate ER (TOPROL XL) 24 hr tablet 100 mg  100 mg Oral Daily   100 mg at 01/10/24 0854    naloxone (NARCAN) injection 0.2 mg  0.2 mg Intravenous Q2 Min PRN        Or    naloxone (NARCAN) injection 0.4 mg  0.4 mg Intravenous Q2 Min PRN        Or    naloxone (NARCAN) injection 0.2 mg  0.2 mg Intramuscular Q2 Min PRN        Or    naloxone (NARCAN) injection 0.4 mg  0.4 mg Intramuscular Q2 Min PRN        oxyCODONE IR (ROXICODONE) half-tab 2.5-5 mg  2.5-5 mg Oral Q6H PRN   5 mg at 01/10/24 0605    pantoprazole (PROTONIX) EC tablet 40 mg  40 mg Oral Daily   40 mg at 01/10/24 0853    PARoxetine (PAXIL) tablet 40 mg  40 mg Oral QAM   40 mg at 01/10/24 1645    sodium chloride (PF) 0.9% PF flush 3 mL  3 mL  Intracatheter q1 min prn        sodium chloride (PF) 0.9% PF flush 3 mL  3 mL Intracatheter Q8H   3 mL at 01/09/24 1203    sodium chloride (PF) 0.9% PF flush 3 mL  3 mL Intracatheter Q8H   3 mL at 01/10/24 0856    sodium chloride (PF) 0.9% PF flush 3 mL  3 mL Intracatheter q1 min prn         No current Epic-ordered outpatient medications on file.             Review of Systems:     The 10 point Review of Systems is negative other than noted in the HPI.            Data:   All laboratory data reviewed  Results for orders placed or performed during the hospital encounter of 01/09/24 (from the past 24 hour(s))   Symptomatic Influenza A/B, RSV, & SARS-CoV2 PCR (COVID-19) Nasopharyngeal    Specimen: Nasopharyngeal; Swab   Result Value Ref Range    Influenza A PCR Negative Negative    Influenza B PCR Negative Negative    RSV PCR Negative Negative    SARS CoV2 PCR Negative Negative    Narrative    Testing was performed using the Xpert Xpress CoV2/Flu/RSV Assay on the [employer] GeneXpert Instrument. This test should be ordered for the detection of SARS-CoV-2, influenza, and RSV viruses in individuals who meet clinical and/or epidemiological criteria. Test performance is unknown in asymptomatic patients. This test is for in vitro diagnostic use under the FDA EUA for laboratories certified under CLIA to perform high or moderate complexity testing. This test has not been FDA cleared or approved. A negative result does not rule out the presence of PCR inhibitors in the specimen or target RNA in concentration below the limit of detection for the assay. If only one viral target is positive but coinfection with multiple targets is suspected, the sample should be re-tested with another FDA cleared, approved, or authorized test, if coinfection would change clinical management. This test was validated by the [facility]. These laboratories are certified under the Clinical Laboratory Improvement Amendments of 1988  (CLIA-88) as qualified to perform high complexity laboratory testing.   EKG 12-lead, tracing only   Result Value Ref Range    Systolic Blood Pressure  mmHg    Diastolic Blood Pressure  mmHg    Ventricular Rate 114 BPM    Atrial Rate 114 BPM    SC Interval 144 ms    QRS Duration 94 ms     ms    QTc 554 ms    P Axis  degrees    R AXIS -49 degrees    T Axis 51 degrees    Interpretation ECG       Sinus tachycardia  Left anterior fascicular block  Moderate voltage criteria for LVH, may be normal variant  Nonspecific ST abnormality  Prolonged QT  Abnormal ECG  When compared with ECG of 09-JAN-2024 11:16,  Aberrant conduction is no longer Present  ST now depressed in Inferior leads     MRSA MSSA PCR, Nasal Swab    Specimen: Nares, Bilateral; Swab   Result Value Ref Range    MRSA Target DNA Negative Negative    SA Target DNA Negative     Narrative    The [employer]  Xpert SA Nasal Complete assay performed in the [employer]  Dx System is a qualitative in vitro diagnostic test designed for rapid detection of Staphylococcus aureus (SA) and methicillin-resistant Staphylococcus aureus (MRSA) from nasal swabs in patients at risk for nasal colonization. The test utilizes automated real-time polymerase chain reaction (PCR) to detect MRSA/SA DNA. The Xpert SA Nasal Complete assay is intended to aid in the prevention and control of MRSA/SA infections in healthcare settings. The assay is not intended to diagnose, guide or monitor treatment for MRSA/SA infections, or provide results of susceptibility to methicillin. A negative result does not preclude MRSA/SA nasal colonization.    Respiratory Aerobic Bacterial Culture with Gram Stain    Specimen: Expectorate; Sputum   Result Value Ref Range    Culture       >10 Squamous epithelial cells/low power field indicates oral contamination. Please recollect.    Gram Stain Result >10 Squamous epithelial cells/low power field     Gram Stain Result <25 PMNs/low power field     Gram Stain Result  4+ Mixed ximena    Basic metabolic panel   Result Value Ref Range    Sodium 139 135 - 145 mmol/L    Potassium 3.8 3.4 - 5.3 mmol/L    Chloride 102 98 - 107 mmol/L    Carbon Dioxide (CO2) 31 (H) 22 - 29 mmol/L    Anion Gap 6 (L) 7 - 15 mmol/L    Urea Nitrogen 16.1 8.0 - 23.0 mg/dL    Creatinine 0.78 0.51 - 0.95 mg/dL    GFR Estimate 74 >60 mL/min/1.73m2    Calcium 8.3 (L) 8.8 - 10.2 mg/dL    Glucose 90 70 - 99 mg/dL   Hepatic panel   Result Value Ref Range    Protein Total 5.6 (L) 6.4 - 8.3 g/dL    Albumin 3.3 (L) 3.5 - 5.2 g/dL    Bilirubin Total 1.1 <=1.2 mg/dL    Alkaline Phosphatase 89 40 - 150 U/L    AST 14 0 - 45 U/L    ALT 8 0 - 50 U/L    Bilirubin Direct <0.20 0.00 - 0.30 mg/dL   CBC with platelets   Result Value Ref Range    WBC Count 7.1 4.0 - 11.0 10e3/uL    RBC Count 3.00 (L) 3.80 - 5.20 10e6/uL    Hemoglobin 10.5 (L) 11.7 - 15.7 g/dL    Hematocrit 33.0 (L) 35.0 - 47.0 %     (H) 78 - 100 fL    MCH 35.0 (H) 26.5 - 33.0 pg    MCHC 31.8 31.5 - 36.5 g/dL    RDW 18.0 (H) 10.0 - 15.0 %    Platelet Count 331 150 - 450 10e3/uL   Procalcitonin   Result Value Ref Range    Procalcitonin 0.10 <0.50 ng/mL   Troponin T, High Sensitivity   Result Value Ref Range    Troponin T, High Sensitivity 19 (H) <=14 ng/L

## 2024-01-10 NOTE — PROGRESS NOTES
[facility]    Hospitalist Progress Note      Assessment & Plan   Yolanda Watson is a 83 year old female admitted on 1/9/2024. She istory of breast cancer, primary lung cancer, colon cancer coming to ER with bilteral weakness and back pain     # Pnuemonia   # Sepsis with lactic acidosis per POCT,  follow-up LA wnl at 1.8     # CT scan Worsening of patchy opacities in the lung bases bilaterally since  1, likely representing worsening pneumonia. Stable small right and trace left pleural effusion.  -Continue azithromycin and ceftriaxone   -MRSA/MSSA nares NEG  -Urine Legionella and urine pneumonia NEG  -Follow blood cultures NGTD  CT chest NEG PE  -UA wnl         # Lung cancer  -PET CT June 23, 2023 showed development of a spiculated FDG avid nodule in the left lower lobe measuring 1 cm suspicious for early primary lung neoplasm without mets no PET lymphadenopathy no finding concerning for distant metastatic disease  CT chest with contrast on 12/14/2023 showed patchyconsolidative opacity seen within the left lower lobe with findings suspicious for developing pneumonia.  Stable 1.1 cm left lower lobe lung nodule.  Slightly enlarging small to moderate right pleural effusion  CT scan 1/9/24     Worsening of patchy opacities in the lung bases bilaterally since  12/14/2023, likely representing worsening pneumonia. Stable small right and trace left pleural effusion.  -Admission BNP 5.1K, chest CT without indications of pulm vascular congestion, serial troponins flat, 20, 19, echo pending.  - consult  [place] [facility]  -consult [facility] pulmonology, see note 1/10/2023, plan for bronchoscopy with BAL tomorrow.        # History of Breast cancer   # History of colon cancer  In remission        # Back pain  # Wedge compression fracture T7  -No recent fall.  No urinary incontinence patient has chronic fecal incontinence and chronic bilateral weakness  -Neurosurgery consult, see note  1/10/2024, recommend thoracic MRI, pending,, advised can liberalize beddrest to as tolerated.-  -pain management  -Will start patient on calcitonin spray for pain management        # Incidental Finding     A stable 1.7 cm hyperdense lesion in the upper pole of the right  kidney, may be a cyst containing hemorrhagic or proteinaceous  material. Consider a follow-up renal MRI on a nonemergent basis for  further evaluation.  -Outpatient follow-up        # Hypomagnesmia  Mag replacmement      # Hypertension  Continue PTA metoprolol 50 mg tablet daily and also continue lisinopril 20 mg daily, however due to relative tachycardia held lisinopril to allow increase in metoprolol to 100 mg/day, monitor.      DVT Prophylaxis: Pneumatic Compression Devices  Code Status: No CPR- Do NOT Intubate     Expected Discharge Date: 01/11/2024      Destination: assisted living         Db Watson MD  Text Page (7am - 6pm, M-F)    Total time 50 minutes for today 1/10/2024 :   time consisted of the following, assuming Patient care with review of records including labs, imaging results, medications, interdisciplinary notes; examination of Patient;  and completing documentation and orders.  Care Management included counseling/discussion with Patient regarding current condition including thoracic compression fracture, pneumonia and Coordination of Care time with Nursing  and Specialists, Neurosurgery and Pulmonary regarding care plan, management and surveillance, as above.     Interval History   Assumed care.  Complains of back pain, no history of same.  No pain in lower extremities, no bladder or bowel complaints.  No cough, dyspnea, no PND, orthopnea.    -Data reviewed today: I reviewed all new labs and imaging results over the last 24 jean marie    Physical Exam   Temp: 98.9  F (37.2  C) Temp src: Oral BP: 134/77 Pulse: 112   Resp: 16 SpO2: 93 % O2 Device: None (Room air)      General/Constitutional:   NAD, alert, calm, cooperative     Chest/Respiratory: Respirations nonlabored room air  Cardiovascular:  regular, no murmur appreciated.    Gastrointestinal/Abdomen:  soft, nontender, no rebound, guarding or other peritoneal signs.  Neuro.  Gross motor tested, nonfocal,  Psych oriented, affect calm        Medications      acetaminophen  1,000 mg Oral Q8H    aspirin  81 mg Oral Daily    azithromycin  250 mg Oral Daily    calcitonin (salmon)  1 spray Alternating Nostrils Daily    calcium carbonate-vitamin D  1 tablet Oral Daily    cefTRIAXone  1 g Intravenous Q24H    [Held by provider] lisinopril  20 mg Oral Daily    metoprolol succinate ER  100 mg Oral Daily    pantoprazole  40 mg Oral Daily    PARoxetine  40 mg Oral QAM    sodium chloride (PF)  3 mL Intracatheter Q8H    sodium chloride (PF)  3 mL Intracatheter Q8H       Data   Recent Labs   Lab 01/10/24  0708 01/09/24  1046   WBC 7.1 9.5   HGB 10.5* 12.0   * 109*    434    141   POTASSIUM 3.8 4.1   CHLORIDE 102 102   CO2 31* 28   BUN 16.1 17.1   CR 0.78 0.91   ANIONGAP 6* 11   PRIYA 8.3* 9.0   GLC 90 156*   ALBUMIN 3.3* 3.6   PROTTOTAL 5.6* 6.4   BILITOTAL 1.1 0.9   ALKPHOS 89 96   ALT 8 8   AST 14 14

## 2024-01-10 NOTE — PROGRESS NOTES
Neurosurgery note    Patient has not yet obtained thoracic MRI, due to staff inability to collect checkless information overnight.  They will be contacting the daughter later today to obtain that information.  Further recommendations to, after review of MRI.  No acute events noted overnight.  RN reports patient is stable, has plans for lung procedure tomorrow.    From neurosurgery perspective patient does not need to be on bedrest at this time, she can be activity as tolerated.    Patient will be seen again once MRI results are available.

## 2024-01-10 NOTE — PROGRESS NOTES
Patient arrived to ortho spine unit, room 2401 now.  Settling the patient and answering questions.      Senait Bush RN on 1/9/2024 at 6:55 PM

## 2024-01-10 NOTE — CONSULTS
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 CT his admission w/ worsening of bilateral infiltrates bilateral lower lobes when compared to CT in December. Vitals and lab work not suspicious for infection, but this remains possible. Alternative etiologies include recurrence of lung Ca, although not classic appearance. Organizing pneumonia possible, but again not a classic appearance.     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 Pt had CT chest 12/14 w/ new patchy consolidative opacities LLL c/f pneumonia, given that she did not have symptoms, she was not treated w/ ABx. CTPE in the ED w/o PE, but worsening of patchy opacities b/l lung bases. She is currently being treated w/ CTX and azithromycin. She reports about of month of weakness. She states \"I was sitting on the pot and almost blacked out 2-3 times and I called for help.\" She denies F/C. Denies SOB at rest. She does have stable CABRERA w/ any activity for several months. Rare dry cough. Long-standing L chest tightness. No wheezing.     10 point review of systems negative, aside from that mentioned above    /70 (BP Location: Right arm)   Pulse 81   Temp 97.9  F (36.6  C) " "(Oral)   Resp 16   SpO2 93%   Gen: NAD  HEENT: anicteric, OP clear, Mallampati IV  Card: RRR  Pulm: clear bilaterally   Abd: soft, NTND  MSK: no LE edema, no acute joint abnormalities  Skin: no obvious rash  Psych: normal affect  Neuro: answering questions appropriately     Labs: personally reviewed    Imaging: personally reviewed    Past Medical History:   Diagnosis Date    Acoustic neuroma (H)     Acute arthropathy     lower leg    Acute depression     Anemia     iron deficeincy    Anemia     Anxiety     Arthritis     osteoarthrosis of knee    Arthritis     Breast cancer (H)     left breast    Breast cancer (H)     Breast cancer, left breast (H) 02/2018    Breast cancer, right breast (H) 01/2016    Cardiomyopathy (H)     Cervical cancer (H)     Colon cancer (H)     Colorectal cancer (H)     Decreased cardiac ejection fraction     Depression     Depressive disorder     Diabetes mellitus (H)     pre-diabetic no longer taking Metformin    Diabetes mellitus (H)     pre\"denies  A1C , 6 for couple years\"    Dyspnea     Dyspnea     Gastro-oesophageal reflux disease     H/O: lung cancer 2013    Herpes zoster     History of blood transfusion     HTN (hypertension)     Hyperlipidemia     Hyperlipidemia     Hypertension     Iron deficiency anemia     Lung cancer (H)     squamous cell carcinoma    Lung nodule 2013    Lung cancer    Nausea & vomiting     Nausea and vomiting 08/12/2020    OAB (overactive bladder)     Obese     Osteoarthritis of knee     Pain in female pelvis     Pre-diabetes     Preop general physical exam     Rib lesion     Shoulder pain, acute     right    Tachycardia     Torn meniscus        Past Surgical History:   Procedure Laterality Date    acoustic neuroma excised      APPENDECTOMY  1966    done with [facility]    ARTHROSCOPY KNEE  2012    right knee scoped    BIOPSY NODE SENTINEL Right 2/3/2016    Procedure: BIOPSY NODE SENTINEL;  Surgeon: Flory Pinto MD;  Location: [facility]    BIOPSY NODE SENTINEL " Left 3/5/2018    Procedure: BIOPSY NODE SENTINEL;;  Surgeon: Denae Perez MD;  Location: [facility]    BREAST BIOPSY, RT/LT      cataracts      cataracts      CERVICAL CANCER SCREENING RESULTS DOCUMENTED AND REVIEWED      CHOLECYSTECTOMY  1991    CHOLECYSTECTOMY      CL AFF SURGICAL PATHOLOGY      CL AFF SURGICAL PATHOLOGY      acoustic neuroma left side excised [facility] Dr[facility] [place] 6/17/14    COLON SURGERY      COLONOSCOPY      COLONOSCOPY N/A 5/4/2022    Procedure: COLONOSCOPY, WITH POLYPECTOMY AND BIOPSY with RANDOM COLON BIOPSIES BY COLD BIOPSY FORCEPS;  Surgeon: Blake Longoria MD;  Location:  GI    CRANIECTOMY FOR EXCISION OF ACOUSTIC NEUROMA      ESOPHAGOSCOPY, GASTROSCOPY, DUODENOSCOPY (EGD), COMBINED N/A 5/4/2022    Procedure: ESOPHAGOGASTRODUODENOSCOPY, WITH BIOPSY with duodenum and gasric biopsies by cold biopsy forceps;  Surgeon: Blake Longoria MD;  Location:  GI    EXCISE NODE MEDIASTINAL  3/12/2013    Procedure: EXCISE NODE MEDIASTINAL;;  Surgeon: Lorne Arenas MD;  Location:  OR    EYE SURGERY      fiona cataracts    GENITOURINARY SURGERY      bladder sling    GYN SURGERY  1966    hysterectomy for cervical cancer *ovaries remain    HYSTERECTOMY  1966    Cervical CA, paps done every other year, done with appendectomy    HYSTERECTOMY      INSERT PORT VASCULAR ACCESS Left 3/5/2018    Procedure: INSERT PORT VASCULAR ACCESS;;  Surgeon: Denae Perez MD;  Location: [facility]    INSERT PORT VASCULAR ACCESS N/A 3/5/2018    Procedure: INSERT PORT VASCULAR ACCESS;;  Surgeon: Denae Perez MD;  Location: [facility]    LAPAROSCOPIC ASSISTED COLECTOMY Right 10/27/2016    Procedure: LAPAROSCOPIC ASSISTED COLECTOMY;  Surgeon: Umang Arteaga MD;  Location:  OR    LOBECTOMY LUNG  3/12/2013    Procedure: LOBECTOMY LUNG;;  Surgeon: Lorne Arenas MD;  Location:  OR    LUMPECTOMY BREAST      LUMPECTOMY BREAST WITH SEED LOCALIZATION Right 2/3/2016    Procedure: LUMPECTOMY BREAST WITH SEED  LOCALIZATION;  Surgeon: Flory Pinto MD;  Location:  [facility]    LUMPECTOMY BREAST WITH SEED LOCALIZATION Left 3/5/2018    Procedure: LUMPECTOMY BREAST WITH SEED LOCALIZATION;  SEED LOCALIZED LEFT BREAST LUMPECTOMY, WITH LEFT SENTINEL NODE BIOPSY, PORT PLACEMENT;  Surgeon: Denae Perez MD;  Location:  [facility]    MASTECTOMY Left     MASTECTOMY SIMPLE Left 3/28/2018    Procedure: MASTECTOMY SIMPLE;  LEFT BREAST MASTECTOMY ;  Surgeon: Denae Perez MD;  Location: [facility]    MINI ARC SLING OPERATION FOR STRESS INCONTINENCE  2009    OTHER SURGICAL HISTORY      right middle and lower lobectomy with mediatinal lymph node disection    OTHER SURGICAL HISTORY      left lower lobe wedge resection    REMOVE PORT VASCULAR ACCESS N/A 2/13/2019    Procedure: REMOVE PORT VASCULAR ACCESS;  Surgeon: Denae Perez MD;  Location:  OR    RESECT RIB  2/15/2013    Procedure: RESECT RIB;  RESECTION PORTION RIGHT TWELVETH RIB;  Surgeon: Lorne Arenas MD;  Location:  OR    THORACOSCOPIC WEDGE RESECTION LUNG Left 7/26/2019    Procedure: LEFT VIDEO ASSISTED THORACIC SURGERY; POSSIBLE LEFT THORACOTOMY; TWO WEDGE RESECTIONS OF LEFT LOWER LUNG NODULE;  Surgeon: Lorne Arenas MD;  Location:  OR    THORACOTOMY  3/12/2013    Procedure: THORACOTOMY;  RIGHT THORACOTOMY, RIGHT MIDDLE AND LOWER LUNG LOBECTOMY, MEDIASTINAL LYMPH NODE DISSECTION ;  Surgeon: Lorne Arenas MD;  Location:  OR    THORACOTOMY      TONSILLECTOMY      ZZC RECONSTR TOTAL SHOULDER IMPLANT Right 3/16/2020    Procedure: REVERSE TOTAL SHOULDER ARTHROPLASTY;  Surgeon: Zuhair Fuentes MD;  Location: [facility];  Service: Orthopedics       Family History   Problem Relation Age of Onset    Breast Cancer Paternal Grandmother     Colon Cancer Other         not specified    Fractures Other         Hip fracture, Aunt    Hyperlipidemia Mother     Hypertension Mother     Heart Disease Mother     Osteoporosis Mother      Hyperlipidemia Sister     Hyperlipidemia Brother     Prostate Cancer Brother     Hypertension Sister     Heart Disease Maternal Grandfather     Prostate Cancer Father     Colon Cancer Cousin         son of paternal aunt with breast cancer.    Breast Cancer Paternal Aunt     Colon Cancer Paternal Aunt     Other Cancer Paternal Uncle         Esophageal    Other Cancer Maternal Aunt        Social History     Socioeconomic History    Marital status:      Spouse name: Not on file    Number of children: 3    Years of education: Not on file    Highest education level: Not on file   Occupational History     Employer: RETIRED   Tobacco Use    Smoking status: Former     Packs/day: 1.00     Years: 45.00     Additional pack years: 0.00     Total pack years: 45.00     Types: Cigarettes     Start date:      Quit date: 1998     Years since quittin.7    Smokeless tobacco: Never    Tobacco comments:     quit    Substance and Sexual Activity    Alcohol use: Yes     Alcohol/week: 0.0 standard drinks of alcohol     Comment: glass of wine a day    Drug use: No    Sexual activity: Yes     Partners: Male     Birth control/protection: Female Surgical   Other Topics Concern    Parent/sibling w/ CABG, MI or angioplasty before 65F 55M? Not Asked   Social History Narrative    2015    -    3 kids    Retired        Pap-2010 WNL    Mammo-13     Colonoscopy-3/3/2010 WNL, no further recommended per pt     Dexa-2008     Social Determinants of Health     Financial Resource Strain: Not on file   Food Insecurity: Not on file   Transportation Needs: Not on file   Physical Activity: Not on file   Stress: Not on file   Social Connections: Not on file   Interpersonal Safety: Not on file   Housing Stability: Not on file       Сергей Mathew MD  Pulmonary and Critical Care Medicine  [facility]

## 2024-01-10 NOTE — CONSULTS
Care Management Initial Consult  Resides at [facility] (address   Phone#[phone]  Fax#[phone]  RX: [place] ([facility]  Facility contact 1/10 Nicole STEPHENSON prefer patient back to [facility] M-F before 15:00 call number above on weekends to coordinate for weekend discharge.   correct in EPIC   PCP [facility]  Oncology [facility] Dr. Borrero (next appt in 3 mos per Daughter)  General Information  Assessment completed with: Care Team Member, [facility] Ajith Daughter by phone  Type of CM/SW Visit: CM Role Introduction    Primary Care Provider verified and updated as needed: Yes   Readmission within the last 30 days: no previous admission in last 30 days   Reason for Consult: discharge planning  Advance Care Planning: Advance Care Planning Reviewed: present on chart          Communication Assessment  Patient's communication style: spoken language (English or Bilingual)             Cognitive  Cognitive/Neuro/Behavioral: WDL                      Living Environment:   People in home: facility resident  [facility]  Current living Arrangements: assisted living  Name of Facility: [facility]   Able to return to prior arrangements: other (see comments) (pending continued recommendations)       Family/Social Support:  Care provided by: self, other (see comments) (facility staff)  Provides care for: no one, unable/limited ability to care for self     Children, Facility resident(s)/Staff          Description of Support System: Supportive, Involved    Support Assessment: Adequate family and caregiver support, Adequate social supports    Current Resources:   Patient receiving home care services: No     Community Resources: None  Equipment currently used at home:    Supplies currently used at home:      Employment/Financial:  Employment Status: retired        Financial Concerns: none   Referral to Financial Worker: No       Does the patient's insurance plan have a 3 day qualifying  hospital stay waiver?  No    Lifestyle & Psychosocial Needs:  Social Determinants of Health     Food Insecurity: Not on file   Depression: Not on file   Housing Stability: Not on file   Tobacco Use: Medium Risk (1/9/2024)    Patient History     Smoking Tobacco Use: Former     Smokeless Tobacco Use: Never     Passive Exposure: Not on file   Financial Resource Strain: Not on file   Alcohol Use: Not on file   Transportation Needs: Not on file   Physical Activity: Not on file   Interpersonal Safety: Not on file   Stress: Not on file   Social Connections: Not on file       Functional Status:  Prior to admission patient needed assistance: Per DANIEL and Daughter Ajith prior to admission patient was assist of 1 for all cares with walker, calls appropriately and uses pendent.              Mental Health Status:          Chemical Dependency Status:                Values/Beliefs:  Spiritual, Cultural Beliefs, Christian Practices, Values that affect care:                 Additional Information:  Writer called patients care home [facility] [place] and talked to SEEMA Rivera.  Benito Rivera patient is A1 for all cares, uses a walker at baseline, is A+Ox4 and calls appropriately.  They provide meds, meals, laundry, toileting, cleaning etc.  Patient has a pendant as well.  care home is aware that the patient has pending consults and that we will continue to follow for discharge planning and coordinating patients discharge with care home, Patient and family.  Benito Wilson does prefer that patients return to their facility during the week by 15:00 with any new meds to be sent with the patient.    They do have weekend coverage, however we would need to call the facility to coordinate the discharge.   Patient sees [facility] they come 1x week (Wednesdays) and patient can be added onto the scheduled if needed.  Writer called patients Daughter Ajith and confirmed the above services.  Ajith is awaiting information on the imaging and is concerned about  her mothers pain management as the shelter may not be able to provider PRN or ranges. Will need to check with DANIEL for further direction and discharge planning.   Writer uriah Canseco and shelter direct RN CC number for further questions.  Care Transitions will continue to follow for further discharge planning needs as identified.        Sowmya Harper RN, BSN, ACM   Care Transitions Specialist  [facility]  Care Transitions Specialist  [address] [id number]  [email or web address]  Office: [phone]   Fax: [phone]  [facility]

## 2024-01-10 NOTE — PLAN OF CARE
Goal Outcome Evaluation:      Plan of Care Reviewed With: patient  BP (!) 159/89 (BP Location: Left arm, Patient Position: Semi-Chavez's, Cuff Size: Adult Large)   Pulse 108   Temp 97.8  F (36.6  C) (Oral)   Resp 16   SpO2 94%     Pt here with closed wedge compression fracture of T7 vertebra, Pneumonia of R.lower lobe d/t infectious organism. A&O x4, Circle. VS hypertensive on RA. Regular diet, takes pills whole with thin liquids. P. IV SL. Continent of B&B. Strict Bedrest. Pain managed with scheduled tylenol, oxycodone & muscle relaxer. MRSA & Sputum cultured collected this shift, urine culture. Unable to obtain MRI checklist d/t pt incapability, will pass on to incoming RN. Plan: Thoracic spine MRI. Discharge pending.

## 2024-01-11 NOTE — PROGRESS NOTES
A/Ox4. Pure wick in place. Denies pain. NPO @ midnight for possible bronchoscopy tomorrow. Plan of care on-going.

## 2024-01-11 NOTE — PROGRESS NOTES
Oncology chart check:    Noted MRI thoracic spine findings from 1/10/2024.  Neurosurgery team onboard.  Scheduled for bronchoscopy today.  Please see note from 1/10/2024 - from oncology stand point no new recommendations at this time.

## 2024-01-11 NOTE — PROGRESS NOTES
"[facility]    Hospitalist Progress Note      Assessment & Plan   Yolanda Watson is a 83 year old female admitted on 1/9/2024. She istory of breast cancer, primary lung cancer, colon cancer [BRCA 2]  coming to ER with bilteral weakness and back pain     # Pnuemonia   # Sepsis with lactic acidosis per POCT,  follow-up LA wnl at 1.8   # CT scan Worsening of patchy opacities in the lung bases bilaterally since  1, likely representing worsening pneumonia. Stable small right and trace left pleural effusion.  -Continue azithromycin and ceftriaxone   -MRSA/MSSA nares NEG  -Urine Legionella and urine pneumonia NEG  -Follow blood cultures NGTD  CT chest NEG PE  -UA wnl         # Lung cancer  CM, ? Chronic ischemic CM per Cardiology  -PET CT June 23, 2023 showed development of a spiculated FDG avid nodule in the left lower lobe measuring 1 cm suspicious for early primary lung neoplasm without mets no PET lymphadenopathy no finding concerning for distant metastatic disease  CT chest with contrast on 12/14/2023 showed patchyconsolidative opacity seen within the left lower lobe with findings suspicious for developing pneumonia.  Stable 1.1 cm left lower lobe lung nodule.  Slightly enlarging small to moderate right pleural effusion  CT scan 1/9/24     Worsening of patchy opacities in the lung bases bilaterally since  12/14/2023, likely representing worsening pneumonia. Stable small right and trace left pleural effusion.  -Admission BNP 5.1K, chest CT without indications of pulm vascular congestion, serial troponins flat, 20, 19,  -1/11/2024: Echo new CM, EF 35-40%, severe hypokinesia inferior and inferolateral..  Cardiology consult.  See note 1/11/2024, \"continue ASA 81 mg, metoprolol succinate 100 mg p.o. daily, lisinopril 20 mg furosemide 20 mg IV one-time reassess regarding need for indications of chronic low-dose diuretic lipid panel, previously on statin.'  Nursing also reports concerns of A-fib on " telemetry, controlled rate.  On admission sinus tachycardia.  Will obtain EKG to confirm, hold lisinopril for now to allow increase in metoprolol, changed to Lopressor 100 mg twice daily.  Check TFTs, on magnesium replacement protocol, potassium 3.8, recheck in a.m. on IV furosemide.  Continue telemetry.  - consult  [place] [facility]; see note.   -consult [facility] pulmonology, see note 1/10/2023, plan for bronchoscopy with BAL today; laboratories pending including grams, culture.        # History of Breast cancer   # History of colon cancer   BRCA 2 mut  In remission        # Back pain  # Wedge compression fracture T7  -No recent fall.  No urinary incontinence patient has chronic fecal incontinence and chronic bilateral weakness  -Neurosurgery consult, see note 1/10/2024, -thoracic MR burst type compression fracture T7 findings concerning for thin epidural hematoma at T6/T8.  Discussed with Neurosurgery NP, note pending.  -pain management, currently patient rates pain at 5/10 pain scale.  Review of records indicates use of as needed oxycodone only 1-3 times/day.  Monitor bowels on oxycodone.  -Will start patient on calcitonin spray for pain management        # Incidental Finding     A stable 1.7 cm hyperdense lesion in the upper pole of the right  kidney, may be a cyst containing hemorrhagic or proteinaceous  material. Consider a follow-up renal MRI on a nonemergent basis for  further evaluation.  -Outpatient follow-up        # Hypomagnesmia  Mag replacement!      # Hypertension  Continue PTA metoprolol 50 mg tablet daily lisinopril 20 mg daily, however due to relative tachycardia held lisinopril to allow increase in metoprolol to 100 mg/day, monitor.  Further increase metoprolol changed to tartrate 100 mg twice daily.      DVT Prophylaxis: Pneumatic Compression Devices  Code Status: No CPR- Do NOT Intubate     Expected Discharge Date: 01/12/2024,  9:00 AM    Destination: assisted living  Discharge  [facility]  [facility] Page (7am - 6pm, M-F)    Total time 50 minutes for today 1/11/2024 :  time consisted of the following, examination of patient, review of records including labs, imaging results, medications, interdisciplinary notes and completing documentation and orders.  Care Management included counseling/discussion with Patient regarding current condition including back pain and Coordination of Care time with Nursing  and Specialists, Pulmonary, NeuroSurgery and Cardiology regarding care plan, management and surveillance, as above. .     Interval History   On morning encounter patient had back pain, rated at 5/10 pain scale.  Scheduled for bronchoscopy.  Later in the day her echo returned consistent with new CM, admission BNP 5.1K.  Denies chest pain, dyspnea, PND, orthopnea.  No prior history of CAD.  Has had significant chemotherapy for multiple cancers.    -Data reviewed today: I reviewed all new labs and imaging results over the last 24 jean marie    Physical Exam   Temp: 97.4  F (36.3  C) Temp src: Oral BP: (!) 145/91 Pulse: 106   Resp: 16 SpO2: 93 % O2 Device: None (Room air)      General/Constitutional:   NAD, alert, calm, cooperative, mild discomfort due to mid back pain.    Chest/Respiratory: Respirations nonlabored room air  Cardiovascular:  regular, no murmur appreciated.    Gastrointestinal/Abdomen:  soft, nontender,   Neuro.  Gross motor tested, nonfocal, ambulatory with assistance.  Psych oriented, affect calm        Medications      acetaminophen  1,000 mg Oral Q8H    aspirin  81 mg Oral Daily    azithromycin  250 mg Oral Daily    calcitonin (salmon)  1 spray Alternating Nostrils Daily    calcium carbonate-vitamin D  1 tablet Oral Daily    cefTRIAXone  2 g Intravenous Q24H    furosemide  20 mg Intravenous Once    [Held by provider] lisinopril  20 mg Oral Daily    metoprolol tartrate  100 mg Oral BID    pantoprazole  40 mg Oral Daily    PARoxetine   40 mg Oral QAM    sodium chloride (PF)  3 mL Intracatheter Q8H    sodium chloride (PF)  3 mL Intracatheter Q8H       Data   Recent Labs   Lab 01/10/24  0708 01/09/24  1046   WBC 7.1 9.5   HGB 10.5* 12.0   * 109*    434    141   POTASSIUM 3.8 4.1   CHLORIDE 102 102   CO2 31* 28   BUN 16.1 17.1   CR 0.78 0.91   ANIONGAP 6* 11   PRIYA 8.3* 9.0   GLC 90 156*   ALBUMIN 3.3* 3.6   PROTTOTAL 5.6* 6.4   BILITOTAL 1.1 0.9   ALKPHOS 89 96   ALT 8 8   AST 14 14

## 2024-01-11 NOTE — PROGRESS NOTES
Alert and oriented x4. VSS, On RA. Clear but diminished lungs sounds. Experiences SOB upon exertion. Has an infrequent dry cough; was unable to get sputum for lab test. Was encouraged to use the IS. Tolerating regular diet, denies nausea and vomiting. Poor appetite. Active bowel sounds x4q. 1 BM during this shift. Incontinent of B/B. Hard of hearing. Pt had an MRI today. Assit of 1 GB/W. Back pain was managed with PRN oxycodone. NPO at midnight for a broncho procedure tomorrow.

## 2024-01-11 NOTE — CONSULTS
[facility]    Cardiology Consultation     Date of Admission:  1/9/2024    Assessment & Plan   Yolanda Watson is a 83 year old female who was admitted on 1/9/2024. She is a nursing home resident with a complex medical history admitted for pneumonia and sepsis and found to have low EF.     Recurrent cardiomyopathy EF 35-40% with focal inferior/inferolateral hypokinesis. Suspect this may be chronic ischemic cardiomyopathy, but may be due to chemotherapeutic agent(s) or multifactorial. No angina. Negative stress nuc at [facility] 2022. NYHA II-III.   Multiple risk factors for CAD  History of chemotherapy related cardiomyopathy with subsequent normaliazation of EF in 2018  Multiple primary cancers, history of paclitaxel, cisplatin,  anastrozole and trastuzumab chemotherapy.   DM2  Macrocytic anemia. Hgb 10.5. Seems to be a chronic anemia.  Telemetry and EKGs shows sinus with PACs. No definite history of atrial fibrillation.   Dyslipidemia. Previously on atorvastatin  Pneumonia  Confusion. ? dementia      Continue on asa 81m, metoprolol succinate 100mg PO daily, lisinopril 20mg PO daily  Patient is DNR/DNI. Not interested in reversing for coronary angiogram and there is no acute indication for cath.  Ordered furosemide 20mg IV x 1.  She may benefit from chronic low dose diuretic: recommend follow up on IV dose today.  Lipids panel ordered. Consider resuming statin. Not clear why she is no longer taking.   Will arrange for outpatient cardiology follow up of cardiomyopathy for med titration and further discussion of ischemic evaluation.   Cardiology signing off. Please call with any additional questions.     High complexity     Rhina Orozco MD, MD    Primary Care Physician   [facility]    Reason for Consult   Reason for consult: I was asked by Dr. Watson to evaluate this patient for cardiomyoapathy.    History of Present Illness   Yolanda Watson is a 83 year old female who was  "admitted on 1/9/23 with pneumonia and sepsis.     An echo yesterday showed EF 35-40% with inferior inferolateral hypokinesis.  She does have a history of chemo-related cardiomyopathy and had been treated with paclitaxel and herceptin and trastuzumab. In 2018 EF was 30-35% with global hypokinesis. This subsequently normalized. Most recent stress test was in 2018 EF was 30-35% with global hypokinesis. Recent chemo has included navelbine and cisplatin and she has been on anastrozole.     Unfortunately she has lung cancer, 2 separate breast cancers and also had colon cancer.  Her left lower lobe lung ca has been treated with radiation in 2023. 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 She has a wedge compression fracture of T7. In 2021 a stress nuc was normal/no ischemia Ef 66%.     CT chest at admision showed Worsening of patchy opacities in the lung bases bilaterally since  12/14/2023, likely representing worsening pneumonia. Stable small right and trace left pleural effusion.    Yolanda states she has been tired and short of breath with activity.  She is a poor historian and focused on \"getting the clothes organized\".  She denies chest pain.  She does have back pain which may or may not be chronic.    History obtained is predominantly from chart review.  Multiple previous cardiology notes as well as inpatient hospital medicine, oncology and pulmonary notes reviewed.    Past Medical History   Past Medical History:   Diagnosis Date    Acoustic neuroma (H)     Acute arthropathy     lower leg    Acute depression     Anemia     iron deficeincy    Anemia     Anxiety     Arthritis     osteoarthrosis of knee    Arthritis     Breast cancer (H)     left breast    Breast cancer (H)     Breast cancer, left breast (H) 02/2018    Breast cancer, right breast (H) 01/2016    Cardiomyopathy (H)     Cervical cancer (H)     Colon cancer (H)     Colorectal cancer (H)     Decreased cardiac ejection fraction     Depression " "    Depressive disorder     Diabetes mellitus (H)     pre-diabetic no longer taking Metformin    Diabetes mellitus (H)     pre\"denies  A1C , 6 for couple years\"    Dyspnea     Dyspnea     Gastro-oesophageal reflux disease     H/O: lung cancer 2013    Herpes zoster     History of blood transfusion     HTN (hypertension)     Hyperlipidemia     Hyperlipidemia     Hypertension     Iron deficiency anemia     Lung cancer (H)     squamous cell carcinoma    Lung nodule 2013    Lung cancer    Nausea & vomiting     Nausea and vomiting 08/12/2020    OAB (overactive bladder)     Obese     Osteoarthritis of knee     Pain in female pelvis     Pre-diabetes     Preop general physical exam     Rib lesion     Shoulder pain, acute     right    Tachycardia     Torn meniscus    Most history is from chart review    Past Surgical History   Past Surgical History:   Procedure Laterality Date    acoustic neuroma excised      APPENDECTOMY  1966    done with CORNELIO    ARTHROSCOPY KNEE  2012    right knee scoped    BIOPSY NODE SENTINEL Right 2/3/2016    Procedure: BIOPSY NODE SENTINEL;  Surgeon: Flory Pinto MD;  Location: [facility]    BIOPSY NODE SENTINEL Left 3/5/2018    Procedure: BIOPSY NODE SENTINEL;;  Surgeon: Denae Perez MD;  Location: [facility]    BREAST BIOPSY, RT/LT      cataracts      cataracts      CERVICAL CANCER SCREENING RESULTS DOCUMENTED AND REVIEWED      CHOLECYSTECTOMY  1991    CHOLECYSTECTOMY      CL AFF SURGICAL PATHOLOGY      CL AFF SURGICAL PATHOLOGY      acoustic neuroma left side excised [facility] Dr. Longoria 6/17/14    COLON SURGERY      COLONOSCOPY      COLONOSCOPY N/A 5/4/2022    Procedure: COLONOSCOPY, WITH POLYPECTOMY AND BIOPSY with RANDOM COLON BIOPSIES BY COLD BIOPSY FORCEPS;  Surgeon: Blake Longoria MD;  Location:  GI    CRANIECTOMY FOR EXCISION OF ACOUSTIC NEUROMA      ESOPHAGOSCOPY, GASTROSCOPY, DUODENOSCOPY (EGD), COMBINED N/A 5/4/2022    Procedure: ESOPHAGOGASTRODUODENOSCOPY, WITH BIOPSY with duodenum " and gasric biopsies by cold biopsy forceps;  Surgeon: Blake Longoria MD;  Location:  GI    EXCISE NODE MEDIASTINAL  3/12/2013    Procedure: EXCISE NODE MEDIASTINAL;;  Surgeon: Lorne Arenas MD;  Location:  OR    EYE SURGERY      fiona cataracts    GENITOURINARY SURGERY      bladder sling    GYN SURGERY  1966    hysterectomy for cervical cancer *ovaries remain    HYSTERECTOMY  1966    Cervical CA, paps done every other year, done with appendectomy    HYSTERECTOMY      INSERT PORT VASCULAR ACCESS Left 3/5/2018    Procedure: INSERT PORT VASCULAR ACCESS;;  Surgeon: Denae Perez MD;  Location: [facility]    INSERT PORT VASCULAR ACCESS N/A 3/5/2018    Procedure: INSERT PORT VASCULAR ACCESS;;  Surgeon: Denae Perez MD;  Location: [facility]    LAPAROSCOPIC ASSISTED COLECTOMY Right 10/27/2016    Procedure: LAPAROSCOPIC ASSISTED COLECTOMY;  Surgeon: Umang Arteaga MD;  Location:  OR    LOBECTOMY LUNG  3/12/2013    Procedure: LOBECTOMY LUNG;;  Surgeon: Lorne Arenas MD;  Location:  OR    LUMPECTOMY BREAST      LUMPECTOMY BREAST WITH SEED LOCALIZATION Right 2/3/2016    Procedure: LUMPECTOMY BREAST WITH SEED LOCALIZATION;  Surgeon: Flory Pinto MD;  Location: [facility]    LUMPECTOMY BREAST WITH SEED LOCALIZATION Left 3/5/2018    Procedure: LUMPECTOMY BREAST WITH SEED LOCALIZATION;  SEED LOCALIZED LEFT BREAST LUMPECTOMY, WITH LEFT SENTINEL NODE BIOPSY, PORT PLACEMENT;  Surgeon: Denae Perez MD;  Location: [facility]    MASTECTOMY Left     MASTECTOMY SIMPLE Left 3/28/2018    Procedure: MASTECTOMY SIMPLE;  LEFT BREAST MASTECTOMY ;  Surgeon: Denae Perez MD;  Location: [facility]    MINI ARC SLING OPERATION FOR STRESS INCONTINENCE  2009    OTHER SURGICAL HISTORY      right middle and lower lobectomy with mediatinal lymph node disection    OTHER SURGICAL HISTORY      left lower lobe wedge resection    REMOVE PORT VASCULAR ACCESS N/A 2/13/2019    Procedure: REMOVE PORT VASCULAR ACCESS;  Surgeon:  Denae Perez MD;  Location:  OR    RESECT RIB  2/15/2013    Procedure: RESECT RIB;  RESECTION PORTION RIGHT TWELVETH RIB;  Surgeon: Lorne Arenas MD;  Location:  OR    THORACOSCOPIC WEDGE RESECTION LUNG Left 7/26/2019    Procedure: LEFT VIDEO ASSISTED THORACIC SURGERY; POSSIBLE LEFT THORACOTOMY; TWO WEDGE RESECTIONS OF LEFT LOWER LUNG NODULE;  Surgeon: Lorne Arenas MD;  Location: [facility]    THORACOTOMY  3/12/2013    Procedure: THORACOTOMY;  RIGHT THORACOTOMY, RIGHT MIDDLE AND LOWER LUNG LOBECTOMY, MEDIASTINAL LYMPH NODE DISSECTION ;  Surgeon: Lorne Arenas MD;  Location: [facility] OR    THORACOTOMY      TONSILLECTOMY      ZZC RECONSTR TOTAL SHOULDER IMPLANT Right 3/16/2020    Procedure: REVERSE TOTAL SHOULDER ARTHROPLASTY;  Surgeon: Zuhair Fuentes MD;  Location: [facility];  Service: Orthopedics       Prior to Admission Medications   Prior to Admission Medications   Prescriptions Last Dose Informant Patient Reported? Taking?   Calcium Carb-Cholecalciferol (CALCIUM 600+D) 600-20 MG-MCG TABS 1/9/2024 at 0800  Yes Yes   Sig: Take 1 tablet by mouth daily   PARoxetine (PAXIL) 40 MG tablet 1/9/2024 at 0800 Self Yes Yes   Sig: Take 40 mg by mouth every morning   Probiotic Product (PROBIOTIC DAILY PO) Unsure at Facility could not confirm if taking or on hold  Yes No   Sig: Take 1 packet by mouth daily Nutrilite Probiotic packet   acetaminophen (TYLENOL) 500 MG tablet   Yes Yes   Sig: Take 1,000 mg by mouth every 6 hours as needed for mild pain   aspirin 81 MG EC tablet 1/9/2024 at 0800  Yes Yes   Sig: Take 81 mg by mouth daily   calcium carbonate (TUMS) 500 MG chewable tablet   Yes Yes   Sig: Take 2 chew tab by mouth every hour as needed for heartburn Max 8 tab/day   lisinopril (ZESTRIL) 20 MG tablet 1/9/2024 at 0800  Yes Yes   Sig: Take 20 mg by mouth daily   loperamide (IMODIUM) 2 MG capsule  Self Yes Yes   Sig: Take 2 mg by mouth 4 times daily as needed for diarrhea    methocarbamol (ROBAXIN) 500 MG tablet   Yes Yes   Sig: Take 500 mg by mouth 2 times daily as needed for muscle spasms or other (back pain)   metoprolol succinate ER (TOPROL-XL) 50 MG 24 hr tablet 1/9/2024 at 0800  No Yes   Sig: Take 1 tablet (50 mg) by mouth daily   omeprazole (PRILOSEC) 20 MG DR capsule 1/9/2024 at 0800  Yes Yes   Sig: Take 20 mg by mouth daily   ondansetron (ZOFRAN) 4 MG tablet  at 2000  Yes Yes   Sig: Take 4 mg by mouth at bedtime   ondansetron (ZOFRAN) 4 MG tablet   Yes Yes   Sig: Take 4 mg by mouth every 8 hours as needed for nausea   traMADol (ULTRAM) 25 mg TABS half-tab   Yes Yes   Sig: Take 25 mg by mouth daily as needed for moderate to severe pain   vitamin D3 (CHOLECALCIFEROL) 50 mcg (2000 units) tablet 1/9/2024 at 0800  Yes Yes   Sig: Take 1 tablet by mouth daily      Facility-Administered Medications: None     Current Facility-Administered Medications   Medication Dose Route Frequency    acetaminophen  1,000 mg Oral Q8H    aspirin  81 mg Oral Daily    azithromycin  250 mg Oral Daily    calcitonin (salmon)  1 spray Alternating Nostrils Daily    calcium carbonate-vitamin D  1 tablet Oral Daily    cefTRIAXone  2 g Intravenous Q24H    lisinopril  20 mg Oral Daily    magnesium sulfate  4 g Intravenous Once    metoprolol succinate ER  100 mg Oral Daily    pantoprazole  40 mg Oral Daily    PARoxetine  40 mg Oral QAM    sodium chloride (PF)  3 mL Intracatheter Q8H    sodium chloride (PF)  3 mL Intracatheter Q8H     Current Facility-Administered Medications   Medication Last Rate     Allergies   Allergies   Allergen Reactions    Demerol [Meperidine] Nausea and Vomiting    Sulfa Antibiotics Other (See Comments)     Extreme chills    Penicillins Rash     Swelling In mouth and tongue       Social History    reports that she quit smoking about 25 years ago. Her smoking use included cigarettes. She started smoking about 70 years ago. She has a 45 pack-year smoking history. She has never used  "smokeless tobacco. She reports current alcohol use. She reports that she does not use drugs.      Family History   I have reviewed this patient's family history and updated it with pertinent information if needed.  Family History   Problem Relation Age of Onset    Breast Cancer Paternal Grandmother     Colon Cancer Other         not specified    Fractures Other         Hip fracture, Aunt    Hyperlipidemia Mother     Hypertension Mother     Heart Disease Mother     Osteoporosis Mother     Hyperlipidemia Sister     Hyperlipidemia Brother     Prostate Cancer Brother     Hypertension Sister     Heart Disease Maternal Grandfather     Prostate Cancer Father     Colon Cancer Cousin         son of paternal aunt with breast cancer.    Breast Cancer Paternal Aunt     Colon Cancer Paternal Aunt     Other Cancer Paternal Uncle         Esophageal    Other Cancer Maternal Aunt           Review of Systems   A comprehensive review of system was performed and is negative other than that noted in the HPI or here.     Physical Exam   Vital Signs with Ranges  Temp:  [97.5  F (36.4  C)-98.9  F (37.2  C)] 97.6  F (36.4  C)  Pulse:  [] 110  Resp:  [6-19] 14  BP: (116-188)/() 122/69  SpO2:  [92 %-100 %] 92 %  Wt Readings from Last 4 Encounters:   01/11/24 87.3 kg (192 lb 7.4 oz)   08/28/23 95.3 kg (210 lb)   05/05/23 81.6 kg (180 lb)   05/04/22 97.5 kg (215 lb)     I/O last 3 completed shifts:  In: -   Out: 248 [Urine:248]      Vitals: /69 (BP Location: Right arm, Patient Position: Supine)   Pulse 110   Temp 97.6  F (36.4  C) (Oral)   Resp 14   Ht 1.778 m (5' 10\")   Wt 87.3 kg (192 lb 7.4 oz)   SpO2 92%   BMI 27.62 kg/m      Physical Exam:   General - Alert and oriented to time place and person in no acute distress. Disheveled.  Eyes - No scleral icterus  HEENT - Neck supple, moist mucous membranes  Cardiovascular - regular rate and rhythm   Extremities - There is no edema  Respiratory - decreased breath sounds " "left base  Skin - No pallor or cyanosis  Gastrointestinal - Non tender and non distended without rebound or guarding  Psych - odd affect   Neurological - awake and alert. Oriented to place but confused and does not give appropriate answers to many questions. Seems confused.     No lab results found in last 7 days.    Invalid input(s): \"TROPONINIES\"    Recent Labs   Lab 01/10/24  0708 01/09/24  1046   WBC 7.1 9.5   HGB 10.5* 12.0   * 109*    434    141   POTASSIUM 3.8 4.1   CHLORIDE 102 102   CO2 31* 28   BUN 16.1 17.1   CR 0.78 0.91   GFRESTIMATED 74 62   ANIONGAP 6* 11   PRIYA 8.3* 9.0   GLC 90 156*   ALBUMIN 3.3* 3.6   PROTTOTAL 5.6* 6.4   BILITOTAL 1.1 0.9   ALKPHOS 89 96   ALT 8 8   AST 14 14     Recent Labs   Lab Test 08/21/18  0000 05/08/18  0000   CHOL 98* 104   HDL 47 44   LDL 19 36   TRIG 159* 118     Recent Labs   Lab 01/10/24  0708 01/09/24  1046   WBC 7.1 9.5   HGB 10.5* 12.0   HCT 33.0* 37.0   * 109*    434     Recent Labs   Lab 01/09/24  1048   PHV 7.39   PO2V 24*   PCO2V 46   HCO3V 28     Recent Labs   Lab 01/09/24  1046   NTBNPI 5,117*     Recent Labs   Lab 01/09/24  1046   DD 0.66*     No results for input(s): \"SED\", \"CRP\" in the last 168 hours.  Recent Labs   Lab 01/10/24  0708 01/09/24  1046    434     No results for input(s): \"TSH\" in the last 168 hours.  Recent Labs   Lab 01/09/24  1318   COLOR Straw   APPEARANCE Clear   URINEGLC Negative   URINEBILI Negative   URINEKETONE Negative   SG 1.018   UBLD Negative   URINEPH 7.0   PROTEIN Negative   NITRITE Negative   LEUKEST Trace*   RBCU <1   WBCU 4     EKG 1/8/24 sinus tachycardia left anterior fascicular block, LVH, prolonged QT, poor R wave progression    Imaging:  Recent Results (from the past 48 hour(s))   CT Chest (PE) Abdomen Pelvis w Contrast    Narrative    CT CHEST PE ABDOMEN PELVIS W CONTRAST 1/9/2024 1:10 PM    CLINICAL HISTORY: Chest pain, CABRERA, recently diagnosed pneumonia, flank  pain  TECHNIQUE: " CT angiogram chest and routine CT abdomen pelvis with IV  contrast. Arterial phase through the chest and venous phase through  the abdomen and pelvis. 2D and 3D MIP reconstructions were preformed  by the CT technologist. Dose reduction techniques were used.     CONTRAST: 105mL [id number]    COMPARISON: Chest CT 12/14/2023 and CT of the abdomen and pelvis  5/5/2023    FINDINGS:  ANGIOGRAM CHEST: No pulmonary emboli. Enlarged main pulmonary artery  consistent with pulmonary arterial hypertension. Valuation of the  thoracic aorta lumen is limited due to timing of the contrast bolus.  No CT evidence of right heart strain.     LUNGS AND PLEURA: Postoperative changes RIGHT middle and lower  lobectomy and left lower lobe wedge resections. Stable scattered  centrilobular emphysema and subpleural fibrosis in the lung bases.  Superimposed patchy opacities in the lung bases bilaterally have  worsened since 12/14/2023, likely due to worsening atypical pneumonia.  Stable small right and trace left pleural effusion.    MEDIASTINUM/AXILLAE: Left mastectomy. No lymphadenopathy. No thoracic  aortic aneurysm. Cardiomegaly. Severe coronary artery calcifications.  No pericardial effusion.    HEPATOBILIARY: No focal lesions in the liver. Cholecystectomy. Mild  intrahepatic and extrahepatic biliary ductal dilatation, likely due to  postcholecystectomy reservoir effect.    PANCREAS: Normal.    SPLEEN: Normal.    ADRENAL GLANDS: Normal.    KIDNEYS/BLADDER: An exophytic hyperdense lesion measuring 1.7 cm at  the upper pole of the right kidney (series 12, image 78) is stable,  likely a cyst containing hemorrhagic or proteinaceous material. A few  other hypodense lesions in the kidneys, also likely cysts. No  hydronephrosis or perinephric stranding. No calculi. Punctate focus of  gas in the urinary bladder lumen.    BOWEL: Right hemicolectomy. Sigmoid diverticulosis. No small bowel or  colonic obstruction or inflammatory changes.    LYMPH  NODES: No lymphadenopathy.    PELVIC ORGANS: Hysterectomy. No pelvic masses.    OTHER: No abdominal aortic aneurysm. No free fluid or fluid  collections. No free air.    MUSCULOSKELETAL: Right shoulder arthroplasty. Moderate to severe wedge  compression fracture of T7 is new since 12/14/2023, either acute or  subacute.      Impression    IMPRESSION:  1.  No pulmonary emboli. Enlarged main pulmonary artery consistent  with pulmonary arterial hypertension.  2.  Worsening of patchy opacities in the lung bases bilaterally since  12/14/2023, likely representing worsening pneumonia. Stable small  right and trace left pleural effusion.  3.  New since 12/14/2023 is moderate to severe wedge compression  fracture T7. This may be acute or subacute. Please correlate with  tenderness and consider follow-up thoracic spine MRI.  4.  A punctate focus of gas in the urinary bladder lumen may be  related to infection or instrumentation. Please correlate with  urinalysis. Otherwise, no acute findings in the abdomen and pelvis.  5.  A stable 1.7 cm hyperdense lesion in the upper pole of the right  kidney, may be a cyst containing hemorrhagic or proteinaceous  material. Consider a follow-up renal MRI on a nonemergent basis for  further evaluation.    MALVIN VELEZ MD         SYSTEM ID:  [id number]   MR Thoracic Spine w/o & w Contrast    Narrative    MRI THORACIC SPINE WITHOUT CONTRAST  1/10/2024 3:21 PM     HISTORY: T7 fracture, history of cancer.    TECHNIQUE: Multiplanar multisequence MRI of the thoracic spine without  contrast.    COMPARISON: CTA chest/CT abdomen and pelvis 1/9/2024. CT chest  12/14/2023.    FINDINGS:  There is a recent appearing T7 burst fracture with moderate  to severe vertebral height loss, as seen on most recent CTA chest/CT  abdomen and pelvis exam 1/9/2024. There is approximately 3-4 mm  retropulsion of the posterior T7 vertebral body fracture fragment into  the ventral epidural space. There appears to be a  possible thin amount  of fluid in the epidural space at the level of T6-T8 (for example, see  series 16 image 45, series 16 image 48), raising concern for small  amount of epidural hematoma. The consultation of findings contribute  to mild to moderate central spinal canal stenosis and thecal sac  narrowing at the level of the T7 vertebral body fracture. No evidence  for high-grade mass effect in the spinal cord at this time. No  definite spinal cord signal abnormality. There is some abnormal  enhancement in the epidural space ventrally extending from T6 down to  T8 (series 18 image 9), nonspecific.       There is an unchanged mild chronic T1 compression deformity. No other  significant vertebral body height loss. There is mild to moderate  exaggeration of the normal thoracic kyphosis. No significant thoracic  spondylolisthesis in the sagittal plane. There is prominent edema-like  marrow signal throughout the T7 vertebral body likely related to the  recent fracture. This extends into the bilateral T7 pedicles, more so  on the left. There is mild prevertebral/paravertebral edema and  posterior paraspinous edema. There is focal STIR hypertense signal in  the T7-T8 interspinous ligament region, possibly indicating  ligamentous injury.    A few scattered nonspecific small foci of STIR hyperintense signal  with corresponding T1 isointense signal or slightly hyperintense  signal seen in the right T8 and T9 vertebral bodies and the left T10  and T11 vertebral bodies (for example see series 15 image 12). These  may represent atypical intraosseous hemangiomas, although they are  nonspecific. These lesions show minimal if any enhancement.    Mild to moderate multilevel disc height loss, most pronounced in the  mid thoracic region particularly at T5-6, T6-7 and T7-8. Shallow  multilevel disc bulges. Mild multilevel facet arthropathy. Mild to  moderate bordering on moderate spinal canal stenosis at T7. No  significant spinal  canal narrowing elsewhere. There appears to be  moderate left T7-T8 neural foraminal narrowing. Mild/mild to moderate  multifocal neural foraminal narrowing elsewhere. Partially visualized  bilateral pleural effusions.      Impression    IMPRESSION:  1. Recent burst type compression fracture of the T7 vertebral body  with retropulsion of the posterior aspect of the vertebral body into  the ventral epidural space. The etiology is uncertain, but no  definitive pathologic marrow replacing process seen associated with  this fracture. Consider close interval follow-up MRI to assess  expected evolution and exclude underlying lesion.  2. Findings concerning for thin epidural hematoma at the level of  T6-T8. The findings all together contribute to mild to moderate  bordering on moderate central spinal canal stenosis at the T7 level.  3. Nonspecific patchy enhancement in the ventral epidural space at the  level of T6-T8, which may be reactive. Attention on follow-up.  4. A few small foci of signal abnormality in the T8, T9, T10 and T11  vertebral bodies are nonspecific, but could possibly represent  atypical intraosseous hemangiomas. Recommend close interval follow-up.  5. Findings concerning for interspinous ligament injury at the T7-T8  level.  6. Partially visualized bilateral pleural effusions.    STEPH CASTELLANO MD         SYSTEM ID:  [id number]   Echocardiogram Complete   Result Value    LVEF  35-40%    Narrative    [id number]  [id number]  [id number]^TJ^MARCELINO^ROHAN     [facility]  Echocardiography Laboratory  [address] [place] [address]     Name: WARREN METCALF  MRN: [id number]  : 1940  Study Date: 2024 07:53 AM  Age: 83 yrs  Gender: Female  Patient Location: [facility]  Reason For Study: Heart Failure  Ordering Physician: MARCELINO SPENCER  Referring Physician: MARCELINO SPENCER  Performed By: DAVE Castro     BSA: 2.1 m2  Height: 69 in  Weight: 210 lb  HR: 80  BP:  181/115 mmHg  ______________________________________________________________________________  Procedure  Complete Portable Echo Adult. Optison (NDC #2694-1154) given intravenously.  ______________________________________________________________________________  Interpretation Summary     Severe hypokinesis of the inferior and inferolateral walls from base to apex.  Left ventricular systolic function is moderately reduced. The visual ejection  fraction is 35-40%.  The right ventricle is normal in structure, function and size.  There is mild (1+) tricuspid regurgitation.  Right ventricular systolic pressure is elevated, consistent with mild  pulmonary hypertension.  IVC diameter and respiratory changes fall into an intermediate range  suggesting an RA pressure of 8 mmHg.     Since the previous study from 2019, the LV function has declined.  ______________________________________________________________________________  Left Ventricle  The left ventricle is normal in size. There is normal left ventricular wall  thickness. Left ventricular systolic function is moderately reduced. The  visual ejection fraction is 35-40%. Diastolic Doppler findings (E/E' ratio  and/or other parameters) suggest left ventricular filling pressures are  indeterminate. Severe hypokinesis of the inferior and inferolateral walls from  base to apex.     Right Ventricle  The right ventricle is normal in structure, function and size.     Atria  Normal left atrial size. Right atrial size is normal. There is no atrial shunt  seen.     Mitral Valve  The mitral valve is normal in structure and function. There is trace mitral  regurgitation.     Tricuspid Valve  There is mild (1+) tricuspid regurgitation. The right ventricular systolic  pressure is approximated at 30.1 mmHg plus the right atrial pressure. IVC  diameter and respiratory changes fall into an intermediate range suggesting an  RA pressure of 8 mmHg. Right ventricular systolic pressure is  elevated,  consistent with mild pulmonary hypertension.     Aortic Valve  The aortic valve is normal in structure and function. No aortic regurgitation  is present. No aortic stenosis is present.     Pulmonic Valve  The pulmonic valve is not well seen, but is grossly normal. There is trace  pulmonic valvular regurgitation.     Vessels  Normal size aorta.     Pericardium  There is no pericardial effusion.     Rhythm  Sinus rhythm was noted.  ______________________________________________________________________________  MMode/2D Measurements & Calculations  IVSd: 1.2 cm     LVIDd: 4.4 cm  LVIDs: 3.1 cm  LVPWd: 1.1 cm  FS: 30.8 %  LV mass(C)d: 184.2 grams  LV mass(C)dI: 87.4 grams/m2  Ao root diam: 3.5 cm  asc Aorta Diam: 3.0 cm  LVOT diam: 2.1 cm  LVOT area: 3.5 cm2  Ao root diam index Ht(cm/m): 2.0  Ao root diam index BSA (cm/m2): 1.7  Asc Ao diam index BSA (cm/m2): 1.4  Asc Ao diam index Ht(cm/m): 1.7  LA Volume (BP): 54.3 ml     LA Volume Index (BP): 25.7 ml/m2  RV Base: 2.7 cm  RWT: 0.51  TAPSE: 3.3 cm     Doppler Measurements & Calculations  MV E max tomer: 101.0 cm/sec  MV max P.3 mmHg  MV mean P.8 mmHg  MV V2 VTI: 18.5 cm  MVA(VTI): 3.2 cm2  MV dec time: 0.11 sec  LV V1 max PG: 3.9 mmHg  LV V1 max: 99.0 cm/sec  LV V1 VTI: 16.9 cm  SV(LVOT): 59.0 ml  SI(LVOT): 28.0 ml/m2  PA acc time: 0.11 sec  TR max tomer: 274.2 cm/sec  TR max P.1 mmHg  Medial E/e': 11.1  RV S Tomer: 19.3 cm/sec     ______________________________________________________________________________  Report approved by: Tomasz Owusu 2024 09:41 AM             Echo:  Today  Echo result w/o MOPS: Interpretation Summary Severe hypokinesis of the inferior and inferolateral walls from base to apex.Left ventricular systolic function is moderately reduced. The visual ejectionfraction is 35-40%.The right ventricle is normal in structure, function and size.There is mild (1+) tricuspid regurgitation.Right ventricular systolic pressure is  "elevated, consistent with mildpulmonary hypertension.IVC diameter and respiratory changes fall into an intermediate rangesuggesting an RA pressure of 8 mmHg. Since the previous study from 2019, the LV function has declined.      Clinically Significant Risk Factors          # Hypocalcemia: Lowest Ca = 8.3 mg/dL in last 2 days, will monitor and replace as appropriate   # Hypomagnesemia: Lowest Mg = 1.4 mg/dL in last 2 days, will replace as needed   # Hypoalbuminemia: Lowest albumin = 3.3 g/dL at 1/10/2024  7:08 AM, will monitor as appropriate     # Hypertension: Noted on problem list  # Chronic heart failure with reduced ejection fraction: last echo with EF <40%       # Overweight: Estimated body mass index is 27.62 kg/m  as calculated from the following:    Height as of this encounter: 1.778 m (5' 10\").    Weight as of this encounter: 87.3 kg (192 lb 7.4 oz)., PRESENT ON ADMISSION     # Financial/Environmental Concerns: none        Cardiomyopathy  Metastatic Cancer: possible metastatic Cancer Metastatic to spine  Suspect dementia  Other Pulmonary Conditions: Idiopathic interstitial pneumonia  Pulmonary Heart Disease (Pulmonary hypertension or Cor pulmonale): Pulmonary Hypertension, unspecified  Chronic Fatigue and Other Debilities: Age-related physical debility  Neoplastic (malignant) related fatigue  Other reduced mobility                 "

## 2024-01-11 NOTE — PROGRESS NOTES
[facility]  Neurosurgery Daily Progress Note    Assessment & Plan   83 year old female with history of breast cancer, lung cancer, colon cancer, afib, and HTN who was admitted on 1/9/2024 with generalized weakness, back pain, and pneumonia. Neurosurgery was consulted for recent T7 compression fracture. Thoracic spine MRI reviewed with Dr. Whaley and Dr. Castro.     - Patient may be a candidate for T7 osteocool and kyphoplasty once medically cleared   - Cardiology following for low EF and cardiomyopathy   - Pulmonology completed bronchoscopy today   - Continue pain control measures as needed  - Upright thoracic spine xray ordered. If xray is stable, NSGY will sign off and patient can follow-up with Dr. Castro to further discuss possible surgery.   - Appreciate assistance from specialties     Discussed with Dr. Castro, Dr. Whaley, Hospitalist, and patient.   Will plan to call and update daughter on Friday as well.     Senia Jones CNP  [facility] Neurosurgery  Pager [phone]    Interval History   Patient was seen laying in bed. Reports continued middle back pain. Denies any radicular pain, numbness, or  new symptoms. Neuro exam stable.     MRI THORACIC SPINE WITHOUT CONTRAST  1/10/2024 3:21 PM                                             IMPRESSION:  1. Recent burst type compression fracture of the T7 vertebral body  with retropulsion of the posterior aspect of the vertebral body into  the ventral epidural space. The etiology is uncertain, but no  definitive pathologic marrow replacing process seen associated with  this fracture. Consider close interval follow-up MRI to assess  expected evolution and exclude underlying lesion.  2. Findings concerning for thin epidural hematoma at the level of  T6-T8. The findings all together contribute to mild to moderate  bordering on moderate central spinal canal stenosis at the T7 level.  3. Nonspecific patchy enhancement in the ventral  epidural space at the  level of T6-T8, which may be reactive. Attention on follow-up.  4. A few small foci of signal abnormality in the T8, T9, T10 and T11  vertebral bodies are nonspecific, but could possibly represent  atypical intraosseous hemangiomas. Recommend close interval follow-up.  5. Findings concerning for interspinous ligament injury at the T7-T8  level.  6. Partially visualized bilateral pleural effusions.      Physical Exam   Temp: 97.6  F (36.4  C) Temp src: Oral BP: 122/69 Pulse: 110   Resp: 14 SpO2: 92 % O2 Device: None (Room air)    Vitals:    01/11/24 1046   Weight: 87.3 kg (192 lb 7.4 oz)     Mental status:  Alert and Oriented x 3, speech is fluent.  Motor:  Generalized weakness but able to move all extremities equally   Shoulder Abduction  Right:  5/5   Left:  5/5  Biceps                      Right:  5/5   Left:  5/5  Triceps                     Right:  5/5   Left:  5/5  Wrist Extensors        Right:  5/5   Left:  5/5  Wrist Flexors            Right:  5/5   Left:  5/5  Intrinsics                   Right:  5/5   Left:  5/5     Iliopsoas  (hip flexion)               Right: 5/5  Left:  5/5  Quadriceps  (knee extension)       Right:  5/5  Left:  5/5  Hamstrings  (knee flexion)            Right:  5/5  Left:  5/5  Gastroc Soleus  (PF)                          Right:  5/5  Left:  5/5  Tibialis Ant  (DF)                          Right:  5/5  Left:  5/5  EHL                          Right:  5/5  Left:  5/5    Sensation:  Intact to light touch   Reflexes:   Negative Clonus.  Negative David's sign.      Tenderness to palpation of mid thoracic spine near T7.   Straight leg raise is negative bilaterally.

## 2024-01-11 NOTE — PROVIDER NOTIFICATION
MD Notification    Notified Person: MD    Notified Person Name: Tono Mathew     Notification Date/Time: 01/11/24  8:35 AM     Notification Interaction: amcom    Purpose of Notification: [id number]  MD wanted me to page you to please be aware of thoracic MRI findings before the bronchoscopy. Wondering if you want to defer until neurosurgery comments. Thanks! SEEMA Torres [phone]    Orders Received: Pt will be flat, okay to proceed      Comments:

## 2024-01-11 NOTE — PLAN OF CARE
Goal Outcome Evaluation:    Pt A&Ox3-4 w/ intermittent confusion observed. VSS on RA. [place] w/out hearing aids. Assist of 1 BG & Walker. Pure wick in place. Tolerating reg diet; NPO since midnight. Incontinent of B&B. Mepliex placed on Coccyx due to blanchable redness. Takes meds whole w/ thin liquids (water). Pain managed w/ oxycodone and scheduled tylenol. CHG wipes done.

## 2024-01-11 NOTE — PROVIDER NOTIFICATION
MD Notification    Notified Person: MD    Notified Person Name: Shirley    Notification Date/Time: 01/11/24   1:47 PM     Notification Interaction: dottie     Purpose of Notification: FYI pt intermittently going in and out of A-Fib today. She was just in A-Fib for about a minute and now back to NSR    Orders Received:    Comments:

## 2024-01-11 NOTE — PROVIDER NOTIFICATION
MD Notification    Notified Person: MD    Notified Person Name: aishwarya    Notification Date/Time: 01/11/24  1:46 PM    Notification Interaction: dottie    Purpose of Notification:  FYI pt intermittently going in and out of A-Fib today.  She was just in A-Fib for about a minute and now back to NSR    Orders Received: EKG ordered.     Comments:

## 2024-01-11 NOTE — PROCEDURES
Procedure(s):    A flexible bronchoscopy  Airway exam  BAL  Therapeutic suctioning (4 sites)    Indication:  bilateral infiltrates    Medications:    6 ml 4% lidocaine  6 ml 1% lidocaine  1 spray(s) hurricaine spray  1.5 mg versed  75 mcg fentanyl    Sedation Time:   Total sedation time was Choose Duration: 20 minutes of continuous bedside 1:1 monitoring.    Time Out:  Performed    The patient's medical record has been reviewed.  The indication for the procedure was reviewed.  The necessary history and physical examination was performed and reviewed.  The risks, benefits and alternatives of the procedure were discussed with the the patient in detail and she had the opportunity to ask questions.  I discussed in particular the potential complications including risks of minor or life-threatening bleeding and/or infection, respiratory failure, vocal cord trauma / paralysis, pneumothorax, and discomfort. Sedation risks were also discussed including abnormal heart rhythms, low blood pressure, and respiratory failure. All questions were answered to the best of my ability.  Verbal and written informed consent was obtained.  The proposed procedure and the patient's identification were verified prior to the procedure by the physician and the nurse.    The patient was assessed for the adequacy for the procedure and to receive medications.   Mental Status:  Alert and oriented x 3  Airway examination:  Class IV (Soft palate is not visual, hard palate only)  Pulmonary:  Clear to ausculation bilaterally  CV:  Tacycardic, no murmurs or gallops  ASA Grade:  (III)  Severe systemic disease    After clinical evaluation and reviewing the indication, risks, alternatives and benefits of the procedure the patient was deemed to be in satisfactory condition to undergo the procedure.      Immediately before administration of medications the patient was re-assessed for adequacy to receive sedatives including the heart rate, respiratory  rate, mental status, oxygen saturation, blood pressure and adequacy of pulmonary ventilation. These same parameters were continuously monitored throughout the procedure.    A Tuberculosis risk assessment was performed:  The patient has no known RISK of Tuberculosis    The procedure was performed in a negative airflow room: Yes    Maneuvers / Procedure:      Airway Examination: A complete airway examination was performed from the distal trachea to the subsegmental level in each lobe of both lungs.  Pertinent findings include significant compression of bronchus intermedius, no endobronchial lesions, minimal secretions.       BAL: The bronchoscope was wedged into the LLL bronchus. A total of 130cc of saline was instilled and a total of 40cc was aspirated. This was sent for analysis.   Therapeutic suctionin-3min of operative time was spent clearing out the airway of debris, blood and mucous prior to the intervention.     Recommendations:     -->  sample sent for analysis, will follow-up  -->  OK to return to medicine floor     Сергей Mathew MD  Pulmonary Disease and Critical Care Medicine  [facility]

## 2024-01-12 NOTE — PROGRESS NOTES
Brief Pulmonary Note    BAL cell count w/ neutrophil predominance concerning for infection. Cytology pending. Would continue ABx and monitor for Cx results. No additional recommendations.     Pulmonary will sign off.     Сергей Mathew MD  Pulmonary Disease and Critical Care Medicine  [facility]

## 2024-01-12 NOTE — PROGRESS NOTES
AO x2. Disoriented to place and situation. Intermittent confusion and lethargic overnight. Did not get OOB. Turned and repositioned. Tele-AFIB. Pain managed with PRN Oxy. Pt c/o nausea, PRN zofran given-symptoms resolved. No IV access. Incontinent of B/B.

## 2024-01-12 NOTE — PROGRESS NOTES
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  --completed adjuvant chemotherapy with navelbine and cisplatin  --On 7/26/19 she underwent a wedge resection of the left lower lobe lung nodule with pathology revealing a second primary 1.8 cm squamous cell cancer, stage E4mC2O2 or stage 1.  --Presented to [facility] ER on 5/5/2023 for evaluation of nausea and vomiting.  CT abdomen and pelvis in the ER on 5/5/2023 showed a new 1.1 cm left lower lobe nodule that raised a concern given her prior history of lung, breast and colon cancers.  --PET/CT 6/23/2023 showed development of a spiculated FDG avid nodule in the left lower lobe measuring 1 cm (max SUV 3.2) suspicious for early primary lung neoplasm without metastasis.  No findings concerning for distant metastatic disease.  No PET lymphadenopathy.  --CT-guided biopsy of left lower lobe lung on 7/10/2023 showed moderate to poorly differentiated squamous cell cancer.  IHC supported the diagnosis showing positive reactivity for p40 and cytokeratin 5.  --MRI brain 7/18/2023 showed no findings concerning for CNS metastatic disease  --7/17/2023 met with Dr Arenas .  Surgical resection was not favored.  Referred for consideration for SBRT   --completed SBRT to the left lower lobe lung mass that was treated to a dose of 5200 cGy over 4 treatment fractions from 8/11/2023 to 8/18/2023 under the direction of Dr. Olivo  --CT chest with contrast on 12/14/2023 showed patchy consolidative opacity seen within the left lower lobe with findings suspicious for developing pneumonia.  Stable 1.1 cm left lower lobe lung nodule.  Slightly enlarging small to moderate right pleural effusion.    --CT chest, abdomen, pelvis with contrast on 1/9/2024 worsening patchy opacities in the lung bases bilaterally since 12/14/2023 likely representing worsening pneumonia.  New  "since 12/14/2023 was wedge compression fracture of T7.  Stable 1.7 cm right upper pole hypodense kidney lesion felt to be likely a cyst - likely continued follow up with serial imaging which will be obtained for her lung cancer anyway  --underwent bronchoscopy 1/11/2024 which showed no endobronchial lesions, significant compression of the bronchus intermedius; BAL cytology pending   --low clinical suspicion for recurrence of her lung cancer   --await BAL cytology results   -- we will continue to follow her with you     T7 compression fracture  Back pain  --pain control   --MRI thoracic spine 1/10/2024 showed burst type fracture of T7 vertebral body with retropulsion of the posterior aspect of the vertebral body into the ventral epidural space.  No definite marrow replacing process was seen.  Epidural hematoma at T6-6 with findings of central spinal canal stenosis at the T7 level.    --has h/o osteopenia   --maybe candidate for T7 osteocool and kyphoplasty per neurosurgery  --per neurosurgery/hospitalist team     Bilateral lung base opacities   --echo on 1/11/2024 showed LVEF 35-40% with hypokinesis of inferior and inferolateral walls   --proBNP 5117 on 1/9/2024  --no fevers or leukocytosis   --bibasal opacities related to ?heart failure   --infectious work up so far   --on empiric Ceftriaxone/Azithromycin for presumed community acquired pneumonia      Prior h/o breast and colon cancers  --has had genetic work up which revealed BRCA2 mutation  --continued outpatient follow up      We will continue to follow her with you.    Deepak Borrero MD               Interval History:     No acute events overnight               Review of Systems:   As per subjective, otherwise 5 systems reviewed and negative.           Physical Exam:   Blood pressure (!) 155/83, pulse 106, temperature 99  F (37.2  C), temperature source Oral, resp. rate 17, height 1.778 m (5' 10\"), weight 87.3 kg (192 lb 7.4 oz), SpO2 90%, not currently " breastfeeding.      Vital Sign Ranges  Temperature Temp  Av.2  F (36.8  C)  Min: 97.4  F (36.3  C)  Max: 99  F (37.2  C)   Blood pressure Systolic (24hrs), Av , Min:116 , Max:188        Diastolic (24hrs), Av, Min:62, Max:112      Pulse Pulse  Av.3  Min: 84  Max: 123   Respirations Resp  Av.5  Min: 6  Max: 19   Pulse oximetry SpO2  Av.1 %  Min: 90 %  Max: 100 %         Intake/Output Summary (Last 24 hours) at 2024 0809  Last data filed at 2024 1812  Gross per 24 hour   Intake 3 ml   Output 750 ml   Net -747 ml       Constitutional:   No acute distress.   Skin:   No rashes, petechiae, or ecchymoses.   HEENT:   No conjunuctival pallor or scleral icterus    Neck:   Supple.   Lungs:   Clear to auscultation bilaterally.   Cardiovascular:   Regular rate and rhythm with no murmurs, rubs, or gallops.   Abdomen:   BS(+)   Extremities:   No ankle edema    Neurological:   Moving all 4 extremities            Medications:     No current outpatient medications on file.                Data:     Results for orders placed or performed during the hospital encounter of 24 (from the past 24 hour(s))   Echocardiogram Complete   Result Value Ref Range    LVEF  35-40%     Narrative    [id number]  [id number]  [id number]  [id number]^TJ^MARCELINO^ROHAN     [facility]  Echocardiography Laboratory  [address] [place] [address]     Name: WARREN METCALF  MRN: [id number]  : 1940  Study Date: 2024 07:53 AM  Age: 83 yrs  Gender: Female  [facility] [place]  Reason For Study: Heart Failure  Ordering Physician: MARCELINO SPENCER  Referring Physician: MARCELINO SPENCER  Performed By: SAMANTHA Castro     BSA: 2.1 m2  Height: 69 in  Weight: 210 lb  HR: 80  BP: 181/115 mmHg  ______________________________________________________________________________  Procedure  Complete Portable Echo Adult. [employer] (NDC #[id number]) given  intravenously.  ______________________________________________________________________________  Interpretation Summary     Severe hypokinesis of the inferior and inferolateral walls from base to apex.  Left ventricular systolic function is moderately reduced. The visual ejection  fraction is 35-40%.  The right ventricle is normal in structure, function and size.  There is mild (1+) tricuspid regurgitation.  Right ventricular systolic pressure is elevated, consistent with mild  pulmonary hypertension.  IVC diameter and respiratory changes fall into an intermediate range  suggesting an RA pressure of 8 mmHg.     Since the previous study from 2019, the LV function has declined.  ______________________________________________________________________________  Left Ventricle  The left ventricle is normal in size. There is normal left ventricular wall  thickness. Left ventricular systolic function is moderately reduced. The  visual ejection fraction is 35-40%. Diastolic Doppler findings (E/E' ratio  and/or other parameters) suggest left ventricular filling pressures are  indeterminate. Severe hypokinesis of the inferior and inferolateral walls from  base to apex.     Right Ventricle  The right ventricle is normal in structure, function and size.     Atria  Normal left atrial size. Right atrial size is normal. There is no atrial shunt  seen.     Mitral Valve  The mitral valve is normal in structure and function. There is trace mitral  regurgitation.     Tricuspid Valve  There is mild (1+) tricuspid regurgitation. The right ventricular systolic  pressure is approximated at 30.1 mmHg plus the right atrial pressure. IVC  diameter and respiratory changes fall into an intermediate range suggesting an  RA pressure of 8 mmHg. Right ventricular systolic pressure is elevated,  consistent with mild pulmonary hypertension.     Aortic Valve  The aortic valve is normal in structure and function. No aortic regurgitation  is present. No  aortic stenosis is present.     Pulmonic Valve  The pulmonic valve is not well seen, but is grossly normal. There is trace  pulmonic valvular regurgitation.     Vessels  Normal size aorta.     Pericardium  There is no pericardial effusion.     Rhythm  Sinus rhythm was noted.  ______________________________________________________________________________  MMode/2D Measurements & Calculations  IVSd: 1.2 cm     LVIDd: 4.4 cm  LVIDs: 3.1 cm  LVPWd: 1.1 cm  FS: 30.8 %  LV mass(C)d: 184.2 grams  LV mass(C)dI: 87.4 grams/m2  Ao root diam: 3.5 cm  asc Aorta Diam: 3.0 cm  LVOT diam: 2.1 cm  LVOT area: 3.5 cm2  Ao root diam index Ht(cm/m): 2.0  Ao root diam index BSA (cm/m2): 1.7  Asc Ao diam index BSA (cm/m2): 1.4  Asc Ao diam index Ht(cm/m): 1.7  LA Volume (BP): 54.3 ml     LA Volume Index (BP): 25.7 ml/m2  RV Base: 2.7 cm  RWT: 0.51  TAPSE: 3.3 cm     Doppler Measurements & Calculations  MV E max tomer: 101.0 cm/sec  MV max P.3 mmHg  MV mean P.8 mmHg  MV V2 VTI: 18.5 cm  MVA(VTI): 3.2 cm2  MV dec time: 0.11 sec  LV V1 max PG: 3.9 mmHg  LV V1 max: 99.0 cm/sec  LV V1 VTI: 16.9 cm  SV(LVOT): 59.0 ml  SI(LVOT): 28.0 ml/m2  PA acc time: 0.11 sec  TR max tomer: 274.2 cm/sec  TR max P.1 mmHg  Medial E/e': 11.1  RV S Tomer: 19.3 cm/sec     ______________________________________________________________________________  Report approved by: Tomasz Owusu 2024 09:41 AM         Cell count with differential fluid - BAL Site 1    Narrative    The following orders were created for panel order Cell count with differential fluid - BAL Site 1.  Procedure                               Abnormality         Status                     ---------                               -----------         ------                     Cell Count Body Fluid[[id number]]        Abnormal            Final result               Differential Body Fluid[[id number]]                          Final result                 Please view results for these tests on  the individual orders.   Respiratory Aerobic Bacterial Culture with Gram Stain    Specimen: Lung, Lower Lobe, Left; Bronchial Alveolar Lavage   Result Value Ref Range    Gram Stain Result <25 PMNs/low power field     Gram Stain Result 1+ Mixed ximena    Acid-Fast Bacilli Culture and Stain    Specimen: Lung, Lower Lobe, Left; Bronchial Alveolar Lavage    Narrative    The following orders were created for panel order Acid-Fast Bacilli Culture and Stain.  Procedure                               Abnormality         Status                     ---------                               -----------         ------                     Acid-Fast Bacilli Cultur...[[id number]]                      In process                   Please view results for these tests on the individual orders.   Koh prep - BAL Site 1    Specimen: Lung, Lower Lobe, Left; Bronchial Alveolar Lavage   Result Value Ref Range    KOH Preparation No fungal elements seen     KOH Preparation Reference Range: No fungal elements seen.    Cell Count Body Fluid   Result Value Ref Range    Color Pink (A) Colorless, Yellow    Clarity Cloudy (A) Clear    Cell Count Fluid Source Lung, Lower Lobe, Left     Total Nucleated Cells 288 /uL    Narrative    No reference ranges have been established.  This result  should be interpreted in the context of the patient's clinical condition and   compared to simultaneous measurement in the patient's blood.         Differential Body Fluid   Result Value Ref Range    % Neutrophils 54 %    % Lymphocytes 3 %    % Monocyte/Macrophages 43 %    Narrative    No reference ranges have been established. This result should be interpreted in the context of the patient's clinical condition and compared to simultaneous measurement in the patient's blood.   EKG 12-lead, tracing only   Result Value Ref Range    Systolic Blood Pressure  mmHg    Diastolic Blood Pressure  mmHg    Ventricular Rate 87 BPM    Atrial Rate 87 BPM    IA Interval 200 ms    QRS  Duration 92 ms     ms    QTc 517 ms    P Axis 60 degrees    R AXIS -42 degrees    T Axis 33 degrees    Interpretation ECG       Sinus rhythm with sinus arrhythmia  Left axis deviation  Nonspecific ST abnormality  Prolonged QT  Abnormal ECG  When compared with ECG of 09-JAN-2024 19:02,  No significant change was found     Magnesium   Result Value Ref Range    Magnesium 2.0 1.7 - 2.3 mg/dL   Basic metabolic panel   Result Value Ref Range    Sodium 137 135 - 145 mmol/L    Potassium 3.3 (L) 3.4 - 5.3 mmol/L    Chloride 97 (L) 98 - 107 mmol/L    Carbon Dioxide (CO2) 33 (H) 22 - 29 mmol/L    Anion Gap 7 7 - 15 mmol/L    Urea Nitrogen 13.9 8.0 - 23.0 mg/dL    Creatinine 0.70 0.51 - 0.95 mg/dL    GFR Estimate 84 >60 mL/min/1.73m2    Calcium 8.5 (L) 8.8 - 10.2 mg/dL    Glucose 115 (H) 70 - 99 mg/dL   CBC with platelets   Result Value Ref Range    WBC Count 10.7 4.0 - 11.0 10e3/uL    RBC Count 3.11 (L) 3.80 - 5.20 10e6/uL    Hemoglobin 11.2 (L) 11.7 - 15.7 g/dL    Hematocrit 33.6 (L) 35.0 - 47.0 %     (H) 78 - 100 fL    MCH 36.0 (H) 26.5 - 33.0 pg    MCHC 33.3 31.5 - 36.5 g/dL    RDW 17.3 (H) 10.0 - 15.0 %    Platelet Count 339 150 - 450 10e3/uL   TSH with free T4 reflex   Result Value Ref Range    TSH 1.43 0.30 - 4.20 uIU/mL

## 2024-01-12 NOTE — PLAN OF CARE
Goal Outcome Evaluation:    Pt A&O x4, ex very lethargic after returning from procedure. Intermittent confusion. [place]. VSS on RA, ex tachy and HTN; /112 PRN hydralazine x1. Tele- int A-fib CVR and NSR, MD notified, EKG ordered. A1-GB/W. Purewic in place. Regular diet, poor appetite. Pain managed with rest, repo, scheduled tylenol, and PRN robaxin x1 and oxy x1. Echo completed, EF 35-40, seen by CARDS, signed off. Bronchoscopy completed, pulmonology following. PIV SL int abx. Mg 1.4, replaced, recheck pending. Sacral mepilex for blanchable redness. Neurosurgery and oncology following.

## 2024-01-12 NOTE — PROGRESS NOTES
VSS except HTN and tachycardia. Pt was lethargic in the morning but had received PRN oxycodone on the previous shift. She responded appropriately when spoken to in a loud voice. Pt denied pain for much of the shift and received scheduled tylenol. At shift change she did report some breakthrough pain and received PRN oxycodone. Abnormal labs: Potassium 3.3, on protocol, replaced. Magnesium: 1.6, on protocol, replaced.

## 2024-01-12 NOTE — PROGRESS NOTES
[facility]    Neurosurgery  Daily Note    Assessment & Plan   Procedure(s):  Bronchoscopy flexible and rigid   1 Day Post-Op  83 year old female with history of breast cancer, lung cancer, colon cancer, afib, and HTN who was admitted on 1/9/2024 with generalized weakness, back pain, and pneumonia. Neurosurgery was consulted for recent T7 compression fracture. Thoracic spine MRI reviewed with Dr. Whaley and Dr. Castro.     Rounds: Patient seen lying in bed comfortably. Ongoing back pain and left hip pain. Denies radicular symptoms, paresthesias, new weakness. Patient stills needs Upright XR. Neuro exam stable.       Plan:  - Patient may be a candidate for T7 osteocool and kyphoplasty once medically cleared   - Cardiology following for low EF and cardiomyopathy   - Pulmonology completed bronchoscopy today   - Continue pain control measures as needed  - Upright thoracic spine xray ordered. If xray is stable, NSGY will sign off and patient can follow-up with Dr. Castro to further discuss possible surgery.   - Appreciate assistance from specialties     Nathaly العراقي PA-C  [facility] Neurosurgery  [address] [place] [address]    Interval History   Stable.  Doing well.      Physical Exam   Temp: 99  F (37.2  C) Temp src: Oral BP: (!) 155/83 Pulse: 106   Resp: 17 SpO2: 90 % O2 Device: None (Room air) Oxygen Delivery: 2 LPM  Vitals:    01/11/24 1046   Weight: 87.3 kg (192 lb 7.4 oz)     Vital Signs with Ranges  Temp:  [97.4  F (36.3  C)-99  F (37.2  C)] 99  F (37.2  C)  Pulse:  [] 106  Resp:  [16-17] 17  BP: (122-162)/(62-99) 155/83  SpO2:  [90 %-99 %] 90 %  I/O last 3 completed shifts:  In: 3 [I.V.:3]  Out: 750 [Urine:750]    Awake, alert, and appropriate. NAD  Moves all extremities equally   Strength 5/5 BLE   Sensation intact to light touch BLE   Clonus negative bilaterally.       Medications       acetaminophen  1,000 mg Oral Q8H    aspirin  81 mg Oral  Daily    calcitonin (salmon)  1 spray Alternating Nostrils Daily    calcium carbonate-vitamin D  1 tablet Oral Daily    cefTRIAXone  2 g Intravenous Q24H    doxycycline hyclate  100 mg Oral Q12H GARRICK (08/20)    lisinopril  10 mg Oral Daily    magnesium oxide  400 mg Oral Q4H    methocarbamol  500 mg Oral BID    metoprolol tartrate  100 mg Oral BID    pantoprazole  40 mg Oral Daily    PARoxetine  40 mg Oral QAM    sodium chloride (PF)  3 mL Intracatheter Q8H

## 2024-01-13 NOTE — PROGRESS NOTES
"[facility]    Hospitalist Progress Note      Assessment & Plan   Yolanda Watson is a 83 year old female admitted on 1/9/2024. She istory of breast cancer, primary lung cancer, colon cancer [BRCA 2]  coming to ER with bilteral weakness and back pain     # Pnuemonia   # Sepsis with lactic acidosis per POCT,  follow-up LA wnl at 1.8; resolved.    # CT scan Worsening of patchy opacities in the lung bases bilaterally 1, likely representing worsening pneumonia. Stable small right and trace left pleural effusion.  -Continue azithromycin and ceftriaxone   -MRSA/MSSA nares NEG  -Urine Legionella and urine pneumonia NEG  -Follow blood cultures NGTD  CT chest NEG PE  -UA wnl  -1/12/2024, due to long , azithromycin is continued and changed to doxycycline.  Noted patient also on paroxetine however as above discontinued azithromycin.    1/13/2024 repeat EKG, assess QT 1/13/24 improved at 476.         # Lung cancer  CM, ? Chronic ischemic CM per Cardiology  -PET CT June 23, 2023 showed development of a spiculated FDG avid nodule in the left lower lobe measuring 1 cm suspicious for early primary lung neoplasm without mets no PET lymphadenopathy no finding concerning for distant metastatic disease  CT chest with contrast on 12/14/2023 showed patchyconsolidative opacity seen within the left lower lobe with findings suspicious for developing pneumonia.  Stable 1.1 cm left lower lobe lung nodule.  Slightly enlarging small to moderate right pleural effusion  CT scan 1/9/24     Worsening of patchy opacities in the lung bases bilaterally since  12/14/2023, likely representing worsening pneumonia. Stable small right and trace left pleural effusion.  -- consult  [place] [facility]; see note.   -consult [facility] pulmonology, see note 1/10/2023, plan for bronchoscopy with BAL.  See note 1/12/2024,\" continue antibiotics\"  Pulmonary to follow BAL laboratories.    Chronic ischemic cardiomyopathy per " "Cardiology  Long QT improved   Hypokalemia after IV furosemide on replacement protocol  Hypomagnesemia, after IV furosemide, on replacement protocol  -Admission BNP 5.1K, chest CT without indications of pulm vascular congestion, serial troponins flat, 20, 19,  -1/11/2024: Echo new CM, EF 35-40%, severe hypokinesia inferior and inferolateral..  Cardiology consult.  See note 1/11/2024, \"continue ASA 81 mg, metoprolol succinate 100 mg p.o. daily, lisinopril 20 mg furosemide 20 mg IV one-time reassess regarding need for indications of chronic low-dose diuretic lipid panel, previously on statin.'  No a fib per Cardiology review of tele.   On admission sinus tachycardia.  Will obtain EKG to confirm [sinus], hold lisinopril for now to allow increase in metoprolol, changed to Lopressor 100 mg twice daily.  Check TFTs, on magnesium replacement protocol, potassium 3.8, recheck in a.m. on IV furosemide.  Continue telemetry.  -1/12/2024.  EKG sinus.  TFTs WNL.  Potassium 3.3, magnesium 1.6.  Initiate potassium and magnesium replacement therapy.  EKG with long QT, 517.  Discontinue azithromycin, transition to doxycycline, for now continue paroxetine.  Per pharmacy, [dot] with 2 prolonging QT agent if DC azithromycin, can continue paroxetine, close monitor.  BP elevated on hold lisinopril to allow increase dose of metoprolol given relative tachycardia.  Therefore, restart lisinopril.    -1/13/2024 EKG with improved QTc 517> 476.  BMP at 11 K after one-time furosemide per Cardiology.  No hypoxia however rather severe CM per echo.  Initiate furosemide 40 mg IV x 2, every 12 hours, reassess fluid status in a.m., likely will start on scheduled diuresis.  Monitor BMP, K, magnesium.          # History of Breast cancer   # History of colon cancer   BRCA 2 mut  In remission        # Back pain  # Wedge compression fracture T7  -No recent fall.  No urinary incontinence patient has chronic fecal incontinence and chronic bilateral " weakness  -Neurosurgery consult, see note 1/10/2024, -thoracic MR burst type compression fracture T7 findings concerning for thin epidural hematoma at T6/T8.  Discussed with Neurosurgery NP, note pending.  -pain management, currently patient rates pain at 5/10 pain scale.  Review of records indicates use of as needed oxycodone only 1-3 times/day.  Monitor bowels on oxycodone.  -Will start patient on calcitonin spray for pain management patient requiring oxycodone as needed.  -1/12/2024 on morning encounters was found sleepy yet arousable, somewhat rambling.  However when awoken by myself and nursing her response appeared appropriate, no neurologic focality and suspect that she was just given as needed oxycodone as contributory to mentation on morning encounter.  Because of this we will increase scheduled analgesia for her T7 fracture including scheduled acetaminophen, scheduled Robaxin and attempts to decrease use of as needed narcotics..  See Neurosurgery note, at this point patient is not a candidate for kyphoplasty.  Per nephrology outpatient follow-up.  No heavy lifting  -1/13/2024 patient complaining only of modest back pain, will add scheduled topical Voltaren.  No lumbar radicular S/S.  Monitor.     # Incidental Finding     A stable 1.7 cm hyperdense lesion in the upper pole of the right  kidney, may be a cyst containing hemorrhagic or proteinaceous  material. Consider a follow-up renal MRI on a nonemergent basis for  further evaluation.  -Outpatient follow-up        # Hypomagnesmia  Mag replacement!      # Hypertension  Continue PTA metoprolol 50 mg tablet daily lisinopril 20 mg daily, however due to relative tachycardia held lisinopril to allow increase in metoprolol to 100 mg/day, monitor.  Further increase metoprolol changed to tartrate 100 mg twice daily.  Patient back on lisinopril 10 mg, increase to 20 mg, monitor.  There may be some pain component to her hypertensive readings.      DVT Prophylaxis:  Pneumatic Compression Devices  Code Status: No CPR- Do NOT Intubate     Expected Discharge Date: 01/14/2024,  9:00 AM    Destination: assisted living  Discharge Comments: Return to [facility]?       Db Watson MD  Text Page (7am - 6pm, M-F)    Total time 50 minutes for today 1/13/2024 :  time consisted of the following, examination of patient, review of records including labs, imaging results, medications, interdisciplinary notes and completing documentation and orders.  Care Management included counseling/discussion with Patient regarding current condition including back pain and Coordination of Care time with Nursing re: fluid status; electrolyte replacement and back pain  and Specialists, Cardiology regarding care plan, management and surveillance; as above. .     Interval History   Patient more awake, uncomfortable due to back pain, notes no leg pain or bladder or bowel incontinence.  Lying flat in bed, denies PND, orthopnea, no hypoxia.      -Data reviewed today: I reviewed all new labs and imaging results over the last 24 jean maire    Physical Exam   Temp: 98.1  F (36.7  C) Temp src: Oral BP: (!) 149/87 Pulse: 110   Resp: 16 SpO2: 96 % O2 Device: Nasal cannula Oxygen Delivery: 2 LPM    General/Constitutional:   NAD, more awake, calm, cooperative   chest/Respiratory: Respirations nonlabored room air  Cardiovascular: Regular, no murmur appreciated.    Gastrointestinal/Abdomen:  soft, nontender,   Neuro.  Gross motor tested, nonfocal, legs 3/5 symmetrical.  Ambulation not tested  Psych oriented, affect calm      Medications      acetaminophen  1,000 mg Oral Q8H    aspirin  81 mg Oral Daily    calcitonin (salmon)  1 spray Alternating Nostrils Daily    calcium carbonate-vitamin D  1 tablet Oral Daily    cefTRIAXone  2 g Intravenous Q24H    diclofenac  4 g Topical 4x Daily    doxycycline hyclate  100 mg Oral Q12H [facility] (08/20)    furosemide  40 mg Intravenous Q12H    lisinopril  20 mg Oral Daily    magnesium  sulfate  4 g Intravenous Once    methocarbamol  500 mg Oral BID    metoprolol tartrate  100 mg Oral BID    pantoprazole  40 mg Oral Daily    PARoxetine  40 mg Oral QAM    sodium chloride (PF)  3 mL Intracatheter Q8H       Data   Recent Labs   Lab 01/13/24  0801 01/12/24  1941 01/12/24  0718 01/10/24  0708 01/09/24  1046   WBC 6.6  --  10.7 7.1 9.5   HGB 10.3*  --  11.2* 10.5* 12.0   *  --  108* 110* 109*     --  339 331 434     --  137 139 141   POTASSIUM 4.2 3.8 3.3* 3.8 4.1   CHLORIDE 100  --  97* 102 102   CO2 30*  --  33* 31* 28   BUN 16.9  --  13.9 16.1 17.1   CR 0.71  --  0.70 0.78 0.91   ANIONGAP 8  --  7 6* 11   PRIYA 8.6*  --  8.5* 8.3* 9.0   GLC 86  --  115* 90 156*   ALBUMIN  --   --   --  3.3* 3.6   PROTTOTAL  --   --   --  5.6* 6.4   BILITOTAL  --   --   --  1.1 0.9   ALKPHOS  --   --   --  89 96   ALT  --   --   --  8 8   AST  --   --   --  14 14

## 2024-01-13 NOTE — PROGRESS NOTES
Pt is alert and oriented to self , Up with 2 assist with dot steady , regular diet , incontinent with bladder had a pure, Pt michaela pain and very sleepy all day ,  Pt has pure wick in placed, Pt is on tele for Afib, Pt had x ray today ,Pt is potassium and  mag protocol , Mag re check is due 1/13/2024 at 6:00 am.Pt mepilex was replace on cocco oxy, Plan to  treat bacteremia before surgery.

## 2024-01-13 NOTE — PROGRESS NOTES
VSS except HTN and tachycardia. Pt was lethargic in the morning did not eat breakfast. Pt received lasix. Urinary output > 1,000ml. Pt more alert in the afternoon. She denied pain for most of the shift and received scheduled tylenol.     New this shift  Abnormal labs:   BNP: 11,185.  was 5,117 4 days ago. (MD ordered lasix. See orders)  Magnesium: 1.4, on protocol, replaced.     Medication changes   Magnesium 4 g IV once.  Lasix Q12 hours.   Voltaren QID  Lisinopril increased from 10 mg to 20mg daily

## 2024-01-13 NOTE — PROGRESS NOTES
Hem- onc chart check    Chart reviewed  Awaiting BAL results  No new recommendation    Arianna Coello MD  MNO

## 2024-01-13 NOTE — PROGRESS NOTES
Neurosurgery progress note:    Upright x-ray's 1/12/24    IMPRESSION: The upper thoracic spine above T4 is not included in the field-of-view on the lateral image. 12 thoracic vertebra are assumed. Moderate anterior wedge compression fracture at the presumed T7 vertebral level with 60-70% anterior vertebral   height loss similar to the previous MRI. Segmental kyphosis centered at this level now measures 37 degrees from T6 through T8 and the upright position compared to 29 degrees on the prior MRI. No definite new fracture elsewhere. Unchanged multilevel   thoracic spondylosis. Suspected right greater than left pleural effusions with adjacent airspace opacities.    RECOMMENDATIONS:  Neurosurgery will sign off and arrange follow up in our NS clinic with with Dr. Castro to discuss options for consideration of osteocool/kyphoplasty.    No lifting greater than 5-10 lbs, no excessive bending or twisting.    Discussed with Dr. Kelly.    Dana Willingham, RADHA  [facility] Neurosurgery  [address] [address]    Tel [phone]  Pager [phone]

## 2024-01-14 NOTE — PROGRESS NOTES
Date & Time: 1/13-1/14 [id number]  Behavior & Aggression: green  Fall Risk: yes  Orientation:Aox3, disoriented to time, intermittent confusion   ABNL VS/O2: VSS on 2L O2, hypertensive, tele on SR  Pain Management: denied pain  Bowel/Bladder: incontinent of b&b, purewick in place  Drains/lines: PIV SL  Diet: Reg  Activity Level: A2 sera stead, not OOB this shift  Consults: Oncology, Pulmonology, Cardiology,   Anticipated  DC Date: pending  Significant Information: Pneumonia, congested cough, O2 sats 95-98% with 2L of O2

## 2024-01-14 NOTE — PROGRESS NOTES
Pt is  alert and oriented to family and self  pt is awake  daughter is at  bedside,  Pt  has pure wick in place, Pt had  dinner, ,Pt has elevated BNP  lasix was order , notify  MD if urine out put is >1,000 ml, Pt michaela pain but scheduled tylenol was given , Pt is on continues oximetry and NC 2 ml, Pt is tele for Afib, Mag protocol was re draw today 2.4 . Continues care.

## 2024-01-14 NOTE — PROGRESS NOTES
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  --completed adjuvant chemotherapy with navelbine and cisplatin  --On 7/26/19 she underwent a wedge resection of the left lower lobe lung nodule with pathology revealing a second primary 1.8 cm squamous cell cancer, stage [id number] or stage 1.  --Presented to [facility] ER on 5/5/2023 for evaluation of nausea and vomiting.  CT abdomen and pelvis in the ER on 5/5/2023 showed a new 1.1 cm left lower lobe nodule that raised a concern given her prior history of lung, breast and colon cancers.  --PET/CT 6/23/2023 showed development of a spiculated FDG avid nodule in the left lower lobe measuring 1 cm (max SUV 3.2) suspicious for early primary lung neoplasm without metastasis.  No findings concerning for distant metastatic disease.  No PET lymphadenopathy.  --CT-guided biopsy of left lower lobe lung on 7/10/2023 showed moderate to poorly differentiated squamous cell cancer.  IHC supported the diagnosis showing positive reactivity for p40 and cytokeratin 5.  --MRI brain 7/18/2023 showed no findings concerning for CNS metastatic disease  --7/17/2023 met with Dr Arenas .  Surgical resection was not favored.  Referred for consideration for SBRT   --completed SBRT to the left lower lobe lung mass that was treated to a dose of 5200 cGy over 4 treatment fractions from 8/11/2023 to 8/18/2023 under the direction of Dr. Olivo  --CT chest with contrast on 12/14/2023 showed patchy consolidative opacity seen within the left lower lobe with findings suspicious for developing pneumonia.  Stable 1.1 cm left lower lobe lung nodule.  Slightly enlarging small to moderate right pleural effusion.    --CT chest, abdomen, pelvis with contrast on 1/9/2024 worsening patchy opacities in the lung bases bilaterally since 12/14/2023 likely representing worsening pneumonia.  New  "since 12/14/2023 was wedge compression fracture of T7.  Stable 1.7 cm right upper pole hypodense kidney lesion felt to be likely a cyst - likely continued follow up with serial imaging which will be obtained for her lung cancer anyway  --underwent bronchoscopy 1/11/2024 which showed no endobronchial lesions, significant compression of the bronchus intermedius; BAL cytology pending   --low clinical suspicion for recurrence of her lung cancer   --await BAL cytology results - FUNGAL CULTURE - positive for Yeast   -- we will  continue to follow her with you     T7 compression fracture  Back pain  --pain control   --MRI thoracic spine 1/10/2024 showed burst type fracture of T7 vertebral body with retropulsion of the posterior aspect of the vertebral body into the ventral epidural space.  No definite marrow replacing process was seen.  Epidural hematoma at T6-6 with findings of central spinal canal stenosis at the T7 level.    --has h/o osteopenia   --maybe candidate for T7 osteocool and kyphoplasty per neurosurgery  --per neurosurgery/hospitalist team     Bilateral lung base opacities   --echo on 1/11/2024 showed LVEF 35-40% with hypokinesis of inferior and inferolateral walls   --proBNP 5117 on 1/9/2024  --no fevers or leukocytosis   --bibasal opacities related to ?heart failure   --BAL - FUNGAL Cx POSITIVE FOR YEAST- ? ID consult   --on empiric Ceftriaxone/Doxycycline for presumed community acquired pneumonia      Prior h/o breast and colon cancers  --has had genetic work up which revealed BRCA2 mutation  --continued outpatient follow up      We will continue to follow her with you.    Arianna Coello MD               Interval History:                 Sleeping.O2 via nasal On tele  O2 via nasal canula     Review of Systems:   Chart reviewed .           Physical Exam:   Blood pressure (!) 151/98, pulse 99, temperature 98  F (36.7  C), temperature source Axillary, resp. rate 16, height 1.778 m (5' 10\"), weight 87.3 kg (192 " lb 7.4 oz), SpO2 96%, not currently breastfeeding.      Vital Sign Ranges  Temperature Temp  Av.2  F (36.8  C)  Min: 97.4  F (36.3  C)  Max: 99  F (37.2  C)   Blood pressure Systolic (24hrs), Av , Min:116 , Max:188        Diastolic (24hrs), Av, Min:62, Max:112      Pulse Pulse  Av.3  Min: 84  Max: 123   Respirations Resp  Av.5  Min: 6  Max: 19   Pulse oximetry SpO2  Av.1 %  Min: 90 %  Max: 100 %         Intake/Output Summary (Last 24 hours) at 2024 0809  Last data filed at 2024 1812  Gross per 24 hour   Intake 3 ml   Output 750 ml   Net -747 ml       Constitutional:   No acute distress.   Skin:   No rashes, petechiae, or ecchymoses.   HEENT:   No conjunuctival pallor or scleral icterus    Neck:   Supple.   Lungs:   Clear to auscultation bilaterally.   Cardiovascular:   Regular rate and rhythm with no murmurs, rubs, or gallops.   Abdomen:   BS(+)   Extremities:   No ankle edema    Neurological:   Moving all 4 extremities            Medications:     No current outpatient medications on file.                Data:     Results for orders placed or performed during the hospital encounter of 24 (from the past 24 hour(s))   Magnesium   Result Value Ref Range    Magnesium 2.4 (H) 1.7 - 2.3 mg/dL   Basic metabolic panel   Result Value Ref Range    Sodium 136 135 - 145 mmol/L    Potassium 3.6 3.4 - 5.3 mmol/L    Chloride 94 (L) 98 - 107 mmol/L    Carbon Dioxide (CO2) 35 (H) 22 - 29 mmol/L    Anion Gap 7 7 - 15 mmol/L    Urea Nitrogen 14.9 8.0 - 23.0 mg/dL    Creatinine 0.75 0.51 - 0.95 mg/dL    GFR Estimate 78 >60 mL/min/1.73m2    Calcium 8.5 (L) 8.8 - 10.2 mg/dL    Glucose 93 70 - 99 mg/dL   Magnesium   Result Value Ref Range    Magnesium 1.6 (L) 1.7 - 2.3 mg/dL

## 2024-01-14 NOTE — PROGRESS NOTES
"[facility]    Hospitalist Progress Note      Assessment & Plan   Yolanda Watson is a 83 year old female admitted on 1/9/2024. She istory of breast cancer, primary lung cancer, colon cancer [BRCA 2]  coming to ER with bilteral weakness and back pain     # Pnuemonia   # Sepsis with lactic acidosis per POCT,  follow-up LA wnl at 1.8; resolved.    # CT scan Worsening of patchy opacities in the lung bases bilaterally 1, likely representing worsening pneumonia. Stable small right and trace left pleural effusion.  -Continue azithromycin and ceftriaxone   -MRSA/MSSA nares NEG  -Urine Legionella and urine pneumonia NEG  -Follow blood cultures NGTD  CT chest NEG PE  -UA wnl  -1/12/2024, due to long , azithromycin discontinued and changed to doxycycline.  Noted patient also on paroxetine however as above discontinued azithromycin.    1/13/2024 repeat EKG, assess QT 1/13/24 improved at 476.  -Stable, on 2 L O2 per NC, wean as able         # Lung cancer  Bronchoscopy/BAL 1/12/2024, cytology pending  -PET CT June 23, 2023 showed development of a spiculated FDG avid nodule in the left lower lobe measuring 1 cm suspicious for early primary lung neoplasm without mets no PET lymphadenopathy no finding concerning for distant metastatic disease  CT chest with contrast on 12/14/2023 showed patchyconsolidative opacity seen within the left lower lobe with findings suspicious for developing pneumonia.  Stable 1.1 cm left lower lobe lung nodule.  Slightly enlarging small to moderate right pleural effusion  CT scan 1/9/24     Worsening of patchy opacities in the lung bases bilaterally since  12/14/2023, likely representing worsening pneumonia. Stable small right and trace left pleural effusion.  -- consult  [place] [facility]; see note.   -consult [facility] pulmonology, see note 1/10/2023, bronchoscopy with BAL.  See note 1/12/2024,\" continue antibiotics\"  Pulmonary to follow BAL " "laboratories.    Chronic ischemic cardiomyopathy per Cardiology  Long QT improved   Hypokalemia after IV furosemide on replacement protocol  Hypomagnesemia, after IV furosemide, on replacement protocol  -Admission BNP 5.1K, chest CT without indications of pulm vascular congestion, serial troponins flat, 20, 19,  -1/11/2024: Echo new CM, EF 35-40%, severe hypokinesia inferior and inferolateral..  Cardiology consult.  See note 1/11/2024, \"continue ASA 81 mg, metoprolol succinate 100 mg p.o. daily, lisinopril 20 mg furosemide 20 mg IV one-time reassess regarding need for indications of chronic low-dose diuretic lipid panel, previously on statin.'  No a fib per Cardiology review of tele.   On admission sinus tachycardia.  Will obtain EKG to confirm [sinus], hold lisinopril for now to allow increase in metoprolol, changed to Lopressor 100 mg twice daily.  Check TFTs, on magnesium replacement protocol, potassium 3.8, recheck in a.m. on IV furosemide.  Continue telemetry.  -1/12/2024.  EKG sinus.  TFTs WNL.  Potassium 3.3, magnesium 1.6.  Initiate potassium and magnesium replacement therapy.  EKG with long QT, 517.  Discontinue azithromycin, transition to doxycycline, for now continue paroxetine.   BP elevated on hold lisinopril to allow increase dose of metoprolol given relative tachycardia.  Therefore, restart lisinopril.    -1/13/2024 EKG with improved QTc 517> 476.  BNP at 11 K after one-time furosemide per Cardiology.  No hypoxia however rather severe CM per echo.  Initiate furosemide 40 mg IV x 2, every 12 hours, reassess fluid status in a.m., likely will start on scheduled diuresis.  Monitor BMP, K, magnesium.  -1/14/2024 start maintenance p.o. diuretics, furosemide 40 mg daily with newly diagnosed CM [see ECHO and Cardiology note].  As above yesterday received IV furosemide x 2, BNP 11 K.  Today fairly asymptomatic, lies flat, I/O -3 L, weight not felt to be reliable, however continue diuretics as continues on O2 " per NC at 4 L, BMP in a.m.       # History of Breast cancer   # History of colon cancer   BRCA 2 mut          # Back pain  # Wedge compression fracture T7  -No recent fall.  No urinary incontinence patient has chronic fecal incontinence and chronic bilateral weakness  -Neurosurgery consult, see note 1/10/2024,   -thoracic MR burst type compression fracture T7 findings concerning for thin epidural hematoma at T6/T8.    -pain management, currently patient rates pain at 5/10 pain scale.  Review of records indicates use of as needed oxycodone only 1-3 times/day.  Monitor bowels on oxycodone.  - patient on calcitonin spray for pain management patient requiring oxycodone as needed.  -1/12/2024 on morning encounters was found sleepy yet arousable, somewhat rambling.  However when awoken by myself and nursing her response appeared appropriate, no neurologic focality and suspect that she was just given as needed oxycodone as contributory to mentation on morning encounter.  Because of this we will increase scheduled analgesia for her T7 fracture including scheduled acetaminophen, scheduled Robaxin and attempts to decrease use of as needed narcotics..  See Neurosurgery note, at this point patient is not a candidate for kyphoplasty.  Per nephrology outpatient follow-up.  No heavy lifting  -1/14/2024 patient with minimal complaints of pain, yesterday topical Voltaren started in addition to scheduled acetaminophen, Robaxin, as needed oxycodone.    # Incidental Finding     A stable 1.7 cm hyperdense lesion in the upper pole of the right  kidney, may be a cyst containing hemorrhagic or proteinaceous  material. Consider a follow-up renal MRI on a nonemergent basis for  further evaluation.  -Outpatient follow-up     # Hypomagnesmia  Mag replacement!      # Hypertension  metoprolol 100 mg tablet bid, lisinopril 20 mg daily, here may be some pain component to her hypertensive readings. monitor      DVT Prophylaxis: Pneumatic  Compression Devices  Code Status: No CPR- Do NOT Intubate     Expected Discharge Date: 01/15/2024,  9:00 AM    Destination: assisted living  Discharge Comments: Return to [facility]?       Db Watson MD  Text Page (7am - 6pm, M-F)      Interval History   Patient more awake, laying flat in bed,, no PND, orthopnea.  Reports no pain on morning encounter.     -Data reviewed today: I reviewed all new labs and imaging results over the last 24 jean marie    Physical Exam   Temp: 98  F (36.7  C) Temp src: Axillary BP: (!) 151/98 Pulse: 99   Resp: 16 SpO2: 96 % O2 Device: Nasal cannula Oxygen Delivery: 2 LPM    General/Constitutional:   NAD, more awake, calm, cooperative   HEENT, edentulous, garbled speech without her dentures in  chest/Respiratory: Respirations nonlabored 2 L O2 NC  Cardiovascular: Regular, no murmur appreciated.    Gastrointestinal/Abdomen:  soft, nontender,   Neuro.  Gross motor tested, nonfocal, legs strength 3/5 symmetrical.  Ambulation not tested  Psych oriented, affect calm.      Medications      acetaminophen  1,000 mg Oral Q8H    aspirin  81 mg Oral Daily    calcitonin (salmon)  1 spray Alternating Nostrils Daily    calcium carbonate-vitamin D  1 tablet Oral Daily    cefTRIAXone  2 g Intravenous Q24H    diclofenac  4 g Topical 4x Daily    doxycycline hyclate  100 mg Oral Q12H [facility] (08/20)    furosemide  40 mg Oral Daily    lisinopril  20 mg Oral Daily    magnesium oxide  400 mg Oral Q4H    methocarbamol  500 mg Oral BID    metoprolol tartrate  100 mg Oral BID    pantoprazole  40 mg Oral Daily    PARoxetine  40 mg Oral QAM    sodium chloride (PF)  3 mL Intracatheter Q8H       Data   Recent Labs   Lab 01/14/24  0712 01/13/24  0801 01/12/24  1941 01/12/24  0718 01/10/24  0708 01/09/24  1046   WBC  --  6.6  --  10.7 7.1 9.5   HGB  --  10.3*  --  11.2* 10.5* 12.0   MCV  --  108*  --  108* 110* 109*   PLT  --  300  --  339 331 434    138  --  137 139 141   POTASSIUM 3.6 4.2 3.8 3.3* 3.8 4.1    CHLORIDE 94* 100  --  97* 102 102   CO2 35* 30*  --  33* 31* 28   BUN 14.9 16.9  --  13.9 16.1 17.1   CR 0.75 0.71  --  0.70 0.78 0.91   ANIONGAP 7 8  --  7 6* 11   PRIYA 8.5* 8.6*  --  8.5* 8.3* 9.0   GLC 93 86  --  115* 90 156*   ALBUMIN  --   --   --   --  3.3* 3.6   PROTTOTAL  --   --   --   --  5.6* 6.4   BILITOTAL  --   --   --   --  1.1 0.9   ALKPHOS  --   --   --   --  89 96   ALT  --   --   --   --  8 8   AST  --   --   --   --  14 14

## 2024-01-15 NOTE — PROGRESS NOTES
[facility]    Medicine Progress Note - Hospitalist Service    Date of Admission:  1/9/2024    Assessment & Plan   Yolanda Watson is a 83 year old female admitted on 1/9/2024. Presented with bilateral weakness and back pain.  Has history of breast cancer, primary lung cancer, colon cancer [BRCA 2]      ##) Musculoskeletal//Pain   Pain is now controlled.   -- Wedge compression fracture T7 ...presented with weakness/back pain:      >burst type compression fracture:  findings concerning for thin epidural hematoma at T6/T8.    >Neurosurgery consult, see note 1/10/2024,   >pain management..  calcitonin NS, prn oxycodone  (last dose oxy was 1/12/24)     >scheduled Robaxin as attempt to minimize the prn narcotics use (mental status)....   > 1/14/24:  topical Voltaren started in addition to above, pain complaints improving..    > Per Neurosurgery :  not a candidate for kyphoplasty  (see 1/10/24 note)   > No known fall reported    Neurosurgery has signed off and will arrange f/up in  NS clinic with w/Dr. Castro to discuss options for consideration of osteocool/kyphoplasty.     No lifting greater than 5-10 lbs, no excessive bending or twisting.    =================================  ##) Pulmonary      -- Pnuemonia (with lactic acidosis/sepsis, now improved) - per CT imaging 1/9/24   >Continue doxycycline and ceftriaxone    > MRSA/MSSA nares NEG.... Urine Legionella and urine pneumonia NEG   >CT chest NEG PE  >Follow blood cultures NGTD   >Stable, on 2 L O2 per NC, wean as able   > BAL of LLL done 1/11/24, +yeast..  Seen by ID consult tonight.    Recommendations per ID:  Check galactomannan and fungitell to check for fungal invasiveness   .......   Follow up on the full ID on the yeast on the BAL   > She is wheezing tonight:   Prednisone 40 mg daily start in am.  Nebulizer treatment now, tonight.       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    >  s/p adjuvant chemotherapy with navelbine and cisplatin    > Lung Cancer history summarized well in  Dr Keller's PN on 1/14/23 (complex history)   > Current pulmonary process felt to be pneumonia as above   > BAL of LLL done 1/11/24, +yeast.. Cytology pending .   significant compression of the bronchus intermedius    >Pulmonary to follow BAL laboratories  Pulm Consult has signed off   > Appreciate Hem/Onc input    -- Yeast Growth of BAL   > Seems to be improving per VS.   Still on supp oxygen, reducing needs?     > will consult with ID re: this finding, in context of the bronchus intermedius compression.     > later entry:  Recommendation = Check galactomannan and fungitell to check for fungal invasiveness   .......   Follow up on the full ID on the yeast on the BAL    =====================================  ##) Cardiac      -- Long QT   > Improved with d/c azithromycin (changed to doxycycline), correcting FEN.  > 1/13/2024 EKG with improved QTc 517> 476     -- HTN     >Med adjustments with Cardiomyopathy in mind, see below:     -- Chronic ischemic cardiomyopathy     >Admission BNP 5.1K, serial troponins flat (20..19), imaging negative for congestion   >1/11/2024: Echo new CM, EF 35-40%, severe hypokinesia inferior and inferolateral   > May be related to chemotherapy (C-M has historically been recurrent).... h/o paclitaxel, cisplatin,  anastrozole and trastuzumab chemotherapy. ...   > DNR/DNI. Not interested in reversing for cor angiogram & there is no acute indication for cath.    > Cardiology consult:  increased BB to improve sinus tachy and HTN.  Lisinopril increased   > after IV lasix, on maintenance p.o. diuretics (furosemide 40 mg QD, as of 1/14/24)   > I/O have been -3L since admit.      > Cardiology input appreciated:  Cardiology signed off:  Will arrange for op cardiology f/u of cardiomyopathy for med titration and further discussion of ischemic evaluation.  "    =================================================    ##) GI/      -- stool and urinary incontinence  >Has at baseline: Not thought to be related to her T7 fracture  > UA normal, no infection    -- Incidental 1.7 cm hyperdense lesion in upper pole of R kidney,   >may be a cyst containing hemorrhagic/proteinaceous material.   >Consider a follow-up renal MRI outpatient/non-emergent basis if appropriate  > \"Stable\" per imaging report. (Last seen on 5/5/23 CT surveillance)       =======================================================     ##) FEN       --Hypokalemia after IV furosemide on replacement protocol  --Hypomagnesemia, after IV furosemide, on replacement protocol        # History of Breast cancer   # History of colon cancer   BRCA 2 mut           AMERICA DUNN M.D.               DVT Prophylaxis: Pneumatic Compression Devices  Code Status: No CPR- Do NOT Intubate     Expected Discharge Date: 01/15/2024,  9:00 AM    Destination: assisted living  Discharge Comments: Return to [facility]?                Diet: Regular Diet Adult    DVT Prophylaxis: Pneumatic Compression Devices  Patricia Catheter: Not present  Lines: None     Cardiac Monitoring: None  Code Status: No CPR- Do NOT Intubate      Clinically Significant Risk Factors            # Hypomagnesemia: Lowest Mg = 1.4 mg/dL in last 2 days, will replace as needed   # Hypoalbuminemia: Lowest albumin = 3.3 g/dL at 1/10/2024  7:08 AM, will monitor as appropriate     # Hypertension: Noted on problem list  # Acute heart failure with reduced ejection fraction: last echo with EF <40% and receiving IV diuretics       # Overweight: Estimated body mass index is 27.62 kg/m  as calculated from the following:    Height as of this encounter: 1.778 m (5' 10\").    Weight as of this encounter: 87.3 kg (192 lb 7.4 oz).      # Financial/Environmental Concerns: none         Disposition Plan     Expected Discharge Date: 01/15/2024,  9:00 AM    Destination: assisted " living  Discharge Comments: Return to [facility]?            Mariann Penny MD  Hospitalist Service  [facility]  Securely message with "[employer] (more info)  Text page via [employer] Paging/Directory   ______________________________________________________________________    Interval History    Pain has steadily improved  She wheezes.     Physical Exam   Vital Signs: Temp: 98.8  F (37.1  C) Temp src: Oral BP: 136/81 Pulse: 78   Resp: 16 SpO2: 97 % O2 Device: Nasal cannula Oxygen Delivery: 1 LPM  Weight: 192 lbs 7.39 oz    Constitutional: awake, alert, cooperative, no apparent distress, and appears stated age  Respiratory: bibasilar crackles.   Exp wheezes heard througout   Cardiovascular: Normal apical impulse, regular rate and rhythm, normal S1 and S2, no S3 or S4, and no murmur noted  GI: No scars, normal bowel sounds, soft, non-distended, non-tender, no masses palpated, no hepatosplenomegally  Genitounirinary: diggs is in.    Urine is dark lacey  Neurologic: Awake, alert, oriented to name, place and time.  Cranial nerves II-XII are grossly intact.     Neuropsychiatric: Memory and insight: normal, memory for past and recent events intact, and thought process normal    Medical Decision Making         55 MINUTES SPENT BY ME on the date of service doing chart review, history, exam, documentation & further activities per the note.      Data     I have personally reviewed the following data over the past 24 hrs:    6.4  \   10.8 (L)   / 298     138 97 (L) 17.7 /  98   3.6 34 (H) 0.60 \       Imaging results reviewed over the past 24 hrs:   No results found for this or any previous visit (from the past 24 hour(s)).

## 2024-01-15 NOTE — CONSULTS
[facility]    Infectious Disease Consultation     Date of Admission:  1/9/2024  Date of Consult (When I saw the patient): 01/15/24    Assessment & Plan   Yolanda Watson is a 83 year old who was admitted on 1/9/2024.     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   No fever   BAL with yeast pending ID   Has been on antibiotics since admission     Recommendations:   Check galactomannan and fungitell to check for fungal invasiveness   Follow up on the full ID on the yeast on the BAL         Ely Kelly MD    Reason for Consult   Reason for consult: I was asked to evaluate this patient for yeast from BAl .    Primary Care Physician   [facility]    Chief Complaint   Feels weak     History is obtained from the patient and medical records    History of Present Illness   Yolanda Watson is a 83 year old female who presents with generalized weakness CT chest showed worsening of infilatrates, no fevers no chills     Past Medical History   I have reviewed this patient's medical history and updated it with pertinent information if needed.   Past Medical History:   Diagnosis Date    Acoustic neuroma (H)     Acute arthropathy     lower leg    Acute depression     Anemia     iron deficeincy    Anemia     Anxiety     Arthritis     osteoarthrosis of knee    Arthritis     Breast cancer (H)     left breast    Breast cancer (H)     Breast cancer, left breast (H) 02/2018    Breast cancer, right breast (H) 01/2016    Cardiomyopathy (H)     Cervical cancer (H)     Colon cancer (H)     Colorectal cancer  "(H)     Decreased cardiac ejection fraction     Depression     Depressive disorder     Diabetes mellitus (H)     pre-diabetic no longer taking Metformin    Diabetes mellitus (H)     pre\"denies  A1C , 6 for couple years\"    Dyspnea     Dyspnea     Gastro-oesophageal reflux disease     H/O: lung cancer 2013    Herpes zoster     History of blood transfusion     HTN (hypertension)     Hyperlipidemia     Hyperlipidemia     Hypertension     Iron deficiency anemia     Lung cancer (H)     squamous cell carcinoma    Lung nodule 2013    Lung cancer    Nausea & vomiting     Nausea and vomiting 08/12/2020    OAB (overactive bladder)     Obese     Osteoarthritis of knee     Pain in female pelvis     Pre-diabetes     Preop general physical exam     Rib lesion     Shoulder pain, acute     right    Tachycardia     Torn meniscus        Past Surgical History   I have reviewed this patient's surgical history and updated it with pertinent information if needed.  Past Surgical History:   Procedure Laterality Date    acoustic neuroma excised      APPENDECTOMY  1966    done with CORNELIO    ARTHROSCOPY KNEE  2012    right knee scoped    BIOPSY NODE SENTINEL Right 2/3/2016    Procedure: BIOPSY NODE SENTINEL;  Surgeon: Flory Pinto MD;  Location: [facility]    BIOPSY NODE SENTINEL Left 3/5/2018    Procedure: BIOPSY NODE SENTINEL;;  Surgeon: Denae Perez MD;  Location: [facility]    BREAST BIOPSY, RT/LT      BRONCHOSCOPY FLEXIBLE N/A 1/11/2024    Procedure: Bronchoscopy flexible and rigid;  Surgeon: Сергей Mathew MD;  Location:  GI    cataracts      cataracts      CERVICAL CANCER SCREENING RESULTS DOCUMENTED AND REVIEWED      CHOLECYSTECTOMY  1991    CHOLECYSTECTOMY      CL AFF SURGICAL PATHOLOGY      CL AFF SURGICAL PATHOLOGY      acoustic neuroma left side excised [facility] Dr. Longoria 6/17/14    COLON SURGERY      COLONOSCOPY      COLONOSCOPY N/A 5/4/2022    Procedure: COLONOSCOPY, WITH POLYPECTOMY AND BIOPSY with RANDOM COLON " BIOPSIES BY COLD BIOPSY FORCEPS;  Surgeon: Blake Longoria MD;  Location:  GI    CRANIECTOMY FOR EXCISION OF ACOUSTIC NEUROMA      ESOPHAGOSCOPY, GASTROSCOPY, DUODENOSCOPY (EGD), COMBINED N/A 5/4/2022    Procedure: ESOPHAGOGASTRODUODENOSCOPY, WITH BIOPSY with duodenum and gasric biopsies by cold biopsy forceps;  Surgeon: Blake Longoria MD;  Location:  GI    EXCISE NODE MEDIASTINAL  3/12/2013    Procedure: EXCISE NODE MEDIASTINAL;;  Surgeon: Lorne Arenas MD;  Location:  OR    EYE SURGERY      fiona cataracts    GENITOURINARY SURGERY      bladder sling    GYN SURGERY  1966    hysterectomy for cervical cancer *ovaries remain    HYSTERECTOMY  1966    Cervical CA, paps done every other year, done with appendectomy    HYSTERECTOMY      INSERT PORT VASCULAR ACCESS Left 3/5/2018    Procedure: INSERT PORT VASCULAR ACCESS;;  Surgeon: Denae Perez MD;  Location: [facility]    INSERT PORT VASCULAR ACCESS N/A 3/5/2018    Procedure: INSERT PORT VASCULAR ACCESS;;  Surgeon: Denae Perez MD;  Location: [facility]    LAPAROSCOPIC ASSISTED COLECTOMY Right 10/27/2016    Procedure: LAPAROSCOPIC ASSISTED COLECTOMY;  Surgeon: Umang Arteaga MD;  Location:  OR    LOBECTOMY LUNG  3/12/2013    Procedure: LOBECTOMY LUNG;;  Surgeon: Lorne Arenas MD;  Location:  OR    LUMPECTOMY BREAST      LUMPECTOMY BREAST WITH SEED LOCALIZATION Right 2/3/2016    Procedure: LUMPECTOMY BREAST WITH SEED LOCALIZATION;  Surgeon: Flory Pinto MD;  Location: [facility]    LUMPECTOMY BREAST WITH SEED LOCALIZATION Left 3/5/2018    Procedure: LUMPECTOMY BREAST WITH SEED LOCALIZATION;  SEED LOCALIZED LEFT BREAST LUMPECTOMY, WITH LEFT SENTINEL NODE BIOPSY, PORT PLACEMENT;  Surgeon: Denae Perez MD;  Location: [facility]    MASTECTOMY Left     MASTECTOMY SIMPLE Left 3/28/2018    Procedure: MASTECTOMY SIMPLE;  LEFT BREAST MASTECTOMY ;  Surgeon: Denae Perez MD;  Location: [facility]    MINI ARC SLING OPERATION FOR STRESS INCONTINENCE   2009    OTHER SURGICAL HISTORY      right middle and lower lobectomy with mediatinal lymph node disection    OTHER SURGICAL HISTORY      left lower lobe wedge resection    REMOVE PORT VASCULAR ACCESS N/A 2/13/2019    Procedure: REMOVE PORT VASCULAR ACCESS;  Surgeon: Denae Perez MD;  Location:  OR    RESECT RIB  2/15/2013    Procedure: RESECT RIB;  RESECTION PORTION RIGHT TWELVETH RIB;  Surgeon: Lorne Arenas MD;  Location:  OR    THORACOSCOPIC WEDGE RESECTION LUNG Left 7/26/2019    Procedure: LEFT VIDEO ASSISTED THORACIC SURGERY; POSSIBLE LEFT THORACOTOMY; TWO WEDGE RESECTIONS OF LEFT LOWER LUNG NODULE;  Surgeon: Lorne Arenas MD;  Location: [facility] OR    THORACOTOMY  3/12/2013    Procedure: THORACOTOMY;  RIGHT THORACOTOMY, RIGHT MIDDLE AND LOWER LUNG LOBECTOMY, MEDIASTINAL LYMPH NODE DISSECTION ;  Surgeon: Lorne Arenas MD;  Location: [facility] OR    THORACOTOMY      TONSILLECTOMY      ZZC RECONSTR TOTAL SHOULDER IMPLANT Right 3/16/2020    Procedure: REVERSE TOTAL SHOULDER ARTHROPLASTY;  Surgeon: Zuhair Fuentes MD;  Location: [facility];  Service: Orthopedics       Prior to Admission Medications   Prior to Admission Medications   Prescriptions Last Dose Informant Patient Reported? Taking?   Calcium Carb-Cholecalciferol (CALCIUM 600+D) 600-20 MG-MCG TABS 1/9/2024 at 0800  Yes Yes   Sig: Take 1 tablet by mouth daily   PARoxetine (PAXIL) 40 MG tablet 1/9/2024 at 0800 Self Yes Yes   Sig: Take 40 mg by mouth every morning   Probiotic Product (PROBIOTIC DAILY PO) Unsure at Facility could not confirm if taking or on hold  Yes No   Sig: Take 1 packet by mouth daily Nutrilite Probiotic packet   acetaminophen (TYLENOL) 500 MG tablet   Yes Yes   Sig: Take 1,000 mg by mouth every 6 hours as needed for mild pain   aspirin 81 MG EC tablet 1/9/2024 at 0800  Yes Yes   Sig: Take 81 mg by mouth daily   calcium carbonate (TUMS) 500 MG chewable tablet   Yes Yes   Sig: Take 2 chew tab by mouth  every hour as needed for heartburn Max 8 tab/day   lisinopril (ZESTRIL) 20 MG tablet 1/9/2024 at 0800  Yes Yes   Sig: Take 20 mg by mouth daily   loperamide (IMODIUM) 2 MG capsule  Self Yes Yes   Sig: Take 2 mg by mouth 4 times daily as needed for diarrhea   methocarbamol (ROBAXIN) 500 MG tablet   Yes Yes   Sig: Take 500 mg by mouth 2 times daily as needed for muscle spasms or other (back pain)   metoprolol succinate ER (TOPROL-XL) 50 MG 24 hr tablet 1/9/2024 at 0800  No Yes   Sig: Take 1 tablet (50 mg) by mouth daily   omeprazole (PRILOSEC) 20 MG DR capsule 1/9/2024 at 0800  Yes Yes   Sig: Take 20 mg by mouth daily   ondansetron (ZOFRAN) 4 MG tablet  at 2000  Yes Yes   Sig: Take 4 mg by mouth at bedtime   ondansetron (ZOFRAN) 4 MG tablet   Yes Yes   Sig: Take 4 mg by mouth every 8 hours as needed for nausea   traMADol (ULTRAM) 25 mg TABS half-tab   Yes Yes   Sig: Take 25 mg by mouth daily as needed for moderate to severe pain   vitamin D3 (CHOLECALCIFEROL) 50 mcg (2000 units) tablet 1/9/2024 at 0800  Yes Yes   Sig: Take 1 tablet by mouth daily      Facility-Administered Medications: None     Allergies   Allergies   Allergen Reactions    Demerol [Meperidine] Nausea and Vomiting    Sulfa Antibiotics Other (See Comments)     Extreme chills    Penicillins Rash     Swelling In mouth and tongue       Immunization History   Immunization History   Administered Date(s) Administered    COVID-19 12+ (2023-24) (Pfizer) 10/10/2023    COVID-19 Bivalent 18+ (Moderna) 11/09/2022    COVID-19 MONOVALENT 12+ (Pfizer) 02/25/2021, 03/18/2021    COVID-19 Monovalent 18+ (Moderna) 11/19/2021, 06/02/2022       Social History   I have reviewed this patient's social history and updated it with pertinent information if needed. Yolanda Watson  reports that she quit smoking about 25 years ago. Her smoking use included cigarettes. She started smoking about 70 years ago. She has a 45 pack-year smoking history. She has never used smokeless tobacco.  "She reports current alcohol use. She reports that she does not use drugs.    Family History   I have reviewed this patient's family history and updated it with pertinent information if needed.   Family History   Problem Relation Age of Onset    Breast Cancer Paternal Grandmother     Colon Cancer Other         not specified    Fractures Other         Hip fracture, Aunt    Hyperlipidemia Mother     Hypertension Mother     Heart Disease Mother     Osteoporosis Mother     Hyperlipidemia Sister     Hyperlipidemia Brother     Prostate Cancer Brother     Hypertension Sister     Heart Disease Maternal Grandfather     Prostate Cancer Father     Colon Cancer Cousin         son of paternal aunt with breast cancer.    Breast Cancer Paternal Aunt     Colon Cancer Paternal Aunt     Other Cancer Paternal Uncle         Esophageal    Other Cancer Maternal Aunt        Review of Systems   The 10 point Review of Systems is negative    Physical Exam   Temp: 97  F (36.1  C) Temp src: Axillary BP: (!) 147/79 Pulse: 97   Resp: 14 SpO2: 99 % O2 Device: Nasal cannula Oxygen Delivery: 1 LPM  Vital Signs with Ranges  Temp:  [97  F (36.1  C)-98.8  F (37.1  C)] 97  F (36.1  C)  Pulse:  [] 97  Resp:  [14-16] 14  BP: (136-156)/(79-96) 147/79  SpO2:  [96 %-99 %] 99 %  192 lbs 7.39 oz  Body mass index is 27.62 kg/m .    GENERAL APPEARANCE:  sleepy   EYES: Eyes grossly normal to inspection  NECK: no adenopathy  RESP: lungs clear   CV: regular rates and rhythm  LYMPHATICS: normal ant/post cervical and supraclavicular nodes  ABDOMEN: soft, nontender  MS: extremities normal  SKIN: no suspicious lesions or rashes        Data   All laboratory and imaging data in the past 24 hours reviewed  No results for input(s): \"CULT\" in the last 168 hours.  Recent Labs   Lab Test 03/24/21 2009 08/11/20 1937 08/11/20  1712   CULT No growth  Cultured on the 2nd day of incubation:  Micrococcus species  *  Critical Value/Significant Value, preliminary result " only, called to and read back by  Dr Maldonado, 3/27/21 @ 0038 TF    (Note)  NEGATIVE for the following: Staphylococcus spp., Staph aureus, Staph  epidermidis, Staph lugdunensis, Streptococcus spp., Strep pneumoniae,  Strep pyogenes, Strep agalactiae, Strep anginosus group, Enterococcus  faecalis, Enterococcus faecium, and Listeria spp. by [employer]  multiplex nucleic acid test. Final identification and antimicrobial  susceptibility testing will be verified by standard methods.    Specimen tested with [employer] multiplex, gram-positive blood culture  nucleic acid test for the following targets: Staph aureus, Staph  epidermidis, Staph lugdunensis, other Staph species, Enterococcus  faecalis, Enterococcus faecium, Streptococcus species, S. agalactiae,  S. anginosus grp., S. pneumoniae, S. pyogenes, Listeria sp., mecA  (methicillin resistance) and Blanca/B (vancomycin resistance).    Critical Value/Significant Value called to and read back by Dr. Maldonado at 0242 3/27/21 hg   No growth No growth          All cultures:  Recent Labs   Lab 01/11/24  1012 01/10/24  0336 01/09/24  1203 01/09/24  1053   CULTURE Yeast*  1+ Normal ximena >10 Squamous epithelial cells/low power field indicates oral contamination. Please recollect. No Growth No Growth      Blood culture:  Results for orders placed or performed during the hospital encounter of 01/09/24   Blood Culture Peripheral Blood    Specimen: Peripheral Blood   Result Value Ref Range    Culture No Growth    Blood Culture Peripheral Blood    Specimen: Peripheral Blood   Result Value Ref Range    Culture No Growth    Results for orders placed or performed during the hospital encounter of 07/19/21   Blood Culture Peripheral Blood    Specimen: Peripheral Blood   Result Value Ref Range    Culture No Growth    Blood Culture Peripheral Blood    Specimen: Peripheral Blood   Result Value Ref Range    Culture No Growth    Results for orders placed or performed during the hospital encounter  "of 03/24/21   Blood culture    Specimen: Blood    Left Arm   Result Value Ref Range    Specimen Description Blood Left Arm     Culture Micro (A)      Cultured on the 2nd day of incubation:  Micrococcus species      Culture Micro       Critical Value/Significant Value, preliminary result only, called to and read back by  Dr Maldonado, 3/27/21 @ 0038 TF      Culture Micro       (Note)  NEGATIVE for the following: Staphylococcus spp., Staph aureus, Staph  epidermidis, Staph lugdunensis, Streptococcus spp., Strep pneumoniae,  Strep pyogenes, Strep agalactiae, Strep anginosus group, Enterococcus  faecalis, Enterococcus faecium, and Listeria spp. by [employer]  multiplex nucleic acid test. Final identification and antimicrobial  susceptibility testing will be verified by standard methods.    Specimen tested with Verigene multiplex, gram-positive blood culture  nucleic acid test for the following targets: Staph aureus, Staph  epidermidis, Staph lugdunensis, other Staph species, Enterococcus  faecalis, Enterococcus faecium, Streptococcus species, S. agalactiae,  S. anginosus grp., S. pneumoniae, S. pyogenes, Listeria sp., mecA  (methicillin resistance) and Blanca/B (vancomycin resistance).    Critical Value/Significant Value called to and read back by Dr. Maldonado at 0242 3/27/21 hg         Susceptibility    Micrococcus species - NICOLE     Ciprofloxacin <=1.0 No interpretation available ug/mL     Clindamycin 0.5 Susceptible ug/mL     Gentamicin <=1.0 No interpretation available ug/mL     Levofloxacin 1.0 No interpretation available ug/mL     Penicillin <=0.03 Susceptible ug/mL     Vancomycin <=0.25 Susceptible ug/mL     Trimethoprim/Sulfamethoxazole >2.0/38.0 No interpretation available ug/mL     Comment:* See comment below        * MICs (minimum inhibitory concentrations) of antibiotics which include \"No Interpretation\" means there are no national regulatory guidelines for interpretation available.  MICs with a greater than sign " (>) should be considered resistant for safety reasons.  Further advice can be sought from IDDL, Pharm Ds, and Infectious Diseases consultants.   Blood culture    Specimen: Blood    Right Arm   Result Value Ref Range    Specimen Description Blood Right Arm     Culture Micro No growth    Results for orders placed or performed during the hospital encounter of 08/11/20   Blood culture    Specimen: Blood    Right Arm   Result Value Ref Range    Specimen Description Blood Right Arm     Culture Micro No growth    Blood culture    Specimen: Blood    Right Arm   Result Value Ref Range    Specimen Description Blood Right Arm     Culture Micro No growth       Urine culture:  Results for orders placed or performed during the hospital encounter of 07/19/21   Urine Culture    Specimen: Urine, Catheter   Result Value Ref Range    Culture >100,000 CFU/mL Escherichia coli (A)        Susceptibility    Escherichia coli - NICOLE     Ampicillin <=2.0 Susceptible ug/mL     Ampicillin/ Sulbactam <=2.0 Susceptible ug/mL     Piperacillin/Tazobactam <=4.0 Susceptible ug/mL     Cefazolin* <=4.0 Susceptible ug/mL      * Cefazolin NICOLE breakpoints are for the treatment of uncomplicated urinary tract infections. For the treatment of systemic infections, please contact the laboratory for additional testing.     Cefoxitin <=4.0 Susceptible ug/mL     Ceftazidime <=1.0 Susceptible ug/mL     Ceftriaxone <=1.0 Susceptible ug/mL     Cefepime <=1.0 Susceptible ug/mL     Gentamicin <=1.0 Susceptible ug/mL     Tobramycin <=1.0 Susceptible ug/mL     Ciprofloxacin <=0.25 Susceptible ug/mL     Levofloxacin <=0.12 Susceptible ug/mL     Nitrofurantoin <=16.0 Susceptible ug/mL     Trimethoprim/Sulfamethoxazole <=1/19 Susceptible ug/mL

## 2024-01-15 NOTE — PROGRESS NOTES
VSS except HTN and tachycardia. Pt was lethargic in the morning did not eat breakfast. Pt more alert in the afternoon. She denied pain for most of the shift. Pain controlled with scheduled tylenol and voltaren.     New this shift  Abnormal labs:   Magnesium: 1.3, on protocol, replaced IV Mag. Recheck 2.4 Continue to monitor.

## 2024-01-15 NOTE — PROGRESS NOTES
[facility]ology Progress Note     Primary Oncologist/Hematologist:  Dr. 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  --completed adjuvant chemotherapy with navelbine and cisplatin  --On 7/26/19 she underwent a wedge resection of the left lower lobe lung nodule with pathology revealing a second primary 1.8 cm squamous cell cancer, stage [id number] or stage 1.  --Presented to [facility] ER on 5/5/2023 for evaluation of nausea and vomiting.  CT abdomen and pelvis in the ER on 5/5/2023 showed a new 1.1 cm left lower lobe nodule that raised a concern given her prior history of lung, breast and colon cancers.  --PET/CT 6/23/2023 showed development of a spiculated FDG avid nodule in the left lower lobe measuring 1 cm (max SUV 3.2) suspicious for early primary lung neoplasm without metastasis.  No findings concerning for distant metastatic disease.  No PET lymphadenopathy.  --CT-guided biopsy of left lower lobe lung on 7/10/2023 showed moderate to poorly differentiated squamous cell cancer.  IHC supported the diagnosis showing positive reactivity for p40 and cytokeratin 5.  --MRI brain 7/18/2023 showed no findings concerning for CNS metastatic disease  --7/17/2023 met with Dr Arenas .  Surgical resection was not favored.  Referred for consideration for SBRT   --completed SBRT to the left lower lobe lung mass that was treated to a dose of 5200 cGy over 4 treatment fractions from 8/11/2023 to 8/18/2023 under the direction of Dr. Olivo  --CT chest with contrast on 12/14/2023 showed patchy consolidative opacity seen within the left lower lobe with findings suspicious for developing pneumonia.  Stable 1.1 cm left lower lobe lung nodule.  Slightly enlarging small to moderate right pleural effusion.    --CT chest, abdomen, pelvis with contrast on 1/9/2024 worsening patchy opacities in the lung bases bilaterally since  12/14/2023 likely representing worsening pneumonia.  New since 12/14/2023 was wedge compression fracture of T7.  Stable 1.7 cm right upper pole hypodense kidney lesion felt to be likely a cyst - likely continued follow up with serial imaging which will be obtained for her lung cancer anyway  --underwent bronchoscopy 1/11/2024 which showed no endobronchial lesions, significant compression of the bronchus intermedius; BAL cytology pending   --low clinical suspicion for recurrence of her lung cancer   --BAL Cytology negative for malignant cells  - FUNGAL CULTURE - positive for Yeast   -- we will  continue to follow her with you      T7 compression fracture  Back pain  --pain control   --MRI thoracic spine 1/10/2024 showed burst type fracture of T7 vertebral body with retropulsion of the posterior aspect of the vertebral body into the ventral epidural space.  No definite marrow replacing process was seen.  Epidural hematoma at T6-6 with findings of central spinal canal stenosis at the T7 level.    --has h/o osteopenia   --per neurosurgery not a candidate for kyphoplasty  (see 1/10/24 note)      Bilateral lung base opacities   --echo on 1/11/2024 showed LVEF 35-40% with hypokinesis of inferior and inferolateral walls   --proBNP 5117 on 1/9/2024  --no fevers or leukocytosis   --bibasal opacities related to ?heart failure   --BAL - FUNGAL Cx POSITIVE FOR YEAST-   --on empiric Ceftriaxone/Doxycycline for presumed community acquired pneumonia   - ID consulted, pt seen by Dr. Kelly who ordered galactomannan and fungitell to check for fungal invasiveness.  Await full ID on the yeast on the BAL     Prior h/o breast and colon cancers  --has had genetic work up which revealed BRCA2 mutation  --continued outpatient follow up       GEOVANNY Conrad, AOCNP  Nurse Practitioner  [facility]  [phone]          Interval History:     No new symptoms today.  Breathing comfortably.              Review of Systems:     The  "5 point Review of Systems is negative other than noted in the HPI                Medications:   Scheduled Medications   acetaminophen  1,000 mg Oral Q8H    aspirin  81 mg Oral Daily    calcitonin (salmon)  1 spray Alternating Nostrils Daily    calcium carbonate-vitamin D  1 tablet Oral Daily    cefTRIAXone  2 g Intravenous Q24H    diclofenac  4 g Topical 4x Daily    doxycycline hyclate  100 mg Oral Q12H [facility] (08/20)    furosemide  40 mg Oral Daily    lisinopril  20 mg Oral Daily    magnesium sulfate  4 g Intravenous Once    methocarbamol  500 mg Oral BID    metoprolol tartrate  100 mg Oral BID    pantoprazole  40 mg Oral Daily    PARoxetine  40 mg Oral QAM    sodium chloride (PF)  3 mL Intracatheter Q8H     PRN Medications  sore throat, calcium carbonate, hydrALAZINE, labetalol, lidocaine 4%, lidocaine (buffered or not buffered), naloxone **OR** naloxone **OR** naloxone **OR** naloxone, ondansetron **OR** ondansetron, oxyCODONE, sodium chloride (PF)               Physical Exam:   Vitals were reviewed  Blood pressure (!) 147/79, pulse 97, temperature 97  F (36.1  C), temperature source Axillary, resp. rate 14, height 1.778 m (5' 10\"), weight 87.3 kg (192 lb 7.4 oz), SpO2 99%, not currently breastfeeding.  Wt Readings from Last 4 Encounters:   01/11/24 87.3 kg (192 lb 7.4 oz)   08/28/23 95.3 kg (210 lb)   05/05/23 81.6 kg (180 lb)   05/04/22 97.5 kg (215 lb)       I/O last 3 completed shifts:  In: 120 [P.O.:120]  Out: 580 [Urine:580]               Data:   All laboratory data and imaging studies reviewed.    CMP  Recent Labs   Lab 01/15/24  0743 01/14/24  0712 01/13/24  1616 01/13/24  0801 01/12/24  1941 01/12/24  0718 01/11/24  0723 01/10/24  0708 01/09/24  1046    136  --  138  --  137  --  139 141   POTASSIUM 3.6 3.6  --  4.2 3.8 3.3*  --  3.8 4.1   CHLORIDE 97* 94*  --  100  --  97*  --  102 102   CO2 34* 35*  --  30*  --  33*  --  31* 28   ANIONGAP 7 7  --  8  --  7  --  6* 11   GLC 98 93  --  86  --  115*  " --  90 156*   BUN 17.7 14.9  --  16.9  --  13.9  --  16.1 17.1   CR 0.60 0.75  --  0.71  --  0.70  --  0.78 0.91   GFRESTIMATED 87 78  --  83  --  84  --  74 62   PRIYA 8.5* 8.5*  --  8.6*  --  8.5*  --  8.3* 9.0   MAG 1.3* 1.6* 2.4* 1.4*  --  1.6*   < >  --  1.4*   PROTTOTAL  --   --   --   --   --   --   --  5.6* 6.4   ALBUMIN  --   --   --   --   --   --   --  3.3* 3.6   BILITOTAL  --   --   --   --   --   --   --  1.1 0.9   ALKPHOS  --   --   --   --   --   --   --  89 96   AST  --   --   --   --   --   --   --  14 14   ALT  --   --   --   --   --   --   --  8 8    < > = values in this interval not displayed.     CBC  Recent Labs   Lab 01/15/24  0743 01/13/24  0801 01/12/24  0718 01/10/24  0708   WBC 6.4 6.6 10.7 7.1   RBC 3.03* 2.96* 3.11* 3.00*   HGB 10.8* 10.3* 11.2* 10.5*   HCT 32.9* 32.1* 33.6* 33.0*   * 108* 108* 110*   MCH 35.6* 34.8* 36.0* 35.0*   MCHC 32.8 32.1 33.3 31.8   RDW 17.3* 17.7* 17.3* 18.0*    300 339 331     INRNo lab results found in last 7 days.        Xander SMALL, AOCNP  Nurse Practitioner  [facility]  [phone]

## 2024-01-15 NOTE — PROGRESS NOTES
Pt  is up with 2 assist with dot steady , on continues oximetry and 2 ml NC, Pt is incontinent of bladder & bowel, she is reposition . Pt is on tele for Afib with normal sinus rhythm, Pt have lab re draw 1/15/2024 for potassium and CBC. Pain management cont

## 2024-01-15 NOTE — PROGRESS NOTES
Date & Time: 1/14-1/15 8116-1409  Behavior & Aggression: green  Fall Risk: yes  Orientation:Aox3, disoriented to time, intermittent confusion   ABNL VS/O2: VSS on 1L O2  Pain Management: denied pain  Bowel/Bladder: incontinent of b&b, purewick in place  Drains/lines: PIV SL  Diet: Reg  Activity Level: A2 sera stead, not OOB this shift  Consults: Oncology, Pulmonology, Cardiology,   Anticipated  DC Date: pending  Significant Information: Pneumonia, congested cough, O2 sats 95-98% with 2L of O2

## 2024-01-16 NOTE — PROGRESS NOTES
[facility]    Medicine Progress Note - Hospitalist Service    Date of Admission:  1/9/2024    Assessment & Plan   Yolanda Watson is a 83 year old female admitted on 1/9/2024. Presented with bilateral weakness and back pain.  Has history of breast cancer, primary lung cancer, colon cancer [BRCA 2]      ##) Musculoskeletal//Pain > No known fall reported   Pain is now controlled.   -- Wedge compression fracture T7 ...presented with weakness/back pain:      >burst type compression fracture:  findings concerning for thin epidural hematoma at T6/T8.    >Neurosurgery consult, see note 1/10/2024,   > Per Neurosurgery :  not a candidate for kyphoplasty  (see 1/10/24 note)   >pain management..  calcitonin NS, prn oxycodone  (last dose oxy was 1/12/24)     > will change bid robaxin to prn now (1/16/24).     > 1/14/24:  topical Voltaren started in addition to above, pain complaints improving..      > MD called Ajith, daughter and updated today, 1/16/24.  The main issue now is more about mobility and disposition   The medical issues discussed below are improving.     Neurosurgery has signed off and will arrange f/up in  NS clinic with w/Dr. Castro to discuss options for consideration of osteocool/kyphoplasty.     No lifting greater than 5-10 lbs, no excessive bending or twisting.    =================================  ##) Pulmonary      -- Pnuemonia (with lactic acidosis/sepsis, now improved) - per CT imaging 1/9/24   >Continue stop IV ceftriaxone.  Continue current management with doxycyline for total of 10 d. (Last dose 1/21/24)   She did get 5 days IV ceftriaxone.     > MRSA/MSSA nares NEG.... Urine Legionella and urine pneumonia NEG   >CT chest NEG PE  >Blood cultures NGTD   >Stable, on 1L O2 per NC, wean as able - O2 sats 100% on 1 L   > BAL of LLL done 1/11/24, +yeast.: NOT PATHOGENIC.  Seen by ID consult (Signed off)     Also:   NEGATIVE CYTOLOGY.   Seen by Onc consult (signed off)        >  Wheezing yesterday.   40 mg prednisone.  Will give 20 mg daily another 3 days, then stop.         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    > s/p adjuvant chemotherapy with navelbine and cisplatin    > Lung Cancer history summarized well in  Dr Keller's PN on 1/14/23 (complex history)   > Current pulmonary process felt to be pneumonia as above   > BAL of LLL done 1/11/24, +yeast.. Cytology  (-) for malignant cells. .   significant compression of the bronchus intermedius    > Pulm Consult has signed off   >  Hem/Onc signed off       =====================================  ##) Cardiac      -- Long QT   > Improved with d/c azithromycin (changed to doxycycline), correcting FEN.  > 1/13/2024 EKG with improved QTc 517> 476     -- HTN     >Med adjustments with Cardiomyopathy in mind, see below:    > will increase Lisinopril to 40 mg today, 1/17/24 (BP not well controlled.)    -- Chronic ischemic cardiomyopathy     >Admission BNP 5.1K, serial troponins flat (20..19), imaging negative for congestion   >1/11/2024: Echo new CM, EF 35-40%, severe hypokinesia inferior and inferolateral   > May be related to chemotherapy (C-M has historically been recurrent).... h/o paclitaxel, cisplatin,  anastrozole and trastuzumab chemotherapy. ...   > DNR/DNI. Not interested in reversing for cor angiogram & there is no acute indication for cath.    > Cardiology consult:  increased BB to improve sinus tachy and HTN.  Lisinopril increased   > after IV lasix, on maintenance p.o. diuretics (furosemide 40 mg QD, as of 1/14/24)      > Cardiology input appreciated:  Cardiology signed off:  Will arrange for op cardiology f/u of cardiomyopathy for med titration and further discussion of ischemic evaluation.     =================================================    ##) GI/      -- stool and urinary incontinence  >Has at baseline: Not thought to be related to her T7 fracture  > UA normal, no  "infection    -- Incidental 1.7 cm hyperdense lesion in upper pole of R kidney,   >may be a cyst containing hemorrhagic/proteinaceous material.   >Consider a follow-up renal MRI outpatient/non-emergent basis if appropriate  > \"Stable\" per imaging report. (Last seen on 5/5/23 CT surveillance)       =======================================================     ##) FEN       -- at this time, eating well.    -- BMP added to today's blood draw, pending.   --Hypokalemia after IV furosemide on replacement protocol  --Hypomagnesemia, after IV furosemide, on replacement protocol          ##) Disposition  Need Care Coordination and OT/Phys therapy eval for d/c plan  Medically improved (Stable) , and BAL, imaging do not show any new concerns       # History of Breast cancer   # History of colon cancer   BRCA 2 mut           AMERICA DUNN M.D.               DVT Prophylaxis: Pneumatic Compression Devices  Code Status: No CPR- Do NOT Intubate     Expected Discharge Date: 01/15/2024,  9:00 AM    Destination: assisted living  Discharge Comments: Return to [facility]?                Diet: Regular Diet Adult    DVT Prophylaxis: Pneumatic Compression Devices  Patricia Catheter: Not present  Lines: None     Cardiac Monitoring: None  Code Status: No CPR- Do NOT Intubate      Clinically Significant Risk Factors            # Hypomagnesemia: Lowest Mg = 1.3 mg/dL in last 2 days, will replace as needed   # Hypoalbuminemia: Lowest albumin = 3.3 g/dL at 1/10/2024  7:08 AM, will monitor as appropriate     # Hypertension: Noted on problem list  # Acute heart failure with reduced ejection fraction: last echo with EF <40% and receiving IV diuretics       # Overweight: Estimated body mass index is 27.33 kg/m  as calculated from the following:    Height as of this encounter: 1.778 m (5' 10\").    Weight as of this encounter: 86.4 kg (190 lb 7.6 oz).   # Moderate Malnutrition: based on nutrition assessment    # Financial/Environmental Concerns: " "none         Disposition Plan      Expected Discharge Date: 01/17/2024,  9:00 AM    Destination: assisted living  Discharge Comments: Return to [facility]?  ID consult placed on 1/15- fungal panel positive for yeast            Mariann Penny MD  Hospitalist Service  [facility]  Securely message with RF Code (more info)  Text page via [employer] Paging/Directory   ______________________________________________________________________    Interval History   No events.   Improving medically     Physical Exam   Vital Signs: Temp: 97.6  F (36.4  C) Temp src: Oral BP: (!) 161/76 Pulse: 110   Resp: 16 SpO2: 100 % O2 Device: Nasal cannula Oxygen Delivery: 1 LPM  Weight: 190 lbs 7.64 oz  Constitutional: awake, alert, cooperative, no apparent distress, and appears stated age  Respiratory: rare bibasilar crackles- improved.   Exp wheezes no longer present as they were yesterday (1/15)   Cardiovascular: Normal apical impulse, regular rate and rhythm, normal S1 and S2, no S3 or S4, and no murmur noted  GI: No scars, normal bowel sounds, soft, non-distended, non-tender, no masses palpated, no hepatosplenomegally  Genitounirinary: diggs is in.    Urine is dark lacey  Neurologic: Awake, alert, oriented to name, place and time.  Cranial nerves II-XII are grossly intact.     Neuropsychiatric: Memory and insight: impaired.  She is A&O x  3, but is not wanting any therapy or movement \"I just want to rest, I have so much on my plate right now\"           Medical Decision Making        35 MINUTES SPENT BY ME on the date of service doing chart review, history, exam, documentation & further activities per the note.      Data     I have personally reviewed the following data over the past 24 hrs:    N/A  \   N/A   / N/A     N/A N/A N/A /  N/A   3.5 N/A N/A \       Imaging results reviewed over the past 24 hrs:   No results found for this or any previous visit (from the past 24 hour(s)).  "

## 2024-01-16 NOTE — PROGRESS NOTES
A/Ox3,forgetful. VSS except HTN. On 1L NC and Tele. Pt did not get oob this shift. T/R. Pure wick in pace and patent. Pain managed with schedule diclofenac gel and prn oxy. BM x1 this shift. No complaint overnight , plan of care on-going.

## 2024-01-16 NOTE — PROGRESS NOTES
"CLINICAL NUTRITION SERVICES  -  ASSESSMENT NOTE      Recommendations Ordered by Registered Dietitian (RD):   RD introduced self to pt, discussed role in care. Attempted to provide encouragement for PO intake and discuss options for ONS. Pt too tired to decide if she would like an ONS she reported.      Malnutrition:   % Weight Loss:  Weight loss does not meet criteria for malnutrition   % Intake:  </= 50% for >/= 5 days (severe malnutrition)  Subcutaneous Fat Loss:  Orbital region mild depletion and Upper arm region moderate depletion-- suspect partially age-related  Muscle Loss:  Temporal region mild depletion and Clavicle bone region mild depletion-- suspect partially age-related  Fluid Retention:  None noted    Malnutrition Diagnosis: Moderate malnutrition in the context of --  Acute illness or injury  Chronic illness or disease         REASON FOR ASSESSMENT  Yolanda Watson is a 83 year old female seen by Registered Dietitian for LOS.    NUTRITION HISTORY  Information obtained from chart review  Unable to obtain nutrition history from pt. Pt having very difficult time understanding RD despite pocket talker and speaking slowly/loudly    PMH of HTN, HLD, Chronic ischemic cardiomyopathy, Hx of breast cancer, hx of primary lung cancer, hx of colon cancer       CURRENT NUTRITION ORDERS  Diet: Regular Diet Adult        Current Intake/Tolerance:  Pt just about to order lunch when RD arrived. Reported her PO intake has been \"hit and miss\" this admission. RD attempted to provide encouragement for PO intake and offered ONS. Pt reported she was too tired to decide at the moment if she would like to try an oral protein supplement.   Pt has been receiving 1-2 meals/day per health touch. % intakes, mostly % recently, documented per nursing flow sheet.      NUTRITION FOCUSED PHYSICAL ASSESSMENT FOR DIAGNOSING MALNUTRITION)  Yes-- visual          Observed:    Muscle wasting (refer to documentation in Malnutrition " "section)  Subcutaneous fat loss (refer to documentation in Malnutrition section)  Dry skin    Obtained from Chart/Interdisciplinary Team:  None noted     ANTHROPOMETRICS  Height: 5' 10\"  Weight: 190 lbs 7.64 oz  Body mass index is 27.33 kg/m .  Weight Status:  Overweight BMI 25-29.9  IBW: 68.2 kg  % IBW: 127%  Weight History:   Wt loss of 8.9 kg over past 5 months (9.3%)    01/16/24 86.4 kg (190 lb 7.6 oz)   08/28/23 95.3 kg (210 lb)   05/05/23 81.6 kg (180 lb)   05/04/22 97.5 kg (215 lb)   07/21/21 105.5 kg (232 lb 9.6 oz)   03/24/21 99.3 kg (219 lb)   08/11/20 93 kg (205 lb)   03/16/20 95 kg (209 lb 6.4 oz)   12/16/19 95 kg (209 lb 8 oz)   11/20/19 93.4 kg (206 lb)   11/18/19 94.3 kg (208 lb)   10/17/19 92.1 kg (203 lb)   10/17/19 92.4 kg (203 lb 9.6 oz)   07/26/19 93 kg (205 lb)   04/29/19 90.7 kg (200 lb)     No recent wt hx per care everywhere        LABS  Component Value Date   MAG 1.4 (L) 01/16/2024     MEDICATIONS   calcium carbonate-vitamin D  1 tablet Oral Daily    furosemide  40 mg Oral Daily    pantoprazole  40 mg Oral Daily    predniSONE  40 mg Oral Daily       ASSESSED NUTRITION NEEDS PER APPROVED PRACTICE GUIDELINES:  Dosing Weight: 73 kg (adjusted, based on 86.4 kg-- 1/16)  Estimated Energy Needs: 2695-2051 kcal (25-30 Kcal/Kg)  Justification: maintenance  Estimated Protein Needs:  grams protein (1.2-1.5 g pro/Kg)  Justification: hypercatabolism with acute illness  Estimated Fluid Needs: 1 mL/kcal  Justification: maintenance     MALNUTRITION:  % Weight Loss:  Weight loss does not meet criteria for malnutrition   % Intake:  </= 50% for >/= 5 days (severe malnutrition)  Subcutaneous Fat Loss:  Orbital region mild depletion and Upper arm region moderate depletion-- suspect partially age-related  Muscle Loss:  Temporal region mild depletion and Clavicle bone region mild depletion-- suspect partially age-related  Fluid Retention:  None noted    Malnutrition Diagnosis: Moderate malnutrition in the " context of --  Acute illness or injury  Chronic illness or disease        NUTRITION DIAGNOSIS:  Inadequate oral intake related to suspected poor appetite, fatigue as evidenced by variable intakes of % of 1-2 meals/day this admit x7 days        NUTRITION INTERVENTIONS  Recommendations / Nutrition Prescription  RD introduced self to pt, discussed role in care. Attempted to provide encouragement for PO intake and discuss options for ONS. Pt too tired to decide if she would like an ONS she reported.       Implementation  Nutrition education       Nutrition Goals  Pt to consume >75% of 2 meals/day        MONITORING AND EVALUATION:  Progress towards goals will be monitored and evaluated per protocol and Practice Guidelines      Anayeli Davis RD, LD   Pager: [phone]

## 2024-01-16 NOTE — PROGRESS NOTES
Oncology chart check:    BAL cytology showed no malignancy.  No new recommendations today.      Deepak Borrero MD

## 2024-01-16 NOTE — PLAN OF CARE
Goal Outcome Evaluation:             Pt alert and oriented X4. Able to make needs known. Denied SOB, CP, N/V. Very [employer]. Pt given pocket talker. Mag replaced IV, potassium replaced oral. Lab redraws ordered. Pt has purewick. Turned Q2 hours, heels floated. No adverse reaction to IV ABT. On 1 L NC

## 2024-01-16 NOTE — PROGRESS NOTES
Yeast in the BAL further identified as candida. This is not a pathogen even in this setting. No treatment recommendations.     Will sign off please call if ques.     Ely Kelly MD  Infectious Disease

## 2024-01-17 NOTE — PROGRESS NOTES
01/17/24 1132   Appointment Info   Signing Clinician's Name / Credentials (OT) Anna Moreno OTR/L   Living Environment   People in Home alone   Current Living Arrangements assisted living   Home Accessibility no concerns   Transportation Anticipated family or friend will provide   Living Environment Comments Pt reports living in [place]. No stairs. Pt reports a family member will pick pt up upon discharge.   Self-Care   Usual Activity Tolerance moderate   Current Activity Tolerance fair   Regular Exercise No   Equipment Currently Used at Home grab bar, toilet;grab bar, tub/shower;walker, rolling   Fall history within last six months no   Activity/Exercise/Self-Care Comment per CC note: Per Nicole patient is A1 for all cares, uses a walker at baseline, is A+Ox4 and calls appropriately.   Instrumental Activities of Daily Living (IADL)   IADL Comments facility completes   General Information   Onset of Illness/Injury or Date of Surgery 01/16/24   Referring Physician Mariann Penny MD   Patient/Family Therapy Goal Statement (OT) did not state   Additional Occupational Profile Info/Pertinent History of Current Problem per chart: 83 year old female admitted on 1/9/2024. Presented with bilateral weakness and back pain.  Has history of breast cancer, primary lung cancer, colon cancer   Existing Precautions/Restrictions fall   Left Upper Extremity (Weight-bearing Status) full weight-bearing (FWB)   Right Upper Extremity (Weight-bearing Status) full weight-bearing (FWB)   General Observations and Info activity order: up ad tanvir   Cognitive Status Examination   Orientation Status person   Affect/Mental Status (Cognitive) anxious;confused   Follows Commands follows one-step commands;over 90% accuracy  ([place], needs repetition to hear)   Cognitive Status Comments pt alert initially, decreased throughout session, will monitor   Pain Assessment   Patient Currently in Pain Yes, see Vital Sign flowsheet   Posture   Posture forward  head position;protracted shoulders   Range of Motion Comprehensive   Comment, General Range of Motion BUE WFL   Strength Comprehensive (MMT)   Comment, General Manual Muscle Testing (MMT) Assessment BUE globally 4/5   Coordination   Upper Extremity Coordination No deficits were identified   Bed Mobility   Bed Mobility sit-supine;scooting/bridging   Scooting/Bridging [place] (Bed Mobility) maximum assist (25% patient effort);2 person assist   Sit-Supine [place] (Bed Mobility) maximum assist (25% patient effort);2 person assist   Assistive Device (Bed Mobility) bed rails;draw sheet  (SS)   Transfers   Transfers sit-stand transfer;toilet transfer   Sit-Stand Transfer   Sit-Stand [place] (Transfers) moderate assist (50% patient effort);2 person assist   Assistive Device (Sit-Stand Transfers)   (Dot Steady)   Toilet Transfer   Type (Toilet Transfer) sit-stand;stand-sit   [place] Level (Toilet Transfer) moderate assist (50% patient effort);2 person assist   Assistive Device (Toilet Transfer) commode chair  (dot steady)   Balance   Balance Comments good initially, poor as session progressed with pt slumping over and to L   Activities of Daily Living   BADL Assessment/Intervention upper body dressing;lower body dressing;grooming;toileting   Upper Body Dressing Assessment/Training   [place] Level (Upper Body Dressing) maximum assist (25% patient effort)   Lower Body Dressing Assessment/Training   [place] Level (Lower Body Dressing) dependent (less than 25% patient effort)   Grooming Assessment/Training   [place] Level (Grooming) moderate assist (50% patient effort)   Toileting   [place] Level (Toileting) maximum assist (25% patient effort)   Clinical Impression   Criteria for Skilled Therapeutic Interventions Met (OT) Yes, treatment indicated   OT Diagnosis impaired ADLs   OT Problem List-Impairments impacting ADL problems related to;activity tolerance  impaired;balance;cognition;communication;strength;pain   Assessment of Occupational Performance 5 or more Performance Deficits   Identified Performance Deficits dressing, toilet transfer, toileting, grooming/hygienes   Planned Therapy Interventions (OT) ADL retraining;strengthening;ROM;transfer training;home program guidelines;progressive activity/exercise   Clinical Decision Making Complexity (OT) detailed assessment/moderate complexity   Risk & Benefits of therapy have been explained evaluation/treatment results reviewed;patient   OT Total Evaluation Time   OT Eval, Moderate Complexity Minutes (93390) 6   OT Goals   Therapy Frequency (OT) 5 times/week   OT Predicted Duration/Target Date for Goal Attainment 01/31/24   OT Goals Hygiene/Grooming;Upper Body Dressing;Lower Body Dressing;Toilet Transfer/Toileting;Cognition   OT: Hygiene/Grooming minimal assist   OT: Upper Body Dressing Minimal assist   OT: Lower Body Dressing Moderate assist;within precautions   OT: Toilet Transfer/Toileting Minimal assist;toilet transfer;cleaning and garment management;using adaptive equipment   OT: Cognitive Patient/caregiver will verbalize understanding of cognitive assessment results/recommendations as needed for safe discharge planning   Interventions   Interventions Quick Adds Self-Care/Home Management   Self-Care/Home Management   Self-Care/Home Mgmt/ADL, Compensatory, Meal Prep Minutes (17782) 30   Symptoms Noted During/After Treatment (Meal Preparation/Planning Training) dizziness;fatigue;increase work of breath   Treatment Detail/Skilled Intervention Pt greeted seated in recliner, alert and asking to use the toilet.  Pt [place], works well with pocket talker.  Pt initially stating she would stand with the 2WW, but when asking for toilet, Denae Steady brought in.  MinAx2 to stand to SS, cues for hand placement.  MinAx2 descent to [facility], pt incontinent of bm as she descended.  Once seated pt with bm output, then reporting increasing  dizziness, slumping forward, asking for emesis bag. RN doing routine check on pt, and asked to assist with return to bed.  MaxAx2 STS to SS, dependent adrienne-cares and briefs change by 3rd person.  Pt returned to bed MaxAx2, vitals taken, see VSFS, pt SOB (BP 150s/90s, O2 sats >90%). Pt on 2L O2 t/o. Increased time for settling pt for comfort, pt reports dizziness gone, RN in room at TH exit.   OT Discharge Planning   OT Plan chair/SS g/h, STS to 2WW, SS to BSC/toilet   OT Discharge Recommendation (DC Rec) Transitional Care Facility   OT Rationale for DC Rec Pt well below baseline, limited by weakness and dizziness.  She will benefit from continued therapy to maximize safety/independence with ADLs.   OT Brief overview of current status Toilet transfer Ax2 w/Denae Stead to BSC overlay, pt with significant dizziness this session   Total Session Time   Timed Code Treatment Minutes 30   Total Session Time (sum of timed and untimed services) 36

## 2024-01-17 NOTE — PLAN OF CARE
Reason for Admission: closed wedge compression fx of T7    Cognitive/Mentation: A/Ox 4 with intermittent confusion    Neuros/CMS: Intact     VS: tachycardia, 1 L via NC, hypertensive    Tele: NSR    GI: BM this shift. incontinent.    : incontinent. Pure wick in place    Pulmonary: LS expiratory wheezes, cough, dyspnea on exertion    Pain: denies.     Drains/Lines: SL PIV    Skin: scattered bruises    Activity: Assist x 2 with dot steady.    Diet: regular with thin liquids. Takes pills whole. Poor oral intake     Therapies recs: TCU    Discharge: pending    End of shift summary: Sat up in chair for 1 hour. On K and Mg protocol. Replaced both. Lab recheck scheduled @ 1830. Called lab at 1915 and staff member in lab indicated that phlebotomist has been assigned and will draw labs shortly. Able to shift weight on own with encouragement.

## 2024-01-17 NOTE — PROGRESS NOTES
[facility]    Medicine Progress Note - Hospitalist Service    Date of Admission:  1/9/2024    Assessment & Plan   Yolanda Watson is a 83 year old female admitted on 1/9/2024. She has a history of breast cancer, primary lung cancer, colon cancer [BRCA 2] and presented to the ER with bilateral weakness and back pain.     Pneumonia   Severe sepsis   Lactic acidosis  CT chest on 1/19/24 showed worsening opacities in bilateral lung bases; negative for PE. Lactic acid elevated at 4.0. WBC within normal limits. UA not suggestive of infection.  Admitted to inpatient.  Initially treated with rocephin and azithromycin. Azithromycin switched to doxycycline due to prolonged QTc. Completed 5 day course of rocephin in the hosptial. Plan to complete 10 day course of doxycycline, last dose on 1/21/24.  Prednisone added 1/16/24 for wheezing, plan short course.  MRSA/MSSA nares negative.  Urine Legionella and urine pneumonia were negative.  Blood cultures negative.  Lactic acid returned to normal on recheck.  ID consulted, appreciate their assistance.  Pulmonology consulted, appreciate their assistance, as noted below, s/p BAL on 1/12/24. Culture grew Candida albicans and Candida glabrata. Mili not a pathogen, see ID note on 1/16/24.  Has been on 1-2 lpm of supplemental oxygen, continue to wean as able.      History of lung cancer  PET CT June 23, 2023 showed development of a spiculated FDG avid nodule in the left lower lobe measuring 1 cm suspicious for early primary lung neoplasm without mets no PET lymphadenopathy no finding concerning for distant metastatic disease.  CT chest with contrast on 12/14/2023 showed patchyconsolidative opacity seen within the left lower lobe with findings suspicious for developing pneumonia.  Stable 1.1 cm left lower lobe lung nodule.  Slightly enlarging small to moderate right pleural effusion  CT scan 1/9/24 showed worsening of patchy opacities in the lung bases  "bilaterally since 12/14/2023, likely representing worsening pneumonia. Stable small right and trace left pleural effusion.  [facility] consulted, appreciate their assistance.  [facility] pulmonology consulted, appreciate their assistance.  S/p bronchoscopy with BAL on 1/12/24.  BAL cytology negative for malignancy.  BAL culture grew candida as noted above.  Continue follow up with [facility].     Chronic ischemic cardiomyopathy per Cardiology  Long QT, improved   Hypokalemia after IV furosemide on replacement protocol  Hypomagnesemia, after IV furosemide, on replacement protocol  Admission BNP 5.1K, chest CT without indications of pulm vascular congestion, serial troponins flat, 20, 19,  1/11/2024: Echo new CM, EF 35-40%, severe hypokinesia inferior and inferolateral..  Cardiology consulted, appreciate their assistance.  See note 1/11/2024: \"continue ASA 81 mg, metoprolol succinate 100 mg p.o. daily, lisinopril 20 mg furosemide 20 mg IV one-time reassess regarding need for indications of chronic low-dose diuretic lipid panel, previously on statin.'  No a fib per Cardiology review of tele.   On admission sinus tachycardia.  Will obtain EKG to confirm [sinus], hold lisinopril for now to allow increase in metoprolol, changed to Lopressor 100 mg twice daily.\"  Plan for outpatient cardiology follow up for med titration and further discussion of ischemic evaluation.  TFTs within normal limits.  Potassium and magnesium replacement protocols ordered.  Telemetry.  Diuresed with IV lasix, transitioned oral lasix on 1/14/24.  QTc improved to 476 on most recent TTE.      History of Breast cancer   History of colon cancer   BRCA 2 mutation  Noted.     Wedge compression fracture T7  No recent fall.  No urinary incontinence patient has chronic fecal incontinence and chronic bilateral weakness.  Neurosurgery consulted, appreciate their assistance, see note 1/10/2024.  Thoracic MRI on 1/10/24 showed a burst type " "compression fracture T7 findings concerning for thin epidural hematoma at T6/T8; see report for full details.  Neurosurgery recommends conservative management.  Plan for outpatient follow-up, consideration of osteocool/kyphoplasty at that time.  No lifting greater than 5-10 pounds, no excessive bending or twisting.  Scheduled acetaminophen, calcitonin spray, voltaren gel, PRN robaxin, PRN oxycodone available for pain control.  Minimize oxycodone as able, patient somewhat sedated from oxycodone on 1/12/24.     Right kidney lesion  Incidental finding on CT scan on 1/9/24.  Follow up with PCP and consider outpatient renal MRI on a nonemergent basis for further evaluation.       Hypertension  Blood pressure elevated, may be somewhat related to pain.   Continue PTA metoprolol and lisinopril.          Diet: Regular Diet Adult    DVT Prophylaxis: Pneumatic Compression Devices  Patricia Catheter: Not present  Lines: None     Cardiac Monitoring: None  Code Status: No CPR- Do NOT Intubate      Clinically Significant Risk Factors        # Hypokalemia: Lowest K = 3.3 mmol/L in last 2 days, will replace as needed     # Hypomagnesemia: Lowest Mg = 1.4 mg/dL in last 2 days, will replace as needed   # Hypoalbuminemia: Lowest albumin = 3.3 g/dL at 1/10/2024  7:08 AM, will monitor as appropriate     # Hypertension: Noted on problem list  # Chronic heart failure with reduced ejection fraction: last echo with EF <40%       # Overweight: Estimated body mass index is 25.69 kg/m  as calculated from the following:    Height as of this encounter: 1.778 m (5' 10\").    Weight as of this encounter: 81.2 kg (179 lb 0.2 oz).   # Moderate Malnutrition: based on nutrition assessment    # Financial/Environmental Concerns: none         Disposition Plan      Expected Discharge Date: 01/18/2024,  9:00 AM    Destination: assisted living  Discharge Comments: Return to [facility]?  ID consult placed on 1/15- fungal panel positive for yeast      "       Ashok Raoms MD  Hospitalist Service  [facility]  Securely message with [employer] (more info)  Text page via [employer] Paging/Directory   ______________________________________________________________________    Interval History   Yolanda Watson was seen earlier today. She feels OK. No new complaints/concerns for me. Pain seems to be well controlled at the moment. Denies fevers, chest pain, shortness of breath at rest, nausea, abdominal pain.    Physical Exam   Vital Signs: Temp: 97.2  F (36.2  C) Temp src: Oral BP: (!) 143/91 Pulse: 83   Resp: 18 SpO2: 98 % O2 Device: Nasal cannula Oxygen Delivery: 1 LPM  Weight: 179 lbs .22 oz    Constitutional: awake, alert, cooperative, no apparent distress, laying in the hospital bed  Respiratory: no increased work of breathing, few crackles at the bases  Cardiovascular: regular rate and rhythm, normal S1 and S2, no murmur noted  GI: normal bowel sounds, soft, non-distended, non-tender  Skin: warm, dry  Musculoskeletal: no lower extremity pitting edema present  Neurologic: awake, alert, answers question appropriately, moves all extremities    Medical Decision Making       40 MINUTES SPENT BY ME on the date of service doing chart review, history, exam, documentation & further activities per the note.      Data     I have personally reviewed the following data over the past 24 hrs:    N/A  \   N/A   / N/A     N/A N/A N/A /  N/A   3.3 (L) N/A N/A \

## 2024-01-17 NOTE — PROGRESS NOTES
01/17/24 0800   Appointment Info   Signing Clinician's Name / Credentials (PT) Reed Sanchez DPT   Rehab Comments (PT) [place], use pocket talker   Living Environment   People in Home alone   Current Living Arrangements assisted living   Home Accessibility no concerns   Transportation Anticipated family or friend will provide   Living Environment Comments Pt reports living in [place]. No stairs. Pt reports a family member will pick pt up upon discharge.   Self-Care   Usual Activity Tolerance moderate   Current Activity Tolerance fair   Regular Exercise No   Equipment Currently Used at Home walker, rolling   Fall history within last six months no   Activity/Exercise/Self-Care Comment Pt reports being IND with bathing and dressing at baseline. Pt ambulates w/ a FWW at baseline but does not specifiy how well she normally gets around.   General Information   Onset of Illness/Injury or Date of Surgery 01/17/24   Referring Physician Mariann Penny MD   Patient/Family Therapy Goals Statement (PT) Pt did not state   Pertinent History of Current Problem (include personal factors and/or comorbidities that impact the POC) Per Chart: Yolanda Watson is a 83 year old female admitted on 1/9/2024. Presented with bilateral weakness and back pain.  Has history of breast cancer, primary lung cancer, colon cancer   Existing Precautions/Restrictions fall   Weight-Bearing Status - LLE full weight-bearing   Weight-Bearing Status - RLE full weight-bearing   Cognition   Orientation Status (Cognition) oriented x 3   Cognitive Status Comments Pt lethargic throughout session, appears mildly confused   Pain Assessment   Patient Currently in Pain Yes, see Vital Sign flowsheet  (reports back pain, did not rate)   Integumentary/Edema   Integumentary/Edema no deficits were identifed   Posture    Posture Forward head position;Protracted shoulders   Range of Motion (ROM)   Range of Motion ROM is WFL   Strength (Manual Muscle Testing)   Strength  (Manual Muscle Testing) Deficits observed during functional mobility   Strength Comments BLE Hip Flexion: 3/5   Bed Mobility   Comment, (Bed Mobility) Supine>sit w/ max A x 1   Transfers   Comment, (Transfers) Attempted sit>stand w/ max A x1, unable to complete   Gait/Stairs (Locomotion)   Comment, (Gait/Stairs) Unable to initiate   Balance   Balance Comments Adequate static sitting balance; unsteady with OOB activity   Sensory Examination   Sensory Perception patient reports no sensory changes   Clinical Impression   Criteria for Skilled Therapeutic Intervention Yes, treatment indicated   PT Diagnosis (PT) Impaired gait   Influenced by the following impairments Decreased activity tolerance; decreased balance; decreased strength; increased pain   Functional limitations due to impairments Impaired functional mobility   Clinical Presentation (PT Evaluation Complexity) stable   Clinical Presentation Rationale Clinical judgement   Clinical Decision Making (Complexity) low complexity   Planned Therapy Interventions (PT) balance training;bed mobility training;gait training;patient/family education;strengthening;transfer training;progressive activity/exercise   Risk & Benefits of therapy have been explained evaluation/treatment results reviewed;care plan/treatment goals reviewed;risks/benefits reviewed;current/potential barriers reviewed;participants voiced agreement with care plan;participants included;patient   PT Total Evaluation Time   PT Eval, Low Complexity Minutes (48823) 10   Physical Therapy Goals   PT Frequency 5x/week   PT Predicted Duration/Target Date for Goal Attainment 01/22/24   PT Goals Bed Mobility;Transfers;Gait   PT: Bed Mobility Supervision/stand-by assist;Supine to/from sit   PT: Transfers Supervision/stand-by assist;Sit to/from stand;Assistive device   PT: Gait Supervision/stand-by assist;Assistive device;100 feet   Interventions   Interventions Quick Adds Therapeutic Activity   Therapeutic Activity    Therapeutic Activities: dynamic activities to improve functional performance Minutes (28391) 23   Symptoms Noted During/After Treatment Fatigue   Treatment Detail/Skilled Intervention Greeted pt supine in bed, agreed to PT with encouragement. VSS on 2L O2 via NC throughout session. Pt very [place], used pocket talker to communicate. Pt very lethargic throughout session. Pt performed supine>sit w/ max A x 1, needing multiple attempts and assist to safely lift BLEs off of bed and trunk control. Additional assist to scoot to EOB. Pt requiring BUE support to maintain sitting balance. Attempted sit>stand w/ FWW and max A x 1, unable to clear buttocks from bed. PT was going to bring in SS, pt asking to cease session due to fatigue and nausea. Pt required mod A x 1 to return to supine in bed, needing assist to safely lift BLEs back into bed and reposition. Pt ended session supine in bed, with all needs met and call light within reach.   PT Discharge Planning   PT Plan Bed mobility; sit>stands w/ SS vs FWW; initiate gait w/ FWW and WC follow as able   PT Discharge Recommendation (DC Rec) Transitional Care Facility   PT Rationale for DC Rec Pt is below baseline. Pt currently requiring heavy A x 2 with OOB activity. Pt presents with deficits in activity tolerance, balance, and strength. Due to these deficits, pt would benefit from continued skilled PT services via TCU to address deficits and improve IND with safety and functional mobility.   PT Brief overview of current status Supine>sit w/ max A x1; unable to achieve standing w/ FWW w/ max A x 1   Total Session Time   Timed Code Treatment Minutes 23   Total Session Time (sum of timed and untimed services) 33

## 2024-01-17 NOTE — PLAN OF CARE
Date & Time: 1/16/24 2403-3596  Behavior & Aggression: Green  Fall Risk: Yes  Orientation: A&Ox3, disoriented to time, intermittent confusion. Sleeping between cares.  ABNL VS/O2: VSS on 1L O2, ex hypertensive. 88% on RA. Denies SOB or chest pain.   Pain Management: Denied pain. On scheduled Tylenol.  Bowel/Bladder: Incontinent of B&B, purewick in place. 400mL dark lacey output. Poor PO intake.  Drains/lines: PIV SL  Diet: Regular. Denies N/V.  Activity Level: Refusing to get OOB. PCDs on. Can repo indep, just need reminders.   Labs: On K & Mg protocols, rechecks ordered for the AM.  Consults: Hem/Onco, Pulmonology, ID, & Cardiology signed off. CM, PT, OT consults pending.  Anticipated  DC Date: pending CM, OT/PT consult & recommendations.  Significant Information: From [facility].

## 2024-01-17 NOTE — TELEPHONE ENCOUNTER
Called and LVM to r/s patient's appt on 1/22/24 Provider will not be in clinic that day. Please r/s to 1/29/24 or 1/30/24.

## 2024-01-17 NOTE — PLAN OF CARE
Goal Outcome Evaluation:                  Pt A&Ox3, confused, disoriented to time. VSS, ex htn. 94% on 1L NC. Denies SOB or CP. Very [place], has pocket talker. Incontinent of B&B.Purewick in place. BMx2 this shift. Perineum cleansed, barrier cream applied. Pain managed with scheduled Tynelol. PIV SL.

## 2024-01-17 NOTE — CONSULTS
Please refer to consult completed on 01/10 by BELLA Harper. Care management is following the patient.       SHANNA Ball  [facility]  Social Work

## 2024-01-18 NOTE — PROGRESS NOTES
Oncology chart check:    BAL cytology showed no malignancy.  Oncology team will sign off.  Please call with questions if any.  She is scheduled for imaging on 3/26/2024 and follow up in the clinic on 3/29/2024 at 1pm    Deepak Borrero MD

## 2024-01-18 NOTE — PROGRESS NOTES
Chest congestion noted  with audible wheezing. Guaifenesin syrup requested from Provider. Albuterol neb treatment requested for wheezing.

## 2024-01-18 NOTE — PROGRESS NOTES
Care Management Follow Up    Length of Stay (days): 9    Expected Discharge Date: 01/18/2024     Concerns to be Addressed: discharge planning     Patient plan of care discussed at interdisciplinary rounds: Yes    Anticipated Discharge Disposition: Assisted Living (watch for other needs)     Anticipated Discharge Services: Other (see comment) (pending continued reccs)  Anticipated Discharge DME: Other (see comment)    Patient/family educated on Medicare website which has current facility and service quality ratings:    Education Provided on the Discharge Plan: Yes  Patient/Family in Agreement with the Plan: yes    Referrals Placed by CM/SW: External Care Coordination, Communication hand-offs to next level of Care Providers  Private pay costs discussed: Not applicable    Additional Information:  Writer reviewed chart and noted the recommendation for Yolanda to go to a TCU at discharge.  Writer met with patient and spoke with her briefly (with pocket talker).  Writer left the list of TCU facilities with patient.  Patient gave writer permission to call her  to talk with him about the plan.  Writer called , Shruti and left VM regarding the above.  Will wait for call back.    1540: writer spoke with daughter Ajith on the phone.Informed Ajith of the acceptance to [facility] TCU.  Ajith presented the issue that her mom could get very depressed being away from her home where things are set up for her.  Ajith shared that patient's  lives in a separate apartment across the [place], he is bed and chair bound and get all cares provided by [facility] staff so is not able to help Yolanda with mobility.  Writer explained patient needs assist of 1 to 2 so will need someone with her 24/7.  Ajith asked what would be different for her going to a TCU vs home with [facility].  Writer explained [facility] PT would come a couple times per week and she would be alone the rest of the time.  She would need to call staff to help her with any mobility.   Writer explained the TCU would work with her each day and has more PT equipment options so help her back to her baseline of indep with walker.  Writer informed Tyler that I did talk with Yolanda using pocket talker and she was agreeable to the TCU.  Writer went to patients room after this conversation to confirm she remembers and is in agreement with TCU, however patient was sound asleep.  Writer will visit with patient again tomorrow and touch base with Ajith as well.    Ajith will be coming to visit with patient this evening.  Writer     Yolanda White RN

## 2024-01-18 NOTE — PROGRESS NOTES
[facility]    Medicine Progress Note - Hospitalist Service    Date of Admission:  1/9/2024    Assessment & Plan   Yolanda Watson is a 83 year old female admitted on 1/9/2024. She has a history of breast cancer, primary lung cancer, colon cancer [BRCA 2] and presented to the ER with bilateral weakness and back pain.     Pneumonia   Severe sepsis   Lactic acidosis  CT chest on 1/19/24 showed worsening opacities in bilateral lung bases; negative for PE. Lactic acid elevated at 4.0. WBC within normal limits. UA not suggestive of infection.  Admitted to inpatient.  Initially treated with rocephin and azithromycin. Azithromycin switched to doxycycline due to prolonged QTc. Completed 5 day course of rocephin in the hosptial. Plan to complete 10 day course of doxycycline, last dose on 1/21/24.  Prednisone added 1/16/24 for wheezing, plan short 5 day course.  MRSA/MSSA nares negative.  Urine legionella and urine pneumonia were negative.  Blood cultures negative.  Lactic acid returned to normal on recheck.  ID consulted, appreciate their assistance.  Pulmonology consulted, appreciate their assistance, as noted below, s/p BAL on 1/12/24. Culture grew Candida albicans and Candida glabrata. Mili not a pathogen, see ID note on 1/16/24.  Has been on 1-2 lpm of supplemental oxygen, continue to wean as able.  PT/OT consulted, recommending TCU.  Care transitions following for discharge planning, TCU placement.  Continue to wean O2 and work with therapies. Patient/family hoping to avoid TCU. Will ask social work to touch base with her daughter.      History of lung cancer  PET CT June 23, 2023 showed development of a spiculated FDG avid nodule in the left lower lobe measuring 1 cm suspicious for early primary lung neoplasm without mets no PET lymphadenopathy no finding concerning for distant metastatic disease.  CT chest with contrast on 12/14/2023 showed patchyconsolidative opacity seen within the left  "lower lobe with findings suspicious for developing pneumonia.  Stable 1.1 cm left lower lobe lung nodule.  Slightly enlarging small to moderate right pleural effusion  CT scan 1/9/24 showed worsening of patchy opacities in the lung bases bilaterally since 12/14/2023, likely representing worsening pneumonia. Stable small right and trace left pleural effusion.  [facility] consulted, appreciate their assistance.  [facility] pulmonology consulted, appreciate their assistance.  S/p bronchoscopy with BAL on 1/12/24.  BAL cytology negative for malignancy.  BAL culture grew candida as noted above.  Continue follow up with [facility].     Chronic ischemic cardiomyopathy per Cardiology  Long QT, improved   Hypokalemia after IV furosemide on replacement protocol  Hypomagnesemia, after IV furosemide, on replacement protocol  Admission BNP 5.1K, chest CT without indications of pulm vascular congestion, serial troponins flat, 20, 19, 1/11/2024: Echo new CM, EF 35-40%, severe hypokinesia inferior and inferolateral..  Cardiology consulted, appreciate their assistance.  See note 1/11/2024: \"continue ASA 81 mg, metoprolol succinate 100 mg p.o. daily, lisinopril 20 mg furosemide 20 mg IV one-time reassess regarding need for indications of chronic low-dose diuretic lipid panel, previously on statin.'  No a fib per Cardiology review of tele.   On admission sinus tachycardia.  Will obtain EKG to confirm [sinus], hold lisinopril for now to allow increase in metoprolol, changed to Lopressor 100 mg twice daily.\"  Plan for outpatient cardiology follow up for med titration and further discussion of ischemic evaluation.  TFTs within normal limits.  Potassium and magnesium replacement protocols ordered.  Telemetry.  Diuresed with IV lasix, transitioned oral lasix on 1/14/24.  QTc improved to 476 on most recent EKG.      History of Breast cancer   History of colon cancer   BRCA 2 mutation  Noted.     Wedge compression fracture T7  No " "recent fall.  No urinary incontinence patient has chronic fecal incontinence and chronic bilateral weakness.  Neurosurgery consulted, appreciate their assistance, see note 1/10/2024.  Thoracic MRI on 1/10/24 showed a burst type compression fracture T7 findings concerning for thin epidural hematoma at T6/T8; see report for full details.  Neurosurgery recommends conservative management with plan for outpatient follow-up, consideration of osteocool/kyphoplasty at that time if symptoms not well controlled.  No lifting greater than 5-10 pounds, no excessive bending or twisting.  Scheduled acetaminophen, calcitonin spray, voltaren gel, PRN robaxin, PRN oxycodone available for pain control.  Minimize oxycodone as able, patient somewhat sedated from oxycodone on 1/12/24.     Right kidney lesion  Incidental finding on CT scan on 1/9/24.  Follow up with PCP and consider outpatient renal MRI on a nonemergent basis for further evaluation.       Hypertension  Blood pressure elevated, may be somewhat related to pain.   Continue PTA metoprolol and lisinopril.          Diet: Regular Diet Adult    DVT Prophylaxis: Pneumatic Compression Devices  Patricia Catheter: Not present  Lines: None     Cardiac Monitoring: None  Code Status: No CPR- Do NOT Intubate      Clinically Significant Risk Factors        # Hypokalemia: Lowest K = 3.3 mmol/L in last 2 days, will replace as needed  # Hyperkalemia: Highest K = 5.5 mmol/L in last 2 days, will monitor as appropriate     # Hypomagnesemia: Lowest Mg = 1.4 mg/dL in last 2 days, will replace as needed   # Hypoalbuminemia: Lowest albumin = 3.3 g/dL at 1/10/2024  7:08 AM, will monitor as appropriate     # Hypertension: Noted on problem list  # Chronic heart failure with reduced ejection fraction: last echo with EF <40%       # Overweight: Estimated body mass index is 27.3 kg/m  as calculated from the following:    Height as of this encounter: 1.778 m (5' 10\").    Weight as of this encounter: 86.3 kg " (190 lb 4.1 oz).   # Moderate Malnutrition: based on nutrition assessment    # Financial/Environmental Concerns: none         Disposition Plan      Expected Discharge Date: 01/19/2024,  9:00 AM    Destination: assisted living  Discharge Comments: Return to [facility]?  ID consult placed on 1/15- fungal panel positive for yeast            Ashok Ramos MD  Hospitalist Service  [facility]  Securely message with [email or web address] (more info)  Text page via [employer] Paging/Directory   ______________________________________________________________________    Interval History   Yolanda Watson was seen this morning.  She feels a little better today.  Has a cough, improving.  Denies shortness of breath.  No fevers, chest pain, nausea, abdominal pain.  Updated her daughter Ajith by phone today.    Physical Exam   Vital Signs: Temp: 99.2  F (37.3  C) Temp src: Axillary BP: (!) 132/90 Pulse: 103   Resp: 18 SpO2: 99 % O2 Device: Nasal cannula Oxygen Delivery: 1 LPM  Weight: 190 lbs 4.11 oz    Constitutional: awake, alert, cooperative, no apparent distress, laying in the hospital bed, very hard of hearing  Respiratory: no increased work of breathing, few crackles at the bases  Cardiovascular: regular rate and rhythm, normal S1 and S2, no murmur noted  GI: normal bowel sounds, soft, non-distended, non-tender  Skin: warm, dry  Musculoskeletal: no lower extremity pitting edema present  Neurologic: awake, alert, answers question appropriately, moves all extremities    Medical Decision Making       40 MINUTES SPENT BY ME on the date of service doing chart review, history, exam, documentation & further activities per the note.      Data     I have personally reviewed the following data over the past 24 hrs:    N/A  \   N/A   / N/A     136 93 (L) 13.8 /  100 (H)   4.1 34 (H) 0.60 \

## 2024-01-19 NOTE — PROGRESS NOTES
Shift Summary [id number]    Admitting Diagnosis: Closed wedge compression fracture of T7 vertebra, initial encounter (H) [S22.310A]  Pneumonia of right lower lobe due to infectious organism [J18.9]  Sepsis, due to unspecified organism, unspecified whether acute organ dysfunction present (H) [A41.9]   Vitals : WNL  Pain : denies pain.    A&Ox4, [place]. Pocket talker for hearing.   Voiding : bladder incontinence. Bedside commode voing.   Mobility : sera steady Ax1.   Tele : QT prolongation.   CMS : edema in  BLE.   Lung Sounds diminished. Audible wheezing. Chest congestion.  on 2 L  via n/c.   GI ; WNL.   Dressing : redness in coccyx Foam dressing applied.     Orders Placed This Encounter      Regular Diet Adult       Plan:     Discharge pending to TCU.

## 2024-01-19 NOTE — PROGRESS NOTES
[facility]    Medicine Progress Note - Hospitalist Service    Date of Admission:  1/9/2024    Assessment & Plan   Yolanda Watson is a 83 year old female admitted on 1/9/2024. She has a history of breast cancer, primary lung cancer, colon cancer [BRCA 2] and presented to the ER with bilateral weakness and back pain.     Bilateral pneumonia: Treated last dose of antibiotic 1/16/2024  Severe sepsis: Resolved  Lactic acidosis  CT chest on 1/19/24 showed worsening opacities in bilateral lung bases; negative for PE. Lactic acid elevated at 4.0. WBC within normal limits. UA not suggestive of infection.  Admitted to inpatient.  Initially treated with rocephin and azithromycin. Azithromycin switched to doxycycline due to prolonged QTc. Completed 8 day course of rocephin in the hosptial, she got 4 days of azithromycin, and 4 days of doxycycline last dose of doxycycline was 1/15/2024  Prednisone added 1/16/24 for wheezing, plan short 5 day course, last day 1/20/2024.  MRSA/MSSA nares negative.  Urine legionella and urine pneumonia were negative.  Blood cultures negative.  Lactic acid returned to normal on recheck.  ID consulted, appreciate their assistance.  Pulmonology consulted, appreciate their assistance, as noted below, s/p BAL on 1/12/24. Culture grew Candida albicans and Candida glabrata. Mili not a pathogen, see ID note on 1/16/24.  Has been on 1-2 lpm of supplemental oxygen, continue to wean as able.  PT/OT consulted, recommending TCU.  Care transitions following for discharge planning, TCU placement.  Continue to wean O2 and work with therapies. Patient/family hoping to avoid TCU. Will ask social work to touch base with her daughter.  Overall stable, generalized weakness care.  Continue PT and OT at this time            History of lung cancer  PET CT June 23, 2023 showed development of a spiculated FDG avid nodule in the left lower lobe measuring 1 cm suspicious for early primary lung  "neoplasm without mets no PET lymphadenopathy no finding concerning for distant metastatic disease.  CT chest with contrast on 12/14/2023 showed patchyconsolidative opacity seen within the left lower lobe with findings suspicious for developing pneumonia.  Stable 1.1 cm left lower lobe lung nodule.  Slightly enlarging small to moderate right pleural effusion  CT scan 1/9/24 showed worsening of patchy opacities in the lung bases bilaterally since 12/14/2023, likely representing worsening pneumonia. Stable small right and trace left pleural effusion.  [facility] consulted, appreciate their assistance.  [facility] pulmonology consulted, appreciate their assistance.  S/p bronchoscopy with BAL on 1/12/24.  BAL cytology negative for malignancy.  BAL culture grew candida as noted above.  [facility] now signed off.     Chronic ischemic cardiomyopathy per Cardiology  Long QT, improved   Hypokalemia after IV furosemide on replacement protocol  Hypomagnesemia, after IV furosemide, on replacement protocol  Admission BNP 5.1K, chest CT without indications of pulm vascular congestion, serial troponins flat, 20, 19, 1/11/2024: Echo new CM, EF 35-40%, severe hypokinesia inferior and inferolateral..  Cardiology consulted, appreciate their assistance.  See note 1/11/2024: \"continue ASA 81 mg, metoprolol succinate 100 mg p.o. daily, lisinopril 20 mg furosemide 20 mg IV one-time reassess regarding need for indications of chronic low-dose diuretic lipid panel, previously on statin.'  No a fib per Cardiology review of tele.   On admission sinus tachycardia.  Will obtain EKG to confirm [sinus], hold lisinopril for now to allow increase in metoprolol, changed to Lopressor 100 mg twice daily.\"  Plan for outpatient cardiology follow up for med titration and further discussion of ischemic evaluation.  TFTs within normal limits.  Potassium and magnesium replacement protocols ordered.  Telemetry.  Diuresed with IV lasix, transitioned " oral lasix on 1/14/24.  QTc improved to 476 on most recent EKG.  Likely recurrent hypomagnesemia is likely because of PPI, will discontinue Protonix to Pepcid.  Replace magnesium as per protocol      History of Breast cancer   History of colon cancer   BRCA 2 mutation  Noted.     Wedge compression fracture T7  No recent fall.  No urinary incontinence patient has chronic fecal incontinence and chronic bilateral weakness.  Neurosurgery consulted, appreciate their assistance, see note 1/10/2024.  Thoracic MRI on 1/10/24 showed a burst type compression fracture T7 findings concerning for thin epidural hematoma at T6/T8; see report for full details.  Neurosurgery recommends conservative management with plan for outpatient follow-up, consideration of osteocool/kyphoplasty at that time if symptoms not well controlled.  No lifting greater than 5-10 pounds, no excessive bending or twisting.  Scheduled acetaminophen, calcitonin spray, voltaren gel, PRN robaxin, PRN oxycodone available for pain control.  Minimize oxycodone as able, patient somewhat sedated from oxycodone on 1/12/24.     Right kidney lesion  Incidental finding on CT scan on 1/9/24.  Follow up with PCP and consider outpatient renal MRI on a nonemergent basis for further evaluation.       Hypertension  Blood pressure elevated, may be somewhat related to pain.   Continue PTA metoprolol and lisinopril.          Diet: Regular Diet Adult    DVT Prophylaxis: Pneumatic Compression Devices  Patricia Catheter: Not present  Lines: None     Cardiac Monitoring: None  Code Status: No CPR- Do NOT Intubate      Clinically Significant Risk Factors        # Hyperkalemia: Highest K = 5.5 mmol/L in last 2 days, will monitor as appropriate     # Hypomagnesemia: Lowest Mg = 1.3 mg/dL in last 2 days, will replace as needed   # Hypoalbuminemia: Lowest albumin = 3.3 g/dL at 1/10/2024  7:08 AM, will monitor as appropriate     # Hypertension: Noted on problem list    # Chronic heart failure  "with reduced ejection fraction: last echo with EF <40%       # Overweight: Estimated body mass index is 27.3 kg/m  as calculated from the following:    Height as of this encounter: 1.778 m (5' 10\").    Weight as of this encounter: 86.3 kg (190 lb 4.1 oz).   # Moderate Malnutrition: based on nutrition assessment      # Financial/Environmental Concerns: none         Disposition Plan      Expected Discharge Date: 01/19/2024, 12:00 PM    Destination: assisted living  Discharge Comments: Return to [facility]?  ID consult placed on 1/15- fungal panel positive for yeast            Tunde Hager MD  Hospitalist Service  [facility]  Securely message with [employer] (more info)  Text page via [facility] Paging/Directory   ______________________________________________________________________    Interval History   Patient seen and evaluated in the room today, she gets tired after the therapy session, no fever chills nausea vomiting dizziness or lightheadedness at this time.  Feeling somewhat tired    No other significant event noted    Physical Exam   Vital Signs: Temp: 98  F (36.7  C) Temp src: Axillary BP: 139/84 Pulse: 95   Resp: 18 SpO2: 96 % O2 Device: Nasal cannula Oxygen Delivery: 1 LPM  Weight: 190 lbs 4.11 oz    Constitutional: awake, alert, cooperative, no apparent distress, sitting in a chair, very hard of hearing  Respiratory: no increased work of breathing, few crackles at the bases  Cardiovascular: regular rate and rhythm, normal S1 and S2, no murmur noted  GI: normal bowel sounds, soft, non-distended, non-tender  Skin: warm, dry  Musculoskeletal: no lower extremity pitting edema present  Neurologic: awake, alert, answers question appropriately, moves all extremities    Medical Decision Making       40 MINUTES SPENT BY ME on the date of service doing chart review, history, exam, documentation & further activities per the note.      Data     I have personally reviewed the following data " over the past 24 hrs:    6.3  \   11.1 (L)   / 316     137 94 (L) 14.0 /  95   3.9 34 (H) 0.63 \

## 2024-01-19 NOTE — PLAN OF CARE
Goal Outcome Evaluation:               Pt alert and orinted x4 but forgetful. Denied SOB, CP, N/V. Incontinent of B&B, purewick in place. Pt had magnesium replaced today lab to redraw. Pt up in chair for breakfast refused to get OOB for lunch. Turn and repo Q2 hours and heels elevated. Denied pain at 1400. Possible discharge tomorrow. On cardiac monitor.

## 2024-01-19 NOTE — PROGRESS NOTES
Care Management Follow Up    Length of Stay (days): 10    Expected Discharge Date: 01/19/2024     Concerns to be Addressed: discharge planning     Patient plan of care discussed at interdisciplinary rounds: Yes    Anticipated Discharge Disposition: Transitional Care     Anticipated Discharge Services: Other (see comment) (pending continued reccs)  Anticipated Discharge DME: Other (see comment)    Patient/family educated on Medicare website which has current facility and service quality ratings: yes (LV left with  re TCU)  Education Provided on the Discharge Plan: Yes  Patient/Family in Agreement with the Plan: yes    Referrals Placed by CM/SW: External Care Coordination, Communication hand-offs to next level of Care Providers  Private pay costs discussed: transportation costs    Additional Information:  Per CCRN note yesterday, patient was accepted to [facility] today.     Writer called Lottie at [facility], [phone], and she stated they would be able to take the patient today after 1600.    Writer updated the patient who stated they would like the writer to set up a ride for her. Writer informed her of ride set up with [facility];   Depending on the patient's assist level or whether or not family/friends are physically able to ambulate the patient, would determine if the patient is safe to be transported by family. Otherwise, the writer would set up transportation with [facility]. There are 2 types of methods for transporation depending on what the patient is able to manage. A W/C ride is an 85-90$ flat fee with about $6 dollars per mile added. A Stretcher ride is about 1025$ with $25 added  per mile. The writer will completed a PCS form but still cannot guarantee full coverage on the ride   Patient stated they are ok with going in a W/C ride.     Writer called [facility] transport and set up a W/C ride between [id number]. Writer updated the patient, TCU, and [facility] nursing staff.     Writer paged Dr. Hager for  orders and was told the patient did not feel good today and her Mg was low today, so to aim for tomorrow.     Writer called [facility] and canceled the ride and updated [facility] nursing staff and TCU. Writer will update the patient.           SHANNA Ball  [facility]  Social Work

## 2024-01-19 NOTE — PLAN OF CARE
Goal Outcome Evaluation:  Shift  summary [id number] 1/18/24-1/19/2024  Cognitive/Mentation: A/Ox 4 with intermittent confusion  Neuros/CMS: Intact   VS: tachycardia, NC 2L, hypertensive  Tele: NSR  GI: NO, BM this shift. incontinent.  : incontinent. Pure wick in place  Pulmonary: LS wheezes, cough, dyspnea on exertion  Pain: denies.   Drains/Lines: SL PIV  Skin: scattered bruises  Activity: Assist x 2 with sera steady.  Diet: regular with thin liquids.   Therapies recs: TCU  Discharge: pending

## 2024-01-20 NOTE — PROGRESS NOTES
[facility]    Medicine Progress Note - Hospitalist Service    Date of Admission:  1/9/2024    Assessment & Plan   Yolanda Watson is a 83 year old female admitted on 1/9/2024. She has a history of breast cancer, primary lung cancer, colon cancer [BRCA 2] and presented to the ER with bilateral weakness and back pain.     Bilateral pneumonia: Treated last dose of antibiotic 1/16/2024  Severe sepsis: Resolved  Lactic acidosis:  CT chest on 1/19/24 showed worsening opacities in bilateral lung bases; negative for PE. Lactic acid elevated at 4.0. WBC within normal limits. UA not suggestive of infection.  Admitted to inpatient.  Initially treated with rocephin and azithromycin. Azithromycin switched to doxycycline due to prolonged QTc. Completed 8 day course of rocephin in the hosptial, she got 4 days of azithromycin, and 4 days of doxycycline last dose of doxycycline was 1/15/2024  Prednisone added 1/16/24 for wheezing, plan short 5 day course, last day 1/20/2024.  MRSA/MSSA nares negative.  Urine legionella and urine pneumonia were negative.  Blood cultures negative.  Lactic acid returned to normal on recheck.  ID consulted, appreciate their assistance.  Pulmonology consulted, appreciate their assistance, as noted below, s/p BAL on 1/12/24. Culture grew Candida albicans and Candida glabrata. Mili not a pathogen, see ID note on 1/16/24, no treatment recommended.  Has been on 1-2 lpm of supplemental oxygen, continue to wean as able.  PT/OT consulted, recommending TCU.  Care transitions following for discharge planning, TCU placement.  Continue to wean O2 and work with therapies. Patient/family hoping to avoid TCU. Will ask social work to touch base with her daughter.    Patient remained overall low fatigue and somewhat confused.        History of lung cancer  PET CT June 23, 2023 showed development of a spiculated FDG avid nodule in the left lower lobe measuring 1 cm suspicious for early primary  "lung neoplasm without mets no PET lymphadenopathy no finding concerning for distant metastatic disease.  CT chest with contrast on 12/14/2023 showed patchyconsolidative opacity seen within the left lower lobe with findings suspicious for developing pneumonia.  Stable 1.1 cm left lower lobe lung nodule.  Slightly enlarging small to moderate right pleural effusion  CT scan 1/9/24 showed worsening of patchy opacities in the lung bases bilaterally since 12/14/2023, likely representing worsening pneumonia. Stable small right and trace left pleural effusion.  [facility] consulted, appreciate their assistance.  [facility] consulted, appreciate their assistance.  S/p bronchoscopy with BAL on 1/12/24.  BAL cytology negative for malignancy.  BAL culture grew candida as noted above.  [facility] now signed off.     Chronic ischemic cardiomyopathy per Cardiology  Long QT, improved   Hypokalemia after IV furosemide on replacement protocol  Hypomagnesemia, after IV furosemide, on replacement protocol  Admission BNP 5.1K, chest CT without indications of pulm vascular congestion, serial troponins flat, 20, 19, 1/11/2024: Echo new CM, EF 35-40%, severe hypokinesia inferior and inferolateral..  Cardiology consulted, appreciate their assistance.  See note 1/11/2024: \"continue ASA 81 mg, metoprolol succinate 100 mg p.o. daily, lisinopril 20 mg furosemide 20 mg IV one-time reassess regarding need for indications of chronic low-dose diuretic lipid panel, previously on statin.'  No a fib per Cardiology review of tele.   On admission sinus tachycardia.  Will obtain EKG to confirm [sinus], hold lisinopril for now to allow increase in metoprolol, changed to Lopressor 100 mg twice daily.\"  Plan for outpatient cardiology follow up for med titration and further discussion of ischemic evaluation.  TFTs within normal limits.  Potassium and magnesium replacement protocols ordered.  Telemetry.  Diuresed with IV lasix, " transitioned oral lasix on 1/14/24, given significant orthostatic hypotension, concern for hypovolemia, Lasix is now on hold.  QTc improved to 476 on most recent EKG.  Likely recurrent hypomagnesemia is likely because of PPI, will discontinue Protonix to Pepcid.  Replace magnesium as per protocol      History of Breast cancer   History of colon cancer   BRCA 2 mutation  Noted.     Wedge compression fracture T7  No recent fall.  No urinary incontinence patient has chronic fecal incontinence and chronic bilateral weakness.  Neurosurgery consulted, appreciate their assistance, see note 1/10/2024.  Thoracic MRI on 1/10/24 showed a burst type compression fracture T7 findings concerning for thin epidural hematoma at T6/T8; see report for full details.  Neurosurgery recommends conservative management with plan for outpatient follow-up, consideration of osteocool/kyphoplasty at that time if symptoms not well controlled.  No lifting greater than 5-10 pounds, no excessive bending or twisting.  Scheduled acetaminophen, calcitonin spray, voltaren gel, PRN robaxin, PRN oxycodone available for pain control.  Minimize oxycodone as able, patient somewhat sedated from oxycodone on 1/12/24.     Right kidney lesion  Incidental finding on CT scan on 1/9/24.  Follow up with PCP and consider outpatient renal MRI on a nonemergent basis for further evaluation.       Hypertension  Orthostatic hypotension  Blood pressure elevated, may be somewhat related to pain.   Continue PTA metoprolol and lisinopril.  Patient has supine hypertension, on metoprolol and lisinopril.  To get significant hypotension, I will continue with metoprolol, but decrease the dose of lisinopril from 40-20 at this time.      At this time the patient is confused, lethargic, no obvious cause.  I will go and check a UA, VBG's, going into MRI of the brain with and without contrast  If needed we can repeat the CT scan of the chest abdomen pelvis  I will check procalcitonin  "level.  Given her age comorbidities, prognosis is guarded     Discussed with the patient and the nursing staff during care of the patient     Diet: Regular Diet Adult    DVT Prophylaxis: Pneumatic Compression Devices  Patricia Catheter: Not present  Lines: None     Cardiac Monitoring: None  Code Status: No CPR- Do NOT Intubate      Clinically Significant Risk Factors        # Hypokalemia: Lowest K = 3.3 mmol/L in last 2 days, will replace as needed     # Hypomagnesemia: Lowest Mg = 1.3 mg/dL in last 2 days, will replace as needed   # Hypoalbuminemia: Lowest albumin = 3.3 g/dL at 1/10/2024  7:08 AM, will monitor as appropriate     # Hypertension: Noted on problem list    # Chronic heart failure with reduced ejection fraction: last echo with EF <40%       # Overweight: Estimated body mass index is 27.3 kg/m  as calculated from the following:    Height as of this encounter: 1.778 m (5' 10\").    Weight as of this encounter: 86.3 kg (190 lb 4.1 oz).   # Moderate Malnutrition: based on nutrition assessment      # Financial/Environmental Concerns: none         Disposition Plan      Expected Discharge Date: 01/20/2024, 12:00 PM    Destination: assisted living  Discharge Comments: [facility] at [phone]            Tunde Hager MD  Hospitalist Service  [facility]  Securely message with [employer] (more info)  Text page via [employer] Paging/Directory   ______________________________________________________________________    Interval History   Patient seen and evaluated in the room today, early morning events reviewed, had RRT secondary to orthostatic hypotension this morning with near syncope.  Was given IV fluid bolus of 500 mL, blood pressure high when supine, at time of my visit was sleeping, very lethargic and tired, but denies any nausea vomiting chest pain or shortness of breath.  Offers no complaint at this time.    Overnight events reviewed with the nursing staff taking care of the patient.      Physical " Exam   Vital Signs: Temp: 97.1  F (36.2  C) Temp src: Axillary BP: (!) 158/86 Pulse: 90   Resp: 18 SpO2: 98 % O2 Device: Nasal cannula Oxygen Delivery: 1 LPM  Weight: 190 lbs 4.11 oz    Constitutional: Fatigued, lethargic but able to open up her eyes, and answer few questions.  Hard of hearing  Respiratory: no increased work of breathing, no wheezing or crackles.  Cardiovascular: regular rate and rhythm, normal S1 and S2, no murmur noted  GI: normal bowel sounds, soft, non-distended, non-tender  Skin: warm, dry  Musculoskeletal: no lower extremity pitting edema present  Neurologic: No focal deficit    Medical Decision Making       40 MINUTES SPENT BY ME on the date of service doing chart review, history, exam, documentation & further activities per the note.      Data     I have personally reviewed the following data over the past 24 hrs:    7.1  \   11.5 (L)   / N/A     135 90 (L) N/A /  153 (H)   3.3 (L) 31 (H) N/A \     ALT: 11 AST: 17 AP: 100 TBILI: 0.5   ALB: 3.6 TOT PROTEIN: 6.5 LIPASE: N/A     Trop: 17 (H) BNP: N/A     Procal: 0.06 CRP: N/A Lactic Acid: N/A

## 2024-01-20 NOTE — CODE/RAPID RESPONSE
"[facility]    House ANTOINETTE RRT Note  1/20/2024   Time Called: 0239 am     RRT called for: increased lethargy     Ms. Watson is an 83 yr old female admitted on 01/09/2024. She has a past medical history of breast cancer, primary lung cancer, colon cancer [BRCA 2] and chronic ischemic cardiomyopathy. Treated for bilateral pneumonia (abx done 1/16/24), severe sepsis and lactic acidosis.     Assessment & Plan     Pre syncope likely secondary to hypovolemia   This evening Ms. Watson called nursing staff to assist her to the restroom as she felt urgent need to have a bowel movement.  She was assisted with 2 staff members to restroom and had an episode of diarrhea.  While up in the bathroom patient \"slumped over\" and became less responsive and pale.  The patient is able to recall this event stating that nursing staff were attempting to place blood pressure cuff in her and that she felt nauseous and was \"going to pass out\".  Historically SBP 140s to 160s and /82 while up on the toilet. Upon return to supine position in bed /96.     On my arrival patient lying supine in hospital bed in no acute distress.  She appears pale with warm and dry skin.  Hemodynamically stable with sinus rhythm with frequent ectopy.  Smart Disclosure reviewed. No vagal episode. HR consistent in the 70's.  Back in bed at rest patient denies shortness of breath, chest pain or nausea or lightheadedness.  Noted the patient has been on several antibiotics for pneumonia and diuresed with IV lasix, transitioned to oral on 1/14/24. I&O -5L and weight is down 1 kg since admission.  Mucous membranes appear very dry.    INTERVENTIONS:  -500 mL NS over four hrs   -Orthostatic VS next time out of bed and q shift  -Continue telemetry   -Could consider c diff sample given diarrheal episode, monitor fever curve, leukocytosis, abdominal pain and stool frequency   -EKG; unchanged from 01/13/24  -Could consider repeat echo  -Lytes " panel   -Hgb, stable 11.5 from (11.1 and 10.8 previously)   -Trop, 17 (down from 19 10 days ago)       Working diagnosis: hypovolemia     At the end of the RRT pt's coloring improved. Remains A/O.  Hemodynamically stable.  Resolution of symptoms.    Disposition ortho spine    Defer further cares to hospitalist attending physician.      Code Status: No CPR- Do NOT Intubate    Allergies   Allergies   Allergen Reactions    Demerol [Meperidine] Nausea and Vomiting    Sulfa Antibiotics Other (See Comments)     Extreme chills    Penicillins Rash     Swelling In mouth and tongue       Physical Exam   Vital Signs with Ranges:  Temp:  [97.9  F (36.6  C)-98.9  F (37.2  C)] 98.9  F (37.2  C)  Pulse:  [81-95] 84  Resp:  [18-19] 18  BP: (112-178)/(73-96) 178/96  SpO2:  [92 %-96 %] 96 %  I/O last 3 completed shifts:  In: -   Out: 700 [Urine:700]    Physical Exam  HENT:      Right Ear: Decreased hearing noted.      Left Ear: Decreased hearing noted.      Mouth/Throat:      Mouth: Mucous membranes are dry.   Eyes:      Pupils: Pupils are equal, round, and reactive to light.   Cardiovascular:      Rate and Rhythm: Normal rate. Rhythm irregular.      Pulses: Normal pulses.      Heart sounds: Normal heart sounds.   Pulmonary:      Effort: Pulmonary effort is normal.      Breath sounds: Normal breath sounds.   Abdominal:      General: Bowel sounds are normal. There is no distension.      Palpations: Abdomen is soft.      Tenderness: There is no abdominal tenderness.   Musculoskeletal:         General: Normal range of motion.      Cervical back: Normal range of motion.   Skin:     General: Skin is warm and moist.      Capillary Refill: Capillary refill takes less than 2 seconds.      Coloration: Skin is pale.   Neurological:      Mental Status: She is alert and oriented to person, place, and time.      GCS: GCS eye subscore is 4. GCS verbal subscore is 5. GCS motor subscore is 6.   Psychiatric:         Attention and Perception:  Attention normal.         Mood and Affect: Mood normal.         Speech: Speech normal.         Behavior: Behavior is cooperative.         Data     EKG:  Interpreted by Verónica Yi NP  Time reviewed: 0310  Symptoms at time of EKG: None   Rhythm: normal sinus   Rate: Normal  Axis: Normal  Ectopy: premature atrial contraction  Conduction: normal  ST Segments/ T Waves: No acute ischemic changes  Q Waves: none  Comparison to prior: Unchanged from 01/13/2024    Clinical Impression: normal EKG with PACs       Troponin:    17    CBC with Diff:  Recent Labs   Lab Test 01/20/24  0323 01/19/24  0628 01/09/24  1046 07/10/23  0742   WBC  --  6.3   < >  --    HGB 11.5* 11.1*   < >  --    MCV  --  106*   < >  --    PLT  --  316   < > 422   INR  --   --   --  0.95    < > = values in this interval not displayed.        Comprehensive Metabolic Panel:  Recent Labs   Lab 01/20/24  0323 01/20/24  0243 01/19/24  1435 01/19/24  0628 01/18/24  0707     --   --  137 136   POTASSIUM 3.5  3.5  --   --  3.9 4.1   CHLORIDE 90*  --   --  94* 93*   CO2 31*  --   --  34* 34*   ANIONGAP 14  --   --  9 9   GLC  --  153*  --  95 100*   BUN  --   --   --  14.0 13.8   CR  --   --   --  0.63 0.60   GFRESTIMATED  --   --   --  86 87   PRIYA  --   --   --  9.1 9.0   MAG  --   --  2.4* 1.3* 1.8   PHOS  --   --   --   --  3.0       Time Spent on this Encounter   I spent 30 minutes on the unit/floor managing the care of Yolanda Watson. Over 50% of my time was spent counseling the patient and/or coordinating care regarding services listed in this note.      Verónica Yi NP  [facility]  Securely message with the Vocera Web Console (learn more here)  Text page via [employer] Paging/[employer]

## 2024-01-20 NOTE — PLAN OF CARE
1/9/24, Bilateral weakness and Back pain  1/19/24, 3 p to 7 p    Orientation: A&O, calm and pleasant, [place]    Vitals/Tele: VSS on RA, denies pain    IV Access/drain: HAKEEM SL, ST, SD    Diet: Regular, appetite is poor    Mobility: A1 GB and W    GI/: Incontinent no BM this shift    Wound/Skin: Blanchable redness on IT, Mepilex on Sacrum coccyx area    Consults:NA    Discharge Plan; To TCU tomorrow, actual time TBD      See Flow sheets for assessment

## 2024-01-20 NOTE — PROGRESS NOTES
Care Management Follow Up    Length of Stay (days): 11    Expected Discharge Date: 01/20/2024     Concerns to be Addressed: discharge planning     Patient plan of care discussed at interdisciplinary rounds: Yes    Anticipated Discharge Disposition: Transitional Care     Anticipated Discharge Services: Other (see comment) (pending continued reccs)  Anticipated Discharge DME: Other (see comment)    Patient/family educated on Medicare website which has current facility and service quality ratings: yes ([facility] left with  re TCU)  Education Provided on the Discharge Plan: Yes  Patient/Family in Agreement with the Plan: yes    Referrals Placed by CM/SW: External Care Coordination, Communication hand-offs to next level of Care Providers  Private pay costs discussed: Not applicable    Additional Information:  Writer received voicemail from [facility] stating [facility] can take pt.    Writer spoke with Dr. Hager who states pt not stable for discharge.     Writer called [facility] as previous  had been working on a discharge there. No answer. Writer left voicemail informing of no discharge until Monday.   Writer updated [facility] of no discharge until Monday.  Care management will check with both facilities Monday to see who has availability and present to pt.     Addendum: Writer received voicemail from [facility] stating will have to check with them Monday to see if they have a bed available.     Writer received voicemail from pt's daughter Al wanting a call back.    Writer spoke with Al. Al aware of not medically ready for discharge. Al states unsure if family will be able to transport. She states if pt needing transport she would like to be one that's talked to about what mode and cost is. She would like to wait until date of discharge to see what mode is needed and discuss then. Al asking writer updated this in report to next . Writer did list this in report for next .      Ashley Tiwari, BSW  Social Work  [facility]

## 2024-01-20 NOTE — PROGRESS NOTES
Vitally stable except HTN,  Up 2GB/W, pt was lethargic this shift with a significant orthostatic blood pressures. RRT was called at 0230 d/t no improvement after returning to bed. See providers note

## 2024-01-20 NOTE — PLAN OF CARE
Goal Outcome Evaluation:       At [place]ISA pt was lethargic and required a lot of stimulation to arouse.pt was oriented X4 at that time. VSS, pt did complain of SOB and had some wheezing though out lungs, prn neb administered, effective. Evaluated by MD, labs ordered, MR of head ordered and UA ordered. No significant results, UA and MRI WNL. Potassium replaced, magnesium replaced and currently running. Labs reordered for this evening. Pt alert when back from MRI, currently sleeping. At 1400 pt denied SOB, CP, N/V. On 1/2L NC. Cardiac monitor, irregular sinus rhythm. Daughter at bedside and updated on all events.

## 2024-01-21 NOTE — PLAN OF CARE
"Goal Outcome Evaluation:       Pt alert and oriented early in shift and lethargic later in shift. Oriented X4 after reoriented pt at [facility]. Pt denied SOB, CP, N/V. Pt on 0.5L NC but would desaturate to 85% with standing. Pt had thigh high compression socks on through out shift. Did have some orthostatic bp from sitting to standing and did endorse \"some\" dizzyness. R PIV leaking, unable to correct with redressing/adjusting. Called float resource nurse to place new IV. Turned Q2, heels floated through out shift.                  "

## 2024-01-21 NOTE — PLAN OF CARE
Goal Outcome Evaluation:  Summary 01/20/[id number]  Patient is alert and oriented X4, confused at times.  [place], uses bilateral hearing aids, VSS on 0.5L via NC with sat of 94%. Continuous  pulse oximetry in place.  On TELE. Up with assist of 1 -2 with gait belt and walker.  On regular diet  with good appetite.  Patient denies pain. Lung sounds expiratory wheezes. Patient denies SOB, chest pain , dizziness and nausea. Patient encouraged to use IS  frequently. On potassium protocol and replacement was given this shift. Continent of B&B, pure wick in place with minimum  output.

## 2024-01-21 NOTE — PROGRESS NOTES
[facility]    Medicine Progress Note - Hospitalist Service    Date of Admission:  1/9/2024    Assessment & Plan   Yolanda Waston is a 83 year old female admitted on 1/9/2024. She has a history of breast cancer, primary lung cancer, colon cancer [BRCA 2] and presented to the ER with bilateral weakness and back pain.     Bilateral pneumonia: Treated last dose of antibiotic 1/16/2024  Severe sepsis: Resolved  Lactic acidosis:  CT chest on 1/19/24 showed worsening opacities in bilateral lung bases; negative for PE. Lactic acid elevated at 4.0. WBC within normal limits. UA not suggestive of infection.  Admitted to inpatient.  Initially treated with rocephin and azithromycin. Azithromycin switched to doxycycline due to prolonged QTc. Completed 8 day course of rocephin in the hosptial, she got 4 days of azithromycin, and 4 days of doxycycline last dose of doxycycline was 1/15/2024  Prednisone added 1/16/24 for wheezing, plan short 5 day course, last day 1/20/2024.  MRSA/MSSA nares negative.  Urine legionella and urine pneumonia were negative.  Blood cultures negative.  Lactic acid returned to normal on recheck.  ID consulted, appreciate their assistance.  Pulmonology consulted, appreciate their assistance, as noted below, s/p BAL on 1/12/24. Culture grew Candida albicans and Candida glabrata. Mili not a pathogen, see ID note on 1/16/24, no treatment recommended.  Has been on 1-2 lpm of supplemental oxygen, continue to wean as able.  PT/OT consulted, recommending TCU.  Care transitions following for discharge planning, TCU placement.  Continue to wean O2 and work with therapies. Patient/family hoping to avoid TCU. Will ask social work to touch base with her daughter.  Patient improved today, procalcitonin checked 11/20/2024, will come back normal 0.06.  She is on 1 L of nasal cannula we will continue with that.  For now and try to wean her off, target saturation is 90% or above.        History of  "lung cancer  PET CT June 23, 2023 showed development of a spiculated FDG avid nodule in the left lower lobe measuring 1 cm suspicious for early primary lung neoplasm without mets no PET lymphadenopathy no finding concerning for distant metastatic disease.  CT chest with contrast on 12/14/2023 showed patchyconsolidative opacity seen within the left lower lobe with findings suspicious for developing pneumonia.  Stable 1.1 cm left lower lobe lung nodule.  Slightly enlarging small to moderate right pleural effusion  CT scan 1/9/24 showed worsening of patchy opacities in the lung bases bilaterally since 12/14/2023, likely representing worsening pneumonia. Stable small right and trace left pleural effusion.  [facility] consulted, appreciate their assistance.  [facility] consulted, appreciate their assistance.  S/p bronchoscopy with BAL on 1/12/24.  BAL cytology negative for malignancy.  BAL culture grew candida as noted above.  [facility] now signed off.     Chronic ischemic cardiomyopathy per Cardiology  Long QT, improved   Hypokalemia after IV furosemide on replacement protocol  Hypomagnesemia, after IV furosemide, on replacement protocol  Admission BNP 5.1K, chest CT without indications of pulm vascular congestion, serial troponins flat, 20, 19, 1/11/2024: Echo new CM, EF 35-40%, severe hypokinesia inferior and inferolateral..  Cardiology consulted, appreciate their assistance.  See note 1/11/2024: \"continue ASA 81 mg, metoprolol succinate 100 mg p.o. daily, lisinopril 20 mg furosemide 20 mg IV one-time reassess regarding need for indications of chronic low-dose diuretic lipid panel, previously on statin.'  No a fib per Cardiology review of tele.   On admission sinus tachycardia.  Will obtain EKG to confirm [sinus], hold lisinopril for now to allow increase in metoprolol, changed to Lopressor 100 mg twice daily.\"  Plan for outpatient cardiology follow up for med titration and further discussion " of ischemic evaluation.  TFTs within normal limits.  Potassium and magnesium replacement protocols ordered.  Telemetry.  Diuresed with IV lasix, transitioned oral lasix on 1/14/24, given significant orthostatic hypotension, concern for hypovolemia, Lasix is now on hold.  QTc improved to 476 on most recent EKG.  Likely recurrent hypomagnesemia is likely because of PPI, switch Protonix to Pepcid.  Magnesium is now improved, replace as per electrolyte replacement protocol.      History of Breast cancer   History of colon cancer   BRCA 2 mutation  Noted.     Wedge compression fracture T7  No recent fall.  No urinary incontinence patient has chronic fecal incontinence and chronic bilateral weakness.  Neurosurgery consulted, appreciate their assistance, see note 1/10/2024.  Thoracic MRI on 1/10/24 showed a burst type compression fracture T7 findings concerning for thin epidural hematoma at T6/T8; see report for full details.  Neurosurgery recommends conservative management with plan for outpatient follow-up, consideration of osteocool/kyphoplasty at that time if symptoms not well controlled.  No lifting greater than 5-10 pounds, no excessive bending or twisting.  Scheduled acetaminophen, calcitonin spray, voltaren gel, PRN robaxin, PRN oxycodone available for pain control.  Minimize oxycodone as able, patient somewhat sedated from oxycodone on 1/12/24.     Right kidney lesion  Incidental finding on CT scan on 1/9/24.  Follow up with PCP and consider outpatient renal MRI on a nonemergent basis for further evaluation.       Hypertension  Orthostatic hypotension  Blood pressure elevated, may be somewhat related to pain.   Continue PTA metoprolol and lisinopril.  Patient has supine hypertension, on metoprolol and lisinopril.  She do have significant orthostatic, I did continue with metoprolol, but decrease the dose of lisinopril from 40-20 at this time, apply compression stockings.  Blood pressure is now reasonably controlled  "at this time with decreased orthostatic hypotension      Patient is overall improved at this time.  Replace electrolytes as per replacement protocol.  Try to wean her off from oxygen  Add Ensure clear with meals  If remain stable can be discharged to TCU tomorrow.    Discussed with the patient and the nursing staff taking care of the patient   Diet: Regular Diet Adult  Snacks/Supplements Pediatric: Ensure Clear; With Meals    DVT Prophylaxis: Pneumatic Compression Devices  Patricia Catheter: Not present  Lines: None     Cardiac Monitoring: None  Code Status: No CPR- Do NOT Intubate      Clinically Significant Risk Factors        # Hypokalemia: Lowest K = 3.3 mmol/L in last 2 days, will replace as needed     # Hypomagnesemia: Lowest Mg = 1.5 mg/dL in last 2 days, will replace as needed   # Hypoalbuminemia: Lowest albumin = 3.3 g/dL at 1/10/2024  7:08 AM, will monitor as appropriate     # Hypertension: Noted on problem list    # Chronic heart failure with reduced ejection fraction: last echo with EF <40%       # Overweight: Estimated body mass index is 27.3 kg/m  as calculated from the following:    Height as of this encounter: 1.778 m (5' 10\").    Weight as of this encounter: 86.3 kg (190 lb 4.1 oz).   # Moderate Malnutrition: based on nutrition assessment      # Financial/Environmental Concerns: none         Disposition Plan      Expected Discharge Date: 01/22/2024, 12:00 PM    Destination: assisted living  Discharge Comments: [place] vs. [facility] on Monday            Tunde Hager MD  Hospitalist Service  [facility]  Securely message with [employer] (more info)  Text page via [employer] Paging/Directory   ______________________________________________________________________    Interval History   Patient much more awake and alert today, sitting in a chair, very hard of hearing, having her breakfast, she told me she had hard time eating, no fever chills chest pain headache dizziness " lightheadedness.    No other significant event overnight        Physical Exam   Vital Signs: Temp: 98  F (36.7  C) Temp src: Oral BP: (!) 143/97 Pulse: 91   Resp: 18 SpO2: 96 % O2 Device: Nasal cannula Oxygen Delivery: 1 LPM  Weight: 190 lbs 4.11 oz    Constitutional: Awake, alert, in no acute distress.  Hard of hearing  Respiratory: no increased work of breathing, no wheezing or crackles.  Cardiovascular: regular rate and rhythm, normal S1 and S2, no murmur noted  GI: normal bowel sounds, soft, non-distended, non-tender  Skin: warm, dry  Musculoskeletal: no lower extremity pitting edema present  Neurologic: No focal deficit    Medical Decision Making       40 MINUTES SPENT BY ME on the date of service doing chart review, history, exam, documentation & further activities per the note.      Data     I have personally reviewed the following data over the past 24 hrs:    11.7 (H)  \   12.0   / 341     139 96 (L) 15.3 /  111 (H)   3.6 35 (H) 0.69 \     ALT: N/A AST: N/A AP: N/A TBILI: N/A   ALB: 3.6 TOT PROTEIN: N/A LIPASE: N/A

## 2024-01-21 NOTE — PROGRESS NOTES
A/OX4 with intermittent confusion, HTN ortherwise VSS,  Takes pills with thin liquids, Shemar MANCIA concerning Pt having loose stools, imodium ordered, wheezes upon ausculation, on Tele , Pt c/o headache, Tylenol given.

## 2024-01-21 NOTE — PROVIDER NOTIFICATION
On call hospitalist updated magnesium lab value of 2.9 via [employer] message, no new order obtained.

## 2024-01-22 NOTE — DISCHARGE SUMMARY
[facility]    Discharge Summary  Hospitalist    Date of Admission:  1/9/2024  Date of Discharge:  1/22/2024  Discharging Provider: Tunde Hager MD, MD  Date of Service (when I saw the patient): 01/22/24    Discharge Diagnoses   Please refer below    History of Present Illness   Yolanda Watson is an 83 year old female who presented with weakness    Hospital Course   Yolanda Watson is a 83 year old female admitted on 1/9/2024. She has a history of breast cancer, primary lung cancer, colon cancer [BRCA 2] and presented to the ER with bilateral weakness and back pain.    Final discharge diagnoses and hospital course     Bilateral pneumonia: Treated last dose of antibiotic 1/16/2024  Severe sepsis: Resolved  Lactic acidosis: Resolved  CT chest on 1/19/24 showed worsening opacities in bilateral lung bases; negative for PE. Lactic acid elevated at 4.0. WBC within normal limits. UA not suggestive of infection.  Admitted to inpatient.  Initially treated with rocephin and azithromycin. Azithromycin switched to doxycycline due to prolonged QTc. Completed 8 day course of rocephin in the hosptial, she got 4 days of azithromycin, and 4 days of doxycycline last dose of doxycycline was 1/15/2024  Prednisone added 1/16/24 for wheezing, plan short 5 day course, last day 1/20/2024.  MRSA/MSSA nares negative.  Urine legionella and urine pneumonia were negative.  Blood cultures negative.  Lactic acid returned to normal on recheck.  ID consulted, appreciate their assistance.  Pulmonology consulted, appreciate their assistance, as noted below, s/p BAL on 1/12/24. Culture grew Candida albicans and Candida glabrata. Mili not a pathogen, see ID note on 1/16/24, no treatment recommended.  Has been on 1-2 lpm of supplemental oxygen, now weaned off to room air  PT/OT consulted, recommending TCU.  Care transitions following for discharge planning, TCU placement.      Patient improved today, procalcitonin checked  "11/20/2024, will come back normal 0.06.  At the time of discharge feeling well, denies any chest pain shortness of breath fever chills nausea vomiting dizziness or lightheadedness.  She is weaned off to room air on day of discharge         History of lung cancer  PET CT June 23, 2023 showed development of a spiculated FDG avid nodule in the left lower lobe measuring 1 cm suspicious for early primary lung neoplasm without mets no PET lymphadenopathy no finding concerning for distant metastatic disease.  CT chest with contrast on 12/14/2023 showed patchyconsolidative opacity seen within the left lower lobe with findings suspicious for developing pneumonia.  Stable 1.1 cm left lower lobe lung nodule.  Slightly enlarging small to moderate right pleural effusion  CT scan 1/9/24 showed worsening of patchy opacities in the lung bases bilaterally since 12/14/2023, likely representing worsening pneumonia. Stable small right and trace left pleural effusion.  [facility] consulted, appreciate their assistance.   of  pulmonology consulted, appreciate their assistance.  S/p bronchoscopy with BAL on 1/12/24.  BAL cytology negative for malignancy.  BAL culture grew candida as noted above.  [facility] now signed off.     Chronic ischemic cardiomyopathy per Cardiology  Long QT, improved   Hypokalemia after IV furosemide on replacement protocol  Hypomagnesemia, after IV furosemide, on replacement protocol  Admission BNP 5.1K, chest CT without indications of pulm vascular congestion, serial troponins flat, 20, 19, 1/11/2024: Echo new CM, EF 35-40%, severe hypokinesia inferior and inferolateral..  Cardiology consulted, appreciate their assistance.  See note 1/11/2024: \"continue ASA 81 mg, metoprolol succinate 100 mg p.o. daily, lisinopril 20 mg furosemide 20 mg IV one-time reassess regarding need for indications of chronic low-dose diuretic lipid panel, previously on statin.'  No afib per Cardiology review of tele.   " On admission sinus tachycardia.  Increase Lopressor 100 mg twice daily.  Has had lisinopril increased to 40 mg daily as well by the cardiology  Potassium and magnesium replacement protocols ordered, not replaced.  Telemetry.  Diuresed with IV lasix, transitioned oral lasix on 1/14/24, but given significant orthostatic hypotension, concern for hypovolemia, Lasix is now on discontinued, patient is euvolemic.  I did decrease the dose of  lisinopril to 20 mg daily  QTc was prolonged, avoid QTc prolonging medications  Likely recurrent hypomagnesemia is likely because of PPI, switch Protonix to Pepcid.  Magnesium and potassium are normal at the time of discharge for      History of Breast cancer   History of colon cancer   BRCA 2 mutation  Noted.     Wedge compression fracture T7  No recent fall.  No urinary incontinence patient has chronic fecal incontinence and chronic bilateral weakness.  Neurosurgery consulted, appreciate their assistance, see note 1/10/2024.  Thoracic MRI on 1/10/24 showed a burst type compression fracture T7 findings concerning for thin epidural hematoma at T6/T8; see report for full details.  Neurosurgery recommends conservative management with plan for outpatient follow-up, consideration of osteocool/kyphoplasty at that time if symptoms not well controlled.  No lifting greater than 5-10 pounds, no excessive bending or twisting.  Scheduled acetaminophen, calcitonin spray, voltaren gel, PRN robaxin, PRN oxycodone available for pain control.  Minimize oxycodone as able, patient somewhat sedated from oxycodone on 1/12/24.     Right kidney lesion  Incidental finding on CT scan on 1/9/24.  Follow up with PCP and consider outpatient renal MRI on a nonemergent basis for further evaluation.       Hypertension  Orthostatic hypotension  Blood pressure elevated, may be somewhat related to pain.   Continue PTA metoprolol and lisinopril.  Patient has supine hypertension, on metoprolol and lisinopril.  She do  have significant orthostatic, I did continue with metoprolol, but decrease the dose of lisinopril from 40-20 at this time, apply compression stockings.  Blood pressure is now reasonably controlled at this time with decreased orthostatic hypotension       Diet: Regular Diet Adult  Snacks/Supplements Pediatric: Ensure Clear; With Meals      Code Status: No CPR- Do NOT Intubate          Tunde Hager MD, MD    Significant Results and Procedures       Pending Results   These results will be followed up by PCP  Unresulted Labs Ordered in the Past 30 Days of this Admission       Date and Time Order Name Status Description    1/11/2024 10:07 AM Fungus Culture, non-blood - BAL Site 1 Preliminary     1/11/2024 10:07 AM Acid-Fast Bacilli Culture and Stain In process             Code Status   DNR / DNI       Primary Care Physician   [facility]    Physical Exam   Temp: 97.7  F (36.5  C) Temp src: Axillary BP: (!) 146/92 Pulse: 106   Resp: 12 SpO2: 95 % O2 Device: Nasal cannula Oxygen Delivery: 1 LPM  Vitals:    01/16/24 0806 01/17/24 0650 01/18/24 0520   Weight: 86.4 kg (190 lb 7.6 oz) 81.2 kg (179 lb 0.2 oz) 86.3 kg (190 lb 4.1 oz)     Vital Signs with Ranges  Temp:  [97  F (36.1  C)-98.1  F (36.7  C)] 97.7  F (36.5  C)  Pulse:  [] 106  Resp:  [12-18] 12  BP: (131-149)/(70-92) 146/92  SpO2:  [2 %-99 %] 95 %  I/O last 3 completed shifts:  In: 410 [P.O.:410]  Out: 250 [Urine:250]    Constitutional: awake, alert, cooperative, no apparent distress, and appears stated age  Eyes: Lids and lashes normal, pupils equal, round and reactive to light, extra ocular muscles intact, sclera clear, conjunctiva normal  Respiratory: No increased work of breathing, good air exchange, clear to auscultation bilaterally, no crackles or wheezing  Cardiovascular: Normal apical impulse, regular rate and rhythm, normal S1 and S2, no S3 or S4, and no murmur noted  GI: No scars, normal bowel sounds, soft, non-distended, non-tender,  no masses palpated, no hepatosplenomegally  Neurologic: No focal deficit    Discharge Disposition   Discharged to short-term care facility  Condition at discharge: Stable    Consultations This Hospital Stay   NEUROSURGERY IP CONSULT  HEMATOLOGY & ONCOLOGY IP CONSULT  PULMONARY IP CONSULT  CARE MANAGEMENT / SOCIAL WORK IP CONSULT  CARDIOLOGY IP CONSULT  INFECTIOUS DISEASES IP CONSULT  PHYSICAL THERAPY ADULT IP CONSULT  OCCUPATIONAL THERAPY ADULT IP CONSULT  CARE MANAGEMENT / SOCIAL WORK IP CONSULT  PHYSICAL THERAPY ADULT IP CONSULT  OCCUPATIONAL THERAPY ADULT IP CONSULT    Time Spent on this Encounter   Tunde GONZALEZ MD, personally saw the patient today and spent greater than 30 minutes discharging this patient.    Discharge Orders      Lipid Profile     General info for SNF    Length of Stay Estimate: Short Term Care: Estimated # of Days <30  Condition at Discharge: Stable  Level of care:skilled   Rehabilitation Potential: Fair  Admission H&P remains valid and up-to-date: Yes  Recent Chemotherapy: N/A  Use Nursing Home Standing Orders: Yes     Mantoux instructions    Give two-step Mantoux (PPD) Per Facility Policy Yes     Follow Up and recommended labs and tests    Follow up with residential physician.  The following labs/tests are recommended: cbc, bmp, Mg.     Reason for your hospital stay    Bilateral pneumonia: Treated last dose of antibiotic 1/16/2024  Severe sepsis: Resolved  Lactic acidosis: Resolved     Intake and output    Every shift     Daily weights    Call Provider for weight gain of more than 2 pounds per day or 5 pounds per week.     Activity - Up with assistive device     Activity - Up with nursing assistance     No CPR- Do NOT Intubate     Physical Therapy Adult Consult    Evaluate and treat as clinically indicated.    Reason:  weakness     Occupational Therapy Adult Consult    Evaluate and treat as clinically indicated.    Reason:  weakness     Fall precautions     Diet    Follow this diet upon  discharge: Orders Placed This Encounter      Snacks/Supplements Pediatric: Ensure Clear; With Meals      Regular Diet Adult     Discharge Medications   Current Discharge Medication List        START taking these medications    Details   calcitonin, salmon, (MIACALCIN) 200 UNIT/ACT nasal spray Spray 1 spray into one nostril alternating nostrils daily Alternate nostril each day.    Associated Diagnoses: Closed wedge compression fracture of T7 vertebra, initial encounter (H)      diclofenac (VOLTAREN) 1 % topical gel Apply 4 g topically 4 times daily    Associated Diagnoses: Closed wedge compression fracture of T7 vertebra, initial encounter (H)      famotidine (PEPCID) 40 MG tablet Take 1 tablet (40 mg) by mouth daily    Associated Diagnoses: Gastroesophageal reflux disease with esophagitis without hemorrhage      metoprolol tartrate (LOPRESSOR) 100 MG tablet Take 1 tablet (100 mg) by mouth 2 times daily    Associated Diagnoses: Primary hypertension           CONTINUE these medications which have NOT CHANGED    Details   acetaminophen (TYLENOL) 500 MG tablet Take 1,000 mg by mouth every 6 hours as needed for mild pain      aspirin 81 MG EC tablet Take 81 mg by mouth daily      Calcium Carb-Cholecalciferol (CALCIUM 600+D) 600-20 MG-MCG TABS Take 1 tablet by mouth daily      calcium carbonate (TUMS) 500 MG chewable tablet Take 2 chew tab by mouth every hour as needed for heartburn Max 8 tab/day      lisinopril (ZESTRIL) 20 MG tablet Take 20 mg by mouth daily      loperamide (IMODIUM) 2 MG capsule Take 2 mg by mouth 4 times daily as needed for diarrhea      methocarbamol (ROBAXIN) 500 MG tablet Take 500 mg by mouth 2 times daily as needed for muscle spasms or other (back pain)      !! ondansetron (ZOFRAN) 4 MG tablet Take 4 mg by mouth at bedtime      !! ondansetron (ZOFRAN) 4 MG tablet Take 4 mg by mouth every 8 hours as needed for nausea      PARoxetine (PAXIL) 40 MG tablet Take 40 mg by mouth every morning       vitamin D3 (CHOLECALCIFEROL) 50 mcg (2000 units) tablet Take 1 tablet by mouth daily      Probiotic Product (PROBIOTIC DAILY PO) Take 1 packet by mouth daily Nutrilite Probiotic packet       !! - Potential duplicate medications found. Please discuss with provider.        STOP taking these medications       metoprolol succinate ER (TOPROL-XL) 50 MG 24 hr tablet Comments:   Reason for Stopping:         omeprazole (PRILOSEC) 20 MG DR capsule Comments:   Reason for Stopping:         traMADol (ULTRAM) 25 mg TABS half-tab Comments:   Reason for Stopping:             Allergies   Allergies   Allergen Reactions    Demerol [Meperidine] Nausea and Vomiting    Sulfa Antibiotics Other (See Comments)     Extreme chills    Penicillins Rash     Swelling In mouth and tongue     Data   Most Recent 3 CBC's:  Recent Labs   Lab Test 01/21/24  0944 01/20/24  0323 01/19/24  0628 01/15/24  0743   WBC 11.7* 7.1 6.3 6.4   HGB 12.0 11.5* 11.1* 10.8*   *  --  106* 109*     --  316 298      Most Recent 3 BMP's:  Recent Labs   Lab Test 01/22/24  0813 01/21/24  0944 01/20/24  1704 01/20/24  0953 01/20/24  0323 01/20/24  0243 01/19/24  0628 01/18/24  0707   NA  --  139  --   --  135  --  137 136   POTASSIUM 4.1 3.6 3.8   < > 3.5  3.5  --  3.9 4.1   CHLORIDE  --  96*  --   --  90*  --  94* 93*   CO2  --  35*  --   --  31*  --  34* 34*   BUN  --  15.3  --   --   --   --  14.0 13.8   CR  --  0.69  --   --   --   --  0.63 0.60   ANIONGAP  --  8  --   --  14  --  9 9   PRIYA  --  9.5  --   --   --   --  9.1 9.0   GLC  --  111*  --   --   --  153* 95 100*    < > = values in this interval not displayed.     Most Recent 2 LFT's:  Recent Labs   Lab Test 01/20/24  0323 01/10/24  0708   AST 17 14   ALT 11 8   ALKPHOS 100 89   BILITOTAL 0.5 1.1     Most Recent INR's and Anticoagulation Dosing History:  Anticoagulation Dose History  More data exists         Latest Ref Rng & Units 4/24/2013 3/17/2014 10/31/2016 11/1/2016 7/26/2019 8/11/2020  7/10/2023   Recent Dosing and Labs   INR 0.85 - 1.15 0.96  0.93  0.97  1.07  0.95  0.97  0.95      Most Recent 3 Troponin's:  Recent Labs   Lab Test 07/19/21  0109 03/25/21  0015 03/24/21  1749 10/17/19  1650   TROPI  --  <0.015 <0.015 <0.015   TROPONIN 0.038  --   --   --      Most Recent Cholesterol Panel:  Recent Labs   Lab Test 01/11/24  0723   CHOL 107   LDL 37   HDL 43*   TRIG 137     Most Recent 6 Bacteria Isolates From Any Culture (See EPIC Reports for Culture Details):  Recent Labs   Lab Test 03/24/21 2009 08/11/20  1937 08/11/20  1712   CULT No growth  Cultured on the 2nd day of incubation:  Micrococcus species  *  Critical Value/Significant Value, preliminary result only, called to and read back by  Dr aMldonado, 3/27/21 @ 0038 TF    (Note)  NEGATIVE for the following: Staphylococcus spp., Staph aureus, Staph  epidermidis, Staph lugdunensis, Streptococcus spp., Strep pneumoniae,  Strep pyogenes, Strep agalactiae, Strep anginosus group, Enterococcus  faecalis, Enterococcus faecium, and Listeria spp. by Lily [employer] Strumigene  multiplex nucleic acid test. Final identification and antimicrobial  susceptibility testing will be verified by standard methods.    Specimen tested with Verigene multiplex, gram-positive blood culture  nucleic acid test for the following targets: Staph aureus, Staph  epidermidis, Staph lugdunensis, other Staph species, Enterococcus  faecalis, Enterococcus faecium, Streptococcus species, S. agalactiae,  S. anginosus grp., S. pneumoniae, S. pyogenes, Listeria sp., mecA  (methicillin resistance) and Blanca/B (vancomycin resistance).    Critical Value/Significant Value called to and read back by Dr. Maldonado at 0242 3/27/21 hg   No growth No growth     Most Recent TSH, T4 and A1c Labs:  Recent Labs   Lab Test 01/12/24  0718 08/11/20  1712 02/05/19  0000   TSH 1.43   < >  --    A1C  --   --  5.2    < > = values in this interval not displayed.     Results for orders placed or performed during the  hospital encounter of 01/09/24   CT Chest (PE) Abdomen Pelvis w Contrast    Narrative    CT CHEST PE ABDOMEN PELVIS W CONTRAST 1/9/2024 1:10 PM    CLINICAL HISTORY: Chest pain, CABRERA, recently diagnosed pneumonia, flank  pain  TECHNIQUE: CT angiogram chest and routine CT abdomen pelvis with IV  contrast. Arterial phase through the chest and venous phase through  the abdomen and pelvis. 2D and 3D MIP reconstructions were preformed  by the CT technologist. Dose reduction techniques were used.     CONTRAST: 105mL [id number]    COMPARISON: Chest CT 12/14/2023 and CT of the abdomen and pelvis  5/5/2023    FINDINGS:  ANGIOGRAM CHEST: No pulmonary emboli. Enlarged main pulmonary artery  consistent with pulmonary arterial hypertension. Valuation of the  thoracic aorta lumen is limited due to timing of the contrast bolus.  No CT evidence of right heart strain.     LUNGS AND PLEURA: Postoperative changes RIGHT middle and lower  lobectomy and left lower lobe wedge resections. Stable scattered  centrilobular emphysema and subpleural fibrosis in the lung bases.  Superimposed patchy opacities in the lung bases bilaterally have  worsened since 12/14/2023, likely due to worsening atypical pneumonia.  Stable small right and trace left pleural effusion.    MEDIASTINUM/AXILLAE: Left mastectomy. No lymphadenopathy. No thoracic  aortic aneurysm. Cardiomegaly. Severe coronary artery calcifications.  No pericardial effusion.    HEPATOBILIARY: No focal lesions in the liver. Cholecystectomy. Mild  intrahepatic and extrahepatic biliary ductal dilatation, likely due to  postcholecystectomy reservoir effect.    PANCREAS: Normal.    SPLEEN: Normal.    ADRENAL GLANDS: Normal.    KIDNEYS/BLADDER: An exophytic hyperdense lesion measuring 1.7 cm at  the upper pole of the right kidney (series 12, image 78) is stable,  likely a cyst containing hemorrhagic or proteinaceous material. A few  other hypodense lesions in the kidneys, also likely cysts.  No  hydronephrosis or perinephric stranding. No calculi. Punctate focus of  gas in the urinary bladder lumen.    BOWEL: Right hemicolectomy. Sigmoid diverticulosis. No small bowel or  colonic obstruction or inflammatory changes.    LYMPH NODES: No lymphadenopathy.    PELVIC ORGANS: Hysterectomy. No pelvic masses.    OTHER: No abdominal aortic aneurysm. No free fluid or fluid  collections. No free air.    MUSCULOSKELETAL: Right shoulder arthroplasty. Moderate to severe wedge  compression fracture of T7 is new since 12/14/2023, either acute or  subacute.      Impression    IMPRESSION:  1.  No pulmonary emboli. Enlarged main pulmonary artery consistent  with pulmonary arterial hypertension.  2.  Worsening of patchy opacities in the lung bases bilaterally since  12/14/2023, likely representing worsening pneumonia. Stable small  right and trace left pleural effusion.  3.  New since 12/14/2023 is moderate to severe wedge compression  fracture T7. This may be acute or subacute. Please correlate with  tenderness and consider follow-up thoracic spine MRI.  4.  A punctate focus of gas in the urinary bladder lumen may be  related to infection or instrumentation. Please correlate with  urinalysis. Otherwise, no acute findings in the abdomen and pelvis.  5.  A stable 1.7 cm hyperdense lesion in the upper pole of the right  kidney, may be a cyst containing hemorrhagic or proteinaceous  material. Consider a follow-up renal MRI on a nonemergent basis for  further evaluation.    MALVIN VELEZ MD         SYSTEM ID:  [id number]   MR Thoracic Spine w/o & w Contrast    Narrative    MRI THORACIC SPINE WITHOUT CONTRAST  1/10/2024 3:21 PM     HISTORY: T7 fracture, history of cancer.    TECHNIQUE: Multiplanar multisequence MRI of the thoracic spine without  contrast.    COMPARISON: CTA chest/CT abdomen and pelvis 1/9/2024. CT chest  12/14/2023.    FINDINGS:  There is a recent appearing T7 burst fracture with moderate  to severe vertebral  height loss, as seen on most recent CTA chest/CT  abdomen and pelvis exam 1/9/2024. There is approximately 3-4 mm  retropulsion of the posterior T7 vertebral body fracture fragment into  the ventral epidural space. There appears to be a possible thin amount  of fluid in the epidural space at the level of T6-T8 (for example, see  series 16 image 45, series 16 image 48), raising concern for small  amount of epidural hematoma. The consultation of findings contribute  to mild to moderate central spinal canal stenosis and thecal sac  narrowing at the level of the T7 vertebral body fracture. No evidence  for high-grade mass effect in the spinal cord at this time. No  definite spinal cord signal abnormality. There is some abnormal  enhancement in the epidural space ventrally extending from T6 down to  T8 (series 18 image 9), nonspecific.       There is an unchanged mild chronic T1 compression deformity. No other  significant vertebral body height loss. There is mild to moderate  exaggeration of the normal thoracic kyphosis. No significant thoracic  spondylolisthesis in the sagittal plane. There is prominent edema-like  marrow signal throughout the T7 vertebral body likely related to the  recent fracture. This extends into the bilateral T7 pedicles, more so  on the left. There is mild prevertebral/paravertebral edema and  posterior paraspinous edema. There is focal STIR hypertense signal in  the T7-T8 interspinous ligament region, possibly indicating  ligamentous injury.    A few scattered nonspecific small foci of STIR hyperintense signal  with corresponding T1 isointense signal or slightly hyperintense  signal seen in the right T8 and T9 vertebral bodies and the left T10  and T11 vertebral bodies (for example see series 15 image 12). These  may represent atypical intraosseous hemangiomas, although they are  nonspecific. These lesions show minimal if any enhancement.    Mild to moderate multilevel disc height loss, most  pronounced in the  mid thoracic region particularly at T5-6, T6-7 and T7-8. Shallow  multilevel disc bulges. Mild multilevel facet arthropathy. Mild to  moderate bordering on moderate spinal canal stenosis at T7. No  significant spinal canal narrowing elsewhere. There appears to be  moderate left T7-T8 neural foraminal narrowing. Mild/mild to moderate  multifocal neural foraminal narrowing elsewhere. Partially visualized  bilateral pleural effusions.      Impression    IMPRESSION:  1. Recent burst type compression fracture of the T7 vertebral body  with retropulsion of the posterior aspect of the vertebral body into  the ventral epidural space. The etiology is uncertain, but no  definitive pathologic marrow replacing process seen associated with  this fracture. Consider close interval follow-up MRI to assess  expected evolution and exclude underlying lesion.  2. Findings concerning for thin epidural hematoma at the level of  T6-T8. The findings all together contribute to mild to moderate  bordering on moderate central spinal canal stenosis at the T7 level.  3. Nonspecific patchy enhancement in the ventral epidural space at the  level of T6-T8, which may be reactive. Attention on follow-up.  4. A few small foci of signal abnormality in the T8, T9, T10 and T11  vertebral bodies are nonspecific, but could possibly represent  atypical intraosseous hemangiomas. Recommend close interval follow-up.  5. Findings concerning for interspinous ligament injury at the T7-T8  level.  6. Partially visualized bilateral pleural effusions.    STEPH CASTELLANO MD         SYSTEM ID:  [id number]   XR Thoracic Spine 2 Views    Narrative    EXAM: XR THORACIC SPINE 2 VIEWS  LOCATION: [facility]  DATE: 1/12/2024    INDICATION: T7 fracture  COMPARISON: MRI 01/10/2024      Impression    IMPRESSION: The upper thoracic spine above T4 is not included in the field-of-view on the lateral image. 12 thoracic vertebra are  assumed. Moderate anterior wedge compression fracture at the presumed T7 vertebral level with 60-70% anterior vertebral   height loss similar to the previous MRI. Segmental kyphosis centered at this level now measures 37 degrees from T6 through T8 and the upright position compared to 29 degrees on the prior MRI. No definite new fracture elsewhere. Unchanged multilevel   thoracic spondylosis. Suspected right greater than left pleural effusions with adjacent airspace opacities.   MR Brain w/o & w Contrast    Narrative    EXAM: MR BRAIN W/O and W CONTRAST  LOCATION: [facility]  DATE: 1/20/2024    INDICATION: Confusion and weakness in a patient with history of lung cancer.  COMPARISON: MRI dated 07/18/2023.  CONTRAST: 9mL Gadavist  TECHNIQUE: Routine multiplanar multisequence head MRI without and with intravenous contrast.    FINDINGS:  Multiple sequences are degraded by motion artifact.    INTRACRANIAL CONTENTS: No acute/subacute infarct, acute hemorrhage, or extra-axial fluid collection. Foci of hyperintensity on the diffusion-weighted images within the left paracentral lobule and left periatrial white matter correspond to elevated ADC   values and FLAIR hyperintensity, most consistent with T2 shine through (series 6.2, images 31 and 19, respectively). A chronic left thalamic lacunar infarct, as before. Multiple grossly unchanged foci of CSF-intensity within the bilateral basal ganglia,   some of which demonstrate mild surrounding FLAIR hyperintensity and are most consistent with chronic lacunar infarcts while the remaining majority represents prominent perivascular spaces. Unchanged 6 x 4 x 4 mm focus of enhancement within the left   internal auditory canal. No definite new intracranial mass or abnormal enhancement. Grossly similar confluent and patchy regions of T2 prolongation within the bilateral cerebral white matter, nonspecific although most likely the sequela of moderate   chronic  microvascular ischemia. Unchanged punctate foci of signal loss on the T2*weighted images within the right temporal lobe and thalamus, most consistent with chronic microhemorrhages. Mild generalized cerebral parenchymal volume loss. No   hydrocephalus. Normal position of the cerebellar tonsils.     SELLA: Within technique limitations, no gross abnormality.    OSSEOUS STRUCTURES/SOFT TISSUES: No suspicious bone marrow signal intensity. 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 The major intracranial vascular   flow voids are maintained.     ORBITS: Within technique limitations, no significant abnormality.     SINUSES/MASTOIDS: No evidence of significant paranasal sinus mucosal disease. Bilateral mastoid effusions, slightly increased on the left.      Impression    IMPRESSION:  1.  Motion degraded exam.  2.  No acute intracranial abnormality or definite metastasis.  3.  Unchanged left vestibular schwannoma.  4.  Similar chronic additional changes, as described.   Echocardiogram Complete     Value    LVEF  35-40%    Narrative    [id number]  [id number]  [id number]  [id number]^TJ^MARCELINO^ROHAN     [facility]  Echocardiography Laboratory  [address] [place] [address]     Name: WARREN METCALF  MRN: [id number]  : 1940  Study Date: 2024 07:53 AM  Age: 83 yrs  Gender: Female  Patient Location: [facility]  Reason For Study: Heart Failure  Ordering Physician: MARCELINO SPENCER  Referring Physician: MARCELINO SPENCER  Performed By: [facility] Jennifer Castro     BSA: 2.1 m2  Height: 69 in  Weight: 210 lb  HR: 80  BP: 181/115 mmHg  ______________________________________________________________________________  Procedure  Complete Portable Echo Adult. Optison (NDC #[id number]) given intravenously.  ______________________________________________________________________________  Interpretation Summary      Severe hypokinesis of the inferior and inferolateral walls from base to apex.  Left ventricular systolic function is moderately reduced. The visual ejection  fraction is 35-40%.  The right ventricle is normal in structure, function and size.  There is mild (1+) tricuspid regurgitation.  Right ventricular systolic pressure is elevated, consistent with mild  pulmonary hypertension.  IVC diameter and respiratory changes fall into an intermediate range  suggesting an RA pressure of 8 mmHg.     Since the previous study from 2019, the LV function has declined.  ______________________________________________________________________________  Left Ventricle  The left ventricle is normal in size. There is normal left ventricular wall  thickness. Left ventricular systolic function is moderately reduced. The  visual ejection fraction is 35-40%. Diastolic Doppler findings (E/E' ratio  and/or other parameters) suggest left ventricular filling pressures are  indeterminate. Severe hypokinesis of the inferior and inferolateral walls from  base to apex.     Right Ventricle  The right ventricle is normal in structure, function and size.     Atria  Normal left atrial size. Right atrial size is normal. There is no atrial shunt  seen.     Mitral Valve  The mitral valve is normal in structure and function. There is trace mitral  regurgitation.     Tricuspid Valve  There is mild (1+) tricuspid regurgitation. The right ventricular systolic  pressure is approximated at 30.1 mmHg plus the right atrial pressure. IVC  diameter and respiratory changes fall into an intermediate range suggesting an  RA pressure of 8 mmHg. Right ventricular systolic pressure is elevated,  consistent with mild pulmonary hypertension.     Aortic Valve  The aortic valve is normal in structure and function. No aortic regurgitation  is present. No aortic stenosis is present.     Pulmonic Valve  The pulmonic valve is not well seen, but is grossly normal. There is  trace  pulmonic valvular regurgitation.     Vessels  Normal size aorta.     Pericardium  There is no pericardial effusion.     Rhythm  Sinus rhythm was noted.  ______________________________________________________________________________  MMode/2D Measurements & Calculations  IVSd: 1.2 cm     LVIDd: 4.4 cm  LVIDs: 3.1 cm  LVPWd: 1.1 cm  FS: 30.8 %  LV mass(C)d: 184.2 grams  LV mass(C)dI: 87.4 grams/m2  Ao root diam: 3.5 cm  asc Aorta Diam: 3.0 cm  LVOT diam: 2.1 cm  LVOT area: 3.5 cm2  Ao root diam index Ht(cm/m): 2.0  Ao root diam index BSA (cm/m2): 1.7  Asc Ao diam index BSA (cm/m2): 1.4  Asc Ao diam index Ht(cm/m): 1.7  LA Volume (BP): 54.3 ml     LA Volume Index (BP): 25.7 ml/m2  RV Base: 2.7 cm  RWT: 0.51  TAPSE: 3.3 cm     Doppler Measurements & Calculations  MV E max tomer: 101.0 cm/sec  MV max P.3 mmHg  MV mean P.8 mmHg  MV V2 VTI: 18.5 cm  MVA(VTI): 3.2 cm2  MV dec time: 0.11 sec  LV V1 max PG: 3.9 mmHg  LV V1 max: 99.0 cm/sec  LV V1 VTI: 16.9 cm  SV(LVOT): 59.0 ml  SI(LVOT): 28.0 ml/m2  PA acc time: 0.11 sec  TR max tomer: 274.2 cm/sec  TR max P.1 mmHg  Medial E/e': 11.1  RV S Tomer: 19.3 cm/sec     ______________________________________________________________________________  Report approved by: Tomasz Owusu 2024 09:41 AM           Most Recent 3 CBC's:  Recent Labs   Lab Test 24  0944 24  0323 24  0628 01/15/24  0743   WBC 11.7* 7.1 6.3 6.4   HGB 12.0 11.5* 11.1* 10.8*   *  --  106* 109*     --  316 298     Most Recent 3 BMP's:  Recent Labs   Lab Test 24  0813 24  0944 24  1704 24  0953 24  0323 24  0243 24  0628 24  0707   NA  --  139  --   --  135  --  137 136   POTASSIUM 4.1 3.6 3.8   < > 3.5  3.5  --  3.9 4.1   CHLORIDE  --  96*  --   --  90*  --  94* 93*   CO2  --  35*  --   --  31*  --  34* 34*   BUN  --  15.3  --   --   --   --  14.0 13.8   CR  --  0.69  --   --   --   --  0.63 0.60   ANIONGAP  --  8   --   --  14  --  9 9   PRIYA  --  9.5  --   --   --   --  9.1 9.0   GLC  --  111*  --   --   --  153* 95 100*    < > = values in this interval not displayed.     Most Recent 2 LFT's:  Recent Labs   Lab Test 01/20/24  0323 01/10/24  0708   AST 17 14   ALT 11 8   ALKPHOS 100 89   BILITOTAL 0.5 1.1     Most Recent 3 INR's:  Recent Labs   Lab Test 07/10/23  0742 08/11/20  1712 07/26/19  1312   INR 0.95 0.97 0.95

## 2024-01-22 NOTE — PROGRESS NOTES
Care Management Discharge Note    Discharge Date: 01/22/2024       Discharge Disposition: Transitional Care    Discharge Services: Other (see comment) (pending continued reccs)    Discharge DME: Other (see comment)    Discharge Transportation: family or friend will provide    Private pay costs discussed: transportation costs    Does the patient's insurance plan have a 3 day qualifying hospital stay waiver?  No    PAS Confirmation Code: [id number]  Patient/family educated on Medicare website which has current facility and service quality ratings: yes (LV left with  re TCU)    Education Provided on the Discharge Plan: Yes  Persons Notified of Discharge Plans: Patient, Family, TCU, and On license of [facility] nursing staff  Patient/Family in Agreement with the Plan: yes    Handoff Referral Completed: No    Additional Information:  Writer paged Dr. Hager to confirm the patient was medically ready.     Writer called Lottie at [facility] to inquire if they are able to accommodate the patient today. Lottie stated they would be able to accept the patient 1500 or after.     Per Previous  note, writer is to call the patient's daughter Ajith, [phone]. Writer called Ajith, and informed her of the costs for [email or web address].  Depending on the patient's assist level or whether or not family/friends are physically able to ambulate the patient, would determine if the patient is safe to be transported by family. Otherwise, the writer would set up transportation with [facility]. There are 2 types of methods for transporation depending on what the patient is able to manage. A W/C ride is an 85-90$ flat fee with about $6 dollars per mile added. A Stretcher ride is about 1025$ with $25 added  per mile. The writer will completed a PCS form but still cannot guarantee full coverage on the ride   Ajith stated the patient was anxious about this having to pay up front, writer clarified that it is not an up front charge, but will be billed initially to the  insurance.     Writer called [facility] and set up a ride between [phone]    Writer updated Ajith, the patient, TCU,and [facility] nursing staff.     Writer sent over orders and completed the PAS.   PAS-RR    D: Per DHS regulation, LEANNA completed and submitted PAS-RR to [place] [employer] Direct Connect via the Senior LinkAge Line.  PAS-RR confirmation # is : [id number]     I: SW spoke with patient and they are aware a PAS-RR has been submitted.  LEANNA reviewed with patient that they may be contacted for a follow up appointment within 10 days of hospital discharge if their SNF stay is < 30 days.  Contact information for Senior LinkAge Line was also provided.    A: patient verbalized understanding.    P: Further questions may be directed to [facility] LinkAge Line at #[phone], option #4 for PAS-RR staff.    Patient will discharge from [facility] to [facility] with W/C [facility] at [phone].  Please refer to LEANNA progress notes for further details.        SHANNA Ball  [facility]  Social Work

## 2024-01-22 NOTE — PLAN OF CARE
Physical Therapy Discharge Summary    Reason for therapy discharge:    Discharged to transitional care facility.    Progress towards therapy goal(s). See goals on Care Plan in [facility] electronic health record for goal details.  Goals partially met.  Barriers to achieving goals:   discharge from facility.    Therapy recommendation(s):    Continued therapy is recommended.  Rationale/Recommendations:   . PT Discharge Planning:    PT Plan: Repeated sit>stands w/ FWW; initiate gait w/ FWW and WC follow as able  PT Discharge Recommendation (DC Rec): Transitional Care Facility  PT Rationale for DC Rec: Pt is below baseline. Pt currently requiring A x 1 with FWW to stand, unable to ambulate this date. Pt presents with deficits in activity tolerance, balance, and strength. Due to these deficits, pt would benefit from continued skilled PT services via TCU to address deficits and improve IND with safety and functional mobility.  PT Brief overview of current status: Sit>stand and stand pivot transfers w/ FWW and Ax1    Recommendation above provided by last treating therapist.

## 2024-01-22 NOTE — PLAN OF CARE
"Goal Outcome Evaluation:             PT alert and orineted X4. Able to make needs known. Denied SOB, CP, N/V. Refused to sign medicare document. Daughter stephany updated and also explained to pt. Pt stated \"why are you playing these games, I should be going home\". Compression socks on, report called to TCU. Pt left with all personal belongings. Has upper denture in, and lower is in personal belongings bag. Hearing aids in.            "

## 2024-01-22 NOTE — PLAN OF CARE
Occupational Therapy Discharge Summary    Reason for therapy discharge:    Discharged to transitional care facility.    Progress towards therapy goal(s). See goals on Care Plan in [facility] electronic health record for goal details.  Goals partially met.  Barriers to achieving goals:   discharge from facility.    Therapy recommendation(s):    Continued therapy is recommended.  Rationale/Recommendations:  Pt well below baseline, limited by weakness and dizziness. She will benefit from continued therapy to maximize safety/independence with ADLs at skilled TCU.

## 2024-01-22 NOTE — PLAN OF CARE
Goal Outcome Evaluation:   Summary 01/21/24. [id number].  Patient is alert and oriented X4. [place], uses bilateral hearing aids, VSS on 0.5L via NC with sat of 99%. Continuous  pulse  oximetry in place.  On TELE. Up with assist of 1 -2 with gait belt and walker.  On regular diet  with good appetite.  Patient denies pain. Lung sounds expiratory wheezes. Patient denies SOB, chest pain , dizziness and nausea. Patient encouraged to use IS  frequently. On potassium protocol and magnesium  protocol replacement was given previous shift.  Continent of B&B, pure wick in place with minimum  output. Brief wet and changed. Compression stockings removed at bedtime.

## 2024-01-22 NOTE — PROGRESS NOTES
[id number]  A &O  x4. VSS on 98% on 1 L per NC. Pt is [place] , wears bilateral Hearing Aids. Occasional incontinence. Pure wick in place. Continuous Pulse Oximetry. Turn and reposition every 2 hours. Denies pain. Rn managed Potassium and Magnesium. Sinus Rhythm with PAC's

## 2024-01-23 NOTE — LETTER
1/23/2024        RE: Yolanda Watson  [address]  [address]  [facility] [address]        [facility] GERIATRICS    PRIMARY CARE PROVIDER AND CLINIC:  [facility], [address] [place] [id number]  Chief Complaint   Patient presents with     Hospital F/U      [facility] Record Number:  [id number]  Place of Service where encounter took place:  [facility] ([facility]) [25]    Yolanda Watson  is a 83 year old  (1940), admitted to the above facility from  [facility]. Hospital stay 1/9/24 through 1/22/24..   HPI:    PMH breast cancer, primary lung cancer, colon cancer, primary lung cancer, colon cancer who presented to ED with bilaterqal weakness and back pain.   Bilateral pneumonia  Severe sepsis  Lactic acidosis  Tx with rocephin and azithromycin, switched to doxycycline d/t prolonged Qtc, completed 8 day coarse of rocephin and 4 days each of azithromycin and doxycycline  Prednisone added 1/16/24-1/20/24 for wheezing  Pulmonology and ID followed,   Bronchoscopy d/t CT scan showing worsening patchy opacities in lung bases, BAL cytology negative for malignancy, culture grew candida  patient improved and weaned off O2 by discharge  Lung Cancer Hx  [facility] followed and signed off.   Ischemic cardiomyopathy  Long QT improved  Echo with new CM EF of 35-40%, severe hypokinesia inferior and inferolateral  IV lasix patient developed hypokalemia, hypomagnesemia and orthostasis, no further diuretic.   Wedge compression Fx of T7  No recent trauma noted  Neurosurgery consulted, recommend conservative Tx, pain management, minimize oxycodone d/t sedation  Right kidney lesion: incidental finding on CT, f/u as OP  On exam today:  patient is resting in bed, denies cough, congestion, SOB although she is requiring O2 at 1 liter this AM to keep SaO2 > 90%, denies pain or discomfort, N/V/D or constipation, she states she slept well last night, no other  "concerns.   CODE STATUS/ADVANCE DIRECTIVES DISCUSSION:  No CPR- Do NOT Intubate  DNR / DNI  ALLERGIES:   Allergies   Allergen Reactions     Demerol [Meperidine] Nausea and Vomiting     Sulfa Antibiotics Other (See Comments)     Extreme chills     Penicillins Rash     Swelling In mouth and tongue      PAST MEDICAL HISTORY:   Past Medical History:   Diagnosis Date     Acoustic neuroma (H)      Acute arthropathy     lower leg     Acute depression      Anemia     iron deficeincy     Anemia      Anxiety      Arthritis     osteoarthrosis of knee     Arthritis      Breast cancer (H)     left breast     Breast cancer (H)      Breast cancer, left breast (H) 02/2018     Breast cancer, right breast (H) 01/2016     Cardiomyopathy (H)      Cervical cancer (H)      Colon cancer (H)      Colorectal cancer (H)      Decreased cardiac ejection fraction      Depression      Depressive disorder      Diabetes mellitus (H)     pre-diabetic no longer taking Metformin     Diabetes mellitus (H)     pre\"denies  A1C , 6 for couple years\"     Dyspnea      Dyspnea      Gastro-oesophageal reflux disease      H/O: lung cancer 2013     Herpes zoster      History of blood transfusion      HTN (hypertension)      Hyperlipidemia      Hyperlipidemia      Hypertension      Iron deficiency anemia      Lung cancer (H)     squamous cell carcinoma     Lung nodule 2013    Lung cancer     Nausea & vomiting      Nausea and vomiting 08/12/2020     OAB (overactive bladder)      Obese      Osteoarthritis of knee      Pain in female pelvis      Pre-diabetes      Preop general physical exam      Rib lesion      Shoulder pain, acute     right     Tachycardia      Torn meniscus       PAST SURGICAL HISTORY:   has a past surgical history that includes cataracts; tonsillectomy; cervical cancer screening results documented and reviewed; Hysterectomy (1966); breast biopsy, rt/lt; Resect rib (2/15/2013); appendectomy (1966); Eye surgery; Thoracotomy (3/12/2013); Lobectomy " lung (3/12/2013); Excise Node Mediastinal (3/12/2013); Cholecystectomy (1991); Mini arc sling operation for stress incontinence (2009); SURGICAL PATHOLOGY; Lumpectomy Breast with Seed Localization (Right, 2/3/2016); Biopsy node sentinel (Right, 2/3/2016); Abdomen surgery; Arthroscopy knee (2012); Laparoscopic assisted colectomy (Right, 10/27/2016); colonoscopy; Lumpectomy Breast with Seed Localization (Left, 3/5/2018); Biopsy node sentinel (Left, 3/5/2018); Insert port vascular access (Left, 3/5/2018); Insert port vascular access (N/A, 3/5/2018); GYN surgery (1966); SURGICAL PATHOLOGY; cataracts; Mastectomy simple (Left, 3/28/2018); acoustic neuroma excised; Remove port vascular access (N/A, 2/13/2019); Thoracoscopic wedge resection lung (Left, 7/26/2019); Thoracotomy; other surgical history; other surgical history; Colon surgery; Craniectomy For Excision Of Acoustic Neuroma; Lumpectomy breast; Hysterectomy; Mastectomy (Left); Cholecystectomy; RECONSTR TOTAL SHOULDER IMPLANT (Right, 3/16/2020); Esophagoscopy, gastroscopy, duodenoscopy (EGD), combined (N/A, 5/4/2022); Colonoscopy (N/A, 5/4/2022); and Bronchoscopy flexible (N/A, 1/11/2024).  FAMILY HISTORY: family history includes Breast Cancer in her paternal aunt and paternal grandmother; Colon Cancer in her cousin, paternal aunt, and another family member; Fractures in an other family member; Heart Disease in her maternal grandfather and mother; Hyperlipidemia in her brother, mother, and sister; Hypertension in her mother and sister; Osteoporosis in her mother; Other Cancer in her maternal aunt and paternal uncle; Prostate Cancer in her brother and father.  SOCIAL HISTORY:   reports that she quit smoking about 25 years ago. Her smoking use included cigarettes. She started smoking about 70 years ago. She has a 45 pack-year smoking history. She has never used smokeless tobacco. She reports current alcohol use. She reports that she does not use drugs.  Patient's  living condition: lives in an assisted living facility    Post Discharge Medication Reconciliation Status:   MED REC REQUIRED  Post Medication Reconciliation Status: discharge medications reconciled and changed, per note/orders       Current Outpatient Medications   Medication Sig     acetaminophen (TYLENOL) 500 MG tablet Take 1,000 mg by mouth every 6 hours as needed for mild pain     aspirin 81 MG EC tablet Take 81 mg by mouth daily     calcitonin, salmon, (MIACALCIN) 200 UNIT/ACT nasal spray Spray 1 spray into one nostril alternating nostrils daily Alternate nostril each day.     Calcium Carb-Cholecalciferol (CALCIUM 600+D) 600-20 MG-MCG TABS Take 1 tablet by mouth daily     calcium carbonate (TUMS) 500 MG chewable tablet Take 2 chew tab by mouth every hour as needed for heartburn Max 8 tab/day     diclofenac (VOLTAREN) 1 % topical gel Apply 4 g topically 4 times daily     famotidine (PEPCID) 40 MG tablet Take 1 tablet (40 mg) by mouth daily     lisinopril (ZESTRIL) 20 MG tablet Take 20 mg by mouth daily     loperamide (IMODIUM) 2 MG capsule Take 2 mg by mouth 4 times daily as needed for diarrhea     methocarbamol (ROBAXIN) 500 MG tablet Take 500 mg by mouth 2 times daily as needed for muscle spasms or other (back pain)     metoprolol tartrate (LOPRESSOR) 100 MG tablet Take 1 tablet (100 mg) by mouth 2 times daily     ondansetron (ZOFRAN) 4 MG tablet Take 4 mg by mouth at bedtime     ondansetron (ZOFRAN) 4 MG tablet Take 4 mg by mouth every 8 hours as needed for nausea     PARoxetine (PAXIL) 40 MG tablet Take 40 mg by mouth every morning     Probiotic Product (PROBIOTIC DAILY PO) Take 1 packet by mouth daily Nutrilite Probiotic packet     vitamin D3 (CHOLECALCIFEROL) 50 mcg (2000 units) tablet Take 1 tablet by mouth daily     No current facility-administered medications for this visit.       ROS:  10 point ROS of systems including Constitutional, Eyes, Respiratory, Cardiovascular, Gastroenterology, Genitourinary,  "Integumentary, Musculoskeletal, Psychiatric were all negative except for pertinent positives noted in my HPI.    Vitals:  BP (!) 147/84   Pulse 101   Temp 98.9  F (37.2  C)   Resp 16   Ht 1.778 m (5' 10\")   Wt 81.6 kg (180 lb)   SpO2 96%   BMI 25.83 kg/m    Exam:  GENERAL APPEARANCE:  Alert, in no distress  ENT:  Mouth and posterior oropharynx normal, moist mucous membranes, [place]  EYES:  EOM, conjunctivae, lids, pupils and irises normal  RESP:  respiratory effort and palpation of chest normal, lungs clear to auscultation , no respiratory distress, diminished breath sounds bases bilaterally  CV:  Palpation and auscultation of heart done , regular rate and rhythm, no murmur, rub, or gallop, no edema  ABDOMEN:  normal bowel sounds, soft, nontender, no hepatosplenomegaly or other masses  M/S:   patient resting in bed  SKIN:  Inspection of skin and subcutaneous tissue baseline  NEURO:   speech wnl  PSYCH:  affect and mood normal    Lab/Diagnostic data:  Recent labs in [facility] reviewed by me today.  and Most Recent 3 CBC's:  Recent Labs   Lab Test 01/21/24  0944 01/20/24  0323 01/19/24  0628 01/15/24  0743   WBC 11.7* 7.1 6.3 6.4   HGB 12.0 11.5* 11.1* 10.8*   *  --  106* 109*     --  316 298     Most Recent 3 BMP's:  Recent Labs   Lab Test 01/22/24  0813 01/21/24  0944 01/20/24  1704 01/20/24  0953 01/20/24  0323 01/20/24  0243 01/19/24  0628 01/18/24  0707   NA  --  139  --   --  135  --  137 136   POTASSIUM 4.1 3.6 3.8   < > 3.5  3.5  --  3.9 4.1   CHLORIDE  --  96*  --   --  90*  --  94* 93*   CO2  --  35*  --   --  31*  --  34* 34*   BUN  --  15.3  --   --   --   --  14.0 13.8   CR  --  0.69  --   --   --   --  0.63 0.60   ANIONGAP  --  8  --   --  14  --  9 9   PRIYA  --  9.5  --   --   --   --  9.1 9.0   GLC  --  111*  --   --   --  153* 95 100*    < > = values in this interval not displayed.       ASSESSMENT/PLAN:    (J18.9) Pneumonia of both lower lobes due to infectious organism  (primary " encounter diagnosis)  (R53.81) Physical deconditioning  Comment: acute/ongoing  Finished coarse of Abx and prednisone during hospitalization  Plan: monitor SaO2 at rest and with activity, PT and OT  OK for O2 to keep SaO2 >90% wean as able    (I25.5) Ischemic cardiomyopathy  (I10) Benign essential hypertension  Comment: acute/ongoing  New cardiomyopathy noted on Echo EF of 35-40%, hypokinesia inferior   Plan: BMP follow, daily weights, continue lisinopril 20mg QD, metoprolol 100mg BID, ASA 81mg QD    (Z85.118) History of lung cancer  Comment: ongoing  Plan: follows with [facility]    (S22.182D) Closed wedge compression fracture of T7 vertebra with routine healing, subsequent encounter  Comment: acute/ongoing  No Hx of trauma  Plan: PT and OT, calcitonin 1 apray alternating nostrils daily, voltaren gel 1% 4g QID, tylenol 1000mg q 6 hours prn, robaxin 500mg BID prn,           Orders:  CBC, BMP and Mg level on Thursday        Total time spent with patient visit at the skilled nursing facility was 45 min including patient visit and review of past records. Reviewed internal medicine, ID, cardiology and neurosurgery progress note, reviewed lab and imaging results, discussed hospitalization and medications with patient. Discussed labs and POC with nursing.     Electronically signed by:  Tonya Lynn Haase, APRN CNP                   Sincerely,        Tonya Lynn Haase, APRN CNP

## 2024-01-23 NOTE — PROGRESS NOTES
[facility] GERIATRICS    PRIMARY CARE PROVIDER AND CLINIC:  [facility], [address] [id number]  Chief Complaint   Patient presents with    Hospital F/U      [place] Medical Record Number:  [id number]  Place of Service where encounter took place:  [facility] ([facility]) [25]    Yolanda Watson  is a 83 year old  (1940), admitted to the above facility from  [facility]. Hospital stay 1/9/24 through 1/22/24..   HPI:    PMH breast cancer, primary lung cancer, colon cancer, primary lung cancer, colon cancer who presented to ED with bilaterqal weakness and back pain.   Bilateral pneumonia  Severe sepsis  Lactic acidosis  Tx with rocephin and azithromycin, switched to doxycycline d/t prolonged Qtc, completed 8 day coarse of rocephin and 4 days each of azithromycin and doxycycline  Prednisone added 1/16/24-1/20/24 for wheezing  Pulmonology and ID followed,   Bronchoscopy d/t CT scan showing worsening patchy opacities in lung bases, BAL cytology negative for malignancy, culture grew candida  patient improved and weaned off O2 by discharge  Lung Cancer Hx  [facility] followed and signed off.   Ischemic cardiomyopathy  Long QT improved  Echo with new CM EF of 35-40%, severe hypokinesia inferior and inferolateral  IV lasix patient developed hypokalemia, hypomagnesemia and orthostasis, no further diuretic.   Wedge compression Fx of T7  No recent trauma noted  Neurosurgery consulted, recommend conservative Tx, pain management, minimize oxycodone d/t sedation  Right kidney lesion: incidental finding on CT, f/u as OP  On exam today:  patient is resting in bed, denies cough, congestion, SOB although she is requiring O2 at 1 liter this AM to keep SaO2 > 90%, denies pain or discomfort, N/V/D or constipation, she states she slept well last night, no other concerns.   CODE STATUS/ADVANCE DIRECTIVES DISCUSSION:  No CPR- Do NOT Intubate  DNR /  "DNI  ALLERGIES:   Allergies   Allergen Reactions    Demerol [Meperidine] Nausea and Vomiting    Sulfa Antibiotics Other (See Comments)     Extreme chills    Penicillins Rash     Swelling In mouth and tongue      PAST MEDICAL HISTORY:   Past Medical History:   Diagnosis Date    Acoustic neuroma (H)     Acute arthropathy     lower leg    Acute depression     Anemia     iron deficeincy    Anemia     Anxiety     Arthritis     osteoarthrosis of knee    Arthritis     Breast cancer (H)     left breast    Breast cancer (H)     Breast cancer, left breast (H) 02/2018    Breast cancer, right breast (H) 01/2016    Cardiomyopathy (H)     Cervical cancer (H)     Colon cancer (H)     Colorectal cancer (H)     Decreased cardiac ejection fraction     Depression     Depressive disorder     Diabetes mellitus (H)     pre-diabetic no longer taking Metformin    Diabetes mellitus (H)     pre\"denies  A1C , 6 for couple years\"    Dyspnea     Dyspnea     Gastro-oesophageal reflux disease     H/O: lung cancer 2013    Herpes zoster     History of blood transfusion     HTN (hypertension)     Hyperlipidemia     Hyperlipidemia     Hypertension     Iron deficiency anemia     Lung cancer (H)     squamous cell carcinoma    Lung nodule 2013    Lung cancer    Nausea & vomiting     Nausea and vomiting 08/12/2020    OAB (overactive bladder)     Obese     Osteoarthritis of knee     Pain in female pelvis     Pre-diabetes     Preop general physical exam     Rib lesion     Shoulder pain, acute     right    Tachycardia     Torn meniscus       PAST SURGICAL HISTORY:   has a past surgical history that includes cataracts; tonsillectomy; cervical cancer screening results documented and reviewed; Hysterectomy (1966); breast biopsy, rt/lt; Resect rib (2/15/2013); appendectomy (1966); Eye surgery; Thoracotomy (3/12/2013); Lobectomy lung (3/12/2013); Excise Node Mediastinal (3/12/2013); Cholecystectomy (1991); Mini arc sling operation for stress incontinence (2009); " SURGICAL PATHOLOGY; Lumpectomy Breast with Seed Localization (Right, 2/3/2016); Biopsy node sentinel (Right, 2/3/2016); Abdomen surgery; Arthroscopy knee (2012); Laparoscopic assisted colectomy (Right, 10/27/2016); colonoscopy; Lumpectomy Breast with Seed Localization (Left, 3/5/2018); Biopsy node sentinel (Left, 3/5/2018); Insert port vascular access (Left, 3/5/2018); Insert port vascular access (N/A, 3/5/2018); GYN surgery (1966); SURGICAL PATHOLOGY; cataracts; Mastectomy simple (Left, 3/28/2018); acoustic neuroma excised; Remove port vascular access (N/A, 2/13/2019); Thoracoscopic wedge resection lung (Left, 7/26/2019); Thoracotomy; other surgical history; other surgical history; Colon surgery; Craniectomy For Excision Of Acoustic Neuroma; Lumpectomy breast; Hysterectomy; Mastectomy (Left); Cholecystectomy; RECONSTR TOTAL SHOULDER IMPLANT (Right, 3/16/2020); Esophagoscopy, gastroscopy, duodenoscopy (EGD), combined (N/A, 5/4/2022); Colonoscopy (N/A, 5/4/2022); and Bronchoscopy flexible (N/A, 1/11/2024).  FAMILY HISTORY: family history includes Breast Cancer in her paternal aunt and paternal grandmother; Colon Cancer in her cousin, paternal aunt, and another family member; Fractures in an other family member; Heart Disease in her maternal grandfather and mother; Hyperlipidemia in her brother, mother, and sister; Hypertension in her mother and sister; Osteoporosis in her mother; Other Cancer in her maternal aunt and paternal uncle; Prostate Cancer in her brother and father.  SOCIAL HISTORY:   reports that she quit smoking about 25 years ago. Her smoking use included cigarettes. She started smoking about 70 years ago. She has a 45 pack-year smoking history. She has never used smokeless tobacco. She reports current alcohol use. She reports that she does not use drugs.  Patient's living condition: lives in an assisted living facility    Post Discharge Medication Reconciliation Status:   MED REC REQUIRED  Post  Medication Reconciliation Status: discharge medications reconciled and changed, per note/orders       Current Outpatient Medications   Medication Sig    acetaminophen (TYLENOL) 500 MG tablet Take 1,000 mg by mouth every 6 hours as needed for mild pain    aspirin 81 MG EC tablet Take 81 mg by mouth daily    calcitonin, salmon, (MIACALCIN) 200 UNIT/ACT nasal spray Spray 1 spray into one nostril alternating nostrils daily Alternate nostril each day.    Calcium Carb-Cholecalciferol (CALCIUM 600+D) 600-20 MG-MCG TABS Take 1 tablet by mouth daily    calcium carbonate (TUMS) 500 MG chewable tablet Take 2 chew tab by mouth every hour as needed for heartburn Max 8 tab/day    diclofenac (VOLTAREN) 1 % topical gel Apply 4 g topically 4 times daily    famotidine (PEPCID) 40 MG tablet Take 1 tablet (40 mg) by mouth daily    lisinopril (ZESTRIL) 20 MG tablet Take 20 mg by mouth daily    loperamide (IMODIUM) 2 MG capsule Take 2 mg by mouth 4 times daily as needed for diarrhea    methocarbamol (ROBAXIN) 500 MG tablet Take 500 mg by mouth 2 times daily as needed for muscle spasms or other (back pain)    metoprolol tartrate (LOPRESSOR) 100 MG tablet Take 1 tablet (100 mg) by mouth 2 times daily    ondansetron (ZOFRAN) 4 MG tablet Take 4 mg by mouth at bedtime    ondansetron (ZOFRAN) 4 MG tablet Take 4 mg by mouth every 8 hours as needed for nausea    PARoxetine (PAXIL) 40 MG tablet Take 40 mg by mouth every morning    Probiotic Product (PROBIOTIC DAILY PO) Take 1 packet by mouth daily Nutrilite Probiotic packet    vitamin D3 (CHOLECALCIFEROL) 50 mcg (2000 units) tablet Take 1 tablet by mouth daily     No current facility-administered medications for this visit.       ROS:  10 point ROS of systems including Constitutional, Eyes, Respiratory, Cardiovascular, Gastroenterology, Genitourinary, Integumentary, Musculoskeletal, Psychiatric were all negative except for pertinent positives noted in my HPI.    Vitals:  BP (!) 147/84   Pulse  "101   Temp 98.9  F (37.2  C)   Resp 16   Ht 1.778 m (5' 10\")   Wt 81.6 kg (180 lb)   SpO2 96%   BMI 25.83 kg/m    Exam:  GENERAL APPEARANCE:  Alert, in no distress  ENT:  Mouth and posterior oropharynx normal, moist mucous membranes, [place]  EYES:  EOM, conjunctivae, lids, pupils and irises normal  RESP:  respiratory effort and palpation of chest normal, lungs clear to auscultation , no respiratory distress, diminished breath sounds bases bilaterally  CV:  Palpation and auscultation of heart done , regular rate and rhythm, no murmur, rub, or gallop, no edema  ABDOMEN:  normal bowel sounds, soft, nontender, no hepatosplenomegaly or other masses  M/S:   patient resting in bed  SKIN:  Inspection of skin and subcutaneous tissue baseline  NEURO:   speech wnl  PSYCH:  affect and mood normal    Lab/Diagnostic data:  Recent labs in [facility] reviewed by me today.  and Most Recent 3 CBC's:  Recent Labs   Lab Test 01/21/24  0944 01/20/24  0323 01/19/24  0628 01/15/24  0743   WBC 11.7* 7.1 6.3 6.4   HGB 12.0 11.5* 11.1* 10.8*   *  --  106* 109*     --  316 298     Most Recent 3 BMP's:  Recent Labs   Lab Test 01/22/24  0813 01/21/24  0944 01/20/24  1704 01/20/24  0953 01/20/24  0323 01/20/24  0243 01/19/24  0628 01/18/24  0707   NA  --  139  --   --  135  --  137 136   POTASSIUM 4.1 3.6 3.8   < > 3.5  3.5  --  3.9 4.1   CHLORIDE  --  96*  --   --  90*  --  94* 93*   CO2  --  35*  --   --  31*  --  34* 34*   BUN  --  15.3  --   --   --   --  14.0 13.8   CR  --  0.69  --   --   --   --  0.63 0.60   ANIONGAP  --  8  --   --  14  --  9 9   PRIYA  --  9.5  --   --   --   --  9.1 9.0   GLC  --  111*  --   --   --  153* 95 100*    < > = values in this interval not displayed.       ASSESSMENT/PLAN:    (J18.9) Pneumonia of both lower lobes due to infectious organism  (primary encounter diagnosis)  (R53.81) Physical deconditioning  Comment: acute/ongoing  Finished coarse of Abx and prednisone during hospitalization  Plan: " monitor SaO2 at rest and with activity, PT and OT  OK for O2 to keep SaO2 >90% wean as able    (I25.5) Ischemic cardiomyopathy  (I10) Benign essential hypertension  Comment: acute/ongoing  New cardiomyopathy noted on Echo EF of 35-40%, hypokinesia inferior   Plan: BMP follow, daily weights, continue lisinopril 20mg QD, metoprolol 100mg BID, ASA 81mg QD    (Z85.118) History of lung cancer  Comment: ongoing  Plan: follows with [facility]    (S22.740D) Closed wedge compression fracture of T7 vertebra with routine healing, subsequent encounter  Comment: acute/ongoing  No Hx of trauma  Plan: PT and OT, calcitonin 1 apray alternating nostrils daily, voltaren gel 1% 4g QID, tylenol 1000mg q 6 hours prn, robaxin 500mg BID prn,           Orders:  CBC, BMP and Mg level on Thursday        Total time spent with patient visit at the skilled nursing facility was 45 min including patient visit and review of past records. Reviewed internal medicine, ID, cardiology and neurosurgery progress note, reviewed lab and imaging results, discussed hospitalization and medications with patient. Discussed labs and POC with nursing.     Electronically signed by:  Tonya Lynn Haase, APRN CNP

## 2024-01-24 NOTE — LETTER
"    1/24/2024        RE: Yolanda Watson  [address]  [address]  [facility] [address]        M [facility] GERIATRICS    Chief Complaint   Patient presents with     RECHECK     HPI:  Yolanda Watson is a 83 year old  (1940), who is being seen today for an episodic care visit at: [facility]) [25]. Today's concern is:   Pneumonia/Hx of lung cancer: nursing called triage concerned because patient was \"not waking up\" vitals stable, I was at the facility so I did go and see the patient.   On exam patient laying flat in bed, when i approached her and called her name she opened her eyes, denies pain or discomfort, SOB, cough, congestion, she does have O2 on at time of exam SaO2 has been 92-94% on 1 liter or O2  Of note patient is very [place] and on my initial assessment she did have a pocket talker on. At time of assessment today the pocket talker was on bedside table  In therapy today patient refused to sit up on edge of bed  HTN/ischemic cardiomyopathy;/94, 138/82, 147/84 with HR .   Fx T7 vertebra: patient denies pain at time of exam.       Allergies, and PMH/PSH reviewed in [facility] today.  REVIEW OF SYSTEMS:  Limited secondary to cognitive impairment but today pt reports denies pain or discomfort, SOB, cough, congestion    Objective:   BP (!) 153/94   Pulse 102   Temp 97.6  F (36.4  C)   Resp 14   Ht 1.676 m (5' 6\")   Wt 81.2 kg (179 lb)   SpO2 94%   BMI 28.89 kg/m    GENERAL APPEARANCE:  Alert, in no distress, morbidly obese  ENT:  Mouth and posterior oropharynx normal, moist mucous membranes, [place]  EYES:  EOM, conjunctivae, lids, pupils and irises normal, PERRL  RESP:  respiratory effort and palpation of chest normal, lungs clear to auscultation , no respiratory distress, diminished breath sounds bases  bilaterally  CV:  Palpation and auscultation of heart done , regular rate and rhythm, no murmur, rub, or gallop, no edema  ABDOMEN:  normal bowel sounds, soft, nontender, no " hepatosplenomegaly or other masses  M/S:   patient resting in bed  SKIN:  Inspection of skin and subcutaneous tissue baseline  NEURO:   speech mumbles, difficult to understand  PSYCH:  somnolent    Recent labs in [facility] reviewed by me today.  and Most Recent 3 CBC's:  Recent Labs   Lab Test 01/21/24  0944 01/20/24  0323 01/19/24  0628 01/15/24  0743   WBC 11.7* 7.1 6.3 6.4   HGB 12.0 11.5* 11.1* 10.8*   *  --  106* 109*     --  316 298     Most Recent 3 BMP's:  Recent Labs   Lab Test 01/22/24  0813 01/21/24  0944 01/20/24  1704 01/20/24  0953 01/20/24  0323 01/20/24  0243 01/19/24  0628 01/18/24  0707   NA  --  139  --   --  135  --  137 136   POTASSIUM 4.1 3.6 3.8   < > 3.5  3.5  --  3.9 4.1   CHLORIDE  --  96*  --   --  90*  --  94* 93*   CO2  --  35*  --   --  31*  --  34* 34*   BUN  --  15.3  --   --   --   --  14.0 13.8   CR  --  0.69  --   --   --   --  0.63 0.60   ANIONGAP  --  8  --   --  14  --  9 9   PRIYA  --  9.5  --   --   --   --  9.1 9.0   GLC  --  111*  --   --   --  153* 95 100*    < > = values in this interval not displayed.       Assessment/Plan:  (J18.9) Pneumonia of both lower lobes due to infectious organism  (primary encounter diagnosis)  (R53.81) Physical deconditioning  Comment: acute/ongoing  Finished coarse of Abx and prednisone during hospitalization  Plan: monitor SaO2 at rest and with activity, PT and OT  OK for O2 to keep SaO2 >90% wean as able     (I25.5) Ischemic cardiomyopathy  (I10) Benign essential hypertension  Comment: acute/ongoing, no change  New cardiomyopathy noted on Echo EF of 35-40%, hypokinesia inferior   Plan: BMP follow, daily weights, continue lisinopril 20mg QD, metoprolol 100mg BID, ASA 81mg QD     (Z85.118) History of lung cancer  Comment: ongoing, no change  Plan: [place] with [facility]     (J98.692J) Closed wedge compression fracture of T7 vertebra with routine healing, subsequent encounter  Comment: acute/ongoing, no change  No Hx of trauma  Plan:  PT and OT, calcitonin 1 apray alternating nostrils daily, voltaren gel 1% 4g QID, tylenol 1000mg q 6 hours prn, robaxin 500mg BID prn,        MED REC REQUIRED  Post Medication Reconciliation Status: medication reconcilation previously completed during another office visit      Orders:  IS QID,   Wean off O2  BMP and CBC in AM  Get patient up in chair three times daily      Electronically signed by: Tonya Lynn Haase, APRN CNP         Sincerely,        Tonya Lynn Haase, APRN CNP

## 2024-01-24 NOTE — PROGRESS NOTES
"[facility] GERIATRICS    Chief Complaint   Patient presents with    RECHECK     HPI:  Yolanda Watson is a 83 year old  (1940), who is being seen today for an episodic care visit at: [facility]) [25]. Today's concern is:   Pneumonia/Hx of lung cancer: nursing called triage concerned because patient was \"not waking up\" vitals stable, I was at the facility so I did go and see the patient.   On exam patient laying flat in bed, when i approached her and called her name she opened her eyes, denies pain or discomfort, SOB, cough, congestion, she does have O2 on at time of exam SaO2 has been 92-94% on 1 liter or O2  Of note patient is very [place] and on my initial assessment she did have a pocket talker on. At time of assessment today the pocket talker was on bedside table  In therapy today patient refused to sit up on edge of bed  HTN/ischemic cardiomyopathy;/94, 138/82, 147/84 with HR .   Fx T7 vertebra: patient denies pain at time of exam.       Allergies, and PMH/PSH reviewed in [facility] today.  REVIEW OF SYSTEMS:  Limited secondary to cognitive impairment but today pt reports denies pain or discomfort, SOB, cough, congestion    Objective:   BP (!) 153/94   Pulse 102   Temp 97.6  F (36.4  C)   Resp 14   Ht 1.676 m (5' 6\")   Wt 81.2 kg (179 lb)   SpO2 94%   BMI 28.89 kg/m    GENERAL APPEARANCE:  Alert, in no distress, morbidly obese  ENT:  Mouth and posterior oropharynx normal, moist mucous membranes, [place]  EYES:  EOM, conjunctivae, lids, pupils and irises normal, PERRL  RESP:  respiratory effort and palpation of chest normal, lungs clear to auscultation , no respiratory distress, diminished breath sounds bases  bilaterally  CV:  Palpation and auscultation of heart done , regular rate and rhythm, no murmur, rub, or gallop, no edema  ABDOMEN:  normal bowel sounds, soft, nontender, no hepatosplenomegaly or other masses  M/S:   patient resting in bed  SKIN:  Inspection of skin and " subcutaneous tissue baseline  NEURO:   speech mumbles, difficult to understand  PSYCH:  somnolent    Recent labs in [facility] reviewed by me today.  and Most Recent 3 CBC's:  Recent Labs   Lab Test 01/21/24  0944 01/20/24  0323 01/19/24  0628 01/15/24  0743   WBC 11.7* 7.1 6.3 6.4   HGB 12.0 11.5* 11.1* 10.8*   *  --  106* 109*     --  316 298     Most Recent 3 BMP's:  Recent Labs   Lab Test 01/22/24  0813 01/21/24  0944 01/20/24  1704 01/20/24  0953 01/20/24  0323 01/20/24  0243 01/19/24  0628 01/18/24  0707   NA  --  139  --   --  135  --  137 136   POTASSIUM 4.1 3.6 3.8   < > 3.5  3.5  --  3.9 4.1   CHLORIDE  --  96*  --   --  90*  --  94* 93*   CO2  --  35*  --   --  31*  --  34* 34*   BUN  --  15.3  --   --   --   --  14.0 13.8   CR  --  0.69  --   --   --   --  0.63 0.60   ANIONGAP  --  8  --   --  14  --  9 9   PRIYA  --  9.5  --   --   --   --  9.1 9.0   GLC  --  111*  --   --   --  153* 95 100*    < > = values in this interval not displayed.       Assessment/Plan:  (J18.9) Pneumonia of both lower lobes due to infectious organism  (primary encounter diagnosis)  (R53.81) Physical deconditioning  Comment: acute/ongoing  Finished coarse of Abx and prednisone during hospitalization  Plan: monitor SaO2 at rest and with activity, PT and OT  OK for O2 to keep SaO2 >90% wean as able     (I25.5) Ischemic cardiomyopathy  (I10) Benign essential hypertension  Comment: acute/ongoing, no change  New cardiomyopathy noted on Echo EF of 35-40%, hypokinesia inferior   Plan: BMP follow, daily weights, continue lisinopril 20mg QD, metoprolol 100mg BID, ASA 81mg QD     (Z85.118) History of lung cancer  Comment: ongoing, no change  Plan: [place] [facility]     (S23.605R) Closed wedge compression fracture of T7 vertebra with routine healing, subsequent encounter  Comment: acute/ongoing, no change  No Hx of trauma  Plan: PT and OT, calcitonin 1 apray alternating nostrils daily, voltaren gel 1% 4g QID, tylenol 1000mg q  6 hours prn, robaxin 500mg BID prn,        MED REC REQUIRED  Post Medication Reconciliation Status: medication reconcilation previously completed during another office visit      Orders:  IS QID,   Wean off O2  BMP and CBC in AM  Get patient up in chair three times daily      Electronically signed by: Tonya Lynn Haase, APRN CNP

## 2024-01-25 NOTE — TELEPHONE ENCOUNTER
[facility] Geriatrics Lab Note     Provider: Tonya Haase, APRN CNP  Facility: [facility] Facility Type:  TCU    Allergies   Allergen Reactions    Demerol [Meperidine] Nausea and Vomiting    Sulfa Antibiotics Other (See Comments)     Extreme chills    Penicillins Rash     Swelling In mouth and tongue       Labs Reviewed by provider: CBC, BMP and Mag    Not on KCL or mag  Pt didn't take any of her meds today, pt being evaled by hospice tomorrow.   Pt did refuse all meds today d/t unable to swallow.                  Verbal Order/Direction given by Provider: NNO    Provider giving Order:  Tonya Haase, APRN CNP    Verbal Order given to: [phone]    Nidia Frost RN

## 2024-01-25 NOTE — PROGRESS NOTES
"[facility] GERIATRICS    Chief Complaint   Patient presents with    RECHECK     HPI:  Yolanda Watson is a 83 year old  (1940), who is being seen today for an episodic care visit at: [facility] ([facility]) [25]. Today's concern is:  Pneumonia/Hx of lung cancer: patient has shared her wishes for comfort care only with daughter, [facility] hospice will meet and evaluate patient, plan to move back to where  is for continued hospice care.   Patient is resting in bed, minimally awake, OE to verbal stimuli, nursing state patient is no longer taking medications, patient c/o right shoulder pain and constipation, she has refused suppository for laxative  HTN/ischemic cardiomyopathy;/84, 164/81, 139/72 with HR    Fx T7 vertebra: c/o shoulder pain as above        Allergies, and PMH/PSH reviewed in [facility] today.  REVIEW OF SYSTEMS:  Patient minimally awake, denies pain or discomfort    Objective:   BP (!) 160/84   Pulse 90   Temp 98.6  F (37  C)   Resp 15   Ht 1.676 m (5' 6\")   Wt 81.2 kg (179 lb)   SpO2 96%   BMI 28.89 kg/m    GENERAL APPEARANCE:  in mild distress due to pain, opens eyes to verbal stimuli  ENT:  Mouth and posterior oropharynx normal, moist mucous membranes, [place]  EYES:  EOM, conjunctivae, lids, pupils and irises normal, PERRL  RESP:  respiratory effort and palpation of chest normal, lungs clear to auscultation , no respiratory distress  CV:  Palpation and auscultation of heart done , regular rate and rhythm, no murmur, rub, or gallop  ABDOMEN:  normal bowel sounds, soft, nontender, no hepatosplenomegaly or other masses  M/S:   patient resting in bed  SKIN:  Inspection of skin and subcutaneous tissue baseline  NEURO:   speech wnl  PSYCH:  affect and mood normal    Recent labs in [facility] reviewed by me today.  and Most Recent 3 CBC's:  Recent Labs   Lab Test 01/21/24  0944 01/20/24  0323 01/19/24  0628 01/15/24  0743   WBC 11.7* 7.1 6.3 6.4   HGB 12.0 11.5* 11.1* 10.8*   *  --  " 106* 109*     --  316 298     Most Recent 3 BMP's:  Recent Labs   Lab Test 01/22/24  0813 01/21/24  0944 01/20/24  1704 01/20/24  0953 01/20/24  0323 01/20/24  0243 01/19/24  0628 01/18/24  0707   NA  --  139  --   --  135  --  137 136   POTASSIUM 4.1 3.6 3.8   < > 3.5  3.5  --  3.9 4.1   CHLORIDE  --  96*  --   --  90*  --  94* 93*   CO2  --  35*  --   --  31*  --  34* 34*   BUN  --  15.3  --   --   --   --  14.0 13.8   CR  --  0.69  --   --   --   --  0.63 0.60   ANIONGAP  --  8  --   --  14  --  9 9   PRIYA  --  9.5  --   --   --   --  9.1 9.0   GLC  --  111*  --   --   --  153* 95 100*    < > = values in this interval not displayed.       Assessment/Plan:  (J18.9) Pneumonia of both lower lobes due to infectious organism  (primary encounter diagnosis)  (R53.81) Physical deconditioning  Comment: acute/ongoing  Finished coarse of Abx and prednisone during hospitalization  Plan: monitor SaO2 at rest and with activity, no further therapy  [facility] hosp[facility] will be meeting with patient and family at 5pm tonight     (I25.5) Ischemic cardiomyopathy  (I10) Benign essential hypertension  Comment: acute/ongoing, no change  New cardiomyopathy noted on Echo EF of 35-40%, hypokinesia inferior   Plan: continue lisinopril 20mg QD, metoprolol 100mg BID, ASA 81mg QD     (Z85.118) History of lung cancer  Comment: ongoing  Plan: follows with [facility]  Wishes for comfort care, meeting with hospice this evening     (V50.729X) Closed wedge compression fracture of T7 vertebra with routine healing, subsequent encounter  Comment: acute/ongoing, no change  No Hx of trauma  Plan: PT and OT, calcitonin 1 apray alternating nostrils daily, voltaren gel 1% 4g QID, tylenol suppository 650mg q 4 hours prn    (M25.511) acute pain in right shoulder  Acute  Plan: Roxanol 20mg/ml, 2.5mg q 1 hour prn for pain          MED REC REQUIRED  Post Medication Reconciliation Status: patient was not discharged from an inpatient facility or  TCU      Orders:  Roxanol 20mg/ml give 2.5mg q 1 hour prn for pain      Electronically signed by: Tonya Lynn Haase, APRN CNP

## 2024-01-26 NOTE — LETTER
"    1/26/2024        RE: Yolanda Watson  [address]  [address]  [facility] [address]        M [facility] GERIATRICS    Chief Complaint   Patient presents with     RECHECK     HPI:  Yolanda Watson is a 83 year old  (1940), who is being seen today for an episodic care visit at: [facility]) [25]. Today's concern is:  Pneumonia/Hx of lung cancer: patient has shared her wishes for comfort care only with daughter, [facility] hospice will meet and evaluate patient, plan to move back to where  is for continued hospice care.   Patient is resting in bed, minimally awake, OE to verbal stimuli, nursing state patient is no longer taking medications, patient c/o right shoulder pain and constipation, she has refused suppository for laxative  HTN/ischemic cardiomyopathy;/84, 164/81, 139/72 with HR    Fx T7 vertebra: c/o shoulder pain as above        Allergies, and PMH/PSH reviewed in [facility] today.  REVIEW OF SYSTEMS:  Patient minimally awake, denies pain or discomfort    Objective:   BP (!) 160/84   Pulse 90   Temp 98.6  F (37  C)   Resp 15   Ht 1.676 m (5' 6\")   Wt 81.2 kg (179 lb)   SpO2 96%   BMI 28.89 kg/m    GENERAL APPEARANCE:  in mild distress due to pain, opens eyes to verbal stimuli  ENT:  Mouth and posterior oropharynx normal, moist mucous membranes, [place]  EYES:  EOM, conjunctivae, lids, pupils and irises normal, PERRL  RESP:  respiratory effort and palpation of chest normal, lungs clear to auscultation , no respiratory distress  CV:  Palpation and auscultation of heart done , regular rate and rhythm, no murmur, rub, or gallop  ABDOMEN:  normal bowel sounds, soft, nontender, no hepatosplenomegaly or other masses  M/S:   patient resting in bed  SKIN:  Inspection of skin and subcutaneous tissue baseline  NEURO:   speech wnl  PSYCH:  affect and mood normal    Recent labs in [facility] reviewed by me today.  and Most Recent 3 CBC's:  Recent Labs   Lab Test 01/21/24  0944 01/20/24  0323 " 01/19/24  0628 01/15/24  0743   WBC 11.7* 7.1 6.3 6.4   HGB 12.0 11.5* 11.1* 10.8*   *  --  106* 109*     --  316 298     Most Recent 3 BMP's:  Recent Labs   Lab Test 01/22/24  0813 01/21/24  0944 01/20/24  1704 01/20/24  0953 01/20/24  0323 01/20/24  0243 01/19/24  0628 01/18/24  0707   NA  --  139  --   --  135  --  137 136   POTASSIUM 4.1 3.6 3.8   < > 3.5  3.5  --  3.9 4.1   CHLORIDE  --  96*  --   --  90*  --  94* 93*   CO2  --  35*  --   --  31*  --  34* 34*   BUN  --  15.3  --   --   --   --  14.0 13.8   CR  --  0.69  --   --   --   --  0.63 0.60   ANIONGAP  --  8  --   --  14  --  9 9   PRIYA  --  9.5  --   --   --   --  9.1 9.0   GLC  --  111*  --   --   --  153* 95 100*    < > = values in this interval not displayed.       Assessment/Plan:  (J18.9) Pneumonia of both lower lobes due to infectious organism  (primary encounter diagnosis)  (R53.81) Physical deconditioning  Comment: acute/ongoing  Finished coarse of Abx and prednisone during hospitalization  Plan: monitor SaO2 at rest and with activity, no further therapy  [facility] will be meeting with patient and family at 5pm tonight     (I25.5) Ischemic cardiomyopathy  (I10) Benign essential hypertension  Comment: acute/ongoing, no change  New cardiomyopathy noted on Echo EF of 35-40%, hypokinesia inferior   Plan: continue lisinopril 20mg QD, metoprolol 100mg BID, ASA 81mg QD     (Z85.118) History of lung cancer  Comment: ongoing  Plan: [place] [facility]  Wishes for comfort care, meeting with hospice this evening     (S22.275D) Closed wedge compression fracture of T7 vertebra with routine healing, subsequent encounter  Comment: acute/ongoing, no change  No Hx of trauma  Plan: PT and OT, calcitonin 1 apray alternating nostrils daily, voltaren gel 1% 4g QID, tylenol suppository 650mg q 4 hours prn    (M25.511) acute pain in right shoulder  Acute  Plan: Roxanol 20mg/ml, 2.5mg q 1 hour prn for pain          MED REC REQUIRED  Post  Medication Reconciliation Status: patient was not discharged from an inpatient facility or TCU      Orders:  Roxanol 20mg/ml give 2.5mg q 1 hour prn for pain      Electronically signed by: Tonya Lynn Haase, APRN CNP         Sincerely,        Tonya Lynn Haase, APRN CNP

## 2024-01-26 NOTE — PROGRESS NOTES
I was informed by one of my colleague Dr. Leach about the AFP results.  Patient is a bone while she was in the hospital and discharged on 1/22/2024.  One of the BAL sample shows AFB positive, identification is pending at this time.    I did discuss with Dr. Kelly oF infectious disease, she will follow-up the AFB results from there.

## 2024-01-29 NOTE — PROGRESS NOTES
[facility] GERIATRICS DISCHARGE SUMMARY  PATIENT'S NAME: Yolanda Watson  YOB: 1940  MEDICAL RECORD NUMBER:  [id number]  Place of Service where encounter took place:  [facility]) [25]    PRIMARY CARE PROVIDER AND CLINIC RESPONSIBLE AFTER TRANSFER:   [facility], [address] [place] [id number]    Assisted Living: [facility]      Transferring providers: Tonya Lynn Haase, APRN CNP, Ez Bray MD  Recent Hospitalization/ED:  [facility] Hospital stay 1/9/24 to 1/22/24.  Date of SNF Admission: January 22, 2024  Date of SNF (anticipated) Discharge: January 30, 2024  Discharged to: previous assisted living  Cognitive Scores: BIMS: 8/15 and Short blessed: 14/28  Physical Function:  bed bound  DME: Hospital Bed    CODE STATUS/ADVANCE DIRECTIVES DISCUSSION:  No CPR- Do NOT Intubate   ALLERGIES: Demerol [meperidine], Sulfa antibiotics, and Penicillins    NURSING FACILITY COURSE   Medication Changes/Rationale:   See assessment and plan    Summary of nursing facility stay:   Patient is not progressing, she continues to decline, did express wishes for hospice/comfort care to family. Patient total assist with bed mobility will discharge back to [facility] with Hospice care through [facility]    Discharge Medications:  MED REC REQUIRED  Post Medication Reconciliation Status: discharge medications reconciled and changed, per note/orders       Current Outpatient Medications   Medication Sig Dispense Refill    acetaminophen (TYLENOL) 500 MG tablet Take 1,000 mg by mouth every 6 hours as needed for mild pain      aspirin 81 MG EC tablet Take 81 mg by mouth daily      calcitonin, salmon, (MIACALCIN) 200 UNIT/ACT nasal spray Spray 1 spray into one nostril alternating nostrils daily Alternate nostril each day.      Calcium Carb-Cholecalciferol (CALCIUM 600+D) 600-20 MG-MCG TABS Take 1 tablet by mouth daily      calcium  carbonate (TUMS) 500 MG chewable tablet Take 2 chew tab by mouth every hour as needed for heartburn Max 8 tab/day      diclofenac (VOLTAREN) 1 % topical gel Apply 4 g topically 4 times daily      famotidine (PEPCID) 40 MG tablet Take 1 tablet (40 mg) by mouth daily      lisinopril (ZESTRIL) 20 MG tablet Take 20 mg by mouth daily      loperamide (IMODIUM) 2 MG capsule Take 2 mg by mouth 4 times daily as needed for diarrhea      methocarbamol (ROBAXIN) 500 MG tablet Take 500 mg by mouth 2 times daily as needed for muscle spasms or other (back pain)      metoprolol tartrate (LOPRESSOR) 100 MG tablet Take 1 tablet (100 mg) by mouth 2 times daily      morphine 10 MG/5ML solution Take 1.25 mLs (2.5 mg) by mouth every hour as needed for pain      ondansetron (ZOFRAN) 4 MG tablet Take 4 mg by mouth at bedtime      ondansetron (ZOFRAN) 4 MG tablet Take 4 mg by mouth every 8 hours as needed for nausea      PARoxetine (PAXIL) 40 MG tablet Take 40 mg by mouth every morning      Probiotic Product (PROBIOTIC DAILY PO) Take 1 packet by mouth daily Nutrilite Probiotic packet      vitamin D3 (CHOLECALCIFEROL) 50 mcg (2000 units) tablet Take 1 tablet by mouth daily            Controlled medications:   Medication: roxanol 20mg/ml , 30ml tabs given to patient at the time of discharge to take home     Past Medical History:   Past Medical History:   Diagnosis Date    Acoustic neuroma (H)     Acute arthropathy     lower leg    Acute depression     Anemia     iron deficeincy    Anemia     Anxiety     Arthritis     osteoarthrosis of knee    Arthritis     Breast cancer (H)     left breast    Breast cancer (H)     Breast cancer, left breast (H) 02/2018    Breast cancer, right breast (H) 01/2016    Cardiomyopathy (H)     Cervical cancer (H)     Colon cancer (H)     Colorectal cancer (H)     Decreased cardiac ejection fraction     Depression     Depressive disorder     Diabetes mellitus (H)     pre-diabetic no longer taking Metformin     "Diabetes mellitus (H)     pre\"denies  A1C , 6 for couple years\"    Dyspnea     Dyspnea     Gastro-oesophageal reflux disease     H/O: lung cancer 2013    Herpes zoster     History of blood transfusion     HTN (hypertension)     Hyperlipidemia     Hyperlipidemia     Hypertension     Iron deficiency anemia     Lung cancer (H)     squamous cell carcinoma    Lung nodule 2013    Lung cancer    Nausea & vomiting     Nausea and vomiting 08/12/2020    OAB (overactive bladder)     Obese     Osteoarthritis of knee     Pain in female pelvis     Pre-diabetes     Preop general physical exam     Rib lesion     Shoulder pain, acute     right    Tachycardia     Torn meniscus      Physical Exam:   Vitals: BP (!) 163/70   Pulse 104   Temp 98.3  F (36.8  C)   Resp 18   Ht 1.676 m (5' 6\")   Wt 81.2 kg (179 lb)   SpO2 91%   BMI 28.89 kg/m    BMI: Body mass index is 28.89 kg/m .  GENERAL APPEARANCE:  Alert, in no distress  ENT:  Mouth and posterior oropharynx normal, moist mucous membranes, [place]  EYES:  EOM, conjunctivae, lids, pupils and irises normal, PERRL  RESP:  respiratory effort and palpation of chest normal, lungs clear to auscultation , no respiratory distress, diminished breath sounds bases bilaterally  CV:  Palpation and auscultation of heart done , regular rate and rhythm, no murmur, rub, or gallop, no edema  ABDOMEN:  normal bowel sounds, soft, nontender, no hepatosplenomegaly or other masses  M/S:   patient resting in bed  SKIN:  Inspection of skin and subcutaneous tissue baseline  NEURO:   speech slightly slurred  PSYCH:  affect and mood normal     SNF labs: Recent labs in [facility] reviewed by me today.  and Most Recent 3 CBC's:  Recent Labs   Lab Test 01/21/24  0944 01/20/24  0323 01/19/24  0628 01/15/24  0743   WBC 11.7* 7.1 6.3 6.4   HGB 12.0 11.5* 11.1* 10.8*   *  --  106* 109*     --  316 298     Most Recent 3 BMP's:  Recent Labs   Lab Test 01/22/24  0813 01/21/24  0944 01/20/24  1704 01/20/24  0953 " 01/20/24  0323 01/20/24  0243 01/19/24  0628 01/18/24  0707   NA  --  139  --   --  135  --  137 136   POTASSIUM 4.1 3.6 3.8   < > 3.5  3.5  --  3.9 4.1   CHLORIDE  --  96*  --   --  90*  --  94* 93*   CO2  --  35*  --   --  31*  --  34* 34*   BUN  --  15.3  --   --   --   --  14.0 13.8   CR  --  0.69  --   --   --   --  0.63 0.60   ANIONGAP  --  8  --   --  14  --  9 9   PRIYA  --  9.5  --   --   --   --  9.1 9.0   GLC  --  111*  --   --   --  153* 95 100*    < > = values in this interval not displayed.       Assessment/Plan:  (J18.9) Pneumonia of both lower lobes due to infectious organism  (primary encounter diagnosis)  (R53.81) Physical deconditioning  Comment: acute/ongoing  Finished coarse of Abx and prednisone during hospitalization  Plan: patient will discharge back to [facility] hospice will sign on at time of readmission back to [facility]     (I25.5) Ischemic cardiomyopathy  (I10) Benign essential hypertension  Comment: acute/ongoing, no change  New cardiomyopathy noted on Echo EF of 35-40%, hypokinesia inferior   Plan: continue lisinopril 20mg QD, metoprolol 100mg BID, ASA 81mg QD     (Z85.118) History of lung cancer  Comment: ongoing  Plan: follows with [facility]  Wishes for comfort care,hospice as above     (S22.502E) Closed wedge compression fracture of T7 vertebra with routine healing, subsequent encounter  Comment: acute/ongoing  No Hx of trauma  Plan: PT and OT, calcitonin 1 apray alternating nostrils daily, voltaren gel 1% 4g QID, tylenol suppository 650mg q 4 hours prn     (M25.511) acute pain in right shoulder  Acute  Plan: Roxanol 20mg/ml, 2.5mg q 1 hour prn for pain     DISCHARGE PLAN:  Follow up labs: No labs orders/due  Medical Follow Up:      Sill sign on with [facility] once transferred back to [facility] scheduled appointments:  Appointments in Next Year      Jan 29, 2024 12:00 PM  Discharge Summary with Tonya Lynn Haase, APRN CNP  [facility]iatric[facility] ([facility] ) [phone]     Apr 08, 2024  1:00 PM  (Arrive by 12:45 PM)  CT CHEST W CONTRAST with EICCT1  [facility] ([facility]) [phone]           Discharge Services: Hospice care through [facility]  Discharge Instructions Verbalized to Patient at Discharge:   None    TOTAL DISCHARGE TIME:   Greater than 30 minutes  Electronically signed by:  Tonya Lynn Haase, APRN CNP     Home care Face to Face documentation done in EPIC attached to Home care orders for [facility]. , Documentation of Face to Face and Certification for Home Health Services    I certify that patient: Yolanda Watson is under my care and that I, or a nurse practitioner or physician's assistant working with me, had a face-to-face encounter that meets the physician face-to-face encounter requirements with this patient on: 1/29/2024.    This encounter with the patient was in whole, or in part, for the following medical condition, which is the primary reason for home health care: Pneumonia, Hx of lung cancer, declining.    I certify that, based on my findings, the following services are medically necessary home health services:  hospice care .    My clinical findings support the need for the above services because:  hospice for end of life/comfort care    Further, I certify that my clinical findings support that this patient is homebound (i.e. absences from home require considerable and taxing effort and are for medical reasons or Scientologist services or infrequently or of short duration when for other reasons) because: Requires assistance of another person or specialized equipment to access medical services because patient:  bedbound..    Based on the above findings. I certify that this patient is confined to the home and needs intermittent skilled nursing care, physical therapy and/or speech therapy.  The patient is under my care, and I have initiated  the establishment of the plan of care.  This patient will be followed by a physician who will periodically review the plan of care.  Physician/Provider to provide follow up care: [place], [place] Physician    Attending hospital physician (the Medicare certified PECOS provider): Tonya Lynn Haase, APRN CNP  Physician Signature: See electronic signature associated with these discharge orders.  Date: 1/29/2024

## 2024-01-29 NOTE — LETTER
1/29/2024        RE: Yolanda Watson  [address]  [address]  [place] [address]        [facility] GERIATRICS DISCHARGE SUMMARY  PATIENT'S NAME: Yolanda Watson  YOB: 1940  MEDICAL RECORD NUMBER:  [id number]  Place of Service where encounter took place:  [facility]) [25]    PRIMARY CARE PROVIDER AND CLINIC RESPONSIBLE AFTER TRANSFER:   [facility], [address], [place] [address] / [facility] [address]    Assisted Living: [facility]      Transferring providers: Tonya Lynn Haase, GEOVANNY COLON, Ez Bray MD  Recent Hospitalization/ED:  [facility] Hospital stay 1/9/24 to 1/22/24.  Date of SNF Admission: January 22, 2024  Date of SNF (anticipated) Discharge: January 30, 2024  Discharged to: previous assisted living  Cognitive Scores: BIMS: 8/15 and Short blessed: 14/28  Physical Function:  bed bound  DME: Hospital Bed    CODE STATUS/ADVANCE DIRECTIVES DISCUSSION:  No CPR- Do NOT Intubate   ALLERGIES: Demerol [meperidine], Sulfa antibiotics, and Penicillins    NURSING FACILITY COURSE   Medication Changes/Rationale:   See assessment and plan    Summary of nursing facility stay:   Patient is not progressing, she continues to decline, did express wishes for hospice/comfort care to family. Patient total assist with bed mobility will discharge back to [facility] with Hospice care through [facility]    Discharge Medications:  MED REC REQUIRED  Post Medication Reconciliation Status: discharge medications reconciled and changed, per note/orders       Current Outpatient Medications   Medication Sig Dispense Refill     acetaminophen (TYLENOL) 500 MG tablet Take 1,000 mg by mouth every 6 hours as needed for mild pain       aspirin 81 MG EC tablet Take 81 mg by mouth daily       calcitonin, salmon, (MIACALCIN) 200 UNIT/ACT nasal spray Spray 1 spray into one nostril alternating nostrils daily Alternate nostril each day.       Calcium  Carb-Cholecalciferol (CALCIUM 600+D) 600-20 MG-MCG TABS Take 1 tablet by mouth daily       calcium carbonate (TUMS) 500 MG chewable tablet Take 2 chew tab by mouth every hour as needed for heartburn Max 8 tab/day       diclofenac (VOLTAREN) 1 % topical gel Apply 4 g topically 4 times daily       famotidine (PEPCID) 40 MG tablet Take 1 tablet (40 mg) by mouth daily       lisinopril (ZESTRIL) 20 MG tablet Take 20 mg by mouth daily       loperamide (IMODIUM) 2 MG capsule Take 2 mg by mouth 4 times daily as needed for diarrhea       methocarbamol (ROBAXIN) 500 MG tablet Take 500 mg by mouth 2 times daily as needed for muscle spasms or other (back pain)       metoprolol tartrate (LOPRESSOR) 100 MG tablet Take 1 tablet (100 mg) by mouth 2 times daily       morphine 10 MG/5ML solution Take 1.25 mLs (2.5 mg) by mouth every hour as needed for pain       ondansetron (ZOFRAN) 4 MG tablet Take 4 mg by mouth at bedtime       ondansetron (ZOFRAN) 4 MG tablet Take 4 mg by mouth every 8 hours as needed for nausea       PARoxetine (PAXIL) 40 MG tablet Take 40 mg by mouth every morning       Probiotic Product (PROBIOTIC DAILY PO) Take 1 packet by mouth daily Nutrilite Probiotic packet       vitamin D3 (CHOLECALCIFEROL) 50 mcg (2000 units) tablet Take 1 tablet by mouth daily            Controlled medications:   Medication: roxanol 20mg/ml , 30ml tabs given to patient at the time of discharge to take home     Past Medical History:   Past Medical History:   Diagnosis Date     Acoustic neuroma (H)      Acute arthropathy     lower leg     Acute depression      Anemia     iron deficeincy     Anemia      Anxiety      Arthritis     osteoarthrosis of knee     Arthritis      Breast cancer (H)     left breast     Breast cancer (H)      Breast cancer, left breast (H) 02/2018     Breast cancer, right breast (H) 01/2016     Cardiomyopathy (H)      Cervical cancer (H)      Colon cancer (H)      Colorectal cancer (H)      Decreased cardiac ejection  "fraction      Depression      Depressive disorder      Diabetes mellitus (H)     pre-diabetic no longer taking Metformin     Diabetes mellitus (H)     pre\"denies  A1C , 6 for couple years\"     Dyspnea      Dyspnea      Gastro-oesophageal reflux disease      H/O: lung cancer 2013     Herpes zoster      History of blood transfusion      HTN (hypertension)      Hyperlipidemia      Hyperlipidemia      Hypertension      Iron deficiency anemia      Lung cancer (H)     squamous cell carcinoma     Lung nodule 2013    Lung cancer     Nausea & vomiting      Nausea and vomiting 08/12/2020     OAB (overactive bladder)      Obese      Osteoarthritis of knee      Pain in female pelvis      Pre-diabetes      Preop general physical exam      Rib lesion      Shoulder pain, acute     right     Tachycardia      Torn meniscus      Physical Exam:   Vitals: BP (!) 163/70   Pulse 104   Temp 98.3  F (36.8  C)   Resp 18   Ht 1.676 m (5' 6\")   Wt 81.2 kg (179 lb)   SpO2 91%   BMI 28.89 kg/m    BMI: Body mass index is 28.89 kg/m .  GENERAL APPEARANCE:  Alert, in no distress  ENT:  Mouth and posterior oropharynx normal, moist mucous membranes, [place]  EYES:  EOM, conjunctivae, lids, pupils and irises normal, PERRL  RESP:  respiratory effort and palpation of chest normal, lungs clear to auscultation , no respiratory distress, diminished breath sounds bases bilaterally  CV:  Palpation and auscultation of heart done , regular rate and rhythm, no murmur, rub, or gallop, no edema  ABDOMEN:  normal bowel sounds, soft, nontender, no hepatosplenomegaly or other masses  M/S:   patient resting in bed  SKIN:  Inspection of skin and subcutaneous tissue baseline  NEURO:   speech slightly slurred  PSYCH:  affect and mood normal     SNF labs: Recent labs in [facility] reviewed by me today.  and Most Recent 3 CBC's:  Recent Labs   Lab Test 01/21/24  0944 01/20/24  0323 01/19/24  0628 01/15/24  0743   WBC 11.7* 7.1 6.3 6.4   HGB 12.0 11.5* 11.1* 10.8*   * "  --  106* 109*     --  316 298     Most Recent 3 BMP's:  Recent Labs   Lab Test 01/22/24  0813 01/21/24  0944 01/20/24  1704 01/20/24  0953 01/20/24  0323 01/20/24  0243 01/19/24  0628 01/18/24  0707   NA  --  139  --   --  135  --  137 136   POTASSIUM 4.1 3.6 3.8   < > 3.5  3.5  --  3.9 4.1   CHLORIDE  --  96*  --   --  90*  --  94* 93*   CO2  --  35*  --   --  31*  --  34* 34*   BUN  --  15.3  --   --   --   --  14.0 13.8   CR  --  0.69  --   --   --   --  0.63 0.60   ANIONGAP  --  8  --   --  14  --  9 9   PRIYA  --  9.5  --   --   --   --  9.1 9.0   GLC  --  111*  --   --   --  153* 95 100*    < > = values in this interval not displayed.       Assessment/Plan:  (J18.9) Pneumonia of both lower lobes due to infectious organism  (primary encounter diagnosis)  (R53.81) Physical deconditioning  Comment: acute/ongoing  Finished coarse of Abx and prednisone during hospitalization  Plan: patient will discharge back to [facility] hospice will sign on at time of readmission back to [facility]     (I25.5) Ischemic cardiomyopathy  (I10) Benign essential hypertension  Comment: acute/ongoing, no change  New cardiomyopathy noted on Echo EF of 35-40%, hypokinesia inferior   Plan: continue lisinopril 20mg QD, metoprolol 100mg BID, ASA 81mg QD     (Z85.118) History of lung cancer  Comment: ongoing  Plan: follows with [facility]  Wishes for comfort care,hospice as above     (U07.765M) Closed wedge compression fracture of T7 vertebra with routine healing, subsequent encounter  Comment: acute/ongoing  No Hx of trauma  Plan: PT and OT, calcitonin 1 apray alternating nostrils daily, voltaren gel 1% 4g QID, tylenol suppository 650mg q 4 hours prn     (M25.511) acute pain in right shoulder  Acute  Plan: Roxanol 20mg/ml, 2.5mg q 1 hour prn for pain     DISCHARGE PLAN:  Follow up labs: No labs orders/due  Medical Follow Up:      Sill sign on with [facility] once transferred back to [facility]  scheduled appointments:  Appointments in Next Year      Jan 29, 2024 12:00 PM  Discharge Summary with Tonya Lynn Haase, APRN CNP  [facility] ([facility] ) [phone] Apr 08, 2024  1:00 PM  (Arrive by 12:45 PM)  CT CHEST W CONTRAST with EICCT1  [facility] ([facility]) [phone]           Discharge Services: Hospice care through [facility]  Discharge Instructions Verbalized to Patient at Discharge:   None    TOTAL DISCHARGE TIME:   Greater than 30 minutes  Electronically signed by:  Tonya Lynn Haase, APRN CNP     Home care Face to Face documentation done in EPIC attached to Home care orders for [facility]. , Documentation of Face to Face and Certification for Home Health Services    I certify that patient: Yolanda Watson is under my care and that I, or a nurse practitioner or physician's assistant working with me, had a face-to-face encounter that meets the physician face-to-face encounter requirements with this patient on: 1/29/2024.    This encounter with the patient was in whole, or in part, for the following medical condition, which is the primary reason for home health care: Pneumonia, Hx of lung cancer, declining.    I certify that, based on my findings, the following services are medically necessary home health services:  hospice care .    My clinical findings support the need for the above services because:  hospice for end of life/comfort care    Further, I certify that my clinical findings support that this patient is homebound (i.e. absences from home require considerable and taxing effort and are for medical reasons or [employer] services or infrequently or of short duration when for other reasons) because: Requires assistance of another person or specialized equipment to access medical services because patient:  bedbound..    Based on the above findings. I certify that this patient is confined to  the home and needs intermittent skilled nursing care, physical therapy and/or speech therapy.  The patient is under my care, and I have initiated the establishment of the plan of care.  This patient will be followed by a physician who will periodically review the plan of care.  Physician/Provider to provide follow up care: Services, [place] [facility]    Attending [facility] physician (the Medicare certified PECOS provider): Tonya Lynn Haase, APRN CNP  Physician Signature: See electronic signature associated with these discharge orders.  Date: 1/29/2024              Sincerely,        Tonya Lynn Haase, APRN CNP

## 2024-02-14 NOTE — PROGRESS NOTES
"[facility]    Hospitalist Progress Note      Assessment & Plan   Yolanda Watson is a 83 year old female admitted on 1/9/2024. She istory of breast cancer, primary lung cancer, colon cancer [BRCA 2]  coming to ER with bilteral weakness and back pain     # Pnuemonia   # Sepsis with lactic acidosis per POCT,  follow-up LA wnl at 1.8; resolved.    # CT scan Worsening of patchy opacities in the lung bases bilaterally since  1, likely representing worsening pneumonia. Stable small right and trace left pleural effusion.  -Continue azithromycin and ceftriaxone   -MRSA/MSSA nares NEG  -Urine Legionella and urine pneumonia NEG  -Follow blood cultures NGTD  CT chest NEG PE  -UA wnl  -1/12/2024, due to long , azithromycin is continued and changed to doxycycline.  Noted patient also on paroxetine however as above discontinued azithromycin.  Repeat EKG, assess QT.         # Lung cancer  CM, ? Chronic ischemic CM per Cardiology  -PET CT June 23, 2023 showed development of a spiculated FDG avid nodule in the left lower lobe measuring 1 cm suspicious for early primary lung neoplasm without mets no PET lymphadenopathy no finding concerning for distant metastatic disease  CT chest with contrast on 12/14/2023 showed patchyconsolidative opacity seen within the left lower lobe with findings suspicious for developing pneumonia.  Stable 1.1 cm left lower lobe lung nodule.  Slightly enlarging small to moderate right pleural effusion  CT scan 1/9/24     Worsening of patchy opacities in the lung bases bilaterally since  12/14/2023, likely representing worsening pneumonia. Stable small right and trace left pleural effusion.  -- consult  [place] [facility]; see note.   -consult [facility] pulmonology, see note 1/10/2023, plan for bronchoscopy with BAL.  See note 1/12/2024,\" continue antibiotics\" moderate to follow BAL laboratories.    Chronic ischemic cardiomyopathy per Cardiology  Long " "QT  Hypokalemia after IV furosemide on replacement protocol  Hypomagnesemia, after IV furosemide, on replacement protocol  -Admission BNP 5.1K, chest CT without indications of pulm vascular congestion, serial troponins flat, 20, 19,  -1/11/2024: Echo new CM, EF 35-40%, severe hypokinesia inferior and inferolateral..  Cardiology consult.  See note 1/11/2024, \"continue ASA 81 mg, metoprolol succinate 100 mg p.o. daily, lisinopril 20 mg furosemide 20 mg IV one-time reassess regarding need for indications of chronic low-dose diuretic lipid panel, previously on statin.'  Nursing also reports concerns of A-fib on telemetry, controlled rate.  On admission sinus tachycardia.  Will obtain EKG to confirm, hold lisinopril for now to allow increase in metoprolol, changed to Lopressor 100 mg twice daily.  Check TFTs, on magnesium replacement protocol, potassium 3.8, recheck in a.m. on IV furosemide.  Continue telemetry.  -1/12/2024.  EKG sinus.  TFTs WNL.  Potassium 3.3, magnesium 1.6.  Initiate potassium and magnesium replacement therapy.  Fluid status appears compensated after IV furosemide per cardiology yesterday.  BMP with hypokalemia and hypomagnesemia.  At this time we will hold off scheduled diuresis, recheck BMP and BNP in AM.  EKG with long QT, 117.  Discontinue azithromycin, transition to doxycycline, for now continue paroxetine.  Per pharmacy, [dot] with 2 prolonging QT agent if DC azithromycin, can continue paroxetine, close monitor.  Repeat EKG to assess QTc.  In addition, BP elevated on hold lisinopril to allow increase dose of metoprolol given relative tachycardia.  Therefore, restart lisinopril.  Monitor.          # History of Breast cancer   # History of colon cancer   BRCA 2 mut  In remission        # Back pain  # Wedge compression fracture T7  -No recent fall.  No urinary incontinence patient has chronic fecal incontinence and chronic bilateral weakness  -Neurosurgery consult, see note 1/10/2024, -thoracic " MR burst type compression fracture T7 findings concerning for thin epidural hematoma at T6/T8.  Discussed with Neurosurgery NP, note pending.  -pain management, currently patient rates pain at 5/10 pain scale.  Review of records indicates use of as needed oxycodone only 1-3 times/day.  Monitor bowels on oxycodone.  -Will start patient on calcitonin spray for pain management patient requiring oxycodone as needed.  -1/12/2024 on morning encounters was found sleepy yet arousable, somewhat rambling.  However when awoken by myself and nursing her response appeared appropriate, no neurologic focality and suspect that she was just given as needed oxycodone as contributory to mentation on morning encounter.  Because of this we will increase scheduled analgesia for her T7 fracture including scheduled acetaminophen, scheduled Robaxin and attempts to decrease use of as needed narcotics..  See Neurosurgery note, at this point patient is not a candidate for kyphoplasty.  Will monitor.        # Incidental Finding     A stable 1.7 cm hyperdense lesion in the upper pole of the right  kidney, may be a cyst containing hemorrhagic or proteinaceous  material. Consider a follow-up renal MRI on a nonemergent basis for  further evaluation.  -Outpatient follow-up        # Hypomagnesmia  Mag replacement!      # Hypertension  Continue PTA metoprolol 50 mg tablet daily lisinopril 20 mg daily, however due to relative tachycardia held lisinopril to allow increase in metoprolol to 100 mg/day, monitor.  Further increase metoprolol changed to tartrate 100 mg twice daily.      DVT Prophylaxis: Pneumatic Compression Devices  Code Status: No CPR- Do NOT Intubate     Expected Discharge Date: 01/13/2024,  9:00 AM    Destination: assisted living  Discharge Comments: Return to [facility]?       Db Watson MD  Text Page (7am - 6pm, M-F)    Total time 50 minutes for today 1/12/2024 :  time consisted of the following, examination of patient,  review of records including labs, imaging results, medications, interdisciplinary notes and completing documentation and orders.  Care Management included  Coordination of Care time with Nursing, as above  and Specialists, Neurosurgery, Pulmonary, Oncology, Cardiology regarding care plan, management and surveillance, as above.     Interval History   On morning encounter patient sleepy, rambling.  Evaluated patient with nursing and on awakening patient's response to questions appeared appropriate, no neurologic focality, nursing states patient was given as needed oxycodone this morning.  Patient appears comfortable at this time.  Added scheduled acetaminophen and Robaxin as above in attempts to decrease as needed narcotics.      -Data reviewed today: I reviewed all new labs and imaging results over the last 24 jean marie    Physical Exam   Temp: 99  F (37.2  C) Temp src: Oral BP: (!) 155/83 Pulse: 106   Resp: 17 SpO2: 90 % O2 Device: None (Room air) Oxygen Delivery: 2 LPM    General/Constitutional:   Sleepy on morning encounters yet arousable, calm, cooperative, appears comfortable.  Chest/Respiratory: Respirations nonlabored room air  Cardiovascular: Regular, tachycardic, no murmur appreciated.    Gastrointestinal/Abdomen:  soft, nontender,   Neuro.  Gross motor tested, nonfocal, ambulatory with assistance.  Psych oriented, affect calm.      Medications      acetaminophen  1,000 mg Oral Q8H    aspirin  81 mg Oral Daily    calcitonin (salmon)  1 spray Alternating Nostrils Daily    calcium carbonate-vitamin D  1 tablet Oral Daily    cefTRIAXone  2 g Intravenous Q24H    doxycycline hyclate  100 mg Oral Q12H [facility] (08/20)    lisinopril  10 mg Oral Daily    magnesium oxide  400 mg Oral Q4H    methocarbamol  500 mg Oral BID    metoprolol tartrate  100 mg Oral BID    pantoprazole  40 mg Oral Daily    PARoxetine  40 mg Oral QAM    sodium chloride (PF)  3 mL Intracatheter Q8H       Data   Recent Labs   Lab 01/12/24  0718  01/10/24  0708 01/09/24  1046   WBC 10.7 7.1 9.5   HGB 11.2* 10.5* 12.0   * 110* 109*    331 434    139 141   POTASSIUM 3.3* 3.8 4.1   CHLORIDE 97* 102 102   CO2 33* 31* 28   BUN 13.9 16.1 17.1   CR 0.70 0.78 0.91   ANIONGAP 7 6* 11   PRIYA 8.5* 8.3* 9.0   * 90 156*   ALBUMIN  --  3.3* 3.6   PROTTOTAL  --  5.6* 6.4   BILITOTAL  --  1.1 0.9   ALKPHOS  --  89 96   ALT  --  8 8   AST  --  14 14          no

## 2025-05-09 NOTE — PLAN OF CARE
Date & Time: 1/12 11pm- 1/13 7am  Surgery/POD#: Admitted for back pain, BL weakness  Behavior & Aggression: green  Fall Risk: yes  Orientation:Aox3, disoriented to time  ABNL VS/O2: VSS on 2L O2, hypertensive  Pain Management: denied pain  Bowel/Bladder: incontinent of b&b, purewick in place  Drains/lines: PIV SL  Diet: Reg  Activity Level: A2 sera stead, not OOB this shift  Anticipated  DC Date: pending  Significant Information: Pneumonia, congested cough, O2 sats 90-95% with 2L of O2    good balance

## (undated) DEVICE — ENDO TROCAR THORACIC 10/12MM TT012

## (undated) DEVICE — GLOVE PROTEXIS W/NEU-THERA 6.5  2D73TE65

## (undated) DEVICE — DRSG KERLIX FLUFFS X5

## (undated) DEVICE — DRAPE POUCH INSTRUMENT 1018

## (undated) DEVICE — DRSG KERLIX 4 1/2"X4YDS ROLL 6715

## (undated) DEVICE — ESU ELEC BLADE 2.75" COATED/INSULATED E1455

## (undated) DEVICE — NDL 22GA 1.5"

## (undated) DEVICE — SUCTION CANISTER MEDIVAC LINER 3000ML W/LID 65651-530

## (undated) DEVICE — COVER ULTRASOUND PROBE 6X48" PC1290

## (undated) DEVICE — STPL ENDO ARTICULATING 60MM EC60A

## (undated) DEVICE — SUCTION MINISQUAIR SMOKE EVAC CAPTURE DEVICE SQ20012-01

## (undated) DEVICE — SU SILK 2-0 FSL 18" 677G

## (undated) DEVICE — SYR 03ML LL W/O NDL 309657

## (undated) DEVICE — GLOVE PROTEXIS BLUE W/NEU-THERA 6.5  2D73EB65

## (undated) DEVICE — DRSG STERI STRIP 1/4X3" R1541

## (undated) DEVICE — SU PROLENE 2-0 CT-2 30" 8411H

## (undated) DEVICE — TUBING SUCTION MEDI-VAC SOFT 3/16"X20' N520A

## (undated) DEVICE — STPL RELOAD REG/THK TISSUE ECHELON 60 X 3.8MM GOLD GST60D

## (undated) DEVICE — SU VICRYL 4-0 PS-2 18" UND J496H

## (undated) DEVICE — PREP CHLORAPREP W/ORANGE TINT 10.5ML 260715

## (undated) DEVICE — PAD CHUX UNDERPAD 23X24" 7136

## (undated) DEVICE — DRAPE SHEET REV FOLD 3/4 9349

## (undated) DEVICE — LINEN TOWEL PACK X5 5464

## (undated) DEVICE — SOL NACL 0.9% IRRIG 1000ML BOTTLE 07138-09

## (undated) DEVICE — TAPE DRSG UNIVERSAL CLOTH 3" WHITE LATEX 881-3

## (undated) DEVICE — DRAPE LAP TRANSVERSE 29421

## (undated) DEVICE — SU VICRYL 3-0 SH 27" J316H

## (undated) DEVICE — SU MONOCRYL 4-0 PS-2 18" UND Y496G

## (undated) DEVICE — DRAIN JACKSON PRATT RESERVOIR 100ML SU130-1305

## (undated) DEVICE — DRAPE BREAST/CHEST 29420

## (undated) DEVICE — SU NDL CUT REV MED 3/8 209014

## (undated) DEVICE — ANTIFOG SOLUTION W/FOAM PAD 31142527

## (undated) DEVICE — ENDO FORCEP ENDOJAW BIOPSY 2.8MMX230CM FB-220U

## (undated) DEVICE — SYR BULB IRRIG 50ML LATEX FREE 0035280

## (undated) DEVICE — SUCTION TIP YANKAUER W/O VENT K86

## (undated) DEVICE — PACK MINOR SBA15MIFSE

## (undated) DEVICE — BLADE KNIFE SURG 15 371115

## (undated) DEVICE — DRSG GAUZE 4X4" 3033

## (undated) DEVICE — SU VICRYL 3-0 TIE 12X18" J904T

## (undated) DEVICE — LINEN GOWN OVERSIZE 5408

## (undated) DEVICE — BLADE KNIFE SURG 10 371110

## (undated) DEVICE — SOL NACL 0.9% INJ 250ML BAG 2B1322Q

## (undated) DEVICE — DRAPE IOBAN INCISE 23X17" 6650EZ

## (undated) DEVICE — PREP CHLORAPREP 26ML TINTED ORANGE  260815

## (undated) DEVICE — GLOVE PROTEXIS MICRO 6.0  2D73PM60

## (undated) DEVICE — SURGICEL HEMOSTAT 3X4" NUKNIT 1943

## (undated) DEVICE — KIT ENDO TURNOVER/PROCEDURE W/CLEAN A SCOPE LINERS 103888

## (undated) DEVICE — ENDO FORCEP ENDOJAW BIOPSY 2.8MMX160CM FB-220K

## (undated) DEVICE — SYR EAR BULB 3OZ 0035830

## (undated) DEVICE — SUTURE BOOTS 051003PBX

## (undated) DEVICE — SU VICRYL 1 CT-2 27" J335H

## (undated) DEVICE — ENDO POUCH UNIV RETRIEVAL SYSTEM INZII 10MM CD001

## (undated) DEVICE — DRAPE COVER C-ARM SEAMLESS SNAP-KAP 03-KP26 LATEX FREE

## (undated) DEVICE — SLEEVE PROTECTIVE BREAST BIOPSY  GMSLV001-10

## (undated) DEVICE — NDL 19GA 1.5"

## (undated) DEVICE — DRAIN JACKSON PRATT 15FR ROUND SU130-1323

## (undated) DEVICE — SUCTION DRY CHEST DRAIN OASIS 3600-100

## (undated) DEVICE — SYR 10ML FINGER CONTROL W/O NDL 309695

## (undated) DEVICE — Device

## (undated) DEVICE — ESU PENCIL W/HOLSTER

## (undated) DEVICE — CATH THORACIC 24FR STR SLICONE 14024

## (undated) DEVICE — SPONGE LAP 18X18" X8435

## (undated) DEVICE — SOL NACL 0.9% IRRIG 1000ML BOTTLE 2F7124

## (undated) DEVICE — SOL WATER IRRIG 1000ML BOTTLE 2F7114

## (undated) DEVICE — CLIP ETHICON LIGACLIP SM BLUE LT100

## (undated) DEVICE — SU VICRYL 2-0 CT-2 27" J333H

## (undated) DEVICE — GOWN IMPERVIOUS ZONED LG

## (undated) DEVICE — ESU GROUND PAD UNIVERSAL W/O CORD

## (undated) DEVICE — SPONGE RAY-TEC 4X8" 7318

## (undated) DEVICE — SU SILK 2 REEL 60" SA8H

## (undated) DEVICE — DRSG STERI STRIP 1/2X4" R1547

## (undated) DEVICE — DECANTER BAG 2002S

## (undated) DEVICE — BLADE KNIFE SURG 11 371111

## (undated) DEVICE — GLOVE PROTEXIS W/NEU-THERA 7.5  2D73TE75

## (undated) DEVICE — SYR 10ML SLIP TIP W/O NDL

## (undated) DEVICE — ESU ELEC BLADE 6" COATED/INSULATED E1455-6

## (undated) DEVICE — ESU CORD MONOPOLAR 10'  E0510

## (undated) DEVICE — SYR 10ML LL W/O NDL

## (undated) RX ORDER — GLYCOPYRROLATE 0.2 MG/ML
INJECTION, SOLUTION INTRAMUSCULAR; INTRAVENOUS
Status: DISPENSED
Start: 2019-02-13

## (undated) RX ORDER — GLYCOPYRROLATE 0.2 MG/ML
INJECTION, SOLUTION INTRAMUSCULAR; INTRAVENOUS
Status: DISPENSED
Start: 2018-03-05

## (undated) RX ORDER — DEXAMETHASONE SODIUM PHOSPHATE 4 MG/ML
INJECTION, SOLUTION INTRA-ARTICULAR; INTRALESIONAL; INTRAMUSCULAR; INTRAVENOUS; SOFT TISSUE
Status: DISPENSED
Start: 2018-03-05

## (undated) RX ORDER — PROPOFOL 10 MG/ML
INJECTION, EMULSION INTRAVENOUS
Status: DISPENSED
Start: 2018-03-28

## (undated) RX ORDER — ONDANSETRON 2 MG/ML
INJECTION INTRAMUSCULAR; INTRAVENOUS
Status: DISPENSED
Start: 2018-03-28

## (undated) RX ORDER — HYDROXYZINE HYDROCHLORIDE 25 MG/1
TABLET, FILM COATED ORAL
Status: DISPENSED
Start: 2018-03-28

## (undated) RX ORDER — ALBUTEROL SULFATE 90 UG/1
AEROSOL, METERED RESPIRATORY (INHALATION)
Status: DISPENSED
Start: 2019-11-20

## (undated) RX ORDER — ACETAMINOPHEN 500 MG
TABLET ORAL
Status: DISPENSED
Start: 2018-03-28

## (undated) RX ORDER — FENTANYL CITRATE 50 UG/ML
INJECTION, SOLUTION INTRAMUSCULAR; INTRAVENOUS
Status: DISPENSED
Start: 2019-07-26

## (undated) RX ORDER — LIDOCAINE HYDROCHLORIDE 20 MG/ML
INJECTION, SOLUTION EPIDURAL; INFILTRATION; INTRACAUDAL; PERINEURAL
Status: DISPENSED
Start: 2018-03-05

## (undated) RX ORDER — PROPOFOL 10 MG/ML
INJECTION, EMULSION INTRAVENOUS
Status: DISPENSED
Start: 2019-02-13

## (undated) RX ORDER — ONDANSETRON 2 MG/ML
INJECTION INTRAMUSCULAR; INTRAVENOUS
Status: DISPENSED
Start: 2019-02-13

## (undated) RX ORDER — CLINDAMYCIN PHOSPHATE 900 MG/50ML
INJECTION, SOLUTION INTRAVENOUS
Status: DISPENSED
Start: 2019-07-26

## (undated) RX ORDER — LIDOCAINE HYDROCHLORIDE 20 MG/ML
INJECTION, SOLUTION EPIDURAL; INFILTRATION; INTRACAUDAL; PERINEURAL
Status: DISPENSED
Start: 2019-02-13

## (undated) RX ORDER — AMINOPHYLLINE 25 MG/ML
INJECTION, SOLUTION INTRAVENOUS
Status: DISPENSED
Start: 2021-03-25

## (undated) RX ORDER — LIDOCAINE HYDROCHLORIDE 40 MG/ML
SOLUTION TOPICAL
Status: DISPENSED
Start: 2024-01-01

## (undated) RX ORDER — FENTANYL CITRATE 50 UG/ML
INJECTION, SOLUTION INTRAMUSCULAR; INTRAVENOUS
Status: DISPENSED
Start: 2018-03-05

## (undated) RX ORDER — CLINDAMYCIN PHOSPHATE 900 MG/50ML
INJECTION, SOLUTION INTRAVENOUS
Status: DISPENSED
Start: 2018-03-05

## (undated) RX ORDER — ONDANSETRON 2 MG/ML
INJECTION INTRAMUSCULAR; INTRAVENOUS
Status: DISPENSED
Start: 2018-03-05

## (undated) RX ORDER — FENTANYL CITRATE 0.05 MG/ML
INJECTION, SOLUTION INTRAMUSCULAR; INTRAVENOUS
Status: DISPENSED
Start: 2022-05-04

## (undated) RX ORDER — FENTANYL CITRATE 50 UG/ML
INJECTION, SOLUTION INTRAMUSCULAR; INTRAVENOUS
Status: DISPENSED
Start: 2023-01-01

## (undated) RX ORDER — HYDROCODONE BITARTRATE AND ACETAMINOPHEN 5; 325 MG/1; MG/1
TABLET ORAL
Status: DISPENSED
Start: 2018-03-05

## (undated) RX ORDER — PROPOFOL 10 MG/ML
INJECTION, EMULSION INTRAVENOUS
Status: DISPENSED
Start: 2018-03-05

## (undated) RX ORDER — PROPOFOL 10 MG/ML
INJECTION, EMULSION INTRAVENOUS
Status: DISPENSED
Start: 2019-07-26

## (undated) RX ORDER — DEXAMETHASONE SODIUM PHOSPHATE 4 MG/ML
INJECTION, SOLUTION INTRA-ARTICULAR; INTRALESIONAL; INTRAMUSCULAR; INTRAVENOUS; SOFT TISSUE
Status: DISPENSED
Start: 2018-03-28

## (undated) RX ORDER — DEXAMETHASONE SODIUM PHOSPHATE 4 MG/ML
INJECTION, SOLUTION INTRA-ARTICULAR; INTRALESIONAL; INTRAMUSCULAR; INTRAVENOUS; SOFT TISSUE
Status: DISPENSED
Start: 2019-02-13

## (undated) RX ORDER — LIDOCAINE HYDROCHLORIDE 10 MG/ML
INJECTION, SOLUTION INFILTRATION; PERINEURAL
Status: DISPENSED
Start: 2024-01-01

## (undated) RX ORDER — REGADENOSON 0.08 MG/ML
INJECTION, SOLUTION INTRAVENOUS
Status: DISPENSED
Start: 2021-03-25

## (undated) RX ORDER — FENTANYL CITRATE 50 UG/ML
INJECTION, SOLUTION INTRAMUSCULAR; INTRAVENOUS
Status: DISPENSED
Start: 2024-01-01

## (undated) RX ORDER — HYDROMORPHONE HYDROCHLORIDE 1 MG/ML
INJECTION, SOLUTION INTRAMUSCULAR; INTRAVENOUS; SUBCUTANEOUS
Status: DISPENSED
Start: 2019-07-26

## (undated) RX ORDER — CLINDAMYCIN PHOSPHATE 900 MG/50ML
INJECTION, SOLUTION INTRAVENOUS
Status: DISPENSED
Start: 2019-02-13

## (undated) RX ORDER — FENTANYL CITRATE 50 UG/ML
INJECTION, SOLUTION INTRAMUSCULAR; INTRAVENOUS
Status: DISPENSED
Start: 2018-03-28

## (undated) RX ORDER — FLUMAZENIL 0.1 MG/ML
INJECTION, SOLUTION INTRAVENOUS
Status: DISPENSED
Start: 2023-01-01

## (undated) RX ORDER — NALOXONE HYDROCHLORIDE 0.4 MG/ML
INJECTION, SOLUTION INTRAMUSCULAR; INTRAVENOUS; SUBCUTANEOUS
Status: DISPENSED
Start: 2023-01-01

## (undated) RX ORDER — KETOROLAC TROMETHAMINE 30 MG/ML
INJECTION, SOLUTION INTRAMUSCULAR; INTRAVENOUS
Status: DISPENSED
Start: 2019-02-13

## (undated) RX ORDER — REGADENOSON 0.08 MG/ML
INJECTION, SOLUTION INTRAVENOUS
Status: DISPENSED
Start: 2019-11-20

## (undated) RX ORDER — BUPIVACAINE HYDROCHLORIDE 5 MG/ML
INJECTION, SOLUTION EPIDURAL; INTRACAUDAL
Status: DISPENSED
Start: 2019-07-26

## (undated) RX ORDER — CLINDAMYCIN PHOSPHATE 900 MG/50ML
INJECTION, SOLUTION INTRAVENOUS
Status: DISPENSED
Start: 2018-03-28

## (undated) RX ORDER — ONDANSETRON 2 MG/ML
INJECTION INTRAMUSCULAR; INTRAVENOUS
Status: DISPENSED
Start: 2024-01-01